# Patient Record
Sex: MALE | Race: WHITE | NOT HISPANIC OR LATINO | Employment: OTHER | URBAN - NONMETROPOLITAN AREA
[De-identification: names, ages, dates, MRNs, and addresses within clinical notes are randomized per-mention and may not be internally consistent; named-entity substitution may affect disease eponyms.]

---

## 2018-02-06 ENCOUNTER — APPOINTMENT (OUTPATIENT)
Dept: CT IMAGING | Facility: HOSPITAL | Age: 68
End: 2018-02-06

## 2018-02-06 ENCOUNTER — HOSPITAL ENCOUNTER (INPATIENT)
Facility: HOSPITAL | Age: 68
LOS: 3 days | Discharge: HOME | End: 2018-02-09
Attending: EMERGENCY MEDICINE | Admitting: FAMILY MEDICINE

## 2018-02-06 ENCOUNTER — APPOINTMENT (OUTPATIENT)
Dept: GENERAL RADIOLOGY | Facility: HOSPITAL | Age: 68
End: 2018-02-06

## 2018-02-06 DIAGNOSIS — Z78.9 IMPAIRED MOBILITY AND ADLS: ICD-10-CM

## 2018-02-06 DIAGNOSIS — Z74.09 IMPAIRED MOBILITY AND ADLS: ICD-10-CM

## 2018-02-06 DIAGNOSIS — Z74.09 IMPAIRED PHYSICAL MOBILITY: ICD-10-CM

## 2018-02-06 DIAGNOSIS — J18.9 PNEUMONIA OF RIGHT LOWER LOBE DUE TO INFECTIOUS ORGANISM: Primary | ICD-10-CM

## 2018-02-06 PROBLEM — J44.1 CHRONIC OBSTRUCTIVE PULMONARY DISEASE WITH ACUTE EXACERBATION: Status: ACTIVE | Noted: 2018-02-06

## 2018-02-06 PROBLEM — J96.21 ACUTE ON CHRONIC RESPIRATORY FAILURE WITH HYPOXIA AND HYPERCAPNIA: Status: ACTIVE | Noted: 2018-02-06

## 2018-02-06 PROBLEM — A41.9 SEPSIS DUE TO PNEUMONIA (HCC): Status: ACTIVE | Noted: 2018-02-06

## 2018-02-06 PROBLEM — J96.22 ACUTE ON CHRONIC RESPIRATORY FAILURE WITH HYPOXIA AND HYPERCAPNIA: Status: ACTIVE | Noted: 2018-02-06

## 2018-02-06 LAB
ALBUMIN SERPL-MCNC: 3.9 G/DL (ref 3.4–4.8)
ALBUMIN/GLOB SERPL: 1 G/DL (ref 1.1–1.8)
ALP SERPL-CCNC: 98 U/L (ref 38–126)
ALT SERPL W P-5'-P-CCNC: 38 U/L (ref 21–72)
ANION GAP SERPL CALCULATED.3IONS-SCNC: 14 MMOL/L (ref 5–15)
ARTERIAL PATENCY WRIST A: ABNORMAL
AST SERPL-CCNC: 27 U/L (ref 17–59)
ATMOSPHERIC PRESS: ABNORMAL MMHG
BASE EXCESS BLDA CALC-SCNC: 6.1 MMOL/L (ref -2.4–2.4)
BASOPHILS # BLD AUTO: 0.03 10*3/MM3 (ref 0–0.2)
BASOPHILS NFR BLD AUTO: 0.2 % (ref 0–2)
BDY SITE: ABNORMAL
BILIRUB SERPL-MCNC: 0.5 MG/DL (ref 0.2–1.3)
BUN BLD-MCNC: 10 MG/DL (ref 7–21)
BUN/CREAT SERPL: 19.2 (ref 7–25)
CA-I BLD-MCNC: 4.6 MG/DL (ref 4.5–4.9)
CALCIUM SPEC-SCNC: 8.7 MG/DL (ref 8.4–10.2)
CHLORIDE SERPL-SCNC: 89 MMOL/L (ref 95–110)
CO2 BLDA-SCNC: 33.4 MMOL/L (ref 23–27)
CO2 SERPL-SCNC: 31 MMOL/L (ref 22–31)
CREAT BLD-MCNC: 0.52 MG/DL (ref 0.7–1.3)
D-DIMER, QUANTITATIVE (MAD,POW, STR): 2509 NG/ML (FEU) (ref 0–470)
D-LACTATE SERPL-SCNC: 0.9 MMOL/L (ref 0.5–2)
DEPRECATED RDW RBC AUTO: 39.9 FL (ref 35.1–43.9)
EOSINOPHIL # BLD AUTO: 0.2 10*3/MM3 (ref 0–0.7)
EOSINOPHIL NFR BLD AUTO: 1.6 % (ref 0–7)
ERYTHROCYTE [DISTWIDTH] IN BLOOD BY AUTOMATED COUNT: 12 % (ref 11.5–14.5)
GFR SERPL CREATININE-BSD FRML MDRD: >150 ML/MIN/1.73 (ref 60–113)
GLOBULIN UR ELPH-MCNC: 3.9 GM/DL (ref 2.3–3.5)
GLUCOSE BLD-MCNC: 101 MG/DL (ref 60–100)
GLUCOSE BLDA-MCNC: 106 MMOL/L
HCO3 BLDA-SCNC: 31.9 MMOL/L (ref 22–26)
HCT VFR BLD AUTO: 41.2 % (ref 39–49)
HCT VFR BLD CALC: 42 % (ref 40–54)
HGB BLD-MCNC: 14 G/DL (ref 13.7–17.3)
HGB BLDA-MCNC: 14.2 G/DL (ref 14–18)
HOLD SPECIMEN: NORMAL
IMM GRANULOCYTES # BLD: 0.09 10*3/MM3 (ref 0–0.02)
IMM GRANULOCYTES NFR BLD: 0.7 % (ref 0–0.5)
LYMPHOCYTES # BLD AUTO: 1.11 10*3/MM3 (ref 0.6–4.2)
LYMPHOCYTES NFR BLD AUTO: 8.8 % (ref 10–50)
MCH RBC QN AUTO: 30.8 PG (ref 26.5–34)
MCHC RBC AUTO-ENTMCNC: 34 G/DL (ref 31.5–36.3)
MCV RBC AUTO: 90.5 FL (ref 80–98)
MODALITY: ABNORMAL
MONOCYTES # BLD AUTO: 2.14 10*3/MM3 (ref 0–0.9)
MONOCYTES NFR BLD AUTO: 17 % (ref 0–12)
NEUTROPHILS # BLD AUTO: 9 10*3/MM3 (ref 2–8.6)
NEUTROPHILS NFR BLD AUTO: 71.7 % (ref 37–80)
NT-PROBNP SERPL-MCNC: 72 PG/ML (ref 0–900)
PCO2 BLDA: 50.3 MM HG (ref 35–45)
PH BLDA: 7.42 PH UNITS (ref 7.35–7.45)
PLATELET # BLD AUTO: 324 10*3/MM3 (ref 150–450)
PMV BLD AUTO: 10.4 FL (ref 8–12)
PO2 BLDA: 108.2 MM HG (ref 80–105)
POTASSIUM BLD-SCNC: 3.8 MMOL/L (ref 3.5–5.1)
POTASSIUM BLDA-SCNC: 3.7 MMOL/L (ref 3.6–4.9)
PROT SERPL-MCNC: 7.8 G/DL (ref 6.3–8.6)
RBC # BLD AUTO: 4.55 10*6/MM3 (ref 4.37–5.74)
SAO2 % BLDCOA: 98.2 %
SODIUM BLD-SCNC: 134 MMOL/L (ref 137–145)
SODIUM BLDA-SCNC: 132 MMOL/L (ref 138–146)
WBC NRBC COR # BLD: 12.57 10*3/MM3 (ref 3.2–9.8)
WHOLE BLOOD HOLD SPECIMEN: NORMAL

## 2018-02-06 PROCEDURE — 93005 ELECTROCARDIOGRAM TRACING: CPT | Performed by: EMERGENCY MEDICINE

## 2018-02-06 PROCEDURE — 93010 ELECTROCARDIOGRAM REPORT: CPT | Performed by: INTERNAL MEDICINE

## 2018-02-06 PROCEDURE — 25010000002 LEVOFLOXACIN PER 250 MG: Performed by: EMERGENCY MEDICINE

## 2018-02-06 PROCEDURE — 94760 N-INVAS EAR/PLS OXIMETRY 1: CPT

## 2018-02-06 PROCEDURE — 71045 X-RAY EXAM CHEST 1 VIEW: CPT

## 2018-02-06 PROCEDURE — 82803 BLOOD GASES ANY COMBINATION: CPT | Performed by: EMERGENCY MEDICINE

## 2018-02-06 PROCEDURE — 99221 1ST HOSP IP/OBS SF/LOW 40: CPT | Performed by: FAMILY MEDICINE

## 2018-02-06 PROCEDURE — 94799 UNLISTED PULMONARY SVC/PX: CPT

## 2018-02-06 PROCEDURE — 83605 ASSAY OF LACTIC ACID: CPT | Performed by: EMERGENCY MEDICINE

## 2018-02-06 PROCEDURE — 99285 EMERGENCY DEPT VISIT HI MDM: CPT

## 2018-02-06 PROCEDURE — 85379 FIBRIN DEGRADATION QUANT: CPT | Performed by: STUDENT IN AN ORGANIZED HEALTH CARE EDUCATION/TRAINING PROGRAM

## 2018-02-06 PROCEDURE — 85025 COMPLETE CBC W/AUTO DIFF WBC: CPT | Performed by: EMERGENCY MEDICINE

## 2018-02-06 PROCEDURE — 87040 BLOOD CULTURE FOR BACTERIA: CPT | Performed by: EMERGENCY MEDICINE

## 2018-02-06 PROCEDURE — 25010000002 METHYLPREDNISOLONE PER 125 MG: Performed by: EMERGENCY MEDICINE

## 2018-02-06 PROCEDURE — 94640 AIRWAY INHALATION TREATMENT: CPT

## 2018-02-06 PROCEDURE — 0 IOPAMIDOL PER 1 ML: Performed by: STUDENT IN AN ORGANIZED HEALTH CARE EDUCATION/TRAINING PROGRAM

## 2018-02-06 PROCEDURE — 83880 ASSAY OF NATRIURETIC PEPTIDE: CPT | Performed by: EMERGENCY MEDICINE

## 2018-02-06 PROCEDURE — 71275 CT ANGIOGRAPHY CHEST: CPT

## 2018-02-06 PROCEDURE — 80053 COMPREHEN METABOLIC PANEL: CPT | Performed by: EMERGENCY MEDICINE

## 2018-02-06 RX ORDER — SODIUM CHLORIDE 0.9 % (FLUSH) 0.9 %
10 SYRINGE (ML) INJECTION AS NEEDED
Status: DISCONTINUED | OUTPATIENT
Start: 2018-02-06 | End: 2018-02-09 | Stop reason: HOSPADM

## 2018-02-06 RX ORDER — ACETAMINOPHEN 325 MG/1
650 TABLET ORAL EVERY 4 HOURS PRN
Status: DISCONTINUED | OUTPATIENT
Start: 2018-02-06 | End: 2018-02-09 | Stop reason: HOSPADM

## 2018-02-06 RX ORDER — ASPIRIN 81 MG/1
81 TABLET, CHEWABLE ORAL DAILY
COMMUNITY
End: 2022-09-14

## 2018-02-06 RX ORDER — IPRATROPIUM BROMIDE AND ALBUTEROL SULFATE 2.5; .5 MG/3ML; MG/3ML
3 SOLUTION RESPIRATORY (INHALATION)
Status: DISCONTINUED | OUTPATIENT
Start: 2018-02-06 | End: 2018-02-06

## 2018-02-06 RX ORDER — BISACODYL 5 MG/1
5 TABLET, DELAYED RELEASE ORAL DAILY PRN
Status: DISCONTINUED | OUTPATIENT
Start: 2018-02-06 | End: 2018-02-09 | Stop reason: HOSPADM

## 2018-02-06 RX ORDER — MONTELUKAST SODIUM 10 MG/1
10 TABLET ORAL NIGHTLY
Status: DISCONTINUED | OUTPATIENT
Start: 2018-02-06 | End: 2018-02-09 | Stop reason: HOSPADM

## 2018-02-06 RX ORDER — LEVOFLOXACIN 5 MG/ML
750 INJECTION, SOLUTION INTRAVENOUS ONCE
Status: COMPLETED | OUTPATIENT
Start: 2018-02-06 | End: 2018-02-06

## 2018-02-06 RX ORDER — PREDNISONE 1 MG/1
5 TABLET ORAL EVERY OTHER DAY
COMMUNITY

## 2018-02-06 RX ORDER — SODIUM CHLORIDE 0.9 % (FLUSH) 0.9 %
1-10 SYRINGE (ML) INJECTION AS NEEDED
Status: DISCONTINUED | OUTPATIENT
Start: 2018-02-06 | End: 2018-02-09 | Stop reason: HOSPADM

## 2018-02-06 RX ORDER — LEVOFLOXACIN 750 MG/1
750 TABLET ORAL EVERY 24 HOURS
Status: DISCONTINUED | OUTPATIENT
Start: 2018-02-07 | End: 2018-02-09 | Stop reason: HOSPADM

## 2018-02-06 RX ORDER — METHYLPREDNISOLONE SODIUM SUCCINATE 125 MG/2ML
125 INJECTION, POWDER, LYOPHILIZED, FOR SOLUTION INTRAMUSCULAR; INTRAVENOUS ONCE
Status: COMPLETED | OUTPATIENT
Start: 2018-02-06 | End: 2018-02-06

## 2018-02-06 RX ORDER — SODIUM CHLORIDE 9 MG/ML
75 INJECTION, SOLUTION INTRAVENOUS CONTINUOUS
Status: DISCONTINUED | OUTPATIENT
Start: 2018-02-06 | End: 2018-02-09

## 2018-02-06 RX ORDER — MONTELUKAST SODIUM 10 MG/1
10 TABLET ORAL NIGHTLY
COMMUNITY
End: 2023-02-10

## 2018-02-06 RX ORDER — LEVOFLOXACIN 5 MG/ML
750 INJECTION, SOLUTION INTRAVENOUS ONCE
Status: DISCONTINUED | OUTPATIENT
Start: 2018-02-07 | End: 2018-02-06

## 2018-02-06 RX ORDER — ASPIRIN 81 MG/1
81 TABLET, CHEWABLE ORAL DAILY
Status: DISCONTINUED | OUTPATIENT
Start: 2018-02-06 | End: 2018-02-09 | Stop reason: HOSPADM

## 2018-02-06 RX ORDER — FAMOTIDINE 40 MG/1
40 TABLET, FILM COATED ORAL DAILY
Status: DISCONTINUED | OUTPATIENT
Start: 2018-02-06 | End: 2018-02-09 | Stop reason: HOSPADM

## 2018-02-06 RX ADMIN — LEVOFLOXACIN 750 MG: 5 INJECTION, SOLUTION INTRAVENOUS at 15:10

## 2018-02-06 RX ADMIN — MONTELUKAST SODIUM 10 MG: 10 TABLET, FILM COATED ORAL at 21:16

## 2018-02-06 RX ADMIN — IOPAMIDOL 57 ML: 755 INJECTION, SOLUTION INTRAVENOUS at 19:30

## 2018-02-06 RX ADMIN — SODIUM CHLORIDE 100 ML/HR: 900 INJECTION, SOLUTION INTRAVENOUS at 13:25

## 2018-02-06 RX ADMIN — SODIUM CHLORIDE 500 ML: 9 INJECTION, SOLUTION INTRAVENOUS at 15:45

## 2018-02-06 RX ADMIN — IPRATROPIUM BROMIDE 0.5 MG: 0.5 SOLUTION RESPIRATORY (INHALATION) at 22:33

## 2018-02-06 RX ADMIN — METHYLPREDNISOLONE SODIUM SUCCINATE 125 MG: 125 INJECTION, POWDER, FOR SOLUTION INTRAMUSCULAR; INTRAVENOUS at 13:25

## 2018-02-06 RX ADMIN — Medication 10 ML: at 13:25

## 2018-02-07 LAB
ALBUMIN SERPL-MCNC: 3.1 G/DL (ref 3.4–4.8)
ALBUMIN/GLOB SERPL: 0.9 G/DL (ref 1.1–1.8)
ALP SERPL-CCNC: 80 U/L (ref 38–126)
ALT SERPL W P-5'-P-CCNC: 40 U/L (ref 21–72)
ANION GAP SERPL CALCULATED.3IONS-SCNC: 5 MMOL/L (ref 5–15)
AST SERPL-CCNC: 23 U/L (ref 17–59)
BASOPHILS # BLD AUTO: 0 10*3/MM3 (ref 0–0.2)
BASOPHILS NFR BLD AUTO: 0 % (ref 0–2)
BILIRUB SERPL-MCNC: <0.1 MG/DL (ref 0.2–1.3)
BUN BLD-MCNC: 11 MG/DL (ref 7–21)
BUN/CREAT SERPL: 21.6 (ref 7–25)
CALCIUM SPEC-SCNC: 8.6 MG/DL (ref 8.4–10.2)
CHLORIDE SERPL-SCNC: 99 MMOL/L (ref 95–110)
CO2 SERPL-SCNC: 32 MMOL/L (ref 22–31)
CREAT BLD-MCNC: 0.51 MG/DL (ref 0.7–1.3)
DEPRECATED RDW RBC AUTO: 39.7 FL (ref 35.1–43.9)
EOSINOPHIL # BLD AUTO: 0 10*3/MM3 (ref 0–0.7)
EOSINOPHIL NFR BLD AUTO: 0 % (ref 0–7)
ERYTHROCYTE [DISTWIDTH] IN BLOOD BY AUTOMATED COUNT: 11.9 % (ref 11.5–14.5)
GFR SERPL CREATININE-BSD FRML MDRD: 162 ML/MIN/1.73 (ref 49–113)
GLOBULIN UR ELPH-MCNC: 3.4 GM/DL (ref 2.3–3.5)
GLUCOSE BLD-MCNC: 139 MG/DL (ref 60–100)
HCT VFR BLD AUTO: 36.4 % (ref 39–49)
HGB BLD-MCNC: 12.4 G/DL (ref 13.7–17.3)
IMM GRANULOCYTES # BLD: 0.05 10*3/MM3 (ref 0–0.02)
IMM GRANULOCYTES NFR BLD: 0.6 % (ref 0–0.5)
LYMPHOCYTES # BLD AUTO: 0.68 10*3/MM3 (ref 0.6–4.2)
LYMPHOCYTES NFR BLD AUTO: 7.9 % (ref 10–50)
MCH RBC QN AUTO: 30.8 PG (ref 26.5–34)
MCHC RBC AUTO-ENTMCNC: 34.1 G/DL (ref 31.5–36.3)
MCV RBC AUTO: 90.5 FL (ref 80–98)
MONOCYTES # BLD AUTO: 0.92 10*3/MM3 (ref 0–0.9)
MONOCYTES NFR BLD AUTO: 10.7 % (ref 0–12)
NEUTROPHILS # BLD AUTO: 6.97 10*3/MM3 (ref 2–8.6)
NEUTROPHILS NFR BLD AUTO: 80.8 % (ref 37–80)
PLATELET # BLD AUTO: 351 10*3/MM3 (ref 150–450)
PMV BLD AUTO: 10.2 FL (ref 8–12)
POTASSIUM BLD-SCNC: 4.3 MMOL/L (ref 3.5–5.1)
PROT SERPL-MCNC: 6.5 G/DL (ref 6.3–8.6)
RBC # BLD AUTO: 4.02 10*6/MM3 (ref 4.37–5.74)
SODIUM BLD-SCNC: 136 MMOL/L (ref 137–145)
WBC NRBC COR # BLD: 8.62 10*3/MM3 (ref 3.2–9.8)

## 2018-02-07 PROCEDURE — 99232 SBSQ HOSP IP/OBS MODERATE 35: CPT | Performed by: STUDENT IN AN ORGANIZED HEALTH CARE EDUCATION/TRAINING PROGRAM

## 2018-02-07 PROCEDURE — 80053 COMPREHEN METABOLIC PANEL: CPT | Performed by: STUDENT IN AN ORGANIZED HEALTH CARE EDUCATION/TRAINING PROGRAM

## 2018-02-07 PROCEDURE — 94760 N-INVAS EAR/PLS OXIMETRY 1: CPT

## 2018-02-07 PROCEDURE — 94799 UNLISTED PULMONARY SVC/PX: CPT

## 2018-02-07 PROCEDURE — 63710000001 PREDNISONE PER 1 MG: Performed by: STUDENT IN AN ORGANIZED HEALTH CARE EDUCATION/TRAINING PROGRAM

## 2018-02-07 PROCEDURE — 85025 COMPLETE CBC W/AUTO DIFF WBC: CPT | Performed by: STUDENT IN AN ORGANIZED HEALTH CARE EDUCATION/TRAINING PROGRAM

## 2018-02-07 RX ORDER — PREDNISONE 20 MG/1
40 TABLET ORAL
Status: DISCONTINUED | OUTPATIENT
Start: 2018-02-07 | End: 2018-02-09 | Stop reason: HOSPADM

## 2018-02-07 RX ORDER — IPRATROPIUM BROMIDE AND ALBUTEROL SULFATE 2.5; .5 MG/3ML; MG/3ML
3 SOLUTION RESPIRATORY (INHALATION)
Status: DISCONTINUED | OUTPATIENT
Start: 2018-02-07 | End: 2018-02-07

## 2018-02-07 RX ADMIN — PREDNISONE 40 MG: 20 TABLET ORAL at 08:31

## 2018-02-07 RX ADMIN — SODIUM CHLORIDE 75 ML/HR: 900 INJECTION, SOLUTION INTRAVENOUS at 15:31

## 2018-02-07 RX ADMIN — SODIUM CHLORIDE 100 ML/HR: 900 INJECTION, SOLUTION INTRAVENOUS at 04:19

## 2018-02-07 RX ADMIN — IPRATROPIUM BROMIDE 0.5 MG: 0.5 SOLUTION RESPIRATORY (INHALATION) at 15:36

## 2018-02-07 RX ADMIN — LEVOFLOXACIN 750 MG: 750 TABLET, FILM COATED ORAL at 15:30

## 2018-02-07 RX ADMIN — IPRATROPIUM BROMIDE 0.5 MG: 0.5 SOLUTION RESPIRATORY (INHALATION) at 18:36

## 2018-02-07 RX ADMIN — ASPIRIN 81 MG 81 MG: 81 TABLET ORAL at 08:31

## 2018-02-07 RX ADMIN — FAMOTIDINE 40 MG: 40 TABLET ORAL at 08:31

## 2018-02-07 RX ADMIN — MONTELUKAST SODIUM 10 MG: 10 TABLET, FILM COATED ORAL at 20:04

## 2018-02-07 RX ADMIN — IPRATROPIUM BROMIDE 0.5 MG: 0.5 SOLUTION RESPIRATORY (INHALATION) at 07:17

## 2018-02-08 ENCOUNTER — APPOINTMENT (OUTPATIENT)
Dept: ULTRASOUND IMAGING | Facility: HOSPITAL | Age: 68
End: 2018-02-08

## 2018-02-08 PROBLEM — E87.6 HYPOKALEMIA: Status: ACTIVE | Noted: 2018-02-08

## 2018-02-08 LAB
ALBUMIN SERPL-MCNC: 2.9 G/DL (ref 3.4–4.8)
ALBUMIN/GLOB SERPL: 0.9 G/DL (ref 1.1–1.8)
ALP SERPL-CCNC: 76 U/L (ref 38–126)
ALT SERPL W P-5'-P-CCNC: 34 U/L (ref 21–72)
ANION GAP SERPL CALCULATED.3IONS-SCNC: 8 MMOL/L (ref 5–15)
AST SERPL-CCNC: 21 U/L (ref 17–59)
BACTERIA SPEC RESP CULT: NORMAL
BASOPHILS # BLD AUTO: 0.01 10*3/MM3 (ref 0–0.2)
BASOPHILS NFR BLD AUTO: 0.1 % (ref 0–2)
BILIRUB SERPL-MCNC: <0.1 MG/DL (ref 0.2–1.3)
BUN BLD-MCNC: 12 MG/DL (ref 7–21)
BUN/CREAT SERPL: 21.1 (ref 7–25)
CALCIUM SPEC-SCNC: 8.6 MG/DL (ref 8.4–10.2)
CHLORIDE SERPL-SCNC: 101 MMOL/L (ref 95–110)
CO2 SERPL-SCNC: 30 MMOL/L (ref 22–31)
CREAT BLD-MCNC: 0.57 MG/DL (ref 0.7–1.3)
DEPRECATED RDW RBC AUTO: 41.1 FL (ref 35.1–43.9)
EOSINOPHIL # BLD AUTO: 0.05 10*3/MM3 (ref 0–0.7)
EOSINOPHIL NFR BLD AUTO: 0.4 % (ref 0–7)
ERYTHROCYTE [DISTWIDTH] IN BLOOD BY AUTOMATED COUNT: 12.2 % (ref 11.5–14.5)
GFR SERPL CREATININE-BSD FRML MDRD: 142 ML/MIN/1.73 (ref 49–113)
GLOBULIN UR ELPH-MCNC: 3.2 GM/DL (ref 2.3–3.5)
GLUCOSE BLD-MCNC: 98 MG/DL (ref 60–100)
GRAM STN SPEC: NORMAL
GRAM STN SPEC: NORMAL
HCT VFR BLD AUTO: 36.1 % (ref 39–49)
HGB BLD-MCNC: 12 G/DL (ref 13.7–17.3)
IMM GRANULOCYTES # BLD: 0.12 10*3/MM3 (ref 0–0.02)
IMM GRANULOCYTES NFR BLD: 1 % (ref 0–0.5)
L PNEUMO1 AG UR QL IA: NEGATIVE
LYMPHOCYTES # BLD AUTO: 1.69 10*3/MM3 (ref 0.6–4.2)
LYMPHOCYTES NFR BLD AUTO: 14.2 % (ref 10–50)
MCH RBC QN AUTO: 30.7 PG (ref 26.5–34)
MCHC RBC AUTO-ENTMCNC: 33.2 G/DL (ref 31.5–36.3)
MCV RBC AUTO: 92.3 FL (ref 80–98)
MONOCYTES # BLD AUTO: 2.01 10*3/MM3 (ref 0–0.9)
MONOCYTES NFR BLD AUTO: 16.9 % (ref 0–12)
NEUTROPHILS # BLD AUTO: 8.03 10*3/MM3 (ref 2–8.6)
NEUTROPHILS NFR BLD AUTO: 67.4 % (ref 37–80)
PLATELET # BLD AUTO: 398 10*3/MM3 (ref 150–450)
PMV BLD AUTO: 10 FL (ref 8–12)
POTASSIUM BLD-SCNC: 3.2 MMOL/L (ref 3.5–5.1)
PROT SERPL-MCNC: 6.1 G/DL (ref 6.3–8.6)
RBC # BLD AUTO: 3.91 10*6/MM3 (ref 4.37–5.74)
S PNEUM AG SPEC QL LA: NEGATIVE
SODIUM BLD-SCNC: 139 MMOL/L (ref 137–145)
WBC NRBC COR # BLD: 11.91 10*3/MM3 (ref 3.2–9.8)

## 2018-02-08 PROCEDURE — 97166 OT EVAL MOD COMPLEX 45 MIN: CPT

## 2018-02-08 PROCEDURE — 94799 UNLISTED PULMONARY SVC/PX: CPT

## 2018-02-08 PROCEDURE — G8978 MOBILITY CURRENT STATUS: HCPCS

## 2018-02-08 PROCEDURE — 97530 THERAPEUTIC ACTIVITIES: CPT

## 2018-02-08 PROCEDURE — 93970 EXTREMITY STUDY: CPT

## 2018-02-08 PROCEDURE — 87581 M.PNEUMON DNA AMP PROBE: CPT | Performed by: STUDENT IN AN ORGANIZED HEALTH CARE EDUCATION/TRAINING PROGRAM

## 2018-02-08 PROCEDURE — 97116 GAIT TRAINING THERAPY: CPT

## 2018-02-08 PROCEDURE — 99232 SBSQ HOSP IP/OBS MODERATE 35: CPT | Performed by: STUDENT IN AN ORGANIZED HEALTH CARE EDUCATION/TRAINING PROGRAM

## 2018-02-08 PROCEDURE — 87899 AGENT NOS ASSAY W/OPTIC: CPT | Performed by: STUDENT IN AN ORGANIZED HEALTH CARE EDUCATION/TRAINING PROGRAM

## 2018-02-08 PROCEDURE — G8979 MOBILITY GOAL STATUS: HCPCS

## 2018-02-08 PROCEDURE — 85025 COMPLETE CBC W/AUTO DIFF WBC: CPT | Performed by: STUDENT IN AN ORGANIZED HEALTH CARE EDUCATION/TRAINING PROGRAM

## 2018-02-08 PROCEDURE — 87205 SMEAR GRAM STAIN: CPT | Performed by: STUDENT IN AN ORGANIZED HEALTH CARE EDUCATION/TRAINING PROGRAM

## 2018-02-08 PROCEDURE — 97162 PT EVAL MOD COMPLEX 30 MIN: CPT

## 2018-02-08 PROCEDURE — 63710000001 PREDNISONE PER 1 MG: Performed by: STUDENT IN AN ORGANIZED HEALTH CARE EDUCATION/TRAINING PROGRAM

## 2018-02-08 PROCEDURE — 80053 COMPREHEN METABOLIC PANEL: CPT | Performed by: STUDENT IN AN ORGANIZED HEALTH CARE EDUCATION/TRAINING PROGRAM

## 2018-02-08 PROCEDURE — G8987 SELF CARE CURRENT STATUS: HCPCS

## 2018-02-08 PROCEDURE — 94760 N-INVAS EAR/PLS OXIMETRY 1: CPT

## 2018-02-08 PROCEDURE — G8988 SELF CARE GOAL STATUS: HCPCS

## 2018-02-08 RX ORDER — POTASSIUM CHLORIDE 750 MG/1
40 CAPSULE, EXTENDED RELEASE ORAL DAILY
Status: DISCONTINUED | OUTPATIENT
Start: 2018-02-08 | End: 2018-02-09

## 2018-02-08 RX ADMIN — FAMOTIDINE 40 MG: 40 TABLET ORAL at 08:17

## 2018-02-08 RX ADMIN — LEVOFLOXACIN 750 MG: 750 TABLET, FILM COATED ORAL at 15:22

## 2018-02-08 RX ADMIN — PREDNISONE 40 MG: 20 TABLET ORAL at 08:16

## 2018-02-08 RX ADMIN — IPRATROPIUM BROMIDE 0.5 MG: 0.5 SOLUTION RESPIRATORY (INHALATION) at 19:37

## 2018-02-08 RX ADMIN — IPRATROPIUM BROMIDE 0.5 MG: 0.5 SOLUTION RESPIRATORY (INHALATION) at 15:25

## 2018-02-08 RX ADMIN — SODIUM CHLORIDE 75 ML/HR: 900 INJECTION, SOLUTION INTRAVENOUS at 18:56

## 2018-02-08 RX ADMIN — SODIUM CHLORIDE 75 ML/HR: 900 INJECTION, SOLUTION INTRAVENOUS at 04:50

## 2018-02-08 RX ADMIN — POTASSIUM CHLORIDE 40 MEQ: 750 CAPSULE, EXTENDED RELEASE ORAL at 10:38

## 2018-02-08 RX ADMIN — IPRATROPIUM BROMIDE 0.5 MG: 0.5 SOLUTION RESPIRATORY (INHALATION) at 10:47

## 2018-02-08 RX ADMIN — ASPIRIN 81 MG 81 MG: 81 TABLET ORAL at 08:16

## 2018-02-08 RX ADMIN — MONTELUKAST SODIUM 10 MG: 10 TABLET, FILM COATED ORAL at 21:24

## 2018-02-08 RX ADMIN — IPRATROPIUM BROMIDE 0.5 MG: 0.5 SOLUTION RESPIRATORY (INHALATION) at 06:31

## 2018-02-09 VITALS
RESPIRATION RATE: 16 BRPM | HEIGHT: 65 IN | HEART RATE: 107 BPM | BODY MASS INDEX: 26.66 KG/M2 | WEIGHT: 160 LBS | OXYGEN SATURATION: 95 % | SYSTOLIC BLOOD PRESSURE: 113 MMHG | DIASTOLIC BLOOD PRESSURE: 61 MMHG | TEMPERATURE: 97.5 F

## 2018-02-09 PROBLEM — R53.81 PHYSICAL DECONDITIONING: Status: ACTIVE | Noted: 2018-02-09

## 2018-02-09 LAB
ALBUMIN SERPL-MCNC: 3 G/DL (ref 3.4–4.8)
ALBUMIN/GLOB SERPL: 0.9 G/DL (ref 1.1–1.8)
ALP SERPL-CCNC: 69 U/L (ref 38–126)
ALT SERPL W P-5'-P-CCNC: 34 U/L (ref 21–72)
ANION GAP SERPL CALCULATED.3IONS-SCNC: 6 MMOL/L (ref 5–15)
AST SERPL-CCNC: 18 U/L (ref 17–59)
BASOPHILS # BLD AUTO: 0.01 10*3/MM3 (ref 0–0.2)
BASOPHILS NFR BLD AUTO: 0.1 % (ref 0–2)
BILIRUB SERPL-MCNC: <0.1 MG/DL (ref 0.2–1.3)
BUN BLD-MCNC: 11 MG/DL (ref 7–21)
BUN/CREAT SERPL: 19.3 (ref 7–25)
CALCIUM SPEC-SCNC: 8.4 MG/DL (ref 8.4–10.2)
CHLORIDE SERPL-SCNC: 100 MMOL/L (ref 95–110)
CO2 SERPL-SCNC: 33 MMOL/L (ref 22–31)
CREAT BLD-MCNC: 0.57 MG/DL (ref 0.7–1.3)
DEPRECATED RDW RBC AUTO: 41 FL (ref 35.1–43.9)
EOSINOPHIL # BLD AUTO: 0.02 10*3/MM3 (ref 0–0.7)
EOSINOPHIL NFR BLD AUTO: 0.2 % (ref 0–7)
ERYTHROCYTE [DISTWIDTH] IN BLOOD BY AUTOMATED COUNT: 11.9 % (ref 11.5–14.5)
GFR SERPL CREATININE-BSD FRML MDRD: 142 ML/MIN/1.73 (ref 60–113)
GLOBULIN UR ELPH-MCNC: 3.2 GM/DL (ref 2.3–3.5)
GLUCOSE BLD-MCNC: 108 MG/DL (ref 60–100)
HCT VFR BLD AUTO: 36.7 % (ref 39–49)
HGB BLD-MCNC: 12.1 G/DL (ref 13.7–17.3)
IMM GRANULOCYTES # BLD: 0.12 10*3/MM3 (ref 0–0.02)
IMM GRANULOCYTES NFR BLD: 1.2 % (ref 0–0.5)
LYMPHOCYTES # BLD AUTO: 1.27 10*3/MM3 (ref 0.6–4.2)
LYMPHOCYTES NFR BLD AUTO: 12.7 % (ref 10–50)
MCH RBC QN AUTO: 30.7 PG (ref 26.5–34)
MCHC RBC AUTO-ENTMCNC: 33 G/DL (ref 31.5–36.3)
MCV RBC AUTO: 93.1 FL (ref 80–98)
MONOCYTES # BLD AUTO: 1.62 10*3/MM3 (ref 0–0.9)
MONOCYTES NFR BLD AUTO: 16.2 % (ref 0–12)
MYCOPLASMAE PNEUMONIAE BY PCR: NORMAL
NEUTROPHILS # BLD AUTO: 6.96 10*3/MM3 (ref 2–8.6)
NEUTROPHILS NFR BLD AUTO: 69.6 % (ref 37–80)
PLATELET # BLD AUTO: 420 10*3/MM3 (ref 150–450)
PMV BLD AUTO: 9.7 FL (ref 8–12)
POTASSIUM BLD-SCNC: 4 MMOL/L (ref 3.5–5.1)
PROT SERPL-MCNC: 6.2 G/DL (ref 6.3–8.6)
RBC # BLD AUTO: 3.94 10*6/MM3 (ref 4.37–5.74)
SODIUM BLD-SCNC: 139 MMOL/L (ref 137–145)
WBC NRBC COR # BLD: 10 10*3/MM3 (ref 3.2–9.8)

## 2018-02-09 PROCEDURE — 97530 THERAPEUTIC ACTIVITIES: CPT

## 2018-02-09 PROCEDURE — 94799 UNLISTED PULMONARY SVC/PX: CPT

## 2018-02-09 PROCEDURE — 80053 COMPREHEN METABOLIC PANEL: CPT | Performed by: STUDENT IN AN ORGANIZED HEALTH CARE EDUCATION/TRAINING PROGRAM

## 2018-02-09 PROCEDURE — 99232 SBSQ HOSP IP/OBS MODERATE 35: CPT | Performed by: STUDENT IN AN ORGANIZED HEALTH CARE EDUCATION/TRAINING PROGRAM

## 2018-02-09 PROCEDURE — 85025 COMPLETE CBC W/AUTO DIFF WBC: CPT | Performed by: STUDENT IN AN ORGANIZED HEALTH CARE EDUCATION/TRAINING PROGRAM

## 2018-02-09 PROCEDURE — 94760 N-INVAS EAR/PLS OXIMETRY 1: CPT

## 2018-02-09 PROCEDURE — 63710000001 PREDNISONE PER 1 MG: Performed by: STUDENT IN AN ORGANIZED HEALTH CARE EDUCATION/TRAINING PROGRAM

## 2018-02-09 PROCEDURE — 97535 SELF CARE MNGMENT TRAINING: CPT

## 2018-02-09 RX ORDER — FLUTICASONE PROPIONATE 220 UG/1
1 AEROSOL, METERED RESPIRATORY (INHALATION)
Qty: 12 G | Refills: 1 | Status: ON HOLD | OUTPATIENT
Start: 2018-02-09 | End: 2021-07-12

## 2018-02-09 RX ORDER — POTASSIUM CHLORIDE 750 MG/1
20 CAPSULE, EXTENDED RELEASE ORAL DAILY
Status: DISCONTINUED | OUTPATIENT
Start: 2018-02-09 | End: 2018-02-09 | Stop reason: HOSPADM

## 2018-02-09 RX ORDER — LEVOFLOXACIN 750 MG/1
750 TABLET ORAL EVERY 24 HOURS
Qty: 2 TABLET | Refills: 0 | Status: SHIPPED | OUTPATIENT
Start: 2018-02-09 | End: 2018-02-11

## 2018-02-09 RX ADMIN — FAMOTIDINE 40 MG: 40 TABLET ORAL at 08:38

## 2018-02-09 RX ADMIN — IPRATROPIUM BROMIDE 0.5 MG: 0.5 SOLUTION RESPIRATORY (INHALATION) at 09:47

## 2018-02-09 RX ADMIN — ASPIRIN 81 MG 81 MG: 81 TABLET ORAL at 08:39

## 2018-02-09 RX ADMIN — POTASSIUM CHLORIDE 20 MEQ: 750 CAPSULE, EXTENDED RELEASE ORAL at 08:38

## 2018-02-09 RX ADMIN — PREDNISONE 40 MG: 20 TABLET ORAL at 08:38

## 2018-02-09 RX ADMIN — IPRATROPIUM BROMIDE 0.5 MG: 0.5 SOLUTION RESPIRATORY (INHALATION) at 06:21

## 2018-02-11 LAB
BACTERIA SPEC AEROBE CULT: NORMAL
BACTERIA SPEC AEROBE CULT: NORMAL

## 2018-11-06 ENCOUNTER — HOSPITAL ENCOUNTER (EMERGENCY)
Facility: HOSPITAL | Age: 68
Discharge: HOME | End: 2018-11-06
Attending: FAMILY MEDICINE | Admitting: FAMILY MEDICINE

## 2018-11-06 ENCOUNTER — APPOINTMENT (OUTPATIENT)
Dept: GENERAL RADIOLOGY | Facility: HOSPITAL | Age: 68
End: 2018-11-06

## 2018-11-06 VITALS
DIASTOLIC BLOOD PRESSURE: 70 MMHG | HEART RATE: 95 BPM | TEMPERATURE: 98 F | HEIGHT: 65 IN | WEIGHT: 161 LBS | RESPIRATION RATE: 20 BRPM | BODY MASS INDEX: 26.82 KG/M2 | SYSTOLIC BLOOD PRESSURE: 152 MMHG | OXYGEN SATURATION: 96 %

## 2018-11-06 DIAGNOSIS — J44.9 CHRONIC OBSTRUCTIVE PULMONARY DISEASE, UNSPECIFIED COPD TYPE (HCC): Primary | ICD-10-CM

## 2018-11-06 LAB
ALBUMIN SERPL-MCNC: 4 G/DL (ref 3.4–4.8)
ALBUMIN/GLOB SERPL: 1.2 G/DL (ref 1.1–1.8)
ALP SERPL-CCNC: 91 U/L (ref 38–126)
ALT SERPL W P-5'-P-CCNC: 31 U/L (ref 21–72)
ANION GAP SERPL CALCULATED.3IONS-SCNC: 9 MMOL/L (ref 5–15)
AST SERPL-CCNC: 26 U/L (ref 17–59)
BASOPHILS # BLD AUTO: 0.02 10*3/MM3 (ref 0–0.2)
BASOPHILS NFR BLD AUTO: 0.1 % (ref 0–2)
BILIRUB SERPL-MCNC: 0.4 MG/DL (ref 0.2–1.3)
BILIRUB UR QL STRIP: NEGATIVE
BUN BLD-MCNC: 19 MG/DL (ref 7–21)
BUN/CREAT SERPL: 24.4 (ref 7–25)
CALCIUM SPEC-SCNC: 8.9 MG/DL (ref 8.4–10.2)
CHLORIDE SERPL-SCNC: 102 MMOL/L (ref 95–110)
CLARITY UR: CLEAR
CO2 SERPL-SCNC: 27 MMOL/L (ref 22–31)
COLOR UR: YELLOW
CREAT BLD-MCNC: 0.78 MG/DL (ref 0.7–1.3)
DEPRECATED RDW RBC AUTO: 41.3 FL (ref 35.1–43.9)
EOSINOPHIL # BLD AUTO: 0.05 10*3/MM3 (ref 0–0.7)
EOSINOPHIL NFR BLD AUTO: 0.3 % (ref 0–7)
ERYTHROCYTE [DISTWIDTH] IN BLOOD BY AUTOMATED COUNT: 12.4 % (ref 11.5–14.5)
FLUAV AG NPH QL: NEGATIVE
FLUBV AG NPH QL IA: NEGATIVE
GFR SERPL CREATININE-BSD FRML MDRD: 99 ML/MIN/1.73 (ref 49–113)
GLOBULIN UR ELPH-MCNC: 3.3 GM/DL (ref 2.3–3.5)
GLUCOSE BLD-MCNC: 105 MG/DL (ref 60–100)
GLUCOSE UR STRIP-MCNC: NEGATIVE MG/DL
HCT VFR BLD AUTO: 42.6 % (ref 39–49)
HGB BLD-MCNC: 14.8 G/DL (ref 13.7–17.3)
HGB UR QL STRIP.AUTO: NEGATIVE
HOLD SPECIMEN: NORMAL
HOLD SPECIMEN: NORMAL
IMM GRANULOCYTES # BLD: 0.06 10*3/MM3 (ref 0–0.02)
IMM GRANULOCYTES NFR BLD: 0.4 % (ref 0–0.5)
INR PPP: 1.02 (ref 0.8–1.2)
KETONES UR QL STRIP: NEGATIVE
LEUKOCYTE ESTERASE UR QL STRIP.AUTO: NEGATIVE
LYMPHOCYTES # BLD AUTO: 0.76 10*3/MM3 (ref 0.6–4.2)
LYMPHOCYTES NFR BLD AUTO: 5.2 % (ref 10–50)
MCH RBC QN AUTO: 31.6 PG (ref 26.5–34)
MCHC RBC AUTO-ENTMCNC: 34.7 G/DL (ref 31.5–36.3)
MCV RBC AUTO: 90.8 FL (ref 80–98)
MONOCYTES # BLD AUTO: 0.8 10*3/MM3 (ref 0–0.9)
MONOCYTES NFR BLD AUTO: 5.5 % (ref 0–12)
NEUTROPHILS # BLD AUTO: 12.88 10*3/MM3 (ref 2–8.6)
NEUTROPHILS NFR BLD AUTO: 88.5 % (ref 37–80)
NITRITE UR QL STRIP: NEGATIVE
NT-PROBNP SERPL-MCNC: 48 PG/ML (ref 0–900)
PH UR STRIP.AUTO: 7 [PH] (ref 5–9)
PLATELET # BLD AUTO: 219 10*3/MM3 (ref 150–450)
PMV BLD AUTO: 11.1 FL (ref 8–12)
POTASSIUM BLD-SCNC: 4.1 MMOL/L (ref 3.5–5.1)
PROT SERPL-MCNC: 7.3 G/DL (ref 6.3–8.6)
PROT UR QL STRIP: NEGATIVE
PROTHROMBIN TIME: 13.2 SECONDS (ref 11.1–15.3)
RBC # BLD AUTO: 4.69 10*6/MM3 (ref 4.37–5.74)
SODIUM BLD-SCNC: 138 MMOL/L (ref 137–145)
SP GR UR STRIP: 1 (ref 1–1.03)
TROPONIN I SERPL-MCNC: <0.012 NG/ML
UROBILINOGEN UR QL STRIP: NORMAL
WBC NRBC COR # BLD: 14.57 10*3/MM3 (ref 3.2–9.8)
WHOLE BLOOD HOLD SPECIMEN: NORMAL
WHOLE BLOOD HOLD SPECIMEN: NORMAL

## 2018-11-06 PROCEDURE — 71046 X-RAY EXAM CHEST 2 VIEWS: CPT

## 2018-11-06 PROCEDURE — 87804 INFLUENZA ASSAY W/OPTIC: CPT | Performed by: PHYSICIAN ASSISTANT

## 2018-11-06 PROCEDURE — 80053 COMPREHEN METABOLIC PANEL: CPT | Performed by: FAMILY MEDICINE

## 2018-11-06 PROCEDURE — 83880 ASSAY OF NATRIURETIC PEPTIDE: CPT | Performed by: PHYSICIAN ASSISTANT

## 2018-11-06 PROCEDURE — 85610 PROTHROMBIN TIME: CPT | Performed by: FAMILY MEDICINE

## 2018-11-06 PROCEDURE — 85025 COMPLETE CBC W/AUTO DIFF WBC: CPT

## 2018-11-06 PROCEDURE — 84484 ASSAY OF TROPONIN QUANT: CPT | Performed by: FAMILY MEDICINE

## 2018-11-06 PROCEDURE — 99284 EMERGENCY DEPT VISIT MOD MDM: CPT

## 2018-11-06 PROCEDURE — 94640 AIRWAY INHALATION TREATMENT: CPT

## 2018-11-06 PROCEDURE — 81003 URINALYSIS AUTO W/O SCOPE: CPT | Performed by: FAMILY MEDICINE

## 2018-11-06 RX ORDER — LEVOFLOXACIN 750 MG/1
750 TABLET ORAL DAILY
Qty: 4 TABLET | Refills: 0 | Status: SHIPPED | OUTPATIENT
Start: 2018-11-06 | End: 2018-11-10

## 2018-11-06 RX ADMIN — LEVOFLOXACIN 750 MG: 500 TABLET, FILM COATED ORAL at 17:21

## 2018-11-06 RX ADMIN — IPRATROPIUM BROMIDE 0.5 MG: 0.5 SOLUTION RESPIRATORY (INHALATION) at 16:37

## 2018-11-06 NOTE — ED PROVIDER NOTES
Subjective   Pt, hx of COPD and pulmonary embolism, reports oxygen drops to the 80s with exertion which started 1 week ago and sore throat and productive cough started yesterday. Called Dr. Starr, PCP and advised pt to come to the ED for further evaluation. In addition, pt has history of flu and pneumonia. Pt takes Ipratropium to manage COPD.        History provided by:  Patient   used: No    Cough   Cough characteristics:  Productive  Sputum characteristics:  Yellow  Severity:  Moderate  Onset quality:  Gradual  Duration:  1 day  Timing:  Constant  Progression:  Unchanged  Context: not animal exposure and not sick contacts    Associated symptoms: shortness of breath        Review of Systems   Constitutional: Negative for fatigue.   HENT: Negative for congestion.    Respiratory: Positive for cough and shortness of breath.    Gastrointestinal: Negative for vomiting.   Endocrine: Negative for polyuria.   Skin: Negative for color change.   Neurological: Negative for syncope.   Psychiatric/Behavioral: Negative for agitation.   All other systems reviewed and are negative.      Past Medical History:   Diagnosis Date   • COPD (chronic obstructive pulmonary disease) (CMS/[facility])        Allergies   Allergen Reactions   • Amoxicillin Anaphylaxis   • Albuterol Irritability     Facial flushing and palpitations.   • Lortab [Hydrocodone-Acetaminophen] Other (See Comments)     Can't remember   • Morphine And Related Hallucinations   • Prilosec [Omeprazole] Other (See Comments)     unknown   • Sulfa Antibiotics Other (See Comments)     Can't remember also more and can't remember   • Symbicort [Budesonide-Formoterol Fumarate] Unknown (See Comments)     Doesn't remember reaction type       Past Surgical History:   Procedure Laterality Date   • HIP ARTHROPLASTY         History reviewed. No pertinent family history.    Social History     Social History   • Marital status:      Social History Main Topics   •  Smoking status: Never Smoker   • Drug use: Unknown     Other Topics Concern   • Not on file           Objective   Physical Exam   Constitutional: He is oriented to person, place, and time. He appears well-developed and well-nourished.   HENT:   Head: Normocephalic.   Right Ear: Hearing normal.   Left Ear: Hearing normal.   Nose: Nose normal.   Eyes: Conjunctivae, EOM and lids are normal.   Neck: Trachea normal and full passive range of motion without pain.   Cardiovascular: Regular rhythm, S1 normal, S2 normal, normal heart sounds and normal pulses.    Pulmonary/Chest: Effort normal. He has wheezes in the right lower field and the left lower field.   Abdominal: Normal appearance and bowel sounds are normal.   Neurological: He is alert and oriented to person, place, and time. He is not disoriented.   Skin: Skin is warm and dry. He is not diaphoretic.   Psychiatric: He has a normal mood and affect. His speech is normal and behavior is normal. Thought content normal.   Nursing note and vitals reviewed.      Procedures         Labs Reviewed   COMPREHENSIVE METABOLIC PANEL - Abnormal; Notable for the following:        Result Value    Glucose 105 (*)     All other components within normal limits   CBC WITH AUTO DIFFERENTIAL - Abnormal; Notable for the following:     WBC 14.57 (*)     Neutrophil % 88.5 (*)     Lymphocyte % 5.2 (*)     Neutrophils, Absolute 12.88 (*)     Immature Grans, Absolute 0.06 (*)     All other components within normal limits   INFLUENZA ANTIGEN, RAPID - Normal   PROTIME-INR - Normal    Narrative:     Therapeutic range for most indications is 2.0-3.0 INR,  or 2.5-3.5 for mechanical heart valves.   TROPONIN (IN-HOUSE) - Normal   URINALYSIS W/ CULTURE IF INDICATED - Normal    Narrative:     Urine microscopic not indicated.   BNP (IN-HOUSE) - Normal   RAINBOW DRAW    Narrative:     The following orders were created for panel order [place] Draw.  Procedure                               Abnormality          Status                     ---------                               -----------         ------                     Light Blue Top[[id number]]                                   Final result               Green Top (Gel)[[id number]]                                  Final result               Lavender Top[[id number]]                                     Final result               Gold Top - SST[[id number]]                                   Final result                 Please view results for these tests on the individual orders.   CBC AND DIFFERENTIAL    Narrative:     The following orders were created for panel order CBC & Differential.  Procedure                               Abnormality         Status                     ---------                               -----------         ------                     CBC Auto Differential[[id number]]        Abnormal            Final result                 Please view results for these tests on the individual orders.   LIGHT BLUE TOP   GREEN TOP   LAVENDER TOP   GOLD TOP - SST       XR Chest PA & Lateral   Final Result   CONCLUSION: Flattening both diaphragms indicating air trapping,   COPD. Otherwise unremarkable chest. No evidence of active   disease.          Electronically signed by:  Shane Wray MD  11/6/2018 3:11 PM CST   Workstation: [id number]              ED Course      4:30P  I informed pt I would like to admit him due to hypoxia with exertion. Pt refuses and expressed his oxygen always decreases with exertion. Pt reports he has taken Levaquin and steroids several times together for several years.          PERC Rule (for pulmonary embolism) reviewed and/or performed as part of the patient evaluation and treatment planning process.  The result associated with this review/performance is: 3       MDM      Final diagnoses:   Chronic obstructive pulmonary disease, unspecified COPD type (CMS/[facility])            Maranda Arevalo PA-C  11/06/18 2013

## 2019-10-01 ENCOUNTER — HOSPITAL ENCOUNTER (OUTPATIENT)
Dept: SLEEP MEDICINE | Facility: HOSPITAL | Age: 69
Discharge: HOME | End: 2019-10-01
Admitting: INTERNAL MEDICINE

## 2019-10-01 ENCOUNTER — TRANSCRIBE ORDERS (OUTPATIENT)
Dept: SLEEP MEDICINE | Facility: HOSPITAL | Age: 69
End: 2019-10-01

## 2019-10-01 DIAGNOSIS — G47.33 OBSTRUCTIVE SLEEP APNEA SYNDROME: Primary | ICD-10-CM

## 2019-10-01 DIAGNOSIS — G47.33 OBSTRUCTIVE SLEEP APNEA SYNDROME: ICD-10-CM

## 2019-10-01 PROCEDURE — 95810 POLYSOM 6/> YRS 4/> PARAM: CPT

## 2019-10-01 PROCEDURE — 95810 POLYSOM 6/> YRS 4/> PARAM: CPT | Performed by: INTERNAL MEDICINE

## 2019-10-06 ENCOUNTER — APPOINTMENT (OUTPATIENT)
Dept: SLEEP MEDICINE | Facility: HOSPITAL | Age: 69
End: 2019-10-06

## 2019-10-24 ENCOUNTER — OFFICE VISIT (OUTPATIENT)
Dept: SLEEP MEDICINE | Facility: HOSPITAL | Age: 69
End: 2019-10-24

## 2019-10-24 VITALS
HEIGHT: 65 IN | SYSTOLIC BLOOD PRESSURE: 131 MMHG | OXYGEN SATURATION: 92 % | WEIGHT: 161.9 LBS | HEART RATE: 118 BPM | DIASTOLIC BLOOD PRESSURE: 80 MMHG | BODY MASS INDEX: 26.98 KG/M2

## 2019-10-24 DIAGNOSIS — R06.83 SNORING: Primary | ICD-10-CM

## 2019-10-24 PROCEDURE — 99203 OFFICE O/P NEW LOW 30 MIN: CPT | Performed by: INTERNAL MEDICINE

## 2019-10-24 NOTE — PROGRESS NOTES
New Patient Sleep Medicine Consultation    Encounter Date: 10/24/2019         Patient's PCP: Landon Starr MD  Referring provider: No ref. provider found  Reason for consultation chief complaint: results of PSG and to establish care    Brian Garcia is a 69 y.o. male whose bedtime is ~ 2230. He  falls asleep after 5-30 minutes, and is up 0 times per night. He wakes up ~ 8361-2288. He endorses 6-8 hours of sleep. He drinks 4 cups of coffee, 0 teas, and 0 sodas per day. He drinks 0 alcoholic beverages per week.    Brian Garcia admits to snoring, High blod pressure and excessive daytime sleepiness. He denies cataplexy, sleep paralysis, or hypnagogic hallucinations. He does not take sedatives or hypnotics. He has no sleepiness with driving. He naps frequently.    He used to smoke, but has not smoked for years. 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  4 brothers and 3 sisters  Other family history + for: 1 brother  with brain CA, 1  with lung cancer, 1 sister  with cancer, FH of emphysema  FH of sleep disorders: none known    [place] -       Review of Systems:  Constitutional: negative  Eyes: negative  Ears, nose, mouth, throat, and face: negative  Respiratory: negative  Cardiovascular: negative  Gastrointestinal: negative  Genitourinary:negative  Integument/breast: negative  Hematologic/lymphatic: negative  Musculoskeletal:negative  Neurological:  "negative  Behavioral/Psych: negative  Endocrine: negative  Allergic/Immunologic: negative Patient advised to discuss any positive ROS with PCP.      Vitals:    10/24/19 1259   BP: 131/80   Pulse: 118   SpO2: 92%           10/24/19  1259   Weight: 73.4 kg (161 lb 14.4 oz)       Body mass index is 26.94 kg/m². Patient's Body mass index is 26.94 kg/m². BMI is within normal parameters. No follow-up required..    Neck circumference: 15\"          General: Alert. Cooperative. Well developed. No acute distress.             Head:  Normocephalic. Symmetrical. Atraumatic.              Eyes: Sclera clear. No icterus. PERRLA. Normal EOM.             Ears: No deformities. Normal hearing.             Nose: No septal deviation. No drainage.          Throat: No oral lesions. No thrush. Moist mucous membranes. Trachea midline    Tongue is enlarged    Dentition is poor       Pharynx: Posterior pharyngeal pillars are narrow    Mallampati score of IV (only hard palate visible)    Pharynx is normal with unrermarkable tonsils   Chest Wall:  Normal shape. Symmetric expansion with respiration. No tenderness.          Lungs:  Clear to auscultation bilaterally. No wheezes. No rhonchi. No rales. Respirations regular, even and unlabored.            Heart:  Regular rhythm and normal rate. Normal S1 and S2. No murmur.     Abdomen:  Soft, non-tender and non-distended. Normal bowel sounds. No masses.  Extremities:  Moves all extremities well. No edema.           Pulses: Pulses palpable and equal bilaterally.               Skin: Dry. Intact. No bleeding. No rash.           Neuro: Moves all 4 extremities and cranial nerves grossly intact.  Psychiatric: Normal mood and affect.      Current Outpatient Medications:   •  aspirin 81 MG chewable tablet, Chew 81 mg Daily., Disp: , Rfl:   •  fluticasone (FLOVENT HFA) 220 MCG/ACT inhaler, Inhale 1 puff 2 (Two) Times a Day., Disp: 12 g, Rfl: 1  •  montelukast (SINGULAIR) 10 MG tablet, Take 10 mg by mouth Every " Night., Disp: , Rfl:   •  predniSONE (DELTASONE) 5 MG tablet, Take 5 mg by mouth Every Other Day., Disp: , Rfl:     Lab Results   Component Value Date    WBC 14.57 (H) 11/06/2018    HGB 14.8 11/06/2018    HCT 42.6 11/06/2018    MCV 90.8 11/06/2018     11/06/2018     Lab Results   Component Value Date    GLUCOSE 105 (H) 11/06/2018    CALCIUM 8.9 11/06/2018     11/06/2018    K 4.1 11/06/2018    CO2 27.0 11/06/2018     11/06/2018    BUN 19 11/06/2018    CREATININE 0.78 11/06/2018    EGFRIFNONA 99 11/06/2018    BCR 24.4 11/06/2018    ANIONGAP 9.0 11/06/2018     Lab Results   Component Value Date    INR 1.02 11/06/2018    INR 1.8 03/18/2016    INR 1.9 03/17/2016    PROTIME 13.2 11/06/2018    PROTIME 21.0 (H) 03/18/2016    PROTIME 22.1 (H) 03/17/2016     Lab Results   Component Value Date    CKTOTAL 33 (L) 03/14/2016    CKMB 0.8 03/14/2016    TROPONINI <0.012 11/06/2018     Assessment and Plan:    1. Chronic respiratory failure - New (to me), no additional work-up planned (3 points of 1)  1. Continue O2  2. Snoring  1. No evidence of LEONARDO    RTC as needed         This document has been electronically signed by Joseph Darling MD on October 24, 2019         CC: Landon Starr MD          No ref. provider found

## 2021-02-23 ENCOUNTER — IMMUNIZATION (OUTPATIENT)
Dept: VACCINE CLINIC | Facility: HOSPITAL | Age: 71
End: 2021-02-23

## 2021-02-23 PROCEDURE — 91300 HC SARSCOV02 VAC 30MCG/0.3ML IM: CPT | Performed by: NURSE PRACTITIONER

## 2021-02-23 PROCEDURE — 0001A: CPT | Performed by: NURSE PRACTITIONER

## 2021-03-16 ENCOUNTER — IMMUNIZATION (OUTPATIENT)
Dept: VACCINE CLINIC | Facility: HOSPITAL | Age: 71
End: 2021-03-16

## 2021-03-16 PROCEDURE — 0002A: CPT | Performed by: THORACIC SURGERY (CARDIOTHORACIC VASCULAR SURGERY)

## 2021-03-16 PROCEDURE — 91300 HC SARSCOV02 VAC 30MCG/0.3ML IM: CPT | Performed by: THORACIC SURGERY (CARDIOTHORACIC VASCULAR SURGERY)

## 2021-07-12 ENCOUNTER — HOSPITAL ENCOUNTER (INPATIENT)
Facility: HOSPITAL | Age: 71
LOS: 7 days | Discharge: HOME | End: 2021-07-19
Attending: EMERGENCY MEDICINE | Admitting: INTERNAL MEDICINE

## 2021-07-12 ENCOUNTER — APPOINTMENT (OUTPATIENT)
Dept: CT IMAGING | Facility: HOSPITAL | Age: 71
End: 2021-07-12

## 2021-07-12 ENCOUNTER — APPOINTMENT (OUTPATIENT)
Dept: GENERAL RADIOLOGY | Facility: HOSPITAL | Age: 71
End: 2021-07-12

## 2021-07-12 DIAGNOSIS — J44.1 ACUTE EXACERBATION OF CHRONIC OBSTRUCTIVE PULMONARY DISEASE (COPD) (HCC): Primary | ICD-10-CM

## 2021-07-12 DIAGNOSIS — J96.21 ACUTE ON CHRONIC RESPIRATORY FAILURE WITH HYPOXIA AND HYPERCAPNIA (HCC): ICD-10-CM

## 2021-07-12 DIAGNOSIS — J96.22 ACUTE ON CHRONIC RESPIRATORY FAILURE WITH HYPOXIA AND HYPERCAPNIA (HCC): ICD-10-CM

## 2021-07-12 DIAGNOSIS — J44.1 CHRONIC OBSTRUCTIVE PULMONARY DISEASE WITH ACUTE EXACERBATION (HCC): ICD-10-CM

## 2021-07-12 DIAGNOSIS — R91.1 PULMONARY NODULE: ICD-10-CM

## 2021-07-12 DIAGNOSIS — Z74.09 IMPAIRED FUNCTIONAL MOBILITY, BALANCE, GAIT, AND ENDURANCE: ICD-10-CM

## 2021-07-12 DIAGNOSIS — J18.9 PNEUMONIA OF RIGHT LUNG DUE TO INFECTIOUS ORGANISM, UNSPECIFIED PART OF LUNG: ICD-10-CM

## 2021-07-12 LAB
ALBUMIN SERPL-MCNC: 4.3 G/DL (ref 3.5–5.2)
ALBUMIN/GLOB SERPL: 1.4 G/DL
ALP SERPL-CCNC: 85 U/L (ref 39–117)
ALT SERPL W P-5'-P-CCNC: 20 U/L (ref 1–41)
ANION GAP SERPL CALCULATED.3IONS-SCNC: 6 MMOL/L (ref 5–15)
APTT PPP: 28 SECONDS (ref 20–40.3)
ARTERIAL PATENCY WRIST A: POSITIVE
AST SERPL-CCNC: 19 U/L (ref 1–40)
ATMOSPHERIC PRESS: 747 MMHG
BASE EXCESS BLDA CALC-SCNC: 7.3 MMOL/L (ref 0–2)
BASOPHILS # BLD AUTO: 0.07 10*3/MM3 (ref 0–0.2)
BASOPHILS NFR BLD AUTO: 0.8 % (ref 0–1.5)
BDY SITE: ABNORMAL
BILIRUB SERPL-MCNC: 0.2 MG/DL (ref 0–1.2)
BUN SERPL-MCNC: 16 MG/DL (ref 8–23)
BUN/CREAT SERPL: 16 (ref 7–25)
CALCIUM SPEC-SCNC: 9.1 MG/DL (ref 8.6–10.5)
CHLORIDE SERPL-SCNC: 96 MMOL/L (ref 98–107)
CO2 SERPL-SCNC: 32 MMOL/L (ref 22–29)
CREAT SERPL-MCNC: 1 MG/DL (ref 0.76–1.27)
D-DIMER, QUANTITATIVE (MAD,POW, STR): 965 NG/ML (FEU) (ref 0–470)
DEPRECATED RDW RBC AUTO: 41.7 FL (ref 37–54)
EOSINOPHIL # BLD AUTO: 0.67 10*3/MM3 (ref 0–0.4)
EOSINOPHIL NFR BLD AUTO: 7.2 % (ref 0.3–6.2)
ERYTHROCYTE [DISTWIDTH] IN BLOOD BY AUTOMATED COUNT: 12.3 % (ref 12.3–15.4)
FLUAV SUBTYP SPEC NAA+PROBE: NOT DETECTED
FLUBV RNA ISLT QL NAA+PROBE: NOT DETECTED
GAS FLOW AIRWAY: 2.5 LPM
GFR SERPL CREATININE-BSD FRML MDRD: 74 ML/MIN/1.73
GLOBULIN UR ELPH-MCNC: 3.1 GM/DL
GLUCOSE SERPL-MCNC: 101 MG/DL (ref 65–99)
HCO3 BLDA-SCNC: 34.2 MMOL/L (ref 20–26)
HCT VFR BLD AUTO: 43.3 % (ref 37.5–51)
HGB BLD-MCNC: 14.3 G/DL (ref 13–17.7)
HOLD SPECIMEN: NORMAL
HOLD SPECIMEN: NORMAL
IMM GRANULOCYTES # BLD AUTO: 0.04 10*3/MM3 (ref 0–0.05)
IMM GRANULOCYTES NFR BLD AUTO: 0.4 % (ref 0–0.5)
INR PPP: 0.93 (ref 0.8–1.2)
LYMPHOCYTES # BLD AUTO: 1.59 10*3/MM3 (ref 0.7–3.1)
LYMPHOCYTES NFR BLD AUTO: 17.2 % (ref 19.6–45.3)
Lab: ABNORMAL
MCH RBC QN AUTO: 30.5 PG (ref 26.6–33)
MCHC RBC AUTO-ENTMCNC: 33 G/DL (ref 31.5–35.7)
MCV RBC AUTO: 92.3 FL (ref 79–97)
MODALITY: ABNORMAL
MONOCYTES # BLD AUTO: 1.08 10*3/MM3 (ref 0.1–0.9)
MONOCYTES NFR BLD AUTO: 11.7 % (ref 5–12)
NEUTROPHILS NFR BLD AUTO: 5.82 10*3/MM3 (ref 1.7–7)
NEUTROPHILS NFR BLD AUTO: 62.7 % (ref 42.7–76)
NRBC BLD AUTO-RTO: 0 /100 WBC (ref 0–0.2)
NT-PROBNP SERPL-MCNC: 30.1 PG/ML (ref 0–900)
PCO2 BLDA: 55.8 MM HG (ref 35–45)
PH BLDA: 7.39 PH UNITS (ref 7.35–7.45)
PLATELET # BLD AUTO: 238 10*3/MM3 (ref 140–450)
PMV BLD AUTO: 10.2 FL (ref 6–12)
PO2 BLDA: 152 MM HG (ref 83–108)
POTASSIUM SERPL-SCNC: 4.2 MMOL/L (ref 3.5–5.2)
PROT SERPL-MCNC: 7.4 G/DL (ref 6–8.5)
PROTHROMBIN TIME: 12.4 SECONDS (ref 11.1–15.3)
RBC # BLD AUTO: 4.69 10*6/MM3 (ref 4.14–5.8)
SAO2 % BLDCOA: 99.4 % (ref 94–99)
SARS-COV-2 RNA PNL SPEC NAA+PROBE: NOT DETECTED
SODIUM SERPL-SCNC: 134 MMOL/L (ref 136–145)
TROPONIN T SERPL-MCNC: <0.01 NG/ML (ref 0–0.03)
TROPONIN T SERPL-MCNC: <0.01 NG/ML (ref 0–0.03)
VENTILATOR MODE: ABNORMAL
WBC # BLD AUTO: 9.27 10*3/MM3 (ref 3.4–10.8)

## 2021-07-12 PROCEDURE — 82803 BLOOD GASES ANY COMBINATION: CPT

## 2021-07-12 PROCEDURE — 85730 THROMBOPLASTIN TIME PARTIAL: CPT | Performed by: EMERGENCY MEDICINE

## 2021-07-12 PROCEDURE — 93005 ELECTROCARDIOGRAM TRACING: CPT | Performed by: EMERGENCY MEDICINE

## 2021-07-12 PROCEDURE — 80053 COMPREHEN METABOLIC PANEL: CPT | Performed by: EMERGENCY MEDICINE

## 2021-07-12 PROCEDURE — 25010000002 ENOXAPARIN PER 10 MG: Performed by: INTERNAL MEDICINE

## 2021-07-12 PROCEDURE — 93005 ELECTROCARDIOGRAM TRACING: CPT

## 2021-07-12 PROCEDURE — 83880 ASSAY OF NATRIURETIC PEPTIDE: CPT | Performed by: EMERGENCY MEDICINE

## 2021-07-12 PROCEDURE — 85025 COMPLETE CBC W/AUTO DIFF WBC: CPT | Performed by: EMERGENCY MEDICINE

## 2021-07-12 PROCEDURE — 36600 WITHDRAWAL OF ARTERIAL BLOOD: CPT

## 2021-07-12 PROCEDURE — 36415 COLL VENOUS BLD VENIPUNCTURE: CPT | Performed by: INTERNAL MEDICINE

## 2021-07-12 PROCEDURE — G0378 HOSPITAL OBSERVATION PER HR: HCPCS

## 2021-07-12 PROCEDURE — 84484 ASSAY OF TROPONIN QUANT: CPT | Performed by: EMERGENCY MEDICINE

## 2021-07-12 PROCEDURE — 85379 FIBRIN DEGRADATION QUANT: CPT | Performed by: EMERGENCY MEDICINE

## 2021-07-12 PROCEDURE — 25010000002 METHYLPREDNISOLONE PER 125 MG: Performed by: EMERGENCY MEDICINE

## 2021-07-12 PROCEDURE — 71275 CT ANGIOGRAPHY CHEST: CPT

## 2021-07-12 PROCEDURE — 99284 EMERGENCY DEPT VISIT MOD MDM: CPT

## 2021-07-12 PROCEDURE — 87636 SARSCOV2 & INF A&B AMP PRB: CPT | Performed by: EMERGENCY MEDICINE

## 2021-07-12 PROCEDURE — 85610 PROTHROMBIN TIME: CPT | Performed by: EMERGENCY MEDICINE

## 2021-07-12 PROCEDURE — 94640 AIRWAY INHALATION TREATMENT: CPT

## 2021-07-12 PROCEDURE — 25010000002 LEVOFLOXACIN PER 250 MG: Performed by: EMERGENCY MEDICINE

## 2021-07-12 PROCEDURE — 0 IOPAMIDOL PER 1 ML: Performed by: EMERGENCY MEDICINE

## 2021-07-12 PROCEDURE — 87040 BLOOD CULTURE FOR BACTERIA: CPT | Performed by: INTERNAL MEDICINE

## 2021-07-12 PROCEDURE — 71045 X-RAY EXAM CHEST 1 VIEW: CPT

## 2021-07-12 PROCEDURE — 93010 ELECTROCARDIOGRAM REPORT: CPT | Performed by: INTERNAL MEDICINE

## 2021-07-12 RX ORDER — PREDNISONE 20 MG/1
40 TABLET ORAL
Status: DISCONTINUED | OUTPATIENT
Start: 2021-07-13 | End: 2021-07-19 | Stop reason: HOSPADM

## 2021-07-12 RX ORDER — SODIUM CHLORIDE 0.9 % (FLUSH) 0.9 %
10 SYRINGE (ML) INJECTION AS NEEDED
Status: DISCONTINUED | OUTPATIENT
Start: 2021-07-12 | End: 2021-07-19 | Stop reason: HOSPADM

## 2021-07-12 RX ORDER — SODIUM CHLORIDE 9 MG/ML
100 INJECTION, SOLUTION INTRAVENOUS CONTINUOUS
Status: DISCONTINUED | OUTPATIENT
Start: 2021-07-12 | End: 2021-07-12

## 2021-07-12 RX ORDER — SODIUM CHLORIDE 0.9 % (FLUSH) 0.9 %
10 SYRINGE (ML) INJECTION EVERY 12 HOURS SCHEDULED
Status: DISCONTINUED | OUTPATIENT
Start: 2021-07-12 | End: 2021-07-19 | Stop reason: HOSPADM

## 2021-07-12 RX ORDER — METHYLPREDNISOLONE SODIUM SUCCINATE 125 MG/2ML
125 INJECTION, POWDER, LYOPHILIZED, FOR SOLUTION INTRAMUSCULAR; INTRAVENOUS ONCE
Status: COMPLETED | OUTPATIENT
Start: 2021-07-12 | End: 2021-07-12

## 2021-07-12 RX ORDER — LEVOFLOXACIN 5 MG/ML
750 INJECTION, SOLUTION INTRAVENOUS ONCE
Status: COMPLETED | OUTPATIENT
Start: 2021-07-12 | End: 2021-07-12

## 2021-07-12 RX ORDER — LORAZEPAM 0.5 MG/1
0.5 TABLET ORAL EVERY 8 HOURS PRN
Status: DISCONTINUED | OUTPATIENT
Start: 2021-07-12 | End: 2021-07-19 | Stop reason: HOSPADM

## 2021-07-12 RX ORDER — ASPIRIN 81 MG/1
81 TABLET, CHEWABLE ORAL DAILY
Status: DISCONTINUED | OUTPATIENT
Start: 2021-07-13 | End: 2021-07-19 | Stop reason: HOSPADM

## 2021-07-12 RX ORDER — ACETAMINOPHEN 325 MG/1
650 TABLET ORAL EVERY 4 HOURS PRN
Status: DISCONTINUED | OUTPATIENT
Start: 2021-07-12 | End: 2021-07-19 | Stop reason: HOSPADM

## 2021-07-12 RX ORDER — SODIUM CHLORIDE 0.9 % (FLUSH) 0.9 %
10 SYRINGE (ML) INJECTION AS NEEDED
Status: DISCONTINUED | OUTPATIENT
Start: 2021-07-12 | End: 2021-07-12

## 2021-07-12 RX ORDER — MONTELUKAST SODIUM 10 MG/1
10 TABLET ORAL NIGHTLY
Status: DISCONTINUED | OUTPATIENT
Start: 2021-07-12 | End: 2021-07-19 | Stop reason: HOSPADM

## 2021-07-12 RX ORDER — ONDANSETRON 4 MG/1
4 TABLET, FILM COATED ORAL EVERY 6 HOURS PRN
Status: DISCONTINUED | OUTPATIENT
Start: 2021-07-12 | End: 2021-07-19 | Stop reason: HOSPADM

## 2021-07-12 RX ORDER — IPRATROPIUM BROMIDE AND ALBUTEROL SULFATE 2.5; .5 MG/3ML; MG/3ML
3 SOLUTION RESPIRATORY (INHALATION)
Status: DISCONTINUED | OUTPATIENT
Start: 2021-07-12 | End: 2021-07-12 | Stop reason: ALTCHOICE

## 2021-07-12 RX ORDER — TIOTROPIUM BROMIDE AND OLODATEROL 3.124; 2.736 UG/1; UG/1
2 SPRAY, METERED RESPIRATORY (INHALATION)
Status: ON HOLD | COMMUNITY
End: 2022-12-30

## 2021-07-12 RX ADMIN — ENOXAPARIN SODIUM 40 MG: 40 INJECTION SUBCUTANEOUS at 23:13

## 2021-07-12 RX ADMIN — MONTELUKAST SODIUM 10 MG: 10 TABLET, COATED ORAL at 23:13

## 2021-07-12 RX ADMIN — IPRATROPIUM BROMIDE 0.5 MG: 0.5 SOLUTION RESPIRATORY (INHALATION) at 20:24

## 2021-07-12 RX ADMIN — SODIUM CHLORIDE, PRESERVATIVE FREE 10 ML: 5 INJECTION INTRAVENOUS at 23:13

## 2021-07-12 RX ADMIN — LEVOFLOXACIN 750 MG: 5 INJECTION, SOLUTION INTRAVENOUS at 18:43

## 2021-07-12 RX ADMIN — IOPAMIDOL 58 ML: 755 INJECTION, SOLUTION INTRAVENOUS at 20:08

## 2021-07-12 RX ADMIN — METHYLPREDNISOLONE SODIUM SUCCINATE 125 MG: 125 INJECTION, POWDER, FOR SOLUTION INTRAMUSCULAR; INTRAVENOUS at 18:43

## 2021-07-12 NOTE — ED PROVIDER NOTES
Subjective   69yo male pmh significant o2 dependent copd/dvt/pe presents ED c/o 1wk hx increased soa/monahan with hypoxia sao2 88% with exertion at home.  Pt reports intermittent chest discomfort at times, however, no chest pain reported today.  ROS neg fever/chills/cough/hemoptysis/abd pain.  ROS (+) pedal edema.      History provided by:  Patient  Shortness of Breath  Severity:  Moderate  Onset quality:  Gradual  Duration:  1 week  Chronicity:  Recurrent  Associated symptoms: chest pain    Associated symptoms: no cough        Review of Systems   Constitutional: Negative.    HENT: Negative.    Eyes: Negative for redness.   Respiratory: Positive for shortness of breath. Negative for cough.    Cardiovascular: Positive for chest pain.   Gastrointestinal: Negative.    Genitourinary: Negative.    Musculoskeletal: Negative.    Allergic/Immunologic: Negative for immunocompromised state.   All other systems reviewed and are negative.      Past Medical History:   Diagnosis Date   • COPD (chronic obstructive pulmonary disease) (CMS/[facility])        Allergies   Allergen Reactions   • Amoxicillin Anaphylaxis   • Albuterol Irritability     Facial flushing and palpitations.   • Lortab [Hydrocodone-Acetaminophen] Other (See Comments)     Can't remember   • Morphine And Related Hallucinations   • Prilosec [Omeprazole] Other (See Comments)     unknown   • Sulfa Antibiotics Other (See Comments)     Can't remember also more and can't remember   • Symbicort [Budesonide-Formoterol Fumarate] Unknown (See Comments)     Doesn't remember reaction type       Past Surgical History:   Procedure Laterality Date   • HIP ARTHROPLASTY         No family history on file.    Social History     Socioeconomic History   • Marital status:      Spouse name: Not on file   • Number of children: Not on file   • Years of education: Not on file   • Highest education level: Not on file   Tobacco Use   • Smoking status: Never Smoker           Objective    Physical Exam  Vitals and nursing note reviewed.   Constitutional:       Appearance: Normal appearance.   HENT:      Head: Normocephalic and atraumatic.      Mouth/Throat:      Mouth: Mucous membranes are moist.   Eyes:      Pupils: Pupils are equal, round, and reactive to light.   Cardiovascular:      Rate and Rhythm: Normal rate and regular rhythm.      Pulses: Normal pulses.      Heart sounds: Normal heart sounds. No murmur heard.   No friction rub. No gallop.    Pulmonary:      Effort: Pulmonary effort is normal. No respiratory distress.      Breath sounds: Normal breath sounds. No wheezing, rhonchi or rales.   Abdominal:      General: Abdomen is flat. Bowel sounds are normal. There is no distension.      Palpations: Abdomen is soft.      Tenderness: There is no abdominal tenderness. There is no guarding or rebound.   Musculoskeletal:      Cervical back: Normal range of motion and neck supple. No rigidity.      Right lower leg: Edema present.      Left lower leg: Edema present.   Lymphadenopathy:      Cervical: No cervical adenopathy.   Skin:     General: Skin is warm and dry.   Neurological:      General: No focal deficit present.      Mental Status: He is alert and oriented to person, place, and time.      GCS: GCS eye subscore is 4. GCS verbal subscore is 5. GCS motor subscore is 6.         ECG 12 Lead      Date/Time: 7/12/2021 6:20 PM  Performed by: Issac Campbell MD  Authorized by: Issac Campbell MD   Interpreted by physician  Rhythm: sinus rhythm  Rate: normal  BPM: 98  QRS axis: normal  Conduction: non-specific intraventricular conduction delay  ST Segments: ST segments normal  T depression: aVL  Other: no other findings  Clinical impression: abnormal ECG                 ED Course  ED Course as of Jul 12 2124   Mon Jul 12, 2021   1836 Checkout Dr. Solis 1900hrs. Pending CTPA/labs/disposition.    [SD]   2120 Patient was checked out to me at shift change by Dr. Campbell.  Patient's pulse oximeter dropped  down to 89% on 3 L nasal cannula with ambulation.  I reviewed the results of the patient's evaluation with him.  I recommended admission for observation.  Case discussed with the hospitalist Dr. Heredia.  He agrees to admit the patient to telemetry.    [DR]      ED Course User Index  [DR] Alex Solis MD  [SD] Issac Campbell MD      Labs Reviewed   COMPREHENSIVE METABOLIC PANEL - Abnormal; Notable for the following components:       Result Value    Glucose 101 (*)     Sodium 134 (*)     Chloride 96 (*)     CO2 32.0 (*)     All other components within normal limits    Narrative:     GFR Normal >60  Chronic Kidney Disease <60  Kidney Failure <15     D-DIMER, QUANTITATIVE - Abnormal; Notable for the following components:    D-Dimer, Quantitative 965 (*)     All other components within normal limits    Narrative:     Dimer values <500 ng/ml FEU are FDA approved as aid in diagnosis of deep venous thrombosis and pulmonary embolism.  This test should not be used in an exclusion strategy with pretest probability alone.    A recent guideline regarding diagnosis for pulmonary thromboembolism recommends an adjusted exclusion criterion of age x 10 ng/ml FEU for patients >50 years of age ([employer] Intern Med 2015; 163: 701-711).     CBC WITH AUTO DIFFERENTIAL - Abnormal; Notable for the following components:    Lymphocyte % 17.2 (*)     Eosinophil % 7.2 (*)     Monocytes, Absolute 1.08 (*)     Eosinophils, Absolute 0.67 (*)     All other components within normal limits   BLOOD GAS, ARTERIAL - Abnormal; Notable for the following components:    pCO2, Arterial 55.8 (*)     pO2, Arterial 152.0 (*)     HCO3, Arterial 34.2 (*)     Base Excess, Arterial 7.3 (*)     O2 Saturation, Arterial 99.4 (*)     All other components within normal limits   COVID-19 AND FLU A/B, NP SWAB IN TRANSPORT MEDIA 8-12 HR TAT - Normal    Narrative:     Fact sheet for providers: https://www.fda.gov/media/251569/download    Fact sheet for patients:  https://www.fda.gov/media/407096/download    Test performed by PCR.   PROTIME-INR - Normal    Narrative:     Therapeutic range for most indications is 2.0-3.0 INR,  or 2.5-3.5 for mechanical heart valves.   APTT - Normal    Narrative:     The recommended Heparin therapeutic range is 68-97 seconds.   TROPONIN (IN-HOUSE) - Normal    Narrative:     Troponin T Reference Range:  <= 0.03 ng/mL-   Negative for AMI  >0.03 ng/mL-     Abnormal for myocardial necrosis.  Clinicians would have to utilize clinical acumen, EKG, Troponin and serial changes to determine if it is an Acute Myocardial Infarction or myocardial injury due to an underlying chronic condition.       Results may be falsely decreased if patient taking Biotin.     TROPONIN (IN-HOUSE) - Normal    Narrative:     Troponin T Reference Range:  <= 0.03 ng/mL-   Negative for AMI  >0.03 ng/mL-     Abnormal for myocardial necrosis.  Clinicians would have to utilize clinical acumen, EKG, Troponin and serial changes to determine if it is an Acute Myocardial Infarction or myocardial injury due to an underlying chronic condition.       Results may be falsely decreased if patient taking Biotin.     BNP (IN-HOUSE) - Normal    Narrative:     Among patients with dyspnea, NT-proBNP is highly sensitive for the detection of acute congestive heart failure. In addition NT-proBNP of <300 pg/ml effectively rules out acute congestive heart failure with 99% negative predictive value.    Results may be falsely decreased if patient taking Biotin.     BLOOD CULTURE   BLOOD CULTURE   BLOOD GAS, ARTERIAL   CBC AND DIFFERENTIAL    Narrative:     The following orders were created for panel order CBC & Differential.  Procedure                               Abnormality         Status                     ---------                               -----------         ------                     CBC Auto Differential[[id number]]        Abnormal            Final result                 Please view  results for these tests on the individual orders.   EXTRA TUBES    Narrative:     The following orders were created for panel order Extra Tubes.  Procedure                               Abnormality         Status                     ---------                               -----------         ------                     Gold Top - SST[[id number]]                                   Final result               Gray Top[[id number]]                                         Final result                 Please view results for these tests on the individual orders.   GOLD TOP - SST   GRAY TOP     XR Chest 1 View    Result Date: 7/12/2021  Narrative: Exam: AP portable chest INDICATION: Shortness of breath COMPARISON: 11/6/2018 FINDINGS: AP portable chest. The bony structures are intact. The cardiomegaly is silhouette is unremarkable. Plaque is present in the aorta. Lungs are hyperinflated with parenchymal destruction right greater than left compatible with emphysema. There is mild linear atelectasis in the right lung. No pneumothorax or pleural effusion.     Impression: 1. Mild linear atelectasis in the right. Recommend follow-up. 2. Pulmonary emphysema Electronically signed by:  Nicolas Saldivar MD  7/12/2021 5:42 PM CDT Workstation: [id number]    CT Angiogram Chest    Result Date: 7/12/2021  Narrative: CT CHEST ANGIOGRAPHY WITH IV CONTRAST INDICATION: 70 years Male; d dimer (+)/soa, J44.1 Chronic obstructive pulmonary disease with (acute) exacerbation TECHNIQUE:  CT angiogram of the chest was performed with IV contrast.  3-D/MIP reconstructions were performed.  This exam was performed according to our departmental dose-optimization program, which includes automated exposure control, adjustment of the mA and/or kV according to patient size and/or use of iterative reconstruction technique. COMPARISON: 2/6/2018. FINDINGS: Thyroid: Unremarkable. Heart/Mediastinum: The heart is normal in size. Scattered coronary artery calcifications.  Vasculature: No evidence of pulmonary emboli. Mild atherosclerosis of the aorta. No aortic aneurysm or dissection. Lungs/Pleura: Severe centrilobular emphysema. Spiculated nodule measuring 1.8 x 1.0 x 0.9 cm in the central posterior aspect of the right middle lobe closely abutting the minor fissure, pulmonary vessels and the lateral subsegmental branch of the right middle lobe bronchus is new from previous study. Reticular interstitial opacity in the remainder of the lateral right middle lobe and also scattered in the superior segment of the right lower lobe could represent postobstructive pneumonia/edema. Stable 3 mm calcified nodule in the left lower lobe is likely benign. No pleural effusion or pneumothorax. Lymph nodes: No pathologically enlarged lymph nodes. Bones: Unremarkable. Soft tissues: Unremarkable. Incidental findings: Small nonenhancing subcentimeter cyst in the right hepatic lobe is probably benign.     Impression: 1.  No evidence of pulmonary embolism. 2.  Spiculated nodule measuring 1.8 cm in the central posterior aspect of the right middle lobe closely abutting the minor fissure, pulmonary vessels and the lateral subsegmental branch of the right middle lobe bronchus is new from previous study. Recommend PET/CT or tissue sampling (bronchoscopy). 3.  Reticular interstitial opacity in the remainder of the lateral right middle lobe and also scattered in the superior segment of the right lower lobe could represent postobstructive pneumonia/edema. Electronically signed by:  Lizz Osman  7/12/2021 8:49 PM CDT Workstation: [id number]                                         [facility]    Final diagnoses:   Acute exacerbation of chronic obstructive pulmonary disease (COPD) (CMS/HCC)   Pneumonia of right lung due to infectious organism, unspecified part of lung   Pulmonary nodule       ED Disposition  ED Disposition     ED Disposition Condition Comment    Decision to Admit  Level of Care: Telemetry [5]   Diagnosis:  Acute exacerbation of chronic obstructive pulmonary disease (COPD) (CMS/[facility]) [[id number]]   Admitting Physician: ROB DEY [[id number]]   Attending Physician: ROB DEY [[id number]]            No follow-up provider specified.       Medication List      No changes were made to your prescriptions during this visit.          Alex Solis MD  07/12/21 8417

## 2021-07-12 NOTE — ED TRIAGE NOTES
PT REPORTS THAT HE HAS EMPHYSEMA AND HIS SPO2 IS DROPPING INTO THE 80S WITH ACTIVITY. HE WEARS 2L HOME O2 AT HOME.     HE IS ALERT AND ORIENTED X4. SPEAKS IN FULL SENTENCES,.

## 2021-07-13 LAB
QT INTERVAL: 340 MS
QTC INTERVAL: 434 MS

## 2021-07-13 PROCEDURE — 94799 UNLISTED PULMONARY SVC/PX: CPT

## 2021-07-13 PROCEDURE — 94760 N-INVAS EAR/PLS OXIMETRY 1: CPT

## 2021-07-13 PROCEDURE — G0378 HOSPITAL OBSERVATION PER HR: HCPCS

## 2021-07-13 PROCEDURE — 25010000002 ENOXAPARIN PER 10 MG: Performed by: INTERNAL MEDICINE

## 2021-07-13 PROCEDURE — 25010000002 CEFTRIAXONE PER 250 MG: Performed by: INTERNAL MEDICINE

## 2021-07-13 PROCEDURE — 63710000001 PREDNISONE PER 1 MG: Performed by: INTERNAL MEDICINE

## 2021-07-13 PROCEDURE — 99204 OFFICE O/P NEW MOD 45 MIN: CPT | Performed by: INTERNAL MEDICINE

## 2021-07-13 PROCEDURE — 25010000002 FUROSEMIDE PER 20 MG: Performed by: INTERNAL MEDICINE

## 2021-07-13 RX ORDER — AZITHROMYCIN 250 MG/1
500 TABLET, FILM COATED ORAL
Status: COMPLETED | OUTPATIENT
Start: 2021-07-13 | End: 2021-07-17

## 2021-07-13 RX ORDER — FUROSEMIDE 10 MG/ML
20 INJECTION INTRAMUSCULAR; INTRAVENOUS EVERY 12 HOURS
Status: COMPLETED | OUTPATIENT
Start: 2021-07-13 | End: 2021-07-15

## 2021-07-13 RX ADMIN — IPRATROPIUM BROMIDE 0.5 MG: 0.5 SOLUTION RESPIRATORY (INHALATION) at 20:15

## 2021-07-13 RX ADMIN — SODIUM CHLORIDE, PRESERVATIVE FREE 10 ML: 5 INJECTION INTRAVENOUS at 08:28

## 2021-07-13 RX ADMIN — ASPIRIN 81 MG 81 MG: 81 TABLET ORAL at 08:28

## 2021-07-13 RX ADMIN — MONTELUKAST SODIUM 10 MG: 10 TABLET, COATED ORAL at 20:38

## 2021-07-13 RX ADMIN — ENOXAPARIN SODIUM 40 MG: 40 INJECTION SUBCUTANEOUS at 20:38

## 2021-07-13 RX ADMIN — AZITHROMYCIN MONOHYDRATE 500 MG: 250 TABLET ORAL at 08:28

## 2021-07-13 RX ADMIN — FUROSEMIDE 20 MG: 10 INJECTION, SOLUTION INTRAMUSCULAR; INTRAVENOUS at 13:29

## 2021-07-13 RX ADMIN — CEFTRIAXONE 1 G: 1 INJECTION, POWDER, FOR SOLUTION INTRAMUSCULAR; INTRAVENOUS at 06:09

## 2021-07-13 RX ADMIN — PREDNISONE 40 MG: 20 TABLET ORAL at 08:28

## 2021-07-13 RX ADMIN — IPRATROPIUM BROMIDE 0.5 MG: 0.5 SOLUTION RESPIRATORY (INHALATION) at 07:46

## 2021-07-13 RX ADMIN — IPRATROPIUM BROMIDE 0.5 MG: 0.5 SOLUTION RESPIRATORY (INHALATION) at 02:38

## 2021-07-13 RX ADMIN — SODIUM CHLORIDE, PRESERVATIVE FREE 10 ML: 5 INJECTION INTRAVENOUS at 20:38

## 2021-07-13 NOTE — PROGRESS NOTES
Item 0 Points 1 Point 2 Points 3 Points 4 Points Subtotal   Mental Status Alert, oriented, cooperative Lethargic, follows commands Confused, not following commands Obtunded or Somnolent Comatose 0     Respiratory Pattern Regular RR 8-16 breaths/minute Increased RR 18-25 breaths/minute Dyspnea on exertion, irregular RR 26-30 breaths/minute Shortness of breath,  RR 31-35 breaths/minute Accessory muscle use, severe SOB  RR > 35 breaths/minute 0     Breath Sounds Clear Decreased unilaterally Decreased bilaterally Basilar crackles Wheezing and/or rhonchi 0     Cough Strong, spontaneous, non-productive Strong productive Weak, non-productive Weak, productive or weak with rhonchi Absent or may require suctioning 1     Pulmonary Status Nonsmoker, no previous history, >1 year quit < 1 PPD  <1 year quit > or = 1 PPD Diagnosed pulmonary disease (severe or chronic) Severe or chronic pulmonary disease with exacerbation 0     Surgical Status None General surgery (non-abdominal or non-thoracic) Lower abdominal Thoracic or upper abdominal Thoracic with pulmonary disease 0     Chest X-ray Clear Chronic changes Infiltrates, atelectasis or pleural effusion Infiltrates in > 1 lobe Diffuse infiltrates and atelectasis and/or effusions 2     Activity   Level Ambulatory Ambulatory with assistance Non-ambulatory Paraplegic Quadriplegic 0          Total Score   3     Score    Drug Therapy Frequency 20 or >    Q4 Duoneb with Q2 Albuterol PRN 15-19    Q6 Duoneb with Q4 Albuterol PRN 10-14    QID Duoneb with Q4 Albuterol PRN 5-9    TID Duoneb with Q6 Albuterol PRN 0-4    Q4 PRN Duoneb                  Lung Expansion Therapy (PEP) Bronchopulmonary Hygiene (CPT)   Q4 & PRN - Severe atelectasis, poor oxygenation Q4 - Copious secretions, dyspnea, unable to sleep   QID - High risk for persistent atelectasis, existence of atelectasis QID & Q4 PRN - Moderate secretion production   TID - At risk for developing atelectasis TID - Small amounts of  secretions with poor cough   BID - Prevention of atelectasis BID - Unable to breathe deeply and cough spontaneously     RT Comments / Recommendations:    RT eval complete changing nebs to prn with Atrovent, due to proventil allergy..  We will reeval in 48 hours.

## 2021-07-13 NOTE — CONSULTS
DATE OF CONSULT: 7/13/2021    REQUESTING SOURCE:  Dr Street      REASON FOR CONSULTATION: Spiculated right lung nodule, history of pulmonary embolism      HISTORY OF PRESENT ILLNESS:   70-year-old male with medical problem consisting of hypertension, history of pulmonary embolism in 2015 for which he took Coumadin for 1 year, chronic obstructive pulmonary disease, history of close to 100-pack-year smoking history that he quit around 2008 was admitted to [facility] on July 12, 2021 with complaint of worsening shortness of breath and swelling of lower extremity.  Patient states prior to hospital admission he was having shortness of breath that was getting worse over the last 2 weeks despite of using home oxygen.  Denied any hemoptysis.  Denies any major weight loss or loss of appetite recently.  Denies any productive cough.  Denies any new headache or dizziness.        PAST MEDICAL HISTORY:    Past Medical History:   Diagnosis Date   • COPD (chronic obstructive pulmonary disease) (CMS/[facility])        PAST SURGICAL HISTORY:  Past Surgical History:   Procedure Laterality Date   • HIP ARTHROPLASTY         ALLERGIES:    Allergies   Allergen Reactions   • Amoxicillin Anaphylaxis   • Albuterol Irritability     Facial flushing and palpitations.   • Lortab [Hydrocodone-Acetaminophen] Other (See Comments)     Can't remember   • Morphine And Related Hallucinations   • Prilosec [Omeprazole] Other (See Comments)     unknown   • Sulfa Antibiotics Other (See Comments)     Can't remember also more and can't remember   • Symbicort [Budesonide-Formoterol Fumarate] Unknown (See Comments)     Doesn't remember reaction type       SOCIAL HISTORY:   Social History     Tobacco Use   • Smoking status: Never Smoker   Substance Use Topics   • Alcohol use: Not on file   • Drug use: Not on file       CURRENT MEDICATIONS:    Current Facility-Administered Medications   Medication Dose Route Frequency Provider Last Rate Last Admin   •  acetaminophen (TYLENOL) tablet 650 mg  650 mg Oral Q4H PRN Alvaro Heredia MD       • aspirin chewable tablet 81 mg  81 mg Oral Daily Alvaro Heredia MD   81 mg at 07/13/21 0828   • azithromycin (ZITHROMAX) tablet 500 mg  500 mg Oral Q24H Alvaro Heredia MD   500 mg at 07/13/21 0828   • cefTRIAXone (ROCEPHIN) 1 g/100 mL 0.9% NS (MBP)  1 g Intravenous Q24H Alvaro Heredia MD   Stopped at 07/13/21 0828   • enoxaparin (LOVENOX) syringe 40 mg  40 mg Subcutaneous Q24H Alvaro Heredia MD   40 mg at 07/12/21 2313   • furosemide (LASIX) injection 20 mg  20 mg Intravenous Q12H Lg Street MD   20 mg at 07/13/21 1329   • ipratropium (ATROVENT) nebulizer solution 0.5 mg  0.5 mg Nebulization Q4H PRN Alvaro Heredia MD       • LORazepam (ATIVAN) tablet 0.5 mg  0.5 mg Oral Q8H PRN Alvaro Heredia MD       • montelukast (SINGULAIR) tablet 10 mg  10 mg Oral Nightly Alvaro Heredia MD   10 mg at 07/12/21 2313   • ondansetron (ZOFRAN) tablet 4 mg  4 mg Oral Q6H PRN Alvaro Heredia MD       • predniSONE (DELTASONE) tablet 40 mg  40 mg Oral Daily With Breakfast Alvaro Heredia MD   40 mg at 07/13/21 0828   • sodium chloride 0.9 % flush 10 mL  10 mL Intravenous Q12H Alvaro Heredia MD   10 mL at 07/13/21 0828   • sodium chloride 0.9 % flush 10 mL  10 mL Intravenous PRN Alvaro Heredia MD            HOME MEDICATIONS:   No current facility-administered medications on file prior to encounter.     Current Outpatient Medications on File Prior to Encounter   Medication Sig Dispense Refill   • ipratropium (ATROVENT HFA) 17 MCG/ACT inhaler Inhale 2 puffs 4 (Four) Times a Day As Needed for Wheezing.     • predniSONE (DELTASONE) 5 MG tablet Take 5 mg by mouth Every Other Day.     • tiotropium bromide-olodaterol (Stiolto Respimat) 2.5-2.5 MCG/ACT aerosol solution inhaler Inhale 2 puffs Daily.     • aspirin 81 MG chewable tablet Chew 81 mg Daily.     • montelukast (SINGULAIR) 10 MG tablet Take 10 mg by  "mouth Every Night.         FAMILY HISTORY:    No family history on file.    REVIEW OF SYSTEMS:        CONSTITUTIONAL:  Complains of fatigue. Denies any fever, chills or weight loss.     EYES: No visual disturbances. No discharge. No new lesions    ENMT:  No epistaxis, mouth sores or difficulty swallowing.    RESPIRATORY: Positive for recent worsening of chronic shortness of breath.  No new cough or hemoptysis.    CARDIOVASCULAR:  No chest pain or palpitations.    GASTROINTESTINAL:  No abdominal pain nausea, vomiting or blood in the stool.    GENITOURINARY: Positive for frequency.  No dysuria or hematuria.    MUSCULOSKELETAL: Positive for chronic back pain.  Positive for swelling of lower extremity.    LYMPHATICS:  Denies any abnormal swollen glands anywhere in the body.    NEUROLOGICAL : Positive for intermittent tingling and numbness affecting bilateral feet. No headache or dizziness. No seizures or balance problems.    SKIN: No new skin lesions.    ENDOCRINE : No new hot or cold intolerance. No new polyuria . No polydipsia.        PHYSICAL EXAMINATION:      VITAL SIGNS:  /68 (BP Location: Left arm, Patient Position: Lying)   Pulse 108   Temp 97.2 °F (36.2 °C) (Temporal)   Resp 16   Ht 165.1 cm (65\")   Wt 75.5 kg (166 lb 6.4 oz)   SpO2 93%   BMI 27.69 kg/m²       07/12/21  1627 07/12/21  2242   Weight: 72.6 kg (160 lb) 75.5 kg (166 lb 6.4 oz)           CONSTITUTIONAL:  Not in any distress.  Using oxygen by nasal cannula    EYES: Mild conjunctival Pallor. No Icterus. No Pterygium. Extraocular Movements intact.No ptosis.    ENMT:  Normocephalic, Atraumatic.No Facial Asymmetry noted.    NECK:  No adenopathy.Trachea midline.     RESPIRATORY: Diminished air entry in bilateral lung field.  Occasional wheezing present.    CARDIOVASCULAR:  S1, S2. Regular rate and rhythm. No murmur or gallop appreciated.    ABDOMEN:  Soft, obese, nontender. Bowel sounds present in all four quadrants.  No Hepatosplenomegaly " appreciated.    MUSCULOSKELETAL: 1+ edema.No Calf Tenderness.decreased range of motion.    NEUROLOGIC:    No  Motor  deficit appreciated. Cranial Nerves 2-12 grossly intact.    SKIN : No new skin lesion identified. Skin is warm and dry to touch.    LYMPHATICS: No new enlarged lymph nodes in neck or supraclavicular area.    PSYCHIATRY: Alert, awake and oriented ×3.            DIAGNOSTIC DATA:    WBC   Date Value Ref Range Status   07/12/2021 9.27 3.40 - 10.80 10*3/mm3 Final     RBC   Date Value Ref Range Status   07/12/2021 4.69 4.14 - 5.80 10*6/mm3 Final     Hemoglobin   Date Value Ref Range Status   07/12/2021 14.3 13.0 - 17.7 g/dL Final     Hematocrit   Date Value Ref Range Status   07/12/2021 43.3 37.5 - 51.0 % Final     MCV   Date Value Ref Range Status   07/12/2021 92.3 79.0 - 97.0 fL Final     MCH   Date Value Ref Range Status   07/12/2021 30.5 26.6 - 33.0 pg Final     MCHC   Date Value Ref Range Status   07/12/2021 33.0 31.5 - 35.7 g/dL Final     RDW   Date Value Ref Range Status   07/12/2021 12.3 12.3 - 15.4 % Final     RDW-SD   Date Value Ref Range Status   07/12/2021 41.7 37.0 - 54.0 fl Final     MPV   Date Value Ref Range Status   07/12/2021 10.2 6.0 - 12.0 fL Final     Platelets   Date Value Ref Range Status   07/12/2021 238 140 - 450 10*3/mm3 Final     Neutrophil %   Date Value Ref Range Status   07/12/2021 62.7 42.7 - 76.0 % Final     Lymphocyte %   Date Value Ref Range Status   07/12/2021 17.2 (L) 19.6 - 45.3 % Final     Monocyte %   Date Value Ref Range Status   07/12/2021 11.7 5.0 - 12.0 % Final     Eosinophil %   Date Value Ref Range Status   07/12/2021 7.2 (H) 0.3 - 6.2 % Final     Basophil %   Date Value Ref Range Status   07/12/2021 0.8 0.0 - 1.5 % Final     Immature Grans %   Date Value Ref Range Status   07/12/2021 0.4 0.0 - 0.5 % Final     Neutrophils, Absolute   Date Value Ref Range Status   07/12/2021 5.82 1.70 - 7.00 10*3/mm3 Final     Lymphocytes, Absolute   Date Value Ref Range Status    07/12/2021 1.59 0.70 - 3.10 10*3/mm3 Final     Monocytes, Absolute   Date Value Ref Range Status   07/12/2021 1.08 (H) 0.10 - 0.90 10*3/mm3 Final     Eosinophils, Absolute   Date Value Ref Range Status   07/12/2021 0.67 (H) 0.00 - 0.40 10*3/mm3 Final     Basophils, Absolute   Date Value Ref Range Status   07/12/2021 0.07 0.00 - 0.20 10*3/mm3 Final     Immature Grans, Absolute   Date Value Ref Range Status   07/12/2021 0.04 0.00 - 0.05 10*3/mm3 Final     nRBC   Date Value Ref Range Status   07/12/2021 0.0 0.0 - 0.2 /100 WBC Final     Glucose   Date Value Ref Range Status   07/12/2021 101 (H) 65 - 99 mg/dL Final     Sodium   Date Value Ref Range Status   07/12/2021 134 (L) 136 - 145 mmol/L Final     Potassium   Date Value Ref Range Status   07/12/2021 4.2 3.5 - 5.2 mmol/L Final     CO2   Date Value Ref Range Status   07/12/2021 32.0 (H) 22.0 - 29.0 mmol/L Final     Chloride   Date Value Ref Range Status   07/12/2021 96 (L) 98 - 107 mmol/L Final     Anion Gap   Date Value Ref Range Status   07/12/2021 6.0 5.0 - 15.0 mmol/L Final     Creatinine   Date Value Ref Range Status   07/12/2021 1.00 0.76 - 1.27 mg/dL Final     BUN   Date Value Ref Range Status   07/12/2021 16 8 - 23 mg/dL Final     BUN/Creatinine Ratio   Date Value Ref Range Status   07/12/2021 16.0 7.0 - 25.0 Final     Calcium   Date Value Ref Range Status   07/12/2021 9.1 8.6 - 10.5 mg/dL Final     eGFR Non  Amer   Date Value Ref Range Status   07/12/2021 74 >60 mL/min/1.73 Final     Alkaline Phosphatase   Date Value Ref Range Status   07/12/2021 85 39 - 117 U/L Final     Total Protein   Date Value Ref Range Status   07/12/2021 7.4 6.0 - 8.5 g/dL Final     ALT (SGPT)   Date Value Ref Range Status   07/12/2021 20 1 - 41 U/L Final     AST (SGOT)   Date Value Ref Range Status   07/12/2021 19 1 - 40 U/L Final     Total Bilirubin   Date Value Ref Range Status   07/12/2021 0.2 0.0 - 1.2 mg/dL Final     Albumin   Date Value Ref Range Status   07/12/2021  4.30 3.50 - 5.20 g/dL Final     Globulin   Date Value Ref Range Status   07/12/2021 3.1 gm/dL Final     No results found for: IRON, TIBC, LABIRON, FERRITIN, [id number], FOLATE  No results found for: , LABCA2, AFPTM, HCGQUANT, , CHROMGRNA, [id number], CEA, REFLABREPO]    Radiology Data :  XR Chest 1 View    Result Date: 7/12/2021  1. Mild linear atelectasis in the right. Recommend follow-up. 2. Pulmonary emphysema Electronically signed by:  Nicolas Saldivar MD  7/12/2021 5:42 PM CDT Workstation: [id number]    CT Angiogram Chest    Result Date: 7/12/2021  1.  No evidence of pulmonary embolism. 2.  Spiculated nodule measuring 1.8 cm in the central posterior aspect of the right middle lobe closely abutting the minor fissure, pulmonary vessels and the lateral subsegmental branch of the right middle lobe bronchus is new from previous study. Recommend PET/CT or tissue sampling (bronchoscopy). 3.  Reticular interstitial opacity in the remainder of the lateral right middle lobe and also scattered in the superior segment of the right lower lobe could represent postobstructive pneumonia/edema. Electronically signed by:  Lizz Osman  7/12/2021 8:49 PM CDT Workstation: [id number]      Pathology :  * Cannot find OR log *    ASSESSMENT AND PLAN:      1.  Spiculated nodule in right middle lobe:  -CT angiogram done on July 12, 2021 was reviewed with radiologist Dr. Worley.  There is 1.8 cm nodule in right middle lobe which is new as compared to CT angiogram done in 2018.  -Due to patient's extensive smoking history of close to 100-pack-year smoking history new onset of spiculated lung nodule is worrisome for developing lung cancer.  -Result of CT angiogram were discussed with patient and his wife.  -Recommend outpatient follow-up with PET CT for further evaluation of lung nodule.  -Recommend waiting 2 to 3 weeks after hospital discharge and completion of antibiotic prior to PET/CT to rule out any infectious etiology.  Sometimes  infection can also be falsely positive on PET scan that is why recommend waiting 2 to 3 weeks at present  -Patient has family is agreeable to plan of PET/CT as outpatient and further work-up depending on result of PET/CT    2.  History of pulmonary embolism  -Patient was diagnosed with pulmonary embolism in 2015  -Had a Coumadin for 1 year.  -CT angiogram done on July 12 2021 is negative for pulmonary embolism    3.  Chronic obstructive pulmonary disease  -Currently remains on steroid and antibiotic for COPD exacerbation            Thank you for this consultation.    Omega Davis MD  7/13/2021  18:25 CDT        Part of this note may be an electronic transcription/translation of spoken language to printed text using the Dragon Dictation System.

## 2021-07-13 NOTE — PROGRESS NOTES
[facility] Medicine Services  INPATIENT PROGRESS NOTE    Length of Stay: 0  Date of Admission: 7/12/2021  Primary Care Physician: Landon Starr MD    Subjective   Chief Complaint: Shortness of air.    HPI: Patient is seen for follow-up.  70-year-old male with history of chronic respiratory failure/O2 dependent COPD, hypertension, prior pulmonary embolism who was admitted for acute on chronic respiratory failure secondary to COPD exacerbation/pneumonia.    Patient is doing better, less short of air and on supplemental oxygen of 2 L nasal prong with saturation in the mid to upper nineties.  At baseline he uses 2 to 3 L of supplemental oxygen.  He voices no new complaints but is hard of hearing.    Review of Systems   Constitutional: Positive for activity change and fatigue. Negative for appetite change, chills, diaphoresis and fever.   HENT: Positive for hearing loss. Negative for trouble swallowing and voice change.    Eyes: Negative for photophobia and visual disturbance.   Respiratory: Positive for shortness of breath. Negative for cough, choking, chest tightness, wheezing and stridor.    Cardiovascular: Negative for chest pain, palpitations and leg swelling.   Gastrointestinal: Negative for abdominal distention, abdominal pain, blood in stool, constipation, diarrhea, nausea and vomiting.   Endocrine: Negative for cold intolerance, heat intolerance, polydipsia, polyphagia and polyuria.   Genitourinary: Negative for decreased urine volume, difficulty urinating, dysuria, enuresis, flank pain, frequency, hematuria and urgency.   Musculoskeletal: Negative for arthralgias, gait problem, myalgias, neck pain and neck stiffness.   Skin: Negative for pallor, rash and wound.   Neurological: Negative for dizziness, tremors, seizures, syncope, facial asymmetry, speech difficulty, weakness, light-headedness, numbness and headaches.   Hematological: Does not bruise/bleed easily.    Psychiatric/Behavioral: Negative for agitation, behavioral problems and confusion.       Objective    Temp:  [96.7 °F (35.9 °C)-98.3 °F (36.8 °C)] 96.7 °F (35.9 °C)  Heart Rate:  [] 108  Resp:  [16-22] 16  BP: (136-198)/(59-96) 136/69    Physical Exam  Vitals and nursing note reviewed.   Constitutional:       General: He is not in acute distress.     Appearance: He is well-developed. He is not diaphoretic.   HENT:      Head: Normocephalic and atraumatic.      Comments: Patient is hard of hearing.  Eyes:      General: No scleral icterus.     Pupils: Pupils are equal, round, and reactive to light.   Neck:      Thyroid: No thyromegaly.      Vascular: No JVD.   Cardiovascular:      Rate and Rhythm: Regular rhythm. Tachycardia present.      Heart sounds: Normal heart sounds. No murmur heard.   No friction rub. No gallop.    Pulmonary:      Effort: Pulmonary effort is normal.      Breath sounds: Decreased breath sounds and rhonchi present. No wheezing or rales.   Chest:      Chest wall: No tenderness.   Abdominal:      General: Bowel sounds are normal. There is no distension.      Palpations: Abdomen is soft. There is no mass.      Tenderness: There is no abdominal tenderness. There is no right CVA tenderness, left CVA tenderness, guarding or rebound.   Musculoskeletal:         General: No tenderness or deformity.      Cervical back: Normal range of motion and neck supple.      Right lower leg: Edema present.      Left lower leg: Edema present.      Comments: He has trace/dependent edema both legs.   Skin:     General: Skin is warm and dry.      Coloration: Skin is not jaundiced or pale.      Findings: No bruising, erythema, lesion or rash.   Neurological:      General: No focal deficit present.      Mental Status: He is alert and oriented to person, place, and time.      Cranial Nerves: No cranial nerve deficit.      Sensory: No sensory deficit.      Motor: No weakness or abnormal muscle tone.      Coordination:  Coordination normal.      Gait: Gait normal.      Deep Tendon Reflexes: Reflexes normal.   Psychiatric:         Behavior: Behavior normal.         Thought Content: Thought content normal.         Judgment: Judgment normal.           Medication Review:    Current Facility-Administered Medications:   •  acetaminophen (TYLENOL) tablet 650 mg, 650 mg, Oral, Q4H PRN, Alvaro Heredia MD  •  aspirin chewable tablet 81 mg, 81 mg, Oral, Daily, Alvaro Heredia MD, 81 mg at 07/13/21 0828  •  azithromycin (ZITHROMAX) tablet 500 mg, 500 mg, Oral, Q24H, Alvaro Heredia MD, 500 mg at 07/13/21 0828  •  cefTRIAXone (ROCEPHIN) 1 g/100 mL 0.9% NS (MBP), 1 g, Intravenous, Q24H, Alvaro Heredia MD, Stopped at 07/13/21 0828  •  enoxaparin (LOVENOX) syringe 40 mg, 40 mg, Subcutaneous, Q24H, Alvaro Heredia MD, 40 mg at 07/12/21 2313  •  ipratropium (ATROVENT) nebulizer solution 0.5 mg, 0.5 mg, Nebulization, Q6H, Alvaro Heredia MD, 0.5 mg at 07/13/21 0746  •  LORazepam (ATIVAN) tablet 0.5 mg, 0.5 mg, Oral, Q8H PRN, Alvaro Heredia MD  •  montelukast (SINGULAIR) tablet 10 mg, 10 mg, Oral, Nightly, Alvaro Heredia MD, 10 mg at 07/12/21 2313  •  ondansetron (ZOFRAN) tablet 4 mg, 4 mg, Oral, Q6H PRN, Alvaro Heredia MD  •  predniSONE (DELTASONE) tablet 40 mg, 40 mg, Oral, Daily With Breakfast, Alvaro Heredia MD, 40 mg at 07/13/21 0828  •  sodium chloride 0.9 % flush 10 mL, 10 mL, Intravenous, Q12H, Alvaro Heredia MD, 10 mL at 07/13/21 0828  •  sodium chloride 0.9 % flush 10 mL, 10 mL, Intravenous, PRN, Alvaro Heredia MD    Results Review:  I have reviewed the labs, radiology results, and diagnostic studies.    Laboratory Data:   Results from last 7 days   Lab Units 07/12/21  1708   SODIUM mmol/L 134*   POTASSIUM mmol/L 4.2   CHLORIDE mmol/L 96*   CO2 mmol/L 32.0*   BUN mg/dL 16   CREATININE mg/dL 1.00   GLUCOSE mg/dL 101*   CALCIUM mg/dL 9.1   BILIRUBIN mg/dL 0.2   ALK PHOS U/L 85   ALT (SGPT) U/L 20    AST (SGOT) U/L 19   ANION GAP mmol/L 6.0     Estimated Creatinine Clearance: 64.3 mL/min (by C-G formula based on SCr of 1 mg/dL).          Results from last 7 days   Lab Units 07/12/21  1709   WBC 10*3/mm3 9.27   HEMOGLOBIN g/dL 14.3   HEMATOCRIT % 43.3   PLATELETS 10*3/mm3 238     Results from last 7 days   Lab Units 07/12/21  1708   INR  0.93       Culture Data:   No results found for: BLOODCX  No results found for: URINECX  No results found for: RESPCX  No results found for: WOUNDCX  No results found for: STOOLCX  No components found for: BODYFLD    Radiology Data:   Imaging Results (Last 24 Hours)     Procedure Component Value Units Date/Time    CT Angiogram Chest [[id number]] Collected: 07/12/21 2004     Updated: 07/12/21 2050    Narrative:      CT CHEST ANGIOGRAPHY WITH IV CONTRAST    INDICATION: 70 years Male; d dimer (+)/soa, J44.1 Chronic  obstructive pulmonary disease with (acute) exacerbation    TECHNIQUE:  CT angiogram of the chest was performed with IV  contrast.  3-D/MIP reconstructions were performed.  This exam was  performed according to our departmental dose-optimization  program, which includes automated exposure control, adjustment of  the mA and/or kV according to patient size and/or use of  iterative reconstruction technique.     COMPARISON: 2/6/2018.    FINDINGS:  Thyroid: Unremarkable.   Heart/Mediastinum: The heart is normal in size. Scattered  coronary artery calcifications.   Vasculature: No evidence of pulmonary emboli. Mild  atherosclerosis of the aorta. No aortic aneurysm or dissection.  Lungs/Pleura: Severe centrilobular emphysema. Spiculated nodule  measuring 1.8 x 1.0 x 0.9 cm in the central posterior aspect of  the right middle lobe closely abutting the minor fissure,  pulmonary vessels and the lateral subsegmental branch of the  right middle lobe bronchus is new from previous study. Reticular  interstitial opacity in the remainder of the lateral right middle  lobe and also  scattered in the superior segment of the right  lower lobe could represent postobstructive pneumonia/edema.  Stable 3 mm calcified nodule in the left lower lobe is likely  benign. No pleural effusion or pneumothorax.   Lymph nodes: No pathologically enlarged lymph nodes.  Bones: Unremarkable.  Soft tissues: Unremarkable.  Incidental findings: Small nonenhancing subcentimeter cyst in the  right hepatic lobe is probably benign.       Impression:      1.  No evidence of pulmonary embolism.  2.  Spiculated nodule measuring 1.8 cm in the central posterior  aspect of the right middle lobe closely abutting the minor  fissure, pulmonary vessels and the lateral subsegmental branch of  the right middle lobe bronchus is new from previous study.  Recommend PET/CT or tissue sampling (bronchoscopy).  3.  Reticular interstitial opacity in the remainder of the  lateral right middle lobe and also scattered in the superior  segment of the right lower lobe could represent postobstructive  pneumonia/edema.    Electronically signed by:  Lizz Osman  7/12/2021 8:49 PM CDT  Workstation: [id number]    XR Chest 1 View [[id number]] Collected: 07/12/21 1712     Updated: 07/12/21 1743    Narrative:      Exam: AP portable chest    INDICATION: Shortness of breath    COMPARISON: 11/6/2018    FINDINGS: AP portable chest. The bony structures are intact. The  cardiomegaly is silhouette is unremarkable. Plaque is present in  the aorta. Lungs are hyperinflated with parenchymal destruction  right greater than left compatible with emphysema. There is mild  linear atelectasis in the right lung. No pneumothorax or pleural  effusion.      Impression:      1. Mild linear atelectasis in the right. Recommend follow-up.  2. Pulmonary emphysema    Electronically signed by:  Nicolas Saldivar MD  7/12/2021 5:42 PM  CDT Workstation: [id number]          I have reviewed the patient's current medications.     Assessment/Plan     Hospital Problem List:  Principal Problem:     Acute exacerbation of chronic obstructive pulmonary disease (COPD) (CMS/[facility])  Active Problems:    Acute on chronic respiratory failure with hypoxia and hypercapnia (CMS/HCC)    Community acquired pneumonia of right middle lobe of lung    Acute on chronic respiratory failure with hypoxia and hypercapnia (secondary to ?pneumonia/COPD exacerbation): Continue noninvasive respiratory therapy, bronchodilators, steroids and antimicrobial therapy.  Blood culture results are pending.  Elevated D-dimer is likely due to acute illness and CT pulmonary angio is negative for pulmonary embolism.  Mild hyponatremia is clinically insignificant.    1.8 cm right middle lobe nodule: Consult oncologist.  Patient may need biopsy/surveillance imaging with PET scan .    Dependent edema both legs: Patient will be treated with low-dose Lasix for few days.    Continue GI and DVT prophylaxis.    CODE STATUS is full code.    Discharge Planning: In progress.    I confirmed that the patient's Advance Care Plan is present, code status is documented, or surrogate decision maker is listed in the patient's medical record.     I have utilized all available immediate resources to obtain, update, or review the patient's current medications      Lg Street MD   07/13/21   11:23 CDT

## 2021-07-13 NOTE — H&P
[facility] Medicine Admission      Date of Admission: 7/12/2021, 05:34 CDT      Primary Care Physician: Landon Starr MD      Chief Complaint: Shortness of breath    HPI: 70-year-old male with past medical history significant for hypertension, prior pulmonary embolism and COPD presents to the emergency department with shortness of breath.  Patient describes worsening shortness of breath for the past few days that is particularly worse with exertion.  He notes oxygen saturations in the mid to high 80s at home.  He endorses generalized chest discomfort without overt chest pain.  No heart palpitations or lightheadedness.  He denies cough, fevers or sick contacts.    Concurrent Medical History:  has a past medical history of COPD (chronic obstructive pulmonary disease) (CMS/[facility]).    Past Surgical History:  has a past surgical history that includes Hip Arthroplasty.    Family History: Noncontributory    Social History:  reports that he has never smoked. He does not have any smokeless tobacco history on file.    Allergies:   Allergies   Allergen Reactions   • Amoxicillin Anaphylaxis   • Albuterol Irritability     Facial flushing and palpitations.   • Lortab [Hydrocodone-Acetaminophen] Other (See Comments)     Can't remember   • Morphine And Related Hallucinations   • Prilosec [Omeprazole] Other (See Comments)     unknown   • Sulfa Antibiotics Other (See Comments)     Can't remember also more and can't remember   • Symbicort [Budesonide-Formoterol Fumarate] Unknown (See Comments)     Doesn't remember reaction type       Medications:   Prior to Admission medications    Medication Sig Start Date End Date Taking? Authorizing Provider   ipratropium (ATROVENT HFA) 17 MCG/ACT inhaler Inhale 2 puffs 4 (Four) Times a Day As Needed for Wheezing.   Yes ProviderQamar MD   predniSONE (DELTASONE) 5 MG tablet Take 5 mg by mouth Every Other Day.   Yes Qamar Javed MD    tiotropium bromide-olodaterol (Stiolto Respimat) 2.5-2.5 MCG/ACT aerosol solution inhaler Inhale 2 puffs Daily.   Yes Provider, MD Qamar   aspirin 81 MG chewable tablet Chew 81 mg Daily.    Provider, Qamar, MD   montelukast (SINGULAIR) 10 MG tablet Take 10 mg by mouth Every Night.    Provider, MD Qamar       Review of Systems:  A complete 10 point review of systems was obtained and was negative unless noted in the HPI.    Physical Exam:   Temp:  [98 °F (36.7 °C)-98.3 °F (36.8 °C)] 98 °F (36.7 °C)  Heart Rate:  [] 96  Resp:  [16-22] 16  BP: (136-198)/(59-96) 136/59  Physical Exam  Constitutional:       General: He is not in acute distress.     Appearance: He is not ill-appearing.   HENT:      Head: Normocephalic.      Nose: Nose normal.      Mouth/Throat:      Mouth: Mucous membranes are moist.      Pharynx: Oropharynx is clear.   Eyes:      Extraocular Movements: Extraocular movements intact.      Conjunctiva/sclera: Conjunctivae normal.   Cardiovascular:      Rate and Rhythm: Normal rate and regular rhythm.      Heart sounds: Normal heart sounds.   Pulmonary:      Effort: Pulmonary effort is normal. No respiratory distress.      Breath sounds: Normal breath sounds.   Abdominal:      General: Bowel sounds are normal.      Palpations: Abdomen is soft.      Tenderness: There is no abdominal tenderness.   Musculoskeletal:         General: Normal range of motion.      Cervical back: Normal range of motion.   Skin:     General: Skin is warm and dry.   Neurological:      General: No focal deficit present.      Mental Status: He is alert. Mental status is at baseline.   Psychiatric:         Mood and Affect: Mood normal.         Behavior: Behavior normal.           Results Reviewed:  I have personally reviewed current lab, radiology, and data and agree with results.  Lab Results (last 24 hours)     Procedure Component Value Units Date/Time    Blood Culture - Blood, Arm, Right [[id number]] Collected:  07/12/21 2301    Specimen: Blood from Arm, Right Updated: 07/12/21 2307    Blood Culture - Blood, Arm, Left [[id number]] Collected: 07/12/21 2125    Specimen: Blood from Arm, Left Updated: 07/12/21 2137    Troponin [[id number]]  (Normal) Collected: 07/12/21 2057    Specimen: Blood Updated: 07/12/21 2122     Troponin T <0.010 ng/mL     Narrative:      Troponin T Reference Range:  <= 0.03 ng/mL-   Negative for AMI  >0.03 ng/mL-     Abnormal for myocardial necrosis.  Clinicians would have to utilize clinical acumen, EKG, Troponin and serial changes to determine if it is an Acute Myocardial Infarction or myocardial injury due to an underlying chronic condition.       Results may be falsely decreased if patient taking Biotin.      Extra Tubes [[id number]] Collected: 07/12/21 1713    Specimen: Blood, Venous Line Updated: 07/12/21 2115    Narrative:      The following orders were created for panel order Extra Tubes.  Procedure                               Abnormality         Status                     ---------                               -----------         ------                     Gold Top - SST[[id number]]                                   Final result               Gray Top[[id number]]                                         Final result                 Please view results for these tests on the individual orders.    Gray Top [[id number]] Collected: 07/12/21 1713    Specimen: Blood Updated: 07/12/21 2115     Extra Tube Hold for add-ons.     Comment: Auto resulted.       Gold Top - SST [[id number]] Collected: 07/12/21 1713    Specimen: Blood Updated: 07/12/21 1815     Extra Tube Hold for add-ons.     Comment: Auto resulted.       Protime-INR [[id number]]  (Normal) Collected: 07/12/21 1708    Specimen: Blood Updated: 07/12/21 1741     Protime 12.4 Seconds      INR 0.93    Narrative:      Therapeutic range for most indications is 2.0-3.0 INR,  or 2.5-3.5 for mechanical heart valves.    aPTT [[id number]]  (Normal) Collected:  07/12/21 1708    Specimen: Blood Updated: 07/12/21 1741     PTT 28.0 seconds     Narrative:      The recommended Heparin therapeutic range is 68-97 seconds.    D-dimer, Quantitative [[id number]]  (Abnormal) Collected: 07/12/21 1708    Specimen: Blood Updated: 07/12/21 1741     D-Dimer, Quantitative 965 ng/mL (FEU)     Narrative:      Dimer values <500 ng/ml FEU are FDA approved as aid in diagnosis of deep venous thrombosis and pulmonary embolism.  This test should not be used in an exclusion strategy with pretest probability alone.    A recent guideline regarding diagnosis for pulmonary thromboembolism recommends an adjusted exclusion criterion of age x 10 ng/ml FEU for patients >50 years of age ([employer]ola Intern Med 2015; 163: 701-711).      COVID-19 and FLU A/B PCR - Swab, Nasopharynx [[id number]]  (Normal) Collected: 07/12/21 1709    Specimen: Swab from Nasopharynx Updated: 07/12/21 1735     COVID19 Not Detected     Influenza A PCR Not Detected     Influenza B PCR Not Detected    Narrative:      Fact sheet for providers: https://www.fda.gov/media/091209/download    Fact sheet for patients: https://www.fda.gov/media/095668/download    Test performed by PCR.    Comprehensive Metabolic Panel [[id number]]  (Abnormal) Collected: 07/12/21 1708    Specimen: Blood Updated: 07/12/21 1734     Glucose 101 mg/dL      BUN 16 mg/dL      Creatinine 1.00 mg/dL      Sodium 134 mmol/L      Potassium 4.2 mmol/L      Chloride 96 mmol/L      CO2 32.0 mmol/L      Calcium 9.1 mg/dL      Total Protein 7.4 g/dL      Albumin 4.30 g/dL      ALT (SGPT) 20 U/L      AST (SGOT) 19 U/L      Alkaline Phosphatase 85 U/L      Total Bilirubin 0.2 mg/dL      eGFR Non African Amer 74 mL/min/1.73      Globulin 3.1 gm/dL      A/G Ratio 1.4 g/dL      BUN/Creatinine Ratio 16.0     Anion Gap 6.0 mmol/L     Narrative:      GFR Normal >60  Chronic Kidney Disease <60  Kidney Failure <15      Troponin [[id number]]  (Normal) Collected: 07/12/21 1708    Specimen: Blood  Updated: 07/12/21 1734     Troponin T <0.010 ng/mL     Narrative:      Troponin T Reference Range:  <= 0.03 ng/mL-   Negative for AMI  >0.03 ng/mL-     Abnormal for myocardial necrosis.  Clinicians would have to utilize clinical acumen, EKG, Troponin and serial changes to determine if it is an Acute Myocardial Infarction or myocardial injury due to an underlying chronic condition.       Results may be falsely decreased if patient taking Biotin.      BNP [[id number]]  (Normal) Collected: 07/12/21 1708    Specimen: Blood Updated: 07/12/21 1732     proBNP 30.1 pg/mL     Narrative:      Among patients with dyspnea, NT-proBNP is highly sensitive for the detection of acute congestive heart failure. In addition NT-proBNP of <300 pg/ml effectively rules out acute congestive heart failure with 99% negative predictive value.    Results may be falsely decreased if patient taking Biotin.      Blood Gas, Arterial - [[id number]]  (Abnormal) Collected: 07/12/21 1722    Specimen: Arterial Blood Updated: 07/12/21 1730     Site Right Radial     Anson's Test Positive     pH, Arterial 7.395 pH units      pCO2, Arterial 55.8 mm Hg      Comment: 83 Value above reference range        pO2, Arterial 152.0 mm Hg      Comment: 83 Value above reference range        HCO3, Arterial 34.2 mmol/L      Comment: 83 Value above reference range        Base Excess, Arterial 7.3 mmol/L      Comment: 83 Value above reference range        O2 Saturation, Arterial 99.4 %      Comment: 83 Value above reference range        Barometric Pressure for Blood Gas 747 mmHg      Modality Nasal Cannula     Flow Rate 2.5 lpm      Ventilator Mode NA     Collected by      Comment: Meter: [id number]     :  [id number]       CBC & Differential [[id number]]  (Abnormal) Collected: 07/12/21 1709    Specimen: Blood Updated: 07/12/21 1716    Narrative:      The following orders were created for panel order CBC & Differential.  Procedure                                Abnormality         Status                     ---------                               -----------         ------                     CBC Auto Differential[[id number]]        Abnormal            Final result                 Please view results for these tests on the individual orders.    CBC Auto Differential [[id number]]  (Abnormal) Collected: 07/12/21 1709    Specimen: Blood Updated: 07/12/21 1716     WBC 9.27 10*3/mm3      RBC 4.69 10*6/mm3      Hemoglobin 14.3 g/dL      Hematocrit 43.3 %      MCV 92.3 fL      MCH 30.5 pg      MCHC 33.0 g/dL      RDW 12.3 %      RDW-SD 41.7 fl      MPV 10.2 fL      Platelets 238 10*3/mm3      Neutrophil % 62.7 %      Lymphocyte % 17.2 %      Monocyte % 11.7 %      Eosinophil % 7.2 %      Basophil % 0.8 %      Immature Grans % 0.4 %      Neutrophils, Absolute 5.82 10*3/mm3      Lymphocytes, Absolute 1.59 10*3/mm3      Monocytes, Absolute 1.08 10*3/mm3      Eosinophils, Absolute 0.67 10*3/mm3      Basophils, Absolute 0.07 10*3/mm3      Immature Grans, Absolute 0.04 10*3/mm3      nRBC 0.0 /100 WBC         Imaging Results (Last 24 Hours)     Procedure Component Value Units Date/Time    CT Angiogram Chest [[id number]] Collected: 07/12/21 2004     Updated: 07/12/21 2050    Narrative:      CT CHEST ANGIOGRAPHY WITH IV CONTRAST    INDICATION: 70 years Male; d dimer (+)/soa, J44.1 Chronic  obstructive pulmonary disease with (acute) exacerbation    TECHNIQUE:  CT angiogram of the chest was performed with IV  contrast.  3-D/MIP reconstructions were performed.  This exam was  performed according to our departmental dose-optimization  program, which includes automated exposure control, adjustment of  the mA and/or kV according to patient size and/or use of  iterative reconstruction technique.     COMPARISON: 2/6/2018.    FINDINGS:  Thyroid: Unremarkable.   Heart/Mediastinum: The heart is normal in size. Scattered  coronary artery calcifications.   Vasculature: No evidence of pulmonary emboli.  Mild  atherosclerosis of the aorta. No aortic aneurysm or dissection.  Lungs/Pleura: Severe centrilobular emphysema. Spiculated nodule  measuring 1.8 x 1.0 x 0.9 cm in the central posterior aspect of  the right middle lobe closely abutting the minor fissure,  pulmonary vessels and the lateral subsegmental branch of the  right middle lobe bronchus is new from previous study. Reticular  interstitial opacity in the remainder of the lateral right middle  lobe and also scattered in the superior segment of the right  lower lobe could represent postobstructive pneumonia/edema.  Stable 3 mm calcified nodule in the left lower lobe is likely  benign. No pleural effusion or pneumothorax.   Lymph nodes: No pathologically enlarged lymph nodes.  Bones: Unremarkable.  Soft tissues: Unremarkable.  Incidental findings: Small nonenhancing subcentimeter cyst in the  right hepatic lobe is probably benign.       Impression:      1.  No evidence of pulmonary embolism.  2.  Spiculated nodule measuring 1.8 cm in the central posterior  aspect of the right middle lobe closely abutting the minor  fissure, pulmonary vessels and the lateral subsegmental branch of  the right middle lobe bronchus is new from previous study.  Recommend PET/CT or tissue sampling (bronchoscopy).  3.  Reticular interstitial opacity in the remainder of the  lateral right middle lobe and also scattered in the superior  segment of the right lower lobe could represent postobstructive  pneumonia/edema.    Electronically signed by:  Lizz Osman  7/12/2021 8:49 PM CDT  Workstation: [id number]    XR Chest 1 View [[id number]] Collected: 07/12/21 1712     Updated: 07/12/21 1743    Narrative:      Exam: AP portable chest    INDICATION: Shortness of breath    COMPARISON: 11/6/2018    FINDINGS: AP portable chest. The bony structures are intact. The  cardiomegaly is silhouette is unremarkable. Plaque is present in  the aorta. Lungs are hyperinflated with parenchymal  destruction  right greater than left compatible with emphysema. There is mild  linear atelectasis in the right lung. No pneumothorax or pleural  effusion.      Impression:      1. Mild linear atelectasis in the right. Recommend follow-up.  2. Pulmonary emphysema    Electronically signed by:  Nicolas Saldivar MD  7/12/2021 5:42 PM  CDT Workstation: [id number]            Assessment:    Active Hospital Problems    Diagnosis    • **Acute exacerbation of chronic obstructive pulmonary disease (COPD) (CMS/HCC)    • Community acquired pneumonia of right middle lobe of lung    • Acute on chronic respiratory failure with hypoxia and hypercapnia (CMS/HCC)      - Treating as described above.               Plan:    #1.  Acute COPD exacerbation with acute on chronic hypoxic respiratory failure.  #2.  Concern for community-acquired pneumonia.  #3.  Right upper lobe pulmonary nodule.    Initiation of systemic steroid therapy and scheduled duo nebs with antibiotic therapy consisting of ceftriaxone azithromycin for possible community-acquired pneumonia and underlying COPD exacerbation.  There is a spiculated nodule measuring 1.8 cm in the central posterior aspect of the right middle lobe concerning for malignancy.  This is a new finding and will need further work-up coordinated in the outpatient setting.  EKG without acute ST segment or T wave changes and troponin enzyme negative.  COVID-19 and influenza negative.  proBNP within normal limits.  D-dimer elevated 165 with CTA chest negative for pulmonary embolism.    CODE STATUS: Full code  DVT prophylaxis: Subcutaneous Lovenox  Diet: Cardiac  Disposition: Observation    Alvaro Heredia MD  Hospitalist Service

## 2021-07-14 LAB
ANION GAP SERPL CALCULATED.3IONS-SCNC: 8 MMOL/L (ref 5–15)
BASOPHILS # BLD AUTO: 0.07 10*3/MM3 (ref 0–0.2)
BASOPHILS NFR BLD AUTO: 0.4 % (ref 0–1.5)
BUN SERPL-MCNC: 16 MG/DL (ref 8–23)
BUN/CREAT SERPL: 20.8 (ref 7–25)
CALCIUM SPEC-SCNC: 9.4 MG/DL (ref 8.6–10.5)
CHLORIDE SERPL-SCNC: 102 MMOL/L (ref 98–107)
CO2 SERPL-SCNC: 32 MMOL/L (ref 22–29)
CREAT SERPL-MCNC: 0.77 MG/DL (ref 0.76–1.27)
DEPRECATED RDW RBC AUTO: 42.6 FL (ref 37–54)
EOSINOPHIL # BLD AUTO: 0.08 10*3/MM3 (ref 0–0.4)
EOSINOPHIL NFR BLD AUTO: 0.5 % (ref 0.3–6.2)
ERYTHROCYTE [DISTWIDTH] IN BLOOD BY AUTOMATED COUNT: 12.6 % (ref 12.3–15.4)
GFR SERPL CREATININE-BSD FRML MDRD: 100 ML/MIN/1.73
GLUCOSE SERPL-MCNC: 102 MG/DL (ref 65–99)
HCT VFR BLD AUTO: 43.2 % (ref 37.5–51)
HGB BLD-MCNC: 14.3 G/DL (ref 13–17.7)
IMM GRANULOCYTES # BLD AUTO: 0.08 10*3/MM3 (ref 0–0.05)
IMM GRANULOCYTES NFR BLD AUTO: 0.5 % (ref 0–0.5)
LYMPHOCYTES # BLD AUTO: 2 10*3/MM3 (ref 0.7–3.1)
LYMPHOCYTES NFR BLD AUTO: 11.5 % (ref 19.6–45.3)
MCH RBC QN AUTO: 30.7 PG (ref 26.6–33)
MCHC RBC AUTO-ENTMCNC: 33.1 G/DL (ref 31.5–35.7)
MCV RBC AUTO: 92.7 FL (ref 79–97)
MONOCYTES # BLD AUTO: 1.8 10*3/MM3 (ref 0.1–0.9)
MONOCYTES NFR BLD AUTO: 10.3 % (ref 5–12)
NEUTROPHILS NFR BLD AUTO: 13.38 10*3/MM3 (ref 1.7–7)
NEUTROPHILS NFR BLD AUTO: 76.8 % (ref 42.7–76)
NRBC BLD AUTO-RTO: 0 /100 WBC (ref 0–0.2)
PLATELET # BLD AUTO: 259 10*3/MM3 (ref 140–450)
PMV BLD AUTO: 10.2 FL (ref 6–12)
POTASSIUM SERPL-SCNC: 3.7 MMOL/L (ref 3.5–5.2)
RBC # BLD AUTO: 4.66 10*6/MM3 (ref 4.14–5.8)
SODIUM SERPL-SCNC: 142 MMOL/L (ref 136–145)
WBC # BLD AUTO: 17.41 10*3/MM3 (ref 3.4–10.8)

## 2021-07-14 PROCEDURE — 25010000002 CEFTRIAXONE: Performed by: INTERNAL MEDICINE

## 2021-07-14 PROCEDURE — 25010000002 ENOXAPARIN PER 10 MG: Performed by: INTERNAL MEDICINE

## 2021-07-14 PROCEDURE — 25010000002 FUROSEMIDE PER 20 MG: Performed by: INTERNAL MEDICINE

## 2021-07-14 PROCEDURE — 94760 N-INVAS EAR/PLS OXIMETRY 1: CPT

## 2021-07-14 PROCEDURE — 97162 PT EVAL MOD COMPLEX 30 MIN: CPT

## 2021-07-14 PROCEDURE — 63710000001 PREDNISONE PER 1 MG: Performed by: INTERNAL MEDICINE

## 2021-07-14 PROCEDURE — G0378 HOSPITAL OBSERVATION PER HR: HCPCS

## 2021-07-14 PROCEDURE — 94799 UNLISTED PULMONARY SVC/PX: CPT

## 2021-07-14 PROCEDURE — 99214 OFFICE O/P EST MOD 30 MIN: CPT | Performed by: INTERNAL MEDICINE

## 2021-07-14 PROCEDURE — 85025 COMPLETE CBC W/AUTO DIFF WBC: CPT | Performed by: INTERNAL MEDICINE

## 2021-07-14 PROCEDURE — 80048 BASIC METABOLIC PNL TOTAL CA: CPT | Performed by: INTERNAL MEDICINE

## 2021-07-14 RX ADMIN — SODIUM CHLORIDE, PRESERVATIVE FREE 10 ML: 5 INJECTION INTRAVENOUS at 08:09

## 2021-07-14 RX ADMIN — ENOXAPARIN SODIUM 40 MG: 40 INJECTION SUBCUTANEOUS at 20:18

## 2021-07-14 RX ADMIN — SODIUM CHLORIDE, PRESERVATIVE FREE 10 ML: 5 INJECTION INTRAVENOUS at 20:18

## 2021-07-14 RX ADMIN — FUROSEMIDE 20 MG: 10 INJECTION, SOLUTION INTRAMUSCULAR; INTRAVENOUS at 05:28

## 2021-07-14 RX ADMIN — FUROSEMIDE 20 MG: 10 INJECTION, SOLUTION INTRAMUSCULAR; INTRAVENOUS at 16:50

## 2021-07-14 RX ADMIN — AZITHROMYCIN MONOHYDRATE 500 MG: 250 TABLET ORAL at 07:26

## 2021-07-14 RX ADMIN — ASPIRIN 81 MG 81 MG: 81 TABLET ORAL at 07:26

## 2021-07-14 RX ADMIN — MONTELUKAST SODIUM 10 MG: 10 TABLET, COATED ORAL at 20:18

## 2021-07-14 RX ADMIN — CEFTRIAXONE 1 G: 1 INJECTION, POWDER, FOR SOLUTION INTRAMUSCULAR; INTRAVENOUS at 05:28

## 2021-07-14 RX ADMIN — PREDNISONE 40 MG: 20 TABLET ORAL at 07:26

## 2021-07-14 NOTE — PROGRESS NOTES
HISTORY OF PRESENT ILLNESS:    Complains of shortness of breath.  Denies any bleeding.  Denies any new lymph node enlargement.  Denies any hemoptysis        PAST MEDICAL HISTORY:    Past Medical History:   Diagnosis Date   • COPD (chronic obstructive pulmonary disease) (CMS/[facility])        SOCIAL HISTORY:    Social History     Tobacco Use   • Smoking status: Never Smoker   Substance Use Topics   • Alcohol use: Not on file   • Drug use: Not on file       Surgical History :  Past Surgical History:   Procedure Laterality Date   • HIP ARTHROPLASTY         ALLERGIES:    Allergies   Allergen Reactions   • Amoxicillin Anaphylaxis   • Albuterol Irritability     Facial flushing and palpitations.   • Lortab [Hydrocodone-Acetaminophen] Other (See Comments)     Can't remember   • Morphine And Related Hallucinations   • Prilosec [Omeprazole] Other (See Comments)     unknown   • Sulfa Antibiotics Other (See Comments)     Can't remember also more and can't remember   • Symbicort [Budesonide-Formoterol Fumarate] Unknown (See Comments)     Doesn't remember reaction type         FAMILY HISTORY:  History reviewed. No pertinent family history.        REVIEW OF SYSTEMS:      CONSTITUTIONAL:  Complains of fatigue. Denies any fever, chills or weight loss.     EYES: No visual disturbances. No discharge. No new lesions    ENMT:  No epistaxis, mouth sores or difficulty swallowing.    RESPIRATORY: Positive for shortness of breath. No new cough or hemoptysis.    CARDIOVASCULAR:  No chest pain or palpitations.    GASTROINTESTINAL:  No abdominal pain nausea, vomiting or blood in the stool.    GENITOURINARY: Positive for frequency.  No Dysuria or Hematuria.    MUSCULOSKELETAL: Positive for chronic back pain.  Positive for swelling of lower extremity.    LYMPHATICS:  Denies any abnormal swollen glands anywhere in the body.    NEUROLOGICAL : Positive for intermittent tingling and numbness affecting bilateral feet. No headache or dizziness. No  "seizures or balance problems.    SKIN: No new skin lesions.    ENDOCRINE : No new hot or cold intolerance. No new polyuria . No polydipsia.        PHYSICAL EXAMINATION:      VITAL SIGNS:  /73 (BP Location: Left arm, Patient Position: Lying)   Pulse 112   Temp 97.1 °F (36.2 °C) (Axillary)   Resp 18   Ht 165.1 cm (65\")   Wt 74.1 kg (163 lb 6.4 oz)   SpO2 94%   BMI 27.19 kg/m²       07/12/21  1627 07/12/21 2242 07/14/21 0527   Weight: 72.6 kg (160 lb) 75.5 kg (166 lb 6.4 oz) 74.1 kg (163 lb 6.4 oz)             CONSTITUTIONAL:  Not in any distress.    EYES: Mild conjunctival Pallor. No Icterus. No Pterygium. Extraocular Movements intact.No ptosis.    ENMT:  Normocephalic, Atraumatic.No Facial Asymmetry noted.    NECK:  No adenopathy.Trachea midline. NO JVD.    RESPIRATORY: Diminished air entry in bilateral lung field.  Occasional wheezing present.    CARDIOVASCULAR:  S1, S2. Regular rate and rhythm. No murmur or gallop appreciated.    ABDOMEN:  Soft, obese, nontender. Bowel sounds present in all four quadrants.  No Hepatosplenomegaly appreciated.    MUSCULOSKELETAL:  1+ edema.No Calf Tenderness.Decreased range of motion.    NEUROLOGIC:    No  Motor  deficit appreciated. Cranial Nerves 2-12 grossly intact.    SKIN : No new skin lesion identified. Skin is warm and moist to touch.    LYMPHATICS: No new enlarged lymph nodes in neck or supraclavicular area.    PSYCHIATRY: Alert, awake and oriented ×3.          DIAGNOSTIC DATA:    Glucose   Date Value Ref Range Status   07/14/2021 102 (H) 65 - 99 mg/dL Final     Sodium   Date Value Ref Range Status   07/14/2021 142 136 - 145 mmol/L Final     Potassium   Date Value Ref Range Status   07/14/2021 3.7 3.5 - 5.2 mmol/L Final     Comment:     Slight hemolysis detected by analyzer. Results may be affected.     CO2   Date Value Ref Range Status   07/14/2021 32.0 (H) 22.0 - 29.0 mmol/L Final     Chloride   Date Value Ref Range Status   07/14/2021 102 98 - 107 mmol/L " Final     Anion Gap   Date Value Ref Range Status   07/14/2021 8.0 5.0 - 15.0 mmol/L Final     Creatinine   Date Value Ref Range Status   07/14/2021 0.77 0.76 - 1.27 mg/dL Final     BUN   Date Value Ref Range Status   07/14/2021 16 8 - 23 mg/dL Final     BUN/Creatinine Ratio   Date Value Ref Range Status   07/14/2021 20.8 7.0 - 25.0 Final     Calcium   Date Value Ref Range Status   07/14/2021 9.4 8.6 - 10.5 mg/dL Final     eGFR Non  Amer   Date Value Ref Range Status   07/14/2021 100 >60 mL/min/1.73 Final     Alkaline Phosphatase   Date Value Ref Range Status   07/12/2021 85 39 - 117 U/L Final     Total Protein   Date Value Ref Range Status   07/12/2021 7.4 6.0 - 8.5 g/dL Final     ALT (SGPT)   Date Value Ref Range Status   07/12/2021 20 1 - 41 U/L Final     AST (SGOT)   Date Value Ref Range Status   07/12/2021 19 1 - 40 U/L Final     Total Bilirubin   Date Value Ref Range Status   07/12/2021 0.2 0.0 - 1.2 mg/dL Final     Albumin   Date Value Ref Range Status   07/12/2021 4.30 3.50 - 5.20 g/dL Final     Globulin   Date Value Ref Range Status   07/12/2021 3.1 gm/dL Final     Lab Results   Component Value Date    WBC 17.41 (H) 07/14/2021    HGB 14.3 07/14/2021    HCT 43.2 07/14/2021    MCV 92.7 07/14/2021     07/14/2021     Lab Results   Component Value Date    NEUTROABS 13.38 (H) 07/14/2021     No results found for: , LABCA2, AFPTM, HCGQUANT, , CHROMGRNA, [id number], CEA, REFLABREPO        PATHOLOGY:  * Cannot find OR log *         RADIOLOGY DATA :  XR Chest 1 View    Result Date: 7/12/2021  1. Mild linear atelectasis in the right. Recommend follow-up. 2. Pulmonary emphysema Electronically signed by:  Nicolas Saldivar MD  7/12/2021 5:42 PM CDT Workstation: [id number]    CT Angiogram Chest    Result Date: 7/12/2021  1.  No evidence of pulmonary embolism. 2.  Spiculated nodule measuring 1.8 cm in the central posterior aspect of the right middle lobe closely abutting the minor fissure, pulmonary  vessels and the lateral subsegmental branch of the right middle lobe bronchus is new from previous study. Recommend PET/CT or tissue sampling (bronchoscopy). 3.  Reticular interstitial opacity in the remainder of the lateral right middle lobe and also scattered in the superior segment of the right lower lobe could represent postobstructive pneumonia/edema. Electronically signed by:  Lizz Osman  7/12/2021 8:49 PM CDT Workstation: [id number]        ASSESSMENT AND PLAN:      1.  Spiculated nodule in right middle lobe:  The CT angiogram done on July 12, 2021 showed 1.8 cm nodule in right middle lobe which is new as compared to CT angiogram done in 2018  -It was discussed with patient and his wife on July 13, 2021 and that he will need further work-up including PET/CT as outpatient to rule out any malignant process  -Upon follow-up visit today patient and his wife does not have any question regarding further work-up regarding spiculated lung nodule.  -My office will arrange for PET/CT in about 3 weeks after hospital discharge that will give enough time for infectious etiology to clear in case patient has any underlying pneumonia.  -Patient will be followed up in cancer center after PET/CT for further recommendation.  Patient was left with contact information for cancer Center today.    2.  History of pulmonary embolism  -Patient was diagnosed with pulmonary embolism in 2015 for which he took Coumadin for 1 year.  -CT angiogram done on July 12, 2021 is negative for pulmonary embolism    3.  Leukocytosis:  -Secondary to steroid.  White blood cell count is 17,000 we'll monitor with CBC    4.  Shortness of breath due to COPD exacerbation  -Remains on steroid and antibiotic as per primary team          Omega Davis MD  7/14/2021  17:41 CDT        Part of this note may be an electronic transcription/translation of spoken language to printed text using the Dragon Dictation System.

## 2021-07-14 NOTE — PLAN OF CARE
Problem: Adult Inpatient Plan of Care  Goal: Plan of Care Review  Recent Flowsheet Documentation  Taken 7/14/2021 1314 by Nydia Muñoz PT  Plan of Care Reviewed With:   patient   spouse  Outcome Summary: PT evaluation completed. Pt sitting in chair visiting with spouse. Pt agreeable to therapy. Pt reports recently finished lunch and this makes his breathing more difficult. Pt transferred sit to stand to sit with supervision and ambulated 20ft with CGA with O2 2lpm. O2 sats initially 95% then dropped to 85% with ambulation and recovered to 94% in 1 minute. Function limited by decreased strength, balance, and tolerance for functional mobility and activities. Pt will benefit from PT to regain lost function. Anticipate home with assistance at discharge. If pt discharged prior to goals being met, Pt may benefit from HH therapy with progression to pulmonary rehab.   Goal Outcome Evaluation:  Plan of Care Reviewed With: patient, spouse           Outcome Summary: PT evaluation completed. Pt sitting in chair visiting with spouse. Pt agreeable to therapy. Pt reports recently finished lunch and this makes his breathing more difficult. Pt transferred sit to stand to sit with supervision and ambulated 20ft with CGA with O2 2lpm. O2 sats initially 95% then dropped to 85% with ambulation and recovered to 94% in 1 minute. Function limited by decreased strength, balance, and tolerance for functional mobility and activities. Pt will benefit from PT to regain lost function. Anticipate home with assistance at discharge. If pt discharged prior to goals being met, Pt may benefit from HH therapy with progression to pulmonary rehab.

## 2021-07-14 NOTE — THERAPY EVALUATION
Patient Name: Brian Garcia  : 1950    MRN: [id number]                              Today's Date: 2021       Admit Date: 2021    Visit Dx:     ICD-10-CM ICD-9-CM   1. Acute exacerbation of chronic obstructive pulmonary disease (COPD) (CMS/[facility])  J44.1 491.21   2. Pneumonia of right lung due to infectious organism, unspecified part of lung  J18.9 483.8   3. Pulmonary nodule  R91.1 793.11   4. Impaired functional mobility, balance, gait, and endurance  Z74.09 V49.89     Patient Active Problem List   Diagnosis   • Pneumonia of both lower lobes due to infectious organism   • Chronic obstructive pulmonary disease with acute exacerbation (CMS/HCC)   • Sepsis due to pneumonia (CMS/HCC)   • Acute on chronic respiratory failure with hypoxia and hypercapnia (CMS/HCC)   • Hypokalemia   • Physical deconditioning   • Acute exacerbation of chronic obstructive pulmonary disease (COPD) (CMS/[facility])   • Community acquired pneumonia of right middle lobe of lung     Past Medical History:   Diagnosis Date   • COPD (chronic obstructive pulmonary disease) (CMS/[facility])      Past Surgical History:   Procedure Laterality Date   • HIP ARTHROPLASTY       General Information     Row Name 21 1314          Physical Therapy Time and Intention    Document Type  evaluation  -     Mode of Treatment  individual therapy;physical therapy  -     Row Name 21 1314          General Information    Patient Profile Reviewed  yes  -EUGENE     Prior Level of Function  independent:;gait;transfer;ADL's;cooking  -     Existing Precautions/Restrictions  oxygen therapy device and L/min  -     Row Name 21 1314          Living Environment    Lives With  spouse  -     Row Name 21 1314          Home Main Entrance    Number of Stairs, Main Entrance  five mostly uses ramp with bilateral rails  -     Stair Railings, Main Entrance  railing on left side (ascending)  -     Row Name 21 1314          Cognition     Orientation Status (Cognition)  oriented x 4  -[facility] Name 07/14/21 1314          Safety Issues, Functional Mobility    Safety Issues Affecting Function (Mobility)  insight into deficits/self-awareness;safety precaution awareness  -     Impairments Affecting Function (Mobility)  endurance/activity tolerance;shortness of breath  -       User Key  (r) = Recorded By, (t) = Taken By, (c) = Cosigned By    Initials Name Provider Type    Nydia Alvarez PT Physical Therapist        Mobility     Row Name 07/14/21 1314          Bed Mobility    Comment (Bed Mobility)  not tested;pt in chair before and after session  -[facility] Name 07/14/21 1314          Bed-Chair Transfer    Bed-Chair Shoshone (Transfers)  supervision chair to chair  -[facility] 07/14/21 1314          Sit-Stand Transfer    Sit-Stand Shoshone (Transfers)  supervision  -EUGENE     Row Name 07/14/21 1314          Gait/Stairs (Locomotion)    Shoshone Level (Gait)  contact guard  -     Assistive Device (Gait)  -- none  -     Distance in Feet (Gait)  20ft  -       User Key  (r) = Recorded By, (t) = Taken By, (c) = Cosigned By    Initials Name Provider Type    EUGENE Nydia Muñoz PT Physical Therapist        Obj/Interventions     [facility] Name 07/14/21 1314          Range of Motion Comprehensive    Comment, General Range of Motion  AROM WFL all extremities  -EUGENE     Row Name 07/14/21 1314          Strength Comprehensive (MMT)    Comment, General Manual Muscle Testing (MMT) Assessment  BUE: grossly 4+/5 BLE: 4/5 ankles/feet, knees, hips  -[facility] Name 07/14/21 1314          Balance    Balance Assessment  sitting static balance;sitting dynamic balance;standing static balance;standing dynamic balance  -     Static Sitting Balance  WNL  -     Dynamic Sitting Balance  WNL  -     Static Standing Balance  WFL  -     Dynamic Standing Balance  mild impairment  -[facility] Name 07/14/21 1314          Sensory Assessment (Somatosensory)     Sensory Assessment (Somatosensory)  sensation intact to light touch  -EUGENE       User Key  (r) = Recorded By, (t) = Taken By, (c) = Cosigned By    Initials Name Provider Type    Nydia Alvarez, PT Physical Therapist        Goals/Plan     Row Name 07/14/21 1314          Bed Mobility Goal 1 (PT)    Activity/Assistive Device (Bed Mobility Goal 1, PT)  sit to supine  -EUGENE     Weber Level/Cues Needed (Bed Mobility Goal 1, PT)  independent  -EUGENE     Time Frame (Bed Mobility Goal 1, PT)  by discharge  -EUGENE     Progress/Outcomes (Bed Mobility Goal 1, PT)  goal not met  -EUGENE     Row Name 07/14/21 1314          Transfer Goal 1 (PT)    Activity/Assistive Device (Transfer Goal 1, PT)  sit-to-stand/stand-to-sit;bed-to-chair/chair-to-bed O2 sats will not drop below 90%  -EUGENE     Weber Level/Cues Needed (Transfer Goal 1, PT)  independent  -EUGENE     Time Frame (Transfer Goal 1, PT)  by discharge  -EUGENE     Progress/Outcome (Transfer Goal 1, PT)  goal not met  -EUGENE     Row Name 07/14/21 1314          Gait Training Goal 1 (PT)    Activity/Assistive Device (Gait Training Goal 1, PT)  gait (walking locomotion);decrease fall risk;increase endurance/gait distance  -EUGENE     Weber Level (Gait Training Goal 1, PT)  independent O2 sats will not drop below 90%  -EUGENE     Distance (Gait Training Goal 1, PT)  30ft or more x3  -EUGENE     Row Name 07/14/21 1314          ROM Goal 1 (PT)    ROM Goal 1 (PT)  Pt will tolerate LE exercises OOB in chair with vss  -EUGENE     Time Frame (ROM Goal 1, PT)  by discharge  -EUGENE     Progress/Outcome (ROM Goal 1, PT)  goal not met  -EUGENE     Row Name 07/14/21 1314          Stairs Goal 1 (PT)    Activity/Assistive Device (Stairs Goal 1, PT)  ascending stairs;descending stairs;using handrail, left  -EUGENE     Weber Level/Cues Needed (Stairs Goal 1, PT)  modified independence  -EUGENE     Number of Stairs (Stairs Goal 1, PT)  5  -EUGENE     Time Frame (Stairs Goal 1, PT)  by discharge  -EUGENE     Progress/Outcome (Stairs  Goal 1, PT)  goal not met  -       User Key  (r) = Recorded By, (t) = Taken By, (c) = Cosigned By    Initials Name Provider Type    Nydia Alvarez, PT Physical Therapist        Clinical Impression     Row Name 07/14/21 1314          Pain    Additional Documentation  Pain Scale: Numbers Pre/Post-Treatment (Group)  -[facility] Name 07/14/21 1314          Pain Scale: Numbers Pre/Post-Treatment    Pretreatment Pain Rating  0/10 - no pain  -EUGENE     Posttreatment Pain Rating  0/10 - no pain  -EUGENE     Row Name 07/14/21 1314          Plan of Care Review    Plan of Care Reviewed With  patient;spouse  -     Outcome Summary  PT evaluation completed. Pt sitting in chair visiting with spouse. Pt agreeable to therapy. Pt reports recently finished lunch and this makes his breathing more difficult. Pt transferred sit to stand to sit with supervision and ambulated 20ft with CGA with O2 2lpm. O2 sats initially 95% then dropped to 85% with ambulation and recovered to 94% in 1 minute. Function limited by decreased strength, balance, and tolerance for functional mobility and activities. Pt will benefit from PT to regain lost function. Anticipate home with assistance at discharge. If pt discharged prior to goals being met, Pt may benefit from HH therapy with progression to pulmonary rehab.  -     Row Name 07/14/21 1314          Therapy Assessment/Plan (PT)    Patient/Family Therapy Goals Statement (PT)  amparo to impove mobility so O2 sats do not drop below 88% on O2  -     Rehab Potential (PT)  good, to achieve stated therapy goals  -     Criteria for Skilled Interventions Met (PT)  yes;meets criteria;skilled treatment is necessary  -     Predicted Duration of Therapy Intervention (PT)  until discharge or goals met  -     Row Name 07/14/21 1314          Vital Signs    Pre Systolic BP Rehab  137  -EUGENE     Pre Treatment Diastolic BP  65  -EUGENE     Post Systolic BP Rehab  142  -EUGENE     Post Treatment Diastolic BP  67  -EUGENE      Pretreatment Heart Rate (beats/min)  110  -EUGENE     Posttreatment Heart Rate (beats/min)  112  -EUGENE     Pre SpO2 (%)  95  -EUGENE     O2 Delivery Pre Treatment  supplemental O2 2lpm  -EUGENE     Intra SpO2 (%)  85  -EUGENE     O2 Delivery Intra Treatment  nasal cannula  -EUGENE     Post SpO2 (%)  94  -EUGENE     O2 Delivery Post Treatment  nasal cannula  -EUGENE     Pre Patient Position  Sitting  -EUGENE     Intra Patient Position  Sitting  -EUGENE     Post Patient Position  Sitting  -EUGENE     Row Name 07/14/21 1314          Positioning and Restraints    Pre-Treatment Position  sitting in chair/recliner  -EUGENE     Post Treatment Position  chair  -EUGENE     In Chair  sitting;call light within reach;encouraged to call for assist;with family/caregiver  -EUGENE       User Key  (r) = Recorded By, (t) = Taken By, (c) = Cosigned By    Initials Name Provider Type    Nydia Alvarez PT Physical Therapist        Outcome Measures     Row Name 07/14/21 1314          How much help from another person do you currently need...    Turning from your back to your side while in flat bed without using bedrails?  4  -EUGENE     Moving from lying on back to sitting on the side of a flat bed without bedrails?  4  -EUGENE     Moving to and from a bed to a chair (including a wheelchair)?  3  -EUGENE     Standing up from a chair using your arms (e.g., wheelchair, bedside chair)?  3  -EUGENE     Climbing 3-5 steps with a railing?  3  -EUGENE     To walk in hospital room?  3  -EUGENE     AM-PAC 6 Clicks Score (PT)  20  -     Row Name 07/14/21 1314          Functional Assessment    Outcome Measure Options  AM-PAC 6 Clicks Basic Mobility (PT)  -       User Key  (r) = Recorded By, (t) = Taken By, (c) = Cosigned By    Initials Name Provider Type    Nydia Alvarez PT Physical Therapist        Physical Therapy Education                 Title: PT OT SLP Therapies (Not Started)     Topic: Physical Therapy (In Progress)     Point: Mobility training (In Progress)     Learning Progress Summary           Patient  Acceptance, E, NR by  at 7/14/2021 1546                   Point: Home exercise program (Not Started)     Learner Progress:  Not documented in this visit.          Point: Body mechanics (In Progress)     Learning Progress Summary           Patient Acceptance, E, NR by  at 7/14/2021 1546                   Point: Precautions (In Progress)     Learning Progress Summary           Patient Acceptance, E, NR by  at 7/14/2021 1546                               User Key     Initials Effective Dates Name Provider Type Discipline     06/16/21 -  Nydia Muñoz, PT Physical Therapist PT              PT Recommendation and Plan  Planned Therapy Interventions (PT): balance training, bed mobility training, gait training, home exercise program, patient/family education, stair training, strengthening, transfer training  Plan of Care Reviewed With: patient, spouse  Outcome Summary: PT evaluation completed. Pt sitting in chair visiting with spouse. Pt agreeable to therapy. Pt reports recently finished lunch and this makes his breathing more difficult. Pt transferred sit to stand to sit with supervision and ambulated 20ft with CGA with O2 2lpm. O2 sats initially 95% then dropped to 85% with ambulation and recovered to 94% in 1 minute. Function limited by decreased strength, balance, and tolerance for functional mobility and activities. Pt will benefit from PT to regain lost function. Anticipate home with assistance at discharge. If pt discharged prior to goals being met, Pt may benefit from HH therapy with progression to pulmonary rehab.     Time Calculation:   PT Charges     Row Name 07/14/21 1547             Time Calculation    Start Time  1314  -      Stop Time  1407  -      Time Calculation (min)  53 min  -      PT Received On  07/14/21  -      PT Goal Re-Cert Due Date  07/27/21  -         Time Calculation- PT    Total Timed Code Minutes- PT  0 minute(s)  -         Untimed Charges    PT Eval/Re-eval Minutes  53   -EUGENE         Total Minutes    Untimed Charges Total Minutes  53  -EUGENE       Total Minutes  53  -EUGENE        User Key  (r) = Recorded By, (t) = Taken By, (c) = Cosigned By    Initials Name Provider Type    Nydia Alvarez, PT Physical Therapist        Therapy Charges for Today     Code Description Service Date Service Provider Modifiers Qty    [id number]  PT EVAL MOD COMPLEXITY 4 7/14/2021 Nydia Muñoz, PT GP 1          PT G-Codes  Outcome Measure Options: AM-PAC 6 Clicks Basic Mobility (PT)  AM-PAC 6 Clicks Score (PT): 20    Nydia Muñoz, TYLER  7/14/2021

## 2021-07-14 NOTE — PROGRESS NOTES
[facility] Medicine Services  INPATIENT PROGRESS NOTE    Length of Stay: 0  Date of Admission: 7/12/2021  Primary Care Physician: Landon Starr MD    Subjective   Chief Complaint: Shortness of air  HPI:    Patient admitted for acute on chronic respiratory failure due to COPD exacerbation and pneumonia.  Doing a little better today currently on 2 L nasal cannula which is baseline for him.  Still having difficulty breathing with minimal exertion.    Review of Systems   Respiratory: Positive for shortness of breath.    Cardiovascular: Negative for chest pain.   Gastrointestinal: Negative for diarrhea.        All pertinent negatives and positives are as above. All other systems have been reviewed and are negative unless otherwise stated.     Objective    Temp:  [97.1 °F (36.2 °C)-97.9 °F (36.6 °C)] 97.6 °F (36.4 °C)  Heart Rate:  [] 99  Resp:  [16-18] 18  BP: (112-164)/(57-74) 131/70  Physical Exam  Vitals and nursing note reviewed.   Constitutional:       General: He is not in acute distress.     Appearance: He is well-developed. He is not diaphoretic.   HENT:      Head: Normocephalic and atraumatic.   Cardiovascular:      Rate and Rhythm: Normal rate.   Pulmonary:      Effort: Pulmonary effort is normal. No respiratory distress.      Breath sounds: No wheezing.   Abdominal:      General: There is no distension.      Palpations: Abdomen is soft.   Musculoskeletal:         General: Normal range of motion.   Skin:     General: Skin is warm and dry.   Neurological:      Mental Status: He is alert.      Cranial Nerves: No cranial nerve deficit.   Psychiatric:         Behavior: Behavior normal.         Thought Content: Thought content normal.         Judgment: Judgment normal.              Results Review:  I have reviewed the labs, radiology results, and diagnostic studies.    Laboratory Data:   Lab Results (last 24 hours)     Procedure Component Value Units Date/Time     Basic Metabolic Panel [[id number]]  (Abnormal) Collected: 07/14/21 0605    Specimen: Blood Updated: 07/14/21 0644     Glucose 102 mg/dL      BUN 16 mg/dL      Creatinine 0.77 mg/dL      Sodium 142 mmol/L      Potassium 3.7 mmol/L      Comment: Slight hemolysis detected by analyzer. Results may be affected.        Chloride 102 mmol/L      CO2 32.0 mmol/L      Calcium 9.4 mg/dL      eGFR Non African Amer 100 mL/min/1.73      BUN/Creatinine Ratio 20.8     Anion Gap 8.0 mmol/L     Narrative:      GFR Normal >60  Chronic Kidney Disease <60  Kidney Failure <15      CBC & Differential [[id number]]  (Abnormal) Collected: 07/14/21 0605    Specimen: Blood Updated: 07/14/21 0625    Narrative:      The following orders were created for panel order CBC & Differential.  Procedure                               Abnormality         Status                     ---------                               -----------         ------                     CBC Auto Differential[[id number]]        Abnormal            Final result                 Please view results for these tests on the individual orders.    CBC Auto Differential [[id number]]  (Abnormal) Collected: 07/14/21 0605    Specimen: Blood Updated: 07/14/21 0625     WBC 17.41 10*3/mm3      RBC 4.66 10*6/mm3      Hemoglobin 14.3 g/dL      Hematocrit 43.2 %      MCV 92.7 fL      MCH 30.7 pg      MCHC 33.1 g/dL      RDW 12.6 %      RDW-SD 42.6 fl      MPV 10.2 fL      Platelets 259 10*3/mm3      Neutrophil % 76.8 %      Lymphocyte % 11.5 %      Monocyte % 10.3 %      Eosinophil % 0.5 %      Basophil % 0.4 %      Immature Grans % 0.5 %      Neutrophils, Absolute 13.38 10*3/mm3      Lymphocytes, Absolute 2.00 10*3/mm3      Monocytes, Absolute 1.80 10*3/mm3      Eosinophils, Absolute 0.08 10*3/mm3      Basophils, Absolute 0.07 10*3/mm3      Immature Grans, Absolute 0.08 10*3/mm3      nRBC 0.0 /100 WBC     Blood Culture - Blood, Arm, Right [[id number]] Collected: 07/12/21 5100    Specimen: Blood  from Arm, Right Updated: 07/13/21 2316     Blood Culture No growth at 24 hours    Blood Culture - Blood, Arm, Left [[id number]] Collected: 07/12/21 2125    Specimen: Blood from Arm, Left Updated: 07/13/21 2145     Blood Culture No growth at 24 hours          Culture Data:   Blood Culture   Date Value Ref Range Status   07/12/2021 No growth at 24 hours  Preliminary   07/12/2021 No growth at 24 hours  Preliminary     No results found for: URINECX  No results found for: RESPCX  No results found for: WOUNDCX  No results found for: STOOLCX  No components found for: BODYFLD    Radiology Data:   Imaging Results (Last 24 Hours)     ** No results found for the last 24 hours. **          I have reviewed the patient's current medications.     Assessment/Plan     Active Hospital Problems    Diagnosis    • **Acute exacerbation of chronic obstructive pulmonary disease (COPD) (CMS/HCC)    • Community acquired pneumonia of right middle lobe of lung    • Acute on chronic respiratory failure with hypoxia and hypercapnia (CMS/HCC)      - Treating as described above.           Acute on chronic respiratory failure with hypoxia and hypercapnia (secondary to pneumonia/COPD exacerbation): Continue noninvasive respiratory therapy, bronchodilators, steroids and antimicrobial therapy.    Culture is negative so far  Elevated D-dimer is likely due to acute illness and CT pulmonary angio is negative for pulmonary embolism.  Continue with IV antibiotics    Mild hyponatremia-is clinically insignificant.     1.8 cm right middle lobe nodule: Consult oncologist.  Patient may need biopsy/surveillance imaging with PET scan .     Dependent edema both legs: Patient will be treated with low-dose Lasix for few days.     Continue GI and DVT prophylaxis.     CODE STATUS is full code.     Discharge Planning: In progress.     I confirmed that the patient's Advance Care Plan is present, code status is documented, or surrogate decision maker is listed in the  patient's medical record.               Herman Smiley MD   07/14/21   12:19 CDT

## 2021-07-15 LAB
ANION GAP SERPL CALCULATED.3IONS-SCNC: 5 MMOL/L (ref 5–15)
BASOPHILS # BLD AUTO: 0.07 10*3/MM3 (ref 0–0.2)
BASOPHILS NFR BLD AUTO: 0.6 % (ref 0–1.5)
BUN SERPL-MCNC: 18 MG/DL (ref 8–23)
BUN/CREAT SERPL: 23.7 (ref 7–25)
CALCIUM SPEC-SCNC: 8.6 MG/DL (ref 8.6–10.5)
CHLORIDE SERPL-SCNC: 102 MMOL/L (ref 98–107)
CO2 SERPL-SCNC: 34 MMOL/L (ref 22–29)
CREAT SERPL-MCNC: 0.76 MG/DL (ref 0.76–1.27)
DEPRECATED RDW RBC AUTO: 42.5 FL (ref 37–54)
EOSINOPHIL # BLD AUTO: 0.29 10*3/MM3 (ref 0–0.4)
EOSINOPHIL NFR BLD AUTO: 2.5 % (ref 0.3–6.2)
ERYTHROCYTE [DISTWIDTH] IN BLOOD BY AUTOMATED COUNT: 12.5 % (ref 12.3–15.4)
GFR SERPL CREATININE-BSD FRML MDRD: 101 ML/MIN/1.73
GLUCOSE SERPL-MCNC: 96 MG/DL (ref 65–99)
HCT VFR BLD AUTO: 38.2 % (ref 37.5–51)
HGB BLD-MCNC: 12.6 G/DL (ref 13–17.7)
IMM GRANULOCYTES # BLD AUTO: 0.05 10*3/MM3 (ref 0–0.05)
IMM GRANULOCYTES NFR BLD AUTO: 0.4 % (ref 0–0.5)
LYMPHOCYTES # BLD AUTO: 2.63 10*3/MM3 (ref 0.7–3.1)
LYMPHOCYTES NFR BLD AUTO: 23.1 % (ref 19.6–45.3)
MCH RBC QN AUTO: 30.6 PG (ref 26.6–33)
MCHC RBC AUTO-ENTMCNC: 33 G/DL (ref 31.5–35.7)
MCV RBC AUTO: 92.7 FL (ref 79–97)
MONOCYTES # BLD AUTO: 1.27 10*3/MM3 (ref 0.1–0.9)
MONOCYTES NFR BLD AUTO: 11.1 % (ref 5–12)
NEUTROPHILS NFR BLD AUTO: 62.3 % (ref 42.7–76)
NEUTROPHILS NFR BLD AUTO: 7.09 10*3/MM3 (ref 1.7–7)
NRBC BLD AUTO-RTO: 0 /100 WBC (ref 0–0.2)
PLATELET # BLD AUTO: 216 10*3/MM3 (ref 140–450)
PMV BLD AUTO: 10.1 FL (ref 6–12)
POTASSIUM SERPL-SCNC: 3.4 MMOL/L (ref 3.5–5.2)
RBC # BLD AUTO: 4.12 10*6/MM3 (ref 4.14–5.8)
SODIUM SERPL-SCNC: 141 MMOL/L (ref 136–145)
WBC # BLD AUTO: 11.4 10*3/MM3 (ref 3.4–10.8)

## 2021-07-15 PROCEDURE — 80048 BASIC METABOLIC PNL TOTAL CA: CPT | Performed by: INTERNAL MEDICINE

## 2021-07-15 PROCEDURE — 94799 UNLISTED PULMONARY SVC/PX: CPT

## 2021-07-15 PROCEDURE — 25010000002 ENOXAPARIN PER 10 MG: Performed by: INTERNAL MEDICINE

## 2021-07-15 PROCEDURE — 25010000002 CEFTRIAXONE PER 250 MG: Performed by: INTERNAL MEDICINE

## 2021-07-15 PROCEDURE — 85025 COMPLETE CBC W/AUTO DIFF WBC: CPT | Performed by: FAMILY MEDICINE

## 2021-07-15 PROCEDURE — 63710000001 PREDNISONE PER 1 MG: Performed by: INTERNAL MEDICINE

## 2021-07-15 PROCEDURE — 25010000002 FUROSEMIDE PER 20 MG: Performed by: INTERNAL MEDICINE

## 2021-07-15 RX ORDER — POTASSIUM CHLORIDE 750 MG/1
40 CAPSULE, EXTENDED RELEASE ORAL AS NEEDED
Status: DISCONTINUED | OUTPATIENT
Start: 2021-07-15 | End: 2021-07-19 | Stop reason: HOSPADM

## 2021-07-15 RX ORDER — POTASSIUM CHLORIDE 7.45 MG/ML
10 INJECTION INTRAVENOUS
Status: DISCONTINUED | OUTPATIENT
Start: 2021-07-15 | End: 2021-07-19 | Stop reason: HOSPADM

## 2021-07-15 RX ORDER — POTASSIUM CHLORIDE 1.5 G/1.77G
40 POWDER, FOR SOLUTION ORAL AS NEEDED
Status: DISCONTINUED | OUTPATIENT
Start: 2021-07-15 | End: 2021-07-19 | Stop reason: HOSPADM

## 2021-07-15 RX ORDER — IPRATROPIUM BROMIDE AND ALBUTEROL SULFATE 2.5; .5 MG/3ML; MG/3ML
3 SOLUTION RESPIRATORY (INHALATION)
Status: DISCONTINUED | OUTPATIENT
Start: 2021-07-15 | End: 2021-07-15

## 2021-07-15 RX ADMIN — SODIUM CHLORIDE, PRESERVATIVE FREE 10 ML: 5 INJECTION INTRAVENOUS at 08:48

## 2021-07-15 RX ADMIN — POTASSIUM CHLORIDE 40 MEQ: 750 CAPSULE, EXTENDED RELEASE ORAL at 16:55

## 2021-07-15 RX ADMIN — SODIUM CHLORIDE, PRESERVATIVE FREE 10 ML: 5 INJECTION INTRAVENOUS at 21:46

## 2021-07-15 RX ADMIN — CEFTRIAXONE 1 G: 1 INJECTION, POWDER, FOR SOLUTION INTRAMUSCULAR; INTRAVENOUS at 06:23

## 2021-07-15 RX ADMIN — PREDNISONE 40 MG: 20 TABLET ORAL at 08:47

## 2021-07-15 RX ADMIN — FUROSEMIDE 20 MG: 10 INJECTION, SOLUTION INTRAMUSCULAR; INTRAVENOUS at 06:23

## 2021-07-15 RX ADMIN — IPRATROPIUM BROMIDE 0.5 MG: 0.5 SOLUTION RESPIRATORY (INHALATION) at 20:05

## 2021-07-15 RX ADMIN — POTASSIUM CHLORIDE 40 MEQ: 750 CAPSULE, EXTENDED RELEASE ORAL at 21:46

## 2021-07-15 RX ADMIN — ENOXAPARIN SODIUM 40 MG: 40 INJECTION SUBCUTANEOUS at 21:46

## 2021-07-15 RX ADMIN — MONTELUKAST SODIUM 10 MG: 10 TABLET, COATED ORAL at 21:46

## 2021-07-15 RX ADMIN — ASPIRIN 81 MG 81 MG: 81 TABLET ORAL at 08:48

## 2021-07-15 RX ADMIN — AZITHROMYCIN MONOHYDRATE 500 MG: 250 TABLET ORAL at 08:47

## 2021-07-15 NOTE — PROGRESS NOTES
[facility] Medicine Services  INPATIENT PROGRESS NOTE    Length of Stay: 0  Date of Admission: 7/12/2021  Primary Care Physician: Landon Starr MD    Subjective   Chief Complaint: Shortness of air  HPI:    Patient admitted for COPD exacerbation.  Patient currently on supplemental oxygen 2 L but he is desaturating to 85% when he is walking around.  He has baseline 2 L at home.    Review of Systems   Respiratory: Positive for cough and shortness of breath.    Cardiovascular: Negative for chest pain.        All pertinent negatives and positives are as above. All other systems have been reviewed and are negative unless otherwise stated.     Objective    Temp:  [97 °F (36.1 °C)-97.6 °F (36.4 °C)] 97.6 °F (36.4 °C)  Heart Rate:  [] 90  Resp:  [18] 18  BP: (123-135)/(62-71) 123/62  Physical Exam  Vitals and nursing note reviewed.   Constitutional:       General: He is not in acute distress.     Appearance: He is well-developed. He is not diaphoretic.   HENT:      Head: Normocephalic and atraumatic.   Cardiovascular:      Rate and Rhythm: Normal rate.   Pulmonary:      Effort: Pulmonary effort is normal. No respiratory distress.      Breath sounds: No wheezing.   Abdominal:      General: There is no distension.      Palpations: Abdomen is soft.   Musculoskeletal:         General: Normal range of motion.   Skin:     General: Skin is warm and dry.   Neurological:      Mental Status: He is alert.      Cranial Nerves: No cranial nerve deficit.   Psychiatric:         Behavior: Behavior normal.         Thought Content: Thought content normal.         Judgment: Judgment normal.             Results Review:  I have reviewed the labs, radiology results, and diagnostic studies.    Laboratory Data:   Lab Results (last 24 hours)     Procedure Component Value Units Date/Time    Basic Metabolic Panel [[id number]]  (Abnormal) Collected: 07/15/21 0610    Specimen: Blood Updated: 07/15/21  0649     Glucose 96 mg/dL      BUN 18 mg/dL      Creatinine 0.76 mg/dL      Sodium 141 mmol/L      Potassium 3.4 mmol/L      Chloride 102 mmol/L      CO2 34.0 mmol/L      Calcium 8.6 mg/dL      eGFR Non African Amer 101 mL/min/1.73      BUN/Creatinine Ratio 23.7     Anion Gap 5.0 mmol/L     Narrative:      GFR Normal >60  Chronic Kidney Disease <60  Kidney Failure <15      CBC & Differential [[id number]]  (Abnormal) Collected: 07/15/21 0610    Specimen: Blood Updated: 07/15/21 0618    Narrative:      The following orders were created for panel order CBC & Differential.  Procedure                               Abnormality         Status                     ---------                               -----------         ------                     CBC Auto Differential[[id number]]        Abnormal            Final result                 Please view results for these tests on the individual orders.    CBC Auto Differential [[id number]]  (Abnormal) Collected: 07/15/21 0610    Specimen: Blood Updated: 07/15/21 0618     WBC 11.40 10*3/mm3      RBC 4.12 10*6/mm3      Hemoglobin 12.6 g/dL      Hematocrit 38.2 %      MCV 92.7 fL      MCH 30.6 pg      MCHC 33.0 g/dL      RDW 12.5 %      RDW-SD 42.5 fl      MPV 10.1 fL      Platelets 216 10*3/mm3      Neutrophil % 62.3 %      Lymphocyte % 23.1 %      Monocyte % 11.1 %      Eosinophil % 2.5 %      Basophil % 0.6 %      Immature Grans % 0.4 %      Neutrophils, Absolute 7.09 10*3/mm3      Lymphocytes, Absolute 2.63 10*3/mm3      Monocytes, Absolute 1.27 10*3/mm3      Eosinophils, Absolute 0.29 10*3/mm3      Basophils, Absolute 0.07 10*3/mm3      Immature Grans, Absolute 0.05 10*3/mm3      nRBC 0.0 /100 WBC     Blood Culture - Blood, Arm, Right [[id number]] Collected: 07/12/21 2301    Specimen: Blood from Arm, Right Updated: 07/14/21 2316     Blood Culture No growth at 2 days    Blood Culture - Blood, Arm, Left [[id number]] Collected: 07/12/21 2125    Specimen: Blood from Arm, Left Updated:  07/14/21 2145     Blood Culture No growth at 2 days          Culture Data:   Blood Culture   Date Value Ref Range Status   07/12/2021 No growth at 2 days  Preliminary   07/12/2021 No growth at 2 days  Preliminary     No results found for: URINECX  No results found for: RESPCX  No results found for: WOUNDCX  No results found for: STOOLCX  No components found for: BODYFLD    Radiology Data:   Imaging Results (Last 24 Hours)     ** No results found for the last 24 hours. **          I have reviewed the patient's current medications.     Assessment/Plan     Active Hospital Problems    Diagnosis    • **Acute exacerbation of chronic obstructive pulmonary disease (COPD) (CMS/HCC)    • Community acquired pneumonia of right middle lobe of lung    • Acute on chronic respiratory failure with hypoxia and hypercapnia (CMS/HCC)      - Treating as described above.         Acute on chronic respiratory failure with hypoxia and hypercapnia (secondary to pneumonia/COPD exacerbation): Continue noninvasive respiratory therapy, bronchodilators, steroids and antimicrobial therapy.    Culture is negative so far  Elevated D-dimer is likely due to acute illness and CT pulmonary angio is negative for pulmonary embolism.  Continue with IV antibiotics  Will start on scheduled nebulizer treatments.     Mild hyponatremia-is clinically insignificant.     1.8 cm right middle lobe nodule: Oncology also managing is planning on PET scan as an outpatient.     Dependent edema both legs: Patient will be treated with low-dose Lasix for few days.     Continue GI and DVT prophylaxis.     CODE STATUS is full code.     Discharge Planning: In progress.     I confirmed that the patient's Advance Care Plan is present, code status is documented, or surrogate decision maker is listed in the patient's medical record.               Herman Smiley MD   07/15/21   16:03 CDT

## 2021-07-15 NOTE — SIGNIFICANT NOTE
07/15/21 1340   OTHER   Discipline physical therapy assistant   Rehab Time/Intention   Session Not Performed patient unavailable for treatment  (Pt. & spouse meeting with case management in room at this time. Will check back tomorrrow for treatment)

## 2021-07-15 NOTE — SIGNIFICANT NOTE
07/15/21 1115   OTHER   Discipline physical therapy assistant   Rehab Time/Intention   Session Not Performed patient/family declined treatment  (Pt.'s spouse assisting pt. with bath this a.m. request PT to check back later for treatment.)

## 2021-07-16 DIAGNOSIS — IMO0001 LUNG NODULE < 6CM ON CT: Primary | ICD-10-CM

## 2021-07-16 LAB
ANION GAP SERPL CALCULATED.3IONS-SCNC: 7 MMOL/L (ref 5–15)
BASOPHILS # BLD AUTO: 0.06 10*3/MM3 (ref 0–0.2)
BASOPHILS NFR BLD AUTO: 0.5 % (ref 0–1.5)
BUN SERPL-MCNC: 16 MG/DL (ref 8–23)
BUN/CREAT SERPL: 26.7 (ref 7–25)
CALCIUM SPEC-SCNC: 8.7 MG/DL (ref 8.6–10.5)
CHLORIDE SERPL-SCNC: 102 MMOL/L (ref 98–107)
CO2 SERPL-SCNC: 31 MMOL/L (ref 22–29)
CREAT SERPL-MCNC: 0.6 MG/DL (ref 0.76–1.27)
DEPRECATED RDW RBC AUTO: 42.6 FL (ref 37–54)
EOSINOPHIL # BLD AUTO: 0.23 10*3/MM3 (ref 0–0.4)
EOSINOPHIL NFR BLD AUTO: 1.9 % (ref 0.3–6.2)
ERYTHROCYTE [DISTWIDTH] IN BLOOD BY AUTOMATED COUNT: 12.3 % (ref 12.3–15.4)
GFR SERPL CREATININE-BSD FRML MDRD: 133 ML/MIN/1.73
GLUCOSE SERPL-MCNC: 115 MG/DL (ref 65–99)
HCT VFR BLD AUTO: 38.7 % (ref 37.5–51)
HGB BLD-MCNC: 12.7 G/DL (ref 13–17.7)
IMM GRANULOCYTES # BLD AUTO: 0.08 10*3/MM3 (ref 0–0.05)
IMM GRANULOCYTES NFR BLD AUTO: 0.7 % (ref 0–0.5)
LYMPHOCYTES # BLD AUTO: 2.25 10*3/MM3 (ref 0.7–3.1)
LYMPHOCYTES NFR BLD AUTO: 18.7 % (ref 19.6–45.3)
MCH RBC QN AUTO: 30.7 PG (ref 26.6–33)
MCHC RBC AUTO-ENTMCNC: 32.8 G/DL (ref 31.5–35.7)
MCV RBC AUTO: 93.5 FL (ref 79–97)
MONOCYTES # BLD AUTO: 1.4 10*3/MM3 (ref 0.1–0.9)
MONOCYTES NFR BLD AUTO: 11.6 % (ref 5–12)
NEUTROPHILS NFR BLD AUTO: 66.6 % (ref 42.7–76)
NEUTROPHILS NFR BLD AUTO: 8 10*3/MM3 (ref 1.7–7)
NRBC BLD AUTO-RTO: 0 /100 WBC (ref 0–0.2)
PLATELET # BLD AUTO: 236 10*3/MM3 (ref 140–450)
PMV BLD AUTO: 10.6 FL (ref 6–12)
POTASSIUM SERPL-SCNC: 3.9 MMOL/L (ref 3.5–5.2)
POTASSIUM SERPL-SCNC: 4.3 MMOL/L (ref 3.5–5.2)
RBC # BLD AUTO: 4.14 10*6/MM3 (ref 4.14–5.8)
SODIUM SERPL-SCNC: 140 MMOL/L (ref 136–145)
WBC # BLD AUTO: 12.02 10*3/MM3 (ref 3.4–10.8)

## 2021-07-16 PROCEDURE — 94799 UNLISTED PULMONARY SVC/PX: CPT

## 2021-07-16 PROCEDURE — 80048 BASIC METABOLIC PNL TOTAL CA: CPT | Performed by: INTERNAL MEDICINE

## 2021-07-16 PROCEDURE — 94760 N-INVAS EAR/PLS OXIMETRY 1: CPT

## 2021-07-16 PROCEDURE — 84132 ASSAY OF SERUM POTASSIUM: CPT | Performed by: INTERNAL MEDICINE

## 2021-07-16 PROCEDURE — 63710000001 PREDNISONE PER 1 MG: Performed by: INTERNAL MEDICINE

## 2021-07-16 PROCEDURE — 97110 THERAPEUTIC EXERCISES: CPT

## 2021-07-16 PROCEDURE — 97530 THERAPEUTIC ACTIVITIES: CPT

## 2021-07-16 PROCEDURE — 25010000002 ENOXAPARIN PER 10 MG: Performed by: INTERNAL MEDICINE

## 2021-07-16 PROCEDURE — 25010000002 CEFTRIAXONE PER 250 MG: Performed by: INTERNAL MEDICINE

## 2021-07-16 PROCEDURE — 85025 COMPLETE CBC W/AUTO DIFF WBC: CPT | Performed by: FAMILY MEDICINE

## 2021-07-16 RX ADMIN — ENOXAPARIN SODIUM 40 MG: 40 INJECTION SUBCUTANEOUS at 20:06

## 2021-07-16 RX ADMIN — PREDNISONE 40 MG: 20 TABLET ORAL at 08:19

## 2021-07-16 RX ADMIN — MONTELUKAST SODIUM 10 MG: 10 TABLET, COATED ORAL at 20:06

## 2021-07-16 RX ADMIN — AZITHROMYCIN MONOHYDRATE 500 MG: 250 TABLET ORAL at 08:19

## 2021-07-16 RX ADMIN — IPRATROPIUM BROMIDE 0.5 MG: 0.5 SOLUTION RESPIRATORY (INHALATION) at 06:47

## 2021-07-16 RX ADMIN — SODIUM CHLORIDE, PRESERVATIVE FREE 10 ML: 5 INJECTION INTRAVENOUS at 08:20

## 2021-07-16 RX ADMIN — ASPIRIN 81 MG 81 MG: 81 TABLET ORAL at 08:19

## 2021-07-16 RX ADMIN — CEFTRIAXONE 1 G: 1 INJECTION, POWDER, FOR SOLUTION INTRAMUSCULAR; INTRAVENOUS at 05:37

## 2021-07-16 NOTE — PLAN OF CARE
Problem: Adult Inpatient Plan of Care  Goal: Plan of Care Review  Outcome: Ongoing, Progressing  Flowsheets (Taken 7/16/2021 1351)  Progress: improving  Plan of Care Reviewed With: patient  Outcome Summary: Patient doing better. Feels like he is hungry all the time from the prednisone.  Goal: Patient-Specific Goal (Individualized)  Outcome: Ongoing, Progressing  Goal: Absence of Hospital-Acquired Illness or Injury  Outcome: Ongoing, Progressing  Intervention: Identify and Manage Fall Risk  Recent Flowsheet Documentation  Taken 7/16/2021 1320 by Leslie Washington RN  Safety Promotion/Fall Prevention: safety round/check completed  Taken 7/16/2021 1100 by Leslie Washington RN  Safety Promotion/Fall Prevention: safety round/check completed  Taken 7/16/2021 0900 by Leslie Washington RN  Safety Promotion/Fall Prevention: safety round/check completed  Taken 7/16/2021 0820 by Leslie Washington RN  Safety Promotion/Fall Prevention: safety round/check completed  Taken 7/16/2021 0715 by Leslie Washington RN  Safety Promotion/Fall Prevention: safety round/check completed  Intervention: Prevent Skin Injury  Recent Flowsheet Documentation  Taken 7/16/2021 1320 by Leslie Washington RN  Body Position: position changed independently  Taken 7/16/2021 1100 by Leslie Washington RN  Body Position: position changed independently  Taken 7/16/2021 0900 by Leslie Washington RN  Body Position: position changed independently  Goal: Optimal Comfort and Wellbeing  Outcome: Ongoing, Progressing  Intervention: Provide Person-Centered Care  Recent Flowsheet Documentation  Taken 7/16/2021 0820 by Leslie Washington RN  Trust Relationship/Rapport: care explained  Goal: Readiness for Transition of Care  Outcome: Ongoing, Progressing   Goal Outcome Evaluation:  Plan of Care Reviewed With: patient        Progress: improving  Outcome Summary: Patient doing better. Feels like he is hungry all the time from  the prednisone.

## 2021-07-16 NOTE — PROGRESS NOTES
Item 0 Points 1 Point 2 Points 3 Points 4 Points Subtotal   Mental Status Alert, oriented, cooperative Lethargic, follows commands Confused, not following commands Obtunded or Somnolent Comatose 0   Respiratory Pattern Regular RR 8-16 breaths/minute Increased RR 18-25 breaths/minute Dyspnea on exertion, irregular RR 26-30 breaths/minute Shortness of breath,  RR 31-35 breaths/minute Accessory muscle use, severe SOB  RR > 35 breaths/minute 1   Breath Sounds Clear Decreased unilaterally Decreased bilaterally Basilar crackles Wheezing and/or rhonchi 0   Cough Strong, spontaneous, non-productive Strong productive Weak, non-productive Weak, productive or weak with rhonchi Absent or may require suctioning 0   Pulmonary Status Nonsmoker, no previous history, >1 year quit < 1 PPD  <1 year quit > or = 1 PPD Diagnosed pulmonary disease (severe or chronic) Severe or chronic pulmonary disease with exacerbation 0   Surgical Status None General surgery (non-abdominal or non-thoracic) Lower abdominal Thoracic or upper abdominal Thoracic with pulmonary disease 0   Chest X-ray Clear Chronic changes Infiltrates, atelectasis or pleural effusion Infiltrates in > 1 lobe Diffuse infiltrates and atelectasis and/or effusions 1   Activity   Level Ambulatory Ambulatory with assistance Non-ambulatory Paraplegic Quadriplegic 2        Total Score   4   Score    Drug Therapy Frequency 20 or >    Q4 Duoneb with Q2 Albuterol PRN 15-19    Q6 Duoneb with Q4 Albuterol PRN 10-14    QID Duoneb with Q4 Albuterol PRN 5-9    TID Duoneb with Q6 Albuterol PRN 0-4    Q4 PRN Duoneb                  Lung Expansion Therapy (PEP) Bronchopulmonary Hygiene (CPT)   Q4 & PRN - Severe atelectasis, poor oxygenation Q4 - Copious secretions, dyspnea, unable to sleep   QID - High risk for persistent atelectasis, existence of atelectasis QID & Q4 PRN - Moderate secretion production   TID - At risk for developing atelectasis TID - Small amounts of secretions with poor cough    BID - Prevention of atelectasis BID - Unable to breathe deeply and cough spontaneously     RT Comments / Recommendations:  Patient states he does not take nebulizer treatments at home but does wear nasal canula @ 2 lpm. Will change nebulizer treatments to Q4PRN per protocol per total score.

## 2021-07-16 NOTE — PLAN OF CARE
Goal Outcome Evaluation:  Plan of Care Reviewed With: patient        Progress: improving  Outcome Summary: Pt. alert & agreeable to treatment with spouse present. Pt. transferred sup-sit independent, sit-stand-sit independent, toilet transfer independent, amb. 15'x2 + 120' w/o AD SBA, pt. performed x20 reps seate therex EOB. Cont. to mobilize

## 2021-07-16 NOTE — PROGRESS NOTES
[facility] Medicine Services  INPATIENT PROGRESS NOTE    Length of Stay: 1  Date of Admission: 7/12/2021  Primary Care Physician: Landon Starr MD    Subjective   Chief Complaint: Shortness of air  HPI:    Patient admitted for COPD exacerbation.  Currently on supplemental oxygen 2 L which is baseline for him but he is desaturating whenever he is getting up.    Review of Systems   Respiratory: Positive for cough and shortness of breath.         All pertinent negatives and positives are as above. All other systems have been reviewed and are negative unless otherwise stated.     Objective    Temp:  [96.7 °F (35.9 °C)-97.6 °F (36.4 °C)] 97.4 °F (36.3 °C)  Heart Rate:  [70-96] 92  Resp:  [18-20] 18  BP: (123-144)/(60-71) 130/60  Physical Exam  Vitals and nursing note reviewed.   Constitutional:       General: He is not in acute distress.     Appearance: He is well-developed. He is not diaphoretic.   HENT:      Head: Normocephalic and atraumatic.   Cardiovascular:      Rate and Rhythm: Normal rate.   Pulmonary:      Effort: Pulmonary effort is normal. No respiratory distress.      Breath sounds: No wheezing.   Abdominal:      General: There is no distension.      Palpations: Abdomen is soft.   Musculoskeletal:         General: Normal range of motion.   Skin:     General: Skin is warm and dry.   Neurological:      Mental Status: He is alert.      Cranial Nerves: No cranial nerve deficit.   Psychiatric:         Behavior: Behavior normal.         Thought Content: Thought content normal.         Judgment: Judgment normal.             Results Review:  I have reviewed the labs, radiology results, and diagnostic studies.    Laboratory Data:   Lab Results (last 24 hours)     Procedure Component Value Units Date/Time    Basic Metabolic Panel [[id number]]  (Abnormal) Collected: 07/16/21 0522    Specimen: Blood Updated: 07/16/21 0628     Glucose 115 mg/dL      BUN 16 mg/dL      Creatinine  0.60 mg/dL      Sodium 140 mmol/L      Potassium 3.9 mmol/L      Comment: Slight hemolysis detected by analyzer. Results may be affected.        Chloride 102 mmol/L      CO2 31.0 mmol/L      Calcium 8.7 mg/dL      eGFR Non African Amer 133 mL/min/1.73      BUN/Creatinine Ratio 26.7     Anion Gap 7.0 mmol/L     Narrative:      GFR Normal >60  Chronic Kidney Disease <60  Kidney Failure <15      CBC & Differential [[id number]]  (Abnormal) Collected: 07/16/21 0522    Specimen: Blood Updated: 07/16/21 0607    Narrative:      The following orders were created for panel order CBC & Differential.  Procedure                               Abnormality         Status                     ---------                               -----------         ------                     CBC Auto Differential[[id number]]        Abnormal            Final result                 Please view results for these tests on the individual orders.    CBC Auto Differential [[id number]]  (Abnormal) Collected: 07/16/21 0522    Specimen: Blood Updated: 07/16/21 0607     WBC 12.02 10*3/mm3      RBC 4.14 10*6/mm3      Hemoglobin 12.7 g/dL      Hematocrit 38.7 %      MCV 93.5 fL      MCH 30.7 pg      MCHC 32.8 g/dL      RDW 12.3 %      RDW-SD 42.6 fl      MPV 10.6 fL      Platelets 236 10*3/mm3      Neutrophil % 66.6 %      Lymphocyte % 18.7 %      Monocyte % 11.6 %      Eosinophil % 1.9 %      Basophil % 0.5 %      Immature Grans % 0.7 %      Neutrophils, Absolute 8.00 10*3/mm3      Lymphocytes, Absolute 2.25 10*3/mm3      Monocytes, Absolute 1.40 10*3/mm3      Eosinophils, Absolute 0.23 10*3/mm3      Basophils, Absolute 0.06 10*3/mm3      Immature Grans, Absolute 0.08 10*3/mm3      nRBC 0.0 /100 WBC     Potassium [[id number]]  (Normal) Collected: 07/16/21 0135    Specimen: Blood Updated: 07/16/21 0201     Potassium 4.3 mmol/L     Blood Culture - Blood, Arm, Right [[id number]] Collected: 07/12/21 2301    Specimen: Blood from Arm, Right Updated: 07/15/21 8641      Blood Culture No growth at 3 days    Blood Culture - Blood, Arm, Left [[id number]] Collected: 07/12/21 2125    Specimen: Blood from Arm, Left Updated: 07/15/21 2145     Blood Culture No growth at 3 days          Culture Data:   No results found for: BLOODCX  No results found for: URINECX  No results found for: RESPCX  No results found for: WOUNDCX  No results found for: STOOLCX  No components found for: BODYFLD    Radiology Data:   Imaging Results (Last 24 Hours)     ** No results found for the last 24 hours. **          I have reviewed the patient's current medications.     Assessment/Plan     Active Hospital Problems    Diagnosis    • **Acute exacerbation of chronic obstructive pulmonary disease (COPD) (CMS/HCC)    • Community acquired pneumonia of right middle lobe of lung    • Acute on chronic respiratory failure with hypoxia and hypercapnia (CMS/HCC)      - Treating as described above.         Acute on chronic respiratory failure with hypoxia and hypercapnia (secondary to pneumonia/COPD exacerbation): Continue noninvasive respiratory therapy, bronchodilators, steroids and antimicrobial therapy.    Culture is negative so far  Elevated D-dimer is likely due to acute illness and CT pulmonary angio is negative for pulmonary embolism.  Continue with IV antibiotics  Continue with nebulizer treatments as scheduled.     Mild hyponatremia-is clinically insignificant.     1.8 cm right middle lobe nodule: Oncology also managing is planning on PET scan as an outpatient.     Dependent edema both legs: Patient will be treated with low-dose Lasix for few days.     Continue GI and DVT prophylaxis.     CODE STATUS is full code.     Discharge Planning: In progress.     I confirmed that the patient's Advance Care Plan is present, code status is documented, or surrogate decision maker is listed in the patient's medical record.             Herman Smiley MD   07/16/21   13:14 CDT

## 2021-07-16 NOTE — THERAPY TREATMENT NOTE
Acute Care - Physical Therapy Treatment Note  [facility]     Patient Name: Brian Garcia  : 1950  MRN: [id number]  Today's Date: 2021           PT Assessment (last 12 hours)      PT Evaluation and Treatment     Row Name 21 0945          Physical Therapy Time and Intention    Subjective Information  no complaints  -JA     Document Type  therapy note (daily note)  -CHICO     Mode of Treatment  individual therapy;physical therapy  -     Patient Effort  good  -     Row Name 21 0945          General Information    Patient Profile Reviewed  yes  -CHICO     Existing Precautions/Restrictions  oxygen therapy device and L/min  -     Row Name 2145          Cognition    Orientation Status (Cognition)  oriented x 4  -     Row Name 2145          Pain Scale: Numbers Pre/Post-Treatment    Pretreatment Pain Rating  0/10 - no pain  -CHICO     Posttreatment Pain Rating  0/10 - no pain  -     Row Name 2145          Transfers    Transfers  sit-stand transfer;stand-sit transfer;toilet transfer  -CHICO     Bed-Chair [place] (Transfers)  --  -JA     Sit-Stand [place] (Transfers)  independent  -CHICO     Stand-Sit [place] (Transfers)  independent  -     [place] Level (Toilet Transfer)  independent  -     Assistive Device (Toilet Transfer)  other (see comments) no AD  -JA     Row Name 21          Sit-Stand Transfer    Assistive Device (Sit-Stand Transfers)  other (see comments) no AD  -JA     Row Name 2145          Stand-Sit Transfer    Assistive Device (Stand-Sit Transfers)  other (see comments) no AD   -JA     Row Name 2145          Toilet Transfer    Type (Toilet Transfer)  sit-stand;stand-sit  -[facility] Name 21 0945          Gait/Stairs (Locomotion)    [place] Level (Gait)  standby assist  -     Assistive Device (Gait)  -- none  -JA     Distance in Feet (Gait)  15'x2, 120'  -JA     Pattern (Gait)   step-through  -     Deviations/Abnormal Patterns (Gait)  stride length decreased  -JA     Row Name 07/16/21 0945          Safety Issues, Functional Mobility    Impairments Affecting Function (Mobility)  endurance/activity tolerance;shortness of breath  -JA     Row Name 07/16/21 0945          Motor Skills    Therapeutic Exercise  hip;knee  -JA     Row Name 07/16/21 0945          Hip (Therapeutic Exercise)    Hip (Therapeutic Exercise)  AROM (active range of motion)  -     Hip AROM (Therapeutic Exercise)  bilateral;flexion  -JA     Row Name 07/16/21 0945          Knee (Therapeutic Exercise)    Knee (Therapeutic Exercise)  AROM (active range of motion)  -     Knee AROM (Therapeutic Exercise)  bilateral;LAQ (long arc quad)  -JA     Row Name 07/16/21 0945          Vital Signs    Pre Systolic BP Rehab  154  -     Pre Treatment Diastolic BP  75  -     Pretreatment Heart Rate (beats/min)  76  -JA     Intratreatment Heart Rate (beats/min)  93  -JA     Posttreatment Heart Rate (beats/min)  92  -     Pre SpO2 (%)  98  -     O2 Delivery Pre Treatment  supplemental O2  -     Intra SpO2 (%)  98  -     O2 Delivery Intra Treatment  supplemental O2  -     Post SpO2 (%)  94  -JA     O2 Delivery Post Treatment  supplemental O2  -     Pre Patient Position  Supine  -     Intra Patient Position  Standing  -     Post Patient Position  Sitting  -JA     Row Name 07/16/21 0945          Bed Mobility Goal 1 (PT)    Activity/Assistive Device (Bed Mobility Goal 1, PT)  sit to supine  -     [place] Level/Cues Needed (Bed Mobility Goal 1, PT)  independent  -     Time Frame (Bed Mobility Goal 1, PT)  by discharge  -     Progress/Outcomes (Bed Mobility Goal 1, PT)  goal not met  -JA     Row Name 07/16/21 0945          Transfer Goal 1 (PT)    Activity/Assistive Device (Transfer Goal 1, PT)  sit-to-stand/stand-to-sit;bed-to-chair/chair-to-bed O2 sats will not drop below 90%  [facility]     [place] Level/Cues Needed  (Transfer Goal 1, PT)  independent  -JA     Time Frame (Transfer Goal 1, PT)  by discharge  -JA     Progress/Outcome (Transfer Goal 1, PT)  goal partially met  -     Row Name 07/16/21 0945          Gait Training Goal 1 (PT)    Activity/Assistive Device (Gait Training Goal 1, PT)  gait (walking locomotion);decrease fall risk;increase endurance/gait distance  -     [place] Level (Gait Training Goal 1, PT)  independent O2 sats will not drop below 90%  -JA     Distance (Gait Training Goal 1, PT)  30ft or more x3  -JA     Progress/Outcome (Gait Training Goal 1, PT)  (S) goal met  -     Row Name 07/16/21 0945          ROM Goal 1 (PT)    ROM Goal 1 (PT)  Pt will tolerate LE exercises OOB in chair with vss  -JA     Time Frame (ROM Goal 1, PT)  by discharge  -JA     Progress/Outcome (ROM Goal 1, PT)  goal not met  -[facility] Name 07/16/21 0945          Stairs Goal 1 (PT)    Activity/Assistive Device (Stairs Goal 1, PT)  ascending stairs;descending stairs;using handrail, left  -JA     [place] Level/Cues Needed (Stairs Goal 1, PT)  modified independence  -     Number of Stairs (Stairs Goal 1, PT)  5  -JA     Time Frame (Stairs Goal 1, PT)  by discharge  -     Progress/Outcome (Stairs Goal 1, PT)  goal not met  -JA     Row Name 07/16/21 0945          Positioning and Restraints    Pre-Treatment Position  in bed  -     Post Treatment Position  bed  -JA     In Bed  sitting EOB;call light within reach;encouraged to call for assist;with family/caregiver  -JA     Row Name 07/16/21 0945          Therapy Assessment/Plan (PT)    Rehab Potential (PT)  good, to achieve stated therapy goals  -     Criteria for Skilled Interventions Met (PT)  yes;meets criteria;skilled treatment is necessary  -[facility] Name 07/16/21 0945          Progress Summary (PT)    Progress Toward Functional Goals (PT)  progress toward functional goals as expected  -       User Key  (r) = Recorded By, (t) = Taken By, (c) = Cosigned By     Initials Name Provider Type    Papi Smith PTA Physical Therapy Assistant        Physical Therapy Education                 Title: PT OT SLP Therapies (Not Started)     Topic: Physical Therapy (In Progress)     Point: Mobility training (In Progress)     Learning Progress Summary           Patient Acceptance, E, NR by  at 7/14/2021 1546                   Point: Home exercise program (Not Started)     Learner Progress:  Not documented in this visit.          Point: Body mechanics (In Progress)     Learning Progress Summary           Patient Acceptance, E, NR by  at 7/14/2021 1546                   Point: Precautions (In Progress)     Learning Progress Summary           Patient Acceptance, E, NR by  at 7/14/2021 1546                               User Key     Initials Effective Dates Name Provider Type Discipline     06/16/21 -  Nydia Muñoz PT Physical Therapist PT              PT Recommendation and Plan  Anticipated Discharge Disposition (PT): home with assist  Therapy Frequency (PT): other (see comments) (5-7 days/wk)  Progress Summary (PT)  Progress Toward Functional Goals (PT): progress toward functional goals as expected  Plan of Care Reviewed With: patient  Progress: improving  Outcome Summary: Pt. alert & agreeable to treatment with spouse present. Pt. transferred sup-sit independent, sit-stand-sit independent, toilet transfer independent, amb. 15'x2 + 120' w/o AD SBA, pt. performed x20 reps seate therex EOB. Cont. to mobilize       Time Calculation:   PT Charges     Row Name 07/16/21 1257             Time Calculation    Start Time  0945  -JA      Stop Time  1030  -JA      Time Calculation (min)  45 min  -JA         Time Calculation- PT    Total Timed Code Minutes- PT  45 minute(s)  -JA         Timed Charges    80504 - PT Therapeutic Exercise Minutes  10  -JA      09972 - PT Therapeutic Activity Minutes  35  -JA         Total Minutes    Timed Charges Total Minutes  45  -JA       Total  Minutes  45  -JA        User Key  (r) = Recorded By, (t) = Taken By, (c) = Cosigned By    Initials Name Provider Type    Papi Smith PTA Physical Therapy Assistant        Therapy Charges for Today     Code Description Service Date Service Provider Modifiers Qty    17102328902  PT THER PROC EA 15 MIN 7/16/2021 Papi Hutchins, REGINE GP 1    65851774199 HC PT THERAPEUTIC ACT EA 15 MIN 7/16/2021 Papi Hutchins PTA GP 2          PT G-Codes  Outcome Measure Options: AM-PAC 6 Clicks Basic Mobility (PT)  AM-PAC 6 Clicks Score (PT): 20    Papi Hutchins PTA  7/16/2021

## 2021-07-16 NOTE — PLAN OF CARE
Goal Outcome Evaluation:           Progress: no change  Outcome Summary: Patient currently resting with no complaints. Replaced potassium per protocol. VSS.

## 2021-07-17 LAB
ALBUMIN SERPL-MCNC: 3.7 G/DL (ref 3.5–5.2)
ALBUMIN/GLOB SERPL: 1.5 G/DL
ALP SERPL-CCNC: 65 U/L (ref 39–117)
ALT SERPL W P-5'-P-CCNC: 40 U/L (ref 1–41)
ANION GAP SERPL CALCULATED.3IONS-SCNC: 8 MMOL/L (ref 5–15)
AST SERPL-CCNC: 30 U/L (ref 1–40)
BACTERIA SPEC AEROBE CULT: NORMAL
BACTERIA SPEC AEROBE CULT: NORMAL
BASOPHILS # BLD AUTO: 0.06 10*3/MM3 (ref 0–0.2)
BASOPHILS NFR BLD AUTO: 0.5 % (ref 0–1.5)
BILIRUB SERPL-MCNC: <0.2 MG/DL (ref 0–1.2)
BUN SERPL-MCNC: 15 MG/DL (ref 8–23)
BUN/CREAT SERPL: 21.4 (ref 7–25)
CALCIUM SPEC-SCNC: 8.6 MG/DL (ref 8.6–10.5)
CHLORIDE SERPL-SCNC: 102 MMOL/L (ref 98–107)
CO2 SERPL-SCNC: 30 MMOL/L (ref 22–29)
CREAT SERPL-MCNC: 0.7 MG/DL (ref 0.76–1.27)
DEPRECATED RDW RBC AUTO: 40.6 FL (ref 37–54)
EOSINOPHIL # BLD AUTO: 0.18 10*3/MM3 (ref 0–0.4)
EOSINOPHIL NFR BLD AUTO: 1.5 % (ref 0.3–6.2)
ERYTHROCYTE [DISTWIDTH] IN BLOOD BY AUTOMATED COUNT: 12.2 % (ref 12.3–15.4)
GFR SERPL CREATININE-BSD FRML MDRD: 111 ML/MIN/1.73
GLOBULIN UR ELPH-MCNC: 2.5 GM/DL
GLUCOSE SERPL-MCNC: 95 MG/DL (ref 65–99)
HCT VFR BLD AUTO: 38.5 % (ref 37.5–51)
HGB BLD-MCNC: 13.1 G/DL (ref 13–17.7)
IMM GRANULOCYTES # BLD AUTO: 0.11 10*3/MM3 (ref 0–0.05)
IMM GRANULOCYTES NFR BLD AUTO: 0.9 % (ref 0–0.5)
LYMPHOCYTES # BLD AUTO: 2.26 10*3/MM3 (ref 0.7–3.1)
LYMPHOCYTES NFR BLD AUTO: 18.5 % (ref 19.6–45.3)
MCH RBC QN AUTO: 31 PG (ref 26.6–33)
MCHC RBC AUTO-ENTMCNC: 34 G/DL (ref 31.5–35.7)
MCV RBC AUTO: 91 FL (ref 79–97)
MONOCYTES # BLD AUTO: 1.2 10*3/MM3 (ref 0.1–0.9)
MONOCYTES NFR BLD AUTO: 9.8 % (ref 5–12)
NEUTROPHILS NFR BLD AUTO: 68.8 % (ref 42.7–76)
NEUTROPHILS NFR BLD AUTO: 8.41 10*3/MM3 (ref 1.7–7)
NRBC BLD AUTO-RTO: 0 /100 WBC (ref 0–0.2)
PLATELET # BLD AUTO: 223 10*3/MM3 (ref 140–450)
PMV BLD AUTO: 10.3 FL (ref 6–12)
POTASSIUM SERPL-SCNC: 4 MMOL/L (ref 3.5–5.2)
PROT SERPL-MCNC: 6.2 G/DL (ref 6–8.5)
RBC # BLD AUTO: 4.23 10*6/MM3 (ref 4.14–5.8)
SODIUM SERPL-SCNC: 140 MMOL/L (ref 136–145)
WBC # BLD AUTO: 12.22 10*3/MM3 (ref 3.4–10.8)

## 2021-07-17 PROCEDURE — 94799 UNLISTED PULMONARY SVC/PX: CPT

## 2021-07-17 PROCEDURE — 94760 N-INVAS EAR/PLS OXIMETRY 1: CPT

## 2021-07-17 PROCEDURE — 25010000002 ENOXAPARIN PER 10 MG: Performed by: INTERNAL MEDICINE

## 2021-07-17 PROCEDURE — 25010000002 CEFTRIAXONE PER 250 MG: Performed by: INTERNAL MEDICINE

## 2021-07-17 PROCEDURE — 80053 COMPREHEN METABOLIC PANEL: CPT | Performed by: FAMILY MEDICINE

## 2021-07-17 PROCEDURE — 63710000001 PREDNISONE PER 1 MG: Performed by: INTERNAL MEDICINE

## 2021-07-17 PROCEDURE — 85025 COMPLETE CBC W/AUTO DIFF WBC: CPT | Performed by: FAMILY MEDICINE

## 2021-07-17 RX ADMIN — MONTELUKAST SODIUM 10 MG: 10 TABLET, COATED ORAL at 20:20

## 2021-07-17 RX ADMIN — ENOXAPARIN SODIUM 40 MG: 40 INJECTION SUBCUTANEOUS at 20:20

## 2021-07-17 RX ADMIN — CEFTRIAXONE 1 G: 1 INJECTION, POWDER, FOR SOLUTION INTRAMUSCULAR; INTRAVENOUS at 06:52

## 2021-07-17 RX ADMIN — PREDNISONE 40 MG: 20 TABLET ORAL at 07:24

## 2021-07-17 RX ADMIN — ASPIRIN 81 MG 81 MG: 81 TABLET ORAL at 07:24

## 2021-07-17 RX ADMIN — SODIUM CHLORIDE, PRESERVATIVE FREE 10 ML: 5 INJECTION INTRAVENOUS at 07:26

## 2021-07-17 RX ADMIN — AZITHROMYCIN MONOHYDRATE 500 MG: 250 TABLET ORAL at 07:25

## 2021-07-17 RX ADMIN — IPRATROPIUM BROMIDE 0.5 MG: 0.5 SOLUTION RESPIRATORY (INHALATION) at 14:44

## 2021-07-17 NOTE — PLAN OF CARE
Problem: Adult Inpatient Plan of Care  Goal: Plan of Care Review  Outcome: Ongoing, Progressing  Flowsheets  Taken 7/17/2021 0332 by Lucia Sanches RN  Progress: improving  Taken 7/16/2021 1351 by Leslie Washintgon RN  Plan of Care Reviewed With: patient  Goal: Patient-Specific Goal (Individualized)  Outcome: Ongoing, Progressing  Goal: Absence of Hospital-Acquired Illness or Injury  Outcome: Ongoing, Progressing  Intervention: Identify and Manage Fall Risk  Recent Flowsheet Documentation  Taken 7/17/2021 0200 by Lucia Sanches RN  Safety Promotion/Fall Prevention: safety round/check completed  Taken 7/17/2021 0000 by Lucia Sanches RN  Safety Promotion/Fall Prevention: safety round/check completed  Taken 7/16/2021 2235 by Lucia Sanches RN  Safety Promotion/Fall Prevention: safety round/check completed  Taken 7/16/2021 2150 by Lucia Sanches RN  Safety Promotion/Fall Prevention: safety round/check completed  Taken 7/16/2021 2050 by Lucia Sanches RN  Safety Promotion/Fall Prevention: safety round/check completed  Taken 7/16/2021 1950 by Lucia Sanches RN  Safety Promotion/Fall Prevention: safety round/check completed  Intervention: Prevent Skin Injury  Recent Flowsheet Documentation  Taken 7/17/2021 0200 by Lucia Sanches RN  Body Position: position changed independently  Taken 7/17/2021 0000 by Lucia Sanches RN  Body Position: position changed independently  Taken 7/16/2021 2235 by Lucia Sanches RN  Body Position: position changed independently  Taken 7/16/2021 2150 by Lucia Sanches RN  Body Position: position changed independently  Taken 7/16/2021 2050 by Lucia Sanches RN  Body Position: position changed independently  Taken 7/16/2021 1950 by Lucia Sanches RN  Body Position: position changed independently  Intervention: Prevent and Manage VTE (venous thromboembolism) Risk  Recent Flowsheet Documentation  Taken 7/16/2021 1950 by Lucia Sanches  RN  VTE Prevention/Management: (lovenox) other (see comments)  Goal: Optimal Comfort and Wellbeing  Outcome: Ongoing, Progressing  Intervention: Provide Person-Centered Care  Recent Flowsheet Documentation  Taken 7/16/2021 1950 by Lucia Sanches RN  Trust Relationship/Rapport: care explained  Goal: Readiness for Transition of Care  Outcome: Ongoing, Progressing   Goal Outcome Evaluation:           Progress: improving

## 2021-07-17 NOTE — PLAN OF CARE
Problem: Adult Inpatient Plan of Care  Goal: Plan of Care Review  Outcome: Ongoing, Progressing  Flowsheets (Taken 7/17/2021 1413)  Progress: no change  Plan of Care Reviewed With: patient  Outcome Summary: patient concerned about not receiving his breathing treatments.  Goal: Patient-Specific Goal (Individualized)  Outcome: Ongoing, Progressing  Goal: Absence of Hospital-Acquired Illness or Injury  Outcome: Ongoing, Progressing  Intervention: Identify and Manage Fall Risk  Recent Flowsheet Documentation  Taken 7/17/2021 1315 by Leslie Washington RN  Safety Promotion/Fall Prevention: safety round/check completed  Taken 7/17/2021 1105 by Leslie Washington RN  Safety Promotion/Fall Prevention: safety round/check completed  Taken 7/17/2021 0930 by Leslie Washington RN  Safety Promotion/Fall Prevention: safety round/check completed  Taken 7/17/2021 0725 by Leslie Washington RN  Safety Promotion/Fall Prevention: safety round/check completed  Taken 7/17/2021 0710 by Leslie Washington RN  Safety Promotion/Fall Prevention: safety round/check completed  Intervention: Prevent Skin Injury  Recent Flowsheet Documentation  Taken 7/17/2021 1315 by Leslie Washington RN  Body Position: position changed independently  Taken 7/17/2021 1105 by Leslie Washington RN  Body Position: position changed independently  Taken 7/17/2021 0930 by Leslie Washington RN  Body Position: position changed independently  Taken 7/17/2021 0725 by Leslie Washington RN  Body Position: position changed independently  Taken 7/17/2021 0710 by Leslie Washington RN  Body Position: position changed independently  Goal: Optimal Comfort and Wellbeing  Outcome: Ongoing, Progressing  Intervention: Provide Person-Centered Care  Recent Flowsheet Documentation  Taken 7/17/2021 0725 by Leslie Washington RN  Trust Relationship/Rapport: care explained  Goal: Readiness for Transition of Care  Outcome: Ongoing,  Progressing   Goal Outcome Evaluation:  Plan of Care Reviewed With: patient        Progress: no change  Outcome Summary: patient concerned about not receiving his breathing treatments.

## 2021-07-17 NOTE — PROGRESS NOTES
[facility] Medicine Services  INPATIENT PROGRESS NOTE    Length of Stay: 2  Date of Admission: 7/12/2021  Primary Care Physician: Landon Starr MD    Subjective   Chief Complaint: Shortness of air  HPI:    Admitted for COPD exacerbation.  Currently on supplemental oxygen 2 L.  Still getting hypoxic with minimal exertion.    Review of Systems   Respiratory: Positive for shortness of breath.    Cardiovascular: Negative for chest pain.   Gastrointestinal: Negative for diarrhea.        All pertinent negatives and positives are as above. All other systems have been reviewed and are negative unless otherwise stated.     Objective    Temp:  [96.6 °F (35.9 °C)-99 °F (37.2 °C)] 99 °F (37.2 °C)  Heart Rate:  [] 106  Resp:  [16-18] 16  BP: (129-150)/(65-78) 142/65  Physical Exam  Vitals and nursing note reviewed.   Constitutional:       General: He is not in acute distress.     Appearance: He is well-developed. He is not diaphoretic.   HENT:      Head: Normocephalic and atraumatic.   Cardiovascular:      Rate and Rhythm: Normal rate.   Pulmonary:      Effort: Pulmonary effort is normal. No respiratory distress.      Breath sounds: No wheezing.   Abdominal:      General: There is no distension.      Palpations: Abdomen is soft.   Musculoskeletal:         General: Normal range of motion.   Skin:     General: Skin is warm and dry.   Neurological:      Mental Status: He is alert.      Cranial Nerves: No cranial nerve deficit.   Psychiatric:         Behavior: Behavior normal.         Thought Content: Thought content normal.         Judgment: Judgment normal.             Results Review:  I have reviewed the labs, radiology results, and diagnostic studies.    Laboratory Data:   Lab Results (last 24 hours)     Procedure Component Value Units Date/Time    Comprehensive Metabolic Panel [[id number]]  (Abnormal) Collected: 07/17/21 0527    Specimen: Blood Updated: 07/17/21 0604     Glucose  95 mg/dL      BUN 15 mg/dL      Creatinine 0.70 mg/dL      Sodium 140 mmol/L      Potassium 4.0 mmol/L      Comment: Slight hemolysis detected by analyzer. Results may be affected.        Chloride 102 mmol/L      CO2 30.0 mmol/L      Calcium 8.6 mg/dL      Total Protein 6.2 g/dL      Albumin 3.70 g/dL      ALT (SGPT) 40 U/L      AST (SGOT) 30 U/L      Comment: Slight hemolysis detected by analyzer. Results may be affected.        Alkaline Phosphatase 65 U/L      Total Bilirubin <0.2 mg/dL      eGFR Non African Amer 111 mL/min/1.73      Globulin 2.5 gm/dL      A/G Ratio 1.5 g/dL      BUN/Creatinine Ratio 21.4     Anion Gap 8.0 mmol/L     Narrative:      GFR Normal >60  Chronic Kidney Disease <60  Kidney Failure <15      CBC & Differential [[id number]]  (Abnormal) Collected: 07/17/21 0527    Specimen: Blood Updated: 07/17/21 0538    Narrative:      The following orders were created for panel order CBC & Differential.  Procedure                               Abnormality         Status                     ---------                               -----------         ------                     CBC Auto Differential[[id number]]        Abnormal            Final result                 Please view results for these tests on the individual orders.    CBC Auto Differential [[id number]]  (Abnormal) Collected: 07/17/21 0527    Specimen: Blood Updated: 07/17/21 0538     WBC 12.22 10*3/mm3      RBC 4.23 10*6/mm3      Hemoglobin 13.1 g/dL      Hematocrit 38.5 %      MCV 91.0 fL      MCH 31.0 pg      MCHC 34.0 g/dL      RDW 12.2 %      RDW-SD 40.6 fl      MPV 10.3 fL      Platelets 223 10*3/mm3      Neutrophil % 68.8 %      Lymphocyte % 18.5 %      Monocyte % 9.8 %      Eosinophil % 1.5 %      Basophil % 0.5 %      Immature Grans % 0.9 %      Neutrophils, Absolute 8.41 10*3/mm3      Lymphocytes, Absolute 2.26 10*3/mm3      Monocytes, Absolute 1.20 10*3/mm3      Eosinophils, Absolute 0.18 10*3/mm3      Basophils, Absolute 0.06 10*3/mm3       Immature Grans, Absolute 0.11 10*3/mm3      nRBC 0.0 /100 WBC     Blood Culture - Blood, Arm, Right [[id number]] Collected: 07/12/21 2301    Specimen: Blood from Arm, Right Updated: 07/16/21 2315     Blood Culture No growth at 4 days    Blood Culture - Blood, Arm, Left [[id number]] Collected: 07/12/21 2125    Specimen: Blood from Arm, Left Updated: 07/16/21 2145     Blood Culture No growth at 4 days          Culture Data:   No results found for: BLOODCX  No results found for: URINECX  No results found for: RESPCX  No results found for: WOUNDCX  No results found for: STOOLCX  No components found for: BODYFLD    Radiology Data:   Imaging Results (Last 24 Hours)     ** No results found for the last 24 hours. **          I have reviewed the patient's current medications.     Assessment/Plan     Active Hospital Problems    Diagnosis    • **Acute exacerbation of chronic obstructive pulmonary disease (COPD) (CMS/HCC)    • Community acquired pneumonia of right middle lobe of lung    • Acute on chronic respiratory failure with hypoxia and hypercapnia (CMS/HCC)      - Treating as described above.       Acute on chronic respiratory failure with hypoxia and hypercapnia (secondary to pneumonia/COPD exacerbation): Continue noninvasive respiratory therapy, bronchodilators, steroids and antimicrobial therapy.    Culture is negative so far  Elevated D-dimer is likely due to acute illness and CT pulmonary angio is negative for pulmonary embolism.  Continue with IV antibiotics  Continue with nebulizer treatments as scheduled.     Mild hyponatremia-is clinically insignificant.     1.8 cm right middle lobe nodule: Oncology also managing is planning on PET scan as an outpatient.     Dependent edema both legs: Lasix has been held, compression stockings will be given    Continue GI and DVT prophylaxis.     CODE STATUS is full code.     Discharge Planning: In progress.     I confirmed that the patient's Advance Care Plan is present, code  status is documented, or surrogate decision maker is listed in the patient's medical record.       Herman Smiley MD   07/17/21   14:50 CDT

## 2021-07-18 LAB
ALBUMIN SERPL-MCNC: 4.1 G/DL (ref 3.5–5.2)
ALBUMIN/GLOB SERPL: 1.5 G/DL
ALP SERPL-CCNC: 72 U/L (ref 39–117)
ALT SERPL W P-5'-P-CCNC: 85 U/L (ref 1–41)
ANION GAP SERPL CALCULATED.3IONS-SCNC: 8 MMOL/L (ref 5–15)
AST SERPL-CCNC: 58 U/L (ref 1–40)
BASOPHILS # BLD AUTO: 0.08 10*3/MM3 (ref 0–0.2)
BASOPHILS NFR BLD AUTO: 0.6 % (ref 0–1.5)
BILIRUB SERPL-MCNC: 0.3 MG/DL (ref 0–1.2)
BUN SERPL-MCNC: 18 MG/DL (ref 8–23)
BUN/CREAT SERPL: 24.3 (ref 7–25)
CALCIUM SPEC-SCNC: 8.9 MG/DL (ref 8.6–10.5)
CHLORIDE SERPL-SCNC: 100 MMOL/L (ref 98–107)
CO2 SERPL-SCNC: 32 MMOL/L (ref 22–29)
CREAT SERPL-MCNC: 0.74 MG/DL (ref 0.76–1.27)
DEPRECATED RDW RBC AUTO: 41.3 FL (ref 37–54)
EOSINOPHIL # BLD AUTO: 0.35 10*3/MM3 (ref 0–0.4)
EOSINOPHIL NFR BLD AUTO: 2.5 % (ref 0.3–6.2)
ERYTHROCYTE [DISTWIDTH] IN BLOOD BY AUTOMATED COUNT: 12.4 % (ref 12.3–15.4)
GFR SERPL CREATININE-BSD FRML MDRD: 104 ML/MIN/1.73
GLOBULIN UR ELPH-MCNC: 2.8 GM/DL
GLUCOSE SERPL-MCNC: 84 MG/DL (ref 65–99)
HCT VFR BLD AUTO: 44 % (ref 37.5–51)
HGB BLD-MCNC: 14.8 G/DL (ref 13–17.7)
IMM GRANULOCYTES # BLD AUTO: 0.16 10*3/MM3 (ref 0–0.05)
IMM GRANULOCYTES NFR BLD AUTO: 1.2 % (ref 0–0.5)
LYMPHOCYTES # BLD AUTO: 2.98 10*3/MM3 (ref 0.7–3.1)
LYMPHOCYTES NFR BLD AUTO: 21.5 % (ref 19.6–45.3)
MCH RBC QN AUTO: 30.7 PG (ref 26.6–33)
MCHC RBC AUTO-ENTMCNC: 33.6 G/DL (ref 31.5–35.7)
MCV RBC AUTO: 91.3 FL (ref 79–97)
MONOCYTES # BLD AUTO: 1.62 10*3/MM3 (ref 0.1–0.9)
MONOCYTES NFR BLD AUTO: 11.7 % (ref 5–12)
NEUTROPHILS NFR BLD AUTO: 62.5 % (ref 42.7–76)
NEUTROPHILS NFR BLD AUTO: 8.66 10*3/MM3 (ref 1.7–7)
NRBC BLD AUTO-RTO: 0 /100 WBC (ref 0–0.2)
PLATELET # BLD AUTO: 277 10*3/MM3 (ref 140–450)
PMV BLD AUTO: 10.3 FL (ref 6–12)
POTASSIUM SERPL-SCNC: 3.7 MMOL/L (ref 3.5–5.2)
PROT SERPL-MCNC: 6.9 G/DL (ref 6–8.5)
RBC # BLD AUTO: 4.82 10*6/MM3 (ref 4.14–5.8)
SODIUM SERPL-SCNC: 140 MMOL/L (ref 136–145)
WBC # BLD AUTO: 13.85 10*3/MM3 (ref 3.4–10.8)

## 2021-07-18 PROCEDURE — 94799 UNLISTED PULMONARY SVC/PX: CPT

## 2021-07-18 PROCEDURE — 97535 SELF CARE MNGMENT TRAINING: CPT

## 2021-07-18 PROCEDURE — 25010000002 ENOXAPARIN PER 10 MG: Performed by: INTERNAL MEDICINE

## 2021-07-18 PROCEDURE — 97116 GAIT TRAINING THERAPY: CPT

## 2021-07-18 PROCEDURE — 85025 COMPLETE CBC W/AUTO DIFF WBC: CPT | Performed by: FAMILY MEDICINE

## 2021-07-18 PROCEDURE — 63710000001 PREDNISONE PER 1 MG: Performed by: INTERNAL MEDICINE

## 2021-07-18 PROCEDURE — 94760 N-INVAS EAR/PLS OXIMETRY 1: CPT

## 2021-07-18 PROCEDURE — 80053 COMPREHEN METABOLIC PANEL: CPT | Performed by: FAMILY MEDICINE

## 2021-07-18 PROCEDURE — 97530 THERAPEUTIC ACTIVITIES: CPT

## 2021-07-18 RX ADMIN — PREDNISONE 40 MG: 20 TABLET ORAL at 07:22

## 2021-07-18 RX ADMIN — IPRATROPIUM BROMIDE 0.5 MG: 0.5 SOLUTION RESPIRATORY (INHALATION) at 20:00

## 2021-07-18 RX ADMIN — ASPIRIN 81 MG 81 MG: 81 TABLET ORAL at 07:23

## 2021-07-18 RX ADMIN — IPRATROPIUM BROMIDE 0.5 MG: 0.5 SOLUTION RESPIRATORY (INHALATION) at 11:45

## 2021-07-18 RX ADMIN — ENOXAPARIN SODIUM 40 MG: 40 INJECTION SUBCUTANEOUS at 20:13

## 2021-07-18 RX ADMIN — SODIUM CHLORIDE, PRESERVATIVE FREE 10 ML: 5 INJECTION INTRAVENOUS at 07:23

## 2021-07-18 RX ADMIN — MONTELUKAST SODIUM 10 MG: 10 TABLET, COATED ORAL at 20:13

## 2021-07-18 RX ADMIN — IPRATROPIUM BROMIDE 0.5 MG: 0.5 SOLUTION RESPIRATORY (INHALATION) at 06:07

## 2021-07-18 NOTE — PLAN OF CARE
Problem: Adult Inpatient Plan of Care  Goal: Plan of Care Review  Outcome: Ongoing, Progressing  Flowsheets  Taken 7/18/2021 0127 by Lucia Sanches RN  Progress: no change  Taken 7/17/2021 1413 by Leslie Washington RN  Plan of Care Reviewed With: patient  Goal: Patient-Specific Goal (Individualized)  Outcome: Ongoing, Progressing  Goal: Absence of Hospital-Acquired Illness or Injury  Outcome: Ongoing, Progressing  Intervention: Identify and Manage Fall Risk  Recent Flowsheet Documentation  Taken 7/18/2021 0000 by Lucia Sanches RN  Safety Promotion/Fall Prevention: safety round/check completed  Taken 7/17/2021 2220 by Lucia Sanches RN  Safety Promotion/Fall Prevention: safety round/check completed  Taken 7/17/2021 2120 by Lucia Sanches RN  Safety Promotion/Fall Prevention: safety round/check completed  Taken 7/17/2021 2020 by Lucia Sanches RN  Safety Promotion/Fall Prevention: safety round/check completed  Taken 7/17/2021 1920 by Lucia Sanches RN  Safety Promotion/Fall Prevention: safety round/check completed  Intervention: Prevent Skin Injury  Recent Flowsheet Documentation  Taken 7/18/2021 0000 by Lucia Sanches RN  Body Position: position changed independently  Taken 7/17/2021 2220 by Lucia Sanches RN  Body Position: position changed independently  Taken 7/17/2021 2120 by Lucia Sanches RN  Body Position: position changed independently  Taken 7/17/2021 2020 by Lucia Sanches RN  Body Position: position changed independently  Taken 7/17/2021 1920 by Lucia Sanches RN  Body Position: position changed independently  Intervention: Prevent and Manage VTE (venous thromboembolism) Risk  Recent Flowsheet Documentation  Taken 7/17/2021 1920 by Lucia Sanches RN  VTE Prevention/Management: (lovenox) other (see comments)  Goal: Optimal Comfort and Wellbeing  Outcome: Ongoing, Progressing  Intervention: Provide Person-Centered Care  Recent Flowsheet  Documentation  Taken 7/17/2021 1920 by Lucia Sanches, RN  Trust Relationship/Rapport: care explained  Goal: Readiness for Transition of Care  Outcome: Ongoing, Progressing   Goal Outcome Evaluation:           Progress: no change

## 2021-07-18 NOTE — PROGRESS NOTES
[facility] Medicine Services  INPATIENT PROGRESS NOTE    Length of Stay: 3  Date of Admission: 7/12/2021  Primary Care Physician: Landon Starr MD    Subjective   Chief Complaint: Shortness of air  HPI:    Patient admitted for COPD exacerbation.  Currently on 2 L.  Reports he is making progress and breathing is improving.    Review of Systems   Respiratory: Positive for shortness of breath.    Cardiovascular: Negative for chest pain.        All pertinent negatives and positives are as above. All other systems have been reviewed and are negative unless otherwise stated.     Objective    Temp:  [96.9 °F (36.1 °C)-97.9 °F (36.6 °C)] 96.9 °F (36.1 °C)  Heart Rate:  [] 80  Resp:  [16-20] 18  BP: (127-151)/(65-76) 138/65  Physical Exam  Vitals and nursing note reviewed.   Constitutional:       General: He is not in acute distress.     Appearance: He is well-developed. He is not diaphoretic.   HENT:      Head: Normocephalic and atraumatic.   Cardiovascular:      Rate and Rhythm: Normal rate.   Pulmonary:      Effort: Pulmonary effort is normal. No respiratory distress.      Breath sounds: Wheezing present.   Abdominal:      General: There is no distension.      Palpations: Abdomen is soft.   Musculoskeletal:         General: Normal range of motion.   Skin:     General: Skin is warm and dry.   Neurological:      Mental Status: He is alert.      Cranial Nerves: No cranial nerve deficit.   Psychiatric:         Behavior: Behavior normal.         Thought Content: Thought content normal.         Judgment: Judgment normal.             Results Review:  I have reviewed the labs, radiology results, and diagnostic studies.    Laboratory Data:   Lab Results (last 24 hours)     Procedure Component Value Units Date/Time    Comprehensive Metabolic Panel [[id number]]  (Abnormal) Collected: 07/18/21 0514    Specimen: Blood Updated: 07/18/21 0652     Glucose 84 mg/dL      BUN 18 mg/dL       Creatinine 0.74 mg/dL      Sodium 140 mmol/L      Potassium 3.7 mmol/L      Comment: Slight hemolysis detected by analyzer. Results may be affected.        Chloride 100 mmol/L      CO2 32.0 mmol/L      Calcium 8.9 mg/dL      Total Protein 6.9 g/dL      Albumin 4.10 g/dL      ALT (SGPT) 85 U/L      AST (SGOT) 58 U/L      Alkaline Phosphatase 72 U/L      Total Bilirubin 0.3 mg/dL      eGFR Non African Amer 104 mL/min/1.73      Globulin 2.8 gm/dL      A/G Ratio 1.5 g/dL      BUN/Creatinine Ratio 24.3     Anion Gap 8.0 mmol/L     Narrative:      GFR Normal >60  Chronic Kidney Disease <60  Kidney Failure <15      CBC & Differential [[id number]]  (Abnormal) Collected: 07/18/21 0514    Specimen: Blood Updated: 07/18/21 0644    Narrative:      The following orders were created for panel order CBC & Differential.  Procedure                               Abnormality         Status                     ---------                               -----------         ------                     CBC Auto Differential[[id number]]        Abnormal            Final result                 Please view results for these tests on the individual orders.    CBC Auto Differential [[id number]]  (Abnormal) Collected: 07/18/21 0514    Specimen: Blood Updated: 07/18/21 0644     WBC 13.85 10*3/mm3      RBC 4.82 10*6/mm3      Hemoglobin 14.8 g/dL      Hematocrit 44.0 %      MCV 91.3 fL      MCH 30.7 pg      MCHC 33.6 g/dL      RDW 12.4 %      RDW-SD 41.3 fl      MPV 10.3 fL      Platelets 277 10*3/mm3      Neutrophil % 62.5 %      Lymphocyte % 21.5 %      Monocyte % 11.7 %      Eosinophil % 2.5 %      Basophil % 0.6 %      Immature Grans % 1.2 %      Neutrophils, Absolute 8.66 10*3/mm3      Lymphocytes, Absolute 2.98 10*3/mm3      Monocytes, Absolute 1.62 10*3/mm3      Eosinophils, Absolute 0.35 10*3/mm3      Basophils, Absolute 0.08 10*3/mm3      Immature Grans, Absolute 0.16 10*3/mm3      nRBC 0.0 /100 WBC     Blood Culture - Blood, Arm, Right  [[id number]] Collected: 07/12/21 2301    Specimen: Blood from Arm, Right Updated: 07/17/21 2315     Blood Culture No growth at 5 days    Blood Culture - Blood, Arm, Left [[id number]] Collected: 07/12/21 2125    Specimen: Blood from Arm, Left Updated: 07/17/21 2145     Blood Culture No growth at 5 days          Culture Data:   No results found for: BLOODCX  No results found for: URINECX  No results found for: RESPCX  No results found for: WOUNDCX  No results found for: STOOLCX  No components found for: BODYFLD    Radiology Data:   Imaging Results (Last 24 Hours)     ** No results found for the last 24 hours. **          I have reviewed the patient's current medications.     Assessment/Plan     Active Hospital Problems    Diagnosis    • **Acute exacerbation of chronic obstructive pulmonary disease (COPD) (CMS/HCC)    • Community acquired pneumonia of right middle lobe of lung    • Acute on chronic respiratory failure with hypoxia and hypercapnia (CMS/HCC)      - Treating as described above.       Acute on chronic respiratory failure with hypoxia and hypercapnia (secondary to pneumonia/COPD exacerbation): Continue noninvasive respiratory therapy, bronchodilators, steroids and antimicrobial therapy.    Culture is negative so far  Elevated D-dimer is likely due to acute illness and CT pulmonary angio is negative for pulmonary embolism.  Has finished course of antibiotics  Continue with nebulizer treatments as scheduled.     Mild hyponatremia-is clinically insignificant.     1.8 cm right middle lobe nodule: Oncology also managing is planning on PET scan as an outpatient.     Dependent edema both legs: Lasix has been held, compression stockings will be given     Continue GI and DVT prophylaxis.     CODE STATUS is full code.     Discharge Planning: Likely can discharge home in 1 or 2 days     I confirmed that the patient's Advance Care Plan is present, code status is documented, or surrogate decision maker is listed in the  patient's medical record              Herman Smiley MD   07/18/21   11:30 CDT

## 2021-07-18 NOTE — PLAN OF CARE
Problem: Adult Inpatient Plan of Care  Goal: Plan of Care Review  Outcome: Ongoing, Progressing  Flowsheets  Taken 7/18/2021 1204 by Leslie Washington RN  Plan of Care Reviewed With: patient  Outcome Summary: Patient ambulating in room independently with oxygen on.  Taken 7/18/2021 0127 by Lucia Sanches RN  Progress: no change  Goal: Patient-Specific Goal (Individualized)  Outcome: Ongoing, Progressing  Goal: Absence of Hospital-Acquired Illness or Injury  Outcome: Ongoing, Progressing  Intervention: Identify and Manage Fall Risk  Recent Flowsheet Documentation  Taken 7/18/2021 0935 by Leslie Washington RN  Safety Promotion/Fall Prevention: safety round/check completed  Taken 7/18/2021 0725 by Leslie Washington RN  Safety Promotion/Fall Prevention: safety round/check completed  Taken 7/18/2021 0723 by Leslie Washington RN  Safety Promotion/Fall Prevention: safety round/check completed  Intervention: Prevent Skin Injury  Recent Flowsheet Documentation  Taken 7/18/2021 0723 by Leslie Washington RN  Body Position: position changed independently  Goal: Optimal Comfort and Wellbeing  Outcome: Ongoing, Progressing  Intervention: Provide Person-Centered Care  Recent Flowsheet Documentation  Taken 7/18/2021 0725 by Leslie Washington RN  Trust Relationship/Rapport: care explained  Goal: Readiness for Transition of Care  Outcome: Ongoing, Progressing   Goal Outcome Evaluation:  Plan of Care Reviewed With: patient        Progress: no change  Outcome Summary: Patient ambulating in room independently with oxygen on.

## 2021-07-18 NOTE — SIGNIFICANT NOTE
Item 0 Points 1 Point 2 Points 3 Points 4 Points Subtotal   Mental Status Alert, oriented, cooperative Lethargic, follows commands Confused, not following commands Obtunded or Somnolent Comatose 0   Respiratory Pattern Regular RR 8-16 breaths/minute Increased RR 18-25 breaths/minute Dyspnea on exertion, irregular RR 26-30 breaths/minute Shortness of breath,  RR 31-35 breaths/minute Accessory muscle use, severe SOB  RR > 35 breaths/minute 0   Breath Sounds Clear Decreased unilaterally Decreased bilaterally Basilar crackles Wheezing and/or rhonchi 2   Cough Strong, spontaneous, non-productive Strong productive Weak, non-productive Weak, productive or weak with rhonchi Absent or may require suctioning 0   Pulmonary Status Nonsmoker, no previous history, >1 year quit < 1 PPD  <1 year quit > or = 1 PPD Diagnosed pulmonary disease (severe or chronic) Severe or chronic pulmonary disease with exacerbation 3   Surgical Status None General surgery (non-abdominal or non-thoracic) Lower abdominal Thoracic or upper abdominal Thoracic with pulmonary disease 0   Chest X-ray Clear Chronic changes Infiltrates, atelectasis or pleural effusion Infiltrates in > 1 lobe Diffuse infiltrates and atelectasis and/or effusions 2   Activity   Level Ambulatory Ambulatory with assistance Non-ambulatory Paraplegic Quadriplegic 0        Total Score   7   Score    Drug Therapy Frequency 20 or >    Q4 Duoneb with Q2 Albuterol PRN 15-19    Q6 Duoneb with Q4 Albuterol PRN 10-14    QID Duoneb with Q4 Albuterol PRN 5-9    TID Duoneb with Q6 Albuterol PRN 0-4    Q4 PRN Duoneb                  Lung Expansion Therapy (PEP) Bronchopulmonary Hygiene (CPT)   Q4 & PRN - Severe atelectasis, poor oxygenation Q4 - Copious secretions, dyspnea, unable to sleep   QID - High risk for persistent atelectasis, existence of atelectasis QID & Q4 PRN - Moderate secretion production   TID - At risk for developing atelectasis TID - Small amounts of secretions with poor cough    BID - Prevention of atelectasis BID - Unable to breathe deeply and cough spontaneously     RT Comments / Recommendations:  TID Atrovent (pt allergic to duo neb)

## 2021-07-18 NOTE — PLAN OF CARE
Goal Outcome Evaluation:  Plan of Care Reviewed With: patient           Outcome Summary: sit-stand-sit ind,amb 82',44',94' w/o ad sba/ind-slow pace but able to turn,step back w/o lob-sat 88-91% with gt and pt recovered to>93% with sitting rest break,hr 105-112

## 2021-07-18 NOTE — THERAPY TREATMENT NOTE
Acute Care - Physical Therapy Treatment Note  [facility]     Patient Name: Brian Garcia  : 1950  MRN: [id number]  Today's Date: 2021           PT Assessment (last 12 hours)      PT Evaluation and Treatment     Row Name 21 1042          Physical Therapy Time and Intention    Subjective Information  complains of;pain  -LN     Document Type  therapy note (daily note)  -LN     Mode of Treatment  individual therapy;physical therapy  -LN     Patient Effort  good  -LN     Row Name 21 1042          General Information    Patient Profile Reviewed  yes  -LN     Existing Precautions/Restrictions  oxygen therapy device and L/min  -LN     Row Name 21 1042          Cognition    Orientation Status (Cognition)  oriented x 4  -LN     Row Name 21 1042          Pain Scale: Numbers Pre/Post-Treatment    Pretreatment Pain Rating  2/10  -LN     Posttreatment Pain Rating  2/10  -LN     Pain Location - Orientation  lower  -LN     Pain Location  back  -LN     Pre/Posttreatment Pain Comment  declines pain meds  -LN     Row Name 21 1042          Transfers    Transfers  sit-stand transfer;stand-sit transfer;toilet transfer  -LN     Sit-Stand [place] (Transfers)  independent  -LN     Stand-Sit [place] (Transfers)  independent  -LN     [place] Level (Toilet Transfer)  independent  -LN     Assistive Device (Toilet Transfer)  other (see comments) no AD  -LN     Row Name 21 1042          Sit-Stand Transfer    Assistive Device (Sit-Stand Transfers)  other (see comments) no AD  -LN     Row Name 21 1042          Stand-Sit Transfer    Assistive Device (Stand-Sit Transfers)  other (see comments) no AD   -LN     Row Name 21 1042          Toilet Transfer    Type (Toilet Transfer)  sit-stand;stand-sit  -LN     Row Name 21 1042          Gait/Stairs (Locomotion)    [place] Level (Gait)  standby assist;independent  -LN     Assistive Device (Gait)  -- none   -LN     Distance in Feet (Gait)  82',44',94'  -LN     Pattern (Gait)  step-through  -LN     Deviations/Abnormal Patterns (Gait)  stride length decreased  -LN     [place] Level (Stairs)  independent  -LN     Handrail Location (Stairs)  none  -LN     Number of Steps (Stairs)  5x2  -LN     Ascending Technique (Stairs)  step-to-step  -LN     Descending Technique (Stairs)  step-to-step  -LN     Row Name 07/18/21 1042          Safety Issues, Functional Mobility    Impairments Affecting Function (Mobility)  endurance/activity tolerance;shortness of breath  -LN     Row Name 07/18/21 1042          Plan of Care Review    Plan of Care Reviewed With  patient  -LN     Outcome Summary  sit-stand-sit ind,amb 82',44',94' w/o ad sba/ind-slow pace but able to turn,step back w/o lob-sat 88-91% with gt and pt recovered to>93% with sitting rest break,hr 105-112  -LN     [facility] Name 07/18/21 1042          Vital Signs    Pre Systolic BP Rehab  135  -LN     Pre Treatment Diastolic BP  65  -LN     Post Systolic BP Rehab  133  -LN     Post Treatment Diastolic BP  64  -LN     Pretreatment Heart Rate (beats/min)  95  -LN     Intratreatment Heart Rate (beats/min)  115  -LN     Posttreatment Heart Rate (beats/min)  106  -LN     Pre SpO2 (%)  95  -LN     O2 Delivery Pre Treatment  supplemental O2  -LN     Intra SpO2 (%)  88  -LN     Post SpO2 (%)  96  -LN     Pre Patient Position  Sitting  -LN     Intra Patient Position  Standing  -LN     Post Patient Position  Sitting  -LN     Row Name 07/18/21 1042          Bed Mobility Goal 1 (PT)    Activity/Assistive Device (Bed Mobility Goal 1, PT)  sit to supine  -LN     [place] Level/Cues Needed (Bed Mobility Goal 1, PT)  independent  -LN     Time Frame (Bed Mobility Goal 1, PT)  by discharge  -LN     Progress/Outcomes (Bed Mobility Goal 1, PT)  goal not met  -LN     Row Name 07/18/21 1042          Transfer Goal 1 (PT)    Activity/Assistive Device (Transfer Goal 1, PT)   sit-to-stand/stand-to-sit;bed-to-chair/chair-to-bed O2 sats will not drop below 90%  -LN     [place] Level/Cues Needed (Transfer Goal 1, PT)  independent  -LN     Time Frame (Transfer Goal 1, PT)  by discharge  -LN     Progress/Outcome (Transfer Goal 1, PT)  goal partially met  -LN     Row Name 07/18/21 1042          Gait Training Goal 1 (PT)    Activity/Assistive Device (Gait Training Goal 1, PT)  gait (walking locomotion);decrease fall risk;increase endurance/gait distance  -LN     [place] Level (Gait Training Goal 1, PT)  independent O2 sats will not drop below 90%  -LN     Distance (Gait Training Goal 1, PT)  30ft or more x3  -LN     Progress/Outcome (Gait Training Goal 1, PT)  goal met  -LN     Row Name 07/18/21 1042          ROM Goal 1 (PT)    ROM Goal 1 (PT)  Pt will tolerate LE exercises OOB in chair with vss  -LN     Time Frame (ROM Goal 1, PT)  by discharge  -LN     Progress/Outcome (ROM Goal 1, PT)  goal not met  -LN     Row Name 07/18/21 1042          Stairs Goal 1 (PT)    Activity/Assistive Device (Stairs Goal 1, PT)  ascending stairs;descending stairs;using handrail, left  -LN     [place] Level/Cues Needed (Stairs Goal 1, PT)  modified independence  -LN     Number of Stairs (Stairs Goal 1, PT)  5  -LN     Time Frame (Stairs Goal 1, PT)  by discharge  -LN     Progress/Outcome (Stairs Goal 1, PT)  goal met  -LN     Row Name 07/18/21 1042          Positioning and Restraints    In Chair  sitting;call light within reach;with family/caregiver  -LN     Row Name 07/18/21 1042          Therapy Assessment/Plan (PT)    Rehab Potential (PT)  good, to achieve stated therapy goals  -LN     Criteria for Skilled Interventions Met (PT)  yes;meets criteria;skilled treatment is necessary  -LN       User Key  (r) = Recorded By, (t) = Taken By, (c) = Cosigned By    Initials Name Provider Type    LN Sangita Thorne PTA Physical Therapy Assistant        Physical Therapy Education                 Title: PT OT  SLP Therapies (Not Started)     Topic: Physical Therapy (In Progress)     Point: Mobility training (In Progress)     Learning Progress Summary           Patient Acceptance, E, NR by EUGENE at 7/14/2021 1546                   Point: Home exercise program (Not Started)     Learner Progress:  Not documented in this visit.          Point: Body mechanics (In Progress)     Learning Progress Summary           Patient Acceptance, E, NR by EUGENE at 7/14/2021 1546                   Point: Precautions (Done)     Learning Progress Summary           Patient Acceptance, E, VU,NR by LN at 7/18/2021 1152    Comment: energy conservation    Acceptance, E, NR by EUGENE at 7/14/2021 1546                               User Key     Initials Effective Dates Name Provider Type Discipline     06/16/21 -  Nydia Muñoz PT Physical Therapist PT    KATERINE 06/16/21 -  Sangita Thorne PTA Physical Therapy Assistant PT              PT Recommendation and Plan  Anticipated Discharge Disposition (PT): home with assist  Therapy Frequency (PT): other (see comments) (5-7 days/wk)  Plan of Care Reviewed With: patient  Outcome Summary: sit-stand-sit ind,amb 82',44',94' w/o ad sba/ind-slow pace but able to turn,step back w/o lob-sat 88-91% with gt and pt recovered to>93% with sitting rest break,hr 105-112       Time Calculation:   PT Charges     Row Name 07/18/21 1152             Time Calculation    Start Time  1042  -LN      Stop Time  1140  -LN      Time Calculation (min)  58 min  -      PT Received On  07/18/21  -LN         Time Calculation- PT    Total Timed Code Minutes- PT  58 minute(s)  -        User Key  (r) = Recorded By, (t) = Taken By, (c) = Cosigned By    Initials Name Provider Type     Sangita Thorne PTA Physical Therapy Assistant        Therapy Charges for Today     Code Description Service Date Service Provider Modifiers Qty    82628042642 HC PT THERAPEUTIC ACT EA 15 MIN 7/18/2021 Sangita Thorne PTA GP 2    96327833886 HC GAIT TRAINING EA 15 MIN  7/18/2021 Sangita Thorne, PTA GP 1    66882169975 HC PT SELF CARE/MGMT/TRAIN EA 15 MIN 7/18/2021 Sangita Thorne, REGINE GP 1          PT G-Codes  Outcome Measure Options: AM-PAC 6 Clicks Basic Mobility (PT)  AM-PAC 6 Clicks Score (PT): 20    Sangita Thorne PTA  7/18/2021

## 2021-07-19 VITALS
HEIGHT: 65 IN | HEART RATE: 111 BPM | WEIGHT: 164.4 LBS | BODY MASS INDEX: 27.39 KG/M2 | TEMPERATURE: 97.7 F | SYSTOLIC BLOOD PRESSURE: 173 MMHG | OXYGEN SATURATION: 98 % | RESPIRATION RATE: 16 BRPM | DIASTOLIC BLOOD PRESSURE: 74 MMHG

## 2021-07-19 LAB
ALBUMIN SERPL-MCNC: 3.9 G/DL (ref 3.5–5.2)
ALBUMIN/GLOB SERPL: 1.4 G/DL
ALP SERPL-CCNC: 83 U/L (ref 39–117)
ALT SERPL W P-5'-P-CCNC: 106 U/L (ref 1–41)
ANION GAP SERPL CALCULATED.3IONS-SCNC: 10 MMOL/L (ref 5–15)
AST SERPL-CCNC: 54 U/L (ref 1–40)
BASOPHILS # BLD AUTO: 0.09 10*3/MM3 (ref 0–0.2)
BASOPHILS NFR BLD AUTO: 0.7 % (ref 0–1.5)
BILIRUB SERPL-MCNC: <0.2 MG/DL (ref 0–1.2)
BUN SERPL-MCNC: 19 MG/DL (ref 8–23)
BUN/CREAT SERPL: 25.7 (ref 7–25)
CALCIUM SPEC-SCNC: 8.9 MG/DL (ref 8.6–10.5)
CHLORIDE SERPL-SCNC: 93 MMOL/L (ref 98–107)
CO2 SERPL-SCNC: 28 MMOL/L (ref 22–29)
CREAT SERPL-MCNC: 0.74 MG/DL (ref 0.76–1.27)
DEPRECATED RDW RBC AUTO: 42.9 FL (ref 37–54)
EOSINOPHIL # BLD AUTO: 0.44 10*3/MM3 (ref 0–0.4)
EOSINOPHIL NFR BLD AUTO: 3.3 % (ref 0.3–6.2)
ERYTHROCYTE [DISTWIDTH] IN BLOOD BY AUTOMATED COUNT: 12.5 % (ref 12.3–15.4)
GFR SERPL CREATININE-BSD FRML MDRD: 104 ML/MIN/1.73
GLOBULIN UR ELPH-MCNC: 2.8 GM/DL
GLUCOSE SERPL-MCNC: 106 MG/DL (ref 65–99)
HCT VFR BLD AUTO: 44.2 % (ref 37.5–51)
HGB BLD-MCNC: 14.6 G/DL (ref 13–17.7)
IMM GRANULOCYTES # BLD AUTO: 0.13 10*3/MM3 (ref 0–0.05)
IMM GRANULOCYTES NFR BLD AUTO: 1 % (ref 0–0.5)
LYMPHOCYTES # BLD AUTO: 2.89 10*3/MM3 (ref 0.7–3.1)
LYMPHOCYTES NFR BLD AUTO: 21.7 % (ref 19.6–45.3)
MCH RBC QN AUTO: 30.7 PG (ref 26.6–33)
MCHC RBC AUTO-ENTMCNC: 33 G/DL (ref 31.5–35.7)
MCV RBC AUTO: 93.1 FL (ref 79–97)
MONOCYTES # BLD AUTO: 1.9 10*3/MM3 (ref 0.1–0.9)
MONOCYTES NFR BLD AUTO: 14.2 % (ref 5–12)
NEUTROPHILS NFR BLD AUTO: 59.1 % (ref 42.7–76)
NEUTROPHILS NFR BLD AUTO: 7.89 10*3/MM3 (ref 1.7–7)
NRBC BLD AUTO-RTO: 0 /100 WBC (ref 0–0.2)
PLATELET # BLD AUTO: 241 10*3/MM3 (ref 140–450)
PMV BLD AUTO: 10.1 FL (ref 6–12)
POTASSIUM SERPL-SCNC: 4 MMOL/L (ref 3.5–5.2)
PROT SERPL-MCNC: 6.7 G/DL (ref 6–8.5)
RBC # BLD AUTO: 4.75 10*6/MM3 (ref 4.14–5.8)
SODIUM SERPL-SCNC: 131 MMOL/L (ref 136–145)
WBC # BLD AUTO: 13.34 10*3/MM3 (ref 3.4–10.8)

## 2021-07-19 PROCEDURE — 85025 COMPLETE CBC W/AUTO DIFF WBC: CPT | Performed by: FAMILY MEDICINE

## 2021-07-19 PROCEDURE — 63710000001 PREDNISONE PER 1 MG: Performed by: INTERNAL MEDICINE

## 2021-07-19 PROCEDURE — 80053 COMPREHEN METABOLIC PANEL: CPT | Performed by: FAMILY MEDICINE

## 2021-07-19 PROCEDURE — 94799 UNLISTED PULMONARY SVC/PX: CPT

## 2021-07-19 RX ADMIN — SODIUM CHLORIDE, PRESERVATIVE FREE 10 ML: 5 INJECTION INTRAVENOUS at 09:24

## 2021-07-19 RX ADMIN — PREDNISONE 40 MG: 20 TABLET ORAL at 09:19

## 2021-07-19 RX ADMIN — IPRATROPIUM BROMIDE 0.5 MG: 0.5 SOLUTION RESPIRATORY (INHALATION) at 11:42

## 2021-07-19 RX ADMIN — ASPIRIN 81 MG 81 MG: 81 TABLET ORAL at 09:19

## 2021-07-19 NOTE — DISCHARGE PLACEMENT REQUEST
"Debbie Velasco (70 y.o. Male)     Date of Birth Social Security Number Address Home Phone MRN    1950  [address] [facility] [address] [facility] [address] [phone] [id number]    [employer] Marital Status          Presybeterian        Admission Date Admission Type Admitting Provider Attending Provider Department, Room/Bed    7/12/21 Emergency Alvaro Heredia MD Kirchner, Quinn J, MD [address], 323/1    Discharge Date Discharge Disposition Discharge Destination         Home or Self Care              Attending Provider: Alvaro Heredia MD    Allergies: Amoxicillin, Albuterol, Lortab [Hydrocodone-acetaminophen], Morphine And Related, Prilosec [Omeprazole], Sulfa Antibiotics, Symbicort [Budesonide-formoterol Fumarate]    Isolation: None   Infection: None   Code Status: CPR    Ht: 165.1 cm (65\")   Wt: 74.6 kg (164 lb 6.4 oz)    Admission Cmt: None   Principal Problem: Acute exacerbation of chronic obstructive pulmonary disease (COPD) (CMS/[facility]) [J44.1]                 Active Insurance as of 7/12/2021     Primary Coverage     Payor Plan Insurance Group Employer/Plan Group    [facility] [place] [id number]     Payor Plan Address Payor Plan Phone Number Payor Plan Fax Number Effective Dates    [facility] OFFICE OF COMMUNITY CARE [phone]  2/24/2000 - None Entered    [address] [address]       [facility] [address]       Subscriber Name Subscriber Birth Date Member ID       DEBBIE VELASCO 1950 [id number]           Secondary Coverage     Payor Plan Insurance Group Employer/Plan Group    HUMANA MEDICARE REPLACEMENT HU[employer]NA MEDICARE REPLACEMENT [id number]     Payor Plan Address Payor Plan Phone Number Payor Plan Fax Number Effective Dates    [address] [address] [phone]  10/1/2019 - None Entered    [facility] [address]       Subscriber Name Subscriber Birth Date Member ID       DBEBIE VELASCO 1950 [id number]                 Emergency Contacts      " "(Rel.) Home Phone Work Phone Mobile Phone    Annette Harvey (Significant Other) [phone] -- [phone]            Insurance Information                [employer] 111 Phone: [phone]    Subscriber: Brian Garcia Subscriber#: [id number]    Group#: [id number] Precert#:         [employer] MEDICARE REPLACEMENT/[employer] MEDICARE REPLACEMENT Phone: [phone]    Subscriber: Brian Garcia Subscriber#: [id number]    Group#: [id number] Precert#:         [address] [address]  Dept. Phone:  [phone]  Dept. Fax:  [phone] Date Ordered: 2021         Patient:  Brian Garcia MRN:  [id number]   [address] [facility] [address] :  1950  SSN:    Phone: [phone] Sex:  M     Weight: 74.6 kg (164 lb 6.4 oz)         Ht Readings from Last 1 Encounters:   21 165.1 cm (65\")         Home Nebulizer          (Order ID: [id number])    Diagnosis:  Acute exacerbation of chronic obstructive pulmonary disease (COPD) (CMS/HCC) (J44.1 [ICD-10-CM] 491.21 [ICD-9-CM])  Impaired functional mobility, balance, gait, and endurance (Z74.09 [ICD-10-CM] V49.89 [ICD-9-CM])  Chronic obstructive pulmonary disease with acute exacerbation (CMS/HCC) (J44.1 [ICD-10-CM] 491.21 [ICD-9-CM])  Acute on chronic respiratory failure with hypoxia and hypercapnia (CMS/HCC) (J96.21,J96.22 [ICD-10-CM] 518.84,786.09,799.02 [ICD-9-CM])   Quantity:  1     Nebulizer Equipment:  Nebulizer w/ Compressor  Nebulizer Accessories:  Nebulizer Kit - Administration Set, Non-Disposable  Length of Need (99 Months = Lifetime): 99 Months = Lifetime        Authorizing Provider's Phone: [phone]   Authorizing Provider:Anita Toney MD  Authorizing Provider's NPI: [id number]  Order Entered By: Anita Toney MD 2021 10:51 AM     Electronically signed by: Anita Toney MD 2021 10:51 AM          "

## 2021-07-19 NOTE — PAYOR COMM NOTE
"Paola Tsai  Case Management Extender  [facility]  [phone] phone  [phone] fax    Auth# [id number]    Debbie Velasco (70 y.o. Male)     Date of Birth Social Security Number Address Home Phone MRN    1950  [address] [facility] [address] [phone] [id number]    Jain Marital Status          Judaism        Admission Date Admission Type Admitting Provider Attending Provider Department, Room/Bed    7/12/21 Emergency Alvaro Heredia MD Kirchner, Quinn J, MD [facility] [address] [place], 323/1    Discharge Date Discharge Disposition Discharge Destination         Home or Self Care              Attending Provider: Alvaro Heredia MD    Allergies: Amoxicillin, Albuterol, Lortab [Hydrocodone-acetaminophen], Morphine And Related, Prilosec [Omeprazole], Sulfa Antibiotics, Symbicort [Budesonide-formoterol Fumarate]    Isolation: None   Infection: None   Code Status: CPR    Ht: 165.1 cm (65\")   Wt: 74.6 kg (164 lb 6.4 oz)    Admission Cmt: None   Principal Problem: Acute exacerbation of chronic obstructive pulmonary disease (COPD) (CMS/[facility]) [J44.1]                 Active Insurance as of 7/12/2021     Primary Coverage     Payor Plan Insurance Group Employer/Plan Group    [facility] DEPT [id number]     Payor Plan Address Payor Plan Phone Number Payor Plan Fax Number Effective Dates    [facility] OFFICE OF COMMUNITY CARE [phone]  2/24/2000 - None Entered    [address] [address]       [facility] [address]       Subscriber Name Subscriber Birth Date Member ID       DEBBIE VELASCO 1950 [id number]           Secondary Coverage     Payor Plan Insurance Group Employer/Plan Group    HUMANA MEDICARE REPLACEMENT HUMANA MEDICARE REPLACEMENT [id number]     Payor Plan Address Payor Plan Phone Number Payor Plan Fax Number Effective Dates    [address] [address] [phone]  10/1/2019 - None Entered    [facility] [address]       Subscriber Name " Subscriber Birth Date Member ID       DEBBIE VELASCO 1950 [id number]                 Emergency Contacts      (Rel.) Home Phone Work Phone Mobile Phone    Annette Harvey (Significant Other) [phone] -- [phone]               History & Physical      Alvaro Heredia MD at 07/13/21 0534                [facility] Medicine Admission      Date of Admission: 7/12/2021, 05:34 CDT      Primary Care Physician: Landon Starr MD      Chief Complaint: Shortness of breath    HPI: 70-year-old male with past medical history significant for hypertension, prior pulmonary embolism and COPD presents to the emergency department with shortness of breath.  Patient describes worsening shortness of breath for the past few days that is particularly worse with exertion.  He notes oxygen saturations in the mid to high 80s at home.  He endorses generalized chest discomfort without overt chest pain.  No heart palpitations or lightheadedness.  He denies cough, fevers or sick contacts.    Concurrent Medical History:  has a past medical history of COPD (chronic obstructive pulmonary disease) (CMS/[facility]).    Past Surgical History:  has a past surgical history that includes Hip Arthroplasty.    Family History: Noncontributory    Social History:  reports that he has never smoked. He does not have any smokeless tobacco history on file.    Allergies:   Allergies   Allergen Reactions   • Amoxicillin Anaphylaxis   • Albuterol Irritability     Facial flushing and palpitations.   • Lortab [Hydrocodone-Acetaminophen] Other (See Comments)     Can't remember   • Morphine And Related Hallucinations   • Prilosec [Omeprazole] Other (See Comments)     unknown   • Sulfa Antibiotics Other (See Comments)     Can't remember also more and can't remember   • Symbicort [Budesonide-Formoterol Fumarate] Unknown (See Comments)     Doesn't remember reaction type       Medications:   Prior to Admission  medications    Medication Sig Start Date End Date Taking? Authorizing Provider   ipratropium (ATROVENT HFA) 17 MCG/ACT inhaler Inhale 2 puffs 4 (Four) Times a Day As Needed for Wheezing.   Yes Qamar Javed MD   predniSONE (DELTASONE) 5 MG tablet Take 5 mg by mouth Every Other Day.   Yes Qamar Javed MD   tiotropium bromide-olodaterol (Stiolto Respimat) 2.5-2.5 MCG/ACT aerosol solution inhaler Inhale 2 puffs Daily.   Yes Qamar Javed MD   aspirin 81 MG chewable tablet Chew 81 mg Daily.    Qamar Javed MD   montelukast (SINGULAIR) 10 MG tablet Take 10 mg by mouth Every Night.    Qamar Javed MD       Review of Systems:  A complete 10 point review of systems was obtained and was negative unless noted in the HPI.    Physical Exam:   Temp:  [98 °F (36.7 °C)-98.3 °F (36.8 °C)] 98 °F (36.7 °C)  Heart Rate:  [] 96  Resp:  [16-22] 16  BP: (136-198)/(59-96) 136/59  Physical Exam  Constitutional:       General: He is not in acute distress.     Appearance: He is not ill-appearing.   HENT:      Head: Normocephalic.      Nose: Nose normal.      Mouth/Throat:      Mouth: Mucous membranes are moist.      Pharynx: Oropharynx is clear.   Eyes:      Extraocular Movements: Extraocular movements intact.      Conjunctiva/sclera: Conjunctivae normal.   Cardiovascular:      Rate and Rhythm: Normal rate and regular rhythm.      Heart sounds: Normal heart sounds.   Pulmonary:      Effort: Pulmonary effort is normal. No respiratory distress.      Breath sounds: Normal breath sounds.   Abdominal:      General: Bowel sounds are normal.      Palpations: Abdomen is soft.      Tenderness: There is no abdominal tenderness.   Musculoskeletal:         General: Normal range of motion.      Cervical back: Normal range of motion.   Skin:     General: Skin is warm and dry.   Neurological:      General: No focal deficit present.      Mental Status: He is alert. Mental status is at baseline.   Psychiatric:          Mood and Affect: Mood normal.         Behavior: Behavior normal.           Results Reviewed:  I have personally reviewed current lab, radiology, and data and agree with results.  Lab Results (last 24 hours)     Procedure Component Value Units Date/Time    Blood Culture - Blood, Arm, Right [[id number]] Collected: 07/12/21 2301    Specimen: Blood from Arm, Right Updated: 07/12/21 2307    Blood Culture - Blood, Arm, Left [[id number]] Collected: 07/12/21 2125    Specimen: Blood from Arm, Left Updated: 07/12/21 2137    Troponin [[id number]]  (Normal) Collected: 07/12/21 2057    Specimen: Blood Updated: 07/12/21 2122     Troponin T <0.010 ng/mL     Narrative:      Troponin T Reference Range:  <= 0.03 ng/mL-   Negative for AMI  >0.03 ng/mL-     Abnormal for myocardial necrosis.  Clinicians would have to utilize clinical acumen, EKG, Troponin and serial changes to determine if it is an Acute Myocardial Infarction or myocardial injury due to an underlying chronic condition.       Results may be falsely decreased if patient taking Biotin.      Extra Tubes [[id number]] Collected: 07/12/21 1713    Specimen: Blood, Venous Line Updated: 07/12/21 2115    Narrative:      The following orders were created for panel order Extra Tubes.  Procedure                               Abnormality         Status                     ---------                               -----------         ------                     Gold Top - SST[[id number]]                                   Final result               Gray Top[[id number]]                                         Final result                 Please view results for these tests on the individual orders.    Gray Top [[id number]] Collected: 07/12/21 1713    Specimen: Blood Updated: 07/12/21 2115     Extra Tube Hold for add-ons.     Comment: Auto resulted.       Gold Top - SST [[id number]] Collected: 07/12/21 1713    Specimen: Blood Updated: 07/12/21 1815     Extra Tube Hold for add-ons.      Comment: Auto resulted.       Protime-INR [[id number]]  (Normal) Collected: 07/12/21 1708    Specimen: Blood Updated: 07/12/21 1741     Protime 12.4 Seconds      INR 0.93    Narrative:      Therapeutic range for most indications is 2.0-3.0 INR,  or 2.5-3.5 for mechanical heart valves.    aPTT [[id number]]  (Normal) Collected: 07/12/21 1708    Specimen: Blood Updated: 07/12/21 1741     PTT 28.0 seconds     Narrative:      The recommended Heparin therapeutic range is 68-97 seconds.    D-dimer, Quantitative [[id number]]  (Abnormal) Collected: 07/12/21 1708    Specimen: Blood Updated: 07/12/21 1741     D-Dimer, Quantitative 965 ng/mL (FEU)     Narrative:      Dimer values <500 ng/ml FEU are FDA approved as aid in diagnosis of deep venous thrombosis and pulmonary embolism.  This test should not be used in an exclusion strategy with pretest probability alone.    A recent guideline regarding diagnosis for pulmonary thromboembolism recommends an adjusted exclusion criterion of age x 10 ng/ml FEU for patients >50 years of age (Lola Intern Med 2015; 163: 701-711).      COVID-19 and FLU A/B PCR - Swab, Nasopharynx [[id number]]  (Normal) Collected: 07/12/21 1709    Specimen: Swab from Nasopharynx Updated: 07/12/21 1735     COVID19 Not Detected     Influenza A PCR Not Detected     Influenza B PCR Not Detected    Narrative:      Fact sheet for providers: https://www.fda.gov/media/875184/download    Fact sheet for patients: https://www.fda.gov/media/037964/download    Test performed by PCR.    Comprehensive Metabolic Panel [[id number]]  (Abnormal) Collected: 07/12/21 1708    Specimen: Blood Updated: 07/12/21 1734     Glucose 101 mg/dL      BUN 16 mg/dL      Creatinine 1.00 mg/dL      Sodium 134 mmol/L      Potassium 4.2 mmol/L      Chloride 96 mmol/L      CO2 32.0 mmol/L      Calcium 9.1 mg/dL      Total Protein 7.4 g/dL      Albumin 4.30 g/dL      ALT (SGPT) 20 U/L      AST (SGOT) 19 U/L      Alkaline Phosphatase 85 U/L      Total  Bilirubin 0.2 mg/dL      eGFR Non African Amer 74 mL/min/1.73      Globulin 3.1 gm/dL      A/G Ratio 1.4 g/dL      BUN/Creatinine Ratio 16.0     Anion Gap 6.0 mmol/L     Narrative:      GFR Normal >60  Chronic Kidney Disease <60  Kidney Failure <15      Troponin [[id number]]  (Normal) Collected: 07/12/21 1708    Specimen: Blood Updated: 07/12/21 1734     Troponin T <0.010 ng/mL     Narrative:      Troponin T Reference Range:  <= 0.03 ng/mL-   Negative for AMI  >0.03 ng/mL-     Abnormal for myocardial necrosis.  Clinicians would have to utilize clinical acumen, EKG, Troponin and serial changes to determine if it is an Acute Myocardial Infarction or myocardial injury due to an underlying chronic condition.       Results may be falsely decreased if patient taking Biotin.      BNP [[id number]]  (Normal) Collected: 07/12/21 1708    Specimen: Blood Updated: 07/12/21 1732     proBNP 30.1 pg/mL     Narrative:      Among patients with dyspnea, NT-proBNP is highly sensitive for the detection of acute congestive heart failure. In addition NT-proBNP of <300 pg/ml effectively rules out acute congestive heart failure with 99% negative predictive value.    Results may be falsely decreased if patient taking Biotin.      Blood Gas, Arterial - [[id number]]  (Abnormal) Collected: 07/12/21 1722    Specimen: Arterial Blood Updated: 07/12/21 1730     Site Right Radial     Anson's Test Positive     pH, Arterial 7.395 pH units      pCO2, Arterial 55.8 mm Hg      Comment: 83 Value above reference range        pO2, Arterial 152.0 mm Hg      Comment: 83 Value above reference range        HCO3, Arterial 34.2 mmol/L      Comment: 83 Value above reference range        Base Excess, Arterial 7.3 mmol/L      Comment: 83 Value above reference range        O2 Saturation, Arterial 99.4 %      Comment: 83 Value above reference range        Barometric Pressure for Blood Gas 747 mmHg      Modality Nasal Cannula     Flow Rate 2.5 lpm      Ventilator Mode NA      Collected by DARYA     Comment: Meter: [id number]     :  [id number]       CBC & Differential [[id number]]  (Abnormal) Collected: 07/12/21 1709    Specimen: Blood Updated: 07/12/21 1716    Narrative:      The following orders were created for panel order CBC & Differential.  Procedure                               Abnormality         Status                     ---------                               -----------         ------                     CBC Auto Differential[[id number]]        Abnormal            Final result                 Please view results for these tests on the individual orders.    CBC Auto Differential [[id number]]  (Abnormal) Collected: 07/12/21 1709    Specimen: Blood Updated: 07/12/21 1716     WBC 9.27 10*3/mm3      RBC 4.69 10*6/mm3      Hemoglobin 14.3 g/dL      Hematocrit 43.3 %      MCV 92.3 fL      MCH 30.5 pg      MCHC 33.0 g/dL      RDW 12.3 %      RDW-SD 41.7 fl      MPV 10.2 fL      Platelets 238 10*3/mm3      Neutrophil % 62.7 %      Lymphocyte % 17.2 %      Monocyte % 11.7 %      Eosinophil % 7.2 %      Basophil % 0.8 %      Immature Grans % 0.4 %      Neutrophils, Absolute 5.82 10*3/mm3      Lymphocytes, Absolute 1.59 10*3/mm3      Monocytes, Absolute 1.08 10*3/mm3      Eosinophils, Absolute 0.67 10*3/mm3      Basophils, Absolute 0.07 10*3/mm3      Immature Grans, Absolute 0.04 10*3/mm3      nRBC 0.0 /100 WBC         Imaging Results (Last 24 Hours)     Procedure Component Value Units Date/Time    CT Angiogram Chest [[id number]] Collected: 07/12/21 2004     Updated: 07/12/21 2050    Narrative:      CT CHEST ANGIOGRAPHY WITH IV CONTRAST    INDICATION: 70 years Male; d dimer (+)/soa, J44.1 Chronic  obstructive pulmonary disease with (acute) exacerbation    TECHNIQUE:  CT angiogram of the chest was performed with IV  contrast.  3-D/MIP reconstructions were performed.  This exam was  performed according to our departmental dose-optimization  program, which includes automated  exposure control, adjustment of  the mA and/or kV according to patient size and/or use of  iterative reconstruction technique.     COMPARISON: 2/6/2018.    FINDINGS:  Thyroid: Unremarkable.   Heart/Mediastinum: The heart is normal in size. Scattered  coronary artery calcifications.   Vasculature: No evidence of pulmonary emboli. Mild  atherosclerosis of the aorta. No aortic aneurysm or dissection.  Lungs/Pleura: Severe centrilobular emphysema. Spiculated nodule  measuring 1.8 x 1.0 x 0.9 cm in the central posterior aspect of  the right middle lobe closely abutting the minor fissure,  pulmonary vessels and the lateral subsegmental branch of the  right middle lobe bronchus is new from previous study. Reticular  interstitial opacity in the remainder of the lateral right middle  lobe and also scattered in the superior segment of the right  lower lobe could represent postobstructive pneumonia/edema.  Stable 3 mm calcified nodule in the left lower lobe is likely  benign. No pleural effusion or pneumothorax.   Lymph nodes: No pathologically enlarged lymph nodes.  Bones: Unremarkable.  Soft tissues: Unremarkable.  Incidental findings: Small nonenhancing subcentimeter cyst in the  right hepatic lobe is probably benign.       Impression:      1.  No evidence of pulmonary embolism.  2.  Spiculated nodule measuring 1.8 cm in the central posterior  aspect of the right middle lobe closely abutting the minor  fissure, pulmonary vessels and the lateral subsegmental branch of  the right middle lobe bronchus is new from previous study.  Recommend PET/CT or tissue sampling (bronchoscopy).  3.  Reticular interstitial opacity in the remainder of the  lateral right middle lobe and also scattered in the superior  segment of the right lower lobe could represent postobstructive  pneumonia/edema.    Electronically signed by:  Lizz Osman  7/12/2021 8:49 PM CDT  Workstation: [id number]    XR Chest 1 View [[id number]] Collected: 07/12/21 7739      Updated: 07/12/21 1743    Narrative:      Exam: AP portable chest    INDICATION: Shortness of breath    COMPARISON: 11/6/2018    FINDINGS: AP portable chest. The bony structures are intact. The  cardiomegaly is silhouette is unremarkable. Plaque is present in  the aorta. Lungs are hyperinflated with parenchymal destruction  right greater than left compatible with emphysema. There is mild  linear atelectasis in the right lung. No pneumothorax or pleural  effusion.      Impression:      1. Mild linear atelectasis in the right. Recommend follow-up.  2. Pulmonary emphysema    Electronically signed by:  Nicolas Saldivar MD  7/12/2021 5:42 PM  CDT Workstation: [id number]            Assessment:    Active Hospital Problems    Diagnosis    • **Acute exacerbation of chronic obstructive pulmonary disease (COPD) (CMS/HCC)    • Community acquired pneumonia of right middle lobe of lung    • Acute on chronic respiratory failure with hypoxia and hypercapnia (CMS/HCC)      - Treating as described above.               Plan:    #1.  Acute COPD exacerbation with acute on chronic hypoxic respiratory failure.  #2.  Concern for community-acquired pneumonia.  #3.  Right upper lobe pulmonary nodule.    Initiation of systemic steroid therapy and scheduled duo nebs with antibiotic therapy consisting of ceftriaxone azithromycin for possible community-acquired pneumonia and underlying COPD exacerbation.  There is a spiculated nodule measuring 1.8 cm in the central posterior aspect of the right middle lobe concerning for malignancy.  This is a new finding and will need further work-up coordinated in the outpatient setting.  EKG without acute ST segment or T wave changes and troponin enzyme negative.  COVID-19 and influenza negative.  proBNP within normal limits.  D-dimer elevated 165 with CTA chest negative for pulmonary embolism.    CODE STATUS: Full code  DVT prophylaxis: Subcutaneous Lovenox  Diet: Cardiac  Disposition: Observation    Alvaor NOVAK  MD Giovanna  Hospitalist Service                  Electronically signed by Alvaro Heredia MD at 07/13/21 0569          Emergency Department Notes      Destiney Gonzales, RN at 07/12/21 1700        PT REPORTS THAT HE HAS EMPHYSEMA AND HIS SPO2 IS DROPPING INTO THE 80S WITH ACTIVITY. HE WEARS 2L HOME O2 AT HOME.     HE IS ALERT AND ORIENTED X4. SPEAKS IN FULL SENTENCES,.    Electronically signed by Destiney Gonzales RN at 07/12/21 1701     Alex Solis MD at 07/12/21 1817      Procedure Orders    1. ECG 12 Lead [[id number]] ordered by Issac Campbell MD               Subjective   69yo male pmh significant o2 dependent copd/dvt/pe presents ED c/o 1wk hx increased soa/monahan with hypoxia sao2 88% with exertion at home.  Pt reports intermittent chest discomfort at times, however, no chest pain reported today.  ROS neg fever/chills/cough/hemoptysis/abd pain.  ROS (+) pedal edema.      History provided by:  Patient  Shortness of Breath  Severity:  Moderate  Onset quality:  Gradual  Duration:  1 week  Chronicity:  Recurrent  Associated symptoms: chest pain    Associated symptoms: no cough        Review of Systems   Constitutional: Negative.    HENT: Negative.    Eyes: Negative for redness.   Respiratory: Positive for shortness of breath. Negative for cough.    Cardiovascular: Positive for chest pain.   Gastrointestinal: Negative.    Genitourinary: Negative.    Musculoskeletal: Negative.    Allergic/Immunologic: Negative for immunocompromised state.   All other systems reviewed and are negative.      Past Medical History:   Diagnosis Date   • COPD (chronic obstructive pulmonary disease) (CMS/[facility])        Allergies   Allergen Reactions   • Amoxicillin Anaphylaxis   • Albuterol Irritability     Facial flushing and palpitations.   • Lortab [Hydrocodone-Acetaminophen] Other (See Comments)     Can't remember   • Morphine And Related Hallucinations   • Prilosec [Omeprazole] Other (See Comments)     unknown   • Sulfa Antibiotics Other  (See Comments)     Can't remember also more and can't remember   • Symbicort [Budesonide-Formoterol Fumarate] Unknown (See Comments)     Doesn't remember reaction type       Past Surgical History:   Procedure Laterality Date   • HIP ARTHROPLASTY         No family history on file.    Social History     Socioeconomic History   • Marital status:      Spouse name: Not on file   • Number of children: Not on file   • Years of education: Not on file   • Highest education level: Not on file   Tobacco Use   • Smoking status: Never Smoker           Objective   Physical Exam  Vitals and nursing note reviewed.   Constitutional:       Appearance: Normal appearance.   HENT:      Head: Normocephalic and atraumatic.      Mouth/Throat:      Mouth: Mucous membranes are moist.   Eyes:      Pupils: Pupils are equal, round, and reactive to light.   Cardiovascular:      Rate and Rhythm: Normal rate and regular rhythm.      Pulses: Normal pulses.      Heart sounds: Normal heart sounds. No murmur heard.   No friction rub. No gallop.    Pulmonary:      Effort: Pulmonary effort is normal. No respiratory distress.      Breath sounds: Normal breath sounds. No wheezing, rhonchi or rales.   Abdominal:      General: Abdomen is flat. Bowel sounds are normal. There is no distension.      Palpations: Abdomen is soft.      Tenderness: There is no abdominal tenderness. There is no guarding or rebound.   Musculoskeletal:      Cervical back: Normal range of motion and neck supple. No rigidity.      Right lower leg: Edema present.      Left lower leg: Edema present.   Lymphadenopathy:      Cervical: No cervical adenopathy.   Skin:     General: Skin is warm and dry.   Neurological:      General: No focal deficit present.      Mental Status: He is alert and oriented to person, place, and time.      GCS: GCS eye subscore is 4. GCS verbal subscore is 5. GCS motor subscore is 6.         ECG 12 Lead      Date/Time: 7/12/2021 6:20 PM  Performed by:  Issac Campbell MD  Authorized by: Issac Campbell MD   Interpreted by physician  Rhythm: sinus rhythm  Rate: normal  BPM: 98  QRS axis: normal  Conduction: non-specific intraventricular conduction delay  ST Segments: ST segments normal  T depression: aVL  Other: no other findings  Clinical impression: abnormal ECG                ED Course  ED Course as of Jul 12 2124   Mon Jul 12, 2021   1836 Checkout Dr. Solis 1900hrs. Pending CTPA/labs/disposition.    [SD]   2120 Patient was checked out to me at shift change by Dr. Campbell.  Patient's pulse oximeter dropped down to 89% on 3 L nasal cannula with ambulation.  I reviewed the results of the patient's evaluation with him.  I recommended admission for observation.  Case discussed with the hospitalist Dr. Heredia.  He agrees to admit the patient to telemetry.    [DR]      ED Course User Index  [DR] Alex Solis MD  [SD] Issac Campbell MD      Labs Reviewed   COMPREHENSIVE METABOLIC PANEL - Abnormal; Notable for the following components:       Result Value    Glucose 101 (*)     Sodium 134 (*)     Chloride 96 (*)     CO2 32.0 (*)     All other components within normal limits    Narrative:     GFR Normal >60  Chronic Kidney Disease <60  Kidney Failure <15     D-DIMER, QUANTITATIVE - Abnormal; Notable for the following components:    D-Dimer, Quantitative 965 (*)     All other components within normal limits    Narrative:     Dimer values <500 ng/ml FEU are FDA approved as aid in diagnosis of deep venous thrombosis and pulmonary embolism.  This test should not be used in an exclusion strategy with pretest probability alone.    A recent guideline regarding diagnosis for pulmonary thromboembolism recommends an adjusted exclusion criterion of age x 10 ng/ml FEU for patients >50 years of age (Lola Intern Med 2015; 163: 701-711).     CBC WITH AUTO DIFFERENTIAL - Abnormal; Notable for the following components:    Lymphocyte % 17.2 (*)     Eosinophil % 7.2 (*)     Monocytes,  Absolute 1.08 (*)     Eosinophils, Absolute 0.67 (*)     All other components within normal limits   BLOOD GAS, ARTERIAL - Abnormal; Notable for the following components:    pCO2, Arterial 55.8 (*)     pO2, Arterial 152.0 (*)     HCO3, Arterial 34.2 (*)     Base Excess, Arterial 7.3 (*)     O2 Saturation, Arterial 99.4 (*)     All other components within normal limits   COVID-19 AND FLU A/B, NP SWAB IN TRANSPORT MEDIA 8-12 HR TAT - Normal    Narrative:     Fact sheet for providers: https://www.fda.gov/media/384345/download    Fact sheet for patients: https://www.fda.gov/media/395664/download    Test performed by PCR.   PROTIME-INR - Normal    Narrative:     Therapeutic range for most indications is 2.0-3.0 INR,  or 2.5-3.5 for mechanical heart valves.   APTT - Normal    Narrative:     The recommended Heparin therapeutic range is 68-97 seconds.   TROPONIN (IN-HOUSE) - Normal    Narrative:     Troponin T Reference Range:  <= 0.03 ng/mL-   Negative for AMI  >0.03 ng/mL-     Abnormal for myocardial necrosis.  Clinicians would have to utilize clinical acumen, EKG, Troponin and serial changes to determine if it is an Acute Myocardial Infarction or myocardial injury due to an underlying chronic condition.       Results may be falsely decreased if patient taking Biotin.     TROPONIN (IN-HOUSE) - Normal    Narrative:     Troponin T Reference Range:  <= 0.03 ng/mL-   Negative for AMI  >0.03 ng/mL-     Abnormal for myocardial necrosis.  Clinicians would have to utilize clinical acumen, EKG, Troponin and serial changes to determine if it is an Acute Myocardial Infarction or myocardial injury due to an underlying chronic condition.       Results may be falsely decreased if patient taking Biotin.     BNP (IN-HOUSE) - Normal    Narrative:     Among patients with dyspnea, NT-proBNP is highly sensitive for the detection of acute congestive heart failure. In addition NT-proBNP of <300 pg/ml effectively rules out acute congestive  heart failure with 99% negative predictive value.    Results may be falsely decreased if patient taking Biotin.     BLOOD CULTURE   BLOOD CULTURE   BLOOD GAS, ARTERIAL   CBC AND DIFFERENTIAL    Narrative:     The following orders were created for panel order CBC & Differential.  Procedure                               Abnormality         Status                     ---------                               -----------         ------                     CBC Auto Differential[[id number]]        Abnormal            Final result                 Please view results for these tests on the individual orders.   EXTRA TUBES    Narrative:     The following orders were created for panel order Extra Tubes.  Procedure                               Abnormality         Status                     ---------                               -----------         ------                     Gold Top - SST[[id number]]                                   Final result               Gray Top[[id number]]                                         Final result                 Please view results for these tests on the individual orders.   GOLD TOP - SST   GRAY TOP     XR Chest 1 View    Result Date: 7/12/2021  Narrative: Exam: AP portable chest INDICATION: Shortness of breath COMPARISON: 11/6/2018 FINDINGS: AP portable chest. The bony structures are intact. The cardiomegaly is silhouette is unremarkable. Plaque is present in the aorta. Lungs are hyperinflated with parenchymal destruction right greater than left compatible with emphysema. There is mild linear atelectasis in the right lung. No pneumothorax or pleural effusion.     Impression: 1. Mild linear atelectasis in the right. Recommend follow-up. 2. Pulmonary emphysema Electronically signed by:  Nicolas Saldivar MD  7/12/2021 5:42 PM CDT Workstation: [id number]    CT Angiogram Chest    Result Date: 7/12/2021  Narrative: CT CHEST ANGIOGRAPHY WITH IV CONTRAST INDICATION: 70 years Male; d dimer (+)/soa, J44.1  Chronic obstructive pulmonary disease with (acute) exacerbation TECHNIQUE:  CT angiogram of the chest was performed with IV contrast.  3-D/MIP reconstructions were performed.  This exam was performed according to our departmental dose-optimization program, which includes automated exposure control, adjustment of the mA and/or kV according to patient size and/or use of iterative reconstruction technique. COMPARISON: 2/6/2018. FINDINGS: Thyroid: Unremarkable. Heart/Mediastinum: The heart is normal in size. Scattered coronary artery calcifications. Vasculature: No evidence of pulmonary emboli. Mild atherosclerosis of the aorta. No aortic aneurysm or dissection. Lungs/Pleura: Severe centrilobular emphysema. Spiculated nodule measuring 1.8 x 1.0 x 0.9 cm in the central posterior aspect of the right middle lobe closely abutting the minor fissure, pulmonary vessels and the lateral subsegmental branch of the right middle lobe bronchus is new from previous study. Reticular interstitial opacity in the remainder of the lateral right middle lobe and also scattered in the superior segment of the right lower lobe could represent postobstructive pneumonia/edema. Stable 3 mm calcified nodule in the left lower lobe is likely benign. No pleural effusion or pneumothorax. Lymph nodes: No pathologically enlarged lymph nodes. Bones: Unremarkable. Soft tissues: Unremarkable. Incidental findings: Small nonenhancing subcentimeter cyst in the right hepatic lobe is probably benign.     Impression: 1.  No evidence of pulmonary embolism. 2.  Spiculated nodule measuring 1.8 cm in the central posterior aspect of the right middle lobe closely abutting the minor fissure, pulmonary vessels and the lateral subsegmental branch of the right middle lobe bronchus is new from previous study. Recommend PET/CT or tissue sampling (bronchoscopy). 3.  Reticular interstitial opacity in the remainder of the lateral right middle lobe and also scattered in the  superior segment of the right lower lobe could represent postobstructive pneumonia/edema. Electronically signed by:  Lizz Osman  7/12/2021 8:49 PM CDT Workstation: [id number]                                         [facility]    Final diagnoses:   Acute exacerbation of chronic obstructive pulmonary disease (COPD) (CMS/[facility])   Pneumonia of right lung due to infectious organism, unspecified part of lung   Pulmonary nodule       ED Disposition  ED Disposition     ED Disposition Condition Comment    Decision to Admit  Level of Care: Telemetry [5]   Diagnosis: Acute exacerbation of chronic obstructive pulmonary disease (COPD) (CMS/[facility]) [275940]   Admitting Physician: ROB DEY [[id number]]   Attending Physician: ROB DEY [[id number]]            No follow-up provider specified.       Medication List      No changes were made to your prescriptions during this visit.          Alex Solis MD  07/12/21 2124      Electronically signed by Alex Solis MD at 07/12/21 2124     Destiney Gonzales RN at 07/12/21 2108        Pt ambulated. SpO2 dropped to 89%     Destiney Gonzales RN  07/12/21 2109      Electronically signed by Destiney Gonzales RN at 07/12/21 2109     Destiney Gonzales RN at 07/12/21 2155        Report given to Trinity Destiney Whitten RN  07/12/21 2155      Electronically signed by Destiney Gonzales RN at 07/12/21 2155     Nydia Salmon, LAUREN at 07/12/21 2233        Tele monitor on and working      Nydia Salmon RN  07/12/21 2233      Electronically signed by Nydia Salmon RN at 07/12/21 2233          Physician Progress Notes (last 72 hours) (Notes from 07/16/21 1125 through 07/19/21 1125)      Herman Smiley MD at 07/18/21 1130              [facility] Medicine Services  INPATIENT PROGRESS NOTE    Length of Stay: 3  Date of Admission: 7/12/2021  Primary Care Physician: Landon Starr MD    Subjective   Chief Complaint: Shortness of air  HPI:    Patient admitted for COPD  exacerbation.  Currently on 2 L.  Reports he is making progress and breathing is improving.    Review of Systems   Respiratory: Positive for shortness of breath.    Cardiovascular: Negative for chest pain.        All pertinent negatives and positives are as above. All other systems have been reviewed and are negative unless otherwise stated.     Objective    Temp:  [96.9 °F (36.1 °C)-97.9 °F (36.6 °C)] 96.9 °F (36.1 °C)  Heart Rate:  [] 80  Resp:  [16-20] 18  BP: (127-151)/(65-76) 138/65  Physical Exam  Vitals and nursing note reviewed.   Constitutional:       General: He is not in acute distress.     Appearance: He is well-developed. He is not diaphoretic.   HENT:      Head: Normocephalic and atraumatic.   Cardiovascular:      Rate and Rhythm: Normal rate.   Pulmonary:      Effort: Pulmonary effort is normal. No respiratory distress.      Breath sounds: Wheezing present.   Abdominal:      General: There is no distension.      Palpations: Abdomen is soft.   Musculoskeletal:         General: Normal range of motion.   Skin:     General: Skin is warm and dry.   Neurological:      Mental Status: He is alert.      Cranial Nerves: No cranial nerve deficit.   Psychiatric:         Behavior: Behavior normal.         Thought Content: Thought content normal.         Judgment: Judgment normal.             Results Review:  I have reviewed the labs, radiology results, and diagnostic studies.    Laboratory Data:   Lab Results (last 24 hours)     Procedure Component Value Units Date/Time    Comprehensive Metabolic Panel [[id number]]  (Abnormal) Collected: 07/18/21 0514    Specimen: Blood Updated: 07/18/21 0652     Glucose 84 mg/dL      BUN 18 mg/dL      Creatinine 0.74 mg/dL      Sodium 140 mmol/L      Potassium 3.7 mmol/L      Comment: Slight hemolysis detected by analyzer. Results may be affected.        Chloride 100 mmol/L      CO2 32.0 mmol/L      Calcium 8.9 mg/dL      Total Protein 6.9 g/dL      Albumin 4.10 g/dL       ALT (SGPT) 85 U/L      AST (SGOT) 58 U/L      Alkaline Phosphatase 72 U/L      Total Bilirubin 0.3 mg/dL      eGFR Non African Amer 104 mL/min/1.73      Globulin 2.8 gm/dL      A/G Ratio 1.5 g/dL      BUN/Creatinine Ratio 24.3     Anion Gap 8.0 mmol/L     Narrative:      GFR Normal >60  Chronic Kidney Disease <60  Kidney Failure <15      CBC & Differential [[id number]]  (Abnormal) Collected: 07/18/21 0514    Specimen: Blood Updated: 07/18/21 0644    Narrative:      The following orders were created for panel order CBC & Differential.  Procedure                               Abnormality         Status                     ---------                               -----------         ------                     CBC Auto Differential[[id number]]        Abnormal            Final result                 Please view results for these tests on the individual orders.    CBC Auto Differential [[id number]]  (Abnormal) Collected: 07/18/21 0514    Specimen: Blood Updated: 07/18/21 0644     WBC 13.85 10*3/mm3      RBC 4.82 10*6/mm3      Hemoglobin 14.8 g/dL      Hematocrit 44.0 %      MCV 91.3 fL      MCH 30.7 pg      MCHC 33.6 g/dL      RDW 12.4 %      RDW-SD 41.3 fl      MPV 10.3 fL      Platelets 277 10*3/mm3      Neutrophil % 62.5 %      Lymphocyte % 21.5 %      Monocyte % 11.7 %      Eosinophil % 2.5 %      Basophil % 0.6 %      Immature Grans % 1.2 %      Neutrophils, Absolute 8.66 10*3/mm3      Lymphocytes, Absolute 2.98 10*3/mm3      Monocytes, Absolute 1.62 10*3/mm3      Eosinophils, Absolute 0.35 10*3/mm3      Basophils, Absolute 0.08 10*3/mm3      Immature Grans, Absolute 0.16 10*3/mm3      nRBC 0.0 /100 WBC     Blood Culture - Blood, Arm, Right [[id number]] Collected: 07/12/21 2301    Specimen: Blood from Arm, Right Updated: 07/17/21 2315     Blood Culture No growth at 5 days    Blood Culture - Blood, Arm, Left [[id number]] Collected: 07/12/21 2125    Specimen: Blood from Arm, Left Updated: 07/17/21 2145     Blood Culture No  growth at 5 days          Culture Data:   No results found for: BLOODCX  No results found for: URINECX  No results found for: RESPCX  No results found for: WOUNDCX  No results found for: STOOLCX  No components found for: BODYFLD    Radiology Data:   Imaging Results (Last 24 Hours)     ** No results found for the last 24 hours. **          I have reviewed the patient's current medications.     Assessment/Plan     Active Hospital Problems    Diagnosis    • **Acute exacerbation of chronic obstructive pulmonary disease (COPD) (CMS/HCC)    • Community acquired pneumonia of right middle lobe of lung    • Acute on chronic respiratory failure with hypoxia and hypercapnia (CMS/HCC)      - Treating as described above.       Acute on chronic respiratory failure with hypoxia and hypercapnia (secondary to pneumonia/COPD exacerbation): Continue noninvasive respiratory therapy, bronchodilators, steroids and antimicrobial therapy.    Culture is negative so far  Elevated D-dimer is likely due to acute illness and CT pulmonary angio is negative for pulmonary embolism.  Has finished course of antibiotics  Continue with nebulizer treatments as scheduled.     Mild hyponatremia-is clinically insignificant.     1.8 cm right middle lobe nodule: Oncology also managing is planning on PET scan as an outpatient.     Dependent edema both legs: Lasix has been held, compression stockings will be given     Continue GI and DVT prophylaxis.     CODE STATUS is full code.     Discharge Planning: Likely can discharge home in 1 or 2 days     I confirmed that the patient's Advance Care Plan is present, code status is documented, or surrogate decision maker is listed in the patient's medical record              Herman Smiley MD   07/18/21   11:30 CDT      Electronically signed by Herman Smiley MD at 07/18/21 3111     Herman Smiley MD at 07/17/21 5195              [facility] Medicine Services  INPATIENT  PROGRESS NOTE    Length of Stay: 2  Date of Admission: 7/12/2021  Primary Care Physician: Landon Starr MD    Subjective   Chief Complaint: Shortness of air  HPI:    Admitted for COPD exacerbation.  Currently on supplemental oxygen 2 L.  Still getting hypoxic with minimal exertion.    Review of Systems   Respiratory: Positive for shortness of breath.    Cardiovascular: Negative for chest pain.   Gastrointestinal: Negative for diarrhea.        All pertinent negatives and positives are as above. All other systems have been reviewed and are negative unless otherwise stated.     Objective    Temp:  [96.6 °F (35.9 °C)-99 °F (37.2 °C)] 99 °F (37.2 °C)  Heart Rate:  [] 106  Resp:  [16-18] 16  BP: (129-150)/(65-78) 142/65  Physical Exam  Vitals and nursing note reviewed.   Constitutional:       General: He is not in acute distress.     Appearance: He is well-developed. He is not diaphoretic.   HENT:      Head: Normocephalic and atraumatic.   Cardiovascular:      Rate and Rhythm: Normal rate.   Pulmonary:      Effort: Pulmonary effort is normal. No respiratory distress.      Breath sounds: No wheezing.   Abdominal:      General: There is no distension.      Palpations: Abdomen is soft.   Musculoskeletal:         General: Normal range of motion.   Skin:     General: Skin is warm and dry.   Neurological:      Mental Status: He is alert.      Cranial Nerves: No cranial nerve deficit.   Psychiatric:         Behavior: Behavior normal.         Thought Content: Thought content normal.         Judgment: Judgment normal.             Results Review:  I have reviewed the labs, radiology results, and diagnostic studies.    Laboratory Data:   Lab Results (last 24 hours)     Procedure Component Value Units Date/Time    Comprehensive Metabolic Panel [[id number]]  (Abnormal) Collected: 07/17/21 0527    Specimen: Blood Updated: 07/17/21 0604     Glucose 95 mg/dL      BUN 15 mg/dL      Creatinine 0.70 mg/dL      Sodium 140 mmol/L       Potassium 4.0 mmol/L      Comment: Slight hemolysis detected by analyzer. Results may be affected.        Chloride 102 mmol/L      CO2 30.0 mmol/L      Calcium 8.6 mg/dL      Total Protein 6.2 g/dL      Albumin 3.70 g/dL      ALT (SGPT) 40 U/L      AST (SGOT) 30 U/L      Comment: Slight hemolysis detected by analyzer. Results may be affected.        Alkaline Phosphatase 65 U/L      Total Bilirubin <0.2 mg/dL      eGFR Non African Amer 111 mL/min/1.73      Globulin 2.5 gm/dL      A/G Ratio 1.5 g/dL      BUN/Creatinine Ratio 21.4     Anion Gap 8.0 mmol/L     Narrative:      GFR Normal >60  Chronic Kidney Disease <60  Kidney Failure <15      CBC & Differential [[id number]]  (Abnormal) Collected: 07/17/21 0527    Specimen: Blood Updated: 07/17/21 0538    Narrative:      The following orders were created for panel order CBC & Differential.  Procedure                               Abnormality         Status                     ---------                               -----------         ------                     CBC Auto Differential[[id number]]        Abnormal            Final result                 Please view results for these tests on the individual orders.    CBC Auto Differential [[id number]]  (Abnormal) Collected: 07/17/21 0527    Specimen: Blood Updated: 07/17/21 0538     WBC 12.22 10*3/mm3      RBC 4.23 10*6/mm3      Hemoglobin 13.1 g/dL      Hematocrit 38.5 %      MCV 91.0 fL      MCH 31.0 pg      MCHC 34.0 g/dL      RDW 12.2 %      RDW-SD 40.6 fl      MPV 10.3 fL      Platelets 223 10*3/mm3      Neutrophil % 68.8 %      Lymphocyte % 18.5 %      Monocyte % 9.8 %      Eosinophil % 1.5 %      Basophil % 0.5 %      Immature Grans % 0.9 %      Neutrophils, Absolute 8.41 10*3/mm3      Lymphocytes, Absolute 2.26 10*3/mm3      Monocytes, Absolute 1.20 10*3/mm3      Eosinophils, Absolute 0.18 10*3/mm3      Basophils, Absolute 0.06 10*3/mm3      Immature Grans, Absolute 0.11 10*3/mm3      nRBC 0.0 /100 WBC     Blood Culture  - Blood, Arm, Right [[id number]] Collected: 07/12/21 2301    Specimen: Blood from Arm, Right Updated: 07/16/21 2315     Blood Culture No growth at 4 days    Blood Culture - Blood, Arm, Left [[id number]] Collected: 07/12/21 2125    Specimen: Blood from Arm, Left Updated: 07/16/21 2145     Blood Culture No growth at 4 days          Culture Data:   No results found for: BLOODCX  No results found for: URINECX  No results found for: RESPCX  No results found for: WOUNDCX  No results found for: STOOLCX  No components found for: BODYFLD    Radiology Data:   Imaging Results (Last 24 Hours)     ** No results found for the last 24 hours. **          I have reviewed the patient's current medications.     Assessment/Plan     Active Hospital Problems    Diagnosis    • **Acute exacerbation of chronic obstructive pulmonary disease (COPD) (CMS/HCC)    • Community acquired pneumonia of right middle lobe of lung    • Acute on chronic respiratory failure with hypoxia and hypercapnia (CMS/HCC)      - Treating as described above.       Acute on chronic respiratory failure with hypoxia and hypercapnia (secondary to pneumonia/COPD exacerbation): Continue noninvasive respiratory therapy, bronchodilators, steroids and antimicrobial therapy.    Culture is negative so far  Elevated D-dimer is likely due to acute illness and CT pulmonary angio is negative for pulmonary embolism.  Continue with IV antibiotics  Continue with nebulizer treatments as scheduled.     Mild hyponatremia-is clinically insignificant.     1.8 cm right middle lobe nodule: Oncology also managing is planning on PET scan as an outpatient.     Dependent edema both legs: Lasix has been held, compression stockings will be given    Continue GI and DVT prophylaxis.     CODE STATUS is full code.     Discharge Planning: In progress.     I confirmed that the patient's Advance Care Plan is present, code status is documented, or surrogate decision maker is listed in the patient's medical  record.       Herman Smiley MD   07/17/21   14:50 CDT      Electronically signed by Herman Smiley MD at 07/17/21 1452     Herman Smiley MD at 07/16/21 1314              [facility] Medicine Services  INPATIENT PROGRESS NOTE    Length of Stay: 1  Date of Admission: 7/12/2021  Primary Care Physician: Landon Starr MD    Subjective   Chief Complaint: Shortness of air  HPI:    Patient admitted for COPD exacerbation.  Currently on supplemental oxygen 2 L which is baseline for him but he is desaturating whenever he is getting up.    Review of Systems   Respiratory: Positive for cough and shortness of breath.         All pertinent negatives and positives are as above. All other systems have been reviewed and are negative unless otherwise stated.     Objective    Temp:  [96.7 °F (35.9 °C)-97.6 °F (36.4 °C)] 97.4 °F (36.3 °C)  Heart Rate:  [70-96] 92  Resp:  [18-20] 18  BP: (123-144)/(60-71) 130/60  Physical Exam  Vitals and nursing note reviewed.   Constitutional:       General: He is not in acute distress.     Appearance: He is well-developed. He is not diaphoretic.   HENT:      Head: Normocephalic and atraumatic.   Cardiovascular:      Rate and Rhythm: Normal rate.   Pulmonary:      Effort: Pulmonary effort is normal. No respiratory distress.      Breath sounds: No wheezing.   Abdominal:      General: There is no distension.      Palpations: Abdomen is soft.   Musculoskeletal:         General: Normal range of motion.   Skin:     General: Skin is warm and dry.   Neurological:      Mental Status: He is alert.      Cranial Nerves: No cranial nerve deficit.   Psychiatric:         Behavior: Behavior normal.         Thought Content: Thought content normal.         Judgment: Judgment normal.             Results Review:  I have reviewed the labs, radiology results, and diagnostic studies.    Laboratory Data:   Lab Results (last 24 hours)     Procedure Component Value Units  Date/Time    Basic Metabolic Panel [[id number]]  (Abnormal) Collected: 07/16/21 0522    Specimen: Blood Updated: 07/16/21 0628     Glucose 115 mg/dL      BUN 16 mg/dL      Creatinine 0.60 mg/dL      Sodium 140 mmol/L      Potassium 3.9 mmol/L      Comment: Slight hemolysis detected by analyzer. Results may be affected.        Chloride 102 mmol/L      CO2 31.0 mmol/L      Calcium 8.7 mg/dL      eGFR Non African Amer 133 mL/min/1.73      BUN/Creatinine Ratio 26.7     Anion Gap 7.0 mmol/L     Narrative:      GFR Normal >60  Chronic Kidney Disease <60  Kidney Failure <15      CBC & Differential [[id number]]  (Abnormal) Collected: 07/16/21 0522    Specimen: Blood Updated: 07/16/21 0607    Narrative:      The following orders were created for panel order CBC & Differential.  Procedure                               Abnormality         Status                     ---------                               -----------         ------                     CBC Auto Differential[[id number]]        Abnormal            Final result                 Please view results for these tests on the individual orders.    CBC Auto Differential [[id number]]  (Abnormal) Collected: 07/16/21 0522    Specimen: Blood Updated: 07/16/21 0607     WBC 12.02 10*3/mm3      RBC 4.14 10*6/mm3      Hemoglobin 12.7 g/dL      Hematocrit 38.7 %      MCV 93.5 fL      MCH 30.7 pg      MCHC 32.8 g/dL      RDW 12.3 %      RDW-SD 42.6 fl      MPV 10.6 fL      Platelets 236 10*3/mm3      Neutrophil % 66.6 %      Lymphocyte % 18.7 %      Monocyte % 11.6 %      Eosinophil % 1.9 %      Basophil % 0.5 %      Immature Grans % 0.7 %      Neutrophils, Absolute 8.00 10*3/mm3      Lymphocytes, Absolute 2.25 10*3/mm3      Monocytes, Absolute 1.40 10*3/mm3      Eosinophils, Absolute 0.23 10*3/mm3      Basophils, Absolute 0.06 10*3/mm3      Immature Grans, Absolute 0.08 10*3/mm3      nRBC 0.0 /100 WBC     Potassium [[id number]]  (Normal) Collected: 07/16/21 0135    Specimen: Blood  Updated: 07/16/21 0201     Potassium 4.3 mmol/L     Blood Culture - Blood, Arm, Right [[id number]] Collected: 07/12/21 2301    Specimen: Blood from Arm, Right Updated: 07/15/21 2315     Blood Culture No growth at 3 days    Blood Culture - Blood, Arm, Left [[id number]] Collected: 07/12/21 2125    Specimen: Blood from Arm, Left Updated: 07/15/21 2145     Blood Culture No growth at 3 days          Culture Data:   No results found for: BLOODCX  No results found for: URINECX  No results found for: RESPCX  No results found for: WOUNDCX  No results found for: STOOLCX  No components found for: BODYFLD    Radiology Data:   Imaging Results (Last 24 Hours)     ** No results found for the last 24 hours. **          I have reviewed the patient's current medications.     Assessment/Plan     Active Hospital Problems    Diagnosis    • **Acute exacerbation of chronic obstructive pulmonary disease (COPD) (CMS/HCC)    • Community acquired pneumonia of right middle lobe of lung    • Acute on chronic respiratory failure with hypoxia and hypercapnia (CMS/HCC)      - Treating as described above.         Acute on chronic respiratory failure with hypoxia and hypercapnia (secondary to pneumonia/COPD exacerbation): Continue noninvasive respiratory therapy, bronchodilators, steroids and antimicrobial therapy.    Culture is negative so far  Elevated D-dimer is likely due to acute illness and CT pulmonary angio is negative for pulmonary embolism.  Continue with IV antibiotics  Continue with nebulizer treatments as scheduled.     Mild hyponatremia-is clinically insignificant.     1.8 cm right middle lobe nodule: Oncology also managing is planning on PET scan as an outpatient.     Dependent edema both legs: Patient will be treated with low-dose Lasix for few days.     Continue GI and DVT prophylaxis.     CODE STATUS is full code.     Discharge Planning: In progress.     I confirmed that the patient's Advance Care Plan is present, code status is  documented, or surrogate decision maker is listed in the patient's medical record.             Herman Smiley MD   07/16/21   13:14 CDT      Electronically signed by Herman Smiley MD at 07/16/21 1315       Consult Notes (last 72 hours) (Notes from 07/16/21 1125 through 07/19/21 1125)    No notes of this type exist for this encounter.

## 2021-07-19 NOTE — DISCHARGE SUMMARY
[facility] Medicine Services  DISCHARGE SUMMARY       Date of Admission: 7/12/2021  Date of Discharge:  7/19/2021  Primary Care Physician: Landon Starr MD    Presenting Problem/History of Present Illness:  Acute exacerbation of chronic obstructive pulmonary disease (COPD) (CMS/HCC) [J44.1]  Pulmonary nodule [R91.1]  Pneumonia of right lung due to infectious organism, unspecified part of lung [J18.9]       Final Discharge Diagnoses:  Active Hospital Problems    Diagnosis    • **Acute exacerbation of chronic obstructive pulmonary disease (COPD) (CMS/HCC)    • Community acquired pneumonia of right middle lobe of lung    • Acute on chronic respiratory failure with hypoxia and hypercapnia (CMS/[facility])      - Treating as described above.         Consults:   Consults     Date and Time Order Name Status Description    7/13/2021 11:34 AM Hematology & Oncology Inpatient Consult Completed           Procedures Performed:                 Pertinent Test Results:   Imaging Results (All)     Procedure Component Value Units Date/Time    CT Angiogram Chest [[id number]] Collected: 07/12/21 2004     Updated: 07/12/21 2050    Narrative:      CT CHEST ANGIOGRAPHY WITH IV CONTRAST    INDICATION: 70 years Male; d dimer (+)/soa, J44.1 Chronic  obstructive pulmonary disease with (acute) exacerbation    TECHNIQUE:  CT angiogram of the chest was performed with IV  contrast.  3-D/MIP reconstructions were performed.  This exam was  performed according to our departmental dose-optimization  program, which includes automated exposure control, adjustment of  the mA and/or kV according to patient size and/or use of  iterative reconstruction technique.     COMPARISON: 2/6/2018.    FINDINGS:  Thyroid: Unremarkable.   Heart/Mediastinum: The heart is normal in size. Scattered  coronary artery calcifications.   Vasculature: No evidence of pulmonary emboli. Mild  atherosclerosis of the aorta. No aortic aneurysm or  dissection.  Lungs/Pleura: Severe centrilobular emphysema. Spiculated nodule  measuring 1.8 x 1.0 x 0.9 cm in the central posterior aspect of  the right middle lobe closely abutting the minor fissure,  pulmonary vessels and the lateral subsegmental branch of the  right middle lobe bronchus is new from previous study. Reticular  interstitial opacity in the remainder of the lateral right middle  lobe and also scattered in the superior segment of the right  lower lobe could represent postobstructive pneumonia/edema.  Stable 3 mm calcified nodule in the left lower lobe is likely  benign. No pleural effusion or pneumothorax.   Lymph nodes: No pathologically enlarged lymph nodes.  Bones: Unremarkable.  Soft tissues: Unremarkable.  Incidental findings: Small nonenhancing subcentimeter cyst in the  right hepatic lobe is probably benign.       Impression:      1.  No evidence of pulmonary embolism.  2.  Spiculated nodule measuring 1.8 cm in the central posterior  aspect of the right middle lobe closely abutting the minor  fissure, pulmonary vessels and the lateral subsegmental branch of  the right middle lobe bronchus is new from previous study.  Recommend PET/CT or tissue sampling (bronchoscopy).  3.  Reticular interstitial opacity in the remainder of the  lateral right middle lobe and also scattered in the superior  segment of the right lower lobe could represent postobstructive  pneumonia/edema.    Electronically signed by:  Lizz Osman  7/12/2021 8:49 PM CDT  Workstation: [id number]    XR Chest 1 View [[id number]] Collected: 07/12/21 1712     Updated: 07/12/21 1743    Narrative:      Exam: AP portable chest    INDICATION: Shortness of breath    COMPARISON: 11/6/2018    FINDINGS: AP portable chest. The bony structures are intact. The  cardiomegaly is silhouette is unremarkable. Plaque is present in  the aorta. Lungs are hyperinflated with parenchymal destruction  right greater than left compatible with emphysema. There is  mild  linear atelectasis in the right lung. No pneumothorax or pleural  effusion.      Impression:      1. Mild linear atelectasis in the right. Recommend follow-up.  2. Pulmonary emphysema    Electronically signed by:  Nicolas Saldivar MD  7/12/2021 5:42 PM  CDT Workstation: [id number]         Lab Results (last 48 hours)     Procedure Component Value Units Date/Time    Comprehensive Metabolic Panel [[id number]]  (Abnormal) Collected: 07/19/21 0654    Specimen: Blood Updated: 07/19/21 0745     Glucose 106 mg/dL      BUN 19 mg/dL      Creatinine 0.74 mg/dL      Sodium 131 mmol/L      Potassium 4.0 mmol/L      Comment: Slight hemolysis detected by analyzer. Results may be affected.        Chloride 93 mmol/L      CO2 28.0 mmol/L      Calcium 8.9 mg/dL      Total Protein 6.7 g/dL      Albumin 3.90 g/dL      ALT (SGPT) 106 U/L      AST (SGOT) 54 U/L      Comment: Slight hemolysis detected by analyzer. Results may be affected.        Alkaline Phosphatase 83 U/L      Total Bilirubin <0.2 mg/dL      eGFR Non African Amer 104 mL/min/1.73      Globulin 2.8 gm/dL      A/G Ratio 1.4 g/dL      BUN/Creatinine Ratio 25.7     Anion Gap 10.0 mmol/L     Narrative:      GFR Normal >60  Chronic Kidney Disease <60  Kidney Failure <15      CBC & Differential [[id number]]  (Abnormal) Collected: 07/19/21 0654    Specimen: Blood Updated: 07/19/21 0721    Narrative:      The following orders were created for panel order CBC & Differential.  Procedure                               Abnormality         Status                     ---------                               -----------         ------                     CBC Auto Differential[[id number]]        Abnormal            Final result                 Please view results for these tests on the individual orders.    CBC Auto Differential [[id number]]  (Abnormal) Collected: 07/19/21 0654    Specimen: Blood Updated: 07/19/21 0721     WBC 13.34 10*3/mm3      RBC 4.75 10*6/mm3      Hemoglobin 14.6 g/dL       Hematocrit 44.2 %      MCV 93.1 fL      MCH 30.7 pg      MCHC 33.0 g/dL      RDW 12.5 %      RDW-SD 42.9 fl      MPV 10.1 fL      Platelets 241 10*3/mm3      Neutrophil % 59.1 %      Lymphocyte % 21.7 %      Monocyte % 14.2 %      Eosinophil % 3.3 %      Basophil % 0.7 %      Immature Grans % 1.0 %      Neutrophils, Absolute 7.89 10*3/mm3      Lymphocytes, Absolute 2.89 10*3/mm3      Monocytes, Absolute 1.90 10*3/mm3      Eosinophils, Absolute 0.44 10*3/mm3      Basophils, Absolute 0.09 10*3/mm3      Immature Grans, Absolute 0.13 10*3/mm3      nRBC 0.0 /100 WBC     Comprehensive Metabolic Panel [[id number]]  (Abnormal) Collected: 07/18/21 0514    Specimen: Blood Updated: 07/18/21 0652     Glucose 84 mg/dL      BUN 18 mg/dL      Creatinine 0.74 mg/dL      Sodium 140 mmol/L      Potassium 3.7 mmol/L      Comment: Slight hemolysis detected by analyzer. Results may be affected.        Chloride 100 mmol/L      CO2 32.0 mmol/L      Calcium 8.9 mg/dL      Total Protein 6.9 g/dL      Albumin 4.10 g/dL      ALT (SGPT) 85 U/L      AST (SGOT) 58 U/L      Alkaline Phosphatase 72 U/L      Total Bilirubin 0.3 mg/dL      eGFR Non African Amer 104 mL/min/1.73      Globulin 2.8 gm/dL      A/G Ratio 1.5 g/dL      BUN/Creatinine Ratio 24.3     Anion Gap 8.0 mmol/L     Narrative:      GFR Normal >60  Chronic Kidney Disease <60  Kidney Failure <15      CBC & Differential [[id number]]  (Abnormal) Collected: 07/18/21 0514    Specimen: Blood Updated: 07/18/21 0644    Narrative:      The following orders were created for panel order CBC & Differential.  Procedure                               Abnormality         Status                     ---------                               -----------         ------                     CBC Auto Differential[[id number]]        Abnormal            Final result                 Please view results for these tests on the individual orders.    CBC Auto Differential [[id number]]  (Abnormal) Collected: 07/18/21  "0514    Specimen: Blood Updated: 07/18/21 0644     WBC 13.85 10*3/mm3      RBC 4.82 10*6/mm3      Hemoglobin 14.8 g/dL      Hematocrit 44.0 %      MCV 91.3 fL      MCH 30.7 pg      MCHC 33.6 g/dL      RDW 12.4 %      RDW-SD 41.3 fl      MPV 10.3 fL      Platelets 277 10*3/mm3      Neutrophil % 62.5 %      Lymphocyte % 21.5 %      Monocyte % 11.7 %      Eosinophil % 2.5 %      Basophil % 0.6 %      Immature Grans % 1.2 %      Neutrophils, Absolute 8.66 10*3/mm3      Lymphocytes, Absolute 2.98 10*3/mm3      Monocytes, Absolute 1.62 10*3/mm3      Eosinophils, Absolute 0.35 10*3/mm3      Basophils, Absolute 0.08 10*3/mm3      Immature Grans, Absolute 0.16 10*3/mm3      nRBC 0.0 /100 WBC     Blood Culture - Blood, Arm, Right [[id number]] Collected: 07/12/21 2301    Specimen: Blood from Arm, Right Updated: 07/17/21 2315     Blood Culture No growth at 5 days    Blood Culture - Blood, Arm, Left [[id number]] Collected: 07/12/21 2125    Specimen: Blood from Arm, Left Updated: 07/17/21 2145     Blood Culture No growth at 5 days            Chief Complaint on Day of Discharge: shortness of breath     Hospital Course:  70-year-old male with past medical history significant for hypertension, prior pulmonary embolism and COPD presents to the emergency department with shortness of breath. He completed a course of antibiotics and steroids. He takes 5mg of prednisone every other day at home. He will be discharged home on nebulizer machine as those treatments help him a lot. He is allergic to albuterol. He has a nodule in the right middle lobe that is concerning for malignancy. He has an appointment for PET and follow up with hem/onc.     Condition on Discharge:  Stable     Physical Exam on Discharge:  /76 (BP Location: Left arm, Patient Position: Lying)   Pulse 96   Temp 97.8 °F (36.6 °C) (Temporal)   Resp 18   Ht 165.1 cm (65\")   Wt 74.6 kg (164 lb 6.4 oz)   SpO2 94%   BMI 27.36 kg/m²   Physical Exam  Vitals reviewed. "   Constitutional:       General: He is not in acute distress.     Appearance: He is well-developed.   HENT:      Head: Normocephalic and atraumatic.      Nose: Nose normal.   Eyes:      Conjunctiva/sclera: Conjunctivae normal.   Cardiovascular:      Rate and Rhythm: Normal rate and regular rhythm.   Pulmonary:      Effort: Pulmonary effort is normal. No respiratory distress.      Breath sounds: Normal breath sounds. No wheezing or rales.   Musculoskeletal:         General: Normal range of motion.      Cervical back: Normal range of motion and neck supple.   Skin:     General: Skin is warm and dry.   Neurological:      Mental Status: He is alert and oriented to person, place, and time.   Psychiatric:         Behavior: Behavior normal.           Discharge Disposition:  Home or Self Care    Discharge Medications:     Discharge Medications      New Medications      Instructions Start Date   ipratropium 0.02 % nebulizer solution  Commonly known as: ATROVENT   0.5 mg, Nebulization, 3 Times Daily - RT, Sub amount for how much insurance allows         Continue These Medications      Instructions Start Date   aspirin 81 MG chewable tablet   81 mg, Oral, Daily      ipratropium 17 MCG/ACT inhaler  Commonly known as: ATROVENT HFA   2 puffs, Inhalation, 4 Times Daily PRN      montelukast 10 MG tablet  Commonly known as: SINGULAIR   10 mg, Oral, Nightly      predniSONE 5 MG tablet  Commonly known as: DELTASONE   5 mg, Oral, Every Other Day      Stiolto Respimat 2.5-2.5 MCG/ACT aerosol solution inhaler  Generic drug: tiotropium bromide-olodaterol   2 puffs, Inhalation, Daily - RT             Discharge Diet:   Diet Instructions     Advance Diet As Tolerated            Activity at Discharge:   Activity Instructions     Activity as Tolerated            Follow-up Appointments:   Future Appointments   Date Time Provider Department Center   7/30/2021  9:30 AM Jaycee Rose MD [facility] CD POW None   7/30/2021 10:30 AM [facility] PET 1 [facility]  PET [facility]   8/2/2021  1:00 PM Omega Davis MD [facility] ONC [facility]AD [facility]AD   8/2/2021  1:45 PM NURSE DIRK CHENEY OPI MAD       Test Results Pending at Discharge:     Time: >30 minutes        This document has been electronically signed by Anita Toney MD on July 19, 2021 10:57 CDT

## 2021-07-19 NOTE — SIGNIFICANT NOTE
"   07/19/21 1230   OTHER   Discipline physical therapy assistant   Rehab Time/Intention   Session Not Performed patient/family declined treatment  (refused rx all day \"I'm not doing it today\")     "

## 2021-07-19 NOTE — PAYOR COMM NOTE
"Paola Tsai  Case Management Extender  [facility]  [phone] phone  [phone] fax    Auth# [id number]    Debbie Velasco (70 y.o. Male)     Date of Birth Social Security Number Address Home Phone MRN    1950  [id number] [facility] [address] [phone] [id number]    Jew Marital Status          Confucianist        Admission Date Admission Type Admitting Provider Attending Provider Department, Room/Bed    7/12/21 Emergency Alvaro Heredia MD Kirchner, Quinn J, MD [facility] [address] [place], 323/1    Discharge Date Discharge Disposition Discharge Destination         Home or Self Care              Attending Provider: Alvaro Heredia MD    Allergies: Amoxicillin, Albuterol, Lortab [Hydrocodone-acetaminophen], Morphine And Related, Prilosec [Omeprazole], Sulfa Antibiotics, Symbicort [Budesonide-formoterol Fumarate]    Isolation: None   Infection: None   Code Status: CPR    Ht: 165.1 cm (65\")   Wt: 74.6 kg (164 lb 6.4 oz)    Admission Cmt: None   Principal Problem: Acute exacerbation of chronic obstructive pulmonary disease (COPD) (CMS/[facility]) [J44.1]                 Active Insurance as of 7/12/2021     Primary Coverage     Payor Plan Insurance Group Employer/Plan Group    [facility] DEPT [id number]     Payor Plan Address Payor Plan Phone Number Payor Plan Fax Number Effective Dates    [facility] OFFICE OF COMMUNITY CARE [phone]  2/24/2000 - None Entered    [address] [address]       [facility] [address]       Subscriber Name Subscriber Birth Date Member ID       DEBBIE VELASCO 1950 [id number]           Secondary Coverage     Payor Plan Insurance Group Employer/Plan Group    HUMANA MEDICARE REPLACEMENT HUMANA MEDICARE REPLACEMENT [id number]     Payor Plan Address Payor Plan Phone Number Payor Plan Fax Number Effective Dates    [address] [address] [phone]  10/1/2019 - None Entered    [facility] [id number]       Subscriber Name " Subscriber Birth Date Member ID       DEBBIE VELASCO 1950 [id number]                 Emergency Contacts      (Rel.) Home Phone Work Phone Mobile Phone    Annette Harvey (Significant Other) [phone] -- [phone]               Discharge Summary      Anita Toney MD at 07/19/21 1057              [facility] Medicine Services  DISCHARGE SUMMARY       Date of Admission: 7/12/2021  Date of Discharge:  7/19/2021  Primary Care Physician: Landon Starr MD    Presenting Problem/History of Present Illness:  Acute exacerbation of chronic obstructive pulmonary disease (COPD) (CMS/HCC) [J44.1]  Pulmonary nodule [R91.1]  Pneumonia of right lung due to infectious organism, unspecified part of lung [J18.9]       Final Discharge Diagnoses:  Active Hospital Problems    Diagnosis    • **Acute exacerbation of chronic obstructive pulmonary disease (COPD) (CMS/HCC)    • Community acquired pneumonia of right middle lobe of lung    • Acute on chronic respiratory failure with hypoxia and hypercapnia (CMS/[facility])      - Treating as described above.         Consults:   Consults     Date and Time Order Name Status Description    7/13/2021 11:34 AM Hematology & Oncology Inpatient Consult Completed           Procedures Performed:                 Pertinent Test Results:   Imaging Results (All)     Procedure Component Value Units Date/Time    CT Angiogram Chest [[id number]] Collected: 07/12/21 2004     Updated: 07/12/21 2050    Narrative:      CT CHEST ANGIOGRAPHY WITH IV CONTRAST    INDICATION: 70 years Male; d dimer (+)/soa, J44.1 Chronic  obstructive pulmonary disease with (acute) exacerbation    TECHNIQUE:  CT angiogram of the chest was performed with IV  contrast.  3-D/MIP reconstructions were performed.  This exam was  performed according to our departmental dose-optimization  program, which includes automated exposure control, adjustment of  the mA and/or kV  according to patient size and/or use of  iterative reconstruction technique.     COMPARISON: 2/6/2018.    FINDINGS:  Thyroid: Unremarkable.   Heart/Mediastinum: The heart is normal in size. Scattered  coronary artery calcifications.   Vasculature: No evidence of pulmonary emboli. Mild  atherosclerosis of the aorta. No aortic aneurysm or dissection.  Lungs/Pleura: Severe centrilobular emphysema. Spiculated nodule  measuring 1.8 x 1.0 x 0.9 cm in the central posterior aspect of  the right middle lobe closely abutting the minor fissure,  pulmonary vessels and the lateral subsegmental branch of the  right middle lobe bronchus is new from previous study. Reticular  interstitial opacity in the remainder of the lateral right middle  lobe and also scattered in the superior segment of the right  lower lobe could represent postobstructive pneumonia/edema.  Stable 3 mm calcified nodule in the left lower lobe is likely  benign. No pleural effusion or pneumothorax.   Lymph nodes: No pathologically enlarged lymph nodes.  Bones: Unremarkable.  Soft tissues: Unremarkable.  Incidental findings: Small nonenhancing subcentimeter cyst in the  right hepatic lobe is probably benign.       Impression:      1.  No evidence of pulmonary embolism.  2.  Spiculated nodule measuring 1.8 cm in the central posterior  aspect of the right middle lobe closely abutting the minor  fissure, pulmonary vessels and the lateral subsegmental branch of  the right middle lobe bronchus is new from previous study.  Recommend PET/CT or tissue sampling (bronchoscopy).  3.  Reticular interstitial opacity in the remainder of the  lateral right middle lobe and also scattered in the superior  segment of the right lower lobe could represent postobstructive  pneumonia/edema.    Electronically signed by:  Lizz Osman  7/12/2021 8:49 PM CDT  Workstation: [id number]    XR Chest 1 View [[id number]] Collected: 07/12/21 1712     Updated: 07/12/21 9788    Narrative:      Exam:  AP portable chest    INDICATION: Shortness of breath    COMPARISON: 11/6/2018    FINDINGS: AP portable chest. The bony structures are intact. The  cardiomegaly is silhouette is unremarkable. Plaque is present in  the aorta. Lungs are hyperinflated with parenchymal destruction  right greater than left compatible with emphysema. There is mild  linear atelectasis in the right lung. No pneumothorax or pleural  effusion.      Impression:      1. Mild linear atelectasis in the right. Recommend follow-up.  2. Pulmonary emphysema    Electronically signed by:  Nicolas Saldivar MD  7/12/2021 5:42 PM  CDT Workstation: [id number]         Lab Results (last 48 hours)     Procedure Component Value Units Date/Time    Comprehensive Metabolic Panel [[id number]]  (Abnormal) Collected: 07/19/21 0654    Specimen: Blood Updated: 07/19/21 0745     Glucose 106 mg/dL      BUN 19 mg/dL      Creatinine 0.74 mg/dL      Sodium 131 mmol/L      Potassium 4.0 mmol/L      Comment: Slight hemolysis detected by analyzer. Results may be affected.        Chloride 93 mmol/L      CO2 28.0 mmol/L      Calcium 8.9 mg/dL      Total Protein 6.7 g/dL      Albumin 3.90 g/dL      ALT (SGPT) 106 U/L      AST (SGOT) 54 U/L      Comment: Slight hemolysis detected by analyzer. Results may be affected.        Alkaline Phosphatase 83 U/L      Total Bilirubin <0.2 mg/dL      eGFR Non African Amer 104 mL/min/1.73      Globulin 2.8 gm/dL      A/G Ratio 1.4 g/dL      BUN/Creatinine Ratio 25.7     Anion Gap 10.0 mmol/L     Narrative:      GFR Normal >60  Chronic Kidney Disease <60  Kidney Failure <15      CBC & Differential [[id number]]  (Abnormal) Collected: 07/19/21 0654    Specimen: Blood Updated: 07/19/21 0721    Narrative:      The following orders were created for panel order CBC & Differential.  Procedure                               Abnormality         Status                     ---------                               -----------         ------                     CBC  Auto Differential[[id number]]        Abnormal            Final result                 Please view results for these tests on the individual orders.    CBC Auto Differential [[id number]]  (Abnormal) Collected: 07/19/21 0654    Specimen: Blood Updated: 07/19/21 0721     WBC 13.34 10*3/mm3      RBC 4.75 10*6/mm3      Hemoglobin 14.6 g/dL      Hematocrit 44.2 %      MCV 93.1 fL      MCH 30.7 pg      MCHC 33.0 g/dL      RDW 12.5 %      RDW-SD 42.9 fl      MPV 10.1 fL      Platelets 241 10*3/mm3      Neutrophil % 59.1 %      Lymphocyte % 21.7 %      Monocyte % 14.2 %      Eosinophil % 3.3 %      Basophil % 0.7 %      Immature Grans % 1.0 %      Neutrophils, Absolute 7.89 10*3/mm3      Lymphocytes, Absolute 2.89 10*3/mm3      Monocytes, Absolute 1.90 10*3/mm3      Eosinophils, Absolute 0.44 10*3/mm3      Basophils, Absolute 0.09 10*3/mm3      Immature Grans, Absolute 0.13 10*3/mm3      nRBC 0.0 /100 WBC     Comprehensive Metabolic Panel [[id number]]  (Abnormal) Collected: 07/18/21 0514    Specimen: Blood Updated: 07/18/21 0652     Glucose 84 mg/dL      BUN 18 mg/dL      Creatinine 0.74 mg/dL      Sodium 140 mmol/L      Potassium 3.7 mmol/L      Comment: Slight hemolysis detected by analyzer. Results may be affected.        Chloride 100 mmol/L      CO2 32.0 mmol/L      Calcium 8.9 mg/dL      Total Protein 6.9 g/dL      Albumin 4.10 g/dL      ALT (SGPT) 85 U/L      AST (SGOT) 58 U/L      Alkaline Phosphatase 72 U/L      Total Bilirubin 0.3 mg/dL      eGFR Non African Amer 104 mL/min/1.73      Globulin 2.8 gm/dL      A/G Ratio 1.5 g/dL      BUN/Creatinine Ratio 24.3     Anion Gap 8.0 mmol/L     Narrative:      GFR Normal >60  Chronic Kidney Disease <60  Kidney Failure <15      CBC & Differential [[id number]]  (Abnormal) Collected: 07/18/21 0514    Specimen: Blood Updated: 07/18/21 0644    Narrative:      The following orders were created for panel order CBC & Differential.  Procedure                               Abnormality          Status                     ---------                               -----------         ------                     CBC Auto Differential[[id number]]        Abnormal            Final result                 Please view results for these tests on the individual orders.    CBC Auto Differential [[id number]]  (Abnormal) Collected: 07/18/21 0514    Specimen: Blood Updated: 07/18/21 0644     WBC 13.85 10*3/mm3      RBC 4.82 10*6/mm3      Hemoglobin 14.8 g/dL      Hematocrit 44.0 %      MCV 91.3 fL      MCH 30.7 pg      MCHC 33.6 g/dL      RDW 12.4 %      RDW-SD 41.3 fl      MPV 10.3 fL      Platelets 277 10*3/mm3      Neutrophil % 62.5 %      Lymphocyte % 21.5 %      Monocyte % 11.7 %      Eosinophil % 2.5 %      Basophil % 0.6 %      Immature Grans % 1.2 %      Neutrophils, Absolute 8.66 10*3/mm3      Lymphocytes, Absolute 2.98 10*3/mm3      Monocytes, Absolute 1.62 10*3/mm3      Eosinophils, Absolute 0.35 10*3/mm3      Basophils, Absolute 0.08 10*3/mm3      Immature Grans, Absolute 0.16 10*3/mm3      nRBC 0.0 /100 WBC     Blood Culture - Blood, Arm, Right [[id number]] Collected: 07/12/21 2301    Specimen: Blood from Arm, Right Updated: 07/17/21 2315     Blood Culture No growth at 5 days    Blood Culture - Blood, Arm, Left [[id number]] Collected: 07/12/21 2125    Specimen: Blood from Arm, Left Updated: 07/17/21 2145     Blood Culture No growth at 5 days            Chief Complaint on Day of Discharge: shortness of breath     Hospital Course:  70-year-old male with past medical history significant for hypertension, prior pulmonary embolism and COPD presents to the emergency department with shortness of breath. He completed a course of antibiotics and steroids. He takes 5mg of prednisone every other day at home. He will be discharged home on nebulizer machine as those treatments help him a lot. He is allergic to albuterol. He has a nodule in the right middle lobe that is concerning for malignancy. He has an appointment for PET  "and follow up with hem/onc.     Condition on Discharge:  Stable     Physical Exam on Discharge:  /76 (BP Location: Left arm, Patient Position: Lying)   Pulse 96   Temp 97.8 °F (36.6 °C) (Temporal)   Resp 18   Ht 165.1 cm (65\")   Wt 74.6 kg (164 lb 6.4 oz)   SpO2 94%   BMI 27.36 kg/m²   Physical Exam  Vitals reviewed.   Constitutional:       General: He is not in acute distress.     Appearance: He is well-developed.   HENT:      Head: Normocephalic and atraumatic.      Nose: Nose normal.   Eyes:      Conjunctiva/sclera: Conjunctivae normal.   Cardiovascular:      Rate and Rhythm: Normal rate and regular rhythm.   Pulmonary:      Effort: Pulmonary effort is normal. No respiratory distress.      Breath sounds: Normal breath sounds. No wheezing or rales.   Musculoskeletal:         General: Normal range of motion.      Cervical back: Normal range of motion and neck supple.   Skin:     General: Skin is warm and dry.   Neurological:      Mental Status: He is alert and oriented to person, place, and time.   Psychiatric:         Behavior: Behavior normal.           Discharge Disposition:  Home or Self Care    Discharge Medications:     Discharge Medications      New Medications      Instructions Start Date   ipratropium 0.02 % nebulizer solution  Commonly known as: ATROVENT   0.5 mg, Nebulization, 3 Times Daily - RT, Sub amount for how much insurance allows         Continue These Medications      Instructions Start Date   aspirin 81 MG chewable tablet   81 mg, Oral, Daily      ipratropium 17 MCG/ACT inhaler  Commonly known as: ATROVENT HFA   2 puffs, Inhalation, 4 Times Daily PRN      montelukast 10 MG tablet  Commonly known as: SINGULAIR   10 mg, Oral, Nightly      predniSONE 5 MG tablet  Commonly known as: DELTASONE   5 mg, Oral, Every Other Day      Stiolto Respimat 2.5-2.5 MCG/ACT aerosol solution inhaler  Generic drug: tiotropium bromide-olodaterol   2 puffs, Inhalation, Daily - RT             Discharge " Diet:   Diet Instructions     Advance Diet As Tolerated            Activity at Discharge:   Activity Instructions     Activity as Tolerated            Follow-up Appointments:   Future Appointments   Date Time Provider Department Center   7/30/2021  9:30 AM Jaycee Rose MD [facility] CD POW None   7/30/2021 10:30 AM MAD PET 1 [facility] PET [facility]   8/2/2021  1:00 PM Omega Davis MD [facility] ONC [facility]   8/2/2021  1:45 PM NURSE [facility] OPI MAD       Test Results Pending at Discharge:     Time: >30 minutes        This document has been electronically signed by Anita Toney MD on July 19, 2021 10:57 CDT                    Electronically signed by Anita Toney MD at 07/19/21 1103

## 2021-07-20 ENCOUNTER — READMISSION MANAGEMENT (OUTPATIENT)
Dept: CALL CENTER | Facility: HOSPITAL | Age: 71
End: 2021-07-20

## 2021-07-20 NOTE — OUTREACH NOTE
Prep Survey      Responses   [facility] facility patient discharged from?  [facility]   Is LACE score < 7 ?  No   Emergency Room discharge w/ pulse ox?  No   Eligibility  Readm Mgmt   Discharge diagnosis  Acute exacerbation of chronic obstructive pulmonary disease   Does the patient have one of the following disease processes/diagnoses(primary or secondary)?  COPD/Pneumonia   Does the patient have Home health ordered?  No   Is there a DME ordered?  Yes   What DME was ordered[facility]   Prep survey completed?  Yes          Sima Mahoney RN

## 2021-07-23 ENCOUNTER — READMISSION MANAGEMENT (OUTPATIENT)
Dept: CALL CENTER | Facility: HOSPITAL | Age: 71
End: 2021-07-23

## 2021-07-23 NOTE — OUTREACH NOTE
COPD/PN Week 1 Survey      Responses   [facility] patient discharged from?  [facility]   Does the patient have one of the following disease processes/diagnoses(primary or secondary)?  COPD/Pneumonia   Was the primary reason for admission:  COPD exacerbation   Week 1 attempt successful?  No   Unsuccessful attempts  Attempt 1          Halina Caldwell RN

## 2021-07-28 ENCOUNTER — TELEPHONE (OUTPATIENT)
Dept: ONCOLOGY | Facility: CLINIC | Age: 71
End: 2021-07-28

## 2021-07-28 ENCOUNTER — READMISSION MANAGEMENT (OUTPATIENT)
Dept: CALL CENTER | Facility: HOSPITAL | Age: 71
End: 2021-07-28

## 2021-07-28 NOTE — OUTREACH NOTE
COPD/PN Week 1 Survey      Responses   [facility] patient discharged from?  [place]   Does the patient have one of the following disease processes/diagnoses(primary or secondary)?  COPD/Pneumonia   Was the primary reason for admission:  COPD exacerbation   Week 1 attempt successful?  No   Unsuccessful attempts  Attempt 2          Enrrique Lombardo RN

## 2021-07-30 ENCOUNTER — HOSPITAL ENCOUNTER (OUTPATIENT)
Dept: PET IMAGING | Facility: HOSPITAL | Age: 71
Discharge: HOME | End: 2021-07-30
Admitting: INTERNAL MEDICINE

## 2021-07-30 DIAGNOSIS — IMO0001 LUNG NODULE < 6CM ON CT: ICD-10-CM

## 2021-07-30 PROCEDURE — A9552 F18 FDG: HCPCS | Performed by: INTERNAL MEDICINE

## 2021-07-30 PROCEDURE — 78815 PET IMAGE W/CT SKULL-THIGH: CPT

## 2021-07-30 PROCEDURE — 0 FLUDEOXYGLUCOSE F18 SOLUTION: Performed by: INTERNAL MEDICINE

## 2021-07-30 RX ADMIN — FLUDEOXYGLUCOSE F18 1 DOSE: 300 INJECTION INTRAVENOUS at 10:42

## 2021-08-02 ENCOUNTER — APPOINTMENT (OUTPATIENT)
Dept: ONCOLOGY | Facility: HOSPITAL | Age: 71
End: 2021-08-02

## 2021-08-02 ENCOUNTER — OFFICE VISIT (OUTPATIENT)
Dept: ONCOLOGY | Facility: CLINIC | Age: 71
End: 2021-08-02

## 2021-08-02 VITALS
TEMPERATURE: 98.3 F | WEIGHT: 164 LBS | BODY MASS INDEX: 27.32 KG/M2 | DIASTOLIC BLOOD PRESSURE: 83 MMHG | HEIGHT: 65 IN | RESPIRATION RATE: 18 BRPM | HEART RATE: 113 BPM | SYSTOLIC BLOOD PRESSURE: 168 MMHG

## 2021-08-02 DIAGNOSIS — R91.8 PULMONARY NODULES: Primary | ICD-10-CM

## 2021-08-02 DIAGNOSIS — Z86.711 HISTORY OF PULMONARY EMBOLISM: ICD-10-CM

## 2021-08-02 DIAGNOSIS — D72.828 OTHER ELEVATED WHITE BLOOD CELL (WBC) COUNT: ICD-10-CM

## 2021-08-02 PROBLEM — D72.829 LEUKOCYTOSIS: Status: ACTIVE | Noted: 2021-08-02

## 2021-08-02 PROCEDURE — G0463 HOSPITAL OUTPT CLINIC VISIT: HCPCS | Performed by: INTERNAL MEDICINE

## 2021-08-02 PROCEDURE — 99214 OFFICE O/P EST MOD 30 MIN: CPT | Performed by: INTERNAL MEDICINE

## 2021-08-02 PROCEDURE — 1126F AMNT PAIN NOTED NONE PRSNT: CPT | Performed by: INTERNAL MEDICINE

## 2021-08-02 PROCEDURE — 1123F ACP DISCUSS/DSCN MKR DOCD: CPT | Performed by: INTERNAL MEDICINE

## 2021-08-02 PROCEDURE — G9903 PT SCRN TBCO ID AS NON USER: HCPCS | Performed by: INTERNAL MEDICINE

## 2021-08-02 NOTE — PROGRESS NOTES
DATE OF VISIT: 8/2/2021      REASON FOR VISIT: Lung nodules, history of pulmonary embolism, leukocytosis      HISTORY OF PRESENT ILLNESS:   70-year-old male with medical problem consisting of hypertension, history of pulmonary embolism in 2015 for which patient took Coumadin for 1 year, chronic obstructive pulmonary disease, gross 200 pack smoking history that he quit in 2008 was initially seen in consultation on July 13, 2021 when patient was admitted to [facility] for shortness of breath and swelling of lower extremity.  Patient was found to have spiculated nodule in right lung on CT angiogram of the chest.  Patient is here for follow-up appointment today to discuss recently done PET/CT on July 30, 2021 for evaluation of lung nodule.  Denies any hemoptysis.  Denies any major weight loss or any new lymph node enlargement.            Past Medical History, Past Surgical History, Social History, Family History have been reviewed and are without significant changes except as mentioned.    Review of Systems   Constitutional: Positive for fatigue.   Respiratory: Positive for shortness of breath.    Musculoskeletal: Positive for arthralgias and back pain.   Neurological: Positive for numbness (Positive for intermittent tingling and numbness affecting bilateral feet).   Hematological: Negative for adenopathy.      A comprehensive 14 point review of systems was performed and was negative except as mentioned.    Medications:  The current medication list was reviewed in the EMR    ALLERGIES:    Allergies   Allergen Reactions   • Amoxicillin Anaphylaxis   • Albuterol Irritability     Facial flushing and palpitations.   • Lortab [Hydrocodone-Acetaminophen] Other (See Comments)     Can't remember   • Morphine And Related Hallucinations   • Prilosec [Omeprazole] Other (See Comments)     unknown   • Sulfa Antibiotics Other (See Comments)     Can't remember also more and can't remember   • Symbicort  "[Budesonide-Formoterol Fumarate] Unknown (See Comments)     Doesn't remember reaction type       Objective      Vitals:    08/02/21 1255   BP: 168/83   Pulse: 113   Resp: 18   Temp: 98.3 °F (36.8 °C)   Weight: 74.4 kg (164 lb)   Height: 165.1 cm (65\")   PainSc: 0-No pain     Current Status 8/2/2021   ECOG score 0       Physical Exam  Cardiovascular:      Rate and Rhythm: Normal rate and regular rhythm.   Pulmonary:      Breath sounds: Normal breath sounds.   Neurological:      Mental Status: He is alert and oriented to person, place, and time.           RECENT LABS:  Glucose   Date Value Ref Range Status   07/19/2021 106 (H) 65 - 99 mg/dL Final     Glucose, Arterial   Date Value Ref Range Status   02/06/2018 106 mmol/L Final     Sodium   Date Value Ref Range Status   07/19/2021 131 (L) 136 - 145 mmol/L Final     Potassium   Date Value Ref Range Status   07/19/2021 4.0 3.5 - 5.2 mmol/L Final     Comment:     Slight hemolysis detected by analyzer. Results may be affected.     CO2   Date Value Ref Range Status   07/19/2021 28.0 22.0 - 29.0 mmol/L Final     Chloride   Date Value Ref Range Status   07/19/2021 93 (L) 98 - 107 mmol/L Final     Anion Gap   Date Value Ref Range Status   07/19/2021 10.0 5.0 - 15.0 mmol/L Final     Creatinine   Date Value Ref Range Status   07/19/2021 0.74 (L) 0.76 - 1.27 mg/dL Final     BUN   Date Value Ref Range Status   07/19/2021 19 8 - 23 mg/dL Final     BUN/Creatinine Ratio   Date Value Ref Range Status   07/19/2021 25.7 (H) 7.0 - 25.0 Final     Calcium   Date Value Ref Range Status   07/19/2021 8.9 8.6 - 10.5 mg/dL Final     eGFR Non  Amer   Date Value Ref Range Status   07/19/2021 104 >60 mL/min/1.73 Final     Alkaline Phosphatase   Date Value Ref Range Status   07/19/2021 83 39 - 117 U/L Final     Total Protein   Date Value Ref Range Status   07/19/2021 6.7 6.0 - 8.5 g/dL Final     ALT (SGPT)   Date Value Ref Range Status   07/19/2021 106 (H) 1 - 41 U/L Final     AST (SGOT) "   Date Value Ref Range Status   07/19/2021 54 (H) 1 - 40 U/L Final     Comment:     Slight hemolysis detected by analyzer. Results may be affected.     Total Bilirubin   Date Value Ref Range Status   07/19/2021 <0.2 0.0 - 1.2 mg/dL Final     Albumin   Date Value Ref Range Status   07/19/2021 3.90 3.50 - 5.20 g/dL Final     Globulin   Date Value Ref Range Status   07/19/2021 2.8 gm/dL Final     Lab Results   Component Value Date    WBC 13.34 (H) 07/19/2021    HGB 14.6 07/19/2021    HCT 44.2 07/19/2021    MCV 93.1 07/19/2021     07/19/2021     Lab Results   Component Value Date    NEUTROABS 7.89 (H) 07/19/2021     No results found for: , LABCA2, AFPTM, HCGQUANT, , CHROMGRNA, [id number], CEA, REFLABREPO      PATHOLOGY:  * Cannot find OR log *         RADIOLOGY DATA :  NM Pet Skull Base To Mid Thigh    Result Date: 8/2/2021  CONCLUSION: The previously noted spiculated nodule in the posterior mid right middle lobe is not well visualized on this exam. No hypermetabolic activity is seen in the noted on the previous CT. SUV max is 1.5 in this area which is below threshold. Severe chronic obstructive pulmonary disease. Electronically signed by:  Tristan Worley MD  8/2/2021 10:35 AM CDT Workstation: [id number]          Assessment/Plan     1.  Lung nodules:  -CT angiogram done on July 12, 2021 showed 1.8 cm spiculated nodule in right middle lobe and 6 mm left upper lung nodule.  -Patient had a PET/CT done on July 30, 2021 which was reviewed with radiologist.  There is no abnormal uptake in the area of spiculated lung nodule on CT angiogram.  There is stable 6 mm nodule in left upper lung which needs follow-up.  -Result of PET/CT not showing any kind of abnormal uptake that can be seen in malignancy was discussed with patient and his family.  -Patient states he is scheduled to get another CT scan done at [facility] in October 2021.  We will get CT scan on a disc from [facility] prior to next  clinic visit in 3 months and have it compared with the last PET/CT to document stability of lung nodules.  -We will repeat CBC and CMP upon next clinic visit in 3 months.  -Recommendation were discussed with patient and his family    2.  History of pulmonary embolism  -Patient was diagnosed with pulmonary embolism in 2015.  -Coumadin for 1 year.  -CT angiogram done on July 12, 2021 is negative for pulmonary embolism    3.  Leukocytosis:  -Secondary to steroids.  Will monitor with CBC for now    4.  Health maintenance: Patient does not smoke currently    5. Advance Care Planning: For now patient remains full code and is able to make decisions.  Patient has health care surrogate mentioned on chart.                 PHQ-9 Total Score: 0   -Patient is not homicidal or suicidal.  No acute intervention required.    Brian Garcia reports a pain score of 0.  Given his pain assessment as noted, treatment options were discussed and the following options were decided upon as a follow-up plan to address the patient's pain: continuation of current treatment plan for pain.         Omega Davis MD  8/2/2021  13:21 CDT        Part of this note may be an electronic transcription/translation of spoken language to printed text using the Dragon Dictation System.          CC:

## 2021-08-03 ENCOUNTER — READMISSION MANAGEMENT (OUTPATIENT)
Dept: CALL CENTER | Facility: HOSPITAL | Age: 71
End: 2021-08-03

## 2021-08-03 NOTE — OUTREACH NOTE
COPD/PN Week 2 Survey      Responses   [facility] patient discharged from?  [place]   Does the patient have one of the following disease processes/diagnoses(primary or secondary)?  COPD/Pneumonia   Was the primary reason for admission:  COPD exacerbation   Week 2 attempt successful?  Yes   Call start time  1406   Call end time  1412   Discharge diagnosis  Acute exacerbation of chronic obstructive pulmonary disease   Is patient permission given to speak with other caregiver?  Yes   Meds reviewed with patient/caregiver?  Yes   Is the patient having any side effects they believe may be caused by any medication additions or changes?  No   Does the patient have all medications ordered at discharge?  Yes   Is the patient taking all medications as directed (includes completed medication regime)?  Yes   Medication comments  States he has not been using his nebs, but will.   Does the patient have a primary care provider?   Yes   Does the patient have an appointment with their PCP or specialist within 7 days of discharge?  Yes   Has the patient kept scheduled appointments due by today?  Yes   Has home health visited the patient within 72 hours of discharge?  N/A   Has all DME been delivered?  No   DME comments  [facility]   Pulse Ox monitoring  None   Psychosocial issues?  No   Did the patient receive a copy of their discharge instructions?  Yes   Nursing interventions  Reviewed instructions with patient   What is the patient's perception of their health status since discharge?  Improving   Nursing Interventions  Nurse provided patient education   Are the patient's immunizations up to date?   Yes   Nursing interventions  Educated on importance of maintaining up to date immunizations as advised by provider   If the patient is a current smoker, are they able to teach back resources for cessation?  Not a smoker   Is the patient/caregiver able to teach back the hierarchy of who to call/visit for  symptoms/problems? PCP, Specialist, Home health nurse, Urgent Care, ED, 911  Yes   Additional teach back comments  Apptetite is great, but his sleep is always poor.   Is the patient/caregiver able to teach back signs and symptoms of worsening condition:  Fever/chills, Shortness of breath, Chest pain   Is the patient/caregiver able to teach back importance of completing antibiotic course of treatment?  Yes   Week 2 call completed?  Yes   Wrap up additional comments  SOa w/ copd, he is improving, sees Cards on the 17th.          Shayla Jarrett RN

## 2021-08-12 ENCOUNTER — READMISSION MANAGEMENT (OUTPATIENT)
Dept: CALL CENTER | Facility: HOSPITAL | Age: 71
End: 2021-08-12

## 2021-08-12 NOTE — OUTREACH NOTE
COPD/PN Week 3 Survey      Responses   [facility] patient discharged from?  [place]   Does the patient have one of the following disease processes/diagnoses(primary or secondary)?  COPD/Pneumonia   Was the primary reason for admission:  COPD exacerbation   Week 3 attempt successful?  No   Unsuccessful attempts  Attempt 1          Emily Galeana RN

## 2021-08-17 ENCOUNTER — OFFICE VISIT (OUTPATIENT)
Dept: CARDIOLOGY | Facility: CLINIC | Age: 71
End: 2021-08-17

## 2021-08-17 VITALS
TEMPERATURE: 96.9 F | SYSTOLIC BLOOD PRESSURE: 142 MMHG | DIASTOLIC BLOOD PRESSURE: 80 MMHG | BODY MASS INDEX: 27.32 KG/M2 | HEIGHT: 65 IN | WEIGHT: 164 LBS | OXYGEN SATURATION: 98 % | HEART RATE: 95 BPM

## 2021-08-17 DIAGNOSIS — R00.2 PALPITATIONS: Primary | ICD-10-CM

## 2021-08-17 PROCEDURE — 99203 OFFICE O/P NEW LOW 30 MIN: CPT | Performed by: INTERNAL MEDICINE

## 2021-08-17 NOTE — PROGRESS NOTES
[facility] Cardiology  OFFICE CONSULT NOTE    Patient Name: Brian Garcia  Age/Sex: 70 y.o. male  : 1950  MRN: [id number]      Referring Provider: Landon Starr MD  [address] [place] [address]        Chief Complaint: Palpitations  History of Present Illness:  Brian Garcia is a 70 y.o. male who in January started having his heart racing.  He said he had his heart rate go up to 150 or so beats per minute.  This was checked by oximetry.  It was usually in the morning when he would be doing something in his home and or like when he was unloading groceries.  He did not have a syncopal episode.  He did not have any chest pain with this.  Later on this year he was found to have a pneumonia and since that has been treated he said is actually somewhat better.  He does have she will DO occasional fast heartbeat but is not as much as before.  He is on home oxygen.    Last Echo: None    Last Cath:      Past Medical History:  Past Medical History:   Diagnosis Date   • COPD (chronic obstructive pulmonary disease) (CMS/[facility])        PAST SURGERY   Past Surgical History:   Procedure Laterality Date   • HIP ARTHROPLASTY         Home Medications:      Current Outpatient Medications:   •  aspirin 81 MG chewable tablet, Chew 81 mg Daily., Disp: , Rfl:   •  ipratropium (ATROVENT HFA) 17 MCG/ACT inhaler, Inhale 2 puffs 4 (Four) Times a Day As Needed for Wheezing., Disp: , Rfl:   •  ipratropium (ATROVENT) 0.02 % nebulizer solution, Take 2.5 mL by nebulization 3 (Three) Times a Day. Sub amount for how much insurance allows, Disp: 670 mL, Rfl: 0  •  montelukast (SINGULAIR) 10 MG tablet, Take 10 mg by mouth Every Night., Disp: , Rfl:   •  predniSONE (DELTASONE) 5 MG tablet, Take 5 mg by mouth Every Other Day., Disp: , Rfl:   •  tiotropium bromide-olodaterol (Stiolto Respimat) 2.5-2.5 MCG/ACT aerosol solution inhaler, Inhale 2 puffs Daily., Disp: , Rfl:      Allergies:  Allergies   Allergen Reactions   • Amoxicillin Anaphylaxis   • Albuterol Irritability     Facial flushing and palpitations.   • Lortab [Hydrocodone-Acetaminophen] Other (See Comments)     Can't remember   • Morphine And Related Hallucinations   • Prilosec [Omeprazole] Other (See Comments)     unknown   • Sulfa Antibiotics Other (See Comments)     Can't remember also more and can't remember   • Symbicort [Budesonide-Formoterol Fumarate] Unknown (See Comments)     Doesn't remember reaction type        Social History:  Social History     Socioeconomic History   • Marital status:      Spouse name: Not on file   • Number of children: Not on file   • Years of education: Not on file   • Highest education level: Not on file   Tobacco Use   • Smoking status: Never Smoker   Vaping Use   • Vaping Use: Never used   Substance and Sexual Activity   • Alcohol use: Not Currently   • Drug use: Never   • Sexual activity: Defer        Family History:  History reviewed. No pertinent family history.      Review of Systems:   Constitution: No chills, no rigors, no unexplained weight loss or weight gain    Eyes:  No diplopia, no blurred vision, no loss of vision, conjunctiva is pink and sclera is anicteric    ENT:  No tinnitus, no otorrhea, no epistaxis, no sore throat   Respiratory: No cough, no hemoptysis    Cardiovascular:  As mentioned in HPI    Gastrointestinal: No nausea, no vomiting, no hematemesis, no diarrhea or constipation, no melena    Genitourinary: No frequency of dysuria no hematuria    Integument: positive for  No pruritis and  no skin rash    Hematologic / Lymphatic: No excessive bleeding, easy bruising, fatigue, lymphadenopathy and petechiae    Musculoskeletal: No joint pain, joint stiffness, joint swelling, muscle pain, muscle weakness and neck pain    Neurological: No dizziness, headaches, light headedness, seizures and vertigo or stroke    Behavioral/Psych: No depression, or bipolar  disorder    Endocrine: no h/o diabetes, hyperlipidemia or thyroid problems        Vitals:    08/17/21 1010   BP: 142/80   Pulse: 95   Temp: 96.9 °F (36.1 °C)   SpO2: 98%       Body mass index is 27.29 kg/m².          Physical Exam:   General Appearance:    Alert, oriented, cooperative, in no acute distress     Head:    Normocephalic, atraumatic, without obvious abnormality     Eyes:            Lids and lashes normal, conjunctivae and sclerae normal, no   icterus, no pallor     Ears:    Ears appear intact with no abnormalities noted     Throat:   Mucous membranes pink and moist     Neck:   Supple, trachea midline, no carotid bruit, no JVD     Lungs:     Air enty equal,  Clear to auscultation, respirations regular, Unlabored. No wheezes, rales, rhonchi    Heart:    Regular rhythm and normal rate, normal S1 and S2, no            murmur, no gallop,      Abdomen:     Normal bowel sounds, no masses, liver and spleen nonpalpable, soft, non-tender, non-distended, no guarding, no rebound tenderness       Extremities:   Moves all extremities well, no edema, no cyanosis, no              Redness, no clubbing     Pulses:   Pulses palpable and equal bilaterally       Neurologic:   Alert and oriented to person, place, and time. No focal neurological deficits       Lab Review:     No results displayed because visit has over 200 results.      ]    Assessment/Plan     1. Palpitations    - Mobile Cardiac Outpatient Telemetry; Future  - Adult Transthoracic Echo Complete W/ Cont if Necessary Per Protocol  The patient's transfer atrial fibrillation versus sinus tachycardia.  He has multiple risk especially in view of his COPD.  Our plan is to do a mobile outpatient telemetry followed by a 2D echo.    Patient's Body mass index is 27.29 kg/m². indicating that he is overweight (BMI 25-29.9). Obesity-related health conditions include the following: copd. Obesity is unchanged. BMI is is above average; no BMI management plan is appropriate. We  discussed portion control and increasing exercise..      Brian Pace Radha  reports that he [id number] packed years. He does not have any smokeless tobacco history on file.           Return in about 6 months (around 2/17/2022).

## 2021-08-23 ENCOUNTER — TELEPHONE (OUTPATIENT)
Dept: CARDIOLOGY | Facility: CLINIC | Age: 71
End: 2021-08-23

## 2021-08-24 ENCOUNTER — TELEPHONE (OUTPATIENT)
Dept: CARDIOLOGY | Facility: CLINIC | Age: 71
End: 2021-08-24

## 2021-08-25 ENCOUNTER — DOCUMENTATION (OUTPATIENT)
Dept: CARDIOLOGY | Facility: CLINIC | Age: 71
End: 2021-08-25

## 2021-08-25 DIAGNOSIS — I48.0 PAROXYSMAL ATRIAL FIBRILLATION (HCC): Primary | ICD-10-CM

## 2021-08-26 ENCOUNTER — DOCUMENTATION (OUTPATIENT)
Dept: CARDIAC SURGERY | Facility: CLINIC | Age: 71
End: 2021-08-26

## 2021-08-26 ENCOUNTER — TELEPHONE (OUTPATIENT)
Dept: CARDIOLOGY | Facility: CLINIC | Age: 71
End: 2021-08-26

## 2021-08-26 ENCOUNTER — ANTICOAGULATION VISIT (OUTPATIENT)
Dept: CARDIAC SURGERY | Facility: CLINIC | Age: 71
End: 2021-08-26

## 2021-08-26 DIAGNOSIS — I48.0 PAROXYSMAL ATRIAL FIBRILLATION (HCC): Primary | ICD-10-CM

## 2021-08-26 NOTE — PROGRESS NOTES
I called pt to schedule appt for [facility]madin Clinic after receiving referral from Dr Rose. Pt states he would rather take Eliquis or Xarelto if possible. Message sent to Dr Rose's MA and she spoke to Dr Rose and sent in Eliquis. I instructed pt to call [facility] if Eliquis was too expensive to call us and we will set him up for an appt and send in Coumadin; pt verbalized an understanding. Findings reported by Treva Miranda RN.

## 2021-08-31 ENCOUNTER — DOCUMENTATION (OUTPATIENT)
Dept: CARDIOLOGY | Facility: CLINIC | Age: 71
End: 2021-08-31

## 2021-08-31 NOTE — PROGRESS NOTES
Paperwork filled out for Mr. Garcia by Dr. Rose to change his Apixaban to Dabigatran 150 mg Q 12 hours.  This was faxed to [facility] ([facility]) [address] [place] [address], [place] [address]   Fax # [phone]

## 2021-09-01 ENCOUNTER — DOCUMENTATION (OUTPATIENT)
Dept: CARDIOLOGY | Facility: CLINIC | Age: 71
End: 2021-09-01

## 2021-09-01 NOTE — PROGRESS NOTES
[facility] [place] [facility] and stated [facility] to change Mr. Garcia's Apixaban to Dabigatran.   Pharmacist called stating that they [facility] got [facility] Apixaban prior authorized and they had already mailed it to the patient. He wanted to know if it was ok to continue this medicine instead of changing it.  I told him it was OK.

## 2021-09-02 ENCOUNTER — TELEPHONE (OUTPATIENT)
Dept: CARDIOLOGY | Facility: CLINIC | Age: 71
End: 2021-09-02

## 2021-09-02 RX ORDER — DILTIAZEM HYDROCHLORIDE 180 MG/1
180 CAPSULE, EXTENDED RELEASE ORAL DAILY
Qty: 30 CAPSULE | Refills: 11 | Status: SHIPPED | OUTPATIENT
Start: 2021-09-02 | End: 2022-04-18 | Stop reason: SDUPTHER

## 2021-09-02 NOTE — TELEPHONE ENCOUNTER
Per Dr. Rose, I notified Mr. Garcia that a rx for diltiazem 180 mg [facility] to [facility].  This is for heart rate control.  Asked him to take one pill daily.

## 2021-09-03 ENCOUNTER — TELEPHONE (OUTPATIENT)
Dept: CARDIOLOGY | Facility: CLINIC | Age: 71
End: 2021-09-03

## 2021-09-03 NOTE — TELEPHONE ENCOUNTER
Called pt to tell him Dr. Rose said yes he does need to continue his baby Asprin and Eliquis every 12 hours. Understood.  Message from Jaycee Rose MD sent at 9/3/2021  3:04 PM CDT -----  yes  ----- Message -----  From: Marlen Bernal MA  Sent: 9/3/2021   2:38 PM CDT  To: Jaycee Rose MD      ----- Message -----  From: Olivia Mueller RegSched Rep  Sent: 9/3/2021   2:35 PM CDT  To: [facility] Cardiology [facility] Clinical Pool  Subject: karlie pt                                         Pt wanted to know if he still needs to continue his baby aspirin as well as taking the Eliquis every 12 hours      Can reach pt at [phone]

## 2021-09-13 ENCOUNTER — TELEPHONE (OUTPATIENT)
Dept: CARDIOLOGY | Facility: CLINIC | Age: 71
End: 2021-09-13

## 2021-09-13 NOTE — TELEPHONE ENCOUNTER
Spoke to Mr. Garcia and told him that [facility].  He stated he was sorry to bother us that he received his medicine in the mail today 9/13/2021.          ----- Message from Jaycee Rose MD sent at 9/13/2021 11:54 AM CDT -----  Regarding: RE: karlie pt  The transmission of the Eliquis as it was received at the pharmacy at [facility] on August 26  ----- Message -----  From: Kerri Terry CSA  Sent: 9/13/2021   8:27 AM CDT  To: Jaycee Rose MD  Subject: FW: karlie pt                                     Please advise    ----- Message -----  From: Lexie Montalvo MA  Sent: 9/13/2021   8:21 AM CDT  To: Kerri Terry CSA  Subject: FW: karlie pt                                       ----- Message -----  From: Olivia Mueller RegSched Rep  Sent: 9/13/2021   8:12 AM CDT  To: [facility] Cardiology [facility] Clinical [place]  Subject: karlie pt                                         Pt said he was told to call this week with his last few days of BP/HR readings.   Pt said he has not received medication for his heart as of yesterday.     8th: 113/54 HR 60, 113/64 HR 80; 101/55 HR 84; 95/53 HR 83; 121/62 HR 78    9th: 117/60 75;     94/57  HR 88;    110/58  HR 76    10th: 102/72   HR 72;      102/53  HR 87;      105/56   HR 92        Can reach pt at [phone]

## 2021-09-20 ENCOUNTER — TELEPHONE (OUTPATIENT)
Dept: CARDIOLOGY | Facility: CLINIC | Age: 71
End: 2021-09-20

## 2021-09-20 LAB
BH CV ECHO MEAS - ACS: 2 CM
BH CV ECHO MEAS - AO MAX PG (FULL): 0.86 MMHG
BH CV ECHO MEAS - AO MAX PG: 5 MMHG
BH CV ECHO MEAS - AO MEAN PG (FULL): 0 MMHG
BH CV ECHO MEAS - AO MEAN PG: 2 MMHG
BH CV ECHO MEAS - AO ROOT AREA (BSA CORRECTED): 1.5
BH CV ECHO MEAS - AO ROOT AREA: 6.2 CM^2
BH CV ECHO MEAS - AO ROOT DIAM: 2.8 CM
BH CV ECHO MEAS - AO V2 MAX: 112 CM/SEC
BH CV ECHO MEAS - AO V2 MEAN: 70 CM/SEC
BH CV ECHO MEAS - AO V2 VTI: 18.9 CM
BH CV ECHO MEAS - ASC AORTA: 3.5 CM
BH CV ECHO MEAS - AVA(I,A): 2.4 CM^2
BH CV ECHO MEAS - AVA(I,D): 2.4 CM^2
BH CV ECHO MEAS - AVA(V,A): 2.3 CM^2
BH CV ECHO MEAS - AVA(V,D): 2.3 CM^2
BH CV ECHO MEAS - BSA(HAYCOCK): 1.9 M^2
BH CV ECHO MEAS - BSA: 1.8 M^2
BH CV ECHO MEAS - BZI_BMI: 27.3 KILOGRAMS/M^2
BH CV ECHO MEAS - BZI_METRIC_HEIGHT: 165.1 CM
BH CV ECHO MEAS - BZI_METRIC_WEIGHT: 74.4 KG
BH CV ECHO MEAS - EDV(CUBED): 54.9 ML
BH CV ECHO MEAS - EDV(MOD-SP2): 78 ML
BH CV ECHO MEAS - EDV(MOD-SP4): 125 ML
BH CV ECHO MEAS - EDV(TEICH): 62 ML
BH CV ECHO MEAS - EF(CUBED): 68 %
BH CV ECHO MEAS - EF(MOD-SP2): 69.2 %
BH CV ECHO MEAS - EF(MOD-SP4): 68.8 %
BH CV ECHO MEAS - EF(TEICH): 60.3 %
BH CV ECHO MEAS - EPSS: 0.7 CM
BH CV ECHO MEAS - ESV(CUBED): 17.6 ML
BH CV ECHO MEAS - ESV(MOD-SP2): 24 ML
BH CV ECHO MEAS - ESV(MOD-SP4): 39 ML
BH CV ECHO MEAS - ESV(TEICH): 24.6 ML
BH CV ECHO MEAS - FS: 31.6 %
BH CV ECHO MEAS - IVS/LVPW: 1.1
BH CV ECHO MEAS - IVSD: 1 CM
BH CV ECHO MEAS - LA DIMENSION: 2.9 CM
BH CV ECHO MEAS - LA/AO: 1
BH CV ECHO MEAS - LV DIASTOLIC VOL/BSA (35-75): 68.8 ML/M^2
BH CV ECHO MEAS - LV MASS(C)D: 109 GRAMS
BH CV ECHO MEAS - LV MASS(C)DI: 60 GRAMS/M^2
BH CV ECHO MEAS - LV MAX PG: 4.2 MMHG
BH CV ECHO MEAS - LV MEAN PG: 2 MMHG
BH CV ECHO MEAS - LV SYSTOLIC VOL/BSA (12-30): 21.5 ML/M^2
BH CV ECHO MEAS - LV V1 MAX: 102 CM/SEC
BH CV ECHO MEAS - LV V1 MEAN: 56.7 CM/SEC
BH CV ECHO MEAS - LV V1 VTI: 17.9 CM
BH CV ECHO MEAS - LVIDD: 3.8 CM
BH CV ECHO MEAS - LVIDS: 2.6 CM
BH CV ECHO MEAS - LVLD AP2: 7.3 CM
BH CV ECHO MEAS - LVLD AP4: 7.8 CM
BH CV ECHO MEAS - LVLS AP2: 6 CM
BH CV ECHO MEAS - LVLS AP4: 6.4 CM
BH CV ECHO MEAS - LVOT AREA (M): 2.5 CM^2
BH CV ECHO MEAS - LVOT AREA: 2.5 CM^2
BH CV ECHO MEAS - LVOT DIAM: 1.8 CM
BH CV ECHO MEAS - LVPWD: 0.9 CM
BH CV ECHO MEAS - MR MAX PG: 21.3 MMHG
BH CV ECHO MEAS - MR MAX VEL: 231 CM/SEC
BH CV ECHO MEAS - MV A MAX VEL: 111 CM/SEC
BH CV ECHO MEAS - MV DEC SLOPE: 162 CM/SEC^2
BH CV ECHO MEAS - MV E MAX VEL: 66.6 CM/SEC
BH CV ECHO MEAS - MV E/A: 0.6
BH CV ECHO MEAS - MV MAX PG: 2.4 MMHG
BH CV ECHO MEAS - MV MEAN PG: 1 MMHG
BH CV ECHO MEAS - MV P1/2T MAX VEL: 67.8 CM/SEC
BH CV ECHO MEAS - MV P1/2T: 122.6 MSEC
BH CV ECHO MEAS - MV V2 MAX: 76.8 CM/SEC
BH CV ECHO MEAS - MV V2 MEAN: 47.4 CM/SEC
BH CV ECHO MEAS - MV V2 VTI: 22.3 CM
BH CV ECHO MEAS - MVA P1/2T LCG: 3.2 CM^2
BH CV ECHO MEAS - MVA(P1/2T): 1.8 CM^2
BH CV ECHO MEAS - MVA(VTI): 2 CM^2
BH CV ECHO MEAS - PA MAX PG (FULL): 1 MMHG
BH CV ECHO MEAS - PA MAX PG: 3.8 MMHG
BH CV ECHO MEAS - PA MEAN PG (FULL): 1 MMHG
BH CV ECHO MEAS - PA MEAN PG: 2 MMHG
BH CV ECHO MEAS - PA V2 MAX: 97.9 CM/SEC
BH CV ECHO MEAS - PA V2 MEAN: 62.9 CM/SEC
BH CV ECHO MEAS - PA V2 VTI: 13.6 CM
BH CV ECHO MEAS - RAP SYSTOLE: 5 MMHG
BH CV ECHO MEAS - RV MAX PG: 2.8 MMHG
BH CV ECHO MEAS - RV MEAN PG: 1 MMHG
BH CV ECHO MEAS - RV V1 MAX: 83.5 CM/SEC
BH CV ECHO MEAS - RV V1 MEAN: 51.2 CM/SEC
BH CV ECHO MEAS - RV V1 VTI: 15.3 CM
BH CV ECHO MEAS - RVDD: 2.7 CM
BH CV ECHO MEAS - RVSP: 34.8 MMHG
BH CV ECHO MEAS - SI(AO): 64 ML/M^2
BH CV ECHO MEAS - SI(CUBED): 20.5 ML/M^2
BH CV ECHO MEAS - SI(LVOT): 25.1 ML/M^2
BH CV ECHO MEAS - SI(MOD-SP2): 29.7 ML/M^2
BH CV ECHO MEAS - SI(MOD-SP4): 47.3 ML/M^2
BH CV ECHO MEAS - SI(TEICH): 20.5 ML/M^2
BH CV ECHO MEAS - SV(AO): 116.4 ML
BH CV ECHO MEAS - SV(CUBED): 37.3 ML
BH CV ECHO MEAS - SV(LVOT): 45.5 ML
BH CV ECHO MEAS - SV(MOD-SP2): 54 ML
BH CV ECHO MEAS - SV(MOD-SP4): 86 ML
BH CV ECHO MEAS - SV(TEICH): 37.3 ML
BH CV ECHO MEAS - TR MAX VEL: 273 CM/SEC
BH CV VAS BP RIGHT ARM: NORMAL MMHG

## 2021-09-20 NOTE — TELEPHONE ENCOUNTER
Contacted patient to let him know per Dr. Rose that his echo looks good. He voiced his understanding.     Jaycee Rose MD Densmore, Ashley, MA  Cc: Epley, Kendall, MA  Overall everything looks good.           Previous Messages       ----- Message -----   From: Jaycee Rose MD   Sent: 9/20/2021   1:44 PM CDT   To: Jaycee Rose MD

## 2021-09-22 ENCOUNTER — TELEPHONE (OUTPATIENT)
Dept: CARDIOLOGY | Facility: CLINIC | Age: 71
End: 2021-09-22

## 2021-09-22 NOTE — TELEPHONE ENCOUNTER
Returned patient call to inform him per Dr. Rose that he is to do what he feels capable of. He voiced his understanding.

## 2021-09-22 NOTE — TELEPHONE ENCOUNTER
----- Message from Jaycee Rose MD sent at 9/21/2021  9:37 AM CDT -----  Regarding: RE: Echo  Do what he feels capable of doing  ----- Message -----  From: Epley, Kendall, MA  Sent: 9/20/2021   4:43 PM CDT  To: Jaycee Rose MD  Subject: FW: Echo                                         Hey Dr. Rose patient wants to know how much he needs to push himself so he don't fall out in the yard.    ----- Message -----  From: Jaycee Rose MD  Sent: 9/20/2021   3:03 PM CDT  To: Kendall Epley, MA, Gilma Jacobson MA  Subject: Echo                                             Overall everything looks good.  ----- Message -----  From: Jaycee Rose MD  Sent: 9/20/2021   1:44 PM CDT  To: Jaycee Rose MD

## 2021-11-08 ENCOUNTER — OFFICE VISIT (OUTPATIENT)
Dept: ONCOLOGY | Facility: CLINIC | Age: 71
End: 2021-11-08

## 2021-11-08 ENCOUNTER — LAB (OUTPATIENT)
Dept: ONCOLOGY | Facility: HOSPITAL | Age: 71
End: 2021-11-08

## 2021-11-08 VITALS
SYSTOLIC BLOOD PRESSURE: 145 MMHG | TEMPERATURE: 97.8 F | BODY MASS INDEX: 27.19 KG/M2 | OXYGEN SATURATION: 94 % | HEART RATE: 82 BPM | WEIGHT: 163.2 LBS | DIASTOLIC BLOOD PRESSURE: 65 MMHG

## 2021-11-08 DIAGNOSIS — Z86.711 HISTORY OF PULMONARY EMBOLISM: ICD-10-CM

## 2021-11-08 DIAGNOSIS — R91.8 PULMONARY NODULES: Chronic | ICD-10-CM

## 2021-11-08 DIAGNOSIS — R91.8 PULMONARY NODULES: ICD-10-CM

## 2021-11-08 DIAGNOSIS — D72.828 OTHER ELEVATED WHITE BLOOD CELL (WBC) COUNT: ICD-10-CM

## 2021-11-08 DIAGNOSIS — D72.828 OTHER ELEVATED WHITE BLOOD CELL (WBC) COUNT: Primary | Chronic | ICD-10-CM

## 2021-11-08 DIAGNOSIS — Z86.711 HISTORY OF PULMONARY EMBOLISM: Chronic | ICD-10-CM

## 2021-11-08 PROBLEM — D72.829 LEUKOCYTOSIS: Chronic | Status: ACTIVE | Noted: 2021-08-02

## 2021-11-08 LAB
ALBUMIN SERPL-MCNC: 4.5 G/DL (ref 3.5–5.2)
ALBUMIN/GLOB SERPL: 1.4 G/DL
ALP SERPL-CCNC: 100 U/L (ref 39–117)
ALT SERPL W P-5'-P-CCNC: 21 U/L (ref 1–41)
ANION GAP SERPL CALCULATED.3IONS-SCNC: 8 MMOL/L (ref 5–15)
AST SERPL-CCNC: 17 U/L (ref 1–40)
BASOPHILS # BLD AUTO: 0.09 10*3/MM3 (ref 0–0.2)
BASOPHILS NFR BLD AUTO: 1.1 % (ref 0–1.5)
BILIRUB SERPL-MCNC: 0.2 MG/DL (ref 0–1.2)
BUN SERPL-MCNC: 16 MG/DL (ref 8–23)
BUN/CREAT SERPL: 17.4 (ref 7–25)
CALCIUM SPEC-SCNC: 9.7 MG/DL (ref 8.6–10.5)
CHLORIDE SERPL-SCNC: 100 MMOL/L (ref 98–107)
CO2 SERPL-SCNC: 31 MMOL/L (ref 22–29)
CREAT SERPL-MCNC: 0.92 MG/DL (ref 0.76–1.27)
DEPRECATED RDW RBC AUTO: 39.3 FL (ref 37–54)
EOSINOPHIL # BLD AUTO: 0.21 10*3/MM3 (ref 0–0.4)
EOSINOPHIL NFR BLD AUTO: 2.6 % (ref 0.3–6.2)
ERYTHROCYTE [DISTWIDTH] IN BLOOD BY AUTOMATED COUNT: 11.9 % (ref 12.3–15.4)
GFR SERPL CREATININE-BSD FRML MDRD: 81 ML/MIN/1.73
GLOBULIN UR ELPH-MCNC: 3.2 GM/DL
GLUCOSE SERPL-MCNC: 115 MG/DL (ref 65–99)
HCT VFR BLD AUTO: 44.4 % (ref 37.5–51)
HGB BLD-MCNC: 15.2 G/DL (ref 13–17.7)
HOLD SPECIMEN: NORMAL
IMM GRANULOCYTES # BLD AUTO: 0.02 10*3/MM3 (ref 0–0.05)
IMM GRANULOCYTES NFR BLD AUTO: 0.2 % (ref 0–0.5)
LYMPHOCYTES # BLD AUTO: 1.29 10*3/MM3 (ref 0.7–3.1)
LYMPHOCYTES NFR BLD AUTO: 15.7 % (ref 19.6–45.3)
MCH RBC QN AUTO: 31 PG (ref 26.6–33)
MCHC RBC AUTO-ENTMCNC: 34.2 G/DL (ref 31.5–35.7)
MCV RBC AUTO: 90.4 FL (ref 79–97)
MONOCYTES # BLD AUTO: 0.65 10*3/MM3 (ref 0.1–0.9)
MONOCYTES NFR BLD AUTO: 7.9 % (ref 5–12)
NEUTROPHILS NFR BLD AUTO: 5.97 10*3/MM3 (ref 1.7–7)
NEUTROPHILS NFR BLD AUTO: 72.5 % (ref 42.7–76)
NRBC BLD AUTO-RTO: 0 /100 WBC (ref 0–0.2)
PLATELET # BLD AUTO: 224 10*3/MM3 (ref 140–450)
PMV BLD AUTO: 10.1 FL (ref 6–12)
POTASSIUM SERPL-SCNC: 4.4 MMOL/L (ref 3.5–5.2)
PROT SERPL-MCNC: 7.7 G/DL (ref 6–8.5)
RBC # BLD AUTO: 4.91 10*6/MM3 (ref 4.14–5.8)
SODIUM SERPL-SCNC: 139 MMOL/L (ref 136–145)
WBC # BLD AUTO: 8.23 10*3/MM3 (ref 3.4–10.8)

## 2021-11-08 PROCEDURE — 80053 COMPREHEN METABOLIC PANEL: CPT

## 2021-11-08 PROCEDURE — 1123F ACP DISCUSS/DSCN MKR DOCD: CPT | Performed by: INTERNAL MEDICINE

## 2021-11-08 PROCEDURE — 85025 COMPLETE CBC W/AUTO DIFF WBC: CPT

## 2021-11-08 PROCEDURE — 99214 OFFICE O/P EST MOD 30 MIN: CPT | Performed by: INTERNAL MEDICINE

## 2021-11-08 PROCEDURE — 1126F AMNT PAIN NOTED NONE PRSNT: CPT | Performed by: INTERNAL MEDICINE

## 2021-11-08 PROCEDURE — G0463 HOSPITAL OUTPT CLINIC VISIT: HCPCS | Performed by: INTERNAL MEDICINE

## 2021-11-08 NOTE — PROGRESS NOTES
DATE OF VISIT: 11/8/2021      REASON FOR VISIT: Lung nodules, history of pulmonary embolism, leukocytosis      HISTORY OF PRESENT ILLNESS:   71-year-old male with medical problem consisting of hypertension, history of pulmonary embolism in 2015 for which patient took Coumadin for 1 year, chronic obstructive pulmonary disease, heavy nicotine addiction that he quit in 2008 was initially seen in consultation on July 13, 2020 when he was admitted to [facility] and was found to have spiculated lung lesion on right lung.  Subsequent PET/CT did not show any uptake on the nodule.  Patient is here for 3-month follow-up appointment today.  Had a repeat CT of chest done at [facility] on October 12, 2021.  Denies any hemoptysis.  Denies any new lymph node enlargement.  Complains of chronic arthralgia and back pain.  Complains of chronic shortness of breath.  Complains of tingling and numbness affecting bilateral lower extremity.      Past Medical History, Past Surgical History, Social History, Family History have been reviewed and are without significant changes except as mentioned.    Review of Systems   A comprehensive 14 point review of systems was performed and was negative except as mentioned in HPI.    Medications:  The current medication list was reviewed in the EMR    ALLERGIES:    Allergies   Allergen Reactions   • Amoxicillin Anaphylaxis   • Albuterol Irritability     Facial flushing and palpitations.   • Lortab [Hydrocodone-Acetaminophen] Other (See Comments)     Can't remember   • Morphine And Related Hallucinations   • Prilosec [Omeprazole] Other (See Comments)     unknown   • Sulfa Antibiotics Other (See Comments)     Can't remember also more and can't remember   • Symbicort [Budesonide-Formoterol Fumarate] Unknown (See Comments)     Doesn't remember reaction type       Objective      Vitals:    11/08/21 1304   BP: 145/65   Pulse: 82   Temp: 97.8 °F (36.6 °C)   SpO2: 94%   Weight: 74 kg (163 lb  3.2 oz)   PainSc: 0-No pain     Current Status 8/2/2021   ECOG score 0       Physical Exam  Pulmonary:      Breath sounds: Normal breath sounds.   Neurological:      Mental Status: He is alert and oriented to person, place, and time.           RECENT LABS:  Glucose   Date Value Ref Range Status   11/08/2021 115 (H) 65 - 99 mg/dL Final     Glucose, Arterial   Date Value Ref Range Status   02/06/2018 106 mmol/L Final     Sodium   Date Value Ref Range Status   11/08/2021 139 136 - 145 mmol/L Final     Potassium   Date Value Ref Range Status   11/08/2021 4.4 3.5 - 5.2 mmol/L Final     CO2   Date Value Ref Range Status   11/08/2021 31.0 (H) 22.0 - 29.0 mmol/L Final     Chloride   Date Value Ref Range Status   11/08/2021 100 98 - 107 mmol/L Final     Anion Gap   Date Value Ref Range Status   11/08/2021 8.0 5.0 - 15.0 mmol/L Final     Creatinine   Date Value Ref Range Status   11/08/2021 0.92 0.76 - 1.27 mg/dL Final     BUN   Date Value Ref Range Status   11/08/2021 16 8 - 23 mg/dL Final     BUN/Creatinine Ratio   Date Value Ref Range Status   11/08/2021 17.4 7.0 - 25.0 Final     Calcium   Date Value Ref Range Status   11/08/2021 9.7 8.6 - 10.5 mg/dL Final     eGFR Non  Amer   Date Value Ref Range Status   11/08/2021 81 >60 mL/min/1.73 Final     Alkaline Phosphatase   Date Value Ref Range Status   11/08/2021 100 39 - 117 U/L Final     Total Protein   Date Value Ref Range Status   11/08/2021 7.7 6.0 - 8.5 g/dL Final     ALT (SGPT)   Date Value Ref Range Status   11/08/2021 21 1 - 41 U/L Final     AST (SGOT)   Date Value Ref Range Status   11/08/2021 17 1 - 40 U/L Final     Total Bilirubin   Date Value Ref Range Status   11/08/2021 0.2 0.0 - 1.2 mg/dL Final     Albumin   Date Value Ref Range Status   11/08/2021 4.50 3.50 - 5.20 g/dL Final     Globulin   Date Value Ref Range Status   11/08/2021 3.2 gm/dL Final     Lab Results   Component Value Date    WBC 8.23 11/08/2021    HGB 15.2 11/08/2021    HCT 44.4 11/08/2021     MCV 90.4 11/08/2021     11/08/2021     Lab Results   Component Value Date    NEUTROABS 5.97 11/08/2021     No results found for: , LABCA2, AFPTM, HCGQUANT, , CHROMGRNA, 5YDQK12SBR, CEA, REFLABREPO      PATHOLOGY:  * Cannot find OR log *         RADIOLOGY DATA :  No radiology results for the last 7 days        Assessment/Plan     1.  Lung nodule:  -PET/CT done on July 30, 2021 did not show any evidence of uptake on right lung nodule.  -Recent CT of chest without contrast done at  on October 12, 2021 showed stable left lung nodules without any significant change since November [facility].  Patient does have infiltrative changes in right lung.  -Recommend continuing surveillance CT scan.  Patient [facility]  Is scheduled to get another CT scan done in month of April 2022.  -We will ask patient to return to clinic in May 2022.  We will try to obtain neck CT scan on a disc as well for review.  -If the lung nodule remains stable upon next clinic visit in 6-month, patient can be discharged from the clinic.      2.  History of pulmonary embolism  -Patient was diagnosed with pulmonary embolism in 2015.  S/p Coumadin for 1 year.  -Recent CT angiogram done on July 20, 2021 was negative for pulmonary embolism    3.  Leukocytosis:  -Reactive due to steroids  -White blood cell count is normal today at 8.23.    4.  Health maintenance: Patient does not smoke.    5. Advance Care Planning: For now patient remains full code and is able to make decisions.  Patient has health care surrogate mentioned on chart.                 PHQ-9 Total Score: 1   -Patient is not homicidal or suicidal.  No acute intervention required.    Brian Garcia reports a pain score of 0.  Given his pain assessment as noted, treatment options were discussed and the following options were decided upon as a follow-up plan to address the patient's pain: continuation  of current treatment plan for pain.         Omega Davis MD  11/8/2021  13:14 CST        Part of this note may be an electronic transcription/translation of spoken language to printed text using the Dragon Dictation System.          CC:

## 2022-02-10 ENCOUNTER — OFFICE VISIT (OUTPATIENT)
Dept: CARDIOLOGY | Facility: CLINIC | Age: 72
End: 2022-02-10

## 2022-02-10 VITALS
TEMPERATURE: 97.1 F | WEIGHT: 170.2 LBS | HEIGHT: 65 IN | HEART RATE: 84 BPM | OXYGEN SATURATION: 98 % | DIASTOLIC BLOOD PRESSURE: 80 MMHG | SYSTOLIC BLOOD PRESSURE: 126 MMHG | BODY MASS INDEX: 28.36 KG/M2

## 2022-02-10 DIAGNOSIS — J44.1 CHRONIC OBSTRUCTIVE PULMONARY DISEASE WITH ACUTE EXACERBATION: ICD-10-CM

## 2022-02-10 DIAGNOSIS — K59.00 CONSTIPATION, UNSPECIFIED CONSTIPATION TYPE: ICD-10-CM

## 2022-02-10 DIAGNOSIS — Z86.711 HISTORY OF PULMONARY EMBOLISM: ICD-10-CM

## 2022-02-10 DIAGNOSIS — I48.0 PAROXYSMAL ATRIAL FIBRILLATION: Primary | ICD-10-CM

## 2022-02-10 PROCEDURE — 99213 OFFICE O/P EST LOW 20 MIN: CPT | Performed by: INTERNAL MEDICINE

## 2022-02-10 NOTE — PROGRESS NOTES
Brian Garcia  71 y.o. male    02/10/2022  Chief Complaint   Patient presents with   • Palpitations     Chief Complaint       History of Present Illness  Patient with a history of atrial fibrillation    -        SUBJECTIVE  Patient had palpitations.  He was found to have atrial fibrillation on MCT.  He is on anticoagulation he is not having issues with this.  He is having no chest pain or any change in his cardiac shortness of breath.  He is having no edema.  His primary issue today is that he has been constipated.  He said he is diet is changed a little bit with less vegetables and this is causing him some issue.  Allergies   Allergen Reactions   • Amoxicillin Anaphylaxis   • Albuterol Irritability     Facial flushing and palpitations.   • Asmanex (120 Metered Doses) [Mometasone Furoate] Unknown - Low Severity   • Lortab [Hydrocodone-Acetaminophen] Other (See Comments)     Can't remember   • Morphine And Related Hallucinations   • Prilosec [Omeprazole] Other (See Comments)     unknown   • Sodium Clavulanate Unknown - High Severity   • Statins GI Intolerance   • Sulfa Antibiotics Other (See Comments)     Can't remember also more and can't remember   • Symbicort [Budesonide-Formoterol Fumarate] Unknown (See Comments)     Doesn't remember reaction type         Past Medical History:   Diagnosis Date   • COPD (chronic obstructive pulmonary disease) (HCC)          Past Surgical History:   Procedure Laterality Date   • HIP ARTHROPLASTY           History reviewed. No pertinent family history.      Social History     Socioeconomic History   • Marital status:    Tobacco Use   • Smoking status: Never Smoker   Vaping Use   • Vaping Use: Never used   Substance and Sexual Activity   • Alcohol use: Not Currently   • Drug use: Never   • Sexual activity: Defer         Prior to Admission medications    Medication Sig Start Date End Date Taking? Authorizing Provider   apixaban (ELIQUIS) 5 MG tablet tablet Take 1  "tablet by mouth Every 12 (Twelve) Hours. 8/26/21  Yes Jaycee Rose MD   aspirin 81 MG chewable tablet Chew 81 mg Daily.   Yes Qamar Javed MD   dilTIAZem XR (DILACOR XR) 180 MG 24 hr capsule Take 1 capsule by mouth Daily. 9/2/21  Yes Jaycee Rose MD   ipratropium (ATROVENT HFA) 17 MCG/ACT inhaler Inhale 2 puffs 4 (Four) Times a Day As Needed for Wheezing.   Yes Qamar Javed MD   ipratropium (ATROVENT) 0.02 % nebulizer solution Take 2.5 mL by nebulization 3 (Three) Times a Day. Sub amount for how much insurance allows 7/19/21  Yes Anita Toney MD   montelukast (SINGULAIR) 10 MG tablet Take 10 mg by mouth Every Night.   Yes Qamar Javed MD   predniSONE (DELTASONE) 5 MG tablet Take 5 mg by mouth Every Other Day.   Yes Qamar Javed MD   tiotropium bromide-olodaterol (Stiolto Respimat) 2.5-2.5 MCG/ACT aerosol solution inhaler Inhale 2 puffs Daily.   Yes Qamar Javed MD         OBJECTIVE    /80 (BP Location: Left arm, Patient Position: Sitting, Cuff Size: Adult)   Pulse 84   Temp 97.1 °F (36.2 °C)   Ht 165 cm (64.96\")   Wt 77.2 kg (170 lb 3.2 oz)   SpO2 98%   BMI 28.36 kg/m²         Review of Systems     Constitutional:  Denies recent weight loss, weight gain, fever or chills, no change in exercise tolerance     HENT:  Denies any hearing loss, epistaxis, hoarseness, or difficulty speaking.     Eyes: Wears eyeglasses or contact lenses     Respiratory:  Denies dyspnea with exertion,no cough, wheezing, or hemoptysis.     Cardiovascular: Negative for palpations, chest pain, orthopnea, PND, peripheral edema, syncope, or claudication.     Gastrointestinal:  Denies change in bowel habits, dyspepsia, ulcer disease, hematochezia, or melena.     Endocrine: Negative for cold intolerance, heat intolerance, polydipsia, polyphagia and polyuria. Denies any history of weight change, heat/cold intolerance, polydipsia, polyuria     Genitourinary: Negative.    "   Musculoskeletal: Denies any history of arthritic symptoms or back problems     Skin:  Denies any change in hair or nails, rashes, or skin lesions.     Allergic/Immunologic: Negative.  Negative for environmental allergies, food allergies and immunocompromised state.     Neurological:  Denies any history of recurrent headaches, strokes, TIA, or seizure disorder.     Hematological: Denies any food allergies, seasonal allergies, bleeding disorders, or lymphadenopathy.     Psychiatric/Behavioral: Denies any history of depression, substance abuse, or change in cognitive function.         Physical Exam     Constitutional: Cooperative, alert and oriented, well-developed, well-nourished, in no acute distress.     HENT:   Head: Normocephalic, normal hair patterns, no masses or tenderness.  Ears, Nose, and Throat: No gross abnormalities. No pallor or cyanosis. Dentition good.   Eyes: EOMS intact, PERRL, conjunctivae and lids unremarkable. Fundoscopic exam and visual fields not performed.   Neck: No palpable masses or adenopathy, no thyromegaly, no JVD, carotid pulses are full and equal bilaterally and without  Bruits.     Cardiovascular: Regular rhythm, S1 and S2 normal, no S3 or S4. Apical impulse not displaced. No murmurs, gallops, or rubs detected.     Pulmonary/Chest: Chest: normal symmetry, no tenderness to palpation, normal respiratory excursion, no intercostal retraction, no use of accessory muscles.            Pulmonary: Normal breath sounds. No rales or ronchi.    Abdominal: Abdomen soft, bowel sounds normoactive, no masses, no hepatosplenomegaly, non-tender, no bruits.     Musculoskeletal: No deformities, clubbing, cyanosis, erythema, or edema observed. There are no spinal abnormalities noted. Normal muscle strength and tone. Pulses full and equal in all extremities, no bruits auscultated.     Neurological: No gross motor or sensory deficits noted, affect appropriate, oriented to time, person, place.     Skin: Warm  and dry to the touch, no apparent skin lesions or masses noted.     Psychiatric: He has a normal mood and affect. His behavior is normal. Judgment and thought content normal.         Procedures      Lab Results   Component Value Date    WBC 8.23 11/08/2021    HGB 15.2 11/08/2021    HCT 44.4 11/08/2021    MCV 90.4 11/08/2021     11/08/2021     Lab Results   Component Value Date    GLUCOSE 115 (H) 11/08/2021    BUN 16 11/08/2021    CREATININE 0.92 11/08/2021    EGFRIFNONA 81 11/08/2021    BCR 17.4 11/08/2021    CO2 31.0 (H) 11/08/2021    CALCIUM 9.7 11/08/2021    ALBUMIN 4.50 11/08/2021    AST 17 11/08/2021    ALT 21 11/08/2021     No results found for: CHOL  No results found for: TRIG  No results found for: HDL  No components found for: LDLCALC  No results found for: LDL  No results found for: HDLLDLRATIO  No components found for: CHOLHDL  No results found for: HGBA1C  No results found for: TSH, P0UKJAG, Z8IBHOY, THYROIDAB        ASSESSMENT AND PLAN      Diagnoses and all orders for this visit:    1. Paroxysmal atrial fibrillation (HCC) (Primary)    2. Chronic obstructive pulmonary disease with acute exacerbation (HCC)    3. History of pulmonary embolism    4. Constipation, unspecified constipation type    Patient has history of atrial fibrillation per his monitor.  He is on anticoagulation for history of a pulmonary embolus and now for his atrial fibrillation.  He is really having no cardiac issues to speak of.  He is having some obstipation and irregular bowel movements.  He attributes that to a slight change of diet with less vegetables.  I have told him to retry his diet but he could also use some Metamucil.  I tell him that should help the constipation and will also help cholesterol.  He will speak to Dr. Starr also if the problem persist .  Patient's Body mass index is 28.36 kg/m². indicating that he is overweight (BMI 25-29.9). Patient's (Body mass index is 28.36 kg/m².) indicates that they are  overweight with health conditions that include none . Weight is unchanged. BMI is is above average; BMI management plan is completed. We discussed portion control. .                Jaycee Rose MD  2/10/2022  08:29 CST

## 2022-04-18 DIAGNOSIS — I48.0 PAROXYSMAL ATRIAL FIBRILLATION: ICD-10-CM

## 2022-04-18 RX ORDER — DILTIAZEM HYDROCHLORIDE 180 MG/1
180 CAPSULE, EXTENDED RELEASE ORAL DAILY
Qty: 90 CAPSULE | Refills: 3 | Status: SHIPPED | OUTPATIENT
Start: 2022-04-18 | End: 2022-06-30 | Stop reason: SDUPTHER

## 2022-04-25 ENCOUNTER — PROCEDURE VISIT (OUTPATIENT)
Dept: PULMONOLOGY | Facility: CLINIC | Age: 72
End: 2022-04-25

## 2022-04-25 ENCOUNTER — OFFICE VISIT (OUTPATIENT)
Dept: PULMONOLOGY | Facility: CLINIC | Age: 72
End: 2022-04-25

## 2022-04-25 VITALS
HEIGHT: 65 IN | WEIGHT: 163 LBS | SYSTOLIC BLOOD PRESSURE: 138 MMHG | DIASTOLIC BLOOD PRESSURE: 82 MMHG | OXYGEN SATURATION: 97 % | HEART RATE: 71 BPM | BODY MASS INDEX: 27.16 KG/M2

## 2022-04-25 DIAGNOSIS — J44.9 CHRONIC OBSTRUCTIVE PULMONARY DISEASE, UNSPECIFIED COPD TYPE: Primary | ICD-10-CM

## 2022-04-25 DIAGNOSIS — J44.9 CHRONIC OBSTRUCTIVE PULMONARY DISEASE, UNSPECIFIED COPD TYPE: ICD-10-CM

## 2022-04-25 PROBLEM — J96.22 ACUTE ON CHRONIC RESPIRATORY FAILURE WITH HYPOXIA AND HYPERCAPNIA: Status: RESOLVED | Noted: 2018-02-06 | Resolved: 2022-04-25

## 2022-04-25 PROBLEM — J18.9 COMMUNITY ACQUIRED PNEUMONIA OF RIGHT MIDDLE LOBE OF LUNG: Status: RESOLVED | Noted: 2021-07-12 | Resolved: 2022-04-25

## 2022-04-25 PROBLEM — J44.1 ACUTE EXACERBATION OF CHRONIC OBSTRUCTIVE PULMONARY DISEASE (COPD): Status: RESOLVED | Noted: 2021-07-12 | Resolved: 2022-04-25

## 2022-04-25 PROBLEM — A41.9 SEPSIS DUE TO PNEUMONIA: Status: RESOLVED | Noted: 2018-02-06 | Resolved: 2022-04-25

## 2022-04-25 PROBLEM — J18.9 PNEUMONIA OF BOTH LOWER LOBES DUE TO INFECTIOUS ORGANISM: Status: RESOLVED | Noted: 2018-02-06 | Resolved: 2022-04-25

## 2022-04-25 PROBLEM — J44.1 CHRONIC OBSTRUCTIVE PULMONARY DISEASE WITH ACUTE EXACERBATION: Status: RESOLVED | Noted: 2018-02-06 | Resolved: 2022-04-25

## 2022-04-25 PROBLEM — J96.21 ACUTE ON CHRONIC RESPIRATORY FAILURE WITH HYPOXIA AND HYPERCAPNIA: Status: RESOLVED | Noted: 2018-02-06 | Resolved: 2022-04-25

## 2022-04-25 PROBLEM — J18.9 SEPSIS DUE TO PNEUMONIA: Status: RESOLVED | Noted: 2018-02-06 | Resolved: 2022-04-25

## 2022-04-25 PROCEDURE — 94729 DIFFUSING CAPACITY: CPT | Performed by: INTERNAL MEDICINE

## 2022-04-25 PROCEDURE — 94727 GAS DIL/WSHOT DETER LNG VOL: CPT | Performed by: INTERNAL MEDICINE

## 2022-04-25 PROCEDURE — 94010 BREATHING CAPACITY TEST: CPT | Performed by: INTERNAL MEDICINE

## 2022-04-25 PROCEDURE — 99204 OFFICE O/P NEW MOD 45 MIN: CPT | Performed by: INTERNAL MEDICINE

## 2022-04-25 NOTE — PROGRESS NOTES
FULL PFT WITHOUT BRONCHODILATOR PERFORMED.     GOOD PATIENT EFFORT AND COOPERATION.     7+ SECOND EXHALATION ON SPIROMETRY, NO PLATEAU ACHIEVED, END OF TEST CRITERIA NOT MET.     ORDERED BY DR. POND, READ BY DR. POND

## 2022-04-25 NOTE — PROGRESS NOTES
Pulmonary Consultation    Landon Starr MD,    Thank you for asking me to see Brian Garcia for   Chief Complaint   Patient presents with   • COPD     Chief Complaint    .      History of Present Illness  Brian Garcia is a 71 y.o. male     4/25/22  [facility]. He tells me his pulmonologist retired and he needs a new one. Was going every 6 months    Patient has Stiolto  He has atrovent HFA and atrovent nebs. He report on albuterol he gets racing heart and flushing.  He is taking Prednisone 5mg every other day for several years  He has supplemental O2 for a few years, DME [facility] of [place], [place]. 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  Review of Systems  As described in the HPI. Otherwise, remainder of ROS (14 systems) were negative.    Patient Active Problem List   Diagnosis   • Hypokalemia   • Physical deconditioning   • Pulmonary nodules   • Leukocytosis   • History of pulmonary embolism   • Chronic obstructive pulmonary disease (HCC)         Current Outpatient Medications:   •  apixaban (ELIQUIS) 5 MG tablet tablet, Take 1 tablet by mouth Every 12 (Twelve) Hours., Disp: 90 tablet, Rfl: 7  •  aspirin 81 MG chewable tablet, Chew 81 mg Daily., Disp: , Rfl:   •  Cholecalciferol (VITAMIN D3 PO), Take  by mouth., Disp: , Rfl:   •  dilTIAZem XR (DILACOR XR) 180 MG 24 hr capsule, Take 1 capsule by mouth Daily., Disp: 90 capsule, Rfl: 3  •  ipratropium (ATROVENT HFA) 17 MCG/ACT inhaler, Inhale 2 puffs 4 (Four) Times a Day As Needed for Wheezing., Disp: , Rfl:   •  ipratropium (ATROVENT) 0.02 % nebulizer solution, Take 2.5 mL by nebulization 3 (Three) Times a Day. Sub amount for how much insurance allows, Disp: 670 mL, Rfl:  "0  •  montelukast (SINGULAIR) 10 MG tablet, Take 10 mg by mouth Every Night., Disp: , Rfl:   •  predniSONE (DELTASONE) 5 MG tablet, Take 5 mg by mouth Every Other Day., Disp: , Rfl:   •  tiotropium bromide-olodaterol (Stiolto Respimat) 2.5-2.5 MCG/ACT aerosol solution inhaler, Inhale 2 puffs Daily., Disp: , Rfl:     Allergies   Allergen Reactions   • Amoxicillin Anaphylaxis   • Albuterol Irritability     Facial flushing and palpitations.   • Asmanex (120 Metered Doses) [Mometasone Furoate] Unknown - Low Severity   • Lortab [Hydrocodone-Acetaminophen] Other (See Comments)     Can't remember   • Morphine And Related Hallucinations   • Prilosec [Omeprazole] Other (See Comments)     unknown   • Sodium Clavulanate Unknown - High Severity   • Statins GI Intolerance   • Sulfa Antibiotics Other (See Comments)     Can't remember also more and can't remember   • Symbicort [Budesonide-Formoterol Fumarate] Unknown (See Comments)     Doesn't remember reaction type       Past Medical History:   Diagnosis Date   • COPD (chronic obstructive pulmonary disease) (HCC)      Past Surgical History:   Procedure Laterality Date   • HIP ARTHROPLASTY       Social History     Socioeconomic History   • Marital status:    Tobacco Use   • Smoking status: Never Smoker   Vaping Use   • Vaping Use: Never used   Substance and Sexual Activity   • Alcohol use: Not Currently   • Drug use: Never   • Sexual activity: Defer     History reviewed. No pertinent family history.       Objective     Blood pressure 138/82, pulse 71, height 165.1 cm (65\"), weight 73.9 kg (163 lb), SpO2 97 %.  Physical Exam    PFTs: (independently reviewed and interpreted by me)  4/25/22  FVC 2.04L, 58%  FEV1 0.80L, 29%  Ratio 39%  TLC 6.92L, 114%  %  DLCO 36%  Very severe obstructive pattern, increased lung volumes suggestive of hyperinflation and air trapping, and severe diffusion impairment      Radiology (independently reviewed and interpreted by me):  CTA chest " 7/12/21- severe emphysema, RML nodule  PET 7/30/21- nodule not appreciated, no aviditiy in that area       Assessment/Plan     Diagnoses and all orders for this visit:    1. Chronic obstructive pulmonary disease, unspecified COPD type (HCC) (Primary)         Discussion/ Recommendations:   Saqib with very severe COPD, however doesn't sound like he's had any recent exacerbations and is actually doing fairly well. Can continue on Stiolto for now. Doesn't actually have an allergy to albuterol but it does sound like he's very sensitive to it. I'm not really sure 5mg of prednisone every other day is doing much for him, not sure why he's not on an ICS. Can consider changing this at follow up    Regarding the nodule on his CT from last year, he is apparently following with Dr Davis for this, and tells me that he has given a disc with subsequent scans to him. I don't have any follow up images after this one. It is reassuring that the PET was negative.    -Continue Stiolto daily  -Continue prn atrovent  -Cont Prednisone 5 mg every other day  -Cont supplemental O2    Follow up in 6 months or sooner if needed             No follow-ups on file.      Thank you for allowing me to participate in the care of Brian Garcia. Please do not hesitate to contact me with any questions.         This document has been electronically signed by Clotilde Washington DO on April 25, 2022 15:12 CDT

## 2022-05-06 ENCOUNTER — TELEPHONE (OUTPATIENT)
Dept: ONCOLOGY | Facility: HOSPITAL | Age: 72
End: 2022-05-06

## 2022-05-06 NOTE — TELEPHONE ENCOUNTER
----- Message from Omega Davis MD sent at 5/6/2022  1:51 PM CDT -----  Regarding: RE: ct report  If he is scheduled to get CT scan done in the near future we can postpone his appointment to CT scan.  If not keep his appointment and we will order a neck CT scan for surveillance purposes after discussion with patient.  Thank you  ----- Message -----  From: Melissa Causey RN  Sent: 5/6/2022   1:19 PM CDT  To: Omega Davis MD  Subject: FW: ct report                                    [facility] Per pt, he has not had CT and the last one he had was in November when he saw us. Do we still need to see him?  Thanks    ----- Message -----  From: Ping Holbrook  Sent: 5/5/2022   8:35 AM CDT  To: Melissa Mcknight RN, #  Subject: RE: ct report                                    Pt called this morning and stated that the last CT he had was the one in October and we have that report scanned in   ----- Message -----  From: Paola Strauss  Sent: 5/4/2022   4:09 PM CDT  To: Melissa Causey RN, #  Subject: RE: ct report                                    Previous reports don't have phone number attached.  Called patient and had to leave voicemail for him to call us back to let us know if he even had a CT recently.   I'm not here 05-05-22, so I asked that he let whoever answers know.    ----- Message -----  From: Melissa Causey RN  Sent: 5/4/2022   3:12 PM CDT  To: [facility]  Subject: ct report                                        Pt coming on 5/9, we are needing a ct report. [facility] Can you all call for the report? There is an old report in media so the numbers should be on there. Thanks

## 2022-05-06 NOTE — TELEPHONE ENCOUNTER
Called and spoke with pt, pt has not had ct scan due to pulmonologist retiring, pt will keep his appt on 5/9. V/u obtained.

## 2022-05-09 ENCOUNTER — OFFICE VISIT (OUTPATIENT)
Dept: ONCOLOGY | Facility: CLINIC | Age: 72
End: 2022-05-09

## 2022-05-09 ENCOUNTER — LAB (OUTPATIENT)
Dept: ONCOLOGY | Facility: HOSPITAL | Age: 72
End: 2022-05-09

## 2022-05-09 VITALS
TEMPERATURE: 97.9 F | BODY MASS INDEX: 26.74 KG/M2 | WEIGHT: 160.7 LBS | DIASTOLIC BLOOD PRESSURE: 67 MMHG | OXYGEN SATURATION: 96 % | HEART RATE: 93 BPM | SYSTOLIC BLOOD PRESSURE: 153 MMHG

## 2022-05-09 DIAGNOSIS — Z86.711 HISTORY OF PULMONARY EMBOLISM: ICD-10-CM

## 2022-05-09 DIAGNOSIS — D72.828 OTHER ELEVATED WHITE BLOOD CELL (WBC) COUNT: Chronic | ICD-10-CM

## 2022-05-09 DIAGNOSIS — R91.8 PULMONARY NODULES: ICD-10-CM

## 2022-05-09 DIAGNOSIS — D72.828 OTHER ELEVATED WHITE BLOOD CELL (WBC) COUNT: ICD-10-CM

## 2022-05-09 DIAGNOSIS — R91.8 PULMONARY NODULES: Primary | Chronic | ICD-10-CM

## 2022-05-09 DIAGNOSIS — Z86.711 HISTORY OF PULMONARY EMBOLISM: Chronic | ICD-10-CM

## 2022-05-09 LAB
ALBUMIN SERPL-MCNC: 4.2 G/DL (ref 3.5–5.2)
ALBUMIN/GLOB SERPL: 1.4 G/DL
ALP SERPL-CCNC: 87 U/L (ref 39–117)
ALT SERPL W P-5'-P-CCNC: 23 U/L (ref 1–41)
ANION GAP SERPL CALCULATED.3IONS-SCNC: 9 MMOL/L (ref 5–15)
AST SERPL-CCNC: 21 U/L (ref 1–40)
BASOPHILS # BLD AUTO: 0.08 10*3/MM3 (ref 0–0.2)
BASOPHILS NFR BLD AUTO: 0.8 % (ref 0–1.5)
BILIRUB SERPL-MCNC: 0.3 MG/DL (ref 0–1.2)
BUN SERPL-MCNC: 17 MG/DL (ref 8–23)
BUN/CREAT SERPL: 22.1 (ref 7–25)
CALCIUM SPEC-SCNC: 9.2 MG/DL (ref 8.6–10.5)
CHLORIDE SERPL-SCNC: 100 MMOL/L (ref 98–107)
CO2 SERPL-SCNC: 29 MMOL/L (ref 22–29)
CREAT SERPL-MCNC: 0.77 MG/DL (ref 0.76–1.27)
DEPRECATED RDW RBC AUTO: 39.9 FL (ref 37–54)
EGFRCR SERPLBLD CKD-EPI 2021: 95.1 ML/MIN/1.73
EOSINOPHIL # BLD AUTO: 0.45 10*3/MM3 (ref 0–0.4)
EOSINOPHIL NFR BLD AUTO: 4.6 % (ref 0.3–6.2)
ERYTHROCYTE [DISTWIDTH] IN BLOOD BY AUTOMATED COUNT: 12.1 % (ref 12.3–15.4)
GLOBULIN UR ELPH-MCNC: 2.9 GM/DL
GLUCOSE SERPL-MCNC: 139 MG/DL (ref 65–99)
HCT VFR BLD AUTO: 42.4 % (ref 37.5–51)
HGB BLD-MCNC: 14.6 G/DL (ref 13–17.7)
HOLD SPECIMEN: NORMAL
IMM GRANULOCYTES # BLD AUTO: 0.04 10*3/MM3 (ref 0–0.05)
IMM GRANULOCYTES NFR BLD AUTO: 0.4 % (ref 0–0.5)
LYMPHOCYTES # BLD AUTO: 1.92 10*3/MM3 (ref 0.7–3.1)
LYMPHOCYTES NFR BLD AUTO: 19.8 % (ref 19.6–45.3)
MCH RBC QN AUTO: 31.3 PG (ref 26.6–33)
MCHC RBC AUTO-ENTMCNC: 34.4 G/DL (ref 31.5–35.7)
MCV RBC AUTO: 91 FL (ref 79–97)
MONOCYTES # BLD AUTO: 1.06 10*3/MM3 (ref 0.1–0.9)
MONOCYTES NFR BLD AUTO: 10.9 % (ref 5–12)
NEUTROPHILS NFR BLD AUTO: 6.14 10*3/MM3 (ref 1.7–7)
NEUTROPHILS NFR BLD AUTO: 63.5 % (ref 42.7–76)
NRBC BLD AUTO-RTO: 0 /100 WBC (ref 0–0.2)
PLATELET # BLD AUTO: 244 10*3/MM3 (ref 140–450)
PMV BLD AUTO: 10 FL (ref 6–12)
POTASSIUM SERPL-SCNC: 4.3 MMOL/L (ref 3.5–5.2)
PROT SERPL-MCNC: 7.1 G/DL (ref 6–8.5)
RBC # BLD AUTO: 4.66 10*6/MM3 (ref 4.14–5.8)
SODIUM SERPL-SCNC: 138 MMOL/L (ref 136–145)
WBC NRBC COR # BLD: 9.69 10*3/MM3 (ref 3.4–10.8)

## 2022-05-09 PROCEDURE — 85025 COMPLETE CBC W/AUTO DIFF WBC: CPT

## 2022-05-09 PROCEDURE — 80053 COMPREHEN METABOLIC PANEL: CPT

## 2022-05-09 PROCEDURE — G0463 HOSPITAL OUTPT CLINIC VISIT: HCPCS | Performed by: INTERNAL MEDICINE

## 2022-05-09 PROCEDURE — 99214 OFFICE O/P EST MOD 30 MIN: CPT | Performed by: INTERNAL MEDICINE

## 2022-05-09 PROCEDURE — 1123F ACP DISCUSS/DSCN MKR DOCD: CPT | Performed by: INTERNAL MEDICINE

## 2022-05-09 PROCEDURE — 1126F AMNT PAIN NOTED NONE PRSNT: CPT | Performed by: INTERNAL MEDICINE

## 2022-05-09 NOTE — PROGRESS NOTES
DATE OF VISIT: 5/9/2022      REASON FOR VISIT: Lung nodule, history of pulmonary embolism, leukocytosis      HISTORY OF PRESENT ILLNESS:   71-year-old male with medical problem consisting of hypertension, history of pulmonary embolism in 2015 for which patient took Coumadin for 1 year, chronic obstructive pulmonary disease, heavy nicotine addiction that he quit in 2008 was initially seen in consultation on July 13, 2020 for spiculated right lung lesion when he was admitted to hospital.  Patient is here for follow-up appointment today.  He was scheduled to get CT scan done on lung at [facility] but states he is pulmonologist has retired and no one has ordered CT scan that he gets every 6 months for surveillance purposes for lung nodule.  Complains of chronic shortness of breath without any recent worsening.  Denies any new cough or hemoptysis.  Complains of tingling and numbness affecting lower extremity.  Complains of chronic hearing loss.      Past Medical History, Past Surgical History, Social History, Family History have been reviewed and are without significant changes except as mentioned.    Review of Systems   A comprehensive 14 point review of systems was performed and was negative except as mentioned in HPI.    Medications:  The current medication list was reviewed in the EMR    ALLERGIES:    Allergies   Allergen Reactions   • Amoxicillin Anaphylaxis   • Albuterol Irritability     Facial flushing and palpitations.   • Asmanex (120 Metered Doses) [Mometasone Furoate] Unknown - Low Severity   • Lortab [Hydrocodone-Acetaminophen] Other (See Comments)     Can't remember   • Morphine And Related Hallucinations   • Prilosec [Omeprazole] Other (See Comments)     unknown   • Sodium Clavulanate Unknown - High Severity   • Statins GI Intolerance   • Sulfa Antibiotics Other (See Comments)     Can't remember also more and can't remember   • Symbicort [Budesonide-Formoterol Fumarate] Unknown (See Comments)     Doesn't  remember reaction type       Objective      Vitals:    05/09/22 1406   BP: 153/67   Pulse: 93   Temp: 97.9 °F (36.6 °C)   TempSrc: Temporal   SpO2: 96%  Comment: O2 TANK IS SET ON 4   Weight: 72.9 kg (160 lb 11.2 oz)   PainSc: 0-No pain     Current Status 8/2/2021   ECOG score 0       Physical Exam  Pulmonary:      Breath sounds: Normal breath sounds.   Neurological:      Mental Status: He is alert and oriented to person, place, and time.           RECENT LABS:  Glucose   Date Value Ref Range Status   05/09/2022 139 (H) 65 - 99 mg/dL Final     Glucose, Arterial   Date Value Ref Range Status   02/06/2018 106 mmol/L Final     Sodium   Date Value Ref Range Status   05/09/2022 138 136 - 145 mmol/L Final     Potassium   Date Value Ref Range Status   05/09/2022 4.3 3.5 - 5.2 mmol/L Final     CO2   Date Value Ref Range Status   05/09/2022 29.0 22.0 - 29.0 mmol/L Final     Chloride   Date Value Ref Range Status   05/09/2022 100 98 - 107 mmol/L Final     Anion Gap   Date Value Ref Range Status   05/09/2022 9.0 5.0 - 15.0 mmol/L Final     Creatinine   Date Value Ref Range Status   05/09/2022 0.77 0.76 - 1.27 mg/dL Final     BUN   Date Value Ref Range Status   05/09/2022 17 8 - 23 mg/dL Final     BUN/Creatinine Ratio   Date Value Ref Range Status   05/09/2022 22.1 7.0 - 25.0 Final     Calcium   Date Value Ref Range Status   05/09/2022 9.2 8.6 - 10.5 mg/dL Final     eGFR Non  Amer   Date Value Ref Range Status   11/08/2021 81 >60 mL/min/1.73 Final     Alkaline Phosphatase   Date Value Ref Range Status   05/09/2022 87 39 - 117 U/L Final     Total Protein   Date Value Ref Range Status   05/09/2022 7.1 6.0 - 8.5 g/dL Final     ALT (SGPT)   Date Value Ref Range Status   05/09/2022 23 1 - 41 U/L Final     AST (SGOT)   Date Value Ref Range Status   05/09/2022 21 1 - 40 U/L Final     Total Bilirubin   Date Value Ref Range Status   05/09/2022 0.3 0.0 - 1.2 mg/dL Final     Albumin   Date Value Ref Range Status   05/09/2022 4.20  3.50 - 5.20 g/dL Final     Globulin   Date Value Ref Range Status   05/09/2022 2.9 gm/dL Final     Lab Results   Component Value Date    WBC 9.69 05/09/2022    HGB 14.6 05/09/2022    HCT 42.4 05/09/2022    MCV 91.0 05/09/2022     05/09/2022     Lab Results   Component Value Date    NEUTROABS 6.14 05/09/2022     No results found for: , LABCA2, AFPTM, HCGQUANT, , CHROMGRNA, [id number], CEA, REFLABREPO      PATHOLOGY:  * Cannot find OR log *         RADIOLOGY DATA :  No radiology results for the last 7 days        Assessment/Plan     1.  Lung nodule:  - PET/CT done on July 30, 2021 did not show any evidence of uptake lung nodule  - CT of chest without contrast done on October 20, 2021 at [facility] showed stable lung nodule.  - We will get CT of chest without contrast along with CBC and CMP prior to next clinic visit in 6 months.  If neck CT scan does not show any significant change in left lung nodule.  No further routine CT scan will be required which was discussed with patient.    2.  History of pulmonary embolism  - Diagnosed in pulmonary embolism in 2015.  S/p Coumadin for 1 year  - CT angiogram in July 2021 was negative for pulmonary embolism.    3.  Leukocytosis  - White blood cell count is normal at 9000.  Will monitor CBC    4.  Health maintenance: Patient does not smoke    5. Advance Care Planning: For now patient remains full code and is able to make decisions.  Patient has health care surrogate mentioned on chart.                 PHQ-9 Total Score: 1   -Patient is not homicidal or suicidal.  No acute intervention required.    Brian Garcia reports a pain score of 0.  Given his pain assessment as noted, treatment options were discussed and the following options were decided upon as a follow-up plan to address the patient's pain: continuation of current treatment plan for pain.         Omega Davis MD  5/9/2022  14:29 CDT        Part of this note may be an electronic  transcription/translation of spoken language to printed text using the Dragon Dictation System.          CC:

## 2022-06-13 ENCOUNTER — OFFICE VISIT (OUTPATIENT)
Dept: PULMONOLOGY | Facility: CLINIC | Age: 72
End: 2022-06-13

## 2022-06-13 VITALS
HEART RATE: 90 BPM | OXYGEN SATURATION: 99 % | BODY MASS INDEX: 26.66 KG/M2 | DIASTOLIC BLOOD PRESSURE: 62 MMHG | SYSTOLIC BLOOD PRESSURE: 126 MMHG | HEIGHT: 65 IN | WEIGHT: 160 LBS

## 2022-06-13 DIAGNOSIS — R04.2 HEMOPTYSIS: Primary | ICD-10-CM

## 2022-06-13 DIAGNOSIS — J44.1 COPD WITH ACUTE EXACERBATION: ICD-10-CM

## 2022-06-13 PROCEDURE — 99214 OFFICE O/P EST MOD 30 MIN: CPT | Performed by: INTERNAL MEDICINE

## 2022-06-13 RX ORDER — DOXYCYCLINE HYCLATE 100 MG
100 TABLET ORAL 2 TIMES DAILY
Qty: 14 TABLET | Refills: 0 | Status: SHIPPED | OUTPATIENT
Start: 2022-06-13 | End: 2022-06-20

## 2022-06-13 RX ORDER — PREDNISONE 10 MG/1
10 TABLET ORAL DAILY
Qty: 5 TABLET | Refills: 0 | OUTPATIENT
Start: 2022-06-13 | End: 2022-07-09

## 2022-06-13 NOTE — PROGRESS NOTES
Pulmonary Consultation    No ref. provider found,    Thank you for asking me to see Brian Garcia for   Chief Complaint   Patient presents with   • Coughing Up Blood   .      History of Present Illness  Brian Garcia is a 71 y.o. male     6/13/22  Patient here for acute visit. He tells me he's coughed up some brihgt red blood last week, more short of breath and lower O2 sats (but not low enough to require increased supplemental o2). Denies fever, fatigue, muscle aches, runny nose or GI symptoms      4/25/22  [facility] He tells me his pulmonologist retired and he needs a new one. Was going every 6 months  Patient has Stiolto  He has atrovent HFA and atrovent nebs. He report on albuterol he gets racing heart and flushing.  He is taking Prednisone 5mg every other day for several years  He has supplemental O2 for a few years, D[facility] [place], [place]. 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  Review of Systems  As described in the HPI. Otherwise, remainder of ROS (14 systems) were negative.    Patient Active Problem List   Diagnosis   • Hypokalemia   • Physical deconditioning   • Pulmonary nodules   • Leukocytosis   • History of pulmonary embolism   • Chronic obstructive pulmonary disease (HCC)         Current Outpatient Medications:   •  apixaban (ELIQUIS) 5 MG tablet tablet, Take 1 tablet by mouth Every 12 (Twelve) Hours., Disp: 90 tablet, Rfl: 7  •  Cholecalciferol (VITAMIN D3 PO), Take  by mouth., Disp: , Rfl:   •  dilTIAZem XR (DILACOR XR) 180 MG 24 hr capsule, Take 1 capsule by mouth Daily., Disp: 90 capsule, Rfl: 3  •  ipratropium (ATROVENT HFA) 17 MCG/ACT inhaler, Inhale 2 puffs 4 (Four) Times a Day As Needed for  Wheezing., Disp: , Rfl:   •  ipratropium (ATROVENT) 0.02 % nebulizer solution, Take 2.5 mL by nebulization 3 (Three) Times a Day. Sub amount for how much insurance allows, Disp: 670 mL, Rfl: 0  •  montelukast (SINGULAIR) 10 MG tablet, Take 10 mg by mouth Every Night., Disp: , Rfl:   •  predniSONE (DELTASONE) 5 MG tablet, Take 5 mg by mouth Every Other Day., Disp: , Rfl:   •  tiotropium bromide-olodaterol (Stiolto Respimat) 2.5-2.5 MCG/ACT aerosol solution inhaler, Inhale 2 puffs Daily., Disp: , Rfl:   •  aspirin 81 MG chewable tablet, Chew 81 mg Daily., Disp: , Rfl:   •  doxycycline (VIBRAMYICN) 100 MG tablet, Take 1 tablet by mouth 2 (Two) Times a Day for 7 days., Disp: 14 tablet, Rfl: 0  •  predniSONE (DELTASONE) 10 MG tablet, Take 1 tablet by mouth Daily., Disp: 5 tablet, Rfl: 0    Allergies   Allergen Reactions   • Amoxicillin Anaphylaxis   • Albuterol Irritability     Facial flushing and palpitations.   • Asmanex (120 Metered Doses) [Mometasone Furoate] Unknown - Low Severity   • Lortab [Hydrocodone-Acetaminophen] Other (See Comments)     Can't remember   • Morphine And Related Hallucinations   • Prilosec [Omeprazole] Other (See Comments)     unknown   • Sodium Clavulanate Unknown - High Severity   • Statins GI Intolerance   • Sulfa Antibiotics Other (See Comments)     Can't remember also more and can't remember   • Symbicort [Budesonide-Formoterol Fumarate] Unknown (See Comments)     Doesn't remember reaction type       Past Medical History:   Diagnosis Date   • COPD (chronic obstructive pulmonary disease) (HCC)      Past Surgical History:   Procedure Laterality Date   • HIP ARTHROPLASTY       Social History     Socioeconomic History   • Marital status:    Tobacco Use   • Smoking status: Never Smoker   Vaping Use   • Vaping Use: Never used   Substance and Sexual Activity   • Alcohol use: Not Currently   • Drug use: Never   • Sexual activity: Defer     History reviewed. No pertinent family history.      "  Objective     Blood pressure 126/62, pulse 90, height 165.1 cm (65\"), weight 72.6 kg (160 lb), SpO2 99 %.  Physical Exam  Vitals reviewed.   Constitutional:       Appearance: Normal appearance.   HENT:      Head: Normocephalic and atraumatic.      Right Ear: Tympanic membrane normal.      Left Ear: Tympanic membrane normal.      Nose: Nose normal.      Mouth/Throat:      Mouth: Mucous membranes are moist.      Pharynx: Oropharynx is clear.   Eyes:      Conjunctiva/sclera: Conjunctivae normal.      Pupils: Pupils are equal, round, and reactive to light.   Cardiovascular:      Rate and Rhythm: Normal rate and regular rhythm.      Pulses: Normal pulses.      Heart sounds: Normal heart sounds.   Pulmonary:      Effort: Pulmonary effort is normal.      Breath sounds: Normal breath sounds.   Abdominal:      General: Abdomen is flat. Bowel sounds are normal.      Palpations: Abdomen is soft.   Musculoskeletal:         General: Normal range of motion.      Cervical back: Normal range of motion and neck supple.   Skin:     General: Skin is warm and dry.   Neurological:      General: No focal deficit present.      Mental Status: He is alert and oriented to person, place, and time.   Psychiatric:         Mood and Affect: Mood normal.         Behavior: Behavior normal.         PFTs: (independently reviewed and interpreted by me)  4/25/22  FVC 2.04L, 58%  FEV1 0.80L, 29%  Ratio 39%  TLC 6.92L, 114%  %  DLCO 36%  Very severe obstructive pattern, increased lung volumes suggestive of hyperinflation and air trapping, and severe diffusion impairment      Radiology (independently reviewed and interpreted by me):  CTA chest 7/12/21- severe emphysema, RML nodule  PET 7/30/21- nodule not appreciated, no aviditiy in that area       Assessment & Plan     Diagnoses and all orders for this visit:    1. Hemoptysis (Primary)    2. COPD with acute exacerbation (HCC)    Other orders  -     doxycycline (VIBRAMYICN) 100 MG tablet; Take 1 " tablet by mouth 2 (Two) Times a Day for 7 days.  Dispense: 14 tablet; Refill: 0  -     predniSONE (DELTASONE) 10 MG tablet; Take 1 tablet by mouth Daily.  Dispense: 5 tablet; Refill: 0         Discussion/ Recommendations:   Patient sounds like he is having a mild exacerbation of his COPD. He's extremely sensitive to steroids, will do a very mild increase to his standard dose as well as some antibiotics. He's on Eliquis for afib which likely contributed to the hemoptysis, which sounded mild. If it continues after treatment, will need to get a CT chest    Follow up in a week       No follow-ups on file.      Thank you for allowing me to participate in the care of Brian Garcia. Please do not hesitate to contact me with any questions.         This document has been electronically signed by Clotilde Washington DO on June 13, 2022 14:26 CDT

## 2022-06-30 RX ORDER — DILTIAZEM HYDROCHLORIDE 180 MG/1
180 CAPSULE, EXTENDED RELEASE ORAL DAILY
Qty: 60 CAPSULE | Refills: 0 | OUTPATIENT
Start: 2022-06-30 | End: 2022-10-07

## 2022-07-11 ENCOUNTER — TELEPHONE (OUTPATIENT)
Dept: PULMONOLOGY | Facility: CLINIC | Age: 72
End: 2022-07-11

## 2022-07-11 ENCOUNTER — OFFICE VISIT (OUTPATIENT)
Dept: PULMONOLOGY | Facility: CLINIC | Age: 72
End: 2022-07-11

## 2022-07-11 VITALS
BODY MASS INDEX: 25.99 KG/M2 | HEIGHT: 65 IN | OXYGEN SATURATION: 97 % | WEIGHT: 156 LBS | DIASTOLIC BLOOD PRESSURE: 80 MMHG | SYSTOLIC BLOOD PRESSURE: 148 MMHG | HEART RATE: 113 BPM

## 2022-07-11 DIAGNOSIS — J96.11 CHRONIC RESPIRATORY FAILURE WITH HYPOXIA, ON HOME O2 THERAPY: ICD-10-CM

## 2022-07-11 DIAGNOSIS — J43.2 CENTRILOBULAR EMPHYSEMA: Primary | ICD-10-CM

## 2022-07-11 DIAGNOSIS — Z99.81 CHRONIC RESPIRATORY FAILURE WITH HYPOXIA, ON HOME O2 THERAPY: ICD-10-CM

## 2022-07-11 PROBLEM — J44.9 CHRONIC OBSTRUCTIVE PULMONARY DISEASE: Status: RESOLVED | Noted: 2022-04-25 | Resolved: 2022-07-11

## 2022-07-11 PROCEDURE — 99215 OFFICE O/P EST HI 40 MIN: CPT | Performed by: NURSE PRACTITIONER

## 2022-07-11 NOTE — TELEPHONE ENCOUNTER
Returned call to patient per Dr. Washington advised him that Coco could see him today at 3 pm for the o2 levels dropping. He voiced his understanding and said that he was no longer coughing up blood.     Returned patient call and patient was not available so left generic VM for return call.     ----- Message from Joanne Gerard sent at 7/11/2022 11:46 AM CDT -----  Contact: [phone]  Appt on 7/15 and he is worse. Been to Urgent care and his O2 drops in the 80's with very little exertion. Wants to be seen ASAP

## 2022-07-11 NOTE — PROGRESS NOTES
"  Pulmonary office visit    No ref. provider found,    Thank you for asking me to see Brian Zarate for   Chief Complaint   Patient presents with   • Shortness of Breath     Chief Complaint    .      History of Present Illness  Brian Zarate is a 71 y.o. male     7/11/22: Mr. Zarate is a 71 year patient who presents today as an acute visit for low oxygen. He was educated on the use of his oxygen and not using too much. He does not like his oxygen to be less than 90. I told him that normal is 92%. And it is ok for him to drop into high 80s with exertion. He has been on 4L NC continuous for awhile and just recently had to increase it to 5L with exertion about a month ago when he had some bloody sputum.   He called his PCP who said his o2 should not be below 89%. This is not feasible for MR. Zarate and he will be running in the high 80s with exertion and should be 92-95% at the most at rest. He is over using his o2 and his wife confirms it. Mr. Zarate will change his liters on his portable conserver which he uses all the time because his home o2 large conserver \"puts out too much air and hurts his nose\".   His hemoptysis resolved. He will return for his Friday appt if he still needs it. I asked him to seek out a dentist immediately for his dental abscess on the right.    He has an abscessed tooth right now on the right that has been on Clindamycin for since Saturday. He has been watching his o2 closely and HR on pulse ox. His HR has been elevated. He is regular rhythm today.  He is tachycardic and has had low grade temps controlled with Tylenol.       6/13/22  Patient here for acute visit. He tells me he's coughed up some bright red blood last week, more short of breath and lower O2 sats (but not low enough to require increased supplemental o2). Denies fever, fatigue, muscle aches, runny nose or GI symptoms      4/25/22  [facility] He tells me his pulmonologist " retired and he needs a new one. Was going every 6 months  Patient has Stiolto  He has atrovent HFA and atrovent nebs. He report on albuterol he gets racing heart and flushing.  He is taking Prednisone 5mg every other day for several years  He has supplemental O2 for a few years, D[facility] of [place], [place]. 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  Review of Systems   Constitutional: Negative for diaphoresis, fatigue, fever and unexpected weight change.   Respiratory: Negative for cough, chest tightness, shortness of breath and wheezing.    Cardiovascular: Negative for chest pain, palpitations and leg swelling.   Gastrointestinal: Negative for abdominal distention and blood in stool.   Genitourinary: Negative for hematuria.   Musculoskeletal: Negative for arthralgias and myalgias.   Skin: Negative for pallor and rash.   Neurological: Negative for dizziness, syncope and weakness.   Hematological: Does not bruise/bleed easily.   Psychiatric/Behavioral: Negative for confusion. The patient is not nervous/anxious.      As described in the HPI. Otherwise, remainder of ROS (14 systems) were negative.    Patient Active Problem List   Diagnosis   • Hypokalemia   • Physical deconditioning   • Pulmonary nodules   • Leukocytosis   • History of pulmonary embolism   • Stage 4 very severe COPD by GOLD classification ([facility])   • Chronic respiratory failure with hypoxia, on home O2 therapy ([facility])         Current Outpatient Medications:   •  apixaban (ELIQUIS) 5 MG tablet tablet, Take 1 tablet by mouth Every 12 (Twelve) Hours., Disp: 90 tablet, Rfl: 7  •  aspirin 81 MG chewable tablet, Chew 81 mg Daily., Disp: , Rfl:   •  chlorhexidine (Peridex) 0.12 % solution, Apply 15 mL to the mouth or  throat 2 (Two) Times a Day for 7 days., Disp: 210 mL, Rfl: 0  •  Cholecalciferol (VITAMIN D3 PO), Take  by mouth., Disp: , Rfl:   •  clindamycin (CLEOCIN) 300 MG capsule, Take 1 capsule by mouth 3 (Three) Times a Day for 7 days., Disp: 21 capsule, Rfl: 0  •  dilTIAZem XR (DILACOR XR) 180 MG 24 hr capsule, Take 1 capsule by mouth Daily., Disp: 60 capsule, Rfl: 0  •  ipratropium (ATROVENT HFA) 17 MCG/ACT inhaler, Inhale 2 puffs 4 (Four) Times a Day As Needed for Wheezing., Disp: , Rfl:   •  ipratropium (ATROVENT) 0.02 % nebulizer solution, Take 2.5 mL by nebulization 3 (Three) Times a Day. Sub amount for how much insurance allows, Disp: 670 mL, Rfl: 0  •  montelukast (SINGULAIR) 10 MG tablet, Take 10 mg by mouth Every Night., Disp: , Rfl:   •  predniSONE (DELTASONE) 5 MG tablet, Take 5 mg by mouth Every Other Day., Disp: , Rfl:   •  tiotropium bromide-olodaterol (Stiolto Respimat) 2.5-2.5 MCG/ACT aerosol solution inhaler, Inhale 2 puffs Daily., Disp: , Rfl:     Allergies   Allergen Reactions   • Amoxicillin Anaphylaxis   • Albuterol Irritability     Facial flushing and palpitations.   • Asmanex (120 Metered Doses) [Mometasone Furoate] Unknown - Low Severity   • Lortab [Hydrocodone-Acetaminophen] Other (See Comments)     Can't remember   • Morphine And Related Hallucinations   • Prilosec [Omeprazole] Other (See Comments)     unknown   • Sodium Clavulanate Unknown - High Severity   • Statins GI Intolerance   • Sulfa Antibiotics Other (See Comments)     Can't remember also more and can't remember   • Symbicort [Budesonide-Formoterol Fumarate] Unknown (See Comments)     Doesn't remember reaction type       Past Medical History:   Diagnosis Date   • COPD (chronic obstructive pulmonary disease) (HCC)      Past Surgical History:   Procedure Laterality Date   • HIP ARTHROPLASTY       Social History     Socioeconomic History   • Marital status:    Tobacco Use   • Smoking status: Former Smoker     Packs/day: 3.00      "Types: Cigarettes     Start date:      Quit date: 2008     Years since quittin.2   • Smokeless tobacco: Never Used   Vaping Use   • Vaping Use: Never used   Substance and Sexual Activity   • Alcohol use: Not Currently   • Drug use: Never   • Sexual activity: Defer     History reviewed. No pertinent family history.       Objective     Blood pressure 148/80, pulse 113, height 165.1 cm (65\"), weight 70.8 kg (156 lb), SpO2 97 %.  Physical Exam  Vitals reviewed.   Constitutional:       Appearance: Normal appearance.   HENT:      Head: Normocephalic and atraumatic.      Right Ear: Tympanic membrane normal.      Left Ear: Tympanic membrane normal.      Nose: Nose normal.      Mouth/Throat:      Mouth: Mucous membranes are moist.      Pharynx: Oropharynx is clear.   Eyes:      Conjunctiva/sclera: Conjunctivae normal.      Pupils: Pupils are equal, round, and reactive to light.   Cardiovascular:      Rate and Rhythm: Regular rhythm. Tachycardia present.      Pulses: Normal pulses.      Heart sounds: Normal heart sounds.   Pulmonary:      Effort: Pulmonary effort is normal.      Breath sounds: Normal breath sounds.   Abdominal:      General: Abdomen is flat. Bowel sounds are normal.      Palpations: Abdomen is soft.   Musculoskeletal:         General: Normal range of motion.      Cervical back: Normal range of motion and neck supple.   Skin:     General: Skin is warm and dry.   Neurological:      General: No focal deficit present.      Mental Status: He is alert and oriented to person, place, and time.   Psychiatric:         Mood and Affect: Mood normal.         Behavior: Behavior normal.         PFTs: (independently reviewed and interpreted by me)  22  FVC 2.04L, 58%  FEV1 0.80L, 29%  Ratio 39%  TLC 6.92L, 114%  %  DLCO 36%  Very severe obstructive pattern, increased lung volumes suggestive of hyperinflation and air trapping, and severe diffusion impairment      Radiology (independently reviewed and " interpreted by me):  CTA chest 7/12/21- severe emphysema, RML nodule  PET 7/30/21- nodule not appreciated, no aviditiy in that area       Assessment & Plan     Continue home o2 at 4L NC. OK for o2 to fall in the 80s with exertion as long as it comes back up. Hemoptysis resolved itself.    I asked him to seek out a dentist immediately for his tooth abscess. He is on Day 3 of abx.     Diagnosis Plan   1. Centrilobular emphysema (HCC)  - continue current treatment.     2. Chronic respiratory failure with hypoxia, on home O2 therapy (HCC)  - continue o2 at 4L. He said 3L is not enough air for him.   I tried to educate him fully on the dangers of using too much oxygen, he listened to me.   He was 99% on 5L of o2 in the room. I walked him - it did not fall. I asked him to not over use his home o2 for shortness of breath.     It sounds like he keeps a pulse ox on his finger almost all the time.             I spent 40 minutes caring for Brian on this date of service. This time includes time spent by me in the following activities: preparing for the visit, reviewing tests, obtaining and/or reviewing a separately obtained history, performing a medically appropriate examination and/or evaluation, counseling and educating the patient/family/caregiver, referring and communicating with other health care professionals and documenting information in the medical record     We had a very long visit.    Follow up Friday if not better. If better, can cancel and come back in 1-2 months.           This document has been electronically signed by GUIDO Rojas on July 11, 2022 17:32 CDT

## 2022-07-15 ENCOUNTER — OFFICE VISIT (OUTPATIENT)
Dept: PULMONOLOGY | Facility: CLINIC | Age: 72
End: 2022-07-15

## 2022-07-15 VITALS
HEIGHT: 65 IN | HEART RATE: 94 BPM | SYSTOLIC BLOOD PRESSURE: 124 MMHG | WEIGHT: 157 LBS | OXYGEN SATURATION: 98 % | DIASTOLIC BLOOD PRESSURE: 78 MMHG | BODY MASS INDEX: 26.16 KG/M2

## 2022-07-15 DIAGNOSIS — Z99.81 CHRONIC RESPIRATORY FAILURE WITH HYPOXIA, ON HOME O2 THERAPY: ICD-10-CM

## 2022-07-15 DIAGNOSIS — J96.11 CHRONIC RESPIRATORY FAILURE WITH HYPOXIA, ON HOME O2 THERAPY: ICD-10-CM

## 2022-07-15 DIAGNOSIS — J44.9 STAGE 4 VERY SEVERE COPD BY GOLD CLASSIFICATION: ICD-10-CM

## 2022-07-15 DIAGNOSIS — R04.2 HEMOPTYSIS: Primary | ICD-10-CM

## 2022-07-15 PROCEDURE — 99214 OFFICE O/P EST MOD 30 MIN: CPT | Performed by: INTERNAL MEDICINE

## 2022-07-15 RX ORDER — ACETAMINOPHEN 325 MG/1
650 TABLET ORAL EVERY 6 HOURS PRN
COMMUNITY
End: 2023-02-14

## 2022-07-15 RX ORDER — DILTIAZEM HYDROCHLORIDE 180 MG/1
CAPSULE, EXTENDED RELEASE ORAL
Qty: 30 CAPSULE | Refills: 1 | Status: SHIPPED | OUTPATIENT
Start: 2022-07-15 | End: 2022-07-15 | Stop reason: SDUPTHER

## 2022-07-15 NOTE — PROGRESS NOTES
Pulmonary Consultation    No ref. provider found,    Thank you for asking me to see Brian Garcia for   Chief Complaint   Patient presents with   • COPD   • Coughing Up Blood   .      History of Present Illness  Brian Garcia is a 71 y.o. male     7/15/22  Patient here for follow up. Saw Coco Mares earlier in the week for increased O2 requirement. This has been stable since then. He has an abscessed tooth and has seen the dentist again and getting another round of antibiotics before it can be extracted. He tells me today that he's had another episode of coughing up blood, not a large amount      6/13/22  Patient here for acute visit. He tells me he's coughed up some brihgt red blood last week, more short of breath and lower O2 sats (but not low enough to require increased supplemental o2). Denies fever, fatigue, muscle aches, runny nose or GI symptoms    4/25/22  [facility]. He tells me his pulmonologist retired and he needs a new one. Was going every 6 months  Patient has Stiolto  He has atrovent HFA and atrovent nebs. He report on albuterol he gets racing heart and flushing.  He is taking Prednisone 5mg every other day for several years  He has supplemental O2 for a few years, D[facility] [place], [place]. 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  Review of Systems  As described in the HPI. Otherwise, remainder of ROS (14 systems) were negative.    Patient Active Problem List   Diagnosis   • Hypokalemia   • Physical deconditioning   • Pulmonary nodules   • Leukocytosis   • History of pulmonary embolism   • Stage 4 very severe COPD by GOLD classification (HCC)   • Chronic respiratory failure with hypoxia, on home O2  therapy (HCC)         Current Outpatient Medications:   •  acetaminophen (TYLENOL) 325 MG tablet, Take 650 mg by mouth Every 6 (Six) Hours As Needed for Mild Pain ., Disp: , Rfl:   •  apixaban (ELIQUIS) 5 MG tablet tablet, Take 1 tablet by mouth Every 12 (Twelve) Hours., Disp: 90 tablet, Rfl: 7  •  aspirin 81 MG chewable tablet, Chew 81 mg Daily., Disp: , Rfl:   •  chlorhexidine (Peridex) 0.12 % solution, Apply 15 mL to the mouth or throat 2 (Two) Times a Day for 7 days., Disp: 210 mL, Rfl: 0  •  Cholecalciferol (VITAMIN D3 PO), Take  by mouth., Disp: , Rfl:   •  clindamycin (CLEOCIN) 300 MG capsule, Take 1 capsule by mouth 3 (Three) Times a Day for 7 days., Disp: 21 capsule, Rfl: 0  •  dilTIAZem XR (DILACOR XR) 180 MG 24 hr capsule, Take 1 capsule by mouth Daily., Disp: 60 capsule, Rfl: 0  •  ipratropium (ATROVENT HFA) 17 MCG/ACT inhaler, Inhale 2 puffs 4 (Four) Times a Day As Needed for Wheezing., Disp: , Rfl:   •  ipratropium (ATROVENT) 0.02 % nebulizer solution, Take 2.5 mL by nebulization 3 (Three) Times a Day. Sub amount for how much insurance allows, Disp: 670 mL, Rfl: 0  •  montelukast (SINGULAIR) 10 MG tablet, Take 10 mg by mouth Every Night., Disp: , Rfl:   •  predniSONE (DELTASONE) 5 MG tablet, Take 5 mg by mouth Every Other Day., Disp: , Rfl:   •  tiotropium bromide-olodaterol (Stiolto Respimat) 2.5-2.5 MCG/ACT aerosol solution inhaler, Inhale 2 puffs Daily., Disp: , Rfl:     Allergies   Allergen Reactions   • Amoxicillin Anaphylaxis   • Albuterol Irritability     Facial flushing and palpitations.   • Asmanex (120 Metered Doses) [Mometasone Furoate] Unknown - Low Severity   • Lortab [Hydrocodone-Acetaminophen] Other (See Comments)     Can't remember   • Morphine And Related Hallucinations   • Prilosec [Omeprazole] Other (See Comments)     unknown   • Sodium Clavulanate Unknown - High Severity   • Statins GI Intolerance   • Sulfa Antibiotics Other (See Comments)     Can't remember also more and can't  "remember   • Symbicort [Budesonide-Formoterol Fumarate] Unknown (See Comments)     Doesn't remember reaction type       Past Medical History:   Diagnosis Date   • COPD (chronic obstructive pulmonary disease) (HCC)    • Hypertension      Past Surgical History:   Procedure Laterality Date   • HIP ARTHROPLASTY       Social History     Socioeconomic History   • Marital status:    Tobacco Use   • Smoking status: Former Smoker     Packs/day: 3.00     Types: Cigarettes     Start date:      Quit date: 2008     Years since quittin.2   • Smokeless tobacco: Never Used   Vaping Use   • Vaping Use: Never used   Substance and Sexual Activity   • Alcohol use: Not Currently   • Drug use: Never   • Sexual activity: Defer     History reviewed. No pertinent family history.       Objective     Blood pressure 124/78, pulse 94, height 165.1 cm (65\"), weight 71.2 kg (157 lb), SpO2 98 %.  Physical Exam  Vitals reviewed.   Constitutional:       Appearance: Normal appearance.   HENT:      Head: Normocephalic and atraumatic.      Right Ear: Tympanic membrane normal.      Left Ear: Tympanic membrane normal.      Nose: Nose normal.      Mouth/Throat:      Mouth: Mucous membranes are moist.      Pharynx: Oropharynx is clear.   Eyes:      Conjunctiva/sclera: Conjunctivae normal.      Pupils: Pupils are equal, round, and reactive to light.   Cardiovascular:      Rate and Rhythm: Normal rate and regular rhythm.      Pulses: Normal pulses.      Heart sounds: Normal heart sounds.   Pulmonary:      Effort: Pulmonary effort is normal.      Breath sounds: Normal breath sounds.   Abdominal:      General: Abdomen is flat. Bowel sounds are normal.      Palpations: Abdomen is soft.   Musculoskeletal:         General: Normal range of motion.      Cervical back: Normal range of motion and neck supple.   Skin:     General: Skin is warm and dry.   Neurological:      General: No focal deficit present.      Mental Status: He is alert and " oriented to person, place, and time.   Psychiatric:         Mood and Affect: Mood normal.         Behavior: Behavior normal.         PFTs: (independently reviewed and interpreted by me)  4/25/22  FVC 2.04L, 58%  FEV1 0.80L, 29%  Ratio 39%  TLC 6.92L, 114%  %  DLCO 36%  Very severe obstructive pattern, increased lung volumes suggestive of hyperinflation and air trapping, and severe diffusion impairment      Radiology (independently reviewed and interpreted by me):  CTA chest 7/12/21- severe emphysema, RML nodule  PET 7/30/21- nodule not appreciated, no aviditiy in that area       Assessment & Plan     Diagnoses and all orders for this visit:    1. Hemoptysis (Primary)  -     CT Chest Without Contrast; Future    2. Chronic respiratory failure with hypoxia, on home O2 therapy ([facility])    3. Stage 4 very severe COPD by GOLD classification ([facility])         Discussion/ Recommendations:   Patient with severe COPD. Retiterated that I think his increased subjective dyspnea right now is because it appears he has an incompletely treated infection. He's getting more antibiotics but if it's actually an abscess (which I can't tell), then it needs drainage  Not sure if he's actually coughing up blood from the lung or if it's from his damaged tooth.  Will just repeat a chest CT now and see if any changes are apparent    CT scan next week and follow up with me the week after          No follow-ups on file.      Thank you for allowing me to participate in the care of Brian Garcia. Please do not hesitate to contact me with any questions.         This document has been electronically signed by Clotilde Washington DO on July 15, 2022 11:02 CDT

## 2022-07-21 ENCOUNTER — HOSPITAL ENCOUNTER (OUTPATIENT)
Dept: CT IMAGING | Facility: HOSPITAL | Age: 72
Discharge: HOME | End: 2022-07-21
Admitting: INTERNAL MEDICINE

## 2022-07-21 DIAGNOSIS — R04.2 HEMOPTYSIS: ICD-10-CM

## 2022-07-21 PROCEDURE — 71250 CT THORAX DX C-: CPT

## 2022-07-26 ENCOUNTER — OFFICE VISIT (OUTPATIENT)
Dept: PULMONOLOGY | Facility: CLINIC | Age: 72
End: 2022-07-26

## 2022-07-26 VITALS
HEIGHT: 65 IN | WEIGHT: 157 LBS | SYSTOLIC BLOOD PRESSURE: 118 MMHG | OXYGEN SATURATION: 97 % | DIASTOLIC BLOOD PRESSURE: 54 MMHG | HEART RATE: 90 BPM | BODY MASS INDEX: 26.16 KG/M2

## 2022-07-26 DIAGNOSIS — Z99.81 CHRONIC RESPIRATORY FAILURE WITH HYPOXIA, ON HOME O2 THERAPY: ICD-10-CM

## 2022-07-26 DIAGNOSIS — J96.11 CHRONIC RESPIRATORY FAILURE WITH HYPOXIA, ON HOME O2 THERAPY: ICD-10-CM

## 2022-07-26 DIAGNOSIS — R91.8 ABNORMAL CT SCAN OF LUNG: Primary | ICD-10-CM

## 2022-07-26 DIAGNOSIS — J44.9 STAGE 4 VERY SEVERE COPD BY GOLD CLASSIFICATION: ICD-10-CM

## 2022-07-26 PROCEDURE — 99214 OFFICE O/P EST MOD 30 MIN: CPT | Performed by: INTERNAL MEDICINE

## 2022-07-26 NOTE — PROGRESS NOTES
Pulmonary Consultation    No ref. provider found,    Thank you for asking me to see Brian Garcia for   Chief Complaint   Patient presents with   • CT results   .      History of Present Illness  Brian Garcia is a 71 y.o. male     7/26/22  Patient here to follow up on CT scan. There was a new RUL infiltrate  He finished his antibiotics this morning for his dental infection. No further hemoptysis. Breathing has been ok.      7/15/22  Patient here for follow up. Saw Coco Mares earlier in the week for increased O2 requirement. This has been stable since then. He has an abscessed tooth and has seen the dentist again and getting another round of antibiotics before it can be extracted. He tells me today that he's had another episode of coughing up blood, not a large amount      6/13/22  Patient here for acute visit. He tells me he's coughed up some brihgt red blood last week, more short of breath and lower O2 sats (but not low enough to require increased supplemental o2). Denies fever, fatigue, muscle aches, runny nose or GI symptoms    4/25/22  [facility] He tells me his pulmonologist retired and he needs a new one. Was going every 6 months  Patient has Stiolto  He has atrovent HFA and atrovent nebs. He report on albuterol he gets racing heart and flushing.  He is taking Prednisone 5mg every other day for several years  He has supplemental O2 for a few years, D[facility] [place], [place]. 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  Review of Systems  As described in the HPI. Otherwise, remainder of ROS (14 systems) were negative.    Patient Active Problem List   Diagnosis   • Hypokalemia   • Physical deconditioning   •  Pulmonary nodules   • Leukocytosis   • History of pulmonary embolism   • Stage 4 very severe COPD by GOLD classification ([facility])   • Chronic respiratory failure with hypoxia, on home O2 therapy ([facility])         Current Outpatient Medications:   •  acetaminophen (TYLENOL) 325 MG tablet, Take 650 mg by mouth Every 6 (Six) Hours As Needed for Mild Pain ., Disp: , Rfl:   •  apixaban (ELIQUIS) 5 MG tablet tablet, Take 1 tablet by mouth Every 12 (Twelve) Hours., Disp: 90 tablet, Rfl: 7  •  aspirin 81 MG chewable tablet, Chew 81 mg Daily., Disp: , Rfl:   •  Cholecalciferol (VITAMIN D3 PO), Take  by mouth., Disp: , Rfl:   •  dilTIAZem XR (DILACOR XR) 180 MG 24 hr capsule, Take 1 capsule by mouth Daily., Disp: 60 capsule, Rfl: 0  •  ipratropium (ATROVENT HFA) 17 MCG/ACT inhaler, Inhale 2 puffs 4 (Four) Times a Day As Needed for Wheezing., Disp: , Rfl:   •  ipratropium (ATROVENT) 0.02 % nebulizer solution, Take 2.5 mL by nebulization 3 (Three) Times a Day. Sub amount for how much insurance allows, Disp: 670 mL, Rfl: 0  •  montelukast (SINGULAIR) 10 MG tablet, Take 10 mg by mouth Every Night., Disp: , Rfl:   •  predniSONE (DELTASONE) 5 MG tablet, Take 5 mg by mouth Every Other Day., Disp: , Rfl:   •  tiotropium bromide-olodaterol (Stiolto Respimat) 2.5-2.5 MCG/ACT aerosol solution inhaler, Inhale 2 puffs Daily., Disp: , Rfl:     Allergies   Allergen Reactions   • Amoxicillin Anaphylaxis   • Albuterol Irritability     Facial flushing and palpitations.   • Asmanex (120 Metered Doses) [Mometasone Furoate] Unknown - Low Severity   • Lortab [Hydrocodone-Acetaminophen] Other (See Comments)     Can't remember   • Morphine And Related Hallucinations   • Prilosec [Omeprazole] Other (See Comments)     unknown   • Sodium Clavulanate Unknown - High Severity   • Statins GI Intolerance   • Sulfa Antibiotics Other (See Comments)     Can't remember also more and can't remember   • Symbicort [Budesonide-Formoterol Fumarate] Unknown (See Comments)  "    Doesn't remember reaction type       Past Medical History:   Diagnosis Date   • COPD (chronic obstructive pulmonary disease) (HCC)    • Hypertension      Past Surgical History:   Procedure Laterality Date   • HIP ARTHROPLASTY       Social History     Socioeconomic History   • Marital status:    Tobacco Use   • Smoking status: Former Smoker     Packs/day: 3.00     Types: Cigarettes     Start date:      Quit date: 2008     Years since quittin.2   • Smokeless tobacco: Never Used   Vaping Use   • Vaping Use: Never used   Substance and Sexual Activity   • Alcohol use: Not Currently   • Drug use: Never   • Sexual activity: Defer     History reviewed. No pertinent family history.       Objective     Blood pressure 118/54, pulse 90, height 165.1 cm (65\"), weight 71.2 kg (157 lb), SpO2 97 %.  Physical Exam  Vitals reviewed.   Constitutional:       Appearance: Normal appearance.   HENT:      Head: Normocephalic and atraumatic.      Right Ear: Tympanic membrane normal.      Left Ear: Tympanic membrane normal.      Nose: Nose normal.      Mouth/Throat:      Mouth: Mucous membranes are moist.      Pharynx: Oropharynx is clear.   Eyes:      Conjunctiva/sclera: Conjunctivae normal.      Pupils: Pupils are equal, round, and reactive to light.   Cardiovascular:      Rate and Rhythm: Normal rate and regular rhythm.      Pulses: Normal pulses.      Heart sounds: Normal heart sounds.   Pulmonary:      Effort: Pulmonary effort is normal.      Breath sounds: Normal breath sounds.   Abdominal:      General: Abdomen is flat. Bowel sounds are normal.      Palpations: Abdomen is soft.   Musculoskeletal:         General: Normal range of motion.      Cervical back: Normal range of motion and neck supple.   Skin:     General: Skin is warm and dry.   Neurological:      General: No focal deficit present.      Mental Status: He is alert and oriented to person, place, and time.   Psychiatric:         Mood and Affect: Mood " normal.         Behavior: Behavior normal.         PFTs: (independently reviewed and interpreted by me)  4/25/22  FVC 2.04L, 58%  FEV1 0.80L, 29%  Ratio 39%  TLC 6.92L, 114%  %  DLCO 36%  Very severe obstructive pattern, increased lung volumes suggestive of hyperinflation and air trapping, and severe diffusion impairment      Radiology (independently reviewed and interpreted by me):  CTA chest 7/12/21- severe emphysema, RML nodule  PET 7/30/21- nodule not appreciated, no aviditiy in that area  CT chest 7/21/22- ne wRUL infiltrate, severe emphysema unchanged       Assessment & Plan     Diagnoses and all orders for this visit:    1. Abnormal CT scan of lung (Primary)  -     CT Chest Without Contrast; Future    2. Stage 4 very severe COPD by GOLD classification ([facility])    3. Chronic respiratory failure with hypoxia, on home O2 therapy ([facility])         Discussion/ Recommendations:   Patient with new small infiltrate in the RUL. It's hard to say what this clearly is, as his background emphysema is so severe. Could certainly be infectious, which would explain the mild hemoptysis. He's just completed an extended course of antibioitcs and he's feeling better so for now, will repeat a CT in 4-6 weeks and look for interval improvement. If not, have to entertain other possibilities such as malignancy    -CT chest in 4-6 weeks and follow up after          Return in about 4 weeks (around 8/23/2022).      Thank you for allowing me to participate in the care of Brian Garcia. Please do not hesitate to contact me with any questions.         This document has been electronically signed by Clotilde Washington DO on July 26, 2022 11:54 CDT

## 2022-08-23 ENCOUNTER — OFFICE VISIT (OUTPATIENT)
Dept: PULMONOLOGY | Facility: CLINIC | Age: 72
End: 2022-08-23

## 2022-08-23 ENCOUNTER — HOSPITAL ENCOUNTER (OUTPATIENT)
Dept: CT IMAGING | Facility: HOSPITAL | Age: 72
Discharge: HOME | End: 2022-08-23
Admitting: INTERNAL MEDICINE

## 2022-08-23 VITALS
HEART RATE: 103 BPM | BODY MASS INDEX: 26.16 KG/M2 | OXYGEN SATURATION: 95 % | SYSTOLIC BLOOD PRESSURE: 114 MMHG | DIASTOLIC BLOOD PRESSURE: 48 MMHG | HEIGHT: 65 IN | WEIGHT: 157 LBS

## 2022-08-23 DIAGNOSIS — J44.9 STAGE 4 VERY SEVERE COPD BY GOLD CLASSIFICATION: Primary | ICD-10-CM

## 2022-08-23 DIAGNOSIS — Z99.81 CHRONIC RESPIRATORY FAILURE WITH HYPOXIA, ON HOME O2 THERAPY: ICD-10-CM

## 2022-08-23 DIAGNOSIS — J96.11 CHRONIC RESPIRATORY FAILURE WITH HYPOXIA, ON HOME O2 THERAPY: ICD-10-CM

## 2022-08-23 DIAGNOSIS — R91.8 PULMONARY NODULES: Chronic | ICD-10-CM

## 2022-08-23 DIAGNOSIS — R91.8 ABNORMAL CT SCAN OF LUNG: ICD-10-CM

## 2022-08-23 PROCEDURE — 99214 OFFICE O/P EST MOD 30 MIN: CPT | Performed by: INTERNAL MEDICINE

## 2022-08-23 PROCEDURE — 71250 CT THORAX DX C-: CPT

## 2022-08-23 NOTE — PROGRESS NOTES
Pulmonary Consultation    No ref. provider found,    Thank you for asking me to see Brian Garcia for   Chief Complaint   Patient presents with   • CT results   .      History of Present Illness  Brian Garcia is a 71 y.o. male     8/23/22  Patient here for follow up CT. Formal read pending but on my review, RUL infiltrate is still present and unchanged. Patient still having intermittent mild hemoptysis and very fatigued, taking several naps a day      7/26/22  Patient here to follow up on CT scan. There was a new RUL infiltrate  He finished his antibiotics this morning for his dental infection. No further hemoptysis. Breathing has been ok.      7/15/22  Patient here for follow up. Saw Coco Mares earlier in the week for increased O2 requirement. This has been stable since then. He has an abscessed tooth and has seen the dentist again and getting another round of antibiotics before it can be extracted. He tells me today that he's had another episode of coughing up blood, not a large amount      6/13/22  Patient here for acute visit. He tells me he's coughed up some brihgt red blood last week, more short of breath and lower O2 sats (but not low enough to require increased supplemental o2). Denies fever, fatigue, muscle aches, runny nose or GI symptoms    4/25/22  [facility] He tells me his pulmonologist retired and he needs a new one. Was going every 6 months  Patient has Stiolto  He has atrovent HFA and atrovent nebs. He report on albuterol he gets racing heart and flushing.  He is taking Prednisone 5mg every other day for several years  He has supplemental O2 for a few years, D[facility] of [place], [place]. 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  Review of Systems  As described in the HPI. Otherwise, remainder of ROS (14 systems) were negative.    Patient Active Problem List   Diagnosis   • Hypokalemia   • Physical deconditioning   • Pulmonary nodules   • Leukocytosis   • History of pulmonary embolism   • Stage 4 very severe COPD by GOLD classification ([facility])   • Chronic respiratory failure with hypoxia, on home O2 therapy ([facility])         Current Outpatient Medications:   •  acetaminophen (TYLENOL) 325 MG tablet, Take 650 mg by mouth Every 6 (Six) Hours As Needed for Mild Pain ., Disp: , Rfl:   •  apixaban (ELIQUIS) 5 MG tablet tablet, Take 1 tablet by mouth Every 12 (Twelve) Hours., Disp: 90 tablet, Rfl: 7  •  aspirin 81 MG chewable tablet, Chew 81 mg Daily., Disp: , Rfl:   •  Cholecalciferol (VITAMIN D3 PO), Take  by mouth., Disp: , Rfl:   •  dilTIAZem XR (DILACOR XR) 180 MG 24 hr capsule, Take 1 capsule by mouth Daily., Disp: 60 capsule, Rfl: 0  •  ipratropium (ATROVENT HFA) 17 MCG/ACT inhaler, Inhale 2 puffs 4 (Four) Times a Day As Needed for Wheezing., Disp: , Rfl:   •  ipratropium (ATROVENT) 0.02 % nebulizer solution, Take 2.5 mL by nebulization 3 (Three) Times a Day. Sub amount for how much insurance allows, Disp: 670 mL, Rfl: 0  •  montelukast (SINGULAIR) 10 MG tablet, Take 10 mg by mouth Every Night., Disp: , Rfl:   •  predniSONE (DELTASONE) 5 MG tablet, Take 5 mg by mouth Every Other Day., Disp: , Rfl:   •  tiotropium bromide-olodaterol (Stiolto Respimat) 2.5-2.5 MCG/ACT aerosol solution inhaler, Inhale 2 puffs Daily., Disp: , Rfl:     Allergies   Allergen Reactions   • Amoxicillin Anaphylaxis   • Albuterol Irritability     Facial flushing and palpitations.   • Asmanex (120 Metered Doses) [Mometasone Furoate] Unknown - Low Severity   • Lortab [Hydrocodone-Acetaminophen] Other (See Comments)     Can't remember   • Morphine And Related Hallucinations   • Prilosec [Omeprazole] Other (See Comments)     unknown   • Sodium  "Clavulanate Unknown - High Severity   • Statins GI Intolerance   • Sulfa Antibiotics Other (See Comments)     Can't remember also more and can't remember   • Symbicort [Budesonide-Formoterol Fumarate] Unknown (See Comments)     Doesn't remember reaction type       Past Medical History:   Diagnosis Date   • COPD (chronic obstructive pulmonary disease) (HCC)    • Hypertension      Past Surgical History:   Procedure Laterality Date   • HIP ARTHROPLASTY       Social History     Socioeconomic History   • Marital status:    Tobacco Use   • Smoking status: Former Smoker     Packs/day: 3.00     Types: Cigarettes     Start date:      Quit date: 2008     Years since quittin.3   • Smokeless tobacco: Never Used   Vaping Use   • Vaping Use: Never used   Substance and Sexual Activity   • Alcohol use: Not Currently   • Drug use: Never   • Sexual activity: Defer     History reviewed. No pertinent family history.       Objective     Blood pressure 114/48, pulse 103, height 165.1 cm (65\"), weight 71.2 kg (157 lb), SpO2 95 %.  Physical Exam  Vitals reviewed.   Constitutional:       Appearance: Normal appearance.   HENT:      Head: Normocephalic and atraumatic.      Right Ear: Tympanic membrane normal.      Left Ear: Tympanic membrane normal.      Nose: Nose normal.      Mouth/Throat:      Mouth: Mucous membranes are moist.      Pharynx: Oropharynx is clear.   Eyes:      Conjunctiva/sclera: Conjunctivae normal.      Pupils: Pupils are equal, round, and reactive to light.   Cardiovascular:      Rate and Rhythm: Normal rate and regular rhythm.      Pulses: Normal pulses.      Heart sounds: Normal heart sounds.   Pulmonary:      Effort: Pulmonary effort is normal.      Breath sounds: Normal breath sounds.   Abdominal:      General: Abdomen is flat. Bowel sounds are normal.      Palpations: Abdomen is soft.   Musculoskeletal:         General: Normal range of motion.      Cervical back: Normal range of motion and neck " supple.   Skin:     General: Skin is warm and dry.   Neurological:      General: No focal deficit present.      Mental Status: He is alert and oriented to person, place, and time.   Psychiatric:         Mood and Affect: Mood normal.         Behavior: Behavior normal.         PFTs: (independently reviewed and interpreted by me)  4/25/22  FVC 2.04L, 58%  FEV1 0.80L, 29%  Ratio 39%  TLC 6.92L, 114%  %  DLCO 36%  Very severe obstructive pattern, increased lung volumes suggestive of hyperinflation and air trapping, and severe diffusion impairment      Radiology (independently reviewed and interpreted by me):  CTA chest 7/12/21- severe emphysema, RML nodule  PET 7/30/21- nodule not appreciated, no aviditiy in that area  CT chest 7/21/22- ne wRUL infiltrate, severe emphysema unchanged       Assessment & Plan     Diagnoses and all orders for this visit:    1. Stage 4 very severe COPD by GOLD classification ([facility]) (Primary)    2. Chronic respiratory failure with hypoxia, on home O2 therapy ([facility])    3. Pulmonary nodules  -     NM PET/CT Skull Base to Mid Thigh; Future         Discussion/ Recommendations:   No change in infiltrate in RUL. This does raise my concern for malignancy somewhat. Will order a PET scan and see what that shows. Given the severity of his emphysema, I would be very hesitant to do much more than a BAL. Will await PET results and then call patient and discuss next steps          No follow-ups on file.      Thank you for allowing me to participate in the care of Brian Garcia. Please do not hesitate to contact me with any questions.         This document has been electronically signed by Clotilde Washington DO on August 23, 2022 13:41 CDT

## 2022-08-24 ENCOUNTER — NURSE NAVIGATOR (OUTPATIENT)
Dept: ONCOLOGY | Facility: CLINIC | Age: 72
End: 2022-08-24

## 2022-08-24 NOTE — PROGRESS NOTES
LUNG PATIENT NAVIGATION     DIAGNOSIS: Lung Nodule  ENCOUNTER TYPE: phone    Received call from Dr Washington yesterday regarding cancelled Bronchoscopy d/t pt’s positive UDS. She is willing to reschedule procedure for 9/13/22 it pt agrees to remain drug-free in interim period prior to that time. Also spoke with Dr. Davis and Dr. Downs who are hopeful that pt will remain compliant so they can proceed with treatment if indicated by biopsy results. Multiple attempts to contact pt yesterday were unanswered.     Called pt this am and had forthright conversation regarding Dr. Washington’ willingness to attempt Bronchoscopy again if pt meets requirement to be drug-free in interim period prior to procedure. She states agreement with requirement. Offered pt encouragement that she has an opportunity to achieve early diagnosis and treatment for best outcomes if she will cooperate with recommendations and that further delays due to her lack of compliance will compromise that opportunity. Offered pt rehab and drug counseling resources which she declined stating her intention is to remove herself from people and situations that involve drug use.     Reviewed new plan of care and appts to include Bronchoscopy with Dr Washington on 9/13/22 and follow-up with Dr. Davis on 9/20/22. Instructed pt to start making transportation arrangements for those appts to avoid further delays. Reinforced goals and encouraged her to call me anytime for support, encouragement and resources to help meet those goals as needed. Pt voiced understanding and agreement to all discussed.     Spoke with Dr. Washington and Dr. Davis to convey above information.

## 2022-08-24 NOTE — ADDENDUM NOTE
Addended by: TERESA DELVALLE on: 8/24/2022 12:35 PM     Modules accepted: Level of Service, SmartSet

## 2022-08-26 ENCOUNTER — NURSE NAVIGATOR (OUTPATIENT)
Dept: ONCOLOGY | Facility: CLINIC | Age: 72
End: 2022-08-26

## 2022-08-26 NOTE — PROGRESS NOTES
LUNG PATIENT NAVIGATION    DIAGNOSIS: Lung Nodule  ENCOUNTER TYPE: Phone    Scheduled PET scan as ordered by Dr. Washington. VA authorization is pending; will follow and reschedule or intervene as needed.    Contacted pt by phone and introduced myself as the Lung Navigator at [facility]. Informed pt of PET scan scheduled for 9/12/22 and that Radiology staff would contact him a few days before with pre-procedure instructions. Made him aware that VA authorization was still pending which may require rescheduling of appt if delayed. Reminded pt that Dr. Washington would call him regarding results or schedule him for follow-up appt.   Provided pt with my contact information and encouraged him to call with any questions or concerns. Pt voiced understanding

## 2022-09-12 ENCOUNTER — HOSPITAL ENCOUNTER (OUTPATIENT)
Dept: PET IMAGING | Facility: HOSPITAL | Age: 72
Discharge: HOME | End: 2022-09-12
Admitting: INTERNAL MEDICINE

## 2022-09-12 DIAGNOSIS — R91.8 PULMONARY NODULES: Chronic | ICD-10-CM

## 2022-09-12 PROCEDURE — 78815 PET IMAGE W/CT SKULL-THIGH: CPT

## 2022-09-12 PROCEDURE — 0 FLUDEOXYGLUCOSE F18 SOLUTION: Performed by: INTERNAL MEDICINE

## 2022-09-12 PROCEDURE — A9552 F18 FDG: HCPCS | Performed by: INTERNAL MEDICINE

## 2022-09-12 RX ADMIN — SODIUM FLUORIDE F 18 1 DOSE: 200 INJECTION, SOLUTION INTRAVENOUS at 09:05

## 2022-09-14 ENCOUNTER — OFFICE VISIT (OUTPATIENT)
Dept: CARDIOLOGY | Facility: CLINIC | Age: 72
End: 2022-09-14

## 2022-09-14 ENCOUNTER — LAB (OUTPATIENT)
Dept: LAB | Facility: HOSPITAL | Age: 72
End: 2022-09-14

## 2022-09-14 VITALS
OXYGEN SATURATION: 96 % | HEART RATE: 80 BPM | BODY MASS INDEX: 26.16 KG/M2 | SYSTOLIC BLOOD PRESSURE: 116 MMHG | DIASTOLIC BLOOD PRESSURE: 62 MMHG | HEIGHT: 65 IN | WEIGHT: 157 LBS

## 2022-09-14 DIAGNOSIS — R00.2 PALPITATIONS: ICD-10-CM

## 2022-09-14 DIAGNOSIS — I48.0 PAROXYSMAL ATRIAL FIBRILLATION: Primary | ICD-10-CM

## 2022-09-14 PROCEDURE — 36415 COLL VENOUS BLD VENIPUNCTURE: CPT | Performed by: INTERNAL MEDICINE

## 2022-09-14 PROCEDURE — 99214 OFFICE O/P EST MOD 30 MIN: CPT | Performed by: INTERNAL MEDICINE

## 2022-09-14 PROCEDURE — 93000 ELECTROCARDIOGRAM COMPLETE: CPT | Performed by: INTERNAL MEDICINE

## 2022-09-14 PROCEDURE — 84443 ASSAY THYROID STIM HORMONE: CPT | Performed by: INTERNAL MEDICINE

## 2022-09-14 NOTE — PROGRESS NOTES
[facility]  OFFICE NOTE    Cardiovascular Medicine  Neville Meeks M.D., [facility]         No referring provider defined for this encounter.    History of Present Illness    Brian Garcia is a 72 y.o. male who presents for a follow up visit today.  He is transitioning care from Dr. Rose to me.    According to Dr. Rose's prior notes, he has a known history of paroxysmal atrial fibrillation.  He is on a regimen of Eliquis 5 mg oral twice daily and diltiazem 180 mg orally daily.  He was diagnosed with A. fib on MCT study in August 2021.   He is also taking baby aspirin.  He denies having any prior history of heart attacks, stroke or peripheral vascular disease.  He has advanced COPD and is dependent on 4 L/min nasal cannula oxygen at all times.  He denies having any chest discomfort, palpitations, lightheadedness, dizziness, presyncope or syncope.  Has not any PND, orthopnea or leg swelling.    Review of Systems - ROS  Constitution: Negative for  weight gain and weight loss.   HENT: Negative for congestion.    Eyes: Negative for blurred vision.   Cardiovascular: As mentioned above  Respiratory: Negative for cough and hemoptysis.    Endocrine: Negative for polydipsia and polyuria.   Hematologic/Lymphatic: Negative for bleeding problem. Does not bruise/bleed easily.   Skin: Negative for flushing.   Musculoskeletal: Negative for neck pain and stiffness.   Gastrointestinal: Negative for abdominal pain, nausea and vomiting.   Genitourinary: Negative for dysuria and hematuria.   Neurological: Negative for dizziness, focal weakness and numbness.   Psychiatric/Behavioral: Negative for altered mental status and depression.      All other systems were reviewed and were negative.    Past Medical History:   Diagnosis Date   • COPD (chronic obstructive pulmonary disease) (HCC)    • Hypertension        Family History:  family history is not on file.    Social History: He quit smoking in  2008.  Denies current alcohol or illicit drug abuse.   reports that he quit smoking about 14 years ago. His smoking use included cigarettes. He started smoking about 61 years ago. He smoked 3.00 packs per day. He has never used smokeless tobacco. He reports previous alcohol use. He reports that he does not use drugs.    Allergies:  Allergies   Allergen Reactions   • Amoxicillin Anaphylaxis   • Albuterol Irritability     Facial flushing and palpitations.   • Asmanex (120 Metered Doses) [Mometasone Furoate] Unknown - Low Severity   • Lortab [Hydrocodone-Acetaminophen] Other (See Comments)     Can't remember   • Morphine And Related Hallucinations   • Prilosec [Omeprazole] Other (See Comments)     unknown   • Sodium Clavulanate Unknown - High Severity   • Statins GI Intolerance   • Sulfa Antibiotics Other (See Comments)     Can't remember also more and can't remember   • Symbicort [Budesonide-Formoterol Fumarate] Unknown (See Comments)     Doesn't remember reaction type         Current Outpatient Medications:   •  acetaminophen (TYLENOL) 325 MG tablet, Take 650 mg by mouth Every 6 (Six) Hours As Needed for Mild Pain ., Disp: , Rfl:   •  apixaban (ELIQUIS) 5 MG tablet tablet, Take 1 tablet by mouth Every 12 (Twelve) Hours., Disp: 90 tablet, Rfl: 7  •  Cholecalciferol (VITAMIN D3 PO), Take  by mouth., Disp: , Rfl:   •  dilTIAZem XR (DILACOR XR) 180 MG 24 hr capsule, Take 1 capsule by mouth Daily., Disp: 60 capsule, Rfl: 0  •  ipratropium (ATROVENT HFA) 17 MCG/ACT inhaler, Inhale 2 puffs 4 (Four) Times a Day As Needed for Wheezing., Disp: , Rfl:   •  ipratropium (ATROVENT) 0.02 % nebulizer solution, Take 2.5 mL by nebulization 3 (Three) Times a Day. Sub amount for how much insurance allows, Disp: 670 mL, Rfl: 0  •  montelukast (SINGULAIR) 10 MG tablet, Take 10 mg by mouth Every Night., Disp: , Rfl:   •  predniSONE (DELTASONE) 5 MG tablet, Take 5 mg by mouth Every Other Day., Disp: , Rfl:   •  tiotropium bromide-olodaterol  "(Stiolto Respimat) 2.5-2.5 MCG/ACT aerosol solution inhaler, Inhale 2 puffs Daily., Disp: , Rfl:     Physical Exam:  Vitals:    09/14/22 1336   BP: 116/62   BP Location: Left arm   Patient Position: Sitting   Cuff Size: Adult   Pulse: 80   SpO2: 96%   Weight: 71.2 kg (157 lb)   Height: 165.1 cm (65\")   PainSc: 0-No pain     Current Pain Level: none  Pulse Ox: Normal  on room air  General: alert, appears stated age and cooperative     Body Habitus: Borderline overweight  HEENT: Head: Normocephalic, no lesions, without obvious abnormality.     Neuro: alert, oriented x3  JVP: Volume/Pulsation: Normal.  Normal waveforms.   Appropriate inspiratory decrease.    Carotid Exam: no bruit normal pulsation bilaterally   Carotid Volume: normal.     Respirations: no increased work of breathing   Chest:  Normal    Pulmonary:Normal   Heart rate: normal    Heart Rhythm: regular     Heart Sounds: S1: normal  S2: normal  S3: absent   S4: absent    Abdomen:   Appearance: normal .  Palpation: Soft, non-tender to palpation, bowel sounds positive in all four quadrants; no guarding or rebound tenderness  Extremity: no significant pitting edema.     DATA REVIEWED:     EKG. I personally reviewed and interpreted the EKG.  normal sinus rhythm, incomplete RBBB, nonspecific T wave abnormality on 9/14/2022    ECG/EMG Results (all)     Procedure Component Value Units Date/Time    ECG 12 Lead [[id number]] Collected: 09/14/22 1328     Updated: 09/14/22 1335     QT Interval 364 ms      QTC Interval 419 ms     Narrative:      Test Reason : a fib  Blood Pressure :   */*   mmHG  Vent. Rate :  80 BPM     Atrial Rate :  80 BPM     P-R Int : 138 ms          QRS Dur : 102 ms      QT Int : 364 ms       P-R-T Axes :  92  81  81 degrees     QTc Int : 419 ms    Normal sinus rhythm  Incomplete right bundle branch block  Borderline ECG  When compared with ECG of 12-JUL-2021 16:32,  No significant change was found    Referred By: WYATT           Confirmed By:     "     ---------------------------------------------------  TTE/LUCILLE:  Results for orders placed in visit on 08/17/21    Adult Transthoracic Echo Complete W/ Cont if Necessary Per Protocol    Interpretation Summary  · Left ventricular ejection fraction appears to be 66 - 70%.  · Left ventricular diastolic function is consistent with (grade I) impaired relaxation.  · Estimated right ventricular systolic pressure from tricuspid regurgitation is normal (<35 mmHg).      -----------------------------------------------------  CXR/Imaging:   Imaging Results (Most Recent)     None          -----------------------------------------------------  CT:   CT Chest Without Contrast Diagnostic    Result Date: 8/24/2022  Overall stable appearance compared with July 21. Persistent central volume loss/consolidation with more peripheral patchy airspace disease right middle and lower lobes. A definite central obstructing lesion is not appreciated. Follow-up after more prolonged appropriate antibiotic therapy might be considered. Alternatively consider bronchoscopy. Electronically signed by:  Star Campuzano MD  8/24/2022 12:51 PM CDT Workstation: [id number]      ----------------------------------------------------      --------------------------------------------------------------------------------------------------  LABS:     The CVD Risk score (Perez et al., 2008) failed to calculate for the following reasons:    Cannot find a previous HDL lab    Cannot find a previous total cholesterol lab         Lab Results   Component Value Date    GLUCOSE 139 (H) 05/09/2022    BUN 17 05/09/2022    CREATININE 0.77 05/09/2022    EGFRIFNONA 81 11/08/2021    BCR 22.1 05/09/2022    K 4.3 05/09/2022    CO2 29.0 05/09/2022    CALCIUM 9.2 05/09/2022    ALBUMIN 4.20 05/09/2022    AST 21 05/09/2022    ALT 23 05/09/2022     Lab Results   Component Value Date    WBC 9.69 05/09/2022    HGB 14.6 05/09/2022    HCT 42.4 05/09/2022    MCV 91.0 05/09/2022      05/09/2022     No results found for: CHOL, CHLPL, TRIG, HDL, LDL, LDLDIRECT  No results found for: TSH, B3VQJVW, R9IKDCO, THYROIDAB  Lab Results   Component Value Date    CKTOTAL 33 (L) 03/14/2016    CKMB 0.8 03/14/2016    TROPONINI <0.012 11/06/2018    TROPONINT <0.010 07/12/2021     No results found for: HGBA1C  Lab Results   Component Value Date    DDIMER 2,063 (H) 11/29/2015     Lab Results   Component Value Date    ALT 23 05/09/2022     No results found for: HGBA1C  Lab Results   Component Value Date    CREATININE 0.77 05/09/2022     No results found for: IRON, TIBC, FERRITIN  Lab Results   Component Value Date    INR 0.93 07/12/2021    INR 1.02 11/06/2018    INR 1.8 03/18/2016    PROTIME 12.4 07/12/2021    PROTIME 13.2 11/06/2018    PROTIME 21.0 (H) 03/18/2016       [unfilled]    1. Paroxysmal atrial fibrillation (HCC)  2. Palpitations  - ECG 12 Lead  - TSH    The patient is a 72-year-old male who is transitioning care from Dr. Rose to me.  He has a known history of paroxysmal atrial fibrillation which was diagnosed by Dr. Rose on MCT study in August 2021.  He also has a history of PE several years ago.  He denies any symptoms that could be attributable to atrial fibrillation at this point.  His [id number] score appears to be 3 based on currently available data.  Will continue Eliquis and diltiazem therapy.  Discontinue baby aspirin.  He denies having any prior history of stroke, heart attacks or peripheral vascular disease.  Obtain serum TSH level today.    Prevention:  BMI is >= 25 and <30. (Overweight) The following options were offered after discussion;: referral to primary care      Brian Garcia  reports that he quit smoking about 14 years ago. His smoking use included cigarettes. He started smoking about 61 years ago. He smoked 3.00 packs per day. He has never used smokeless tobacco.. I have educated him on continued tobacco cessation.      Return in about 6 months (around  3/14/2023).            Electronically signed by Neville Meeks MD on 09/14/22 at 13:50 CDT

## 2022-09-15 ENCOUNTER — TELEPHONE (OUTPATIENT)
Dept: PULMONOLOGY | Facility: CLINIC | Age: 72
End: 2022-09-15

## 2022-09-15 DIAGNOSIS — R91.8 PULMONARY NODULES: Primary | ICD-10-CM

## 2022-09-15 LAB — TSH SERPL DL<=0.05 MIU/L-ACNC: 1.12 UIU/ML (ref 0.27–4.2)

## 2022-09-15 RX ORDER — DILTIAZEM HYDROCHLORIDE 180 MG/1
CAPSULE, EXTENDED RELEASE ORAL
Qty: 30 CAPSULE | Refills: 1 | Status: SHIPPED | OUTPATIENT
Start: 2022-09-15 | End: 2022-12-08 | Stop reason: SDUPTHER

## 2022-09-15 NOTE — TELEPHONE ENCOUNTER
Called patient to discuss results of his Pet scan,  I reviewed his Pet with Dr Davis and Dr Beck in Oncology. We reviewed all his recents scans. The concensus was to repeat a PET in 6 weeks and eval for any changes. If there is a more focal area that is amenable to bronch at that time, will do it. If not, will recommend empiric radiation  Patient agreeable to plan

## 2022-09-16 ENCOUNTER — NURSE NAVIGATOR (OUTPATIENT)
Dept: ONCOLOGY | Facility: CLINIC | Age: 72
End: 2022-09-16

## 2022-09-16 ENCOUNTER — TELEPHONE (OUTPATIENT)
Dept: CARDIOLOGY | Facility: CLINIC | Age: 72
End: 2022-09-16

## 2022-09-16 NOTE — TELEPHONE ENCOUNTER
Called patient. Informed patient of test results. Patient voiced their understandings.     ----- Message from Neville Meeks MD sent at 9/15/2022 12:19 PM CDT -----  Serum TSH was normal.

## 2022-09-16 NOTE — PROGRESS NOTES
LUNG PATIENT NAVIGATION     DIAGNOSIS: Lung Nodule  ENCOUNTER TYPE: Phone     Scheduled PET scan as ordered by Dr. Washington and follow-up appt to review results. Contacted Pulmonology referral coordinator who is submitting updated forms to extend VA authorization.     Called pt to inform of PET scan scheduled for 10/24/22 and Dr. Washington follow-up to review results on 10/26/22. Reminded him that Radiology staff would contact him a few days before with pre-procedure instructions as they did on recent PET scan. Made him aware that VA authorization has been submitted.   Reinforced plan of care as discussed with him by Dr. Washington on phone call this morning.     Confirmed that pt still has my contact information and encouraged him to call with any questions or concerns. Pt voiced understanding of all discussed.

## 2022-09-17 LAB
QT INTERVAL: 364 MS
QTC INTERVAL: 419 MS

## 2022-09-26 ENCOUNTER — TELEPHONE (OUTPATIENT)
Dept: ADMINISTRATIVE | Facility: HOSPITAL | Age: 72
End: 2022-09-26

## 2022-09-26 NOTE — TELEPHONE ENCOUNTER
Patient had left a message asking me to return his call about his VA auth for Dr Washington.  I let him know that it is  and he will need to call them to get that updated- he also asked about Dr Davis.  I told him we had on him but the patient  had not received his letter yet. He said he will contact the VA after every appointment to keep things from getting delayed

## 2022-10-24 ENCOUNTER — HOSPITAL ENCOUNTER (OUTPATIENT)
Dept: PET IMAGING | Facility: HOSPITAL | Age: 72
Discharge: HOME | End: 2022-10-24

## 2022-10-24 DIAGNOSIS — R91.8 PULMONARY NODULES: ICD-10-CM

## 2022-10-31 ENCOUNTER — HOSPITAL ENCOUNTER (OUTPATIENT)
Dept: PET IMAGING | Facility: HOSPITAL | Age: 72
Discharge: HOME | End: 2022-10-31
Admitting: INTERNAL MEDICINE

## 2022-10-31 PROCEDURE — 78815 PET IMAGE W/CT SKULL-THIGH: CPT

## 2022-10-31 PROCEDURE — A9552 F18 FDG: HCPCS | Performed by: INTERNAL MEDICINE

## 2022-10-31 PROCEDURE — 0 FLUDEOXYGLUCOSE F18 SOLUTION: Performed by: INTERNAL MEDICINE

## 2022-10-31 RX ADMIN — SODIUM FLUORIDE F 18 1 DOSE: 200 INJECTION, SOLUTION INTRAVENOUS at 09:54

## 2022-11-07 ENCOUNTER — LAB (OUTPATIENT)
Dept: ONCOLOGY | Facility: HOSPITAL | Age: 72
End: 2022-11-07

## 2022-11-07 DIAGNOSIS — R91.8 PULMONARY NODULES: ICD-10-CM

## 2022-11-07 DIAGNOSIS — D72.828 OTHER ELEVATED WHITE BLOOD CELL (WBC) COUNT: ICD-10-CM

## 2022-11-07 DIAGNOSIS — Z86.711 HISTORY OF PULMONARY EMBOLISM: ICD-10-CM

## 2022-11-07 LAB
ALBUMIN SERPL-MCNC: 4 G/DL (ref 3.5–5.2)
ALBUMIN/GLOB SERPL: 1.3 G/DL
ALP SERPL-CCNC: 95 U/L (ref 39–117)
ALT SERPL W P-5'-P-CCNC: 18 U/L (ref 1–41)
ANION GAP SERPL CALCULATED.3IONS-SCNC: 8 MMOL/L (ref 5–15)
AST SERPL-CCNC: 18 U/L (ref 1–40)
BASOPHILS # BLD AUTO: 0.08 10*3/MM3 (ref 0–0.2)
BASOPHILS NFR BLD AUTO: 0.8 % (ref 0–1.5)
BILIRUB SERPL-MCNC: <0.2 MG/DL (ref 0–1.2)
BUN SERPL-MCNC: 11 MG/DL (ref 8–23)
BUN/CREAT SERPL: 14.9 (ref 7–25)
CALCIUM SPEC-SCNC: 9.2 MG/DL (ref 8.6–10.5)
CHLORIDE SERPL-SCNC: 101 MMOL/L (ref 98–107)
CO2 SERPL-SCNC: 32 MMOL/L (ref 22–29)
CREAT SERPL-MCNC: 0.74 MG/DL (ref 0.76–1.27)
DEPRECATED RDW RBC AUTO: 41.5 FL (ref 37–54)
EGFRCR SERPLBLD CKD-EPI 2021: 95.7 ML/MIN/1.73
EOSINOPHIL # BLD AUTO: 0.5 10*3/MM3 (ref 0–0.4)
EOSINOPHIL NFR BLD AUTO: 5 % (ref 0.3–6.2)
ERYTHROCYTE [DISTWIDTH] IN BLOOD BY AUTOMATED COUNT: 12.3 % (ref 12.3–15.4)
GLOBULIN UR ELPH-MCNC: 3.2 GM/DL
GLUCOSE SERPL-MCNC: 105 MG/DL (ref 65–99)
HCT VFR BLD AUTO: 43.7 % (ref 37.5–51)
HGB BLD-MCNC: 14.3 G/DL (ref 13–17.7)
HOLD SPECIMEN: NORMAL
IMM GRANULOCYTES # BLD AUTO: 0.03 10*3/MM3 (ref 0–0.05)
IMM GRANULOCYTES NFR BLD AUTO: 0.3 % (ref 0–0.5)
LYMPHOCYTES # BLD AUTO: 1.93 10*3/MM3 (ref 0.7–3.1)
LYMPHOCYTES NFR BLD AUTO: 19.4 % (ref 19.6–45.3)
MCH RBC QN AUTO: 30 PG (ref 26.6–33)
MCHC RBC AUTO-ENTMCNC: 32.7 G/DL (ref 31.5–35.7)
MCV RBC AUTO: 91.8 FL (ref 79–97)
MONOCYTES # BLD AUTO: 1.1 10*3/MM3 (ref 0.1–0.9)
MONOCYTES NFR BLD AUTO: 11 % (ref 5–12)
NEUTROPHILS NFR BLD AUTO: 6.33 10*3/MM3 (ref 1.7–7)
NEUTROPHILS NFR BLD AUTO: 63.5 % (ref 42.7–76)
NRBC BLD AUTO-RTO: 0 /100 WBC (ref 0–0.2)
PLATELET # BLD AUTO: 249 10*3/MM3 (ref 140–450)
PMV BLD AUTO: 9.8 FL (ref 6–12)
POTASSIUM SERPL-SCNC: 3.9 MMOL/L (ref 3.5–5.2)
PROT SERPL-MCNC: 7.2 G/DL (ref 6–8.5)
RBC # BLD AUTO: 4.76 10*6/MM3 (ref 4.14–5.8)
SODIUM SERPL-SCNC: 141 MMOL/L (ref 136–145)
WBC NRBC COR # BLD: 9.97 10*3/MM3 (ref 3.4–10.8)

## 2022-11-07 PROCEDURE — 80053 COMPREHEN METABOLIC PANEL: CPT

## 2022-11-07 PROCEDURE — 36415 COLL VENOUS BLD VENIPUNCTURE: CPT | Performed by: INTERNAL MEDICINE

## 2022-11-07 PROCEDURE — 85025 COMPLETE CBC W/AUTO DIFF WBC: CPT

## 2022-11-09 ENCOUNTER — OFFICE VISIT (OUTPATIENT)
Dept: ONCOLOGY | Facility: CLINIC | Age: 72
End: 2022-11-09

## 2022-11-09 VITALS
DIASTOLIC BLOOD PRESSURE: 74 MMHG | TEMPERATURE: 97.5 F | WEIGHT: 146.5 LBS | HEART RATE: 106 BPM | SYSTOLIC BLOOD PRESSURE: 149 MMHG | RESPIRATION RATE: 18 BRPM | OXYGEN SATURATION: 94 % | BODY MASS INDEX: 22.28 KG/M2

## 2022-11-09 DIAGNOSIS — Z86.711 HISTORY OF PULMONARY EMBOLISM: Chronic | ICD-10-CM

## 2022-11-09 DIAGNOSIS — D72.828 OTHER ELEVATED WHITE BLOOD CELL (WBC) COUNT: Chronic | ICD-10-CM

## 2022-11-09 DIAGNOSIS — R91.8 PULMONARY NODULES: Primary | Chronic | ICD-10-CM

## 2022-11-09 PROCEDURE — G0463 HOSPITAL OUTPT CLINIC VISIT: HCPCS | Performed by: INTERNAL MEDICINE

## 2022-11-09 PROCEDURE — 99214 OFFICE O/P EST MOD 30 MIN: CPT | Performed by: INTERNAL MEDICINE

## 2022-11-09 PROCEDURE — 1123F ACP DISCUSS/DSCN MKR DOCD: CPT | Performed by: INTERNAL MEDICINE

## 2022-11-09 PROCEDURE — 1126F AMNT PAIN NOTED NONE PRSNT: CPT | Performed by: INTERNAL MEDICINE

## 2022-11-09 NOTE — PROGRESS NOTES
DATE OF VISIT: 11/9/2022      REASON FOR VISIT: Lung nodule, history of pulmonary embolism, leukocytosis      HISTORY OF PRESENT ILLNESS:   72-year-old male with medical problems significant for hypertension, history of pulmonary embolism in 2015 for which patient took Coumadin for 1 year, chronic obstructive pulmonary disease, heavy nicotine addiction that he quit in 2008 has been [facility] for spiculated right lung lesion leukocytosis and history of pulmonary embolism.  Patient had a PET/CT done on September 12, 2022 that showed minimal uptake in right lung nodule along with uptake in left rib.  Patient had a follow-up 6-week PET/CT done on October 31, 2022.  Patient is here for to discuss results and further recommendation.  Continues to have chronic tingling and numbness affecting lower extremity.  Continues to have chronic hearing loss.  Complains of chronic shortness of breath.  No hemoptysis.          Past Medical History, Past Surgical History, Social History, Family History have been reviewed and are without significant changes except as mentioned.    Review of Systems   Constitutional: Positive for unexpected weight change (Has lost 14 pounds in last 6 months).      A comprehensive 14 point review of systems was performed and was negative except as mentioned in HPI.    Medications:  The current medication list was reviewed in the EMR    ALLERGIES:    Allergies   Allergen Reactions   • Amoxicillin Anaphylaxis   • Albuterol Irritability     Facial flushing and palpitations.   • Asmanex (120 Metered Doses) [Mometasone Furoate] Unknown - Low Severity   • Lortab [Hydrocodone-Acetaminophen] Other (See Comments)     Can't remember   • Morphine And Related Hallucinations   • Prilosec [Omeprazole] Other (See Comments)     unknown   • Sodium Clavulanate Unknown - High Severity   • Statins GI Intolerance   • Sulfa Antibiotics Other (See Comments)     Can't remember also more and can't  remember   • Symbicort [Budesonide-Formoterol Fumarate] Unknown (See Comments)     Doesn't remember reaction type       Objective      Vitals:    11/09/22 1414   BP: 149/74   Pulse: 106   Resp: 18   Temp: 97.5 °F (36.4 °C)   SpO2: 94%   Weight: 66.5 kg (146 lb 8 oz)   PainSc: 0-No pain     Current Status 8/2/2021   ECOG score 0       Physical Exam  Pulmonary:      Breath sounds: Wheezing present. No rhonchi.   Neurological:      Mental Status: He is alert and oriented to person, place, and time.           RECENT LABS:  Glucose   Date Value Ref Range Status   11/07/2022 105 (H) 65 - 99 mg/dL Final     Glucose, Arterial   Date Value Ref Range Status   02/06/2018 106 mmol/L Final     Sodium   Date Value Ref Range Status   11/07/2022 141 136 - 145 mmol/L Final     Potassium   Date Value Ref Range Status   11/07/2022 3.9 3.5 - 5.2 mmol/L Final     CO2   Date Value Ref Range Status   11/07/2022 32.0 (H) 22.0 - 29.0 mmol/L Final     Chloride   Date Value Ref Range Status   11/07/2022 101 98 - 107 mmol/L Final     Anion Gap   Date Value Ref Range Status   11/07/2022 8.0 5.0 - 15.0 mmol/L Final     Creatinine   Date Value Ref Range Status   11/07/2022 0.74 (L) 0.76 - 1.27 mg/dL Final     BUN   Date Value Ref Range Status   11/07/2022 11 8 - 23 mg/dL Final     BUN/Creatinine Ratio   Date Value Ref Range Status   11/07/2022 14.9 7.0 - 25.0 Final     Calcium   Date Value Ref Range Status   11/07/2022 9.2 8.6 - 10.5 mg/dL Final     eGFR Non  Amer   Date Value Ref Range Status   11/08/2021 81 >60 mL/min/1.73 Final     Alkaline Phosphatase   Date Value Ref Range Status   11/07/2022 95 39 - 117 U/L Final     Total Protein   Date Value Ref Range Status   11/07/2022 7.2 6.0 - 8.5 g/dL Final     ALT (SGPT)   Date Value Ref Range Status   11/07/2022 18 1 - 41 U/L Final     AST (SGOT)   Date Value Ref Range Status   11/07/2022 18 1 - 40 U/L Final     Total Bilirubin   Date Value Ref Range Status   11/07/2022 <0.2 0.0 - 1.2 mg/dL  Final     Albumin   Date Value Ref Range Status   11/07/2022 4.00 3.50 - 5.20 g/dL Final     Globulin   Date Value Ref Range Status   11/07/2022 3.2 gm/dL Final     Lab Results   Component Value Date    WBC 9.97 11/07/2022    HGB 14.3 11/07/2022    HCT 43.7 11/07/2022    MCV 91.8 11/07/2022     11/07/2022     Lab Results   Component Value Date    NEUTROABS 6.33 11/07/2022     No results found for: , LABCA2, AFPTM, HCGQUANT, , CHROMGRNA, [id number], CEA, REFLABREPO      PATHOLOGY:  * Cannot find OR log *         RADIOLOGY DATA :  No radiology results for the last 7 days        Assessment & Plan     1.  Lung nodule:  - PET/CT done on September 12, 2022 showed lung nodule with SUV around 2.7 involving right middle lobe and some uptake in left rib cage region which was nonspecific at that point.  - Follow-up PET/CT done on October 31, 2022 was reviewed with radiation oncologist Dr. Downs and discussed with patient.  PET/CT does show increased uptake in right middle lobe area of consolidation with SUV around 5.  Left rib region that had minimal uptake on last PET/CT is consistent with healing fracture.  - Result of PET/CT were discussed with patient.  Uptake in lung is not consistent with bronchogenic carcinoma due to multiple area having faint uptake next to each other.  Ideally patient will benefit from a tissue diagnosis prior to any further treatment.  - Option of empiric radiation treatment if biopsy is not possible was discussed with patient as well.  -Patient has upcoming appointment with Dr. Washington on November 15, 2022.  He wants to discuss with Dr. Washington before making any decision.  - Patient was encouraged to call our office if he wants to have consultation with radiation oncologist regarding the situation.  - For now we will provide him a 3-month appointment with repeat CBC and CMP on that day.    2.  History of pulmonary embolism  - Was diagnosed in 2015.  S/p Coumadin for 1 year  - CT  angiogram in July 2021 was negative for pulmonary embolism    3.  Leukocytosis  - Most recent white blood cell count is 9000.  Will monitor with CBC    4.  Health maintenance: Patient does not smoke    5. Advance Care Planning: For now patient remains full code and is able to make decisions.  Patient has health care surrogate mentioned on chart.                 PHQ-9 Total Score: 6   -Patient is not homicidal or suicidal.  No acute intervention required.    Brian Garcia reports a pain score of 0.  Given his pain assessment as noted, treatment options were discussed and the following options were decided upon as a follow-up plan to address the patient's pain: continuation of current treatment plan for pain.         Omega Davis MD  11/9/2022  14:30 CST        Part of this note may be an electronic transcription/translation of spoken language to printed text using the Dragon Dictation System.          CC:

## 2022-11-15 ENCOUNTER — OFFICE VISIT (OUTPATIENT)
Dept: PULMONOLOGY | Facility: CLINIC | Age: 72
End: 2022-11-15

## 2022-11-15 VITALS
HEART RATE: 95 BPM | DIASTOLIC BLOOD PRESSURE: 70 MMHG | WEIGHT: 146 LBS | HEIGHT: 68 IN | SYSTOLIC BLOOD PRESSURE: 120 MMHG | OXYGEN SATURATION: 95 % | BODY MASS INDEX: 22.13 KG/M2

## 2022-11-15 DIAGNOSIS — J44.9 STAGE 4 VERY SEVERE COPD BY GOLD CLASSIFICATION: Primary | ICD-10-CM

## 2022-11-15 DIAGNOSIS — R91.8 PULMONARY NODULES: Chronic | ICD-10-CM

## 2022-11-15 PROCEDURE — 99214 OFFICE O/P EST MOD 30 MIN: CPT | Performed by: INTERNAL MEDICINE

## 2022-11-15 NOTE — PROGRESS NOTES
Pulmonary Consultation    No ref. provider found,    Thank you for asking me to see Brian Garcia for   Chief Complaint   Patient presents with   • PET scan results   .      History of Present Illness  Brian Garcia is a 72 y.o. male     11/15/22  Patient here to follow up on Pet scan. He's already seen Dr Davis about this, but tells me he didn't really understand what he was saying so not sure about what's going on  He stopped his Stiolto a few weeks ago. He's been using his Atrovent inhaler, rarely using his nebulizer    8/23/22  Patient here for follow up CT. Formal read pending but on my review, RUL infiltrate is still present and unchanged. Patient still having intermittent mild hemoptysis and very fatigued, taking several naps a day      7/26/22  Patient here to follow up on CT scan. There was a new RUL infiltrate  He finished his antibiotics this morning for his dental infection. No further hemoptysis. Breathing has been ok.      7/15/22  Patient here for follow up. Saw Coco Mares earlier in the week for increased O2 requirement. This has been stable since then. He has an abscessed tooth and has seen the dentist again and getting another round of antibiotics before it can be extracted. He tells me today that he's had another episode of coughing up blood, not a large amount      6/13/22  Patient here for acute visit. He tells me he's coughed up some brihgt red blood last week, more short of breath and lower O2 sats (but not low enough to require increased supplemental o2). Denies fever, fatigue, muscle aches, runny nose or GI symptoms    4/25/22  [facility]. He tells me his pulmonologist retired and he needs a new one. Was going every 6 months  Patient has Stiolto  He has atrovent HFA and atrovent nebs. He report on albuterol he gets racing heart and flushing.  He is taking Prednisone 5mg every other day for several years  He has supplemental O2 for a few  years, DME [facility] [place], [place]. 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  Review of Systems  As described in the HPI. Otherwise, remainder of ROS (14 systems) were negative.    Patient Active Problem List   Diagnosis   • Hypokalemia   • Physical deconditioning   • Pulmonary nodules   • Leukocytosis   • History of pulmonary embolism   • Stage 4 very severe COPD by GOLD classification (formerly [facility])   • Chronic respiratory failure with hypoxia, on home O2 therapy (formerly [facility])         Current Outpatient Medications:   •  acetaminophen (TYLENOL) 325 MG tablet, Take 650 mg by mouth Every 6 (Six) Hours As Needed for Mild Pain ., Disp: , Rfl:   •  apixaban (ELIQUIS) 5 MG tablet tablet, Take 1 tablet by mouth Every 12 (Twelve) Hours., Disp: 90 tablet, Rfl: 7  •  Cholecalciferol (VITAMIN D3 PO), Take  by mouth., Disp: , Rfl:   •  dilTIAZem (TIAZAC) 180 MG 24 hr capsule, TAKE ONE CAPSULE BY MOUTH ONCE A DAY FOR HEART OR TO LOWER BLOOD PRESSURE, Disp: 30 capsule, Rfl: 1  •  ipratropium (ATROVENT HFA) 17 MCG/ACT inhaler, Inhale 2 puffs 4 (Four) Times a Day As Needed for Wheezing., Disp: , Rfl:   •  ipratropium (ATROVENT) 0.02 % nebulizer solution, Take 2.5 mL by nebulization 3 (Three) Times a Day. Sub amount for how much insurance allows, Disp: 670 mL, Rfl: 0  •  montelukast (SINGULAIR) 10 MG tablet, Take 10 mg by mouth Every Night., Disp: , Rfl:   •  predniSONE (DELTASONE) 5 MG tablet, Take 5 mg by mouth Every Other Day., Disp: , Rfl:   •  tiotropium bromide-olodaterol (Stiolto Respimat) 2.5-2.5 MCG/ACT aerosol solution inhaler, Inhale 2 puffs Daily., Disp: , Rfl:     Allergies   Allergen Reactions   • Amoxicillin Anaphylaxis   • Albuterol Irritability     Facial flushing and palpitations.  "  • Asmanex (120 Metered Doses) [Mometasone Furoate] Unknown - Low Severity   • Lortab [Hydrocodone-Acetaminophen] Other (See Comments)     Can't remember   • Morphine And Related Hallucinations   • Prilosec [Omeprazole] Other (See Comments)     unknown   • Sodium Clavulanate Unknown - High Severity   • Statins GI Intolerance   • Sulfa Antibiotics Other (See Comments)     Can't remember also more and can't remember   • Symbicort [Budesonide-Formoterol Fumarate] Unknown (See Comments)     Doesn't remember reaction type       Past Medical History:   Diagnosis Date   • COPD (chronic obstructive pulmonary disease) (HCC)    • Hypertension      Past Surgical History:   Procedure Laterality Date   • HIP ARTHROPLASTY       Social History     Socioeconomic History   • Marital status:    Tobacco Use   • Smoking status: Former     Packs/day: 3.00     Types: Cigarettes     Start date:      Quit date: 2008     Years since quittin.5   • Smokeless tobacco: Never   Vaping Use   • Vaping Use: Never used   Substance and Sexual Activity   • Alcohol use: Not Currently   • Drug use: Never   • Sexual activity: Defer     No family history on file.       Objective     Blood pressure 120/70, pulse 95, height 172.7 cm (68\"), weight 66.2 kg (146 lb), SpO2 95 %.  Physical Exam  Vitals reviewed.   Constitutional:       Appearance: Normal appearance.   HENT:      Head: Normocephalic and atraumatic.      Right Ear: Tympanic membrane normal.      Left Ear: Tympanic membrane normal.      Nose: Nose normal.      Mouth/Throat:      Mouth: Mucous membranes are moist.      Pharynx: Oropharynx is clear.   Eyes:      Conjunctiva/sclera: Conjunctivae normal.      Pupils: Pupils are equal, round, and reactive to light.   Cardiovascular:      Rate and Rhythm: Normal rate and regular rhythm.      Pulses: Normal pulses.      Heart sounds: Normal heart sounds.   Pulmonary:      Effort: Pulmonary effort is normal.      Breath sounds: Normal " breath sounds.   Abdominal:      General: Abdomen is flat. Bowel sounds are normal.      Palpations: Abdomen is soft.   Musculoskeletal:         General: Normal range of motion.      Cervical back: Normal range of motion and neck supple.   Skin:     General: Skin is warm and dry.   Neurological:      General: No focal deficit present.      Mental Status: He is alert and oriented to person, place, and time.   Psychiatric:         Mood and Affect: Mood normal.         Behavior: Behavior normal.         PFTs: (independently reviewed and interpreted by me)  4/25/22  FVC 2.04L, 58%  FEV1 0.80L, 29%  Ratio 39%  TLC 6.92L, 114%  %  DLCO 36%  Very severe obstructive pattern, increased lung volumes suggestive of hyperinflation and air trapping, and severe diffusion impairment      Radiology (independently reviewed and interpreted by me):  CTA chest 7/12/21- severe emphysema, RML nodule  PET 7/30/21- nodule not appreciated, no aviditiy in that area  CT chest 7/21/22- ne wRUL infiltrate, severe emphysema unchanged  CT chest 8/23/22- persistent infiltrate  PET 9/12/22- increased avidity in the RML infiltrate  PET 10/31/22- increased uptake in the same area, no other areas of concern     Assessment & Plan     Diagnoses and all orders for this visit:    1. Stage 4 very severe COPD by GOLD classification (HCC) (Primary)    2. Pulmonary nodules  -     CT Chest Without Contrast; Future         Discussion/ Recommendations:   Spent 30 mins discussing the PET results and possible implications of this with patient and his wife. Agree that a getting a tissue diagnosis would be ideal. However patient has extremely severe emphysema and requires 4-5L of supplemental O2 at baseline. He is chronically dyspneic and I think performing any lung procedure that requires anesthesia is risky. Additionally, the location of the lesion is a bit tricky and I think that the certainty of getting an accurate, diagnostic sample is not very high.  We  discussed empiric radiation, which patient is potentially open to but only if necessary. For now, he would prefer to watch it a little longer which is not unreasonable. Will plan on a CT chest in 3 months and if there is evidence of actual growth in size of the lesion, will refer to Rad Onc          No follow-ups on file.      Thank you for allowing me to participate in the care of Brian Garcia. Please do not hesitate to contact me with any questions.         This document has been electronically signed by Clotilde Washington DO on November 15, 2022 12:41 CST

## 2022-12-08 ENCOUNTER — TELEPHONE (OUTPATIENT)
Dept: CARDIOLOGY | Facility: CLINIC | Age: 72
End: 2022-12-08

## 2022-12-08 RX ORDER — DILTIAZEM HYDROCHLORIDE 180 MG/1
180 CAPSULE, EXTENDED RELEASE ORAL DAILY
Qty: 90 CAPSULE | Refills: 1 | Status: SHIPPED | OUTPATIENT
Start: 2022-12-08 | End: 2023-02-03 | Stop reason: SDUPTHER

## 2022-12-08 NOTE — TELEPHONE ENCOUNTER
----- Message from Joanne Gerard sent at 12/6/2022  1:40 PM CST -----  Contact: [phone]  Needs a rx of dilTIAZem (TIAZAC) 180 MG 24 hr capsule [facility]

## 2022-12-15 ENCOUNTER — TRANSCRIBE ORDERS (OUTPATIENT)
Dept: ORTHOPEDIC SURGERY | Facility: CLINIC | Age: 72
End: 2022-12-15

## 2022-12-15 DIAGNOSIS — M25.511 RIGHT SHOULDER PAIN, UNSPECIFIED CHRONICITY: Primary | ICD-10-CM

## 2022-12-24 ENCOUNTER — APPOINTMENT (OUTPATIENT)
Dept: CT IMAGING | Facility: HOSPITAL | Age: 72
End: 2022-12-24

## 2022-12-24 ENCOUNTER — APPOINTMENT (OUTPATIENT)
Dept: GENERAL RADIOLOGY | Facility: HOSPITAL | Age: 72
End: 2022-12-24

## 2022-12-24 ENCOUNTER — HOSPITAL ENCOUNTER (EMERGENCY)
Facility: HOSPITAL | Age: 72
Discharge: ANOTHER HOSPITAL | End: 2022-12-24
Attending: FAMILY MEDICINE | Admitting: FAMILY MEDICINE

## 2022-12-24 VITALS
RESPIRATION RATE: 20 BRPM | OXYGEN SATURATION: 100 % | HEART RATE: 91 BPM | DIASTOLIC BLOOD PRESSURE: 70 MMHG | SYSTOLIC BLOOD PRESSURE: 149 MMHG | WEIGHT: 157.8 LBS | TEMPERATURE: 98.5 F | BODY MASS INDEX: 26.29 KG/M2 | HEIGHT: 65 IN

## 2022-12-24 DIAGNOSIS — I61.5 INTRAVENTRICULAR HEMORRHAGE: Primary | ICD-10-CM

## 2022-12-24 LAB
ALBUMIN SERPL-MCNC: 4.3 G/DL (ref 3.5–5.2)
ALBUMIN/GLOB SERPL: 1.5 G/DL
ALP SERPL-CCNC: 89 U/L (ref 39–117)
ALT SERPL W P-5'-P-CCNC: 30 U/L (ref 1–41)
ANION GAP SERPL CALCULATED.3IONS-SCNC: 11 MMOL/L (ref 5–15)
AST SERPL-CCNC: 20 U/L (ref 1–40)
BASOPHILS # BLD AUTO: 0.05 10*3/MM3 (ref 0–0.2)
BASOPHILS NFR BLD AUTO: 0.5 % (ref 0–1.5)
BILIRUB SERPL-MCNC: 0.2 MG/DL (ref 0–1.2)
BUN SERPL-MCNC: 18 MG/DL (ref 8–23)
BUN/CREAT SERPL: 25 (ref 7–25)
CALCIUM SPEC-SCNC: 9.5 MG/DL (ref 8.6–10.5)
CHLORIDE SERPL-SCNC: 98 MMOL/L (ref 98–107)
CK SERPL-CCNC: 34 U/L (ref 20–200)
CO2 SERPL-SCNC: 30 MMOL/L (ref 22–29)
CREAT SERPL-MCNC: 0.72 MG/DL (ref 0.76–1.27)
DEPRECATED RDW RBC AUTO: 45.6 FL (ref 37–54)
EGFRCR SERPLBLD CKD-EPI 2021: 96.5 ML/MIN/1.73
EOSINOPHIL # BLD AUTO: 0.12 10*3/MM3 (ref 0–0.4)
EOSINOPHIL NFR BLD AUTO: 1.2 % (ref 0.3–6.2)
ERYTHROCYTE [DISTWIDTH] IN BLOOD BY AUTOMATED COUNT: 13.4 % (ref 12.3–15.4)
FLUAV RNA RESP QL NAA+PROBE: NOT DETECTED
FLUBV RNA RESP QL NAA+PROBE: NOT DETECTED
GLOBULIN UR ELPH-MCNC: 2.9 GM/DL
GLUCOSE BLDC GLUCOMTR-MCNC: 120 MG/DL (ref 70–130)
GLUCOSE SERPL-MCNC: 121 MG/DL (ref 65–99)
HCT VFR BLD AUTO: 39.6 % (ref 37.5–51)
HGB BLD-MCNC: 13 G/DL (ref 13–17.7)
HOLD SPECIMEN: NORMAL
IMM GRANULOCYTES # BLD AUTO: 0.07 10*3/MM3 (ref 0–0.05)
IMM GRANULOCYTES NFR BLD AUTO: 0.7 % (ref 0–0.5)
INR PPP: 1.07 (ref 0.8–1.2)
LIPASE SERPL-CCNC: 31 U/L (ref 13–60)
LYMPHOCYTES # BLD AUTO: 0.76 10*3/MM3 (ref 0.7–3.1)
LYMPHOCYTES NFR BLD AUTO: 7.3 % (ref 19.6–45.3)
MAGNESIUM SERPL-MCNC: 1.9 MG/DL (ref 1.6–2.4)
MCH RBC QN AUTO: 30.6 PG (ref 26.6–33)
MCHC RBC AUTO-ENTMCNC: 32.8 G/DL (ref 31.5–35.7)
MCV RBC AUTO: 93.2 FL (ref 79–97)
MONOCYTES # BLD AUTO: 0.62 10*3/MM3 (ref 0.1–0.9)
MONOCYTES NFR BLD AUTO: 6 % (ref 5–12)
NEUTROPHILS NFR BLD AUTO: 8.76 10*3/MM3 (ref 1.7–7)
NEUTROPHILS NFR BLD AUTO: 84.3 % (ref 42.7–76)
NRBC BLD AUTO-RTO: 0 /100 WBC (ref 0–0.2)
NT-PROBNP SERPL-MCNC: 21.7 PG/ML (ref 0–900)
PLATELET # BLD AUTO: 252 10*3/MM3 (ref 140–450)
PMV BLD AUTO: 9.9 FL (ref 6–12)
POTASSIUM SERPL-SCNC: 4.5 MMOL/L (ref 3.5–5.2)
PROT SERPL-MCNC: 7.2 G/DL (ref 6–8.5)
PROTHROMBIN TIME: 13.8 SECONDS (ref 11.1–15.3)
RBC # BLD AUTO: 4.25 10*6/MM3 (ref 4.14–5.8)
SARS-COV-2 RNA RESP QL NAA+PROBE: NOT DETECTED
SODIUM SERPL-SCNC: 139 MMOL/L (ref 136–145)
TROPONIN T SERPL-MCNC: <0.01 NG/ML (ref 0–0.03)
WBC NRBC COR # BLD: 10.38 10*3/MM3 (ref 3.4–10.8)
WHOLE BLOOD HOLD SPECIMEN: NORMAL

## 2022-12-24 PROCEDURE — 85610 PROTHROMBIN TIME: CPT | Performed by: FAMILY MEDICINE

## 2022-12-24 PROCEDURE — 83880 ASSAY OF NATRIURETIC PEPTIDE: CPT | Performed by: FAMILY MEDICINE

## 2022-12-24 PROCEDURE — 25010000002 LEVOFLOXACIN PER 250 MG: Performed by: FAMILY MEDICINE

## 2022-12-24 PROCEDURE — 83690 ASSAY OF LIPASE: CPT | Performed by: FAMILY MEDICINE

## 2022-12-24 PROCEDURE — 71045 X-RAY EXAM CHEST 1 VIEW: CPT

## 2022-12-24 PROCEDURE — 25010000002 PROTHROMBIN COMPLEX CONC HUMAN 1000 UNITS KIT: Performed by: FAMILY MEDICINE

## 2022-12-24 PROCEDURE — 83735 ASSAY OF MAGNESIUM: CPT | Performed by: FAMILY MEDICINE

## 2022-12-24 PROCEDURE — 82550 ASSAY OF CK (CPK): CPT | Performed by: FAMILY MEDICINE

## 2022-12-24 PROCEDURE — 99284 EMERGENCY DEPT VISIT MOD MDM: CPT

## 2022-12-24 PROCEDURE — 82962 GLUCOSE BLOOD TEST: CPT

## 2022-12-24 PROCEDURE — 36415 COLL VENOUS BLD VENIPUNCTURE: CPT

## 2022-12-24 PROCEDURE — 96365 THER/PROPH/DIAG IV INF INIT: CPT

## 2022-12-24 PROCEDURE — 70450 CT HEAD/BRAIN W/O DYE: CPT

## 2022-12-24 PROCEDURE — 84484 ASSAY OF TROPONIN QUANT: CPT | Performed by: FAMILY MEDICINE

## 2022-12-24 PROCEDURE — 85025 COMPLETE CBC W/AUTO DIFF WBC: CPT | Performed by: FAMILY MEDICINE

## 2022-12-24 PROCEDURE — 87040 BLOOD CULTURE FOR BACTERIA: CPT | Performed by: FAMILY MEDICINE

## 2022-12-24 PROCEDURE — 96375 TX/PRO/DX INJ NEW DRUG ADDON: CPT

## 2022-12-24 PROCEDURE — 25010000002 PROTHROMBIN COMPLEX CONC HUMAN 500 UNITS KIT: Performed by: FAMILY MEDICINE

## 2022-12-24 PROCEDURE — 87636 SARSCOV2 & INF A&B AMP PRB: CPT | Performed by: FAMILY MEDICINE

## 2022-12-24 PROCEDURE — 80053 COMPREHEN METABOLIC PANEL: CPT | Performed by: FAMILY MEDICINE

## 2022-12-24 RX ORDER — LEVOFLOXACIN 5 MG/ML
750 INJECTION, SOLUTION INTRAVENOUS ONCE
Status: COMPLETED | OUTPATIENT
Start: 2022-12-24 | End: 2022-12-24

## 2022-12-24 RX ADMIN — PROTHROMBIN, COAGULATION FACTOR VII HUMAN, COAGULATION FACTOR IX HUMAN, COAGULATION FACTOR X HUMAN, PROTEIN C, PROTEIN S HUMAN, AND WATER 2174 UNITS: KIT at 13:39

## 2022-12-24 RX ADMIN — LEVOFLOXACIN 750 MG: 5 INJECTION, SOLUTION INTRAVENOUS at 13:39

## 2022-12-24 RX ADMIN — PROTHROMBIN, COAGULATION FACTOR VII HUMAN, COAGULATION FACTOR IX HUMAN, COAGULATION FACTOR X HUMAN, PROTEIN C, PROTEIN S HUMAN, AND WATER 1150 UNITS: KIT at 13:39

## 2022-12-24 NOTE — ED PROVIDER NOTES
Subjective   History of Present Illness  Patient presents to the emergency department confused after having a ground-level fall.  Patient complains of leg weakness and myalgias.  Wife states that the patient developed these symptoms prior to falling.  She states that he fell between 10 and 10:30 AM this morning.  Patient is on Eliquis.  Patient does not have any signs of trauma on physical exam.    Fall  Mechanism of injury: fall    Incident location:  Home  Time since incident:  1 hour  Arrived directly from scene: yes    Fall:     Fall occurred:  Standing    Height of fall:  GLF    Impact surface:  Hard floor    Entrapped after fall: no    Protective equipment: none    Suspicion of alcohol use: no    Suspicion of drug use: no    Prior to arrival data:     Bystander interventions:  None    Blood loss:  None    Responsiveness at scene:  Alert    Orientation at scene:  Person    Amnesic to event: no      Airway interventions:  None    Breathing interventions:  None  Associated symptoms: no abdominal pain, no chest pain, no headaches, no nausea, no neck pain, no seizures and no vomiting    Eye Problem  Associated symptoms: no discharge, no headaches, no nausea, no redness, no vomiting and no weakness    Gait Problem  Associated symptoms: fatigue and myalgias    Associated symptoms: no abdominal pain, no chest pain, no congestion, no cough, no diarrhea, no ear pain, no fever, no headaches, no nausea, no rash, no rhinorrhea, no shortness of breath, no sore throat, no vomiting and no wheezing        Review of Systems   Constitutional: Positive for activity change and fatigue. Negative for appetite change, chills, diaphoresis and fever.   HENT: Negative for congestion, ear discharge, ear pain, nosebleeds, rhinorrhea, sinus pressure, sore throat and trouble swallowing.    Eyes: Negative for discharge and redness.   Respiratory: Negative for apnea, cough, chest tightness, shortness of breath and wheezing.    Cardiovascular:  Negative for chest pain.   Gastrointestinal: Negative for abdominal pain, diarrhea, nausea and vomiting.   Endocrine: Negative for polyuria.   Genitourinary: Negative for dysuria, frequency and urgency.   Musculoskeletal: Positive for gait problem and myalgias. Negative for neck pain.   Skin: Negative for color change and rash.   Allergic/Immunologic: Negative for immunocompromised state.   Neurological: Negative for dizziness, seizures, syncope, weakness, light-headedness and headaches.   Hematological: Negative for adenopathy. Does not bruise/bleed easily.   Psychiatric/Behavioral: Positive for confusion. Negative for behavioral problems.   All other systems reviewed and are negative.      Past Medical History:   Diagnosis Date   • COPD (chronic obstructive pulmonary disease) ([facility])    • Hypertension        Allergies   Allergen Reactions   • Amoxicillin Anaphylaxis   • Albuterol Irritability     Facial flushing and palpitations.   • Asmanex (120 Metered Doses) [Mometasone Furoate] Unknown - Low Severity   • Lortab [Hydrocodone-Acetaminophen] Other (See Comments)     Can't remember   • Morphine And Related Hallucinations   • Prilosec [Omeprazole] Other (See Comments)     unknown   • Sodium Clavulanate Unknown - High Severity   • Statins GI Intolerance   • Sulfa Antibiotics Other (See Comments)     Can't remember also more and can't remember   • Symbicort [Budesonide-Formoterol Fumarate] Unknown (See Comments)     Doesn't remember reaction type       Past Surgical History:   Procedure Laterality Date   • HIP ARTHROPLASTY         History reviewed. No pertinent family history.    Social History     Socioeconomic History   • Marital status:    Tobacco Use   • Smoking status: Former     Packs/day: 3.00     Types: Cigarettes     Start date:      Quit date: 2008     Years since quittin.6   • Smokeless tobacco: Never   Vaping Use   • Vaping Use: Never used   Substance and Sexual Activity   • Alcohol  use: Not Currently   • Drug use: Never   • Sexual activity: Defer           Objective   Physical Exam  Vitals and nursing note reviewed.   Constitutional:       Appearance: He is well-developed.   HENT:      Head: Normocephalic and atraumatic.      Nose: Nose normal.   Eyes:      General: No scleral icterus.        Right eye: No discharge.         Left eye: No discharge.      Conjunctiva/sclera: Conjunctivae normal.      Pupils: Pupils are equal, round, and reactive to light.   Neck:      Trachea: No tracheal deviation.   Cardiovascular:      Rate and Rhythm: Normal rate and regular rhythm.      Heart sounds: Normal heart sounds. No murmur heard.  Pulmonary:      Effort: Pulmonary effort is normal. No respiratory distress.      Breath sounds: No stridor. Decreased breath sounds, wheezing and rhonchi present. No rales.   Abdominal:      General: Bowel sounds are normal. There is no distension.      Palpations: Abdomen is soft. There is no mass.      Tenderness: There is no abdominal tenderness. There is no guarding or rebound.   Musculoskeletal:      Cervical back: Normal range of motion and neck supple.   Skin:     General: Skin is warm and dry.      Findings: No erythema or rash.   Neurological:      Mental Status: He is alert. He is confused.      GCS: GCS eye subscore is 4. GCS verbal subscore is 5. GCS motor subscore is 6.      Cranial Nerves: Cranial nerves 2-12 are intact.      Sensory: Sensation is intact.      Motor: Motor function is intact.      Coordination: Coordination is intact. Coordination normal.   Psychiatric:         Behavior: Behavior normal.         Thought Content: Thought content normal.         Procedures           ED Course  ED Course as of 12/24/22 1439   Sat Dec 24, 2022   1325 Findings were discussed with Dr. Aguero at [facility].  He has agreed to accept patient for further care.  Weather does not permit transfer via helicopter, patient will have to go by ground.  Kcentra was  started in the emergency department as patient is on Eliquis.  His wife states that he did not take his dose of Eliquis today, but had been taking it up until yesterday. [CB]      ED Course User Index  [CB] Jemal Matamoros MD              Labs Reviewed   COMPREHENSIVE METABOLIC PANEL - Abnormal; Notable for the following components:       Result Value    Glucose 121 (*)     Creatinine 0.72 (*)     CO2 30.0 (*)     All other components within normal limits    Narrative:     GFR Normal >60  Chronic Kidney Disease <60  Kidney Failure <15    The GFR formula is only valid for adults with stable renal function between ages 18 and 70.   CBC WITH AUTO DIFFERENTIAL - Abnormal; Notable for the following components:    Neutrophil % 84.3 (*)     Lymphocyte % 7.3 (*)     Immature Grans % 0.7 (*)     Neutrophils, Absolute 8.76 (*)     Immature Grans, Absolute 0.07 (*)     All other components within normal limits   COVID-19 AND FLU A/B, NP SWAB IN TRANSPORT MEDIA 8-12 HR TAT - Normal    Narrative:     Fact sheet for providers: https://www.fda.gov/media/374944/download    Fact sheet for patients: https://www.fda.gov/media/774004/download    Test performed by PCR.   LIPASE - Normal   PROTIME-INR - Normal    Narrative:     Therapeutic range for most indications is 2.0-3.0 INR,  or 2.5-3.5 for mechanical heart valves.   BNP (IN-HOUSE) - Normal    Narrative:     Among patients with dyspnea, NT-proBNP is highly sensitive for the detection of acute congestive heart failure. In addition NT-proBNP of <300 pg/ml effectively rules out acute congestive heart failure with 99% negative predictive value.    Results may be falsely decreased if patient taking Biotin.     TROPONIN (IN-HOUSE) - Normal    Narrative:     Troponin T Reference Range:  <= 0.03 ng/mL-   Negative for AMI  >0.03 ng/mL-     Abnormal for myocardial necrosis.  Clinicians would have to utilize clinical acumen, EKG, Troponin and serial changes to determine if it is an  Acute Myocardial Infarction or myocardial injury due to an underlying chronic condition.       Results may be falsely decreased if patient taking Biotin.     CK - Normal   MAGNESIUM - Normal   POCT GLUCOSE FINGERSTICK - Normal   BLOOD CULTURE   BLOOD CULTURE   LACTIC ACID, PLASMA   CBC AND DIFFERENTIAL    Narrative:     The following orders were created for panel order CBC & Differential.  Procedure                               Abnormality         Status                     ---------                               -----------         ------                     CBC Auto Differential[[id number]]        Abnormal            Final result                 Please view results for these tests on the individual orders.   EXTRA TUBES    Narrative:     The following orders were created for panel order Extra Tubes.  Procedure                               Abnormality         Status                     ---------                               -----------         ------                     Lavender Top[[id number]]                                     Final result               Gold Top - SST[[id number]]                                   Final result                 Please view results for these tests on the individual orders.   LAVENDER TOP   GOLD TOP - SST       CT Head Without Contrast Stroke Protocol   Final Result   CONCLUSION:   Acute hemorrhage distending the right lateral ventricle, most   pronounced in the temporal horn.   Hemorrhage in the third ventricle as well.   Etiology most likely traumatic in nature.   Cerebral and cerebellar atrophy.   Findings discussed with ER physician at the time of dictation.      [id number]      Electronically signed by:  Vishnu Staton MD  12/24/2022 1:02 PM   [facility] Workstation: [id number]      XR Chest 1 View   Final Result   CONCLUSION:   Chronic obstructive pulmonary disease.   Progressive infiltrates right middle and lower lobes from CT   August 23, 2022.   Possible 2.5 cm nodule right midlung adjacent  to the minor   fissure.      [id number]      Electronically signed by:  Vishnu Staton MD  12/24/2022 12:39 PM   [facility] Workstation: [id number]                                            [facility]    Final diagnoses:   Intraventricular hemorrhage (HCC)       ED Disposition  ED Disposition     ED Disposition   Transfer to Another Facility     Condition   --    Comment   --             No follow-up provider specified.       Medication List      No changes were made to your prescriptions during this visit.         Jemal Matamoros MD  12/24/22 [id number]

## 2022-12-29 LAB
BACTERIA SPEC AEROBE CULT: NORMAL
BACTERIA SPEC AEROBE CULT: NORMAL

## 2022-12-30 ENCOUNTER — HOSPITAL ENCOUNTER (INPATIENT)
Facility: HOSPITAL | Age: 72
LOS: 14 days | Discharge: HOME | DRG: 949 | End: 2023-01-13
Attending: ORTHOPAEDIC SURGERY | Admitting: ORTHOPAEDIC SURGERY
Payer: OTHER GOVERNMENT

## 2022-12-30 DIAGNOSIS — Z78.9 IMPAIRED MOBILITY AND ADLS: ICD-10-CM

## 2022-12-30 DIAGNOSIS — I63.9 CEREBROVASCULAR ACCIDENT (CVA), UNSPECIFIED MECHANISM: ICD-10-CM

## 2022-12-30 DIAGNOSIS — R13.10 DYSPHAGIA, UNSPECIFIED TYPE: ICD-10-CM

## 2022-12-30 DIAGNOSIS — Z74.09 IMPAIRED FUNCTIONAL MOBILITY, BALANCE, GAIT, AND ENDURANCE: ICD-10-CM

## 2022-12-30 DIAGNOSIS — Z74.09 IMPAIRED MOBILITY AND ADLS: ICD-10-CM

## 2022-12-30 DIAGNOSIS — R48.9 SYMBOLIC DYSFUNCTION: ICD-10-CM

## 2022-12-30 DIAGNOSIS — I63.89 CEREBROVASCULAR ACCIDENT (CVA) DUE TO OTHER MECHANISM: Primary | ICD-10-CM

## 2022-12-30 PROBLEM — E87.6 HYPOKALEMIA: Chronic | Status: ACTIVE | Noted: 2018-02-08

## 2022-12-30 PROBLEM — J44.9 STAGE 4 VERY SEVERE COPD BY GOLD CLASSIFICATION: Chronic | Status: ACTIVE | Noted: 2022-04-25

## 2022-12-30 PROBLEM — I61.6 NONTRAUMATIC MULTIPLE LOCALIZED INTRACEREBRAL HEMORRHAGES (HCC): Status: ACTIVE | Noted: 2022-12-30

## 2022-12-30 PROCEDURE — 99222 1ST HOSP IP/OBS MODERATE 55: CPT | Performed by: ORTHOPAEDIC SURGERY

## 2022-12-30 PROCEDURE — 94640 AIRWAY INHALATION TREATMENT: CPT

## 2022-12-30 RX ORDER — CEFUROXIME AXETIL 500 MG/1
500 TABLET ORAL EVERY 12 HOURS SCHEDULED
Status: COMPLETED | OUTPATIENT
Start: 2022-12-30 | End: 2023-01-04

## 2022-12-30 RX ORDER — CEFUROXIME AXETIL 500 MG/1
500 TABLET ORAL 2 TIMES DAILY
COMMUNITY
Start: 2022-12-29 | End: 2023-01-13 | Stop reason: HOSPADM

## 2022-12-30 RX ORDER — EZETIMIBE 10 MG/1
10 TABLET ORAL DAILY
COMMUNITY
End: 2023-02-14

## 2022-12-30 RX ORDER — MELATONIN
1000 DAILY
Status: DISCONTINUED | OUTPATIENT
Start: 2022-12-31 | End: 2023-01-13 | Stop reason: HOSPADM

## 2022-12-30 RX ORDER — DILTIAZEM HYDROCHLORIDE 180 MG/1
180 CAPSULE, COATED, EXTENDED RELEASE ORAL
Status: DISCONTINUED | OUTPATIENT
Start: 2022-12-31 | End: 2023-01-13 | Stop reason: HOSPADM

## 2022-12-30 RX ORDER — PREDNISONE 1 MG/1
5 TABLET ORAL EVERY OTHER DAY
Status: DISCONTINUED | OUTPATIENT
Start: 2023-01-01 | End: 2023-01-13 | Stop reason: HOSPADM

## 2022-12-30 RX ORDER — TIZANIDINE 2 MG/1
2 TABLET ORAL NIGHTLY PRN
Status: DISCONTINUED | OUTPATIENT
Start: 2022-12-30 | End: 2023-01-13 | Stop reason: HOSPADM

## 2022-12-30 RX ORDER — ACETAMINOPHEN 325 MG/1
650 TABLET ORAL EVERY 6 HOURS PRN
Status: DISCONTINUED | OUTPATIENT
Start: 2022-12-30 | End: 2023-01-01

## 2022-12-30 RX ORDER — MONTELUKAST SODIUM 10 MG/1
10 TABLET ORAL NIGHTLY
Status: DISCONTINUED | OUTPATIENT
Start: 2022-12-30 | End: 2023-01-13 | Stop reason: HOSPADM

## 2022-12-30 RX ADMIN — TIZANIDINE 2 MG: 2 TABLET ORAL at 21:51

## 2022-12-30 RX ADMIN — CEFUROXIME AXETIL 500 MG: 500 TABLET ORAL at 21:09

## 2022-12-30 RX ADMIN — IPRATROPIUM BROMIDE 0.5 MG: 0.5 SOLUTION RESPIRATORY (INHALATION) at 20:33

## 2022-12-30 RX ADMIN — MONTELUKAST SODIUM 10 MG: 10 TABLET, COATED ORAL at 21:09

## 2022-12-30 NOTE — H&P
HISTORY AND PHYSICAL    Admit date: 22     Chief complaint: Confusion and balance    History of present illness (4):  Patient is a 72 y.o. male presents with CVA history initially seen in [place] transferred over the weekend to [place] studies performed shows CVA intracranial hemorrhage with  midline shift no focal significant weakness just balance issues and confusion issues some type of expressive aphasia cognition type aphasia so he knows what he is doing    Personal/Family/Social History: Patient lives alone or with his girlfriend significant other seem to be very compatible  Past Medical History:   Diagnosis Date   • COPD (chronic obstructive pulmonary disease) (HCC)    • Hypertension      Past Surgical History:   Procedure Laterality Date   • HIP ARTHROPLASTY       No family history on file.  Social History     Tobacco Use   • Smoking status: Former     Packs/day: 3.00     Types: Cigarettes     Start date:      Quit date: 2008     Years since quittin.6   • Smokeless tobacco: Never   Vaping Use   • Vaping Use: Never used   Substance Use Topics   • Alcohol use: Not Currently   • Drug use: Never       Review of Systems:(detailed)    Constitutional: Prior to the stroke extremely independent almost to the point of being obstinate     HENT: No significant abnormalities within his respiratory system other chronic obstructive lung disease    Eyes: No complaints visual difficulties    Respiratory: History of severe chronic obstructive pulm disease multiple medications have multiple episodes of pneumonia and admission to the hospital and respiratory failure    Cardiovascular: Significant cardiac abnormality chest pain (R cardiac failure    Gastrointestinal: Negative    Endocrine: Negative patient states she is nondiabetic    Genitourinary: Negative      Musculoskeletal: Generalized weakness and poor coordination since the stroke    Skin: Negative    Allergic/Immunologic:    Allergies    Allergen Reactions   • Amoxicillin Anaphylaxis   • Albuterol Irritability     Facial flushing and palpitations.   • Asmanex (120 Metered Doses) [Mometasone Furoate] Unknown - Low Severity   • Lortab [Hydrocodone-Acetaminophen] Other (See Comments)     Can't remember   • Morphine And Related Hallucinations   • Prilosec [Omeprazole] Other (See Comments)     unknown   • Sodium Clavulanate Unknown - High Severity   • Statins GI Intolerance   • Sulfa Antibiotics Other (See Comments)     Can't remember also more and can't remember   • Symbicort [Budesonide-Formoterol Fumarate] Unknown (See Comments)     Doesn't remember reaction type       Neurological: Nothing prior to the stroke    Hematological: Negative    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 Authorizing Provider   Cholecalciferol (VITAMIN D3 PO) Take 2,000 Int'l Units/day by mouth Daily.   Yes Qamar Javed MD   dilTIAZem (TIAZAC) 180 MG 24 hr capsule Take 1 capsule by mouth Daily. 12/8/22  Yes Neville Meeks MD   ezetimibe (ZETIA) 10 MG tablet Take 10 mg by mouth Daily.   Yes Qamar Javed MD   montelukast (SINGULAIR) 10 MG tablet Take 10 mg by mouth Every Night.   Yes Qamar Javed MD   predniSONE (DELTASONE) 5 MG tablet Take 5 mg by mouth Every Other Day.   Yes Qamar Javed MD   acetaminophen (TYLENOL) 325 MG tablet Take 650 mg by mouth Every 6 (Six) Hours As Needed for Mild Pain .    Qamar Javed MD   apixaban (ELIQUIS) 5 MG tablet tablet Take 1 tablet by mouth Every 12 (Twelve) Hours. 4/18/22   Jaycee Rose MD   ipratropium (ATROVENT HFA) 17 MCG/ACT inhaler Inhale 2 puffs 4 (Four) Times a Day As Needed for Wheezing.    Qamar Javed MD   ipratropium (ATROVENT) 0.02 % nebulizer solution Take 2.5 mL by nebulization 3 (Three)  Times a Day. Sub amount for how much insurance allows 7/19/21   Anita Toney MD   tiZANidine (ZANAFLEX) 2 MG tablet Take 1 tablet by mouth At Night As Needed for Muscle Spasms. 12/12/22   Christin Sanderson APRN   dilTIAZem XR (DILACOR XR) 180 MG 24 hr capsule Take 1 capsule by mouth Daily. 6/30/22 10/7/22  Neville Meeks MD   tiotropium bromide-olodaterol (Stiolto Respimat) 2.5-2.5 MCG/ACT aerosol solution inhaler Inhale 2 puffs Daily.  12/30/22  Qamar Javed MD   tiZANidine (ZANAFLEX) 2 MG tablet Take 1 tablet by mouth At Night As Needed for Muscle Spasms. 12/12/22 12/30/22  Christin Sanderson APRN     Allergies   Allergen Reactions   • Amoxicillin Anaphylaxis   • Albuterol Irritability     Facial flushing and palpitations.   • Asmanex (120 Metered Doses) [Mometasone Furoate] Unknown - Low Severity   • Lortab [Hydrocodone-Acetaminophen] Other (See Comments)     Can't remember   • Morphine And Related Hallucinations   • Prilosec [Omeprazole] Other (See Comments)     unknown   • Sodium Clavulanate Unknown - High Severity   • Statins GI Intolerance   • Sulfa Antibiotics Other (See Comments)     Can't remember also more and can't remember   • Symbicort [Budesonide-Formoterol Fumarate] Unknown (See Comments)     Doesn't remember reaction type         Vital Signs  Temp:  [98.7 °F (37.1 °C)] 98.7 °F (37.1 °C)  Heart Rate:  [72] 72  Resp:  [18] 18  BP: (148)/(71) 148/71    Physical Exam:    Constitutional: [place] [facility] he is in a wheelchair   He is on O2 has not had a eat I do not know if his medicines have been given to him this morning we will have to check on that I discussed with him that we need to cooperate together the goal is to get him better independent and safe so he can go home and released to go home with his significant other and be safe he said he would try  HENT: Within normal limits for age    Eyes: EOM intact no visual field  difficulties    Neck: Range of motion no carotid bruits    Cardiovascular: Regular sinus rhythm without murmur    Pulmonary: Distant breath sounds wheezes present throughout no cough or sputum production    Abdominal: Negative masses    Genitourinary/Anorectal: Furred at this time      Musculoskeletal: Normal ROM as functional poor coordination  Skin:     Neurological: Please see therapies assessment    Psychiatric/Behavioral: Hopefully he will be cooperative and he was told he required a lot of cueing where he was before he was more obstinate with the therapist wanting to do it the way he wanted to do it rather than allowing us to guide him and help him    Results Review:   Lab Results (last 24 hours)     ** No results found for the last 24 hours. **        Imaging Results (Last 24 Hours)     ** No results found for the last 24 hours. 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    Hypertension stable           Fan Rob MD  12/30/22  13:02 CST            This document has been electronically signed by Fan Rob MD on December 30, 2022 13:02 CST      Part of this note may be an electronic transcription/translation of spoken language to printed text using the Dragon Dictation System.

## 2022-12-31 PROBLEM — R53.81 PHYSICAL DECONDITIONING: Chronic | Status: ACTIVE | Noted: 2022-12-31

## 2022-12-31 PROCEDURE — 99231 SBSQ HOSP IP/OBS SF/LOW 25: CPT | Performed by: ORTHOPAEDIC SURGERY

## 2022-12-31 PROCEDURE — 94664 DEMO&/EVAL PT USE INHALER: CPT

## 2022-12-31 PROCEDURE — 94799 UNLISTED PULMONARY SVC/PX: CPT

## 2022-12-31 PROCEDURE — 92523 SPEECH SOUND LANG COMPREHEN: CPT | Performed by: SPEECH-LANGUAGE PATHOLOGIST

## 2022-12-31 PROCEDURE — 94760 N-INVAS EAR/PLS OXIMETRY 1: CPT

## 2022-12-31 PROCEDURE — 97162 PT EVAL MOD COMPLEX 30 MIN: CPT

## 2022-12-31 PROCEDURE — 92610 EVALUATE SWALLOWING FUNCTION: CPT | Performed by: SPEECH-LANGUAGE PATHOLOGIST

## 2022-12-31 PROCEDURE — 97110 THERAPEUTIC EXERCISES: CPT

## 2022-12-31 PROCEDURE — 97535 SELF CARE MNGMENT TRAINING: CPT

## 2022-12-31 PROCEDURE — 97166 OT EVAL MOD COMPLEX 45 MIN: CPT

## 2022-12-31 RX ADMIN — CEFUROXIME AXETIL 500 MG: 500 TABLET ORAL at 08:11

## 2022-12-31 RX ADMIN — IPRATROPIUM BROMIDE 0.5 MG: 0.5 SOLUTION RESPIRATORY (INHALATION) at 14:26

## 2022-12-31 RX ADMIN — DILTIAZEM HYDROCHLORIDE 180 MG: 180 CAPSULE, COATED, EXTENDED RELEASE ORAL at 08:11

## 2022-12-31 RX ADMIN — Medication 1000 UNITS: at 08:11

## 2022-12-31 RX ADMIN — MONTELUKAST SODIUM 10 MG: 10 TABLET, COATED ORAL at 20:01

## 2022-12-31 RX ADMIN — IPRATROPIUM BROMIDE 0.5 MG: 0.5 SOLUTION RESPIRATORY (INHALATION) at 08:18

## 2022-12-31 RX ADMIN — CEFUROXIME AXETIL 500 MG: 500 TABLET ORAL at 20:01

## 2022-12-31 RX ADMIN — TIZANIDINE 2 MG: 2 TABLET ORAL at 20:01

## 2022-12-31 NOTE — THERAPY TREATMENT NOTE
Inpatient Rehabilitation - Occupational Therapy Treatment Note    [facility]     Patient Name: Brian Garcia  : 1950  MRN: [id number]    Today's Date: 2022                 Admit Date: 2022         ICD-10-CM ICD-9-CM   1. Dysphagia, unspecified type  R13.10 787.20   2. Symbolic dysfunction  R48.9 784.60   3. Impaired mobility and ADLs  Z74.09 V49.89    Z78.9    4. Impaired functional mobility, balance, gait, and endurance  Z74.09 V49.89       Patient Active Problem List   Diagnosis   • Pneumonia of both lower lobes due to infectious organism   • Hypokalemia   • Physical deconditioning   • Pulmonary nodules   • Leukocytosis   • History of pulmonary embolism   • Stage 4 very severe COPD by GOLD classification ([facility])   • Chronic respiratory failure with hypoxia, on home O2 therapy ([facility])   • Nontraumatic multiple localized intracerebral hemorrhages ([facility])   • Hypertension       Past Medical History:   Diagnosis Date   • COPD (chronic obstructive pulmonary disease) ([facility])    • Hypertension        Past Surgical History:   Procedure Laterality Date   • HIP ARTHROPLASTY               IRF OT ASSESSMENT FLOWSHEET (last 12 hours)     IRF OT Evaluation and Treatment     Row Name 22 1230 22 1033       OT Time and Intention    Document Type daily treatment  -BB initial evaluation  -CM    Mode of Treatment individual therapy;occupational therapy  -BB occupational therapy;individual therapy  -CM    Total Minutes, Occupational Therapy 30  -BB 60  -CM    Patient Effort good  -BB good  -CM    Row Name 22 1230 22 1033       General Information    Patient Profile Reviewed -- yes  -CM    General Observations of Patient supine in bed  -BB up in w/c upon arrival, supine in bed at end of session  -CM    Existing Precautions/Restrictions fall  -BB fall  -CM    Limitations/Impairments hearing  -BB hearing  -CM    Row Name 22 1033          Previous Level of Function/Home Environm     Bathing/Grooming, Premorbid Functional Level independent  -CM     Dressing, Premorbid Functional Level independent  -CM     Eating/Feeding, Premorbid Functional Level independent  -CM     Toileting, Premorbid Functional Level independent  -CM     BADLs, Premorbid Functional Level independent  -CM     IADLs, Premorbid Functional Level independent  -CM     Bed Mobility, Premorbid Functional Level independent  -CM     Transfers, Premorbid Functional Level independent  -CM     Row Name 12/31/22 1033          Living Environment    Current Living Arrangements home  -CM     Home Accessibility wheelchair accessible  -CM     People in Home significant other  -CM     Primary Care Provided by self  -CM     Row Name 12/31/22 1033          Home Use of Assistive/Adaptive Equipment    Equipment Currently Used at Home oxygen  -CM     Row Name 12/31/22 1230 12/31/22 1033       Pain Assessment    Pretreatment Pain Rating 0/10 - no pain  -BB 4/10  -CM    Posttreatment Pain Rating 0/10 - no pain  -BB 2/10  -CM    Pain Location -- lower  -CM    Pain Location - -- back  -CM    Row Name 12/31/22 1230 12/31/22 1033       Cognition/Psychosocial    Orientation Status (Cognition) oriented x 4  -BB oriented x 4  -CM    Follows Commands (Cognition) WFL  -BB WFL  -CM    Personal Safety Interventions gait belt;fall prevention program maintained;nonskid shoes/slippers when out of bed;supervised activity  -BB gait belt;fall prevention program maintained;nonskid shoes/slippers when out of bed;supervised activity  -CM    Row Name 12/31/22 1230 12/31/22 1033       Range of Motion (ROM)    Range of Motion bilateral upper extremities  pt performed B UE exercises with shoulder,elbow, wrist flexion/extension 1x10  -BB ROM is WFL  -CM    Row Name 12/31/22 1033          Range of Motion Comprehensive    General Range of Motion bilateral upper extremity ROM WFL  -CM     Row Name 12/31/22 1033          Strength (Manual Muscle Testing)    Strength (Manual  Muscle Testing) bilateral upper extremities  -CM     Row Name 12/31/22 1033          Strength Comprehensive (MMT)    Comment, General Manual Muscle Testing (MMT) Assessment MMT: B elbows and  4/5. B shoulders 3+/5.  -CM     Row Name 12/31/22 1230 12/31/22 1033       Basic Activities of Daily Living (BADLs)    Basic Activities of Daily Living grooming  -BB lower body dressing  -CM    Row Name 12/31/22 1033          Lower Body Dressing    [place] Level (Lower Body Dressing) moderate assist (50% patient effort)  -CM     Assistive Device Use (Lower Body Dressing) other (see comments)  step stool  -CM     Position (Lower Body Dressing) edge of bed sitting  -CM     Row Name 12/31/22 1230          Grooming    [place] Level (Grooming) oral care regimen;wash face, hands;hair care, combing/brushing;standby assist  SBA  -BB     Position (Grooming) sitting up in bed  -BB     Row Name 12/31/22 1033          Bed Mobility    Bed Mobility scooting/bridging;sit-supine  -CM     Scooting/Bridging [place] (Bed Mobility) standby assist;verbal cues  -CM     Sit-Supine [place] (Bed Mobility) verbal cues;standby assist  -CM     Bed Mobility, Safety Issues decreased use of legs for bridging/pushing  -CM     Assistive Device (Bed Mobility) bed rails;head of bed elevated  -CM     Row Name 12/31/22 1033          Functional Mobility    Functional Mobility- Ind. Level contact guard assist  -CM     Functional Mobility- Device walker, front-wheeled  -CM     Functional Mobility- Safety Issues supplemental O2  -CM     Row Name 12/31/22 1033          Transfer Assessment/Treatment    Transfers toilet transfer;sit-stand transfer;stand-sit transfer  -CM     Row Name 12/31/22 1033          Sit-Stand Transfer    Sit-Stand [place] (Transfers) contact guard  -CM     Assistive Device (Sit-Stand Transfers) walker, front-wheeled  -CM     Row Name 12/31/22 1033          Stand-Sit Transfer    Stand-Sit [place] (Transfers)  contact guard  -CM     Assistive Device (Stand-Sit Transfers) walker, front-wheeled  -CM     Row Name 12/31/22 1033          Toilet Transfer    Type (Toilet Transfer) stand-sit;sit-stand  -CM     [place] Level (Toilet Transfer) contact guard  -CM     Assistive Device (Toilet Transfer) commode chair;walker, front-wheeled  -CM     Comment, (Toilet Transfer) Pt requested to sit on commode chair sitting beside toilet.  -CM     Row Name 12/31/22 1033          Safety Issues, Functional Mobility    Safety Issues Affecting Function (Mobility) awareness of need for assistance;insight into deficits/self-awareness;positioning of assistive device;safety precaution awareness;safety precautions follow-through/compliance  -CM     Impairments Affecting Function (Mobility) balance;endurance/activity tolerance  -CM     Row Name 12/31/22 1230          Positioning and Restraints    Pre-Treatment Position in bed  -BB     Post Treatment Position bed  -BB     Row Name 12/31/22 1033          Vital Signs    Pre Systolic BP Rehab 139  -CM     Pre Treatment Diastolic BP 71  -CM     Post Systolic BP Rehab 140  -CM     Post Treatment Diastolic BP 74  -CM     Pretreatment Heart Rate (beats/min) 97  -CM     Posttreatment Heart Rate (beats/min) 86  -CM     Pre SpO2 (%) 99  -CM     O2 Delivery Pre Treatment nasal cannula  4LPM  -CM     Post SpO2 (%) 100  -CM     O2 Delivery Post Treatment nasal cannula  -CM     Intra Patient Position Sitting  -CM     Post Patient Position Supine  -CM     Row Name 12/31/22 1033          Therapy Assessment/Plan (OT)    Patient's Goals For Discharge return home  -     Row Name 12/31/22 1033          Therapy Assessment/Plan (OT)    OT Diagnosis Impaired mobility and ADLs  -CM     Rehab Potential/Prognosis (OT) good, to achieve stated therapy goals  -CM     Frequency of Treatment (OT) other (see comments)  5-6 days/wk  -CM     Estimated Duration of Therapy (OT) until facility discharge  -CM     Problem List (OT)  problems related to;balance;mobility;strength;postural control  -CM     Activity Limitations Related to Problem List (OT) unable to ambulate safely;BADLs not performed adequately or safely;IADLs not performed adequately or safely;community activities not performed adequately or safely;home management activity not performed adequately or safely  -CM     Planned Therapy Interventions (OT) activity tolerance training;adaptive equipment training;BADL retraining;cognitive/visual perception retraining;functional balance retraining;IADL retraining;occupation/activity based interventions;patient/caregiver education/training;ROM/therapeutic exercise;strengthening exercise;transfer/mobility retraining;wheelchair assessment/training  -CM     Row Name 12/31/22 1033          Evaluation Complexity (OT)    Review Occupational Profile/Medical/Therapy History Complexity expanded/moderate complexity  -CM     Assessment, Occupational Performance/Identification of Deficit Complexity 3-5 performance deficits  -CM     Clinical Decision Making Complexity (OT) detailed assessment/moderate complexity  -CM     Overall Complexity of Evaluation (OT) moderate complexity  -CM     Row Name 12/31/22 1033          Therapy Plan Review/Discharge Plan (OT)    Therapy Plan Review (OT) evaluation/treatment results reviewed;care plan/treatment goals reviewed;risks/benefits reviewed;current/potential barriers reviewed;participants voiced agreement with care plan;participants included;patient  -CM     Anticipated Discharge Disposition (OT) home with assist  -CM     Row Name 12/31/22 1033          Bathing Goal 1 (OT-IRF)    Activity/Device (Bathing Goal 1, OT-IRF) lower body bathing  -CM     [place] Level (Bathing Goal 1, OT-IRF) modified independence  -CM     Time Frame (Bathing Goal 1, OT-IRF) by discharge;long-term goal (LTG)  -CM     Progress/Outcomes (Bathing Goal 1, OT-IRF) goal not met  -CM     Row Name 12/31/22 1033          LB Dressing Goal 1  (OT-IRF)    Activity/Device (LB Dressing Goal 1, OT-IRF) lower body dressing  -CM     [place] (LB Dressing Goal 1, OT-IRF) modified independence  -CM     Time Frame (LB Dressing Goal 1, OT-IRF) long-term goal (LTG);by discharge  -CM     Progress/Outcomes (LB Dressing Goal 1, OT-IRF) goal not met  -CM     Row Name 12/31/22 1033          Strength Goal 1 (OT-IRF)    Strength Goal 1 (OT-IRF) Pt will increase BUE strength in all planes by one level for increased strength and endurance required for ADL routine.  -CM     Time Frame (Strength Goal 1, OT-IRF) long-term goal (LTG);by discharge  -CM     Progress/Outcomes (Strength Goal 1, OT-IRF) goal not met  -CM     Row Name 12/31/22 1033           Endurance Goal 1 (OT)    Endurance Goal 1 (OT) Pt will complete at least 20 minutes of standing ADLs with Mod (I) or less w/ min fatigue or less for increased endurance, safety, and (I) with ADLs and mobility.  -CM     Activity Level (Endurance Goal 1, OT) endurance 2 good  -CM     Time Frame (Endurance Goal 1, OT) long term goal (LTG);by discharge  -CM     Progress/Outcome (Endurance Goal 1, OT) goal not met  -CM           User Key  (r) = Recorded By, (t) = Taken By, (c) = Cosigned By    Initials Name Effective Dates    Lisa Ricardo COTA 06/16/21 -     CM Ghazala Alfonso OT 11/18/22 -                  Occupational Therapy Education     Title: PT OT SLP Therapies (In Progress)     Topic: Occupational Therapy (In Progress)     Point: ADL training (Done)     Description:   Instruct learner(s) on proper safety adaptation and remediation techniques during self care or transfers.   Instruct in proper use of assistive devices.              Learning Progress Summary           Patient Acceptance, E,TB, VU by BB at 12/31/2022 1522    Acceptance, E,TB, VU by EDSON at 12/31/2022 1211    Comment: OT POC, Role of OT, safe ADL mobility and t/f                   Point: Home exercise program (Not Started)     Description:   Instruct  learner(s) on appropriate technique for monitoring, assisting and/or progressing therapeutic exercises/activities.              Learner Progress:  Not documented in this visit.          Point: Precautions (Not Started)     Description:   Instruct learner(s) on prescribed precautions during self-care and functional transfers.              Learner Progress:  Not documented in this visit.          Point: Body mechanics (Not Started)     Description:   Instruct learner(s) on proper positioning and spine alignment during self-care, functional mobility activities and/or exercises.              Learner Progress:  Not documented in this visit.                      User Key     Initials Effective Dates Name Provider Type Discipline    BB 06/16/21 -  Lisa José COTA Occupational Therapist Assistant OT    CM 11/18/22 -  Ghazala Alfonso OT Occupational Therapist OT                    OT Recommendation and Plan                    Outcome Measures     Row Name 12/31/22 1100 12/31/22 1033 12/31/22 0930       9 Hole Peg    9-Hole Peg Left -- 1.02.91  -CM --    9-Hole Peg Right -- 38.61  -CM --       How much help from another person do you currently need...    Turning from your back to your side while in flat bed without using bedrails? -- -- 3  -EUGENE    Moving from lying on back to sitting on the side of a flat bed without bedrails? -- -- 3  -EUGENE    Moving to and from a bed to a chair (including a wheelchair)? -- -- 3  -EUGENE    Standing up from a chair using your arms (e.g., wheelchair, bedside chair)? -- -- 3  -EUGENE    Climbing 3-5 steps with a railing? -- -- 2  -EUGENE    To walk in hospital room? -- -- 3  -EUGENE    AM-PAC 6 Clicks Score (PT) -- -- 17  -EUGENE       How much help from another is currently needed...    Putting on and taking off regular lower body clothing? -- 2  -CM --    Bathing (including washing, rinsing, and drying) -- 2  -CM --    Toileting (which includes using toilet bed pan or urinal) -- 3  -CM --    Putting on  and taking off regular upper body clothing -- 3  -CM --    Taking care of personal grooming (such as brushing teeth) -- 4  -CM --    Eating meals -- 4  -CM --    AM-PAC 6 Clicks Score (OT) -- 18  -CM --       Modified [place] Scale    Pre-Stroke Modified [place] Scale -- 0 - No Symptoms at all.  -CM --    Modified [place] Scale -- 0 - No Symptoms at all.  -CM --       Functional Assessment    Outcome Measure Options AM-PAC 6 Clicks Daily Activity (OT);Modified [place];9 Hole Peg  -CM -- AM-PAC 6 Clicks Basic Mobility (PT)  -EUGENE          User Key  (r) = Recorded By, (t) = Taken By, (c) = Cosigned By    Initials Name Provider Type    Nydia Alvarez, PT Physical Therapist    Ghazala Lemus, OT Occupational Therapist                  Time Calculation:      Time Calculation- OT     Row Name 12/31/22 1522 12/31/22 1212          Time Calculation- OT    OT Start Time 1230  -BB 1033  -CM     OT Stop Time 1300  -BB 1133  -CM     OT Time Calculation (min) 30 min  -BB 60 min  -CM     Total Timed Code Minutes- OT 30 minute(s)  -BB --     OT Received On 12/31/22  -BB 12/31/22  -CM     OT Goal Re-Cert Due Date -- 01/13/23  -CM        Timed Charges    48325 - OT Therapeutic Exercise Minutes 15  -BB --     62500 - OT Self Care/Mgmt Minutes 15  -BB --        Untimed Charges    OT Eval/Re-eval Minutes -- 60  -CM        Total Minutes    Timed Charges Total Minutes 30  -BB --     Untimed Charges Total Minutes -- 60  -CM      Total Minutes 30  -BB 60  -CM           User Key  (r) = Recorded By, (t) = Taken By, (c) = Cosigned By    Initials Name Provider Type    Lisa Ricardo COTA Occupational Therapist Assistant    Ghazala Lemus OT Occupational Therapist              Therapy Charges for Today     Code Description Service Date Service Provider Modifiers Qty    96167397132 HC OT THER PROC EA 15 MIN 12/31/2022 Lisa José COTA GO 1    [id number] HC OT SELF CARE/MGMT/TRAIN EA 15 MIN 12/31/2022 Lisa José COTA  GO 1                   Lisa José, MUNOZ  12/31/2022

## 2022-12-31 NOTE — PLAN OF CARE
Problem: Rehabilitation ([facility]) Plan of Care  Goal: Home and Community Transition Plan Established  Intervention: Support Transition to Safe and Accessible Home Environment  Recent Flowsheet Documentation  Taken 12/31/2022 7427 by Nydia Muñzo PT  Equipment Currently Used at Home: oxygen     Problem: Rehabilitation ([facility]) Plan of Care  Goal: Plan of Care Review  Flowsheets (Taken 12/31/2022 1351)  Plan of Care Reviewed With: patient  Outcome Evaluation: PT evaluation completed. Pt sleepy but easily roused and agreeable to therapy. Pt transferred supine to sit with CGA and sit to stand to sit with CGA with FWW and verbal cues for safe use of FWW. Pt transferred bed to w/c with FWW and CGA. Pt propelled w/c 100ft with SBA. Pt ambulated with FWW 50ft with CGA. Pt required frequent rest breaks to catch his breath. O2 sats remained in 90's throughout on 4lpm. Function limited by decreased strength, balance, and tolerance for functional mobility and activities. Pt will benefit from PT to improve strength, balance, and tolerance to improve transfer and gait ability and to decrease fall risk. Anticipate home with assist at discharge with HHPT.   Goal Outcome Evaluation:

## 2022-12-31 NOTE — PLAN OF CARE
Goal Outcome Evaluation:           Progress: improving  Outcome Evaluation: Swallow evaluation and speech evaluation completed this date. Pt scored 14/15 on BIMS and 30/30 on SLUMS however pt  has been confused at times and RN reports some confusion. SLP will initiate plan of care for cognitive linguistic disorders to determine pt's safety and return to baseline. No s/s of aspiration noted with swallow evaluation. No tx for swallowing needed.

## 2022-12-31 NOTE — PROGRESS NOTES
Progress Note and 24 hour plan of care       Admit date: 12/30/2022 12:28 PM       Treatment Team     Provider Relationship Specialty Contact    Fan Rob MD Attending Rehabilitation   [phone]      Nydia Muñoz, PT Physical Therapist Physical Therapy   [phone]      Dale Starr, RN Registered Nurse --   [phone]      Anita Ram, CCC-SLP Speech Language Pathologist Speech Pathology   [phone]      Ghazala Alfonso OT Occupational Therapist Occupational Therapy --    Judi Pierre, CRT Respiratory Therapist Respiratory Therapy   [phone]               Chief Complaint:  Nontraumatic multiple localized intracerebral hemorrhages (HCC)    HPI: No changes since admission    Vital Signs  Temp:  [96 °F (35.6 °C)-98.7 °F (37.1 °C)] 96 °F (35.6 °C)  Heart Rate:  [] 100  Resp:  [16-18] 16  BP: (148-181)/(71-78) 155/71    Physical Exam 1:    Constitutional: Patient is alert oriented person undergoing therapy with OT this morning and eating breakfast does not like the food is too greasy to collect to caloric     HENT:      Eyes:     Neck:      Cardiovascular: No chest pain no evidence of heart failure    Pulmonary: No coughing or sputum production lungs clear on nasal O2    Abdominal:      Genitourinary/Anorectal:       Musculoskeletal: Generalized weakness    Skin:     Neurological: Progressive change    Psychiatric/Behavioral: She is to be cooperative with therapy       Results Review:    I reviewed the patient's new clinical results.    Nursing notes and therapy notes have been reviewed.    I have reviewed medications.    Assessment and Plan  Patient to benefit from skilled PT to address exercise endurance, safety in self-care, and functional mobility.     Patient to benefit from skilled Occupational Therapy to address activities of daily living, functional mobility, fine motor, gross motor, endurance, safety in self-care, and activity tolerance.     Patient to benefit  from skilled Speech Therapy to address dysphagia, communication deficits and cognitive-linguistic communication disorder.     Pt should be able to participate up to 3 hours per day and make measurable improvements and be able to return home post discharge.    There has been no change in the last 24 hours.    Assessment/Plan  Principal Problem:    Nontraumatic multiple localized intracerebral hemorrhages (HCC) we do not have records from [facility] we do not know the recommendation of when the patient know how long he should stay un anticoagulated.  Usual recommendation [facility] acute bleed would be 2 weeks then restart him on his anticoagulation which I will have to find what that was is not reported is using Plavix aspirin and Eliquis for his A. fib  Active Problems:    Pneumonia of both lower lobes due to infectious organism patient is on antibiotics we will continue to the fourth but he is having no problems with the evidence of pneumonia at this time so the treatment protocol is successful      Overview: - Discussed with Radiology (Dr. Davey), who reports that the patient has a       superior segment small patchy opacity in the left lung base likely       pneumonia, and a small patchy opacity in the right lung base, likely       pneumonia. He recommended continuing with antibiotics and then doing       follow up imaging in 3 months.      - Blood cultures no growth to date.      - Levaquin day 4. Transitioned to PO today.      - Legionella and strep pneumo neg. Mycoplasma pending.    Physical deconditioning plan is exercise and endurance training as well as mental activity to clear his confusion hopefully that will improve with time      Overview: - PT/OT Consulted. Recommendations: home with home health.      - Patient may benefit from an assisted living.    Stage 4 very severe COPD by GOLD classification (HCC) on oxygen we will continue    Hypokalemia stable      Overview: - Supplement daily.    Hypertension  stable         Fan Rob MD  12/31/22  09:42 CST          This document has been electronically signed by Fan Rob MD on December 31, 2022 09:42 CST       This document has been electronically signed by Fan Rob MD on December 31, 2022 09:42 CST      Part of this note may be an electronic transcription/translation of spoken language to printed text using the Dragon Dictation System.

## 2022-12-31 NOTE — THERAPY EVALUATION
Inpatient Rehabilitation - Speech Language Pathology Initial Evaluation  [facility]     Patient Name: Brian Garcia  : 1950  MRN: [id number]  Today's Date: 2022               Admit Date: 2022     Visit Dx:    ICD-10-CM ICD-9-CM   1. Dysphagia, unspecified type  R13.10 787.20   2. Symbolic dysfunction  R48.9 784.60   3. Impaired mobility and ADLs  Z74.09 V49.89    Z78.9    4. Impaired functional mobility, balance, gait, and endurance  Z74.09 V49.89     Patient Active Problem List   Diagnosis   • Pneumonia of both lower lobes due to infectious organism   • Hypokalemia   • Physical deconditioning   • Pulmonary nodules   • Leukocytosis   • History of pulmonary embolism   • Stage 4 very severe COPD by GOLD classification ([facility])   • Chronic respiratory failure with hypoxia, on home O2 therapy ([facility])   • Nontraumatic multiple localized intracerebral hemorrhages ([facility])   • Hypertension     Past Medical History:   Diagnosis Date   • COPD (chronic obstructive pulmonary disease) ([facility])    • Hypertension      Past Surgical History:   Procedure Laterality Date   • HIP ARTHROPLASTY         SLP Recommendation and Plan  SLP Diagnosis: cognitive-linguistic disorder (Noted confusion however scored 15/15 on BIMs and 30/30 SLUMS) (22)           SLC Criteria for Skilled Therapy Interventions Met: yes (22)        Therapy Frequency (Swallow): evaluation only (22)        Daily Summary of Progress (SLP): progress toward functional goals is good (22)           Treatment Assessment (SLP): cognitive-linguistic disorder (22)  Treatment Assessment Comments (SLP): Swallow evaluation and speech evaluation completed this date. Pt scored 14/15 on BIMS and 30/30 on SLUMS however pt  has been confused at times and RN reports some confusion. SLP will initiate plan of care for cognitive linguistic disorders to determine pt's safety and return to baseline. No s/s of  aspiration noted with swallow evaluation. No tx for swallowing needed. (12/31/22 0831)     Plan of Care Reviewed With: patient (12/31/22 1409)  Progress: improving (12/31/22 1409)  Outcome Evaluation: Swallow evaluation and speech evaluation completed this date. Pt scored 14/15 on BIMS and 30/30 on SLUMS however pt  has been confused at times and RN reports some confusion. SLP will initiate plan of care for cognitive linguistic disorders to determine pt's safety and return to baseline. No s/s of aspiration noted with swallow evaluation. No tx for swallowing needed. (12/31/22 1409)      SLP EVALUATION (last 72 hours)     SLP SLC Evaluation     Row Name 12/31/22 0831                   Communication Assessment/Intervention    Document Type evaluation  -EK        Subjective Information no complaints  -EK        Patient Observations alert;cooperative;agree to therapy  -EK        Patient Effort good  -EK           General Information    Patient Profile Reviewed yes  -EK        Precautions/Limitations, Vision corrective lenses needed for reading  -EK        Precautions/Limitations, Hearing hearing impairment, bilaterally  -EK        Plans/Goals Discussed with patient  -EK           Pain    Additional Documentation Pain Scale: Numbers Pre/Post-Treatment (Group)  -EK           Pain Scale: Numbers Pre/Post-Treatment    Pretreatment Pain Rating 4/10  -EK        Posttreatment Pain Rating 4/10  -EK        Pain Location - back  -EK           Comprehension Assessment/Intervention    Comprehension Assessment/Intervention Auditory Comprehension  -EK           Auditory Comprehension Assessment/Intervention    Auditory Comprehension (Communication) WFL  -EK        Able to Identify Objects/Pictures (Communication) WFL  -EK        Answers Questions (Communication) WFL  -EK        Able to Follow Commands (Communication) WFL  -EK           Expression Assessment/Intervention    Expression Assessment/Intervention verbal expression  -EK            Oral Motor Structure and Function    Oral Motor Structure and Function WFL  -EK           Oral Musculature and Cranial Nerve Assessment    Oral Motor General Assessment WFL  -EK           Cognitive Assessment Intervention- SLP    Orientation Status (Cognition) mild impairment  -EK        Memory (Cognitive) mild impairment  -EK        Thought Organization (Cognitive) WFL  -EK        Reasoning (Cognitive) WFL  -EK        Problem Solving (Cognitive) WFL  -EK        Functional Math (Cognitive) WFL  -EK        Executive Function (Cognition) WFL  -EK        Pragmatics (Communication) WFL  -EK           SLUMS: [facility] Mental Status Examination    SLUMS Score 30  -EK        SLUMS Range 27-30: Normal (High school education or higher)  -EK           SLP Evaluation Clinical Impressions    SLP Diagnosis cognitive-linguistic disorder  Noted confusion however scored 15/15 on BIMs and 30/30 SLUMS  -EK        Rehab Potential/Prognosis good  -EK        SLC Criteria for Skilled Therapy Interventions Met yes  -EK        Functional Impact Poor Judgement  -EK           SLP Treatment Clinical Impressions    Treatment Assessment (SLP) cognitive-linguistic disorder  -EK        Treatment Assessment Comments (SLP) Swallow evaluation and speech evaluation completed this date. Pt scored 14/15 on BIMS and 30/30 on SLUMS however pt  has been confused at times and RN reports some confusion. SLP will initiate plan of care for cognitive linguistic disorders to determine pt's safety and return to baseline. No s/s of aspiration noted with swallow evaluation. No tx for swallowing needed.  -EK        Daily Summary of Progress (SLP) progress toward functional goals is good  -EK        Care Plan Review evaluation/treatment results reviewed  -EK           Recommendations    Therapy Frequency (SLP SLC) 5 days per week  5-6 week  -EK           Orientation Goal 1 (SLP)    Improve Orientation Through Goal 1 (SLP) demonstrating orientation  to day;demonstrating orientation to month;demonstrating orientation to year;demonstrating orientation to place;90%;independently (over 90% accuracy)  -EK        Time Frame (Orientation Goal 1, SLP) by discharge  -EK        Progress/Outcomes (Orientation Goal 1, SLP) new goal  -EK           Reasoning Goal 1 (SLP)    Improve Reasoning Through Goal 1 (SLP) complete deductive reasoning task;90%;independently (over 90% accuracy)  -EK        Time Frame (Reasoning Goal 1, SLP) by discharge  -EK        Progress/Outcomes (Reasoning Goal 1, SLP) new goal  -EK           Functional Problem Solving Skills Goal 1 (SLP)    Improve Problem Solving Through Goal 1 (SLP) answer questions about ADL problems;90%;independently (over 90% accuracy)  -EK        Time Frame (Problem Solving Goal 1, SLP) by discharge  -EK        Progress/Outcomes (Problem Solving Goal 1, SLP) new goal  -EK              User Key  (r) = Recorded By, (t) = Taken By, (c) = Cosigned By    Initials Name Effective Dates    Anita Maya, CCC-SLP 06/16/21 -                    EDUCATION  The patient has been educated in the following areas:     Cognitive Impairment Communication Impairment Dysphagia (Swallowing Impairment).           SLP GOALS     Row Name 12/31/22 0831             Orientation Goal 1 (SLP)    Improve Orientation Through Goal 1 (SLP) demonstrating orientation to day;demonstrating orientation to month;demonstrating orientation to year;demonstrating orientation to place;90%;independently (over 90% accuracy)  -EK      Time Frame (Orientation Goal 1, SLP) by discharge  -EK      Progress/Outcomes (Orientation Goal 1, SLP) new goal  -EK         Reasoning Goal 1 (SLP)    Improve Reasoning Through Goal 1 (SLP) complete deductive reasoning task;90%;independently (over 90% accuracy)  -EK      Time Frame (Reasoning Goal 1, SLP) by discharge  -EK      Progress/Outcomes (Reasoning Goal 1, SLP) new goal  -EK         Functional Problem Solving Skills  "Goal 1 (SLP)    Improve Problem Solving Through Goal 1 (SLP) answer questions about ADL problems;90%;independently (over 90% accuracy)  -EK      Time Frame (Problem Solving Goal 1, SLP) by discharge  -EK      Progress/Outcomes (Problem Solving Goal 1, SLP) new goal  -EK            User Key  (r) = Recorded By, (t) = Taken By, (c) = Cosigned By    Initials Name Provider Type    Anita Maya CCC-SLP Speech and Language Pathologist                Cognitive [facility] Admission   Should Brief Interview for Mental Status (-) be Conducted?    0.No (patient is rarely/never understood) Skip to , Memory/Recall Ability     1.Yes Continue to , Repetition of Three Words 1   BRIEF INTERVIEW FOR MENTAL STATUS    Repetition of Three Words    Ask patient: “I am going to say three words for you to remember. Please repeat the words after I have said all three. The words are: sock, blue and bed. Now tell me the three words.”    Number of words repeated after first attempt     3.Three 2. Two  1.One  0.None 3   After the patient's first attempt, repeat the words using cues (\"sock, something to wear; blue, a color; bed, a piece of furniture\"). You may repeat the words up to two more times.    Temporal Orientation (orientation to year, month, and day)    Ask patient: “Please tell me what year it is right now.”    Able to report correct year  3. Correct    2.Missed by 1 year    1.Missed by 2 - 5 years 0.Missed by > 5 years or no answer 3   Ask patient: “What month are we in right now?”   Able to report correct month 2. Accurate within 5 days 1. Missed by 6 days to 1 month 0.Missed by > 1 month or no answer 2   Ask patient: “What day of the week is today?”    Able to report correct day of the week 1. Correct     0. Incorrect or no answer 0   Recall     Ask patient: “Let's go back to an earlier question. What were those three words that I asked you to repeat?” If unable to remember a word, give cue " "(something to wear; a color; a piece of furniture) for that word. Able to recall “sock” 2.Yes, no cue required  1.Yes, after cueing (\"something to wear\")  0.No - could not recall 2   Able to recall “blue” 2. Yes, no cue required 1.Yes, after cueing (\"a color\") 0.No - could not recall 2   Able to recall “bed” 2. Yes, no cue required1.Yes, after cueing (\"a piece of furniture\") 0.No - could not recall 2   BIMS TOTAL SCORE 0-15  14   Enter 99 IF Patient unable to complete the interview    Should the Staff Assessment for Mental Status () be Conducted? (Yes or No)    Staff Assessment for Mental Status    (Do not conduct if Brief Inerview for Mental Status was completed)    Memory Recall/Ability Check all that Apply:    A.Current season.    B.Location of own room.    C.Staff names and faces.    E.That he or she is in a hospital/hospital unit.    Z.None of the above were recalled.            Hearing, Speech and Vision Admission Goal  Date Date Date Date Date Discharge   Expression of Ideas and Wants (consider both verbal and non-verbal expression and excluding language barriers) 4 4         4.Expresses complex messages without difficulty and with speech that is clear and easy to understand.           3.Exhibits some difficulty with expressing needs and ideas (e.g., some words or finishing thoughts) or speech is not clear.           2.Frequently exhibits difficulty with expressing needs and ideas.           1.Rarely/Never expresses self or speech is very difficult to understand.           Understanding Verbal and Non-Verbal Content (with hearing aid or device, if used, and excluding language barriers) 4 4         4.Understands: Clear comprehension without cues or repetitions.           3.Usually Understands: Understands most conversations, but misses some part/intent of message. Requires cues at times to understand           2.Sometimes Understands: Understands only basic conversations or simple, direct phrases. " Frequently requires cues to understand           1.Rarely/Never Understands                     Time Calculation:      Time Calculation- SLP     Row Name 22 1001             Time Calculation- SLP    SLP Start Time 08  -EK      SLP Stop Time 915  -EK      SLP Time Calculation (min) 44 min  -EK      Total Timed Code Minutes- SLP 44 minute(s)  -EK      SLP Received On 22  -EK      SLP Goal Re-Cert Due Date 23  -EK            User Key  (r) = Recorded By, (t) = Taken By, (c) = Cosigned By    Initials Name Provider Type    Anita Maya CCC-SLP Speech and Language Pathologist                Therapy Charges for Today     Code Description Service Date Service Provider Modifiers Qty    70590531697 HC ST EVAL ORAL PHARYNG SWALLOW 1 2022 Anita Ram CCC-SLP GN 1    15821265734 HC ST EVAL SPEECH AND PROD W LANG  2 2022 Anita Ram CCC-SLP GN 1                     LOC Barrera  2022 and Inpatient Rehabilitation - Speech Language Pathology   Swallow Initial Evaluation [facility]     Patient Name: Brian Garcia  : 1950  MRN: [id number]  Today's Date: 2022               Admit Date: 2022    Visit Dx:     ICD-10-CM ICD-9-CM   1. Dysphagia, unspecified type  R13.10 787.20   2. Symbolic dysfunction  R48.9 784.60   3. Impaired mobility and ADLs  Z74.09 V49.89    Z78.9    4. Impaired functional mobility, balance, gait, and endurance  Z74.09 V49.89     Patient Active Problem List   Diagnosis   • Pneumonia of both lower lobes due to infectious organism   • Hypokalemia   • Physical deconditioning   • Pulmonary nodules   • Leukocytosis   • History of pulmonary embolism   • Stage 4 very severe COPD by GOLD classification ([facility])   • Chronic respiratory failure with hypoxia, on home O2 therapy ([facility])   • Nontraumatic multiple localized intracerebral hemorrhages ([facility])   • Hypertension     Past  Medical History:   Diagnosis Date   • COPD (chronic obstructive pulmonary disease) ([facility])    • Hypertension      Past Surgical History:   Procedure Laterality Date   • HIP ARTHROPLASTY         SLP Recommendation and Plan                                Therapy Frequency (Swallow): evaluation only (12/31/22 0831)           Daily Summary of Progress (SLP): progress toward functional goals is good (12/31/22 0831)               Treatment Assessment (SLP): cognitive-linguistic disorder (12/31/22 0831)  Treatment Assessment Comments (SLP): Swallow evaluation and speech evaluation completed this date. Pt scored 14/15 on BIMS and 30/30 on SLUMS however pt  has been confused at times and RN reports some confusion. SLP will initiate plan of care for cognitive linguistic disorders to determine pt's safety and return to baseline. No s/s of aspiration noted with swallow evaluation. No tx for swallowing needed. (12/31/22 0831)            Plan of Care Reviewed With: patient  Progress: improving  Outcome Evaluation: Swallow evaluation and speech evaluation completed this date. Pt scored 14/15 on BIMS and 30/30 on SLUMS however pt  has been confused at times and RN reports some confusion. SLP will initiate plan of care for cognitive linguistic disorders to determine pt's safety and return to baseline. No s/s of aspiration noted with swallow evaluation. No tx for swallowing needed.      SWALLOW EVALUATION (last 72 hours)     SLP Adult Swallow Evaluation     Row Name 12/31/22 0831                   Rehab Evaluation    Patient/Family/Caregiver Comments/Observations none  -EK           General Information    Pertinent History Of Current Problem CVA with hemorrhage  -EK        Current Method of Nutrition regular textures;thin liquids  -EK           Oral Motor Structure and Function    Dentition Assessment natural, present and adequate  -EK        Secretion Management WNL/WFL  -EK        Volitional Swallow WFL  -EK        Volitional Cough  WFL  -EK           General Eating/Swallowing Observations    Respiratory Support Currently in Use nasal cannula  -EK        Eating/Swallowing Skills self-fed  -EK        Positioning During Eating upright 90 degree;upright in bed  -EK        Utensils Used cup  Pt does not like to use straws  -EK        Consistencies Trialed regular textures;thin liquids;soft to chew textures  -EK           Clinical Swallow Eval    Oral Prep Phase WFL  -EK        Oral Transit WFL  -EK        Oral Residue WFL  -EK        Pharyngeal Phase WFL  -EK           SLP Evaluation Clinical Impression    Functional Impact no impact on function  -EK           Recommendations    Therapy Frequency (Swallow) evaluation only  -EK              User Key  (r) = Recorded By, (t) = Taken By, (c) = Cosigned By    Initials Name Effective Dates    EK Anita Ram CCC-SLP 06/16/21 -                 EDUCATION  The patient has been educated in the following areas:   Cognitive Impairment Communication Impairment Dysphagia (Swallowing Impairment).        SLP GOALS     Row Name 12/31/22 0831             Orientation Goal 1 (SLP)    Improve Orientation Through Goal 1 (SLP) demonstrating orientation to day;demonstrating orientation to month;demonstrating orientation to year;demonstrating orientation to place;90%;independently (over 90% accuracy)  -EK      Time Frame (Orientation Goal 1, SLP) by discharge  -EK      Progress/Outcomes (Orientation Goal 1, SLP) new goal  -EK         Reasoning Goal 1 (SLP)    Improve Reasoning Through Goal 1 (SLP) complete deductive reasoning task;90%;independently (over 90% accuracy)  -EK      Time Frame (Reasoning Goal 1, SLP) by discharge  -EK      Progress/Outcomes (Reasoning Goal 1, SLP) new goal  -EK         Functional Problem Solving Skills Goal 1 (SLP)    Improve Problem Solving Through Goal 1 (SLP) answer questions about ADL problems;90%;independently (over 90% accuracy)  -EK      Time Frame (Problem Solving Goal  1, SLP) by discharge  -EK      Progress/Outcomes (Problem Solving Goal 1, SLP) new goal  -EK            User Key  (r) = Recorded By, (t) = Taken By, (c) = Cosigned By    Initials Name Provider Type    Anita Maya CCC-SLP Speech and Language Pathologist                   Time Calculation:    Time Calculation- SLP     Row Name 12/31/22 1001             Time Calculation- SLP    SLP Start Time 0831  -EK      SLP Stop Time 0915  -EK      SLP Time Calculation (min) 44 min  -EK      Total Timed Code Minutes- SLP 44 minute(s)  -EK      SLP Received On 12/31/22  -EK      SLP Goal Re-Cert Due Date 01/13/23  -EK            User Key  (r) = Recorded By, (t) = Taken By, (c) = Cosigned By    Initials Name Provider Type    Anita Maya CCC-SLP Speech and Language Pathologist                Therapy Charges for Today     Code Description Service Date Service Provider Modifiers Qty    40316533162 HC ST EVAL ORAL PHARYNG SWALLOW 1 12/31/2022 Anita Ram CCC-SLP GN 1    [id number] HC ST EVAL SPEECH AND PROD W LANG  2 12/31/2022 Anita Ram CCC-SLP GN 1           29 minutes speech/15 minutes swallow    LOC Barrera  12/31/2022

## 2022-12-31 NOTE — PLAN OF CARE
Goal Outcome Evaluation:  Plan of Care Reviewed With: patient           Outcome Evaluation: Initial OT evaluation completed on ARU. Pt sitting in w/c with PT upon arrival. Pt reported he was (I) with ADLs prior to admission. Pt t/f from w/c to EOB with CGA and FWW. Pt completed 9-hole peg test and  strength via dynamometer.  strength results are as follows: L 28, 25, 20 and R 28, 28, 25. Pt required Min A for doffing socks sitting EOB and Mod A for donning socks sitting EOB, as well as a small stool to raise his foot off the floor. Pt completed sit <> stand with CGA and FWW. Completed mobility to and from the bathroom with CGA and FWW. Pt completed toilet t/f with CGA and FWW. Pt was SBA for sit to sup. Pt left supine in bed with all needs in reach and exit alarm on. I/P OT will focus on endurance, safety, strength, and (I) with ADLs and mobility. Goals established.

## 2022-12-31 NOTE — PLAN OF CARE
Problem: Rehabilitation ([facility]) Plan of Care  Goal: Plan of Care Review  Outcome: Ongoing, Progressing  Flowsheets (Taken 12/31/2022 1100)  Plan of Care Reviewed With: patient   Goal Outcome Evaluation:  Plan of Care Reviewed With: patient            Decreased appetite and intake.  Wt varies.  Nausea occasionally.  Agreed to ice cream BID and PB BID to supplement intake and optimize nutrition at this time.  Will add cardiac restrictions to diet order to align with hx.   Goal for pt to eat % of meals

## 2022-12-31 NOTE — PLAN OF CARE
Goal Outcome Evaluation:   PT evaluation completed. Pt sleepy but easily roused and agreeable to therapy. Pt transferred supine to sit with CGA and sit to stand to sit with CGA. Pt transferred bed to w/c with FWW with CGA with verbal and tactile cues for safe use of FWW. Pt propelled w/c 100ft with 2 turns with SBA. Pt ambulated with FWW 50ft with CGA. Pt required frequent rest breaks to catch his breath. O2 sats remained in 90's throughout on 4lpm. Function limited by decreased strength, balance and tolerance for functional mobility and activities. Pt will benefit from PT to improve strength, balance, and tolerance to improve transfer and gait ability and to decrease fall risk. Anticipate home with assistance at discharge with HHPT.

## 2022-12-31 NOTE — PLAN OF CARE
Goal Outcome Evaluation:  Plan of Care Reviewed With: patient        Progress: no change  Outcome Evaluation: VSS, pt very argumentative and uncooperative. Pt confused at night and will not follow cues. Pt continues to repeat questions and phrases and refuses to do simple tasks asked of him. Unsteady gait and when he stands at the side of the bed. Bed alarm on at all times, pt reoriented to place and time and situation several times throughout shift. Will continue to monitor.

## 2022-12-31 NOTE — PLAN OF CARE
Goal Outcome Evaluation:  Plan of Care Reviewed With: patient        Progress: improving  Outcome Evaluation: Swallow evaluation and speech evaluation completed this date. Pt scored 14/15 on BIMS and 30/30 on SLUMS however pt  has been confused at times and RN reports some confusion. SLP will initiate plan of care for cognitive linguistic disorders to determine pt's safety and return to baseline. No s/s of aspiration noted with swallow evaluation. No tx for swallowing needed.

## 2022-12-31 NOTE — THERAPY EVALUATION
Inpatient Rehabilitation - Physical Therapy Initial Evaluation       [facility]     Patient Name: Brian Garcia  : 1950  MRN: [id number]    Today's Date: 2022                    Admit Date: 2022      Visit Dx:     ICD-10-CM ICD-9-CM   1. Dysphagia, unspecified type  R13.10 787.20   2. Symbolic dysfunction  R48.9 784.60   3. Impaired mobility and ADLs  Z74.09 V49.89    Z78.9    4. Impaired functional mobility, balance, gait, and endurance  Z74.09 V49.89       Patient Active Problem List   Diagnosis   • Pneumonia of both lower lobes due to infectious organism   • Hypokalemia   • Physical deconditioning   • Pulmonary nodules   • Leukocytosis   • History of pulmonary embolism   • Stage 4 very severe COPD by GOLD classification ([facility])   • Chronic respiratory failure with hypoxia, on home O2 therapy ([facility])   • Nontraumatic multiple localized intracerebral hemorrhages ([facility])   • Hypertension       Past Medical History:   Diagnosis Date   • COPD (chronic obstructive pulmonary disease) ([facility])    • Hypertension        Past Surgical History:   Procedure Laterality Date   • HIP ARTHROPLASTY         PT ASSESSMENT (last 12 hours)     IRF PT Evaluation and Treatment     Row Name 22          PT Time and Intention    Document Type initial evaluation  -EUGENE     Mode of Treatment physical therapy;individual therapy  -EUGENE     Row Name 22          General Information    Patient Profile Reviewed yes  -EUGENE     General Observations of Patient supine in bed  -EUGENE     Existing Precautions/Restrictions fall  -EUGENE     Limitations/Impairments hearing  -EUGENE     Row Name 22          Living Environment    Current Living Arrangements home  double wide  -EUGENE     Home Accessibility wheelchair accessible  -EUGENE     People in Home significant other  -EUGENE     Primary Care Provided by self  -EUGENE     Row Name 22          Home Use of Assistive/Adaptive Equipment    Equipment Currently Used at  Home oxygen  -[facility] Name 12/31/22 0930          Pain Assessment    Pretreatment Pain Rating 5/10  -     Posttreatment Pain Rating 4/10  -     Pain Location lower  -     Pain Location - back  -[facility] Name 12/31/22 0930          Cognition/Psychosocial    Orientation Status (Cognition) oriented x 4  -     Follows Commands (Cognition) [facility]  -     Personal Safety Interventions gait belt;fall prevention program maintained;supervised activity;nonskid shoes/slippers when out of bed  -[facility] Name 12/31/22 0930          Range of Motion (ROM)    Range of Motion ROM is WFL  -[facility] Name 12/31/22 0930          Range of Motion Comprehensive    General Range of Motion bilateral lower extremity ROM [facility] Name 12/31/22 0930          Strength (Manual Muscle Testing)    Strength (Manual Muscle Testing) bilateral lower extremities  -[facility] Name 12/31/22 0930          Strength Comprehensive (MMT)    Comment, General Manual Muscle Testing (MMT) Assessment BLE: grossly 4-/5  -[facility] Name 12/31/22 0930          Bed Mobility    Bed Mobility rolling left;rolling right;scooting/bridging;supine-sit;sidelying-sit  -     Rolling Left [place] (Bed Mobility) verbal cues;contact guard;nonverbal cues (demo/gesture)  -     Rolling Right [place] (Bed Mobility) verbal cues;nonverbal cues (demo/gesture);contact guard  -     Scooting/Bridging [place] (Bed Mobility) standby assist;verbal cues  -     Supine-Sit [place] (Bed Mobility) minimum assist (75% patient effort);verbal cues;nonverbal cues (demo/gesture)  -     Assistive Device (Bed Mobility) bed rails;head of bed elevated  -EUGENE     Row Name 12/31/22 0930          Sit-Stand Transfer    Sit-Stand [place] (Transfers) contact guard;verbal cues;nonverbal cues (demo/gesture)  -EUGENE     Row Name 12/31/22 0930          Stand-Sit Transfer    Stand-Sit [place] (Transfers) contact guard;verbal cues;nonverbal cues (demo/gesture)   -[facility] Name 12/31/22 0930          Gait/Stairs (Locomotion)    Gait/Stairs Locomotion gait/ambulation assistive device;distance ambulated  -     [place] Level (Gait) verbal cues;nonverbal cues (demo/gesture);contact guard  -     Distance in Feet (Gait) 50ft  -[facility] Name 12/31/22 0930          Wheelchair Mobility/Management    Comment, Wheelchair Mobility Pt propelled w/c bilateral pedal and manual 100ft with SBA; pt demonstrated understanding of use of brakes  -[facility] Name 12/31/22 0930          Safety Issues, Functional Mobility    Safety Issues Affecting Function (Mobility) insight into deficits/self-awareness;safety precaution awareness  -     Impairments Affecting Function (Mobility) balance;endurance/activity tolerance;shortness of breath;strength  -[facility] Name 12/31/22 0930          Balance    Balance Assessment sitting static balance;sitting dynamic balance;sit to stand dynamic balance;standing static balance;standing dynamic balance  -     Static Sitting Balance standby assist  -     Dynamic Sitting Balance standby assist  -     Position, Sitting Balance sitting edge of bed  -     Sit to Stand Dynamic Balance contact guard  -EUGENE     Static Standing Balance contact guard  -     Dynamic Standing Balance contact guard  -EUGENE     [facility] Name 12/31/22 0930          Vital Signs    Pre Systolic BP Rehab 155  -EUGENE     Pre Treatment Diastolic BP 71  -EUGENE     Post Systolic BP Rehab 139  -EUGENE     Post Treatment Diastolic BP 74  -EUGENE     Pretreatment Heart Rate (beats/min) 90  -EUGENE     Posttreatment Heart Rate (beats/min) 98  -EUGENE     Pre SpO2 (%) 98  -EUGENE     O2 Delivery Pre Treatment nasal cannula  4lpm  -EUGENE     Post SpO2 (%) 98  -EUGENE     O2 Delivery Post Treatment nasal cannula  -EUGENE     Pre Patient Position Supine  -EUGENE     Post Patient Position Sitting  -[facility] Name 12/31/22 0930          Therapy Assessment/Plan (PT)    Patient's Goals For Discharge return home  -[facility] Name 12/31/22  0930          Therapy Assessment/Plan (PT)    PT Diagnosis (PT) impaired gait and mobility  -     Rehab Potential/Prognosis (PT) good, to achieve stated therapy goals  -     Frequency of Treatment (PT) --  5-6 days/wk  -     Estimated Duration of Therapy (PT) 2 weeks;4 weeks  -     Problem List (PT) balance;mobility;strength;pain;hearing  -     Row Name 12/31/22 0930          Therapy Plan Review/Discharge Plan (PT)    Therapy Plan Review (PT) evaluation/treatment results reviewed;care plan/treatment goals reviewed;risks/benefits reviewed;participants voiced agreement with care plan;patient  -     Anticipated Discharge Disposition (PT) home with assist;home with home health  -     Row Name 12/31/22 0930          IRF PT Goals    Bed Mobility Goal Selection (PT-IRF) bed mobility, PT goal 1  -     Transfer Goal Selection (PT-IRF) transfers, PT goal 1;transfers, PT goal 2  -     Gait (Walking Locomotion) Goal Selection (PT-IRF) gait, PT goal 1;gait, PT goal 2  -     Wheelchair Locomotion Goal Selection (PT-IRF) wheelchair locomotion, PT goal (free text)  -     Stairs Goal Selection (PT-IRF) stairs, PT goal 1  -     Strength Goal Selection (PT-IRF) strength, PT goal 3 (free text)  -     Balance Goal Selection (PT) balance, PT goal 1  -     Caregiver Training Goal Selection (PT-IRF) caregiver training, PT goal 1  -     Row Name 12/31/22 0930          Bed Mobility Goal 1 (PT-IRF)    Activity/Assistive Device (Bed Mobility Goal 1, PT-IRF) bed mobility activities, all  -     [place] Level (Bed Mobility Goal 1, PT-IRF) independent  -     Time Frame (Bed Mobility Goal 1, PT-IRF) by discharge  -     Progress/Outcomes (Bed Mobility Goal 1, PT-IRF) goal not met  -     Row Name 12/31/22 0930          Transfer Goal 1 (PT-IRF)    Activity/Assistive Device (Transfer Goal 1, PT-IRF) sit-to-stand/stand-to-sit;bed-to-chair/chair-to-bed;wheelchair transfer  -     [place] Level (Transfer  Goal 1, PT-IRF) standby assist  -EUGENE     Time Frame (Transfer Goal 1, PT-IRF) 1 week  -EUGENE     Progress/Outcomes (Transfer Goal 1, PT-IRF) goal not met  -EUGENE     Row Name 12/31/22 0930          Transfer Goal 2 (PT-IRF)    Activity/Assistive Device (Transfer Goal 2, PT-IRF) sit-to-stand/stand-to-sit;bed-to-chair/chair-to-bed;wheelchair transfer  -EUGENE     [place] Level (Transfer Goal 2, PT-IRF) modified independence  -EUGENE     Time Frame (Transfer Goal 2, PT-IRF) 2 weeks  -EUGENE     Progress/Outcomes (Transfer Goal 2, PT-IRF) goal not met  -EUGENE     Row Name 12/31/22 0930          Gait/Walking Locomotion Goal 1 (PT-IRF)    Activity/Assistive Device (Gait/Walking Locomotion Goal 1, PT-IRF) gait (walking locomotion);assistive device use;increase endurance/gait distance;decrease fall risk  -     Gait/Walking Locomotion Distance Goal 1 (PT-IRF) 50ft or more x2  -EUGENE     [place] Level (Gait/Walking Locomotion Goal 1, PT-IRF) standby assist  -EUGENE     Time Frame (Gait/Walking Locomotion Goal 1, PT-IRF) 1 week  -EUGENE     Progress/Outcomes (Gait/Walking Locomotion Goal 1, PT-IRF) goal not met  -     Row Name 12/31/22 0930          Gait/Walking Locomotion Goal 2 (PT-IRF)    Activity/Assistive Device (Gait/Walking Locomotion Goal 2, PT-IRF) gait (walking locomotion);assistive device use;decrease fall risk;increase endurance/gait distance  -EUGENE     Gait/Walking Locomotion Distance Goal 2 (PT-IRF) 150ft or more  -EUGENE     [place] Level (Gait/Walking Locomotion Goal 2, PT-IRF) modified independence  -EUGENE     Time Frame (Gait/Walking Locomotion Goal 2, PT-IRF) 2 weeks  -EUGENE     Progress/Outcomes (Gait/Walking Locomotion Goal 2, PT-IRF) goal not met  -     Row Name 12/31/22 0930          Wheelchair Locomotion Goal (PT-IRF)    Wheelchair Locomotion Goal (PT-IRF) Pt will propel w/c 150ft with 2 turns with supervision  -EUGENE     Time Frame (Wheelchair Locomotion Goal, PT-IRF) by discharge  -EUGENE     Progress/Outcomes (Wheelchair Locomotion  Goal, PT-IRF) goal not met  -EUGENE     Row Name 12/31/22 0930          Stairs Goal 1 (PT-IRF)    Activity/Assistive Device (Stairs Goal 1, PT-IRF) ascending stairs;descending stairs;using handrail, left;using handrail, right;decrease fall risk  -EUGENE     Number of Stairs (Stairs Goal 1, PT-IRF) 3  -EUGENE     [place] Level (Stairs Goal 1, PT-IRF) standby assist;modified independence  -EUGENE     Time Frame (Stairs Goal 1, PT-IRF) 2 weeks  -EUGENE     Progress/Outcomes (Stairs Goal 1, PT-IRF) goal not met  -EUGENE     Row Name 12/31/22 0930          Strength Goal (PT-IRF)    Strength Goal 3 (PT-IRF) Pt will tolerate LE exercises OOB in chair and progress to standing exercises with VSS  -EUGENE     Time Frame (Strength Goal 3, PT-IRF) by discharge  -EUGENE     Progress/Outcomes (Strength Goal 3, PT-IRF) goal not met  -EUGENE     Row Name 12/31/22 0930          Balance Goal 1 (PT)    Activity/Assistive Device (Balance Goal 1, PT) assistive device use;sitting, static;sitting, dynamic;standing, static;standing, dynamic  -EUGENE     [place] Level/Cues Needed (Balance Goal 1, PT) conditional independence  -EUGENE     Time Frame (Balance Goal 1, PT) by discharge  -EUGENE     Progress/Outcomes (Balance Goal 1, PT) goal not met  -EUGENE     Row Name 12/31/22 0930          Caregiver Training Goal 1 (PT-IRF)    Caregiver Training Goal 1 (PT-IRF) Caregiver with acknowledge/demonstrate understanding of home care instructions  -EUGENE     Time Frame (Caregiver Training Goal 1, PT-IRF) by discharge  -EUGENE     Progress/Outcomes (Caregiver Training Goal 1, PT-IRF) goal not met  -EUGENE           User Key  (r) = Recorded By, (t) = Taken By, (c) = Cosigned By    Initials Name Provider Type    Nydia Alvarez PT Physical Therapist                 Physical Therapy Education     Title: PT OT SLP Therapies (In Progress)     Topic: Physical Therapy (In Progress)     Point: Mobility training (In Progress)     Learning Progress Summary           Patient Acceptance, E, NR by EUGENE at  12/31/2022 1345                   Point: Home exercise program (Not Started)     Learner Progress:  Not documented in this visit.          Point: Body mechanics (In Progress)     Learning Progress Summary           Patient Acceptance, E, NR by EUGENE at 12/31/2022 1345                   Point: Precautions (In Progress)     Learning Progress Summary           Patient Acceptance, E, NR by EUGENE at 12/31/2022 1345                               User Key     Initials Effective Dates Name Provider Type Discipline    EUGENE 06/16/21 -  Nydia Muñoz, PT Physical Therapist PT                PT Recommendation and Plan    Planned Therapy Interventions (PT): balance training, bed mobility training, gait training, home exercise program, patient/family education, stair training, strengthening, transfer training, wheelchair management/propulsion training  Frequency of Treatment (PT):  (5-6 days/wk)  Plan of Care Reviewed With: patient  Outcome Evaluation: PT evaluation completed. Pt sleepy but easily roused and agreeable to therapy. Pt transferred supine to sit with CGA and sit to stand to sit with CGA with FWW and verbal cues for safe use of FWW. Pt transferred bed to w/c with FWW and CGA. Pt propelled w/c 100ft with SBA. Pt ambulated with FWW 50ft with CGA. Pt required frequent rest breaks to catch his breath. O2 sats remained in 90's throughout on 4lpm. Function limited by decreased strength, balance, and tolerance for functional mobility and activities. Pt will benefit from PT to improve strength, balance, and tolerance to improve transfer and gait ability and to decrease fall risk. Anticipate home with assist at discharge with HHPT.    Mobility  Admission Goal Date Discharge   A.Roll left and right 4 6     B.Sit to lying 4 6     C. Lying to sitting on side of bed 4 6     D.Sit to stand 4 6     E. Chair/bed-to-chair transfer   4 6     F.Toilet transfer    88 6     G. Car Transfer 88 6     I.Walk 10 feet 4 6     J.Walk 50 feet with two  turns. 4 6     K.Walk 150 feet:  88 6     L.Walking 10 feet on uneven surfaces  88 6     M.1 step (curb) 88 6     N.4 steps 88 6     O.12 steps 09 09     P.Picking up object 88 6     R. Wheel 50 feet with two turns 4 6     S.Wheel 150 feet 88 6              Time Calculation:      PT Charges     Row Name 12/31/22 1358             Time Calculation    Start Time 0930  -      Stop Time 1032  -      Time Calculation (min) 62 min  -EUGENE      PT Received On 12/31/22  -      PT Goal Re-Cert Due Date 01/13/23  -         Time Calculation- PT    Total Timed Code Minutes- PT 0 minute(s)  -EUGENE         Untimed Charges    PT Eval/Re-eval Minutes 62  -EUGENE         Total Minutes    Untimed Charges Total Minutes 62  -EUGENE       Total Minutes 62  -EUGENE            User Key  (r) = Recorded By, (t) = Taken By, (c) = Cosigned By    Initials Name Provider Type    EUGENE Nydia Muñoz, PT Physical Therapist                Therapy Charges for Today     Code Description Service Date Service Provider Modifiers Qty    [id number]  PT EVAL MOD COMPLEXITY 4 12/31/2022 Nydia Muñoz, PT GP 1            PT G-Codes  Outcome Measure Options: AM-PAC 6 Clicks Daily Activity (OT), Modified [place], 9 [place]  AM-PAC 6 Clicks Score (PT): 17  AM-PAC 6 Clicks Score (OT): 18  Modified Dk Scale: 0 - No Symptoms at all.      Nydia Muñoz, PT  12/31/2022

## 2022-12-31 NOTE — CONSULTS
Adult Nutrition  Assessment/PES    Patient Name:  Brian Garcia  YOB: 1950  MRN: [id number]  Admit Date:  12/30/2022    Assessment Date:  12/31/2022    Comments:  Pt admitted to ARU for therapy after CVA with intracranial hemorrhage.  Hx of COPD, HTN, and blood clots in lungs.  RDN staff seeing for new admit.  Pt reports decreased intake r/t food being salty and decreased appetite.  Will order pt a cardiac diet r/t hx.  Denies food allergies.  Reports nausea occasionally but nothing regular.  Reports wt is up and down and not sure of UBW.  Reports he cannot drink milk d/t Bursitis, but did agreed to ice cream BID and PB BID.  RDN staff will follow ARU course.      Reason for Assessment     Row Name 12/31/22 1047          Reason for Assessment    Reason For Assessment other (see comments)     Identified At Risk by Screening Criteria --  initial assessment                Nutrition/Diet History     Row Name 12/31/22 1048          Nutrition/Diet History    Typical Intake (Food/Fluid/EN/PN) reports not eating the best d/t food being too salty and decreased appetite     Factors Affecting Nutritional Intake appetite                Labs/Tests/Procedures/Meds     Row Name 12/31/22 1048          Labs/Procedures/Meds    Lab Results Reviewed reviewed     Lab Results Comments Cr 0.72, TRIG 167        Diagnostic Tests/Procedures    Diagnostic Test/Procedure Reviewed reviewed, pertinent        Medications    Pertinent Medications Reviewed reviewed, pertinent     Pertinent Medications Comments D3, prednisone                  Estimated/Assessed Needs - Anthropometrics     Row Name 12/31/22 1055          Anthropometrics    Weight for Calculation 64 kg (141 lb 1.5 oz)        Estimated/Assessed Needs    Additional Documentation KCAL/KG (Group);Protein Requirements (Group);Fluid Requirements (Group)        KCAL/KG    KCAL/KG 25 Kcal/Kg (kcal);30 Kcal/Kg (kcal)     25 Kcal/Kg (kcal) 1600     30 Kcal/Kg (kcal)  1920        Protein Requirements    Weight Used For Protein Calculations 64 kg (141 lb 1.5 oz)     Est Protein Requirement Amount (gms/kg) 1.2 gm protein     Estimated Protein Requirements (gms/day) 76.8        Fluid Requirements    Fluid Requirements (mL/day) 1900     RDA Method (mL) 1900                Nutrition Prescription Ordered     Row Name 12/31/22 1055          Nutrition Prescription PO    Current PO Diet Regular                     Problem/Interventions:   Problem 1     Row Name 12/31/22 1055          Nutrition Diagnoses Problem 1    Problem 1 Predicted Suboptimal Intake     Etiology (related to) Factors Affecting Nutrition     Appetite Fair;Poor     Signs/Symptoms (evidenced by) Report of Mnimal PO Intake                      Intervention Goal     Row Name 12/31/22 1056          Intervention Goal    General Maintain nutrition;Meet nutritional needs for age/condition     PO Increase intake;Meet estimated needs     Weight No significant weight loss                Nutrition Intervention     Row Name 12/31/22 1056          Nutrition Intervention    RD/Tech Action Follow Tx progress;Care plan reviewd;Encourage intake;Recommend/ordered     Recommended/Ordered Supplement                Nutrition Prescription     Row Name 12/31/22 1056          Nutrition Prescription PO    PO Prescription Begin/change supplement     Supplement Ice Cream;Other (comment)  PB     Supplement Frequency 2 times a day                Education/Evaluation     Row Name 12/31/22 1056          Education    Education Education topics;Advised regarding habits/behavior     Education Topics Basic nutrition     Advised Regarding Habits/Behavior Use supplement        Monitor/Evaluation    Monitor Per protocol;PO intake;Supplement intake;Pertinent labs;Weight;Skin status;Symptoms     Education Follow-up Reinforce PRN                 Electronically signed by:  Cami Callejas  12/31/22 10:57 CST

## 2022-12-31 NOTE — THERAPY EVALUATION
Inpatient Rehabilitation - Occupational Therapy Initial Evaluation    [facility]     Patient Name: Brian Garcia  : 1950  MRN: [id number]    Today's Date: 2022                 Admit Date: 2022         ICD-10-CM ICD-9-CM   1. Dysphagia, unspecified type  R13.10 787.20   2. Symbolic dysfunction  R48.9 784.60   3. Impaired mobility and ADLs  Z74.09 V49.89    Z78.9        Patient Active Problem List   Diagnosis   • Pneumonia of both lower lobes due to infectious organism   • Hypokalemia   • Physical deconditioning   • Pulmonary nodules   • Leukocytosis   • History of pulmonary embolism   • Stage 4 very severe COPD by GOLD classification ([facility])   • Chronic respiratory failure with hypoxia, on home O2 therapy ([facility])   • Nontraumatic multiple localized intracerebral hemorrhages ([facility])   • Hypertension       Past Medical History:   Diagnosis Date   • COPD (chronic obstructive pulmonary disease) ([facility])    • Hypertension        Past Surgical History:   Procedure Laterality Date   • HIP ARTHROPLASTY               IRF OT ASSESSMENT FLOWSHEET (last 12 hours)     IRF OT Evaluation and Treatment     Row Name 22 1033          OT Time and Intention    Document Type initial evaluation  -CM     Mode of Treatment occupational therapy;individual therapy  -CM     Total Minutes, Occupational Therapy 60  -CM     Patient Effort good  -CM     Row Name 22 1033          General Information    Patient Profile Reviewed yes  -CM     General Observations of Patient up in w/c upon arrival, supine in bed at end of session  -CM     Existing Precautions/Restrictions fall  -CM     Limitations/Impairments hearing  -CM     Row Name 22 1033          Previous Level of Function/Home Environm    Bathing/Grooming, Premorbid Functional Level independent  -CM     Dressing, Premorbid Functional Level independent  -CM     Eating/Feeding, Premorbid Functional Level independent  -CM     Toileting, Premorbid  Functional Level independent  -CM     BADLs, Premorbid Functional Level independent  -CM     IADLs, Premorbid Functional Level independent  -CM     Bed Mobility, Premorbid Functional Level independent  -CM     Transfers, Premorbid Functional Level independent  -CM     Row Name 12/31/22 1033          Living Environment    Current Living Arrangements home  -CM     Home Accessibility wheelchair accessible  -CM     People in Home significant other  -CM     Primary Care Provided by self  -CM     Row Name 12/31/22 1033          Home Use of Assistive/Adaptive Equipment    Equipment Currently Used at Home oxygen  -     Row Name 12/31/22 1033          Pain Assessment    Pretreatment Pain Rating 4/10  -CM     Posttreatment Pain Rating 2/10  -CM     Pain Location lower  -CM     Pain Location - back  -     Row Name 12/31/22 1033          Cognition/Psychosocial    Orientation Status (Cognition) oriented x 4  -CM     Follows Commands (Cognition) WFL  -     Personal Safety Interventions gait belt;fall prevention program maintained;nonskid shoes/slippers when out of bed;supervised activity  -     Row Name 12/31/22 1033          Range of Motion (ROM)    Range of Motion ROM is WFL  -     Row Name 12/31/22 1033          Range of Motion Comprehensive    General Range of Motion bilateral upper extremity ROM WFL  -     Row Name 12/31/22 1033          Strength (Manual Muscle Testing)    Strength (Manual Muscle Testing) bilateral upper extremities  -     Row Name 12/31/22 1033          Strength Comprehensive (MMT)    Comment, General Manual Muscle Testing (MMT) Assessment MMT: B elbows and  4/5. B shoulders 3+/5.  -CM     Row Name 12/31/22 1033          Basic Activities of Daily Living (BADLs)    Basic Activities of Daily Living lower body dressing  -     Row Name 12/31/22 1033          Lower Body Dressing    [place] Level (Lower Body Dressing) moderate assist (50% patient effort)  -CM     Assistive Device Use  (Lower Body Dressing) other (see comments)  step stool  -CM     Position (Lower Body Dressing) edge of bed sitting  -CM     Row Name 12/31/22 1033          Bed Mobility    Bed Mobility scooting/bridging;sit-supine  -CM     Scooting/Bridging [place] (Bed Mobility) standby assist;verbal cues  -CM     Sit-Supine [place] (Bed Mobility) verbal cues;standby assist  -CM     Bed Mobility, Safety Issues decreased use of legs for bridging/pushing  -CM     Assistive Device (Bed Mobility) bed rails;head of bed elevated  -CM     Row Name 12/31/22 1033          Functional Mobility    Functional Mobility- Ind. Level contact guard assist  -CM     Functional Mobility- Device walker, front-wheeled  -CM     Functional Mobility- Safety Issues supplemental O2  -CM     Row Name 12/31/22 1033          Transfer Assessment/Treatment    Transfers toilet transfer;sit-stand transfer;stand-sit transfer  -CM     Row Name 12/31/22 1033          Sit-Stand Transfer    Sit-Stand [place] (Transfers) contact guard  -CM     Assistive Device (Sit-Stand Transfers) walker, front-wheeled  -CM     Row Name 12/31/22 1033          Stand-Sit Transfer    Stand-Sit [place] (Transfers) contact guard  -CM     Assistive Device (Stand-Sit Transfers) walker, front-wheeled  -CM     Row Name 12/31/22 1033          Toilet Transfer    Type (Toilet Transfer) stand-sit;sit-stand  -CM     [place] Level (Toilet Transfer) contact guard  -CM     Assistive Device (Toilet Transfer) commode chair;walker, front-wheeled  -CM     Comment, (Toilet Transfer) Pt requested to sit on commode chair sitting beside toilet.  -CM     Row Name 12/31/22 1033          Safety Issues, Functional Mobility    Safety Issues Affecting Function (Mobility) awareness of need for assistance;insight into deficits/self-awareness;positioning of assistive device;safety precaution awareness;safety precautions follow-through/compliance  -CM     Impairments Affecting Function (Mobility)  balance;endurance/activity tolerance  -     Row Name 12/31/22 1033          Vital Signs    Pre Systolic BP Rehab 139  -CM     Pre Treatment Diastolic BP 71  -CM     Post Systolic BP Rehab 140  -CM     Post Treatment Diastolic BP 74  -CM     Pretreatment Heart Rate (beats/min) 97  -CM     Posttreatment Heart Rate (beats/min) 86  -CM     Pre SpO2 (%) 99  -CM     O2 Delivery Pre Treatment nasal cannula  4LPM  -CM     Post SpO2 (%) 100  -CM     O2 Delivery Post Treatment nasal cannula  -CM     Intra Patient Position Sitting  -CM     Post Patient Position Supine  -CM     Row Name 12/31/22 1033          Therapy Assessment/Plan (OT)    Patient's Goals For Discharge return home  -     Row Name 12/31/22 1033          Therapy Assessment/Plan (OT)    OT Diagnosis Impaired mobility and ADLs  -CM     Rehab Potential/Prognosis (OT) good, to achieve stated therapy goals  -CM     Frequency of Treatment (OT) other (see comments)  5-6 days/wk  -CM     Estimated Duration of Therapy (OT) until facility discharge  -CM     Problem List (OT) problems related to;balance;mobility;strength;postural control  -CM     Activity Limitations Related to Problem List (OT) unable to ambulate safely;BADLs not performed adequately or safely;IADLs not performed adequately or safely;community activities not performed adequately or safely;home management activity not performed adequately or safely  -CM     Planned Therapy Interventions (OT) activity tolerance training;adaptive equipment training;BADL retraining;cognitive/visual perception retraining;functional balance retraining;IADL retraining;occupation/activity based interventions;patient/caregiver education/training;ROM/therapeutic exercise;strengthening exercise;transfer/mobility retraining;wheelchair assessment/training  -     Row Name 12/31/22 1033          Evaluation Complexity (OT)    Review Occupational Profile/Medical/Therapy History Complexity expanded/moderate complexity  -CM      Assessment, Occupational Performance/Identification of Deficit Complexity 3-5 performance deficits  -CM     Clinical Decision Making Complexity (OT) detailed assessment/moderate complexity  -CM     Overall Complexity of Evaluation (OT) moderate complexity  -CM     Row Name 12/31/22 1033          Therapy Plan Review/Discharge Plan (OT)    Therapy Plan Review (OT) evaluation/treatment results reviewed;care plan/treatment goals reviewed;risks/benefits reviewed;current/potential barriers reviewed;participants voiced agreement with care plan;participants included;patient  -CM     Anticipated Discharge Disposition (OT) home with assist  -CM     Row Name 12/31/22 1033          Bathing Goal 1 (OT-IRF)    Activity/Device (Bathing Goal 1, OT-IRF) lower body bathing  -CM     [place] Level (Bathing Goal 1, OT-IRF) modified independence  -CM     Time Frame (Bathing Goal 1, OT-IRF) by discharge;long-term goal (LTG)  -CM     Progress/Outcomes (Bathing Goal 1, OT-IRF) goal not met  -CM     Row Name 12/31/22 1033          LB Dressing Goal 1 (OT-IRF)    Activity/Device (LB Dressing Goal 1, OT-IRF) lower body dressing  -CM     [place] (LB Dressing Goal 1, OT-IRF) modified independence  -CM     Time Frame (LB Dressing Goal 1, OT-IRF) long-term goal (LTG);by discharge  -CM     Progress/Outcomes (LB Dressing Goal 1, OT-IRF) goal not met  -CM     Row Name 12/31/22 1033          Strength Goal 1 (OT-IRF)    Strength Goal 1 (OT-IRF) Pt will increase BUE strength in all planes by one level for increased strength and endurance required for ADL routine.  -CM     Time Frame (Strength Goal 1, OT-IRF) long-term goal (LTG);by discharge  -CM     Progress/Outcomes (Strength Goal 1, OT-IRF) goal not met  -CM     Row Name 12/31/22 1033           Endurance Goal 1 (OT)    Endurance Goal 1 (OT) Pt will complete at least 20 minutes of standing ADLs with Mod (I) or less w/ min fatigue or less for increased endurance, safety, and (I) with ADLs and  mobility.  -CM     Activity Level (Endurance Goal 1, OT) endurance 2 good  -CM     Time Frame (Endurance Goal 1, OT) long term goal (LTG);by discharge  -CM     Progress/Outcome (Endurance Goal 1, OT) goal not met  -CM           User Key  (r) = Recorded By, (t) = Taken By, (c) = Cosigned By    Initials Name Effective Dates    Ghazala Lemus OT 11/18/22 -                  Occupational Therapy Education     Title: PT OT SLP Therapies (In Progress)     Topic: Occupational Therapy (In Progress)     Point: ADL training (Done)     Description:   Instruct learner(s) on proper safety adaptation and remediation techniques during self care or transfers.   Instruct in proper use of assistive devices.              Learning Progress Summary           Patient Acceptance, E,TB, VU by EDSON at 12/31/2022 1211    Comment: OT POC, Role of OT, safe ADL mobility and t/f                   Point: Home exercise program (Not Started)     Description:   Instruct learner(s) on appropriate technique for monitoring, assisting and/or progressing therapeutic exercises/activities.              Learner Progress:  Not documented in this visit.          Point: Precautions (Not Started)     Description:   Instruct learner(s) on prescribed precautions during self-care and functional transfers.              Learner Progress:  Not documented in this visit.          Point: Body mechanics (Not Started)     Description:   Instruct learner(s) on proper positioning and spine alignment during self-care, functional mobility activities and/or exercises.              Learner Progress:  Not documented in this visit.                      User Key     Initials Effective Dates Name Provider Type Discipline     11/18/22 -  Ghazlaa Alfonso OT Occupational Therapist OT                       Self-Care Admission Goal Date Discharge   Eating 06 06 12/31/22    Oral hygiene 06 06 12/31/22    Toileting hygiene 05 06 12/31/22    Shower/bathe self 03 06 12/31/22    Upper body  dressing 05 06 12/31/22    Lower body dressing 03 06 12/31/22    Putting on/taking off footwear 03 06 12/31/22        OT Recommendation and Plan    Planned Therapy Interventions (OT): activity tolerance training, adaptive equipment training, BADL retraining, cognitive/visual perception retraining, functional balance retraining, IADL retraining, occupation/activity based interventions, patient/caregiver education/training, ROM/therapeutic exercise, strengthening exercise, transfer/mobility retraining, wheelchair assessment/training    Plan of Care Review  Plan of Care Reviewed With: patient  Outcome Evaluation: Initial OT evaluation completed on ARU. Pt sitting in w/c with PT upon arrival. Pt reported he was (I) with ADLs prior to admission. Pt t/f from w/c to EOB with CGA and FWW. Pt completed 9-hole peg test and  strength via dynamometer.  strength results are as follows: L 28, 25, 20 and R 28, 28, 25. Pt required Min A for doffing socks sitting EOB and Mod A for donning socks sitting EOB, as well as a small stool to raise his foot off the floor. Pt completed sit <> stand with CGA and FWW. Completed mobility to and from the bathroom with CGA and FWW. Pt completed toilet t/f with CGA and FWW. Pt was SBA for sit to sup. Pt left supine in bed with all needs in reach and exit alarm on. I/P OT will focus on endurance, safety, strength, and (I) with ADLs and mobility. Goals established.  Plan of Care Reviewed With: patient       Outcome Measures     Row Name 12/31/22 1100 12/31/22 1033          9 Hole Peg    9-Hole Peg Left -- 1.02.91  -CM     9-Hole Peg Right -- 38.61  -CM        How much help from another is currently needed...    Putting on and taking off regular lower body clothing? -- 2  -CM     Bathing (including washing, rinsing, and drying) -- 2  -CM     Toileting (which includes using toilet bed pan or urinal) -- 3  -CM     Putting on and taking off regular upper body clothing -- 3  -CM     Taking care  of personal grooming (such as brushing teeth) -- 4  -CM     Eating meals -- 4  -CM     AM-PAC 6 Clicks Score (OT) -- 18  -CM        Modified Beverly Scale    Pre-Stroke Modified Beverly Scale -- 0 - No Symptoms at all.  -CM     Modified Beverly Scale -- 0 - No Symptoms at all.  -CM        Functional Assessment    Outcome Measure Options AM-PAC 6 Clicks Daily Activity (OT);Modified Dk;9 Hole Peg  -CM --           User Key  (r) = Recorded By, (t) = Taken By, (c) = Cosigned By    Initials Name Provider Type    Ghazala Lemus OT Occupational Therapist                  Time Calculation:      Time Calculation- OT     Row Name 12/31/22 1212             Time Calculation- OT    OT Start Time 1033  -CM      OT Stop Time 1133  -CM      OT Time Calculation (min) 60 min  -CM      OT Received On 12/31/22  -CM      OT Goal Re-Cert Due Date 01/13/23  -CM         Untimed Charges    OT Eval/Re-eval Minutes 60  -CM         Total Minutes    Untimed Charges Total Minutes 60  -CM       Total Minutes 60  -CM            User Key  (r) = Recorded By, (t) = Taken By, (c) = Cosigned By    Initials Name Provider Type    Ghazala Lemus OT Occupational Therapist              Therapy Charges for Today     Code Description Service Date Service Provider Modifiers Qty    [id number] HC OT EVAL MOD COMPLEXITY 4 12/31/2022 Ghazala Alfonso OT GO 1                   Ghazala Alfonso OT  12/31/2022

## 2023-01-01 ENCOUNTER — APPOINTMENT (OUTPATIENT)
Dept: GENERAL RADIOLOGY | Facility: HOSPITAL | Age: 73
DRG: 949 | End: 2023-01-01
Payer: OTHER GOVERNMENT

## 2023-01-01 PROBLEM — I63.9 CVA (CEREBRAL VASCULAR ACCIDENT): Status: ACTIVE | Noted: 2023-01-01

## 2023-01-01 LAB — GLUCOSE BLDC GLUCOMTR-MCNC: 163 MG/DL (ref 70–130)

## 2023-01-01 PROCEDURE — 82962 GLUCOSE BLOOD TEST: CPT

## 2023-01-01 PROCEDURE — 94799 UNLISTED PULMONARY SVC/PX: CPT

## 2023-01-01 PROCEDURE — 63710000001 PREDNISONE PER 5 MG: Performed by: ORTHOPAEDIC SURGERY

## 2023-01-01 PROCEDURE — 99231 SBSQ HOSP IP/OBS SF/LOW 25: CPT | Performed by: ORTHOPAEDIC SURGERY

## 2023-01-01 PROCEDURE — 71046 X-RAY EXAM CHEST 2 VIEWS: CPT

## 2023-01-01 PROCEDURE — 94760 N-INVAS EAR/PLS OXIMETRY 1: CPT

## 2023-01-01 RX ORDER — LANOLIN ALCOHOL/MO/W.PET/CERES
6 CREAM (GRAM) TOPICAL NIGHTLY PRN
Status: DISCONTINUED | OUTPATIENT
Start: 2023-01-01 | End: 2023-01-13 | Stop reason: HOSPADM

## 2023-01-01 RX ORDER — ONDANSETRON 4 MG/1
4 TABLET, FILM COATED ORAL EVERY 6 HOURS PRN
Status: DISCONTINUED | OUTPATIENT
Start: 2023-01-01 | End: 2023-01-13 | Stop reason: HOSPADM

## 2023-01-01 RX ORDER — DOCUSATE SODIUM 100 MG/1
100 CAPSULE, LIQUID FILLED ORAL 2 TIMES DAILY
Status: DISCONTINUED | OUTPATIENT
Start: 2023-01-01 | End: 2023-01-13 | Stop reason: HOSPADM

## 2023-01-01 RX ORDER — CALCIUM CARBONATE 200(500)MG
2 TABLET,CHEWABLE ORAL 2 TIMES DAILY PRN
Status: DISCONTINUED | OUTPATIENT
Start: 2023-01-01 | End: 2023-01-13 | Stop reason: HOSPADM

## 2023-01-01 RX ORDER — BISACODYL 10 MG
10 SUPPOSITORY, RECTAL RECTAL DAILY PRN
Status: DISCONTINUED | OUTPATIENT
Start: 2023-01-01 | End: 2023-01-13 | Stop reason: HOSPADM

## 2023-01-01 RX ORDER — ACETAMINOPHEN 325 MG/1
650 TABLET ORAL EVERY 6 HOURS PRN
Status: DISCONTINUED | OUTPATIENT
Start: 2023-01-01 | End: 2023-01-13 | Stop reason: HOSPADM

## 2023-01-01 RX ADMIN — TIZANIDINE 2 MG: 2 TABLET ORAL at 20:38

## 2023-01-01 RX ADMIN — CEFUROXIME AXETIL 500 MG: 500 TABLET ORAL at 20:35

## 2023-01-01 RX ADMIN — IPRATROPIUM BROMIDE 0.5 MG: 0.5 SOLUTION RESPIRATORY (INHALATION) at 20:29

## 2023-01-01 RX ADMIN — DOCUSATE SODIUM 100 MG: 100 CAPSULE, LIQUID FILLED ORAL at 20:35

## 2023-01-01 RX ADMIN — CEFUROXIME AXETIL 500 MG: 500 TABLET ORAL at 08:20

## 2023-01-01 RX ADMIN — PREDNISONE 5 MG: 5 TABLET ORAL at 08:21

## 2023-01-01 RX ADMIN — DILTIAZEM HYDROCHLORIDE 180 MG: 180 CAPSULE, COATED, EXTENDED RELEASE ORAL at 08:21

## 2023-01-01 RX ADMIN — DOCUSATE SODIUM 100 MG: 100 CAPSULE, LIQUID FILLED ORAL at 09:19

## 2023-01-01 RX ADMIN — MONTELUKAST SODIUM 10 MG: 10 TABLET, COATED ORAL at 20:35

## 2023-01-01 RX ADMIN — ACETAMINOPHEN 650 MG: 325 TABLET, FILM COATED ORAL at 06:09

## 2023-01-01 RX ADMIN — Medication 1000 UNITS: at 08:21

## 2023-01-01 RX ADMIN — ACETAMINOPHEN 650 MG: 325 TABLET, FILM COATED ORAL at 13:02

## 2023-01-01 RX ADMIN — IPRATROPIUM BROMIDE 0.5 MG: 0.5 SOLUTION RESPIRATORY (INHALATION) at 15:12

## 2023-01-01 NOTE — SIGNIFICANT NOTE
RT ARRIVED TO ROOM / PT ASLEEPWOKE PATIENT UP AND PT STATED HE DID NOT WANT TREATMENT AT THIS TIME

## 2023-01-01 NOTE — PLAN OF CARE
"Goal Outcome Evaluation:  Plan of Care Reviewed With: patient, significant other        Progress: no change  Outcome Evaluation: Pt continues to recover from intracerebral hemorrhages. Pt is alert and oriented to person, place and time as the day progressed. Pt is slow to follow commands at times, often appearing to \"consider\" the request and whether he will complete the task or not. Pt's significant other at bedside during much of the day and stated that this was not her partner's baseline. SO endorsed that pt is \"stubborn at times.\" Pt was mostly compliant with assessment process but requires time to process what is being asked for him to do or whether he will do it. Pt was medication compliant taking meds as ordered. Pt did require one PRN for pain due to a headache originating from the right side of his head. Effective per patient. Skin is warm, dry and intact, no areas of redness observed. Pt reported fatigue which we discussed as part of the issue of recovering from pneumonia. Chest xray completed today ( no changes from previous study, still indicating pneumonia rt sided groundglass appearance.), Incentive Spiromenter started today with patient. Pt stated \" I know what to do with this.\" Pt demonstrated good technique and understanding and effort. Pt remains on oxygen at 4 LPM.     Pt 's SO, Annette Harvey, asked what she viewed as changes to him since these bleeds occurred. Annette stated that \"he seems to take longer to tell you what's on his mind, but he has always been the kind of person who says 'I will do what I want when I want to do it.' It takes him longer to tell you what he is thinking.\"  "

## 2023-01-01 NOTE — PROGRESS NOTES
Progress Note      Admit date: 12/30/2022 12:28 PM       Treatment Team     Provider Relationship Specialty Contact    Fan Rob MD Attending Rehabilitation   [phone]      Skylar Sanches, RRT Respiratory Therapist Respiratory Therapy   [phone]      Palma Alonso, RN Registered Nurse -- --    Anna Quinones RN Registered Nurse -- --             Chief Complaint:  Nontraumatic multiple localized intracerebral hemorrhages (HCC)    HPI: No changes since admission    Vital Signs  Temp:  [99.8 °F (37.7 °C)-100 °F (37.8 °C)] 100 °F (37.8 °C)  Heart Rate:  [] 109  Resp:  [16-20] 20  BP: (137-184)/(72) 184/72    Physical Exam 1:    Constitutional: Patient is alert voluntarily and independently got up and sat on the side of the bed and took his medications as asked and offered     HENT:      Eyes:     Neck:      Cardiovascular: No evidence of failure    Pulmonary: Lungs prolonged expiratory wheezing difficult to hear no rales or rhonchi    Abdominal:      Genitourinary/Anorectal:       Musculoskeletal: Dressing addressed with physical therapy    Skin:     Neurological: No focal or progressive neurological changes    Psychiatric/Behavioral: Cooperative with therapy       Results Review:    I reviewed the patient's new clinical results.  Note nurse reports temperature 100 no evidence of any fever chills clinically the patient does not have any complaints referable to his lungs with his chronic lung disease.  He was treated for pneumonia up in [place] will continue to that treatment for Nai and plan is a chest x-ray to see if he is have progressive clearing of his infiltrate    Nursing notes and therapy notes have been reviewed.    I have reviewed medications.    Assessment/Plan  Principal Problem:    Nontraumatic multiple localized intracerebral hemorrhages (HCC) stable no focal neurological signs  Active Problems:    Pneumonia of both lower lobes due to infectious organism      Overview: -  Discussed with Radiology (Dr. Davey), who reports that the patient has a       superior segment small patchy opacity in the left lung base likely       pneumonia, and a small patchy opacity in the right lung base, likely       pneumonia. He recommended continuing with antibiotics and then doing       follow up imaging in 3 months.  We will do an x-ray here to have a baseline and see if there is continued improvement      - Blood cultures no growth to date.      - Levaquin day 4. Transitioned to PO today.      - Legionella and strep pneumo neg. Mycoplasma pending.  SHAHRIAR Holley is antibiotics    Physical deconditioning      Overview: - PT/OT Consulted. Recommendations: home with home health.      - Patient may benefit from an assisted living.  Continue therapy    Stage 4 very severe COPD by GOLD classification (HCC) follow    Hypokalemia resolved      Overview: - Supplement daily.    Hypertension stable           Fan Rob MD  01/01/23  08:45 CST          This document has been electronically signed by Fan Rob MD on January 1, 2023 08:45 CST      Part of this note may be an electronic transcription/translation of spoken language to printed text using the Dragon Dictation System.

## 2023-01-02 PROBLEM — Z99.81 CHRONIC RESPIRATORY FAILURE WITH HYPOXIA, ON HOME O2 THERAPY (HCC): Chronic | Status: ACTIVE | Noted: 2022-07-11

## 2023-01-02 PROBLEM — J96.11 CHRONIC RESPIRATORY FAILURE WITH HYPOXIA, ON HOME O2 THERAPY: Chronic | Status: ACTIVE | Noted: 2022-07-11

## 2023-01-02 PROBLEM — E87.6 HYPOKALEMIA: Chronic | Status: RESOLVED | Noted: 2018-02-08 | Resolved: 2023-01-02

## 2023-01-02 LAB
ALBUMIN SERPL-MCNC: 3.7 G/DL (ref 3.5–5.2)
ALBUMIN/GLOB SERPL: 1.1 G/DL
ALP SERPL-CCNC: 97 U/L (ref 39–117)
ALT SERPL W P-5'-P-CCNC: 13 U/L (ref 1–41)
ANION GAP SERPL CALCULATED.3IONS-SCNC: 9 MMOL/L (ref 5–15)
AST SERPL-CCNC: 13 U/L (ref 1–40)
BASOPHILS # BLD AUTO: 0.11 10*3/MM3 (ref 0–0.2)
BASOPHILS NFR BLD AUTO: 0.8 % (ref 0–1.5)
BILIRUB SERPL-MCNC: 0.4 MG/DL (ref 0–1.2)
BUN SERPL-MCNC: 15 MG/DL (ref 8–23)
BUN/CREAT SERPL: 26.3 (ref 7–25)
CALCIUM SPEC-SCNC: 9.4 MG/DL (ref 8.6–10.5)
CHLORIDE SERPL-SCNC: 91 MMOL/L (ref 98–107)
CO2 SERPL-SCNC: 31 MMOL/L (ref 22–29)
CREAT SERPL-MCNC: 0.57 MG/DL (ref 0.76–1.27)
DEPRECATED RDW RBC AUTO: 42.4 FL (ref 37–54)
EGFRCR SERPLBLD CKD-EPI 2021: 103.5 ML/MIN/1.73
EOSINOPHIL # BLD AUTO: 0.71 10*3/MM3 (ref 0–0.4)
EOSINOPHIL NFR BLD AUTO: 4.9 % (ref 0.3–6.2)
ERYTHROCYTE [DISTWIDTH] IN BLOOD BY AUTOMATED COUNT: 12.7 % (ref 12.3–15.4)
GLOBULIN UR ELPH-MCNC: 3.4 GM/DL
GLUCOSE SERPL-MCNC: 109 MG/DL (ref 65–99)
HBA1C MFR BLD: 5.5 % (ref 4.8–5.6)
HCT VFR BLD AUTO: 39.9 % (ref 37.5–51)
HGB BLD-MCNC: 13.6 G/DL (ref 13–17.7)
IMM GRANULOCYTES # BLD AUTO: 0.08 10*3/MM3 (ref 0–0.05)
IMM GRANULOCYTES NFR BLD AUTO: 0.6 % (ref 0–0.5)
IRON 24H UR-MRATE: 37 MCG/DL (ref 59–158)
IRON SATN MFR SERPL: 13 % (ref 20–50)
LYMPHOCYTES # BLD AUTO: 2.14 10*3/MM3 (ref 0.7–3.1)
LYMPHOCYTES NFR BLD AUTO: 14.7 % (ref 19.6–45.3)
MAGNESIUM SERPL-MCNC: 2 MG/DL (ref 1.6–2.4)
MCH RBC QN AUTO: 30.8 PG (ref 26.6–33)
MCHC RBC AUTO-ENTMCNC: 34.1 G/DL (ref 31.5–35.7)
MCV RBC AUTO: 90.5 FL (ref 79–97)
MONOCYTES # BLD AUTO: 2.02 10*3/MM3 (ref 0.1–0.9)
MONOCYTES NFR BLD AUTO: 13.9 % (ref 5–12)
NEUTROPHILS NFR BLD AUTO: 65.1 % (ref 42.7–76)
NEUTROPHILS NFR BLD AUTO: 9.45 10*3/MM3 (ref 1.7–7)
NRBC BLD AUTO-RTO: 0 /100 WBC (ref 0–0.2)
PLATELET # BLD AUTO: 357 10*3/MM3 (ref 140–450)
PMV BLD AUTO: 9.7 FL (ref 6–12)
POTASSIUM SERPL-SCNC: 3.8 MMOL/L (ref 3.5–5.2)
PROT SERPL-MCNC: 7.1 G/DL (ref 6–8.5)
RBC # BLD AUTO: 4.41 10*6/MM3 (ref 4.14–5.8)
SODIUM SERPL-SCNC: 131 MMOL/L (ref 136–145)
TIBC SERPL-MCNC: 286 MCG/DL (ref 298–536)
TRANSFERRIN SERPL-MCNC: 192 MG/DL (ref 200–360)
TSH SERPL DL<=0.05 MIU/L-ACNC: 2.07 UIU/ML (ref 0.27–4.2)
WBC NRBC COR # BLD: 14.51 10*3/MM3 (ref 3.4–10.8)

## 2023-01-02 PROCEDURE — 97116 GAIT TRAINING THERAPY: CPT

## 2023-01-02 PROCEDURE — 80053 COMPREHEN METABOLIC PANEL: CPT | Performed by: ORTHOPAEDIC SURGERY

## 2023-01-02 PROCEDURE — 94799 UNLISTED PULMONARY SVC/PX: CPT

## 2023-01-02 PROCEDURE — 94760 N-INVAS EAR/PLS OXIMETRY 1: CPT

## 2023-01-02 PROCEDURE — 97110 THERAPEUTIC EXERCISES: CPT

## 2023-01-02 PROCEDURE — 99231 SBSQ HOSP IP/OBS SF/LOW 25: CPT | Performed by: ORTHOPAEDIC SURGERY

## 2023-01-02 PROCEDURE — 92507 TX SP LANG VOICE COMM INDIV: CPT | Performed by: SPEECH-LANGUAGE PATHOLOGIST

## 2023-01-02 PROCEDURE — 85025 COMPLETE CBC W/AUTO DIFF WBC: CPT | Performed by: ORTHOPAEDIC SURGERY

## 2023-01-02 PROCEDURE — 97542 WHEELCHAIR MNGMENT TRAINING: CPT

## 2023-01-02 PROCEDURE — 97530 THERAPEUTIC ACTIVITIES: CPT

## 2023-01-02 PROCEDURE — 83036 HEMOGLOBIN GLYCOSYLATED A1C: CPT | Performed by: ORTHOPAEDIC SURGERY

## 2023-01-02 PROCEDURE — 83735 ASSAY OF MAGNESIUM: CPT | Performed by: ORTHOPAEDIC SURGERY

## 2023-01-02 PROCEDURE — 84466 ASSAY OF TRANSFERRIN: CPT | Performed by: ORTHOPAEDIC SURGERY

## 2023-01-02 PROCEDURE — 83540 ASSAY OF IRON: CPT | Performed by: ORTHOPAEDIC SURGERY

## 2023-01-02 PROCEDURE — 84443 ASSAY THYROID STIM HORMONE: CPT | Performed by: ORTHOPAEDIC SURGERY

## 2023-01-02 RX ADMIN — MONTELUKAST SODIUM 10 MG: 10 TABLET, COATED ORAL at 20:15

## 2023-01-02 RX ADMIN — CEFUROXIME AXETIL 500 MG: 500 TABLET ORAL at 08:13

## 2023-01-02 RX ADMIN — DOCUSATE SODIUM 100 MG: 100 CAPSULE, LIQUID FILLED ORAL at 08:14

## 2023-01-02 RX ADMIN — Medication 1000 UNITS: at 08:14

## 2023-01-02 RX ADMIN — DILTIAZEM HYDROCHLORIDE 180 MG: 180 CAPSULE, COATED, EXTENDED RELEASE ORAL at 08:13

## 2023-01-02 RX ADMIN — DOCUSATE SODIUM 100 MG: 100 CAPSULE, LIQUID FILLED ORAL at 20:15

## 2023-01-02 RX ADMIN — ACETAMINOPHEN 650 MG: 325 TABLET, FILM COATED ORAL at 11:16

## 2023-01-02 RX ADMIN — TIZANIDINE 2 MG: 2 TABLET ORAL at 20:15

## 2023-01-02 RX ADMIN — CEFUROXIME AXETIL 500 MG: 500 TABLET ORAL at 20:15

## 2023-01-02 RX ADMIN — IPRATROPIUM BROMIDE 0.5 MG: 0.5 SOLUTION RESPIRATORY (INHALATION) at 06:41

## 2023-01-02 NOTE — PLAN OF CARE
Goal Outcome Evaluation:  Plan of Care Reviewed With: patient        Progress: improving  Outcome Evaluation: Pt seen for skilled ST services this date to address cognitive-lingusitic deficits.  Upon entry, pt was attempting to fill out his menu for the next three meals and was having notable dificulty despites attempts from dietary staff to assist.  Pt able to acknowledge his need for extended time, but demonstrated poor inhibition when help was offered and when he needed to ask for help.  Pt did participate well in all therapy tasks and met targets for 2/3 goals when given extended time and repetition of information/questions.  Continue POC.

## 2023-01-02 NOTE — PLAN OF CARE
Goal Outcome Evaluation:  Plan of Care Reviewed With: patient     Problem: Rehabilitation (IRF) Plan of Care  Goal: Plan of Care Review  Outcome: Ongoing, Progressing  Flowsheets (Taken 1/2/2023 1430)  Progress: improving  Plan of Care Reviewed With: patient  Outcome Evaluation: pt is drowsy and requires much encouragement, extened time, and constant cueing for participation. pt requires CGA for sit-stand-sit and t/fs. pt participated in gait training, 50 ft both AM and PM. pt with FF posture and cueing for walker proximity. pt has festinating gait with albin flexed knees/hips that worsen with fatigue. pt participated in LE ther ex with much encouragment and cueing. no new goals met at this time. pt would continue to benefit from PT services.

## 2023-01-02 NOTE — THERAPY TREATMENT NOTE
Inpatient Rehabilitation - Physical Therapy Treatment Note       [facility]     Patient Name: Brian Garcia  : 1950  MRN: [id number]    Today's Date: 2023                    Admit Date: 2022      Visit Dx:     ICD-10-CM ICD-9-CM   1. Dysphagia, unspecified type  R13.10 787.20   2. Symbolic dysfunction  R48.9 784.60   3. Impaired mobility and ADLs  Z74.09 V49.89    Z78.9    4. Impaired functional mobility, balance, gait, and endurance  Z74.09 V49.89       Patient Active Problem List   Diagnosis   • Pneumonia of both lower lobes due to infectious organism   • Physical deconditioning   • Pulmonary nodules   • Leukocytosis   • History of pulmonary embolism   • Stage 4 very severe COPD by GOLD classification ([facility])   • Chronic respiratory failure with hypoxia, on home O2 therapy (HCC)   • Nontraumatic multiple localized intracerebral hemorrhages (HCC)   • Hypertension   • CVA (cerebral vascular accident) ([facility])       Past Medical History:   Diagnosis Date   • COPD (chronic obstructive pulmonary disease) (HCC)    • Hypertension        Past Surgical History:   Procedure Laterality Date   • HIP ARTHROPLASTY         PT ASSESSMENT (last 12 hours)     IRF PT Evaluation and Treatment     Row Name 23 1353 23 1011       PT Time and Intention    Document Type daily treatment  -EUGENE daily treatment  -EUGENE    Mode of Treatment physical therapy;individual therapy  -EUGENE physical therapy;individual therapy  -EUGENE    Row Name 23 1353 23 1011       General Information    Patient Profile Reviewed yes  -EUGENE yes  -EUGENE    Existing Precautions/Restrictions fall  -EUGENE fall  -EUGENE    Limitations/Impairments hearing  -EUGENE hearing  -EUGENE    Row Name 23 1353 23 1011       Living Environment    Primary Care Provided by self  -EUGENE self  -EUGENE    Row Name 23 1353 23 1011       Home Use of Assistive/Adaptive Equipment    Equipment Currently Used at Home oxygen  -EUGENE oxygen  -EUGENE    Row Name  01/02/23 1353 01/02/23 1011       Pain Assessment    Pretreatment Pain Rating 3/10  -EUGENE 5/10  -EUGENE    Posttreatment Pain Rating -- 4/10  -EUGENE    Pain Location - Side/Orientation Right  - --    Pain Location - shoulder  - --    Row Name 01/02/23 1353 01/02/23 1011       Cognition/Psychosocial    Affect/Mental Status (Cognition) other (see comments)  drowsy  -EUGENE --    Orientation Status (Cognition) oriented x 4  -EUGENE --  -EUGENE    Follows Commands (Cognition) -- --  -EUGENE    Comment, Cognition -- pt does not follow commands well. pt is drowsy. requires extended time and constant cueing  -    Row Name 01/02/23 1353 01/02/23 1011       Range of Motion Comprehensive    General Range of Motion bilateral lower extremity ROM WFL  - bilateral lower extremity ROM WFL  -[facility] Name 01/02/23 1011          Bed Mobility    Bed Mobility --  -EUGENE     Rolling Left [place] (Bed Mobility) --  -EUGENE     Rolling Right [place] (Bed Mobility) --  -EUGENE     Scooting/Bridging [place] (Bed Mobility) --  -EUGENE     Supine-Sit [place] (Bed Mobility) --  -EUGENE     Assistive Device (Bed Mobility) --  -[facility] Name 01/02/23 1353 01/02/23 1011       Transfer Assessment/Treatment    Transfers sit-stand transfer;stand-sit transfer;chair-bed transfer  - sit-stand transfer;stand-sit transfer  -[facility] Name 01/02/23 1353 01/02/23 1011       Sit-Stand Transfer    Sit-Stand [place] (Transfers) contact guard;verbal cues;nonverbal cues (demo/gesture)  - contact guard;verbal cues;nonverbal cues (demo/gesture)  -[facility] Name 01/02/23 1353 01/02/23 1011       Stand-Sit Transfer    Stand-Sit [place] (Transfers) contact guard;verbal cues;nonverbal cues (demo/gesture)  - contact guard;verbal cues;nonverbal cues (demo/gesture)  -[facility] Name 01/02/23 1353          Toilet Transfer    Type (Toilet Transfer) sit-stand;stand-sit  -     [place] Level (Toilet Transfer) contact guard  -[facility] Name 01/02/23 1353 01/02/23  1011       Gait/Stairs (Locomotion)    Gait/Stairs Locomotion gait/ambulation assistive device;distance ambulated  -EUGENE gait/ambulation assistive device;distance ambulated  -EUGENE    [place] Level (Gait) verbal cues;nonverbal cues (demo/gesture);contact guard  -EUGENE verbal cues;nonverbal cues (demo/gesture);contact guard  -EUGENE    Assistive Device (Gait) walker, front-wheeled  -EUGENE walker, front-wheeled  -    Distance in Feet (Gait) 50  -EUGENE 50  -EUGENE    Deviations/Abnormal Patterns (Gait) festinating/shuffling;gait speed decreased;stride length decreased  albin flexed knee/hips that worsened with fatigue.  -EUGENE festinating/shuffling;gait speed decreased;stride length decreased  -EUGENE    Bilateral Gait Deviations forward flexed posture  cueing for walker proximity  - forward flexed posture  cueing for walker proximity  -    Comment, (Gait/Stairs) -- much cueing and encouragement required to get pt to stand up and participate in gait.  -    Row Name 01/02/23 1011          Wheelchair Mobility/Management    Comment, Wheelchair Mobility with extended time and constant cueing, pt propelled WC 10-15 ft SBA  -     Row Name 01/02/23 1353 01/02/23 1011       Safety Issues, Functional Mobility    Impairments Affecting Function (Mobility) balance;endurance/activity tolerance;shortness of breath;strength  - balance;endurance/activity tolerance;shortness of breath;strength  -    Row Name 01/02/23 1011          Motor Skills    Therapeutic Exercise --  attempted HS curls with theraband. pt completed only 6 reps with extended time and max encouragement. attempted AAROM in sitting and pt would not participate. began to stretch pts HS and became agitated and demanded PT stop.  -     Additional Documentation --  pt educated if he is going to remain on this floor then he will have to work with PT. pt began to participate in seated LE ther ex. ankle DF/PF, LAQ, hip flexion, hip Ab/Ad- 10x1 AROM-AAROM.  -     Row Name 01/02/23 0671  01/02/23 1011       Vital Signs    Pre Systolic BP Rehab 129  -EUGENE 131  -EUGENE    Pre Treatment Diastolic BP 71  -EUGENE 62  -EUGENE    Pretreatment Heart Rate (beats/min) 96  -EUGENE 107  -EUGENE    Intratreatment Heart Rate (beats/min) -- 99  -EUGENE    Pre SpO2 (%) 93  -EUGENE 99  -EUGENE    O2 Delivery Pre Treatment nasal cannula  -EUGENE nasal cannula  -EUGENE    Intra SpO2 (%) -- 88  -EUGENE    O2 Delivery Intra Treatment -- nasal cannula  -EUGENE    Pre Patient Position Sitting  -EUGENE Sitting  -EUGENE    Post Patient Position Sitting  EOB with nsg.  - Sitting  -EUGENE    Row Name 01/02/23 1353 01/02/23 1011       Therapy Assessment/Plan (PT)    Patient's Goals For Discharge return home  - return home  -    Row Name 01/02/23 1353 01/02/23 1011       Therapy Assessment/Plan (PT)    Rehab Potential/Prognosis (PT) good, to achieve stated therapy goals  - good, to achieve stated therapy goals  -    Frequency of Treatment (PT) --  5-6 days/wk  -EUGENE --  5-6 days/wk  -EUGENE    Estimated Duration of Therapy (PT) 2 weeks;4 weeks  -EUGENE 2 weeks;4 weeks  -    Problem List (PT) balance;mobility;strength;pain;hearing  - balance;mobility;strength;pain;hearing  -    Row Name 01/02/23 1353 01/02/23 1011       Therapy Plan Review/Discharge Plan (PT)    Anticipated Discharge Disposition (PT) home with assist;home with home health  - home with assist;home with home health  -    Row Name 01/02/23 1353 01/02/23 1011       IRF PT Goals    Bed Mobility Goal Selection (PT-IRF) bed mobility, PT goal 1  - bed mobility, PT goal 1  -    Transfer Goal Selection (PT-IRF) transfers, PT goal 1;transfers, PT goal 2  - transfers, PT goal 1;transfers, PT goal 2  -    Gait (Walking Locomotion) Goal Selection (PT-IRF) gait, PT goal 1;gait, PT goal 2  -EUGENE gait, PT goal 1;gait, PT goal 2  -    Wheelchair Locomotion Goal Selection (PT-IRF) wheelchair locomotion, PT goal (free text)  - wheelchair locomotion, PT goal (free text)  -    Stairs Goal Selection (PT-IRF) stairs, PT goal  1  -EUGENE stairs, PT goal 1  -EUGENE    Strength Goal Selection (PT-IRF) strength, PT goal 3 (free text)  -EUGENE strength, PT goal 3 (free text)  -EUGENE    Balance Goal Selection (PT) balance, PT goal 1  -EUGENE balance, PT goal 1  -EUGENE    Caregiver Training Goal Selection (PT-IRF) caregiver training, PT goal 1  -EUGENE caregiver training, PT goal 1  -EUGENE    Row Name 01/02/23 1353 01/02/23 1011       Bed Mobility Goal 1 (PT-IRF)    Activity/Assistive Device (Bed Mobility Goal 1, PT-IRF) bed mobility activities, all  -EUGENE bed mobility activities, all  -EUGENE    [place] Level (Bed Mobility Goal 1, PT-IRF) independent  -EUGENE independent  -EUGENE    Time Frame (Bed Mobility Goal 1, PT-IRF) by discharge  -EUGENE by discharge  -EUGENE    Progress/Outcomes (Bed Mobility Goal 1, PT-IRF) goal not met  -EUGENE goal not met  -EUGENE    Row Name 01/02/23 1353 01/02/23 1011       Transfer Goal 1 (PT-IRF)    Activity/Assistive Device (Transfer Goal 1, PT-IRF) sit-to-stand/stand-to-sit;bed-to-chair/chair-to-bed;wheelchair transfer  -EUGENE sit-to-stand/stand-to-sit;bed-to-chair/chair-to-bed;wheelchair transfer  -EUGENE    [place] Level (Transfer Goal 1, PT-IRF) standby assist  -EUGENE standby assist  -EUGENE    Time Frame (Transfer Goal 1, PT-IRF) 1 week  -EUGENE 1 week  -EUGENE    Progress/Outcomes (Transfer Goal 1, PT-IRF) goal not met  -EUGENE goal not met  -EUGENE    Row Name 01/02/23 1353 01/02/23 1011       Transfer Goal 2 (PT-IRF)    Activity/Assistive Device (Transfer Goal 2, PT-IRF) sit-to-stand/stand-to-sit;bed-to-chair/chair-to-bed;wheelchair transfer  -EUGENE sit-to-stand/stand-to-sit;bed-to-chair/chair-to-bed;wheelchair transfer  -EUGENE    [place] Level (Transfer Goal 2, PT-IRF) modified independence  -EUGENE modified independence  -EUGENE    Time Frame (Transfer Goal 2, PT-IRF) 2 weeks  -EUGENE 2 weeks  -EUGENE    Progress/Outcomes (Transfer Goal 2, PT-IRF) goal not met  -EUGENE goal not met  -EUGENE    Row Name 01/02/23 1353 01/02/23 1011       Gait/Walking Locomotion Goal 1 (PT-IRF)    Activity/Assistive  Device (Gait/Walking Locomotion Goal 1, PT-IRF) gait (walking locomotion);assistive device use;increase endurance/gait distance;decrease fall risk  -EUGENE gait (walking locomotion);assistive device use;increase endurance/gait distance;decrease fall risk  -EUGENE    Gait/Walking Locomotion Distance Goal 1 (PT-IRF) 50ft or more x2  -EUGENE 50ft or more x2  -EUGENE    [place] Level (Gait/Walking Locomotion Goal 1, PT-IRF) standby assist  -EUGENE standby assist  -EUGENE    Time Frame (Gait/Walking Locomotion Goal 1, PT-IRF) 1 week  -EUGENE 1 week  -EUGENE    Progress/Outcomes (Gait/Walking Locomotion Goal 1, PT-IRF) goal not met  -EUGENE goal not met  -EUGENE    Row Name 01/02/23 1353 01/02/23 1011       Gait/Walking Locomotion Goal 2 (PT-IRF)    Activity/Assistive Device (Gait/Walking Locomotion Goal 2, PT-IRF) gait (walking locomotion);assistive device use;decrease fall risk;increase endurance/gait distance  -EUGENE gait (walking locomotion);assistive device use;decrease fall risk;increase endurance/gait distance  -EUGENE    Gait/Walking Locomotion Distance Goal 2 (PT-IRF) 150ft or more  -EUGENE 150ft or more  -EUGENE    [place] Level (Gait/Walking Locomotion Goal 2, PT-IRF) modified independence  -EUGENE modified independence  -EUGENE    Time Frame (Gait/Walking Locomotion Goal 2, PT-IRF) 2 weeks  -EUGENE 2 weeks  -EUGENE    Progress/Outcomes (Gait/Walking Locomotion Goal 2, PT-IRF) goal not met  -EUGENE goal not met  -EUGENE    Row Name 01/02/23 1353 01/02/23 1011       Wheelchair Locomotion Goal (PT-IRF)    Wheelchair Locomotion Goal (PT-IRF) Pt will propel w/c 150ft with 2 turns with supervision  -EUGENE Pt will propel w/c 150ft with 2 turns with supervision  -EUGENE    Time Frame (Wheelchair Locomotion Goal, PT-IRF) by discharge  -EUGENE by discharge  -EUGENE    Progress/Outcomes (Wheelchair Locomotion Goal, PT-IRF) goal not met  -EUGENE goal not met  -EUGENE    Row Name 01/02/23 1353 01/02/23 1011       Stairs Goal 1 (PT-IRF)    Activity/Assistive Device (Stairs Goal 1, PT-IRF) ascending  stairs;descending stairs;using handrail, left;using handrail, right;decrease fall risk  -EUGENE ascending stairs;descending stairs;using handrail, left;using handrail, right;decrease fall risk  -EUGENE    Number of Stairs (Stairs Goal 1, PT-IRF) 3  -EUGENE 3  -EUGENE    [place] Level (Stairs Goal 1, PT-IRF) standby assist;modified independence  -EUGENE standby assist;modified independence  -EUGENE    Time Frame (Stairs Goal 1, PT-IRF) 2 weeks  -EUGENE 2 weeks  -EUGENE    Progress/Outcomes (Stairs Goal 1, PT-IRF) goal not met  -EUGENE goal not met  -EUGENE    Row Name 01/02/23 1353 01/02/23 1011       Strength Goal (PT-IRF)    Strength Goal 3 (PT-IRF) Pt will tolerate LE exercises OOB in chair and progress to standing exercises with VSS  -EUGENE Pt will tolerate LE exercises OOB in chair and progress to standing exercises with VSS  -EUGENE    Time Frame (Strength Goal 3, PT-IRF) by discharge  -EUGENE by discharge  -EUGENE    Progress/Outcomes (Strength Goal 3, PT-IRF) goal not met  -EUGENE goal not met  -EUGENE    Row Name 01/02/23 1353 01/02/23 1011       Balance Goal 1 (PT)    Activity/Assistive Device (Balance Goal 1, PT) assistive device use;sitting, static;sitting, dynamic;standing, static;standing, dynamic  -EUGENE assistive device use;sitting, static;sitting, dynamic;standing, static;standing, dynamic  -EUGENE    [place] Level/Cues Needed (Balance Goal 1, PT) conditional independence  -EUGENE conditional independence  -EUGENE    Time Frame (Balance Goal 1, PT) by discharge  -EUGENE by discharge  -EUGENE    Progress/Outcomes (Balance Goal 1, PT) goal not met  -EUGENE goal not met  -EUGENE    Row Name 01/02/23 1353 01/02/23 1011       Caregiver Training Goal 1 (PT-IRF)    Caregiver Training Goal 1 (PT-IRF) Caregiver with acknowledge/demonstrate understanding of home care instructions  -EUGENE Caregiver with acknowledge/demonstrate understanding of home care instructions  -EUGENE    Time Frame (Caregiver Training Goal 1, PT-IRF) by discharge  -EUGENE by discharge  -EUGENE    Progress/Outcomes (Caregiver Training  Goal 1, PT-IRF) goal not met  - goal not met  -          User Key  (r) = Recorded By, (t) = Taken By, (c) = Cosigned By    Initials Name Provider Type     Yossi Haynes PTA Physical Therapist Assistant                 Physical Therapy Education     Title: PT OT SLP Therapies (In Progress)     Topic: Physical Therapy (In Progress)     Point: Mobility training (In Progress)     Learning Progress Summary           Patient Acceptance, E, NR by  at 1/2/2023 1219    Acceptance, E, NR by [facility] at 12/31/2022 1345                   Point: Home exercise program (Not Started)     Learner Progress:  Not documented in this visit.          Point: Body mechanics (In Progress)     Learning Progress Summary           Patient Acceptance, E, NR by [facility] at 12/31/2022 1345                   Point: Precautions (In Progress)     Learning Progress Summary           Patient Acceptance, E, NR by [facility] at 12/31/2022 1345                               User Key     Initials Effective Dates Name Provider Type Discipline    [facility] 06/16/21 -  Nydia Muñoz, PT Physical Therapist PT     06/16/21 -  Yossi Haynes PTA Physical Therapist Assistant PT                PT Recommendation and Plan    Frequency of Treatment (PT):  (5-6 days/wk)  Plan of Care Reviewed With: patient  Progress: improving  Outcome Evaluation: pt is drowsy and requires much encouragement, extened time, and constant cueing for participation. pt requires CGA for sit-stand-sit and t/fs. pt participated in gait training, 50 ft both AM and PM. pt with FF posture and cueing for walker proximity. pt has festinating gait with albin flexed knees/hips that worsen with fatigue. pt participated in LE ther ex with much encouragment and cueing. no new goals met at this time. pt would continue to benefit from PT services.       Outcome Measures     Row Name 01/02/23 1011 12/31/22 1100 12/31/22 1033       9 Hole Peg    9-Hole Peg Left -- -- 1.02.91  -CM    9-Hole Peg Right -- -- 38.61   -CM       How much help from another person do you currently need...    Turning from your back to your side while in flat bed without using bedrails? 3  -EUGENE -- --    Moving from lying on back to sitting on the side of a flat bed without bedrails? 3  -EUGENE -- --    Moving to and from a bed to a chair (including a wheelchair)? 3  -EUGENE -- --    Standing up from a chair using your arms (e.g., wheelchair, bedside chair)? 3  -EUGENE -- --    Climbing 3-5 steps with a railing? 2  -EUGENE -- --    To walk in hospital room? 3  -EUGENE -- --    AM-PAC 6 Clicks Score (PT) 17  -EUGENE -- --       How much help from another is currently needed...    Putting on and taking off regular lower body clothing? -- -- 2  -CM    Bathing (including washing, rinsing, and drying) -- -- 2  -CM    Toileting (which includes using toilet bed pan or urinal) -- -- 3  -CM    Putting on and taking off regular upper body clothing -- -- 3  -CM    Taking care of personal grooming (such as brushing teeth) -- -- 4  -CM    Eating meals -- -- 4  -CM    AM-PAC 6 Clicks Score (OT) -- -- 18  -CM       Modified Dk Scale    Pre-Stroke Modified Dk Scale -- -- 0 - No Symptoms at all.  -CM    Modified [place] Scale -- -- 0 - No Symptoms at all.  -CM       Functional Assessment    Outcome Measure Options AM-PAC 6 Clicks Basic Mobility (PT)  -EUGENE AM-PAC 6 Clicks Daily Activity (OT);Modified [place];9 Hole Peg  - --    Row Name 12/31/22 5112             How much help from another person do you currently need...    Turning from your back to your side while in flat bed without using bedrails? 3  -JCA      Moving from lying on back to sitting on the side of a flat bed without bedrails? 3  -JCA      Moving to and from a bed to a chair (including a wheelchair)? 3  -JCA      Standing up from a chair using your arms (e.g., wheelchair, bedside chair)? 3  -JCA      Climbing 3-5 steps with a railing? 2  -JCA      To walk in hospital room? 3  -JCA      AM-PAC 6 Clicks Score (PT) 17  -JCA          Functional Assessment    Outcome Measure Options AM-PAC 6 Clicks Basic Mobility (PT)  -JCA            User Key  (r) = Recorded By, (t) = Taken By, (c) = Cosigned By    Initials Name Provider Type    Nydia Mcgrath, PT Physical Therapist    Yossi Ragland PTA Physical Therapist Assistant    Ghazala Lemus, OT Occupational Therapist                     Time Calculation:      PT Charges     Row Name 01/02/23 1434 01/02/23 1220          Time Calculation    Start Time 1353  -EUGENE 1011  -EUGENE     Stop Time 1425  -EUGENE 1110  -EUGENE     Time Calculation (min) 32 min  -EUGENE 59 min  -EUGENE        Time Calculation- PT    Total Timed Code Minutes- PT 32 minute(s)  -EUGENE 59 minute(s)  -EUGENE        Timed Charges    50436 - PT Therapeutic Exercise Minutes -- 30  -EUGENE     89066 - Gait Training Minutes  15  -EUGENE 15  -EUGENE     18240 - PT Therapeutic Activity Minutes 17  -EUGENE --     51536 - PT Self Care/Mgmt Minutes -- 4  -EUGENE     35484 - Wheelchair Management Training -- 10  -EUGENE        Total Minutes    Timed Charges Total Minutes 32  -EUGENE 59  -EUGENE      Total Minutes 32  -EUGENE 59  -EUGENE           User Key  (r) = Recorded By, (t) = Taken By, (c) = Cosigned By    Initials Name Provider Type    Yossi Ragland PTA Physical Therapist Assistant                Therapy Charges for Today     Code Description Service Date Service Provider Modifiers Qty    89556932964 HC PT THER PROC EA 15 MIN 1/2/2023 Yossi Haynes PTA GP 2    16927269101 HC GAIT TRAINING EA 15 MIN 1/2/2023 Yossi Haynes, PTA GP 1    61894334053 HC PT WHEELCHAIR MGMT EA 15 MIN 1/2/2023 Yossi Haynes PTA GP 1    40547388130 HC GAIT TRAINING EA 15 MIN 1/2/2023 Yossi Haynes, PTA GP 1    00205417591 HC PT THERAPEUTIC ACT EA 15 MIN 1/2/2023 Yossi Haynes, PTA GP 1            PT G-Codes  Outcome Measure Options: AM-PAC 6 Clicks Basic Mobility (PT)  AM-PAC 6 Clicks Score (PT): 17  AM-PAC 6 Clicks Score (OT): 18  Modified [place] Scale: 0 - No Symptoms at all.      Yossi PORTILLO  [place], PTA  1/2/2023

## 2023-01-02 NOTE — THERAPY TREATMENT NOTE
Inpatient Rehabilitation - Occupational Therapy Treatment Note    [facility]     Patient Name: Brian Garcia  : 1950  MRN: [id number]    Today's Date: 2023                 Admit Date: 2022         ICD-10-CM ICD-9-CM   1. Dysphagia, unspecified type  R13.10 787.20   2. Symbolic dysfunction  R48.9 784.60   3. Impaired mobility and ADLs  Z74.09 V49.89    Z78.9    4. Impaired functional mobility, balance, gait, and endurance  Z74.09 V49.89       Patient Active Problem List   Diagnosis   • Pneumonia of both lower lobes due to infectious organism   • Physical deconditioning   • Pulmonary nodules   • Leukocytosis   • History of pulmonary embolism   • Stage 4 very severe COPD by GOLD classification ([facility])   • Chronic respiratory failure with hypoxia, on home O2 therapy ([facility])   • Nontraumatic multiple localized intracerebral hemorrhages ([facility])   • Hypertension   • CVA (cerebral vascular accident) ([facility])       Past Medical History:   Diagnosis Date   • COPD (chronic obstructive pulmonary disease) ([facility])    • Hypertension        Past Surgical History:   Procedure Laterality Date   • HIP ARTHROPLASTY               IRF OT ASSESSMENT FLOWSHEET (last 12 hours)     IRF OT Evaluation and Treatment     Row Name 23 1257 23 0925       OT Time and Intention    Document Type daily treatment  -LM daily treatment  -LM    Mode of Treatment individual therapy;occupational therapy  -LM individual therapy;occupational therapy  -LM    Patient Effort good  -LM good  -LM    Row Name 23 1257 23 0925       General Information    Existing Precautions/Restrictions fall  -LM fall  -LM    Limitations/Impairments hearing  -LM hearing  -LM    Row Name 23 1257 23 0925       Pain Assessment    Pretreatment Pain Rating 0/10 - no pain  -LM 0/10 - no pain  -LM    Posttreatment Pain Rating 0/10 - no pain  -LM 0/10 - no pain  -LM    Row Name 23 1257 23 0925        Cognition/Psychosocial    Orientation Status (Cognition) oriented x 4  -LM oriented x 4  -LM    Follows Commands (Cognition) WFL  -LM WFL  -LM    Row Name 01/02/23 0925          Basic Activities of Daily Living (BADLs)    Basic Activities of Daily Living grooming  -LM     Row Name 01/02/23 1257 01/02/23 0925       Grooming    Brush Level (Grooming) --  SBA  -LM oral care regimen;wash face, hands;hair care, combing/brushing;standby assist  SBA  -LM    Position (Grooming) -- sitting up in bed  -LM    Row Name 01/02/23 0925          Bed Mobility    Bed Mobility bed mobility (all) activities;supine-sit  -LM     All Activities, Brush (Bed Mobility) minimum assist (75% patient effort);moderate assist (50% patient effort)  -LM     Row Name 01/02/23 1257 01/02/23 0925       Transfer Assessment/Treatment    Transfers bed-chair transfer;sit-stand transfer;stand-sit transfer  -LM bed-chair transfer;sit-stand transfer;stand-sit transfer  -LM    Row Name 01/02/23 1257 01/02/23 0925       Bed-Chair Transfer    Bed-Chair Brush (Transfers) minimum assist (75% patient effort)  -LM minimum assist (75% patient effort)  -LM    Row Name 01/02/23 0925          Sit-Stand Transfer    Sit-Stand Brush (Transfers) contact guard  -LM     Row Name 01/02/23 0925          Stand-Sit Transfer    Stand-Sit Brush (Transfers) contact guard  -LM     Row Name 01/02/23 1257 01/02/23 0925       Wheelchair Mobility/Management    Comment, Wheelchair Mobility mod a  pt perf with some effort to push wheelchair to gym  -LM max a with many vcs and encouragement to self propel  -LM    Row Name 01/02/23 1257 01/02/23 0925       Motor Skills    Motor Skills coordination  large ball bounce act  pt demo good coordination  perf for coordination and endurance attention act  -LM coordination  act at table top with problem solving task completed with good perf and no vcs or modifications  -LM    Coordination WFL;WNL  -LM --     Therapeutic Exercise -- aerobic  -LM    Row Name 01/02/23 1257 01/02/23 0925       Aerobic Exercise    Type (Aerobic Exercise) arm bike  -LM arm bike  -LM    Time Performed (Aerobic Exercise) attempted 10 min again on bike although pt perf 2 min at time and stopping act and beginning again total of 15 min with breaks  -LM 10  encouragment to cont task and complete  pt stopping task and fall asleep periodically requiring vcs  -LM    Row Name 01/02/23 1257          Balance    Balance Assessment sitting static balance;sitting dynamic balance;standing static balance;sit to stand dynamic balance;standing dynamic balance  -LM     Row Name 01/02/23 1257 01/02/23 0925       Positioning and Restraints    Pre-Treatment Position in bed  -LM in bed  -LM    Post Treatment Position wheelchair  -LM wheelchair  -LM    Row Name 01/02/23 1257 01/02/23 0925       Bathing Goal 1 (OT-IRF)    Activity/Device (Bathing Goal 1, OT-IRF) lower body bathing  -LM lower body bathing  -LM    Webb Level (Bathing Goal 1, OT-IRF) modified independence  -LM modified independence  -LM    Time Frame (Bathing Goal 1, OT-IRF) by discharge;long-term goal (LTG)  -LM by discharge;long-term goal (LTG)  -LM    Progress/Outcomes (Bathing Goal 1, OT-IRF) goal not met  -LM goal not met  -LM    Row Name 01/02/23 1257 01/02/23 0925       LB Dressing Goal 1 (OT-IRF)    Activity/Device (LB Dressing Goal 1, OT-IRF) lower body dressing  -LM lower body dressing  -LM    Webb (LB Dressing Goal 1, OT-IRF) modified independence  -LM modified independence  -LM    Time Frame (LB Dressing Goal 1, OT-IRF) long-term goal (LTG);by discharge  -LM long-term goal (LTG);by discharge  -LM    Progress/Outcomes (LB Dressing Goal 1, OT-IRF) goal not met  -LM goal not met  -LM    Row Name 01/02/23 1257 01/02/23 0925       Strength Goal 1 (OT-IRF)    Strength Goal 1 (OT-IRF) Pt will increase BUE strength in all planes by one level for increased strength and endurance  required for ADL routine.  -LM Pt will increase BUE strength in all planes by one level for increased strength and endurance required for ADL routine.  -LM    Time Frame (Strength Goal 1, OT-IRF) long-term goal (LTG);by discharge  -LM long-term goal (LTG);by discharge  -LM    Progress/Outcomes (Strength Goal 1, OT-IRF) goal not met  -LM goal not met  -LM    Row Name 01/02/23 1257 01/02/23 0925        Endurance Goal 1 (OT)    Activity Level (Endurance Goal 1, OT) endurance 2 good  -LM endurance 2 good  -LM    Progress/Outcome (Endurance Goal 1, OT) goal not met  -LM goal not met  -LM          User Key  (r) = Recorded By, (t) = Taken By, (c) = Cosigned By    Initials Name Effective Dates    LM Holly Sewell, ALEXANDER 06/16/21 -                  Occupational Therapy Education     Title: PT OT SLP Therapies (In Progress)     Topic: Occupational Therapy (In Progress)     Point: ADL training (Done)     Description:   Instruct learner(s) on proper safety adaptation and remediation techniques during self care or transfers.   Instruct in proper use of assistive devices.              Learning Progress Summary           Patient Acceptance, E,TB, VU by BB at 12/31/2022 1522    Acceptance, E,TB, VU by CM at 12/31/2022 1211    Comment: OT POC, Role of OT, safe ADL mobility and t/f                   Point: Home exercise program (Not Started)     Description:   Instruct learner(s) on appropriate technique for monitoring, assisting and/or progressing therapeutic exercises/activities.              Learner Progress:  Not documented in this visit.          Point: Precautions (Not Started)     Description:   Instruct learner(s) on prescribed precautions during self-care and functional transfers.              Learner Progress:  Not documented in this visit.          Point: Body mechanics (Not Started)     Description:   Instruct learner(s) on proper positioning and spine alignment during self-care, functional mobility activities and/or  exercises.              Learner Progress:  Not documented in this visit.                      User Key     Initials Effective Dates Name Provider Type Discipline    BB 06/16/21 -  Lisa José COTA Occupational Therapist Assistant OT    CM 11/18/22 -  Ghazala Alfonso OT Occupational Therapist OT                    OT Recommendation and Plan         Plan of Care Review  Plan of Care Reviewed With: patient  Progress: improving  Outcome Evaluation: pt perf fair with OT  pt very drowsy behavior falling asleep periodically during act.pt very difficult to get to remain on task   pt required min a for transfers although dificulty staying on task or comprehending task of wheelchair mobility.  Pt perf ther ex b ue bike with multi vcs problem solving act at table good perf and large ball bounce task with good perf  pt 02 thsi date 92-99  %  Plan of Care Reviewed With: patient  Progress: improving       Outcome Measures     Row Name 01/02/23 1011 12/31/22 1100 12/31/22 1033       9 Hole Peg    9-Hole Peg Left -- -- 1.02.91  -CM    9-Hole Peg Right -- -- 38.61  -CM       How much help from another person do you currently need...    Turning from your back to your side while in flat bed without using bedrails? 3  -EUGENE -- --    Moving from lying on back to sitting on the side of a flat bed without bedrails? 3  -EUGENE -- --    Moving to and from a bed to a chair (including a wheelchair)? 3  -EUGENE -- --    Standing up from a chair using your arms (e.g., wheelchair, bedside chair)? 3  -EUGENE -- --    Climbing 3-5 steps with a railing? 2  -EUGENE -- --    To walk in hospital room? 3  -EUGENE -- --    AM-PAC 6 Clicks Score (PT) 17  -EUGENE -- --       How much help from another is currently needed...    Putting on and taking off regular lower body clothing? -- -- 2  -CM    Bathing (including washing, rinsing, and drying) -- -- 2  -CM    Toileting (which includes using toilet bed pan or urinal) -- -- 3  -CM    Putting on and taking off regular upper  body clothing -- -- 3  -CM    Taking care of personal grooming (such as brushing teeth) -- -- 4  -CM    Eating meals -- -- 4  -CM    AM-PAC 6 Clicks Score (OT) -- -- 18  -CM       Modified Dk Scale    Pre-Stroke Modified Rotan Scale -- -- 0 - No Symptoms at all.  -CM    Modified Rotan Scale -- -- 0 - No Symptoms at all.  -CM       Functional Assessment    Outcome Measure Options AM-PAC 6 Clicks Basic Mobility (PT)  - AM-PAC 6 Clicks Daily Activity (OT);Modified Rotan;9 Hole Peg  -CM --    Row Name 12/31/22 0930             How much help from another person do you currently need...    Turning from your back to your side while in flat bed without using bedrails? 3  -JCA      Moving from lying on back to sitting on the side of a flat bed without bedrails? 3  -JCA      Moving to and from a bed to a chair (including a wheelchair)? 3  -JCA      Standing up from a chair using your arms (e.g., wheelchair, bedside chair)? 3  -JCA      Climbing 3-5 steps with a railing? 2  -JCA      To walk in hospital room? 3  -JCA      AM-PAC 6 Clicks Score (PT) 17  -JCA         Functional Assessment    Outcome Measure Options AM-PAC 6 Clicks Basic Mobility (PT)  -[facility]            User Key  (r) = Recorded By, (t) = Taken By, (c) = Cosigned By    Initials Name Provider Type    JCA Nydia Muñoz, PT Physical Therapist    EUGENE Yossi Haynes, PTA Physical Therapist Assistant    Ghazala Lemus, OT Occupational Therapist                  Time Calculation:      Time Calculation- OT     Row Name 01/02/23 1634 01/02/23 1626 01/02/23 1434       Time Calculation- OT    OT Start Time 1258  -LM 0925  -LM --    OT Stop Time 1345  -LM 1010  -LM --    OT Time Calculation (min) 47 min  -LM 45 min  -LM --    Total Timed Code Minutes- OT 47 minute(s)  -LM 45 minute(s)  -LM --    OT Received On 01/02/23  -LM 01/02/23  -LM --       Timed Charges    77887 - OT Therapeutic Exercise Minutes 15  -LM 10  -LM --    20000 - Gait Training Minutes  -- -- 15   -    46228 - OT Therapeutic Activity Minutes 32  -LM 35  -LM --       Total Minutes    Timed Charges Total Minutes 47  -LM 45  -LM 15  -EUGENE     Total Minutes 47  -LM 45  -LM 15  -EUGENE    Row Name 01/02/23 1220             Timed Charges    42889 - Gait Training Minutes  15  -EUGENE         Total Minutes    Timed Charges Total Minutes 15  -EUGENE       Total Minutes 15  -EUGENE            User Key  (r) = Recorded By, (t) = Taken By, (c) = Cosigned By    Initials Name Provider Type    Yossi Ragland, REGINE Physical Therapist Assistant    Holly Broussard COTA Occupational Therapist Assistant              Therapy Charges for Today     Code Description Service Date Service Provider Modifiers Qty    02976463426 HC OT THERAPEUTIC ACT EA 15 MIN 1/2/2023 Holly Sewell, ALEXANDER GO 2    37835835865 HC OT THER PROC EA 15 MIN 1/2/2023 Holly Sewell, ALEXANDER GO 1    29132086582 HC OT THERAPEUTIC ACT EA 15 MIN 1/2/2023 Holly Sewell COTA GO 2    09384866112 HC OT THER PROC EA 15 MIN 1/2/2023 Holly Sewell, MUNOZ GO 1                   ALEXANDER Fragoso  1/2/2023

## 2023-01-02 NOTE — PLAN OF CARE
Goal Outcome Evaluation:  Plan of Care Reviewed With: patient        Progress: improving  Outcome Evaluation: pt perf fair with OT  pt very drowsy behavior falling asleep periodically during act.pt very difficult to get to remain on task   pt required min a for transfers although dificulty staying on task or comprehending task of wheelchair mobility.  Pt perf ther ex b ue bike with multi vcs problem solving act at table good perf and large ball bounce task with good perf  pt 02 thsi date 92-99  %

## 2023-01-02 NOTE — PROGRESS NOTES
Progress Note      Admit date: 12/30/2022 12:28 PM       Treatment Team     Provider Relationship Specialty Contact    Fan Rob MD Attending Rehabilitation   [phone]      Yossi Haynes, REGINE Physical Therapy Assistant Physical Therapy --    Latesha Ram MS CCC-SLP Speech Language Pathologist Speech Pathology   [phone]      Holly Sewell COTA Occupational Therapy Assistant Occupational Therapy --    Mami Jerry LPN Licensed Practical Nurse --   [phone]      Lizzie Izaguirre, RT Student Respiratory Therapy Student Respiratory Therapy   [phone]      Tarsha Hernandez RN Registered Nurse Behavioral Health --             Chief Complaint:  Nontraumatic multiple localized intracerebral hemorrhages (HCC)    HPI: No changes since admission    Vital Signs  Temp:  [98.2 °F (36.8 °C)-99.3 °F (37.4 °C)] 98.2 °F (36.8 °C)  Heart Rate:  [] 111  Resp:  [18-20] 18  BP: (139-143)/(69-76) 143/69    Physical Exam 1:    Constitutional: Patient is alert stable oriented to time place and person     HENT:      Eyes:     Neck:      Cardiovascular: No chest pain    Pulmonary: If occultly breathing still on O2    Abdominal:      Genitourinary/Anorectal:       Musculoskeletal: No focal motor weakness    Skin:     Neurological: Complains of a slight little headache on the right side right behind the right eye area just like he says little shocks no other significant problem.  Been cooperative slow to respond but responds appropriately    Psychiatric/Behavioral: Cooperative physical therapy       Results Review:    I reviewed the patient's new clinical results.    Nursing notes and therapy notes have been reviewed.    I have reviewed medications.    Assessment/Plan  Principal Problem:    Nontraumatic multiple localized intracerebral hemorrhages (HCC) apparently this is middle cerebral artery area stable active Problems:    Pneumonia of both lower lobes due to infectious organism new chest  x-ray basically demonstrates opacity I will see any dramatic change from previous x-rays I think I will try to see that of Dr. Sparks and see him tomorrow some comment of the possibility of cancer what this recommends what he needs to have done for evaluation      Overview: - Discussed with Radiology (Dr. Davey), who reports that the patient has a       superior segment small patchy opacity in the left lung base likely       pneumonia, and a small patchy opacity in the right lung base, likely       pneumonia. He recommended continuing with antibiotics and then doing       follow up imaging in 3 months.      - Blood cultures no growth to date.      - Levaquin day 4. Transitioned to PO today.      - Legionella and strep pneumo neg. Mycoplasma pending.    Physical deconditioning      Overview: - PT/OT Consulted. Recommendations: home with home health.  Stable improving with physical therapy      - Patient may benefit from an assisted living.  To be evaluated    Stage 4 very severe COPD by GOLD classification ([facility]) no change    Hypokalemia resolved      Overview: - Supplement daily.    Hypertension    CVA (cerebral vascular accident) ([facility])           Fan Rob MD  01/02/23  09:06 CST          This document has been electronically signed by Fan Rob MD on January 2, 2023 09:06 CST      Part of this note may be an electronic transcription/translation of spoken language to printed text using the Dragon Dictation System.

## 2023-01-02 NOTE — PLAN OF CARE
Goal Outcome Evaluation:  Plan of Care Reviewed With: patient        Progress: no change  Outcome Evaluation: Pt alert and oriented to person place and situation.  Pt does have response lag and each time spoken to requests for signee to repeat.  Pt was cooperative and did assist with transfer from chair to bed with gait belt and assist of one.  Pt. did repeatedly ask where his oxygen concentrator was during night for approximately 20 minutes but did go back to sleep when explained oxygen in room.  Pt continues on 4L/min oxygen per nasal canula.  Pt. was compliant with medications and did sleep approximately 7 hours this shift.  Encouraged patient to used incentive spirometer.  Pt. has been continent throughout night.

## 2023-01-02 NOTE — THERAPY TREATMENT NOTE
"Inpatient Rehabilitation - Speech Language Pathology Treatment Note  [facility]     Patient Name: Brian Garcia  : 1950  MRN: [id number]  Today's Date: 2023               Admit Date: 2022     Pt seen for skilled ST services this date to address cognitive-lingusitic deficits.  Upon entry, pt was attempting to fill out his menu for the next three meals and was having notable dificulty despites attempts from dietary staff to assist.  Pt able to acknowledge his need for extended time, but demonstrated poor inhibition when help was offered and when he needed to ask for help.  Pt did participate well in all therapy tasks and met targets for 2/3 goals when given extended time and repetition of information/questions.  Continue POC.    Goals:  1.  Pt will answer orientation questions w/90% acc:  Pt was 90% acc w/orientation questions, only missing the year in which he stated it was , then he said, \"no, it's .\"    2.  Pt will complete deductive reasoning tasks w/90% acc:  Pt was 100% acc w/simple deductive reasoning questions involving math; however, had difficulty deducing and required step-by-step from SLP in order to state where his children lived.  Pt did require additional time for each question.    3.  Pt will answer questions about ADL problems w/90% acc:  Pt was 60% acc w/simple problem solving scenarios and required increased time for responses, as well as, repetition of questions and information.       Visit Dx:    ICD-10-CM ICD-9-CM   1. Dysphagia, unspecified type  R13.10 787.20   2. Symbolic dysfunction  R48.9 784.60   3. Impaired mobility and ADLs  Z74.09 V49.89    Z78.9    4. Impaired functional mobility, balance, gait, and endurance  Z74.09 V49.89     Patient Active Problem List   Diagnosis   • Pneumonia of both lower lobes due to infectious organism   • Physical deconditioning   • Pulmonary nodules   • Leukocytosis   • History of pulmonary embolism   • Stage 4 very " severe COPD by GOLD classification ([facility])   • Chronic respiratory failure with hypoxia, on home O2 therapy ([facility])   • Nontraumatic multiple localized intracerebral hemorrhages ([facility])   • Hypertension   • CVA (cerebral vascular accident) ([facility])     Past Medical History:   Diagnosis Date   • COPD (chronic obstructive pulmonary disease) (HCC)    • Hypertension      Past Surgical History:   Procedure Laterality Date   • HIP ARTHROPLASTY         SLP Recommendation and Plan  SLP Diagnosis: cognitive-linguistic disorder (Noted confusion however scored 15/15 on BIMs and 30/30 SLUMS) (01/02/23 0830)           SLC Criteria for Skilled Therapy Interventions Met: yes (01/02/23 0830)                 Daily Summary of Progress (SLP): progress toward functional goals is good (01/02/23 0830)           Treatment Assessment (SLP): continued, cognitive-linguistic disorder (01/02/23 0830)  Treatment Assessment Comments (SLP): Pt seen for skilled ST services this date to address cognitive-lingusitic deficits.  Upon entry, pt was attempting to fill out his menu for the next three meals and was having notable dificulty despites attempts from dietary staff to assist.  Pt able to acknowledge his need for extended time, but demonstrated poor inhibition when help was offered and when he needed to ask for help.  Pt did participate well in all therapy tasks and met targets for 2/3 goals when given extended time and repetition of information/questions.  Continue POC. (01/02/23 0830)  Plan for Continued Treatment (SLP): continue treatment per plan of care (01/02/23 0830)  Plan of Care Reviewed With: patient (01/02/23 1026)  Progress: improving (01/02/23 1026)  Outcome Evaluation: Pt seen for skilled ST services this date to address cognitive-lingusitic deficits.  Upon entry, pt was attempting to fill out his menu for the next three meals and was having notable dificulty despites attempts from dietary staff to assist.  Pt able to acknowledge his need  for extended time, but demonstrated poor inhibition when help was offered and when he needed to ask for help.  Pt did participate well in all therapy tasks and met targets for 2/3 goals when given extended time and repetition of information/questions.  Continue POC. (01/02/23 1026)      SLP EVALUATION (last 72 hours)     SLP SLC Evaluation     Row Name 01/02/23 0830 12/31/22 0831                Communication Assessment/Intervention    Document Type therapy note (daily note)  -CK evaluation  -EK       Subjective Information no complaints  -CK no complaints  -EK       Patient Observations alert;cooperative;agree to therapy  -CK alert;cooperative;agree to therapy  -EK       Patient/Family/Caregiver Comments/Observations No family present at bedside  -CK --       Patient Effort good  -CK good  -EK          General Information    Patient Profile Reviewed yes  -CK yes  -EK       Precautions/Limitations, Vision corrective lenses needed for reading  -CK corrective lenses needed for reading  -EK       Precautions/Limitations, Hearing hearing impairment, bilaterally  -CK hearing impairment, bilaterally  -EK       Plans/Goals Discussed with patient  -CK patient  -EK          Pain    Additional Documentation -- Pain Scale: Numbers Pre/Post-Treatment (Group)  -EK          Pain Scale: Numbers Pre/Post-Treatment    Pretreatment Pain Rating 0/10 - no pain  -CK 4/10  -EK       Posttreatment Pain Rating 0/10 - no pain  -CK 4/10  -EK       Pain Location - -- back  -EK          Comprehension Assessment/Intervention    Comprehension Assessment/Intervention -- Auditory Comprehension  -EK          Auditory Comprehension Assessment/Intervention    Auditory Comprehension (Communication) WFL  -CK WFL  -EK       Able to Identify Objects/Pictures (Communication) WFL  -CK WFL  -EK       Answers Questions (Communication) WFL  -CK WFL  -EK       Able to Follow Commands (Communication) WFL  -CK WFL  -EK          Expression Assessment/Intervention     Expression Assessment/Intervention -- verbal expression  -EK          Oral Motor Structure and Function    Oral Motor Structure and Function WFL  -CK WFL  -EK       Dentition Assessment natural, present and adequate  -CK --          Oral Musculature and Cranial Nerve Assessment    Oral Motor General Assessment WFL  -CK WFL  -EK          Cognitive Assessment Intervention- SLP    Orientation Status (Cognition) mild impairment  -CK mild impairment  -EK       Memory (Cognitive) mild impairment  -CK mild impairment  -EK       Thought Organization (Cognitive) WFL  -CK WFL  -EK       Reasoning (Cognitive) WFL  -CK WFL  -EK       Problem Solving (Cognitive) WFL  -CK WFL  -EK       Functional Math (Cognitive) WFL  -CK WFL  -EK       Executive Function (Cognition) WFL  -CK WFL  -EK       Pragmatics (Communication) WFL  -CK WFL  -EK          SLUMS: [facility] Mental Status Examination    SLUMS Score -- 30  -EK       SLUMS Range -- 27-30: Normal (High school education or higher)  -EK          SLP Evaluation Clinical Impressions    SLP Diagnosis cognitive-linguistic disorder  Noted confusion however scored 15/15 on BIMs and 30/30 SLUMS  -CK cognitive-linguistic disorder  Noted confusion however scored 15/15 on BIMs and 30/30 SLUMS  -EK       Rehab Potential/Prognosis good  -CK good  -EK       SLC Criteria for Skilled Therapy Interventions Met yes  -CK yes  -EK       Functional Impact Poor Judgement  -CK Poor Judgement  -EK          SLP Treatment Clinical Impressions    Treatment Assessment (SLP) continued;cognitive-linguistic disorder  -CK cognitive-linguistic disorder  -EK       Treatment Assessment Comments (SLP) Pt seen for skilled ST services this date to address cognitive-lingusitic deficits.  Upon entry, pt was attempting to fill out his menu for the next three meals and was having notable dificulty despites attempts from dietary staff to assist.  Pt able to acknowledge his need for extended time, but  demonstrated poor inhibition when help was offered and when he needed to ask for help.  Pt did participate well in all therapy tasks and met targets for 2/3 goals when given extended time and repetition of information/questions.  Continue POC.  -CK Swallow evaluation and speech evaluation completed this date. Pt scored 14/15 on BIMS and 30/30 on SLUMS however pt  has been confused at times and RN reports some confusion. SLP will initiate plan of care for cognitive linguistic disorders to determine pt's safety and return to baseline. No s/s of aspiration noted with swallow evaluation. No tx for swallowing needed.  -EK       Daily Summary of Progress (SLP) progress toward functional goals is good  -CK progress toward functional goals is good  -EK       Plan for Continued Treatment (SLP) continue treatment per plan of care  -CK --       Care Plan Review evaluation/treatment results reviewed;care plan/treatment goals reviewed;risks/benefits reviewed;current/potential barriers reviewed;patient/other agree to care plan  -CK evaluation/treatment results reviewed  -EK          Recommendations    Therapy Frequency (SLP SLC) 5 days per week  5-6 week  -CK 5 days per week  5-6 week  -EK          Orientation Goal 1 (SLP)    Improve Orientation Through Goal 1 (SLP) demonstrating orientation to day;demonstrating orientation to month;demonstrating orientation to year;demonstrating orientation to place;90%;independently (over 90% accuracy)  -CK demonstrating orientation to day;demonstrating orientation to month;demonstrating orientation to year;demonstrating orientation to place;90%;independently (over 90% accuracy)  -EK       Time Frame (Orientation Goal 1, SLP) by discharge  -CK by discharge  -EK       Progress (Orientation Goal 1, SLP) 90%  -CK --       Progress/Outcomes (Orientation Goal 1, SLP) goal ongoing  -CK new goal  -EK          Reasoning Goal 1 (SLP)    Improve Reasoning Through Goal 1 (SLP) complete deductive reasoning  task;90%;independently (over 90% accuracy)  -CK complete deductive reasoning task;90%;independently (over 90% accuracy)  -EK       Time Frame (Reasoning Goal 1, SLP) by discharge  -CK by discharge  -EK       Progress (Reasoning Goal 1, SLP) 100%;with moderate cues (50-74%)  -CK --       Progress/Outcomes (Reasoning Goal 1, SLP) goal ongoing  -CK new goal  -EK          Functional Problem Solving Skills Goal 1 (SLP)    Improve Problem Solving Through Goal 1 (SLP) answer questions about ADL problems;90%;independently (over 90% accuracy)  -CK answer questions about ADL problems;90%;independently (over 90% accuracy)  -EK       Time Frame (Problem Solving Goal 1, SLP) by discharge  -CK by discharge  -EK       Progress (Problem Solving Goal 1, SLP) 60%  -CK --       Progress/Outcomes (Problem Solving Goal 1, SLP) goal ongoing  -CK new goal  -EK             User Key  (r) = Recorded By, (t) = Taken By, (c) = Cosigned By    Initials Name Effective Dates    EK Anita Ram, CCC-SLP 06/16/21 -     Latesha Andrews MS CCC-SLP 06/16/21 -                    EDUCATION  The patient has been educated in the following areas:     Cognitive Impairment Communication Impairment.           SLP GOALS     Row Name 01/02/23 0830 12/31/22 0831          Orientation Goal 1 (SLP)    Improve Orientation Through Goal 1 (SLP) demonstrating orientation to day;demonstrating orientation to month;demonstrating orientation to year;demonstrating orientation to place;90%;independently (over 90% accuracy)  -CK demonstrating orientation to day;demonstrating orientation to month;demonstrating orientation to year;demonstrating orientation to place;90%;independently (over 90% accuracy)  -EK     Time Frame (Orientation Goal 1, SLP) by discharge  -CK by discharge  -EK     Progress (Orientation Goal 1, SLP) 90%  -CK --     Progress/Outcomes (Orientation Goal 1, SLP) goal ongoing  -CK new goal  -EK        Reasoning Goal 1 (SLP)    Improve  Reasoning Through Goal 1 (SLP) complete deductive reasoning task;90%;independently (over 90% accuracy)  -CK complete deductive reasoning task;90%;independently (over 90% accuracy)  -EK     Time Frame (Reasoning Goal 1, SLP) by discharge  -CK by discharge  -EK     Progress (Reasoning Goal 1, SLP) 100%;with moderate cues (50-74%)  -CK --     Progress/Outcomes (Reasoning Goal 1, SLP) goal ongoing  -CK new goal  -EK        Functional Problem Solving Skills Goal 1 (SLP)    Improve Problem Solving Through Goal 1 (SLP) answer questions about ADL problems;90%;independently (over 90% accuracy)  -CK answer questions about ADL problems;90%;independently (over 90% accuracy)  -EK     Time Frame (Problem Solving Goal 1, SLP) by discharge  -CK by discharge  -EK     Progress (Problem Solving Goal 1, SLP) 60%  -CK --     Progress/Outcomes (Problem Solving Goal 1, SLP) goal ongoing  -CK new goal  -EK           User Key  (r) = Recorded By, (t) = Taken By, (c) = Cosigned By    Initials Name Provider Type    Anita Maya, CCC-SLP Speech and Language Pathologist    Latesha Andrews MS CCC-SLP Speech and Language Pathologist                        Time Calculation:      Time Calculation- SLP     Row Name 01/02/23 1021             Time Calculation- SLP    SLP Start Time 0830  -CK      SLP Stop Time 0923  -CK      SLP Time Calculation (min) 53 min  -CK      Total Timed Code Minutes- SLP 53 minute(s)  -CK      SLP Received On 01/02/23  -CK      SLP Goal Re-Cert Due Date 01/13/23  -CK         Untimed Charges    52044-UT Treatment/ST Modification Prosth Aug Alter  53  -CK         Total Minutes    Untimed Charges Total Minutes 53  -CK       Total Minutes 53  -CK            User Key  (r) = Recorded By, (t) = Taken By, (c) = Cosigned By    Initials Name Provider Type    Latesha Andrews MS CCC-SLP Speech and Language Pathologist                Therapy Charges for Today     Code Description Service Date Service  Provider Modifiers Qty    25517676919  ST TREATMENT SPEECH 4 1/2/2023 Latesha Ram, MS CCC-SLP GN 1                     Latesha Ram, MS IVEY-SLP  1/2/2023

## 2023-01-03 PROCEDURE — 97116 GAIT TRAINING THERAPY: CPT

## 2023-01-03 PROCEDURE — 94799 UNLISTED PULMONARY SVC/PX: CPT

## 2023-01-03 PROCEDURE — 97530 THERAPEUTIC ACTIVITIES: CPT

## 2023-01-03 PROCEDURE — 63710000001 PREDNISONE PER 5 MG: Performed by: ORTHOPAEDIC SURGERY

## 2023-01-03 PROCEDURE — 97110 THERAPEUTIC EXERCISES: CPT

## 2023-01-03 PROCEDURE — 99231 SBSQ HOSP IP/OBS SF/LOW 25: CPT | Performed by: ORTHOPAEDIC SURGERY

## 2023-01-03 PROCEDURE — 92507 TX SP LANG VOICE COMM INDIV: CPT | Performed by: SPEECH-LANGUAGE PATHOLOGIST

## 2023-01-03 PROCEDURE — 94664 DEMO&/EVAL PT USE INHALER: CPT

## 2023-01-03 PROCEDURE — 97542 WHEELCHAIR MNGMENT TRAINING: CPT

## 2023-01-03 PROCEDURE — 97535 SELF CARE MNGMENT TRAINING: CPT

## 2023-01-03 PROCEDURE — 94760 N-INVAS EAR/PLS OXIMETRY 1: CPT

## 2023-01-03 RX ADMIN — IPRATROPIUM BROMIDE 0.5 MG: 0.5 SOLUTION RESPIRATORY (INHALATION) at 19:59

## 2023-01-03 RX ADMIN — Medication 1000 UNITS: at 08:09

## 2023-01-03 RX ADMIN — IPRATROPIUM BROMIDE 0.5 MG: 0.5 SOLUTION RESPIRATORY (INHALATION) at 08:47

## 2023-01-03 RX ADMIN — DILTIAZEM HYDROCHLORIDE 180 MG: 180 CAPSULE, COATED, EXTENDED RELEASE ORAL at 08:09

## 2023-01-03 RX ADMIN — CEFUROXIME AXETIL 500 MG: 500 TABLET ORAL at 20:10

## 2023-01-03 RX ADMIN — MONTELUKAST SODIUM 10 MG: 10 TABLET, COATED ORAL at 20:10

## 2023-01-03 RX ADMIN — DOCUSATE SODIUM 100 MG: 100 CAPSULE, LIQUID FILLED ORAL at 20:10

## 2023-01-03 RX ADMIN — DOCUSATE SODIUM 100 MG: 100 CAPSULE, LIQUID FILLED ORAL at 08:09

## 2023-01-03 RX ADMIN — PREDNISONE 5 MG: 5 TABLET ORAL at 08:09

## 2023-01-03 RX ADMIN — CEFUROXIME AXETIL 500 MG: 500 TABLET ORAL at 08:09

## 2023-01-03 NOTE — PLAN OF CARE
Goal Outcome Evaluation:  Plan of Care Reviewed With: patient        Progress: no change  Outcome Evaluation: ST: pt with poor participation in tasks.  frequently needing repeititon likely d/t [place], but delayed processing cannot be ruled out. inconsistant responses noted during therapy tasks with egocentric questions

## 2023-01-03 NOTE — NURSING NOTE
PHQ 2-9 completed. Patient does not report feelings of social isolation.  [facility] Brochure and Data Collection Summary for Patients in Inpatient Rehab Facilities given to patient.

## 2023-01-03 NOTE — PLAN OF CARE
Goal Outcome Evaluation:  Plan of Care Reviewed With: patient        Progress: improving  Outcome Evaluation: pt perf bed mobiliity with mod a due to lack of cooperation and participation  pt requireds mod a for wheelchair mob due to lack of participation and coopertion,  pt perf tf eob to wheelchair min a  perf ue ex b ue 10 with many rb on ue bike and ther ex b ue with 2 lb wt and level 1 tband sitting inchair  pt frequently falling asleep or abdruptly stopping act with many cues to continue./  pt will need 24\/7 sup assist at dc with HH OT at this time

## 2023-01-03 NOTE — NURSING NOTE
Spoke with pt's SO regarding what pt likes to eat. She states he likes Biscuits and gravy, rowe, roast beef, mashed potatoes, fresh steamed vegetables, hamburgers,  Baked chicken & fish & tuna salad sandwiches.

## 2023-01-03 NOTE — THERAPY TREATMENT NOTE
Inpatient Rehabilitation - Physical Therapy Treatment Note  [facility]     Patient Name: Brian Garcia  : 1950  MRN: [id number]  Today's Date: 1/3/2023      Visit Dx:     ICD-10-CM ICD-9-CM   1. Dysphagia, unspecified type  R13.10 787.20   2. Symbolic dysfunction  R48.9 784.60   3. Impaired mobility and ADLs  Z74.09 V49.89    Z78.9    4. Impaired functional mobility, balance, gait, and endurance  Z74.09 V49.89     Patient Active Problem List   Diagnosis   • Pneumonia of both lower lobes due to infectious organism   • Physical deconditioning   • Pulmonary nodules   • Leukocytosis   • History of pulmonary embolism   • Stage 4 very severe COPD by GOLD classification ([facility])   • Chronic respiratory failure with hypoxia, on home O2 therapy ([facility])   • Nontraumatic multiple localized intracerebral hemorrhages ([facility])   • Hypertension   • CVA (cerebral vascular accident) ([facility])     Past Medical History:   Diagnosis Date   • COPD (chronic obstructive pulmonary disease) ([facility])    • Hypertension      Past Surgical History:   Procedure Laterality Date   • HIP ARTHROPLASTY       PT Assessment (last 12 hours)     PT Evaluation and Treatment    No documentation.                 Physical Therapy Education     Title: PT OT SLP Therapies (In Progress)     Topic: Physical Therapy (In Progress)     Point: Mobility training (In Progress)     Learning Progress Summary           Patient Acceptance, E, NR by EUGENE at 1/3/2023 1206    Acceptance, E, NR by EUGENE at 2023 1219    Acceptance, E, NR by JC1 at 2022 1345                   Point: Home exercise program (Not Started)     Learner Progress:  Not documented in this visit.          Point: Body mechanics (In Progress)     Learning Progress Summary           Patient Acceptance, E, NR by JC1 at 2022 1345                   Point: Precautions (In Progress)     Learning Progress Summary           Patient Acceptance, E, NR by JC1 at 2022 1345                                User Key     Initials Effective Dates Name Provider Type Discipline    JC 06/16/21 -  Nydia Muñoz, PT Physical Therapist PT     06/16/21 -  Yossi Haynes, REGINE Physical Therapist Assistant PT              PT Recommendation and Plan     Plan of Care Reviewed With: patient  Progress: improving  Outcome Evaluation: PT tx limited by pt participation. pt requires constant cueing and encouragement for participation. pt requires CGA for sit-stand when willing. increased gait to 52 ft min A w/ RW. pt participated in LE ther ex and sit-stands. WC mobility- 20, 10, 10 ft min A. education and encouragement given this PM but pt only participated minimally. no new goals met at this time. pt would continue to benefit from PT services.   Outcome Measures     Row Name 01/03/23 1404 01/03/23 1044 01/02/23 1011       How much help from another person do you currently need...    Turning from your back to your side while in flat bed without using bedrails? -- 3  -EUGENE 3  -EUGENE    Moving from lying on back to sitting on the side of a flat bed without bedrails? -- 3  -EUGENE 3  -EUGENE    Moving to and from a bed to a chair (including a wheelchair)? -- 3  -EUGENE 3  -EUGENE    Standing up from a chair using your arms (e.g., wheelchair, bedside chair)? -- 3  -EUGENE 3  -EUGENE    Climbing 3-5 steps with a railing? -- 2  -EUGENE 2  -EUGENE    To walk in hospital room? -- 3  -EUGENE 3  -EUGENE    AM-PAC 6 Clicks Score (PT) -- 17  -EUGENE 17  -EUGENE       Functional Assessment    Outcome Measure Options AM-PAC 6 Clicks Basic Mobility (PT)  -EUGENE AM-PAC 6 Clicks Basic Mobility (PT)  -EUGENE AM-PAC 6 Clicks Basic Mobility (PT)  -EGUENE          User Key  (r) = Recorded By, (t) = Taken By, (c) = Cosigned By    Initials Name Provider Type    EUGENE Yossi Haynes PTA Physical Therapist Assistant                 Time Calculation:    PT Charges     Row Name 01/03/23 1501 01/03/23 1206          Time Calculation    Start Time 1404  -EUGENE 1044  -EUGENE     Stop Time 1427  -EUGENE 1153  -EUGENE     Time  Calculation (min) 23 min  -EUGENE 69 min  -        Time Calculation- PT    Total Timed Code Minutes- PT 23 minute(s)  -EUGENE 69 minute(s)  -EUGENE        Timed Charges    61650 - PT Therapeutic Exercise Minutes -- 15  -EUGENE     09895 - Gait Training Minutes  -- 15  -EUGENE     05798 - PT Therapeutic Activity Minutes 15  -EUGENE 15  -     70077 - PT Self Care/Mgmt Minutes 8  -EUGENE 9  -     34777 - Wheelchair Management Training -- 15  -EUGENE        Total Minutes    Timed Charges Total Minutes 23  -EUGENE 69  -EUGENE      Total Minutes 23  -EUGENE 69  -EUGENE           User Key  (r) = Recorded By, (t) = Taken By, (c) = Cosigned By    Initials Name Provider Type     Yossi Haynes PTA Physical Therapist Assistant              Therapy Charges for Today     Code Description Service Date Service Provider Modifiers Qty    81881199362 HC PT THER PROC EA 15 MIN 1/2/2023 Yossi Haynes, PTA GP 2    28756830424 HC GAIT TRAINING EA 15 MIN 1/2/2023 Yossi Haynes, PTA GP 1    46018698549 HC PT WHEELCHAIR MGMT EA 15 MIN 1/2/2023 Yossi Haynes, PTA GP 1    16094451710 HC GAIT TRAINING EA 15 MIN 1/2/2023 Yossi Haynes, PTA GP 1    89866387052 HC PT THERAPEUTIC ACT EA 15 MIN 1/2/2023 Yossi Haynes, PTA GP 1    97005121466 HC PT THER PROC EA 15 MIN 1/3/2023 Yossi Haynes, PTA GP 1    19741916244 HC GAIT TRAINING EA 15 MIN 1/3/2023 Yossi Haynes, PTA GP 1    81118157739 HC PT THERAPEUTIC ACT EA 15 MIN 1/3/2023 Yossi Haynes, PTA GP 1    05057748528 HC PT SELF CARE/MGMT/TRAIN EA 15 MIN 1/3/2023 Yossi Haynes, PTA GP 1    36128741193 HC PT WHEELCHAIR MGMT EA 15 MIN 1/3/2023 Yossi Haynes, PTA GP 1    12223702129 HC PT SELF CARE/MGMT/TRAIN EA 15 MIN 1/3/2023 Yossi Haynes, PTA GP 1    95604117127 HC PT THERAPEUTIC ACT EA 15 MIN 1/3/2023 Yossi Haynes, PTA GP 1          PT G-Codes  Outcome Measure Options: AM-PAC 6 Clicks Basic Mobility (PT)  AM-PAC 6 Clicks Score (PT): 17  AM-PAC 6 Clicks Score (OT): 18  Modified Tutor Key Scale: 0 - No  Symptoms at all.    Yossi Haynes, PTA  1/3/2023

## 2023-01-03 NOTE — PATIENT CARE CONFERENCE
Attendance of weekly team conference:   Dr. Fan Rob, Holly Sewell, ALEXANDER/JARAD, Lori Siu, SLP, Hyacinth Duran, GWEN Coordinator and Aleja Tripp, MSW, Cami Callejas, Dietetic Specialist    Dr. Rob initiated and led today's multidisciplinary team meeting. Patient's progress reviewed, GG codes reviewed, discharge date discussed and equipment needs addressed.     Rehab goals  Goal is independent ambulation with and/or without assistive aids.  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 and cooperation hopefully we will get overcome this whether this is due to his CVA or part of his normal personality is difficult to tell at this time  Discharge Disposition: To be determined      Expected Discharge Date: Monday 1/9/23    Anticipated discharge orders/equipment: Wheelchair, shower chair, walker, bedside commode, ramp           Self-Care Admission Goal Date 1/3/23 Discharge   Eating 6 6 3    Oral hygiene 6 6 3    Toileting hygiene 5 6 2    Shower/bathe self 3 6 2    Upper body dressing 5 6 3    Lower body dressing 3 6 2    Putting on/taking off footwear 3 6 2        Mobility  Admission Goal Date  1/3/23 Discharge   A.Roll left and right 4 6 4    B.Sit to lying 4 6 4    C. Lying to sitting on side of bed 4 6 3    D.Sit to stand 4 6 4    E. Chair/bed-to-chair transfer   4 6 4    F.Toilet transfer    88 6 4    G. Car Transfer 88 6 88    I.Walk 10 feet 4 6 4    J.Walk 50 feet with two turns. 4 6 3    K.Walk 150 feet:  88 6 88    L.Walking 10 feet on uneven surfaces  88 6 88    M.1 step (curb) 88 6 88    N.4 steps 88 6 88    O.12 steps 9 9 88    P.Picking up object 88 6 88    R. Wheel 50 feet with two turns 4 6 7    S.Wheel 150 feet 88 6 7      Hearing, Speech and Vision Admission Goal  Date  1/3/23 Discharge   Expression of Ideas and Wants (consider both verbal and non-verbal expression and excluding language barriers)  4- Without Difficulty  3- Some difficulty  2- Frequently exhibits  difficulty  1- Rarely/never exhibits clear speech  4 4 3    Understanding Verbal and Non-Verbal Content (with hearing aid or device, if used, and excluding language barriers)  4- Comprehends without cues  3- Usually understands  2- Some Understands  1- Rarely/Never Understands 4 4 3

## 2023-01-03 NOTE — PLAN OF CARE
Goal Outcome Evaluation:  Plan of Care Reviewed With: patient        Progress: improving  Outcome Evaluation: Pt has been up multiple times throughout night.  He was able to stand at bedside and use urinal with minimal assist of one.  Pt. able to lay back down in bed and put feet in bed but does move very slowly.  He is [place] and requires multiple ques to understand directions.  He was compliant with meds. VSS.

## 2023-01-03 NOTE — PLAN OF CARE
Goal Outcome Evaluation:  Plan of Care Reviewed With: patient     Problem: Rehabilitation (IRF) Plan of Care  Goal: Plan of Care Review  Outcome: Ongoing, Progressing  Flowsheets (Taken 1/3/2023 7636)  Progress: improving  Plan of Care Reviewed With: patient  Outcome Evaluation: PT tx limited by pt participation. pt requires constant cueing and encouragement for participation. pt requires CGA for sit-stand when willing. increased gait to 52 ft min A w/ RW. pt participated in LE ther ex and sit-stands. WC mobility- 20, 10, 10 ft min A. education and encouragement given this PM but pt only participated minimally. no new goals met at this time. pt would continue to benefit from PT services.

## 2023-01-03 NOTE — THERAPY TREATMENT NOTE
Inpatient Rehabilitation - Speech Language Pathology Treatment Note    [facility]     Patient Name: Brian Garcia  : 1950  MRN: [id number]    Today's Date: 1/3/2023                   Admit Date: 2022     Pt seen for skilled ST services this date to address cognitive-lingusitic deficits. Pt responses were inconsistent and no goals were met.  [place] was a barrier as well as poor judgement and problem solving.    Continue POC.     Goals:  1.  Pt will answer orientation questions w/90% acc:  Pt was 90% acc w/orientation questions and mod cues.   Ultimately, he had correct responses, but first he listed off totally wrong answers to almost every inquiry.       2.  Pt will complete deductive reasoning tasks w/90% acc:  Pt was 80% acc w/simple deductive reasoning questions and required mod cues.  He demonstrated difficulty recalling egocentric information and was hesitant to share responses to personal information.       3.  Pt will answer questions about ADL problems w/90% acc:  Pt was 60% acc w/simple problem solving scenarios and required increased time for responses, as well as, repetition of questions and information.     Hearing, Speech and Vision Admission Goal  Date  1/3/23 Date Date Date Date Discharge   Expression of Ideas and Wants (consider both verbal and non-verbal expression and excluding language barriers) 4 4  3             Understanding Verbal and Non-Verbal Content (with hearing aid or device, if used, and excluding language barriers) 4 4 3            Visit Dx:      ICD-10-CM ICD-9-CM   1. Dysphagia, unspecified type  R13.10 787.20   2. Symbolic dysfunction  R48.9 784.60   3. Impaired mobility and ADLs  Z74.09 V49.89    Z78.9    4. Impaired functional mobility, balance, gait, and endurance  Z74.09 V49.89       Patient Active Problem List   Diagnosis   • Pneumonia of both lower lobes due to infectious organism   • Physical deconditioning   • Pulmonary nodules   • Leukocytosis   •  History of pulmonary embolism   • Stage 4 very severe COPD by GOLD classification ([facility])   • Chronic respiratory failure with hypoxia, on home O2 therapy ([facility])   • Nontraumatic multiple localized intracerebral hemorrhages ([facility])   • Hypertension   • CVA (cerebral vascular accident) ([facility])       Past Medical History:   Diagnosis Date   • COPD (chronic obstructive pulmonary disease) ([facility])    • Hypertension        Past Surgical History:   Procedure Laterality Date   • HIP ARTHROPLASTY         SLP Recommendation and Plan                   Anticipated Discharge Disposition (SLP): home with assist (01/03/23 1218)        Predicted Duration Therapy Intervention (Days): until discharge (01/03/23 1218)     Daily Summary of Progress (SLP): progress toward functional goals as expected (01/03/23 1218)        Plan of Care Reviewed With: patient (01/03/23 1617)  Progress: no change (01/03/23 1617)  Outcome Evaluation: ST: pt with poor participation in tasks.  frequently needing repeititon likely d/t [place], but delayed processing cannot be ruled out. inconsistant responses noted during therapy tasks with egocentric questions (01/03/23 1617)     Treatment Assessment (SLP): continued, cognitive-linguistic disorder (01/03/23 1218)  Treatment Assessment Comments (SLP): ST: pt with poor participation in tasks.  frequently needing repeititon likely d/t [place], but delayed processing cannot be ruled out. inconsistant responses noted during therapy tasks with egocentric questiions (01/03/23 1218)  Plan for Continued Treatment (SLP): continue treatment per plan of care (01/03/23 1218)    SLP EVALUATION (last 72 hours)     SLP SLC Evaluation     Row Name 01/03/23 1218 01/02/23 0889                Communication Assessment/Intervention    Document Type therapy note (daily note)  -EC therapy note (daily note)  -CK       Subjective Information no complaints  -EC no complaints  -CK       Patient Observations alert;poorly cooperative;agree to therapy  -EC  alert;cooperative;agree to therapy  -CK       Patient/Family/Caregiver Comments/Observations none  -EC No family present at bedside  -CK       Patient Effort good  -EC good  -CK          General Information    Patient Profile Reviewed yes  -EC yes  -CK       Precautions/Limitations, Vision corrective lenses needed for reading  -EC corrective lenses needed for reading  -CK       Precautions/Limitations, Hearing hearing impairment, bilaterally  -EC hearing impairment, bilaterally  -CK       Plans/Goals Discussed with patient  -EC patient  -CK          Pain Scale: Numbers Pre/Post-Treatment    Pretreatment Pain Rating 0/10 - no pain  -EC 0/10 - no pain  -CK       Posttreatment Pain Rating 0/10 - no pain  -EC 0/10 - no pain  -CK          Auditory Comprehension Assessment/Intervention    Auditory Comprehension (Communication) -- WFL  -CK       Able to Identify Objects/Pictures (Communication) -- L  -CK       Answers Questions (Communication) -- WFL  -CK       Able to Follow Commands (Communication) -- WFL  -CK          Oral Motor Structure and Function    Oral Motor Structure and Function -- [facility]  -CK       Dentition Assessment -- natural, present and adequate  -CK          Oral Musculature and Cranial Nerve Assessment    Oral Motor General Assessment -- WFL  -CK          Cognitive Assessment Intervention- SLP    Orientation Status (Cognition) -- mild impairment  -CK       Memory (Cognitive) -- mild impairment  -CK       Thought Organization (Cognitive) -- WFL  -CK       Reasoning (Cognitive) -- L  -CK       Problem Solving (Cognitive) -- [facility]  -CK       Functional Math (Cognitive) -- L  -CK       Executive Function (Cognition) -- WFL  -CK       Pragmatics (Communication) -- [facility]  -CK          SLP Evaluation Clinical Impressions    SLP Diagnosis -- cognitive-linguistic disorder  Noted confusion however scored 15/15 on BIMs and 30/30 SLUMS  -CK       Rehab Potential/Prognosis -- good  -CK       SLC Criteria for Skilled  Therapy Interventions Met -- yes  -CK       Functional Impact -- Poor Judgement  -CK          SLP Treatment Clinical Impressions    Treatment Assessment (SLP) continued;cognitive-linguistic disorder  -EC continued;cognitive-linguistic disorder  -CK       Treatment Assessment Comments (SLP) ST: pt with poor participation in tasks.  frequently needing repeititon likely d/t [place], but delayed processing cannot be ruled out. inconsistant responses noted during therapy tasks with egocentric questiions  -EC Pt seen for skilled ST services this date to address cognitive-lingusitic deficits.  Upon entry, pt was attempting to fill out his menu for the next three meals and was having notable dificulty despites attempts from dietary staff to assist.  Pt able to acknowledge his need for extended time, but demonstrated poor inhibition when help was offered and when he needed to ask for help.  Pt did participate well in all therapy tasks and met targets for 2/3 goals when given extended time and repetition of information/questions.  Continue POC.  -CK       Daily Summary of Progress (SLP) progress toward functional goals as expected  -EC progress toward functional goals is good  -CK       Plan for Continued Treatment (SLP) continue treatment per plan of care  -EC continue treatment per plan of care  -CK       Care Plan Review evaluation/treatment results reviewed;care plan/treatment goals reviewed;risks/benefits reviewed;current/potential barriers reviewed;patient/other agree to care plan  -EC evaluation/treatment results reviewed;care plan/treatment goals reviewed;risks/benefits reviewed;current/potential barriers reviewed;patient/other agree to care plan  -CK          Recommendations    Therapy Frequency (SLP SLC) 5 days per week  -EC 5 days per week  5-6 week  -CK       Predicted Duration Therapy Intervention (Days) until discharge  -EC --       Anticipated Discharge Disposition (SLP) home with assist  -EC --           Orientation Goal 1 (SLP)    Improve Orientation Through Goal 1 (SLP) demonstrating orientation to day;demonstrating orientation to month;demonstrating orientation to year;demonstrating orientation to place;90%;independently (over 90% accuracy)  -EC demonstrating orientation to day;demonstrating orientation to month;demonstrating orientation to year;demonstrating orientation to place;90%;independently (over 90% accuracy)  -CK       Time Frame (Orientation Goal 1, SLP) by discharge  -EC by discharge  -CK       Progress (Orientation Goal 1, SLP) 90%;other (comment)  repetitions and inconsistant responses  -EC 90%  -CK       Progress/Outcomes (Orientation Goal 1, SLP) goal ongoing  -EC goal ongoing  -CK          Reasoning Goal 1 (SLP)    Improve Reasoning Through Goal 1 (SLP) complete deductive reasoning task;90%;independently (over 90% accuracy)  -EC complete deductive reasoning task;90%;independently (over 90% accuracy)  -CK       Time Frame (Reasoning Goal 1, SLP) by discharge  -EC by discharge  -CK       Progress (Reasoning Goal 1, SLP) with moderate cues (50-74%);80%  -%;with moderate cues (50-74%)  -CK       Progress/Outcomes (Reasoning Goal 1, SLP) goal ongoing  -EC goal ongoing  -CK          Functional Problem Solving Skills Goal 1 (SLP)    Improve Problem Solving Through Goal 1 (SLP) answer questions about ADL problems;90%;independently (over 90% accuracy)  -EC answer questions about ADL problems;90%;independently (over 90% accuracy)  -CK       Time Frame (Problem Solving Goal 1, SLP) by discharge  -EC by discharge  -CK       Progress (Problem Solving Goal 1, SLP) 60%  -EC 60%  -CK       Progress/Outcomes (Problem Solving Goal 1, SLP) goal ongoing  -EC goal ongoing  -CK             User Key  (r) = Recorded By, (t) = Taken By, (c) = Cosigned By    Initials Name Effective Dates    EC Anita Siu CCC-SLP 06/16/21 -     CK Latesha Ram MS CCC-SLP 06/16/21 -                    EDUCATION    The  patient has been educated in the following areas:       Cognitive Impairment.             SLP GOALS     Row Name 01/03/23 1218 01/02/23 0859          Orientation Goal 1 (SLP)    Improve Orientation Through Goal 1 (SLP) demonstrating orientation to day;demonstrating orientation to month;demonstrating orientation to year;demonstrating orientation to place;90%;independently (over 90% accuracy)  -EC demonstrating orientation to day;demonstrating orientation to month;demonstrating orientation to year;demonstrating orientation to place;90%;independently (over 90% accuracy)  -CK     Time Frame (Orientation Goal 1, SLP) by discharge  -EC by discharge  -CK     Progress (Orientation Goal 1, SLP) 90%;other (comment)  repetitions and inconsistant responses  -EC 90%  -CK     Progress/Outcomes (Orientation Goal 1, SLP) goal ongoing  -EC goal ongoing  -CK        Reasoning Goal 1 (SLP)    Improve Reasoning Through Goal 1 (SLP) complete deductive reasoning task;90%;independently (over 90% accuracy)  -EC complete deductive reasoning task;90%;independently (over 90% accuracy)  -CK     Time Frame (Reasoning Goal 1, SLP) by discharge  -EC by discharge  -CK     Progress (Reasoning Goal 1, SLP) with moderate cues (50-74%);80%  -%;with moderate cues (50-74%)  -CK     Progress/Outcomes (Reasoning Goal 1, SLP) goal ongoing  -EC goal ongoing  -CK        Functional Problem Solving Skills Goal 1 (SLP)    Improve Problem Solving Through Goal 1 (SLP) answer questions about ADL problems;90%;independently (over 90% accuracy)  -EC answer questions about ADL problems;90%;independently (over 90% accuracy)  -CK     Time Frame (Problem Solving Goal 1, SLP) by discharge  -EC by discharge  -CK     Progress (Problem Solving Goal 1, SLP) 60%  -EC 60%  -CK     Progress/Outcomes (Problem Solving Goal 1, SLP) goal ongoing  -EC goal ongoing  -CK           User Key  (r) = Recorded By, (t) = Taken By, (c) = Cosigned By    Initials Name Provider Type    EC  Anita Siu CCC-SLP Speech and Language Pathologist    CK Latesha Ram, MS CCC-SLP Speech and Language Pathologist                            Time Calculation:        Time Calculation- SLP     Row Name 01/03/23 1617             Time Calculation- SLP    SLP Start Time 1218  -EC      SLP Stop Time 1256  -EC      SLP Time Calculation (min) 38 min  -EC      Total Timed Code Minutes- SLP 38 minute(s)  -EC      SLP Received On 01/03/23  -EC      SLP Goal Re-Cert Due Date 01/13/23  -EC         Untimed Charges    29475-BM Treatment/ST Modification Prosth Aug Alter  38  -EC         Total Minutes    Untimed Charges Total Minutes 38  -EC       Total Minutes 38  -EC            User Key  (r) = Recorded By, (t) = Taken By, (c) = Cosigned By    Initials Name Provider Type    EC Anita Siu CCC-SLP Speech and Language Pathologist                  Therapy Charges for Today     Code Description Service Date Service Provider Modifiers Qty    [id number]  ST TREATMENT SPEECH 3 1/3/2023 nAita Siu CCC-SLP GN 1                           LOC Bernardo  1/3/2023

## 2023-01-03 NOTE — PROGRESS NOTES
Nutrition Services    Patient Name:  Brian Garcia  YOB: 1950  MRN: [id number]  Admit Date:  12/30/2022    Reason for assessment:  Follow up    Nutrition Diet/History:    -Typical Intake:  Appetite depressed and inadequate intake.  -Factors Affecting Nutritional Intake:  Appetite    Weight history:  CBW:  141#  UBW:  Unsure at this time    Labs:  Na 131, Cl 91, Glu 109, Cr 0.57    Meds:  D3, colace, prednisone    Nutrition Prescription Order:  Heart healthy; ice cream BID, PB crackers or 1/2 PB sandwich BID    Evaluation of Nutrition Intake:  47% average for 8 meals    Pt states he is doing well with his meals.  Reports appetite has not improved.  Getting foods he likes, but RN staff have stated he is still stating the food is too salty.  ?altered taste.  Wears O2 at home.  Will continue to send supplements as ordered to encourage intake.  Pt is very [place] and every question needed to be repeated at least once.  RN staff stated they would work with pt to get his preferences for meals.  MD requested cranberry juice be ordered with all meals - will order.  RDN staff will follow ARU course.     Electronically signed by:  Cami Callejas  01/03/23 13:40 CST

## 2023-01-03 NOTE — PROGRESS NOTES
Progress Note      Admit date: 12/30/2022 12:28 PM       Treatment Team     Provider Relationship Specialty Contact    Fan Rob MD Attending Rehabilitation   [phone]      Yossi Haynes, PTA Physical Therapy Assistant Physical Therapy --    Anita Siu CCC-SLP Speech Language Pathologist Speech Pathology   [phone]      Aleja Tripp  -- --    Aldair Miller MD Consulting Physician Pulmonary Disease   [phone]      Susan Olguin, RRT Respiratory Therapist Respiratory Therapy   [phone]      Elayne Cohn, LPN Licensed Practical Nurse -- --    Holly Sewell COTA Occupational Therapy Assistant Occupational Therapy --    Tarsha Hernandez RN Registered Nurse Behavioral Health --             Chief Complaint:  Nontraumatic multiple localized intracerebral hemorrhages (HCC)    HPI: No changes since admission    Vital Signs  Temp:  [97 °F (36.1 °C)-97.9 °F (36.6 °C)] 97 °F (36.1 °C)  Heart Rate:  [] 98  Resp:  [18-20] 18  BP: (140-158)/(69-84) 140/69    Physical Exam 1:    Constitutional: Patient is alert oriented up in chair in therapy with occupational therapy this morning     HENT:      Eyes:     Neck:      Cardiovascular: No chest pain    Pulmonary: No cough productive sputum temperature    Abdominal:      Genitourinary/Anorectal: Voiding without difficulty      Musculoskeletal:     Skin:     Neurological: Progressive changes in neurological status    Psychiatric/Behavioral: Cooperative with physical therapy       Results Review:    I reviewed the patient's new clinical results.    Nursing notes and therapy notes have been reviewed.    I have reviewed medications.    Assessment/Plan  Principal Problem:    Nontraumatic multiple localized intracerebral hemorrhages (HCC) stable active Problems:    Pneumonia of both lower lobes due to infectious organism      Overview: - Discussed with Radiology (Dr. Davey), who reports that the patient has a       superior  segment small patchy opacity in the left lung base likely       pneumonia, and a small patchy opacity in the right lung base, likely       pneumonia. He recommended continuing with antibiotics and then doing       follow up imaging in 3 months.      - Blood cultures no growth to date.      - Levaquin day 4. Transitioned to PO today.      - Legionella and strep pneumo neg. Mycoplasma pending.  Under treatment and evaluation    Physical deconditioning      Overview: - PT/OT Consulted. Recommendations: home with home health.      - Patient may benefit from an assisted living.  Continue therapy    Stage 4 very severe COPD by GOLD classification ([facility]) evaluation I have consulted Dr. Miller to review his films and give me recommendations    Hypertension stable    Chronic respiratory failure with hypoxia, on home O2 therapy ([facility]) stable    CVA (cerebral vascular accident) ([facility]) able           Fan Rob MD  01/03/23  10:34 CST          This document has been electronically signed by Fan Rob MD on January 3, 2023 10:34 CST      Part of this note may be an electronic transcription/translation of spoken language to printed text using the Dragon Dictation System.

## 2023-01-03 NOTE — THERAPY TREATMENT NOTE
Inpatient Rehabilitation - Occupational Therapy Treatment Note    [facility]     Patient Name: Brian Garcia  : 1950  MRN: [id number]    Today's Date: 1/3/2023                     Self-Care Admission Goal Date Discharge   Eating   3    Oral hygiene   3    Toileting hygiene   2    Shower/bathe self   2    Upper body dressing   3    Lower body dressing   2    Putting on/taking off footwear   2           Admit Date: 2022         ICD-10-CM ICD-9-CM   1. Dysphagia, unspecified type  R13.10 787.20   2. Symbolic dysfunction  R48.9 784.60   3. Impaired mobility and ADLs  Z74.09 V49.89    Z78.9    4. Impaired functional mobility, balance, gait, and endurance  Z74.09 V49.89       Patient Active Problem List   Diagnosis   • Pneumonia of both lower lobes due to infectious organism   • Physical deconditioning   • Pulmonary nodules   • Leukocytosis   • History of pulmonary embolism   • Stage 4 very severe COPD by GOLD classification ([facility])   • Chronic respiratory failure with hypoxia, on home O2 therapy ([facility])   • Nontraumatic multiple localized intracerebral hemorrhages ([facility])   • Hypertension   • CVA (cerebral vascular accident) ([facility])       Past Medical History:   Diagnosis Date   • COPD (chronic obstructive pulmonary disease) ([facility])    • Hypertension        Past Surgical History:   Procedure Laterality Date   • HIP ARTHROPLASTY               IRF OT ASSESSMENT FLOWSHEET (last 12 hours)     IRF OT Evaluation and Treatment     Row Name 23 1044 23 0855       OT Time and Intention    Document Type daily treatment  -EUGENE daily treatment  -LM    Mode of Treatment physical therapy;individual therapy  -EUGENE individual therapy;occupational therapy  -LM    Patient Effort -- good  -LM    Row Name 23 1044 23 0855       General Information    Patient Profile Reviewed yes  -EUGENE --    Existing Precautions/Restrictions fall  -EUGENE fall  -LM    Limitations/Impairments hearing  -EUGENE hearing  -LM    Row  Name 01/03/23 1044          Living Environment    Primary Care Provided by self  -     Row Name 01/03/23 1044          Home Use of Assistive/Adaptive Equipment    Equipment Currently Used at Home oxygen  -     Row Name 01/03/23 1044 01/03/23 0855       Pain Assessment    Pretreatment Pain Rating 0/10 - no pain  -EUGENE 0/10 - no pain  -LM    Posttreatment Pain Rating -- 0/10 - no pain  -LM    Row Name 01/03/23 1044 01/03/23 0855       Cognition/Psychosocial    Affect/Mental Status (Cognition) other (see comments)  drowsy  -EUGENE --    Orientation Status (Cognition) oriented x 4  -EUGENE oriented x 4  -    Follows Commands (Cognition) -- WFL  -    Row Name 01/03/23 1044          Range of Motion Comprehensive    General Range of Motion bilateral lower extremity ROM WFL  -     Row Name 01/03/23 0855          Lower Body Dressing    [place] Level (Lower Body Dressing) doff;don;shoes/slippers;moderate assist (50% patient effort)  -     Row Name 01/03/23 0855          Grooming    [place] Level (Grooming) --  SBA  -     Row Name 01/03/23 0855          Bed Mobility    Bed Mobility bed mobility (all) activities;supine-sit  -     All Activities, [place] (Bed Mobility) moderate assist (50% patient effort)  due to pt lack of participation and effort  -     Row Name 01/03/23 0855          Transfer Assessment/Treatment    Transfers bed-chair transfer;sit-stand transfer;stand-sit transfer  -     Row Name 01/03/23 0855          Bed-Chair Transfer    Bed-Chair [place] (Transfers) minimum assist (75% patient effort)  -     Row Name 01/03/23 1044 01/03/23 0855       Sit-Stand Transfer    Sit-Stand [place] (Transfers) contact guard;verbal cues;nonverbal cues (demo/gesture)  - minimum assist (75% patient effort)  -    Row Name 01/03/23 1044          Stand-Sit Transfer    Stand-Sit [place] (Transfers) contact guard;verbal cues;nonverbal cues (demo/gesture)  -     Row Name 01/03/23 0855           Wheelchair Mobility/Management    Comment, Wheelchair Mobility to gym 100 ft with mod a multi vcs  -     Row Name 01/03/23 1044          Safety Issues, Functional Mobility    Impairments Affecting Function (Mobility) balance;endurance/activity tolerance;shortness of breath;strength  -     Row Name 01/03/23 0855          Motor Skills    Motor Skills --  ther ex with level 1 tband all p;lanes sitting in chair  -     Therapeutic Exercise aerobic  -     Row Name 01/03/23 0855          Aerobic Exercise    Type (Aerobic Exercise) arm bike  -     Time Performed (Aerobic Exercise) 5  -LM     Row Name 01/03/23 0855          Positioning and Restraints    Pre-Treatment Position in bed  -     Post Treatment Position wheelchair  -     Row Name 01/03/23 1044 01/03/23 0855       Vital Signs    Pre Systolic BP Rehab 134  -EUGENE --    Pre Treatment Diastolic BP 66  -EUGENE --    Pretreatment Heart Rate (beats/min) 105  -EUGENE --    Intratreatment Heart Rate (beats/min) -- 110  -    Pre SpO2 (%) 94  -EUGENE --    O2 Delivery Pre Treatment nasal cannula  - --    Intra SpO2 (%) -- 99  -LM    Row Name 01/03/23 1044          Therapy Assessment/Plan (OT)    Patient's Goals For Discharge return home  -     Row Name 01/03/23 0855          Bathing Goal 1 (OT-IRF)    Activity/Device (Bathing Goal 1, OT-IRF) lower body bathing  -LM     [place] Level (Bathing Goal 1, OT-IRF) modified independence  -LM     Time Frame (Bathing Goal 1, OT-IRF) by discharge;long-term goal (LTG)  -LM     Progress/Outcomes (Bathing Goal 1, OT-IRF) goal not met  -     Row Name 01/03/23 0855          LB Dressing Goal 1 (OT-IRF)    Activity/Device (LB Dressing Goal 1, OT-IRF) lower body dressing  -LM     [place] (LB Dressing Goal 1, OT-IRF) modified independence  -LM     Time Frame (LB Dressing Goal 1, OT-IRF) long-term goal (LTG);by discharge  -LM     Progress/Outcomes (LB Dressing Goal 1, OT-IRF) goal not met  -     Row Name 01/03/23 0855           Strength Goal 1 (OT-IRF)    Strength Goal 1 (OT-IRF) Pt will increase BUE strength in all planes by one level for increased strength and endurance required for ADL routine.  -LM     Time Frame (Strength Goal 1, OT-IRF) long-term goal (LTG);by discharge  -LM     Progress/Outcomes (Strength Goal 1, OT-IRF) goal not met  -LM     Row Name 01/03/23 0855           Endurance Goal 1 (OT)    Activity Level (Endurance Goal 1, OT) endurance 2 good  -LM     Progress/Outcome (Endurance Goal 1, OT) goal not met  -LM           User Key  (r) = Recorded By, (t) = Taken By, (c) = Cosigned By    Initials Name Effective Dates    EUGENE Yossi Haynes, PTA 06/16/21 -     LM Holly Sewell, MUNOZ 06/16/21 -                  Occupational Therapy Education     Title: PT OT SLP Therapies (In Progress)     Topic: Occupational Therapy (In Progress)     Point: ADL training (Done)     Description:   Instruct learner(s) on proper safety adaptation and remediation techniques during self care or transfers.   Instruct in proper use of assistive devices.              Learning Progress Summary           Patient Acceptance, E,TB, VU by BB at 12/31/2022 1522    Acceptance, E,TB, VU by CM at 12/31/2022 1211    Comment: OT POC, Role of OT, safe ADL mobility and t/f                   Point: Home exercise program (Not Started)     Description:   Instruct learner(s) on appropriate technique for monitoring, assisting and/or progressing therapeutic exercises/activities.              Learner Progress:  Not documented in this visit.          Point: Precautions (Not Started)     Description:   Instruct learner(s) on prescribed precautions during self-care and functional transfers.              Learner Progress:  Not documented in this visit.          Point: Body mechanics (Not Started)     Description:   Instruct learner(s) on proper positioning and spine alignment during self-care, functional mobility activities and/or exercises.              Learner  Progress:  Not documented in this visit.                      User Key     Initials Effective Dates Name Provider Type Discipline    BB 06/16/21 -  Lisa José COTA Occupational Therapist Assistant OT    CM 11/18/22 -  Ghazala Alfonso OT Occupational Therapist OT                    OT Recommendation and Plan         Plan of Care Review  Plan of Care Reviewed With: patient  Progress: improving  Outcome Evaluation: pt perf bed mobiliity with mod a due to lack of cooperation and participation  pt requireds mod a for wheelchair mob due to lack of participation and coopertion,  pt perf tf eob to wheelchair min a  perf ue ex b ue 10 with many rb on ue bike and ther ex b ue with 2 lb wt and level 1 tband sitting inchair  pt frequently falling asleep or abdruptly stopping act with many cues to continue./  pt will need 24\/7 sup assist at dc with HH OT at this time  Plan of Care Reviewed With: patient  Progress: improving       Outcome Measures     Row Name 01/03/23 1404 01/03/23 1044 01/02/23 1011       How much help from another person do you currently need...    Turning from your back to your side while in flat bed without using bedrails? -- 3  -EUGENE 3  -EUGENE    Moving from lying on back to sitting on the side of a flat bed without bedrails? -- 3  -EUGENE 3  -EUGENE    Moving to and from a bed to a chair (including a wheelchair)? -- 3  -EUGENE 3  -EUGENE    Standing up from a chair using your arms (e.g., wheelchair, bedside chair)? -- 3  -EUGENE 3  -EUGENE    Climbing 3-5 steps with a railing? -- 2  -EUGENE 2  -EUGENE    To walk in hospital room? -- 3  -EUGENE 3  -EUGENE    AM-PAC 6 Clicks Score (PT) -- 17  -EUGENE 17  -EUGENE       Functional Assessment    Outcome Measure Options AM-PAC 6 Clicks Basic Mobility (PT)  -EUGENE AM-PAC 6 Clicks Basic Mobility (PT)  -EUGENE AM-PAC 6 Clicks Basic Mobility (PT)  -EUGENE          User Key  (r) = Recorded By, (t) = Taken By, (c) = Cosigned By    Initials Name Provider Type    EUGENE Yossi Haynes, PTA Physical Therapist Assistant                   Time Calculation:      Time Calculation- OT     Row Name 01/03/23 1206 01/03/23 0912          Time Calculation- OT    OT Start Time -- 0855  -LM     OT Stop Time -- 1025  -LM     OT Time Calculation (min) -- 90 min  -LM     Total Timed Code Minutes- OT -- 90 minute(s)  -LM     OT Received On -- 01/03/23  -LM        Timed Charges    78364 - OT Therapeutic Exercise Minutes -- 40  -LM     52143 - Gait Training Minutes  15  -EUGENE --     13912 - OT Therapeutic Activity Minutes -- 30  -LM     39779 - OT Self Care/Mgmt Minutes -- 20  -LM        Total Minutes    Timed Charges Total Minutes 15  -EUGENE 90  -LM      Total Minutes 15  -EUGENE 90  -LM           User Key  (r) = Recorded By, (t) = Taken By, (c) = Cosigned By    Initials Name Provider Type    Yossi Ragland, REGINE Physical Therapist Assistant     Holly Sewell COTA Occupational Therapist Assistant              Therapy Charges for Today     Code Description Service Date Service Provider Modifiers Qty    24442101089 HC OT THERAPEUTIC ACT EA 15 MIN 1/2/2023 Holly Sewell, MUNOZ GO 2    05110431203 HC OT THER PROC EA 15 MIN 1/2/2023 Holly Sewell, MUNOZ GO 1    95224251758 HC OT THERAPEUTIC ACT EA 15 MIN 1/2/2023 Holly Sewell, MUNOZ GO 2    87966131319 HC OT THER PROC EA 15 MIN 1/2/2023 Holly Sewell, MUNOZ GO 1    15592914570 HC OT SELF CARE/MGMT/TRAIN EA 15 MIN 1/3/2023 Holly Sewell, MUNOZ GO 1    59587542219 HC OT THERAPEUTIC ACT EA 15 MIN 1/3/2023 Holly Sewell, MUNOZ GO 2    56939438167 HC OT THER PROC EA 15 MIN 1/3/2023 Holly Sewell, MUNOZ GO 3                   ALEXANDER Fragoso  1/3/2023

## 2023-01-04 PROCEDURE — 99231 SBSQ HOSP IP/OBS SF/LOW 25: CPT | Performed by: ORTHOPAEDIC SURGERY

## 2023-01-04 PROCEDURE — 97530 THERAPEUTIC ACTIVITIES: CPT

## 2023-01-04 PROCEDURE — 94664 DEMO&/EVAL PT USE INHALER: CPT

## 2023-01-04 PROCEDURE — 92507 TX SP LANG VOICE COMM INDIV: CPT | Performed by: SPEECH-LANGUAGE PATHOLOGIST

## 2023-01-04 PROCEDURE — 94799 UNLISTED PULMONARY SVC/PX: CPT

## 2023-01-04 PROCEDURE — 94760 N-INVAS EAR/PLS OXIMETRY 1: CPT

## 2023-01-04 PROCEDURE — 97110 THERAPEUTIC EXERCISES: CPT

## 2023-01-04 PROCEDURE — 97535 SELF CARE MNGMENT TRAINING: CPT

## 2023-01-04 RX ADMIN — IPRATROPIUM BROMIDE 0.5 MG: 0.5 SOLUTION RESPIRATORY (INHALATION) at 15:13

## 2023-01-04 RX ADMIN — DOCUSATE SODIUM 100 MG: 100 CAPSULE, LIQUID FILLED ORAL at 08:00

## 2023-01-04 RX ADMIN — IPRATROPIUM BROMIDE 0.5 MG: 0.5 SOLUTION RESPIRATORY (INHALATION) at 21:21

## 2023-01-04 RX ADMIN — DOCUSATE SODIUM 100 MG: 100 CAPSULE, LIQUID FILLED ORAL at 20:44

## 2023-01-04 RX ADMIN — MONTELUKAST SODIUM 10 MG: 10 TABLET, COATED ORAL at 20:44

## 2023-01-04 RX ADMIN — IPRATROPIUM BROMIDE 0.5 MG: 0.5 SOLUTION RESPIRATORY (INHALATION) at 07:44

## 2023-01-04 RX ADMIN — Medication 1000 UNITS: at 08:00

## 2023-01-04 RX ADMIN — DILTIAZEM HYDROCHLORIDE 180 MG: 180 CAPSULE, COATED, EXTENDED RELEASE ORAL at 08:00

## 2023-01-04 RX ADMIN — CEFUROXIME AXETIL 500 MG: 500 TABLET ORAL at 08:00

## 2023-01-04 RX ADMIN — TIZANIDINE 2 MG: 2 TABLET ORAL at 20:43

## 2023-01-04 NOTE — THERAPY TREATMENT NOTE
"Inpatient Rehabilitation - Speech Language Pathology Treatment Note  [facility]     Patient Name: Brian Garcia  : 1950  MRN: [id number]  Today's Date: 2023               Admit Date: 2022     Pt seen for skilled ST services this date. Initially pt was very lethargic and falling asleep; however, pt aroused after tactile stimuli from SLP.  Pt continues to require mutliple repetitions of information and increased time for response formulation.  Pt also gave nonsensical answers for deductive reasoning questions.  Pt would smile and laugh after giving nonsensical responses.  Pt did meet orientation goal this date w/o use of visual aides.  Pt's lack of participation and willingness to provide legitimate answers is a barrier to progress.  Goals:  1.  Pt will answer orientation questions w/90% acc:  Pt was 90% acc w/orientation questions w/o assistance  2.  Pt will complete deductive reasoning tasks w/90% acc:  Pt would not provide legitimate answers to questions.  Pt would respond w/nonsensical answers followed by a smile and a laugh.  When SLP would ugre pt to provide a legitimate answer, pt would respond w/another nonsensical response and again smile and laugh.  For example, the pt was presented the following infrencing question: Children playing on a neighbor's back porch who was patiently waiting for her tomatoes to rippen.  The tomato plant sat on the back porch where the children liked to play.  The next day the tomatoes were missing.  When asked if he could infer what happened to the tomatoes, he responded w/\"bull frogs at them.\"  When asked to give a realistic response, he then stated, \"woodpeckers\" ate them.     3.  Pt will answer questions about ADL problems w/90% acc:  Pt was 60% acc w/simple problem solving scenarios and required increased time for responses, as well as, repetition of questions and information.     Hearing, Speech and Vision Admission Goal  Date  1/3/23 Date " 01/04/23\ Date Date Date Discharge   Expression of Ideas and Wants (consider both verbal and non-verbal expression and excluding language barriers) 4 4  3  3           Understanding Verbal and Non-Verbal Content (with hearing aid or device, if used, and excluding language barriers) 4 4 3  3                      Visit Dx:    ICD-10-CM ICD-9-CM   1. Dysphagia, unspecified type  R13.10 787.20   2. Symbolic dysfunction  R48.9 784.60   3. Impaired mobility and ADLs  Z74.09 V49.89    Z78.9    4. Impaired functional mobility, balance, gait, and endurance  Z74.09 V49.89     Patient Active Problem List   Diagnosis   • Pneumonia of both lower lobes due to infectious organism   • Physical deconditioning   • Pulmonary nodules   • Leukocytosis   • History of pulmonary embolism   • Stage 4 very severe COPD by GOLD classification ([facility])   • Chronic respiratory failure with hypoxia, on home O2 therapy ([facility])   • Nontraumatic multiple localized intracerebral hemorrhages ([facility])   • Hypertension   • CVA (cerebral vascular accident) ([facility])     Past Medical History:   Diagnosis Date   • COPD (chronic obstructive pulmonary disease) ([facility])    • Hypertension      Past Surgical History:   Procedure Laterality Date   • HIP ARTHROPLASTY         SLP Recommendation and Plan                 Anticipated Discharge Disposition (SLP): home with assist (01/04/23 1305)        Predicted Duration Therapy Intervention (Days): until discharge (01/04/23 1305)     Daily Summary of Progress (SLP): progress toward functional goals is gradual (01/04/23 1305)           Treatment Assessment (SLP): continued, cognitive-linguistic disorder (01/04/23 1305)  Treatment Assessment Comments (SLP): Pt seen for skilled ST services this date. Initially pt was very lethargic and falling asleep; however, pt aroused after tactile stimuli from SLP.  Pt continues to require mutliple repetitions of information and increased time for response formulation.  Pt also gave nonsensical  answers for deductive reasoning questions.  Pt would smile and laugh after giving nonsensical responses.  Pt did meet orientation goal this date w/o use of visual aides.  Pt's lack of participation and willingness to provide legitimate answers is a barrier to progress. (01/04/23 1305)  Plan for Continued Treatment (SLP): goals adjusted to reflect functional improvements demonstrated (01/04/23 1305)  Plan of Care Reviewed With: patient (01/04/23 1358)  Progress: improving (01/04/23 1358)  Outcome Evaluation: Pt seen for skilled ST services this date. Initially pt was very lethargic and falling asleep; however, pt aroused after tactile stimuli from SLP.  Pt continues to require mutliple repetitions of information and increased time for response formulation.  Pt also gave nonsensical answers for deductive reasoning questions.  Pt would smile and laugh after giving nonsensical responses.  Pt did meet orientation goal this date w/o use of visual aides.  Pt's lack of participation and willingness to provide legitimate answers is a barrier to progress. (01/04/23 1358)      SLP EVALUATION (last 72 hours)     SLP SLC Evaluation     Row Name 01/04/23 1305 01/03/23 1218 01/02/23 0830             Communication Assessment/Intervention    Document Type therapy note (daily note)  -CK therapy note (daily note)  -EC therapy note (daily note)  -CK      Subjective Information no complaints  -CK no complaints  -EC no complaints  -CK      Patient Observations alert;poorly cooperative;agree to therapy  -CK alert;poorly cooperative;agree to therapy  -EC alert;cooperative;agree to therapy  -CK      Patient/Family/Caregiver Comments/Observations No family present at bedside  -CK none  -EC No family present at bedside  -CK      Patient Effort fair  -CK good  -EC good  -CK         General Information    Patient Profile Reviewed yes  -CK yes  -EC yes  -CK      Precautions/Limitations, Vision corrective lenses needed for reading  -CK corrective  lenses needed for reading  -EC corrective lenses needed for reading  -CK      Precautions/Limitations, Hearing hearing impairment, bilaterally  -CK hearing impairment, bilaterally  -EC hearing impairment, bilaterally  -CK      Plans/Goals Discussed with patient  -CK patient  -EC patient  -CK         Pain Scale: Numbers Pre/Post-Treatment    Pretreatment Pain Rating 0/10 - no pain  -CK 0/10 - no pain  -EC 0/10 - no pain  -CK      Posttreatment Pain Rating 0/10 - no pain  -CK 0/10 - no pain  -EC 0/10 - no pain  -CK         Auditory Comprehension Assessment/Intervention    Auditory Comprehension (Communication) -- -- WFL  -CK      Able to Identify Objects/Pictures (Communication) -- -- WFL  -CK      Answers Questions (Communication) -- -- WFL  -CK      Able to Follow Commands (Communication) -- -- WFL  -CK         Oral Motor Structure and Function    Oral Motor Structure and Function -- -- WFL  -CK      Dentition Assessment -- -- natural, present and adequate  -CK         Oral Musculature and Cranial Nerve Assessment    Oral Motor General Assessment -- -- WFL  -CK         Cognitive Assessment Intervention- SLP    Orientation Status (Cognition) -- -- mild impairment  -CK      Memory (Cognitive) -- -- mild impairment  -CK      Thought Organization (Cognitive) -- -- WFL  -CK      Reasoning (Cognitive) -- -- WFL  -CK      Problem Solving (Cognitive) -- -- WFL  -CK      Functional Math (Cognitive) -- -- WFL  -CK      Executive Function (Cognition) -- -- WFL  -CK      Pragmatics (Communication) -- -- WFL  -CK         SLP Evaluation Clinical Impressions    SLP Diagnosis -- -- cognitive-linguistic disorder  Noted confusion however scored 15/15 on BIMs and 30/30 SLUMS  -CK      Rehab Potential/Prognosis -- -- good  -CK      SLC Criteria for Skilled Therapy Interventions Met -- -- yes  -CK      Functional Impact -- -- Poor Judgement  -CK         SLP Treatment Clinical Impressions    Treatment Assessment (SLP)  continued;cognitive-linguistic disorder  -CK continued;cognitive-linguistic disorder  -EC continued;cognitive-linguistic disorder  -CK      Treatment Assessment Comments (SLP) Pt seen for skilled ST services this date. Initially pt was very lethargic and falling asleep; however, pt aroused after tactile stimuli from SLP.  Pt continues to require mutliple repetitions of information and increased time for response formulation.  Pt also gave nonsensical answers for deductive reasoning questions.  Pt would smile and laugh after giving nonsensical responses.  Pt did meet orientation goal this date w/o use of visual aides.  Pt's lack of participation and willingness to provide legitimate answers is a barrier to progress.  -CK ST: pt with poor participation in tasks.  frequently needing repeititon likely d/t [place] but delayed processing cannot be ruled out. inconsistant responses noted during therapy tasks with egocentric questiions  -EC Pt seen for skilled ST services this date to address cognitive-lingusitic deficits.  Upon entry, pt was attempting to fill out his menu for the next three meals and was having notable dificulty despites attempts from dietary staff to assist.  Pt able to acknowledge his need for extended time, but demonstrated poor inhibition when help was offered and when he needed to ask for help.  Pt did participate well in all therapy tasks and met targets for 2/3 goals when given extended time and repetition of information/questions.  Continue POC.  -CK      Daily Summary of Progress (SLP) progress toward functional goals is gradual  -CK progress toward functional goals as expected  -EC progress toward functional goals is good  -CK      Barriers to Overall Progress (SLP) Cognitive status  -CK -- --      Plan for Continued Treatment (SLP) goals adjusted to reflect functional improvements demonstrated  -CK continue treatment per plan of care  -EC continue treatment per plan of care  -CK      Care Plan  Review evaluation/treatment results reviewed;care plan/treatment goals reviewed;risks/benefits reviewed;current/potential barriers reviewed;patient/other agree to care plan  -CK evaluation/treatment results reviewed;care plan/treatment goals reviewed;risks/benefits reviewed;current/potential barriers reviewed;patient/other agree to care plan  -EC evaluation/treatment results reviewed;care plan/treatment goals reviewed;risks/benefits reviewed;current/potential barriers reviewed;patient/other agree to care plan  -CK         Recommendations    Therapy Frequency (SLP SLC) 5 days per week  -CK 5 days per week  -EC 5 days per week  5-6 week  -CK      Predicted Duration Therapy Intervention (Days) until discharge  -CK until discharge  -EC --      Anticipated Discharge Disposition (SLP) home with assist  -CK home with assist  -EC --         Orientation Goal 1 (SLP)    Improve Orientation Through Goal 1 (SLP) demonstrating orientation to day;demonstrating orientation to month;demonstrating orientation to year;demonstrating orientation to place;90%;independently (over 90% accuracy)  -CK demonstrating orientation to day;demonstrating orientation to month;demonstrating orientation to year;demonstrating orientation to place;90%;independently (over 90% accuracy)  -EC demonstrating orientation to day;demonstrating orientation to month;demonstrating orientation to year;demonstrating orientation to place;90%;independently (over 90% accuracy)  -CK      Time Frame (Orientation Goal 1, SLP) by discharge  -CK by discharge  -EC by discharge  -CK      Progress (Orientation Goal 1, SLP) 90%;independently (over 90% accuracy)  -CK 90%;other (comment)  repetitions and inconsistant responses  -EC 90%  -CK      Progress/Outcomes (Orientation Goal 1, SLP) goal met  -CK goal ongoing  -EC goal ongoing  -CK         Reasoning Goal 1 (SLP)    Improve Reasoning Through Goal 1 (SLP) complete deductive reasoning task;90%;independently (over 90%  accuracy)  -CK complete deductive reasoning task;90%;independently (over 90% accuracy)  -EC complete deductive reasoning task;90%;independently (over 90% accuracy)  -CK      Time Frame (Reasoning Goal 1, SLP) by discharge  -CK by discharge  -EC by discharge  -CK      Progress (Reasoning Goal 1, SLP) --  didn't provide legitimate answers.  -CK with moderate cues (50-74%);80%  -%;with moderate cues (50-74%)  -CK      Progress/Outcomes (Reasoning Goal 1, SLP) goal ongoing  -CK goal ongoing  -EC goal ongoing  -CK         Functional Problem Solving Skills Goal 1 (SLP)    Improve Problem Solving Through Goal 1 (SLP) answer questions about ADL problems;90%;independently (over 90% accuracy)  -CK answer questions about ADL problems;90%;independently (over 90% accuracy)  -EC answer questions about ADL problems;90%;independently (over 90% accuracy)  -CK      Time Frame (Problem Solving Goal 1, SLP) by discharge  -CK by discharge  -EC by discharge  -CK      Progress (Problem Solving Goal 1, SLP) 60%  -CK 60%  -EC 60%  -CK      Progress/Outcomes (Problem Solving Goal 1, SLP) goal ongoing  -CK goal ongoing  -EC goal ongoing  -CK            User Key  (r) = Recorded By, (t) = Taken By, (c) = Cosigned By    Initials Name Effective Dates    EC Anita Siu, CCC-SLP 06/16/21 -     CK Latesha Ram, MS CCC-SLP 06/16/21 -                    EDUCATION  The patient has been educated in the following areas:     Cognitive Impairment Communication Impairment.           SLP GOALS     Row Name 01/04/23 1305 01/03/23 1218 01/02/23 0830       Orientation Goal 1 (SLP)    Improve Orientation Through Goal 1 (SLP) demonstrating orientation to day;demonstrating orientation to month;demonstrating orientation to year;demonstrating orientation to place;90%;independently (over 90% accuracy)  -CK demonstrating orientation to day;demonstrating orientation to month;demonstrating orientation to year;demonstrating orientation to  place;90%;independently (over 90% accuracy)  -EC demonstrating orientation to day;demonstrating orientation to month;demonstrating orientation to year;demonstrating orientation to place;90%;independently (over 90% accuracy)  -CK    Time Frame (Orientation Goal 1, SLP) by discharge  -CK by discharge  -EC by discharge  -CK    Progress (Orientation Goal 1, SLP) 90%;independently (over 90% accuracy)  -CK 90%;other (comment)  repetitions and inconsistant responses  -EC 90%  -CK    Progress/Outcomes (Orientation Goal 1, SLP) goal met  -CK goal ongoing  -EC goal ongoing  -CK       Reasoning Goal 1 (SLP)    Improve Reasoning Through Goal 1 (SLP) complete deductive reasoning task;90%;independently (over 90% accuracy)  -CK complete deductive reasoning task;90%;independently (over 90% accuracy)  -EC complete deductive reasoning task;90%;independently (over 90% accuracy)  -CK    Time Frame (Reasoning Goal 1, SLP) by discharge  -CK by discharge  -EC by discharge  -CK    Progress (Reasoning Goal 1, SLP) --  didn't provide legitimate answers.  -CK with moderate cues (50-74%);80%  -%;with moderate cues (50-74%)  -CK    Progress/Outcomes (Reasoning Goal 1, SLP) goal ongoing  -CK goal ongoing  -EC goal ongoing  -CK       Functional Problem Solving Skills Goal 1 (SLP)    Improve Problem Solving Through Goal 1 (SLP) answer questions about ADL problems;90%;independently (over 90% accuracy)  -CK answer questions about ADL problems;90%;independently (over 90% accuracy)  -EC answer questions about ADL problems;90%;independently (over 90% accuracy)  -CK    Time Frame (Problem Solving Goal 1, SLP) by discharge  -CK by discharge  -EC by discharge  -CK    Progress (Problem Solving Goal 1, SLP) 60%  -CK 60%  -EC 60%  -CK    Progress/Outcomes (Problem Solving Goal 1, SLP) goal ongoing  -CK goal ongoing  -EC goal ongoing  -CK          User Key  (r) = Recorded By, (t) = Taken By, (c) = Cosigned By    Initials Name Provider Type    EC Sherron  Anita TEIXEIRA CCC-SLP Speech and Language Pathologist    Latesha Andrews, MS CCC-SLP Speech and Language Pathologist                        Time Calculation:      Time Calculation- SLP     Row Name 01/04/23 1359             Time Calculation- SLP    SLP Start Time 1305  -CK      SLP Stop Time 1359  -CK      SLP Time Calculation (min) 54 min  -CK      Total Timed Code Minutes- SLP 54 minute(s)  -CK      SLP Received On 01/04/23  -CK      SLP Goal Re-Cert Due Date 01/13/23  -CK         Untimed Charges    72182-JI Treatment/ST Modification Prosth Aug Alter  54  -CK         Total Minutes    Untimed Charges Total Minutes 54  -CK       Total Minutes 54  -CK            User Key  (r) = Recorded By, (t) = Taken By, (c) = Cosigned By    Initials Name Provider Type    Latesha Andrews, MS CCC-SLP Speech and Language Pathologist                Therapy Charges for Today     Code Description Service Date Service Provider Modifiers Qty    [id number]  ST TREATMENT SPEECH 4 1/4/2023 Latesha Ram, MS CCC-SLP GN 1                     Latesha Ram MS CCC-LINDA  1/4/2023

## 2023-01-04 NOTE — PLAN OF CARE
Goal Outcome Evaluation:   Vss, up in chair most of the shift, appetite poor, eats bites with encouragement, [place], slow to respond, continent of bladder, no bm today, capable of voicing needs, will continue to monitor.

## 2023-01-04 NOTE — PLAN OF CARE
Goal Outcome Evaluation:  Plan of Care Reviewed With: patient        Progress: improving  Outcome Evaluation: Pt seen for skilled ST services this date. Initially pt was very lethargic and falling asleep; however, pt aroused after tactile stimuli from SLP.  Pt continues to require mutliple repetitions of information and increased time for response formulation.  Pt also gave nonsensical answers for deductive reasoning questions.  Pt would smile and laugh after giving nonsensical responses.  Pt did meet orientation goal this date w/o use of visual aides.  Pt's lack of participation and willingness to provide legitimate answers is a barrier to progress.

## 2023-01-04 NOTE — PROGRESS NOTES
Progress Note      Admit date: 12/30/2022 12:28 PM       Treatment Team     Provider Relationship Specialty Contact    Fan Rob MD Attending Rehabilitation   [phone]      Papi Hutchins PTA Physical Therapy Assistant Physical Therapy --    Aleja Tripp  -- --    Aldair Miller MD Consulting Physician Pulmonary Disease   [phone]      Latesha Ram MS CCC-SLP Speech Language Pathologist Speech Pathology   [phone]      Elayne Cohn LPN Licensed Practical Nurse -- --    Holly Sewell COTA Occupational Therapy Assistant Occupational Therapy --    Radha Pierre RN Registered Nurse -- --             Chief Complaint:  Nontraumatic multiple localized intracerebral hemorrhages (HCC)    HPI: No changes since admission    Vital Signs  Temp:  [98.8 °F (37.1 °C)-99.6 °F (37.6 °C)] 98.8 °F (37.1 °C)  Heart Rate:  [] 110  Resp:  [18] 18  BP: (145-169)/(73-76) 145/73    Physical Exam 1:    Constitutional: Alert stable cooperative physical therapy this morning on and Occupational Therapy     HENT:      Eyes:     Neck:      Cardiovascular: No chest pain    Pulmonary: No cough sputum production difficulty breathing of the normal on O2    Abdominal:      Genitourinary/Anorectal:       Musculoskeletal:     Skin:     Neurological: No progressive neurological changes    Psychiatric/Behavioral: Cooperative with physical therapy       Results Review:    I reviewed the patient's new clinical results.    Nursing notes and therapy notes have been reviewed.    I have reviewed medications.    Assessment/Plan  Principal Problem:    Nontraumatic multiple localized intracerebral hemorrhages (HCC) stable  Active Problems:    Pneumonia of both lower lobes due to infectious organism      Overview: - Discussed with Radiology (Dr. Davey), who reports that the patient has a       superior segment small patchy opacity in the left lung base likely       pneumonia, and a small patchy opacity  in the right lung base, likely       pneumonia. He recommended continuing with antibiotics and then doing       follow up imaging in 3 months.      - Blood cultures no growth to date.      - Levaquin day 4. Transitioned to PO today.      - Legionella and strep pneumo neg. Mycoplasma pending.  Talk to Dr. Miller who is reviewed his records he has an appointment with pulmonology in February anticipation him there is been some raising of a question of the cancer would imagine that that will be an issue for the future at this time this is not interfering with his self rehabilitation  Physical deconditioning improving      Overview: - PT/OT Consulted. Recommendations: home with home health.      - Patient may benefit from an assisted living.    Stage 4 very severe COPD by GOLD classification ([facility]) stable    Hypertension stable    Chronic respiratory failure with hypoxia, on home O2 therapy ([facility])    CVA (cerebral vascular accident) ([facility]) stable           Fan Rob MD  01/04/23  09:45 CST          This document has been electronically signed by Fan Rob MD on January 4, 2023 09:45 CST      Part of this note may be an electronic transcription/translation of spoken language to printed text using the Dragon Dictation System.

## 2023-01-04 NOTE — THERAPY TREATMENT NOTE
Inpatient Rehabilitation - Physical Therapy Treatment Note       [facility]     Patient Name: Brian Garcia  : 1950  MRN: [id number]    Today's Date: 2023                    Admit Date: 2022      Visit Dx:     ICD-10-CM ICD-9-CM   1. Dysphagia, unspecified type  R13.10 787.20   2. Symbolic dysfunction  R48.9 784.60   3. Impaired mobility and ADLs  Z74.09 V49.89    Z78.9    4. Impaired functional mobility, balance, gait, and endurance  Z74.09 V49.89       Patient Active Problem List   Diagnosis   • Pneumonia of both lower lobes due to infectious organism   • Physical deconditioning   • Pulmonary nodules   • Leukocytosis   • History of pulmonary embolism   • Stage 4 very severe COPD by GOLD classification ([facility])   • Chronic respiratory failure with hypoxia, on home O2 therapy (HCC)   • Nontraumatic multiple localized intracerebral hemorrhages (HCC)   • Hypertension   • CVA (cerebral vascular accident) ([facility])       Past Medical History:   Diagnosis Date   • COPD (chronic obstructive pulmonary disease) (HCC)    • Hypertension        Past Surgical History:   Procedure Laterality Date   • HIP ARTHROPLASTY         PT ASSESSMENT (last 12 hours)     IRF PT Evaluation and Treatment     Row Name 23 1408 23 1026       PT Time and Intention    Document Type daily treatment  -CHICO daily treatment  -CHICO    Mode of Treatment physical therapy;individual therapy  -CHICO physical therapy;individual therapy  -CHICO    Row Name 23 1408 23 1026       General Information    Patient Profile Reviewed yes  -JA yes  -JA    Existing Precautions/Restrictions fall  -JA fall  -JA    Limitations/Impairments hearing  -CHICO hearing  -CHICO    Row Name 23 1408 23 1026       Living Environment    Primary Care Provided by self  -JA self  -JA    Row Name 23 1408 23 1026       Home Use of Assistive/Adaptive Equipment    Equipment Currently Used at Home oxygen  -CHICO oxygen  -CHICO    Row Name  "01/04/23 1408 01/04/23 1026       Pain Assessment    Pretreatment Pain Rating 0/10 - no pain  -CHICO 0/10 - no pain  -CHICO    Posttreatment Pain Rating 0/10 - no pain  - 0/10 - no pain  -[facility] Name 01/04/23 1408 01/04/23 1026       Cognition/Psychosocial    Affect/Mental Status (Cognition) --  drowsy  - low arousal/lethargic  -    Orientation Status (Cognition) oriented x 4  - oriented to;person;place;situation  -    Follows Commands (Cognition) -- delayed response/completion;increased processing time needed;0-24% accuracy;follows one-step commands;follows two-step commands;initiation impaired;physical/tactile prompts required;repetition of directions required;verbal cues/prompting required;visual cue  -CHICO    Comment, Cognition -- Pt. with eyes closed throughout treatment required max v.c.'s for participation with limited activity  -JA    Row Name 01/04/23 1408 01/04/23 1026       Range of Motion Comprehensive    General Range of Motion bilateral lower extremity ROM WFL  - bilateral lower extremity ROM WFL  -JA    Row Name 01/04/23 1408          Bed Mobility    Comment, (Bed Mobility) Pt. deferred transferring to bed this p.m.  -JA     Row Name 01/04/23 1408 01/04/23 1026       Transfer Assessment/Treatment    Transfers sit-stand transfer;stand-sit transfer  -CHICO sit-stand transfer;stand-sit transfer  -CHICO    Comment, (Transfers) Pt. unable to achieve full erect stance this p.m. with increase L lateral lean and B knees flexed pt. states, \" I don't want to\"  -CHICO --    [facility] Name 01/04/23 1408 01/04/23 1026       Sit-Stand Transfer    Sit-Stand [place] (Transfers) minimum assist (75% patient effort);verbal cues;nonverbal cues (demo/gesture)  -CHICO minimum assist (75% patient effort);verbal cues;nonverbal cues (demo/gesture)  -CHICO    Assistive Device (Sit-Stand Transfers) walker, front-wheeled  -CHICO walker, front-wheeled  -[facility] Name 01/04/23 1408 01/04/23 1026       Stand-Sit Transfer    Stand-Sit " [place] (Transfers) minimum assist (75% patient effort);verbal cues;nonverbal cues (demo/gesture)  - minimum assist (75% patient effort);verbal cues;nonverbal cues (demo/gesture)  -    Assistive Device (Stand-Sit Transfers) walker, front-wheeled  - walker, front-wheeled  -    Row Name 01/04/23 1408 01/04/23 1026       Gait/Stairs (Locomotion)    Gait/Stairs Locomotion --  - gait/ambulation independence;gait/ambulation assistive device;distance ambulated;gait pattern;gait deviations  -    [place] Level (Gait) -- minimum assist (75% patient effort)  -    Assistive Device (Gait) --  - walker, front-wheeled  -    Distance in Feet (Gait) -- 4  -CHICO    Deviations/Abnormal Patterns (Gait) --  -CHICO festinating/shuffling;kimber decreased;stride length decreased  -    Bilateral Gait Deviations --  - forward flexed posture  albin hip and knee flexion. cueing for walker proximity.  -CHICO    Comment, (Gait/Stairs) Pt. deferred to attempt after 3 attempts with stance this p.m.  -CHICO w/c followed for safety, pt. sat impulsively with walker pushed out in front, increase forward flexed posture, and NBOS, with L lateral lean   -    Row Name 01/04/23 1408 01/04/23 1026       Safety Issues, Functional Mobility    Impairments Affecting Function (Mobility) balance;endurance/activity tolerance;shortness of breath;strength  - balance;endurance/activity tolerance;shortness of breath;strength  -    Row Name 01/04/23 1026          Motor Skills    Therapeutic Exercise hip;knee;ankle  -     Row Name 01/04/23 1026          Hip (Therapeutic Exercise)    Hip (Therapeutic Exercise) isometric exercises;AAROM (active assistive range of motion)  -     Hip AAROM (Therapeutic Exercise) bilateral;flexion  -     Hip Isometrics (Therapeutic Exercise) bilateral;aDduction  -     Row Name 01/04/23 1026          Knee (Therapeutic Exercise)    Knee (Therapeutic Exercise) AROM (active range of motion);strengthening  exercise  -     Knee AROM (Therapeutic Exercise) bilateral;LAQ (long arc quad)  -     Knee Strengthening (Therapeutic Exercise) sitting;bilateral;hamstring curls;resistance band;yellow  -     Row Name 01/04/23 1026          Ankle (Therapeutic Exercise)    Ankle (Therapeutic Exercise) AROM (active range of motion)  -     Ankle AROM (Therapeutic Exercise) bilateral;dorsiflexion;plantarflexion  -     Row Name 01/04/23 1408 01/04/23 1026       Positioning and Restraints    Pre-Treatment Position sitting in chair/recliner  - sitting in chair/recliner  -    Post Treatment Position wheelchair  - wheelchair  -    In Wheelchair sitting;call light within reach;encouraged to call for assist;exit alarm on  - sitting;call light within reach;encouraged to call for assist;exit alarm on  -    Row Name 01/04/23 1408 01/04/23 1026       Vital Signs    Pre Systolic BP Rehab 135  - 138  -    Pre Treatment Diastolic BP 63  - 63  -    Pretreatment Heart Rate (beats/min) 98  - 99  -    Pre SpO2 (%) 94  - 92  -    O2 Delivery Pre Treatment supplemental O2  - supplemental O2  -    Pre Patient Position Sitting  - Sitting  -    Intra Patient Position Standing  - Standing  -    Post Patient Position Sitting  - Sitting  -    Row Name 01/04/23 1408 01/04/23 1026       Therapy Assessment/Plan (PT)    Patient's Goals For Discharge return home  - return home  -    Row Name 01/04/23 1408 01/04/23 1026       Daily Progress Summary (PT)    Functional Goal Overall Progress (PT) not progressing toward functional goals as expected  - not progressing toward functional goals as expected  -    Row Name 01/04/23 1408 01/04/23 1026       Therapy Plan Review/Discharge Plan (PT)    Anticipated Equipment Needs at Discharge (PT Eval) front wheeled walker;hospital bed;wheelchair  - front wheeled walker;hospital bed;wheelchair  -    Anticipated Discharge Disposition (PT) home with 24/7 care;home with  home health;skilled nursing facility  [facility] home with 24/7 care;home with home health;skilled nursing facility  [facility]          User Key  (r) = Recorded By, (t) = Taken By, (c) = Cosigned By    Initials Name Provider Type    Papi Smith, PTA Physical Therapist Assistant                 Physical Therapy Education     Title: PT OT SLP Therapies (In Progress)     Topic: Physical Therapy (In Progress)     Point: Mobility training (In Progress)     Learning Progress Summary           Patient Acceptance, E, NR by  at 1/3/2023 1206    Acceptance, E, NR by  at 1/2/2023 1219    Acceptance, E, NR by [facility] at 12/31/2022 1345                   Point: Home exercise program (Not Started)     Learner Progress:  Not documented in this visit.          Point: Body mechanics (In Progress)     Learning Progress Summary           Patient Acceptance, E, NR by [facility] at 12/31/2022 1345                   Point: Precautions (In Progress)     Learning Progress Summary           Patient Acceptance, E, NR by [facility] at 12/31/2022 1345                               User Key     Initials Effective Dates Name Provider Type Discipline    [facility] 06/16/21 -  Nydia Muñoz, PT Physical Therapist PT     06/16/21 -  Yossi Haynes PTA Physical Therapist Assistant PT                PT Recommendation and Plan       Anticipated Equipment Needs at Discharge (PT Eval): front wheeled walker, hospital bed, wheelchair  Plan of Care Reviewed With: patient  Daily Progress Summary (PT)  Functional Goal Overall Progress (PT): not progressing toward functional goals as expected  Progress: no change  Outcome Evaluation: Pt. with limited participation with PT today. Pt. this a.m. able to perform seated therex x15 reps A-AAROM, sit-stand-sit CGA with RW, amb. ~4ft with RW Rachna with poor gt. quality with NBOS, short step length, forward flexed posture, and L lateral lean, pt. lethargic during treatment with eyes closed throughout majority of treatment, pt. would  arouse, but with max encouragement and v.c.'s. Pt. this p.m. agreeable to very limited mobility & deferred to ambulate/or transfer  to bed even though c/o's increase fatigue throughout treatment this p.m. Pt. peformed sit-stand-sit Rachna x3 reps with pt. unable to achieve full erect stance with any attempt this  p.m. Cont. efforts to mobilize. Pt. will need 24/7 assist upon d/c, ramp, RW, w/c, hospital bed, BSC, and HHPT. If assist not available for d/c  pt. would need SNF placement.       Outcome Measures     Row Name 01/03/23 1404 01/03/23 1044 01/02/23 1011       How much help from another person do you currently need...    Turning from your back to your side while in flat bed without using bedrails? -- 3  -EUGENE 3  -EUGENE    Moving from lying on back to sitting on the side of a flat bed without bedrails? -- 3  -EUGENE 3  -EUGENE    Moving to and from a bed to a chair (including a wheelchair)? -- 3  -EUGENE 3  -EUGENE    Standing up from a chair using your arms (e.g., wheelchair, bedside chair)? -- 3  -EUGENE 3  -EUGENE    Climbing 3-5 steps with a railing? -- 2  -EUGENE 2  -EUGENE    To walk in hospital room? -- 3  -EUGENE 3  -EUGENE    AM-PAC 6 Clicks Score (PT) -- 17  -EUGENE 17  -EUGENE       Functional Assessment    Outcome Measure Options AM-PAC 6 Clicks Basic Mobility (PT)  -EUGENE AM-PAC 6 Clicks Basic Mobility (PT)  -EUGENE AM-PAC 6 Clicks Basic Mobility (PT)  -EUGENE          User Key  (r) = Recorded By, (t) = Taken By, (c) = Cosigned By    Initials Name Provider Type    Yossi Ragland, PTA Physical Therapist Assistant                     Time Calculation:      PT Charges     Row Name 01/04/23 1453 01/04/23 1117          Time Calculation    Start Time 1408  -JA 1026  -JA     Stop Time 1431  -JA 1105  -JA     Time Calculation (min) 23 min  -JA 39 min  -JA        Time Calculation- PT    Total Timed Code Minutes- PT 23 minute(s)  -JA 39 minute(s)  -JA        Timed Charges    46362 - PT Therapeutic Exercise Minutes -- 30  -JA     42939 - PT Therapeutic Activity  Minutes 23  -JA 9  -JA        Total Minutes    Timed Charges Total Minutes 23  -JA 39  -JA      Total Minutes 23  -JA 39  -JA           User Key  (r) = Recorded By, (t) = Taken By, (c) = Cosigned By    Initials Name Provider Type    Papi Smith PTA Physical Therapist Assistant                Therapy Charges for Today     Code Description Service Date Service Provider Modifiers Qty    44372038887 HC PT THER PROC EA 15 MIN 1/4/2023 Papi Hutchins, REGINE GP 2    74994938103 HC PT THERAPEUTIC ACT EA 15 MIN 1/4/2023 Papi Hutchins, PTA GP 1    10157304656 HC PT THERAPEUTIC ACT EA 15 MIN 1/4/2023 Papi Hutchins, PTA GP 2            PT G-Codes  Outcome Measure Options: AM-PAC 6 Clicks Basic Mobility (PT)  AM-PAC 6 Clicks Score (PT): 17  AM-PAC 6 Clicks Score (OT): 18  Modified Clayton Scale: 0 - No Symptoms at all.      Papi Hutchins PTA  1/4/2023

## 2023-01-04 NOTE — PLAN OF CARE
Goal Outcome Evaluation:  Plan of Care Reviewed With: patient        Progress: no change  Outcome Evaluation: Pt. with limited participation with PT today. Pt. this a.m. able to perform seated therex x15 reps A-AAROM, sit-stand-sit CGA with RW, amb. ~4ft with RW Rachna with poor gt. quality with NBOS, short step length, forward flexed posture, and L lateral lean, pt. lethargic during treatment with eyes closed throughout majority of treatment, pt. would arouse, but with max encouragement and v.c.'s. Pt. this p.m. agreeable to very limited mobility & deferred to ambulate/or transfer  to bed even though c/o's increase fatigue throughout treatment this p.m. Pt. peformed sit-stand-sit Rachna x3 reps with pt. unable to achieve full erect stance with any attempt this  p.m. Cont. efforts to mobilize. Pt. will need 24/7 assist upon d/c, ramp, RW, w/c, hospital bed, BSC, and HHPT. If assist not available for d/c  pt. would need SNF placement.

## 2023-01-04 NOTE — PLAN OF CARE
Goal Outcome Evaluation:           Progress: improving  Outcome Evaluation: Alert and voices needs as they arise, VSS with no acute distress noted,Poor participation in tasks needs repeition. [place], up with gaitbelt and wlker with assist of 1 , denies pain.

## 2023-01-04 NOTE — PLAN OF CARE
Goal Outcome Evaluation:  Plan of Care Reviewed With: patient        Progress: improving  Outcome Evaluation: pt perf fair with OT pt perf bed mob with min a sitting eob sba perf grooming with shaving electric razor increased time cues to complete  morrow-cga  lb donning doffing with min a-mod a due to increased time and cues and shoes socks with mod a toilet tf increased time effort and pt very distracted with wanting toilet paper roll off  and ed on safety with tf.  Pt wanting wheelchair pulled up too far for safe tf  repo several times and ed

## 2023-01-04 NOTE — THERAPY TREATMENT NOTE
Inpatient Rehabilitation - Occupational Therapy Treatment Note    [facility]     Patient Name: Brian Garcia  : 1950  MRN: [id number]    Today's Date: 2023                 Admit Date: 2022         ICD-10-CM ICD-9-CM   1. Dysphagia, unspecified type  R13.10 787.20   2. Symbolic dysfunction  R48.9 784.60   3. Impaired mobility and ADLs  Z74.09 V49.89    Z78.9    4. Impaired functional mobility, balance, gait, and endurance  Z74.09 V49.89       Patient Active Problem List   Diagnosis   • Pneumonia of both lower lobes due to infectious organism   • Physical deconditioning   • Pulmonary nodules   • Leukocytosis   • History of pulmonary embolism   • Stage 4 very severe COPD by GOLD classification ([facility])   • Chronic respiratory failure with hypoxia, on home O2 therapy ([facility])   • Nontraumatic multiple localized intracerebral hemorrhages ([facility])   • Hypertension   • CVA (cerebral vascular accident) ([facility])       Past Medical History:   Diagnosis Date   • COPD (chronic obstructive pulmonary disease) ([facility])    • Hypertension        Past Surgical History:   Procedure Laterality Date   • HIP ARTHROPLASTY               IRF OT ASSESSMENT FLOWSHEET (last 12 hours)     IRF OT Evaluation and Treatment     Row Name 23 0825          OT Time and Intention    Document Type daily treatment  -LM     Mode of Treatment individual therapy;occupational therapy  -LM     Patient Effort good  -LM     Row Name 23 0825          General Information    Existing Precautions/Restrictions fall  -LM     Limitations/Impairments hearing  -LM     Row Name 23 0825          Pain Assessment    Pretreatment Pain Rating 0/10 - no pain  -LM     Posttreatment Pain Rating 0/10 - no pain  -LM     Row Name 23 0825          Cognition/Psychosocial    Orientation Status (Cognition) oriented x 4  -LM     Follows Commands (Cognition) WFL  -LM     Row Name 23 0825          Basic Activities of Daily Living (BADLs)     Basic Activities of Daily Living bathing;upper body dressing;lower body dressing;grooming;toileting;self-feeding  -[facility] Name 01/04/23 0825          Bathing    [place] Level (Bathing) bathing skills;lower body;distal lower extremities/feet;proximal lower extremities;perineal area;moderate assist (50% patient effort);other (see comments)  mod a due to vcs required to continue act and required incresaed time for all tasks  -[facility] Name 01/04/23 0825          Lower Body Dressing    [place] Level (Lower Body Dressing) doff;don;shoes/slippers;pants/bottoms;socks;underwear;minimum assist (75% patient effort)  -[facility] Name 01/04/23 0825          Grooming    [place] Level (Grooming) grooming skills;deodorant application;hair care, combing/brushing;wash face, hands;set up;verbal cues;contact guard assist  SBA  -[facility] Name 01/04/23 0825          Toileting    [place] Level (Toileting) toileting skills;adjust/manage clothing;bridging;perform perineal hygiene;minimum assist (75% patient effort);verbal cues  pt perf toileting with increased time and effort  -[facility] Name 01/04/23 0825          Bed Mobility    Bed Mobility bed mobility (all) activities;supine-sit  -     All Activities, [place] (Bed Mobility) moderate assist (50% patient effort)  due to pt lack of participation and effort  -[facility] Name 01/04/23 0825          Functional Mobility    Functional Mobility- Ind. Level independent  -[facility] Name 01/04/23 0825          Transfer Assessment/Treatment    Transfers bed-chair transfer;sit-stand transfer;stand-sit transfer  -[facility] Name 01/04/23 0825          Bed-Chair Transfer    Bed-Chair [place] (Transfers) minimum assist (75% patient effort)  -     Row Name 01/04/23 0825          Sit-Stand Transfer    Sit-Stand [place] (Transfers) minimum assist (75% patient effort)  -[facility] Name 01/04/23 0825          Toilet Transfer    Type (Toilet Transfer)  sit-stand;stand-sit;stand pivot/stand step  -LM     [place] Level (Toilet Transfer) --  toilet tf with min a  although pt required multi cues to perf tf and ed on tf pt distracted wanted toilet paper roll off and to pull chair up too far  ed on safety with wheelchair morrow positon.  -LM     Row Name 01/04/23 0825          Motor Skills    Motor Skills coordination;motor control/coordination interventions  -LM     Coordination WNL  -LM     Therapeutic Exercise shoulder;wrist;elbow/forearm;hand  -LM     Row Name 01/04/23 0825          Shoulder (Therapeutic Exercise)    Shoulder (Therapeutic Exercise) AROM (active range of motion)  -LM     Row Name 01/04/23 0825          Elbow/Forearm (Therapeutic Exercise)    Elbow/Forearm (Therapeutic Exercise) AROM (active range of motion)  -LM     Row Name 01/04/23 0825          Wrist (Therapeutic Exercise)    Wrist (Therapeutic Exercise) AROM (active range of motion)  -LM     Row Name 01/04/23 0825          Positioning and Restraints    Pre-Treatment Position in bed  -LM     Post Treatment Position other  toilet nursing sitting with pt at griffiths exit  -LM     Row Name 01/04/23 0825          Bathing Goal 1 (OT-IRF)    Activity/Device (Bathing Goal 1, OT-IRF) lower body bathing  -LM     [place] Level (Bathing Goal 1, OT-IRF) modified independence  -LM     Time Frame (Bathing Goal 1, OT-IRF) by discharge;long-term goal (LTG)  -LM     Progress/Outcomes (Bathing Goal 1, OT-IRF) goal not met  -LM     Row Name 01/04/23 0825          LB Dressing Goal 1 (OT-IRF)    Activity/Device (LB Dressing Goal 1, OT-IRF) lower body dressing  -LM     [place] (LB Dressing Goal 1, OT-IRF) modified independence  -LM     Time Frame (LB Dressing Goal 1, OT-IRF) long-term goal (LTG);by discharge  -LM     Progress/Outcomes (LB Dressing Goal 1, OT-IRF) goal not met  -LM     Row Name 01/04/23 0825          Strength Goal 1 (OT-IRF)    Strength Goal 1 (OT-IRF) Pt will increase BUE strength in all planes  by one level for increased strength and endurance required for ADL routine.  -LM     Time Frame (Strength Goal 1, OT-IRF) long-term goal (LTG);by discharge  -LM     Progress/Outcomes (Strength Goal 1, OT-IRF) goal not met  -LM     Row Name 01/04/23 0825           Endurance Goal 1 (OT)    Activity Level (Endurance Goal 1, OT) endurance 2 good  -LM     Progress/Outcome (Endurance Goal 1, OT) goal not met  -LM           User Key  (r) = Recorded By, (t) = Taken By, (c) = Cosigned By    Initials Name Effective Dates    LM Holly Sewell COTA 06/16/21 -                  Occupational Therapy Education     Title: PT OT SLP Therapies (In Progress)     Topic: Occupational Therapy (In Progress)     Point: ADL training (Done)     Description:   Instruct learner(s) on proper safety adaptation and remediation techniques during self care or transfers.   Instruct in proper use of assistive devices.              Learning Progress Summary           Patient Acceptance, E,TB, VU by MARCELL at 12/31/2022 1522    Acceptance, E,TB, VU by EDSON at 12/31/2022 1211    Comment: OT POC, Role of OT, safe ADL mobility and t/f                   Point: Home exercise program (Not Started)     Description:   Instruct learner(s) on appropriate technique for monitoring, assisting and/or progressing therapeutic exercises/activities.              Learner Progress:  Not documented in this visit.          Point: Precautions (Not Started)     Description:   Instruct learner(s) on prescribed precautions during self-care and functional transfers.              Learner Progress:  Not documented in this visit.          Point: Body mechanics (Not Started)     Description:   Instruct learner(s) on proper positioning and spine alignment during self-care, functional mobility activities and/or exercises.              Learner Progress:  Not documented in this visit.                      User Key     Initials Effective Dates Name Provider Type Discipline     06/16/21 -   Lisa José COTA Occupational Therapist Assistant OT    CM 11/18/22 -  Ghazala Alfonso OT Occupational Therapist OT                    OT Recommendation and Plan         Plan of Care Review  Plan of Care Reviewed With: patient  Progress: improving  Outcome Evaluation: pt perf fair with OT pt perf bed mob with min a sitting eob sba perf grooming with shaving electric razor increased time cues to complete  morrow-cga  lb donning doffing with min a-mod a due to increased time and cues and shoes socks with mod a  Plan of Care Reviewed With: patient  Progress: improving       Outcome Measures     Row Name 01/03/23 1404 01/03/23 1044 01/02/23 1011       How much help from another person do you currently need...    Turning from your back to your side while in flat bed without using bedrails? -- 3  -EUGENE 3  -EUGENE    Moving from lying on back to sitting on the side of a flat bed without bedrails? -- 3  -EUGENE 3  -EUGENE    Moving to and from a bed to a chair (including a wheelchair)? -- 3  -EUGENE 3  -EUGENE    Standing up from a chair using your arms (e.g., wheelchair, bedside chair)? -- 3  -EUGENE 3  -EUGENE    Climbing 3-5 steps with a railing? -- 2  -EUGENE 2  -EUGENE    To walk in hospital room? -- 3  -EUGENE 3  -EUGENE    AM-PAC 6 Clicks Score (PT) -- 17  -EUGENE 17  -EUGENE       Functional Assessment    Outcome Measure Options AM-PAC 6 Clicks Basic Mobility (PT)  -EUGENE AM-PAC 6 Clicks Basic Mobility (PT)  -EUGENE AM-PAC 6 Clicks Basic Mobility (PT)  -EUGENE          User Key  (r) = Recorded By, (t) = Taken By, (c) = Cosigned By    Initials Name Provider Type    Yossi Ragland, PTA Physical Therapist Assistant                  Time Calculation:      Time Calculation- OT     Row Name 01/04/23 1515             Time Calculation- OT    OT Start Time 0825  -LM      OT Stop Time 0955  -LM      OT Time Calculation (min) 90 min  -LM      Total Timed Code Minutes- OT 90 minute(s)  -LM      OT Received On 01/04/23  -LM         Timed Charges    99142 - OT Therapeutic Activity  Minutes 15  -LM      76264 - OT Self Care/Mgmt Minutes 75  -LM         Total Minutes    Timed Charges Total Minutes 90  -LM       Total Minutes 90  -LM            User Key  (r) = Recorded By, (t) = Taken By, (c) = Cosigned By    Initials Name Provider Type    Holly Broussard COTA Occupational Therapist Assistant              Therapy Charges for Today     Code Description Service Date Service Provider Modifiers Qty    45898037176 HC OT SELF CARE/MGMT/TRAIN EA 15 MIN 1/3/2023 Holly Sewell COTA GO 1    86432252619 HC OT THERAPEUTIC ACT EA 15 MIN 1/3/2023 Holly Sewell COTA GO 2    44759051431 HC OT THER PROC EA 15 MIN 1/3/2023 Holly Sewell COTA GO 3    70913739142 HC OT SELF CARE/MGMT/TRAIN EA 15 MIN 1/4/2023 Holly Sewell COTA GO 5    33013809450 HC OT THERAPEUTIC ACT EA 15 MIN 1/4/2023 Holly Sewell COTA GO 1                   ALEXANDER Fragoso  1/4/2023

## 2023-01-05 ENCOUNTER — APPOINTMENT (OUTPATIENT)
Dept: CT IMAGING | Facility: HOSPITAL | Age: 73
DRG: 949 | End: 2023-01-05
Payer: OTHER GOVERNMENT

## 2023-01-05 PROCEDURE — 92507 TX SP LANG VOICE COMM INDIV: CPT | Performed by: SPEECH-LANGUAGE PATHOLOGIST

## 2023-01-05 PROCEDURE — 97110 THERAPEUTIC EXERCISES: CPT

## 2023-01-05 PROCEDURE — 70450 CT HEAD/BRAIN W/O DYE: CPT

## 2023-01-05 PROCEDURE — 63710000001 PREDNISONE PER 5 MG: Performed by: ORTHOPAEDIC SURGERY

## 2023-01-05 PROCEDURE — 97535 SELF CARE MNGMENT TRAINING: CPT

## 2023-01-05 PROCEDURE — 97530 THERAPEUTIC ACTIVITIES: CPT

## 2023-01-05 PROCEDURE — 99231 SBSQ HOSP IP/OBS SF/LOW 25: CPT | Performed by: ORTHOPAEDIC SURGERY

## 2023-01-05 PROCEDURE — 94799 UNLISTED PULMONARY SVC/PX: CPT

## 2023-01-05 PROCEDURE — 94760 N-INVAS EAR/PLS OXIMETRY 1: CPT

## 2023-01-05 PROCEDURE — 97168 OT RE-EVAL EST PLAN CARE: CPT

## 2023-01-05 PROCEDURE — 97116 GAIT TRAINING THERAPY: CPT

## 2023-01-05 RX ADMIN — PREDNISONE 5 MG: 5 TABLET ORAL at 08:12

## 2023-01-05 RX ADMIN — Medication 1000 UNITS: at 08:12

## 2023-01-05 RX ADMIN — TIZANIDINE 2 MG: 2 TABLET ORAL at 20:55

## 2023-01-05 RX ADMIN — DILTIAZEM HYDROCHLORIDE 180 MG: 180 CAPSULE, COATED, EXTENDED RELEASE ORAL at 08:12

## 2023-01-05 RX ADMIN — MONTELUKAST SODIUM 10 MG: 10 TABLET, COATED ORAL at 20:55

## 2023-01-05 RX ADMIN — DOCUSATE SODIUM 100 MG: 100 CAPSULE, LIQUID FILLED ORAL at 20:55

## 2023-01-05 NOTE — NURSING NOTE
"Patient called nurse and stated \"I just had a dream about you and I haven't had much sleep tonight\", \"I need for you to help me get comfortable so I can get some sleep\" this nurse explained to patient that it was time to get up and that breakfast would be here soon, I also explained to patient that therapy would be here to work with him, patient stated \"I don't have the energy nor the desire to anything with anyone, what I am going to do is get comfortable in this bed so I can get some sleep\". Patient was unwilling to get in the wheelchair and put his feet up in the bed. Reiterated to patient as to why he was here he continued to state that he was going to get some sleep and that I could work with therapy.   "

## 2023-01-05 NOTE — PLAN OF CARE
Goal Outcome Evaluation:  Plan of Care Reviewed With: patient   Pt participated well w/ OT  after he became awake he was slow to wake and to get up to eob  he transferred bed>w/c w/ min A completed grooming act w/ morrow and vc's took extra time to complete. Donned shoes w/ min A,   needs 24/7 care   cont poc

## 2023-01-05 NOTE — PLAN OF CARE
Goal Outcome Evaluation:  Plan of Care Reviewed With: patient           Outcome Evaluation: OT re-evaluation completed. Pt sitting up in w/c upon arrival. Pt was CGA for sit <> stand t/f with FWW. Pt completed ambulation to the toilet with CGA and FWW. Completed toilet t/f with CGA and use of grab bars. Verbal cues and a lot of increase in time required for toileting with no tactile assistance required. Pt completed mobility to the sink with CGA and FWW. Completed hand washing in seated position at the sink with SBA. Pt was Mod A for donning/doffing socks and shoes in seated position with a step stool elevating his LEs. Pt requires increased time and decreased processing skills, more so than did during the initial evaluation. Pt was left sitting up in w/c with nursing notified and all needs in reach. Pt continues to demonstrate decreased UB strength, impaired safety with ADLs, as well as decreased (I) with ADLs and mobility. All goals continued. Continue OT POC.

## 2023-01-05 NOTE — THERAPY TREATMENT NOTE
Inpatient Rehabilitation - Occupational Therapy Treatment Note    [facility]     Patient Name: Brian Garcia  : 1950  MRN: [id number]    Today's Date: 2023                 Admit Date: 2022         ICD-10-CM ICD-9-CM   1. Dysphagia, unspecified type  R13.10 787.20   2. Symbolic dysfunction  R48.9 784.60   3. Impaired mobility and ADLs  Z74.09 V49.89    Z78.9    4. Impaired functional mobility, balance, gait, and endurance  Z74.09 V49.89       Patient Active Problem List   Diagnosis   • Pneumonia of both lower lobes due to infectious organism   • Physical deconditioning   • Pulmonary nodules   • Leukocytosis   • History of pulmonary embolism   • Stage 4 very severe COPD by GOLD classification ([facility])   • Chronic respiratory failure with hypoxia, on home O2 therapy ([facility])   • Nontraumatic multiple localized intracerebral hemorrhages ([facility])   • Hypertension   • CVA (cerebral vascular accident) ([facility])       Past Medical History:   Diagnosis Date   • COPD (chronic obstructive pulmonary disease) ([facility])    • Hypertension        Past Surgical History:   Procedure Laterality Date   • HIP ARTHROPLASTY               IRF OT ASSESSMENT FLOWSHEET (last 12 hours)     IRF OT Evaluation and Treatment     Row Name 23 0857          OT Time and Intention    Document Type daily treatment  -RC     Mode of Treatment individual therapy;occupational therapy  -RC     Total Minutes, Occupational Therapy 90  -RC     Patient Effort good  -RC     Row Name 23 0857          General Information    Existing Precautions/Restrictions fall  -RC     Limitations/Impairments hearing  -RC     Row Name 23 0857          Pain Assessment    Pretreatment Pain Rating 0/10 - no pain  -RC     Posttreatment Pain Rating 0/10 - no pain  -RC     Row Name 23 0857          Cognition/Psychosocial    Orientation Status (Cognition) oriented x 4  -RC     Follows Commands (Cognition) WFL  -RC     Row Name 23 0857           Basic Activities of Daily Living (BADLs)    Basic Activities of Daily Living bathing;upper body dressing;lower body dressing;grooming;toileting;self-feeding  -     Row Name 01/05/23 0857          Bathing    [place] Level (Bathing) bathing skills;lower body;distal lower extremities/feet;proximal lower extremities;perineal area;moderate assist (50% patient effort);other (see comments)  mod a due to vcs required to continue act and required incresaed time for all tasks  -     Row Name 01/05/23 0857          Lower Body Dressing    [place] Level (Lower Body Dressing) doff;don;shoes/slippers;pants/bottoms;socks;underwear;minimum assist (75% patient effort)  -[facility] Name 01/05/23 0857          Grooming    [place] Level (Grooming) grooming skills;deodorant application;hair care, combing/brushing;wash face, hands;set up;verbal cues  SBA  -     Position (Grooming) sink side;supported sitting  in w/c  -[facility] Name 01/05/23 0857          Toileting    [place] Level (Toileting) --  pt perf toileting with increased time and effort  -[facility] Name 01/05/23 0857          Bed Mobility    Bed Mobility --  -     All Activities, [place] (Bed Mobility) --  -     Supine-Sit [place] (Bed Mobility) minimum assist (75% patient effort)  -     Row Name 01/05/23 0857          Functional Mobility    Functional Mobility- Ind. Level independent  -[facility] Name 01/05/23 0857          Transfer Assessment/Treatment    Transfers bed-chair transfer;sit-stand transfer;stand-sit transfer  -[facility] Name 01/05/23 0857          Bed-Chair Transfer    Bed-Chair [place] (Transfers) minimum assist (75% patient effort)  -     Row Name 01/05/23 0857          Sit-Stand Transfer    Sit-Stand [place] (Transfers) minimum assist (75% patient effort)  -[facility] Name 01/05/23 0857          Toilet Transfer    Type (Toilet Transfer) --  -     [place] Level (Toilet Transfer) --  -[facility]  Name 01/05/23 0857          Balance    Balance Assessment --  sat eob for ~ 20 min w/ nelly no lob  -RC     Row Name 01/05/23 0857          Positioning and Restraints    Pre-Treatment Position in bed  -RC     Post Treatment Position wheelchair  -RC     In Wheelchair with PT  -RC     Row Name 01/05/23 0857          Vital Signs    Post SpO2 (%) 92  -RC     O2 Delivery Post Treatment supplemental O2  -RC     Row Name 01/05/23 0857          Daily Progress Summary (OT)    Overall Progress Toward Functional Goals (OT) progressing toward functional goals as expected  -RC     Barriers to Overall Progress (OT) left neglect, fatigue  -RC     Recommendations (OT) 24/7 assist  -RC     Row Name 01/05/23 0857          Bathing Goal 1 (OT-IRF)    Activity/Device (Bathing Goal 1, OT-IRF) lower body bathing  -RC     [place] Level (Bathing Goal 1, OT-IRF) modified independence  -RC     Time Frame (Bathing Goal 1, OT-IRF) by discharge;long-term goal (LTG)  -RC     Progress/Outcomes (Bathing Goal 1, OT-IRF) goal not met  -RC     Row Name 01/05/23 0857          LB Dressing Goal 1 (OT-IRF)    Activity/Device (LB Dressing Goal 1, OT-IRF) lower body dressing  -RC     [place] (LB Dressing Goal 1, OT-IRF) modified independence  -RC     Time Frame (LB Dressing Goal 1, OT-IRF) long-term goal (LTG);by discharge  -RC     Progress/Outcomes (LB Dressing Goal 1, OT-IRF) goal not met  -RC     Row Name 01/05/23 0857          Strength Goal 1 (OT-IRF)    Strength Goal 1 (OT-IRF) Pt will increase BUE strength in all planes by one level for increased strength and endurance required for ADL routine.  -RC     Time Frame (Strength Goal 1, OT-IRF) long-term goal (LTG);by discharge  -RC     Progress/Outcomes (Strength Goal 1, OT-IRF) goal not met  -RC     Row Name 01/05/23 0857           Endurance Goal 1 (OT)    Activity Level (Endurance Goal 1, OT) endurance 2 good  -RC     Progress/Outcome (Endurance Goal 1, OT) goal not met  -RC           User Key   (r) = Recorded By, (t) = Taken By, (c) = Cosigned By    Initials Name Effective Dates     Annette Carey COTA 06/16/21 -                  Occupational Therapy Education     Title: PT OT SLP Therapies (In Progress)     Topic: Occupational Therapy (In Progress)     Point: ADL training (Done)     Description:   Instruct learner(s) on proper safety adaptation and remediation techniques during self care or transfers.   Instruct in proper use of assistive devices.              Learning Progress Summary           Patient Acceptance, E, VU,NR by  at 1/5/2023 1517    Acceptance, E,TB, VU by  at 12/31/2022 1522    Acceptance, E,TB, VU by  at 12/31/2022 1211    Comment: OT POC, Role of OT, safe ADL mobility and t/f                   Point: Home exercise program (Not Started)     Description:   Instruct learner(s) on appropriate technique for monitoring, assisting and/or progressing therapeutic exercises/activities.              Learner Progress:  Not documented in this visit.          Point: Precautions (Done)     Description:   Instruct learner(s) on prescribed precautions during self-care and functional transfers.              Learning Progress Summary           Patient Acceptance, E, VU,NR by  at 1/5/2023 1517                   Point: Body mechanics (Done)     Description:   Instruct learner(s) on proper positioning and spine alignment during self-care, functional mobility activities and/or exercises.              Learning Progress Summary           Patient Acceptance, E, VU,NR by  at 1/5/2023 1517                               User Key     Initials Effective Dates Name Provider Type Discipline     06/16/21 -  Lisa José COTA Occupational Therapist Assistant OT    MURIEL 06/16/21 -  Annette Carey COTA Occupational Therapist Assistant OT     11/18/22 -  Ghazala Alfonso OT Occupational Therapist OT                    OT Recommendation and Plan         Plan of Care Review  Plan of Care Reviewed  With: patient  Daily Progress Summary (OT)  Overall Progress Toward Functional Goals (OT): progressing toward functional goals as expected  Barriers to Overall Progress (OT): left neglect, fatigue  Recommendations (OT): 24/7 assist  Plan of Care Reviewed With: patient       Outcome Measures     Row Name 01/03/23 1404 01/03/23 1044          How much help from another person do you currently need...    Turning from your back to your side while in flat bed without using bedrails? -- 3  -EUGENE     Moving from lying on back to sitting on the side of a flat bed without bedrails? -- 3  -EUGENE     Moving to and from a bed to a chair (including a wheelchair)? -- 3  -EUGENE     Standing up from a chair using your arms (e.g., wheelchair, bedside chair)? -- 3  -EUGENE     Climbing 3-5 steps with a railing? -- 2  -EUGENE     To walk in hospital room? -- 3  -EUGENE     AM-PAC 6 Clicks Score (PT) -- 17  -EUGENE        Functional Assessment    Outcome Measure Options AM-PAC 6 Clicks Basic Mobility (PT)  -EUGENE AM-PAC 6 Clicks Basic Mobility (PT)  -EUGENE           User Key  (r) = Recorded By, (t) = Taken By, (c) = Cosigned By    Initials Name Provider Type    Yossi Ragland, PTA Physical Therapist Assistant                  Time Calculation:      Time Calculation- OT     Row Name 01/05/23 1247 01/05/23 1135          Time Calculation- OT    OT Start Time 0857  -RC --     OT Stop Time 1027  -RC --     OT Time Calculation (min) 90 min  -RC --     Total Timed Code Minutes- OT 90 minute(s)  -RC --     OT Received On 01/05/23 -RC --        Timed Charges    83875 - Gait Training Minutes  -- 25  -JA     55281 - OT Therapeutic Activity Minutes 30  -RC --     09730 - OT Self Care/Mgmt Minutes 60  -RC --        Total Minutes    Timed Charges Total Minutes 90  -RC 25  -JA      Total Minutes 90  -RC 25  -JA           User Key  (r) = Recorded By, (t) = Taken By, (c) = Cosigned By    Initials Name Provider Type    Papi Smith, PTA Physical Therapist Assistant     Annette Gonzalez COTA Occupational Therapist Assistant              Therapy Charges for Today     Code Description Service Date Service Provider Modifiers Qty    23076173830  OT THERAPEUTIC ACT EA 15 MIN 1/5/2023 Annette Carey COTA GO 2    58330821032  OT SELF CARE/MGMT/TRAIN EA 15 MIN 1/5/2023 Annette Carey COTA GO 4                       Self-Care Admission Goal Date Discharge   Eating   4    Oral hygiene   4    Toileting hygiene   3    Shower/bathe self   3    Upper body dressing   4    Lower body dressing   3    Putting on/taking off footwear   3              ALEXANDER Mccoy  1/5/2023

## 2023-01-05 NOTE — PLAN OF CARE
Goal Outcome Evaluation:  Plan of Care Reviewed With: patient        Progress: improving  Outcome Evaluation: ST: pt met goals for orientation this date.  He is beginning to demonstrate increased awareness of deficits and awknowledges he will need a plan in order to return home independently.

## 2023-01-05 NOTE — THERAPY TREATMENT NOTE
Inpatient Rehabilitation - Speech Language Pathology Treatment Note  [facility]     Patient Name: Brian Garcia  : 1950  MRN: [id number]  Today's Date: 2023               Admit Date: 2022   Pt was generally defensive at beginning of session and then became more willing to participate in problem solving activities.  He met one goal.     Goals:  1.  Pt will answer orientation questions w/90% acc:  Pt was 90% acc w/orientation questions w/o assistance.  GOAL MET    2.  Pt will complete deductive reasoning tasks w/90% acc:  Pt was 75-80% accuracy with deductive reasoning for safety and problem solving for t/f scenarios.       3.  Pt will answer questions about ADL problems w/90% acc:  Pt was 80% acc w/simple problem solving scenarios pertaining to return home and ADLs.      Hearing, Speech and Vision Admission Goal  Date  1/3/23 Date 23\ Date  23 Date Date Discharge   Expression of Ideas and Wants (consider both verbal and non-verbal expression and excluding language barriers) 4 4  3  3  4         Understanding Verbal and Non-Verbal Content (with hearing aid or device, if used, and excluding language barriers) 4 4 3  3  3                Visit Dx:    ICD-10-CM ICD-9-CM   1. Dysphagia, unspecified type  R13.10 787.20   2. Symbolic dysfunction  R48.9 784.60   3. Impaired mobility and ADLs  Z74.09 V49.89    Z78.9    4. Impaired functional mobility, balance, gait, and endurance  Z74.09 V49.89     Patient Active Problem List   Diagnosis   • Pneumonia of both lower lobes due to infectious organism   • Physical deconditioning   • Pulmonary nodules   • Leukocytosis   • History of pulmonary embolism   • Stage 4 very severe COPD by GOLD classification ([facility])   • Chronic respiratory failure with hypoxia, on home O2 therapy ([facility])   • Nontraumatic multiple localized intracerebral hemorrhages ([facility])   • Hypertension   • CVA (cerebral vascular accident) ([facility])     Past Medical History:    Diagnosis Date   • COPD (chronic obstructive pulmonary disease) (HCC)    • Hypertension      Past Surgical History:   Procedure Laterality Date   • HIP ARTHROPLASTY         SLP Recommendation and Plan                 Anticipated Discharge Disposition (SLP): home with assist (01/05/23 1315)        Predicted Duration Therapy Intervention (Days): until discharge (01/05/23 1315)     Daily Summary of Progress (SLP): progress toward functional goals as expected (01/05/23 1315)           Treatment Assessment (SLP): improved, cognitive-linguistic disorder (01/05/23 1315)  Treatment Assessment Comments (SLP): ST: pt met goals for orientation this date.  He is beginning to demonstrate increased awareness of deficits and awknowledges he will need a plan in order to return home independently. (01/05/23 1315)  Plan for Continued Treatment (SLP): continue treatment per plan of care (01/05/23 1315)  Plan of Care Reviewed With: patient (01/05/23 1408)  Progress: improving (01/05/23 1408)  Outcome Evaluation: ST: pt met goals for orientation this date.  He is beginning to demonstrate increased awareness of deficits and awknowledges he will need a plan in order to return home independently. (01/05/23 1408)      SLP EVALUATION (last 72 hours)     SLP SLC Evaluation     Row Name 01/05/23 1315 01/04/23 1305 01/03/23 1218             Communication Assessment/Intervention    Document Type therapy note (daily note)  -EC therapy note (daily note)  -CK therapy note (daily note)  -EC      Subjective Information no complaints  -EC no complaints  -CK no complaints  -EC      Patient Observations alert;cooperative;agree to therapy  -EC alert;poorly cooperative;agree to therapy  -CK alert;poorly cooperative;agree to therapy  -EC      Patient/Family/Caregiver Comments/Observations none  -EC No family present at bedside  -CK none  -EC      Patient Effort good  -EC fair  -CK good  -EC         General Information    Patient Profile Reviewed yes  -EC  yes  -CK yes  -EC      Precautions/Limitations, Vision corrective lenses needed for reading  -EC corrective lenses needed for reading  -CK corrective lenses needed for reading  -EC      Precautions/Limitations, Hearing hearing impairment, bilaterally  -EC hearing impairment, bilaterally  -CK hearing impairment, bilaterally  -EC      Plans/Goals Discussed with patient  -EC patient  -CK patient  -EC         Pain Scale: Numbers Pre/Post-Treatment    Pretreatment Pain Rating 0/10 - no pain  -EC 0/10 - no pain  -CK 0/10 - no pain  -EC      Posttreatment Pain Rating 0/10 - no pain  -EC 0/10 - no pain  -CK 0/10 - no pain  -EC         SLP Treatment Clinical Impressions    Treatment Assessment (SLP) improved;cognitive-linguistic disorder  -EC continued;cognitive-linguistic disorder  -CK continued;cognitive-linguistic disorder  -EC      Treatment Assessment Comments (SLP) ST: pt met goals for orientation this date.  He is beginning to demonstrate increased awareness of deficits and awknowledges he will need a plan in order to return home independently.  -EC Pt seen for skilled ST services this date. Initially pt was very lethargic and falling asleep; however, pt aroused after tactile stimuli from SLP.  Pt continues to require mutliple repetitions of information and increased time for response formulation.  Pt also gave nonsensical answers for deductive reasoning questions.  Pt would smile and laugh after giving nonsensical responses.  Pt did meet orientation goal this date w/o use of visual aides.  Pt's lack of participation and willingness to provide legitimate answers is a barrier to progress.  -CK ST: pt with poor participation in tasks.  frequently needing repeititon likely d/t [place], but delayed processing cannot be ruled out. inconsistant responses noted during therapy tasks with egocentric questiions  -EC      Daily Summary of Progress (SLP) progress toward functional goals as expected  -EC progress toward functional  goals is gradual  -CK progress toward functional goals as expected  -EC      Barriers to Overall Progress (SLP) -- Cognitive status  -CK --      Plan for Continued Treatment (SLP) continue treatment per plan of care  -EC goals adjusted to reflect functional improvements demonstrated  -CK continue treatment per plan of care  -EC      Care Plan Review evaluation/treatment results reviewed;care plan/treatment goals reviewed;risks/benefits reviewed;current/potential barriers reviewed;patient/other agree to care plan  -EC evaluation/treatment results reviewed;care plan/treatment goals reviewed;risks/benefits reviewed;current/potential barriers reviewed;patient/other agree to care plan  -CK evaluation/treatment results reviewed;care plan/treatment goals reviewed;risks/benefits reviewed;current/potential barriers reviewed;patient/other agree to care plan  -EC         Recommendations    Therapy Frequency (SLP SLC) 5 days per week  -EC 5 days per week  -CK 5 days per week  -EC      Predicted Duration Therapy Intervention (Days) until discharge  -EC until discharge  -CK until discharge  -EC      Anticipated Discharge Disposition (SLP) home with assist  -EC home with assist  -CK home with assist  -EC         Orientation Goal 1 (SLP)    Improve Orientation Through Goal 1 (SLP) demonstrating orientation to day;demonstrating orientation to month;demonstrating orientation to year;demonstrating orientation to place;90%;independently (over 90% accuracy)  -EC demonstrating orientation to day;demonstrating orientation to month;demonstrating orientation to year;demonstrating orientation to place;90%;independently (over 90% accuracy)  -CK demonstrating orientation to day;demonstrating orientation to month;demonstrating orientation to year;demonstrating orientation to place;90%;independently (over 90% accuracy)  -EC      Time Frame (Orientation Goal 1, SLP) by discharge  -EC by discharge  -CK by discharge  -EC      Progress (Orientation  Goal 1, SLP) 90%;independently (over 90% accuracy)  -EC 90%;independently (over 90% accuracy)  -CK 90%;other (comment)  repetitions and inconsistant responses  -EC      Progress/Outcomes (Orientation Goal 1, SLP) goal met   -EC goal met  -CK goal ongoing  -EC         Reasoning Goal 1 (SLP)    Improve Reasoning Through Goal 1 (SLP) complete deductive reasoning task;90%;independently (over 90% accuracy)  -EC complete deductive reasoning task;90%;independently (over 90% accuracy)  -CK complete deductive reasoning task;90%;independently (over 90% accuracy)  -EC      Time Frame (Reasoning Goal 1, SLP) by discharge  -EC by discharge  -CK by discharge  -EC      Progress (Reasoning Goal 1, SLP) 80%  -EC --  didn't provide legitimate answers.  -CK with moderate cues (50-74%);80%  -EC      Progress/Outcomes (Reasoning Goal 1, SLP) goal ongoing  -EC goal ongoing  -CK goal ongoing  -EC      Comment (Reasoning Goal 1, SLP) improvement noted with thought organization toward safey and problem solving.  -EC -- --         Functional Problem Solving Skills Goal 1 (SLP)    Improve Problem Solving Through Goal 1 (SLP) answer questions about ADL problems;90%;independently (over 90% accuracy)  -EC answer questions about ADL problems;90%;independently (over 90% accuracy)  -CK answer questions about ADL problems;90%;independently (over 90% accuracy)  -EC      Time Frame (Problem Solving Goal 1, SLP) by discharge  -EC by discharge  -CK by discharge  -EC      Progress (Problem Solving Goal 1, SLP) 80%  -EC 60%  -CK 60%  -EC      Progress/Outcomes (Problem Solving Goal 1, SLP) goal ongoing  -EC goal ongoing  -CK goal ongoing  -EC            User Key  (r) = Recorded By, (t) = Taken By, (c) = Cosigned By    Initials Name Effective Dates    Anita Nj, CCC-SLP 06/16/21 -     CK Latesha Ram, MS CCC-SLP 06/16/21 -                    EDUCATION  The patient has been educated in the following areas:     Cognitive  Impairment.           SLP GOALS     Row Name 01/05/23 1315 01/04/23 1305 01/03/23 1218       Orientation Goal 1 ([facility])    Improve Orientation Through Goal 1 ([facility]) demonstrating orientation to day;demonstrating orientation to month;demonstrating orientation to year;demonstrating orientation to place;90%;independently (over 90% accuracy)  -EC demonstrating orientation to day;demonstrating orientation to month;demonstrating orientation to year;demonstrating orientation to place;90%;independently (over 90% accuracy)  -CK demonstrating orientation to day;demonstrating orientation to month;demonstrating orientation to year;demonstrating orientation to place;90%;independently (over 90% accuracy)  -EC    Time Frame (Orientation Goal 1, [facility]) by discharge  -EC by discharge  -CK by discharge  -EC    Progress (Orientation Goal 1, SLP) 90%;independently (over 90% accuracy)  -EC 90%;independently (over 90% accuracy)  -CK 90%;other (comment)  repetitions and inconsistant responses  -EC    Progress/Outcomes (Orientation Goal 1, SLP) goal met   -EC goal met  -CK goal ongoing  -EC       Reasoning Goal 1 (SLP)    Improve Reasoning Through Goal 1 (SLP) complete deductive reasoning task;90%;independently (over 90% accuracy)  -EC complete deductive reasoning task;90%;independently (over 90% accuracy)  -CK complete deductive reasoning task;90%;independently (over 90% accuracy)  -EC    Time Frame (Reasoning Goal 1, SLP) by discharge  -EC by discharge  -CK by discharge  -EC    Progress (Reasoning Goal 1, SLP) 80%  -EC --  didn't provide legitimate answers.  -CK with moderate cues (50-74%);80%  -EC    Progress/Outcomes (Reasoning Goal 1, SLP) goal ongoing  -EC goal ongoing  -CK goal ongoing  -EC    Comment (Reasoning Goal 1, SLP) improvement noted with thought organization toward safey and problem solving.  -EC -- --       Functional Problem Solving Skills Goal 1 (SLP)    Improve Problem Solving Through Goal 1 (SLP) answer questions about ADL  problems;90%;independently (over 90% accuracy)  -EC answer questions about ADL problems;90%;independently (over 90% accuracy)  -CK answer questions about ADL problems;90%;independently (over 90% accuracy)  -EC    Time Frame (Problem Solving Goal 1, SLP) by discharge  -EC by discharge  -CK by discharge  -EC    Progress (Problem Solving Goal 1, SLP) 80%  -EC 60%  -CK 60%  -EC    Progress/Outcomes (Problem Solving Goal 1, SLP) goal ongoing  -EC goal ongoing  -CK goal ongoing  -EC          User Key  (r) = Recorded By, (t) = Taken By, (c) = Cosigned By    Initials Name Provider Type    EC Anita Siu CCC-SLP Speech and Language Pathologist    CK Latesha Ram, MS CCC-SLP Speech and Language Pathologist                        Time Calculation:      Time Calculation- SLP     Row Name 01/05/23 1408             Time Calculation- SLP    SLP Start Time 1315  -EC      SLP Stop Time 1353  -EC      SLP Time Calculation (min) 38 min  -EC      Total Timed Code Minutes- SLP 38 minute(s)  -EC      SLP Received On 01/05/23  -EC      SLP Goal Re-Cert Due Date 01/13/23  -EC         Untimed Charges    12452-EQ Treatment/ST Modification Prosth Aug Alter  38  -EC         Total Minutes    Untimed Charges Total Minutes 38  -EC       Total Minutes 38  -EC            User Key  (r) = Recorded By, (t) = Taken By, (c) = Cosigned By    Initials Name Provider Type    EC Anita Siu CCC-SLP Speech and Language Pathologist                Therapy Charges for Today     Code Description Service Date Service Provider Modifiers Qty    [id number]  ST TREATMENT SPEECH 3 1/5/2023 Anita Siu CCC-SLP GN 1                     Anita Siu CCC-LINDA  1/5/2023

## 2023-01-05 NOTE — PATIENT CARE CONFERENCE
Attendance of weekly team conference:   Papi Hutchins PTA, Annette Carey MUNOZ/JARAD, Dr. Fan Rob, Lori Ram, SLP, Pema Penaloza, PT, GWEN Ibarra Coordinator, Yee Sifuentes, RN, Director of [facility], Kirstie Segal OTR/L and Sj Mari, ELIGIOW    Dr. Rob initiated and led today's multidisciplinary team meeting. Patient's progress reviewed, GG codes reviewed, discharge date discussed and equipment needs addressed.       Discharge Disposition:  Home with caregiver    Expected Discharge Date:1-9-2023 tentatively    Anticipated discharge orders/equipment:    Comment: Patient was discussed extensively his significant other cannot care for him independently at time he is very cooperative at other times he is more or less less cooperative.  Because of that a new CT scan which shows certainly brain edema the hemorrhage is decreasing.  Not sure that a lot of this is a due to the cerebral edema and that would just take time to resolve.  We had a discussion with him about participation as best he can in light of this change batteries out his hearing aids she states helps him here but I think some of this is out of his control some of it is his personality and some of it is due to the brain edema which will just take time to resolve.  There is no new hemorrhage.  I discussed with Dr. Liang office and we are not to anticoagulate him at least for a month he will see him in February and make that determination whether to wait really and continue anticoagulation for his atrial fibrillation or change his anticoagulation.  Patient's numbers have decreased but it depends upon his cooperation and how tired he seems to be    -Care Admission Goal Date 1/5/23 Discharge   Eating 6 6 4     Oral hygiene 6 6 4     Toileting hygiene 5 6 3     Shower/bathe self 3 6 3     Upper body dressing 5 6 4     Lower body dressing 3 6 3     Putting on/taking off footwear 3 6 4           Mobility  Admission Goal Date  1/5/23 Discharge    A.Roll left and right 4 6 4     B.Sit to lying 4 6 4     C. Lying to sitting on side of bed 4 6 3     D.Sit to stand 4 6 3     E. Chair/bed-to-chair transfer    4 6 3     F.Toilet transfer    88 6 3     G. Car Transfer 88 6 88     I.Walk 10 feet 4 6 3     J.Walk 50 feet with two turns. 4 6 1     K.Walk 150 feet:  88 6 88     L.Walking 10 feet on uneven surfaces  88 6 88     M.1 step (curb) 88 6 88     N.4 steps 88 6 88     O.12 steps 9 9 88     P.Picking up object 88 6 88     R. Wheel 50 feet with two turns 4 6 3     S.Wheel 150 feet 88 6 3        Hearing, Speech and Vision Admission Goal  Date  1/5/23 Discharge   Expression of Ideas and Wants (consider both verbal and non-verbal expression and excluding language barriers)  4- Without Difficulty  3- Some difficulty  2- Frequently exhibits difficulty  1- Rarely/never exhibits clear speech  4 4 3     Understanding Verbal and Non-Verbal Content (with hearing aid or device, if used, and excluding language barriers)  4- Comprehends without cues  3- Usually understands  2- Some Understands  1- Rarely/Never Understands 4 4 3

## 2023-01-05 NOTE — PLAN OF CARE
Pt has been pleasant today and compliant with therapy. Pt has been awake and interactive most of the day. Still requires gait belt, walker and assist x 1 to ambulate safely. Vital signs stable, voices no complaints.

## 2023-01-05 NOTE — PROGRESS NOTES
Progress Note      Admit date: 12/30/2022 12:28 PM       Treatment Team     Provider Relationship Specialty Contact    Fan Rob MD Attending Rehabilitation   [phone]      Papi Hutchins PTA Physical Therapy Assistant Physical Therapy --    Annette Carey COTA Occupational Therapy Assistant Occupational Therapy --    Anita Siu CCC-SLP Speech Language Pathologist Speech Pathology   [phone]      Aleja Tripp  -- --    Aldair Miller MD Consulting Physician Pulmonary Disease   [phone]      Aleja Sams, RRT Respiratory Therapist --   [phone]      Kina Galicia, PCT Patient Care Technician -- --    Maya Villavicencio RN Registered Nurse -- --             Chief Complaint:  Nontraumatic multiple localized intracerebral hemorrhages (HCC)    HPI: No changes since admission    Vital Signs  Temp:  [98.8 °F (37.1 °C)-98.9 °F (37.2 °C)] 98.9 °F (37.2 °C)  Heart Rate:  [] 103  Resp:  [18-20] 18  BP: (107-135)/(57-80) 107/57    Physical Exam 1:    Constitutional: Patient is alert but not very cooperative this morning see the nurses notes therapy note there is no focal signs     HENT:      Eyes:     Neck:      Cardiovascular: No chest pain    Pulmonary: Difficulty breathing    Abdominal:      Genitourinary/Anorectal:       Musculoskeletal:     Skin:     Neurological: No focal progression may be some increased confusion    Psychiatric/Behavioral:        Results Review:    I reviewed the patient's new clinical results.  Reviewed nurses notes    Nursing notes and therapy notes have been reviewed.    I have reviewed medications.    Assessment/Plan  Principal Problem:    Nontraumatic multiple localized intracerebral hemorrhages (HCC) plan is a CT scan  Active Problems:    Pneumonia of both lower lobes due to infectious organism      Overview: - Discussed with Radiology (Dr. Davey), who reports that the patient has a       superior segment small patchy opacity in the  left lung base likely       pneumonia, and a small patchy opacity in the right lung base, likely       pneumonia. He recommended continuing with antibiotics and then doing       follow up imaging in 3 months.      - Blood cultures no growth to date.      - Levaquin day 4. Transitioned to PO today.      - Legionella and strep pneumo neg. Mycoplasma pending.  Stable all antibiotics have been stopped    Physical deconditioning      Overview: - PT/OT Consulted. Recommendations: home with home health.      - Patient may benefit from an assisted living.  Needs continued therapy but not cooperative sense not sure this is not just confusion from his stroke    Stage 4 very severe COPD by GOLD classification ([facility]) stable    Hypertension stable    Chronic respiratory failure with hypoxia, on home O2 therapy ([facility]) able stable    CVA (cerebral vascular accident) ([facility]) plan is a CT without with and without contrast to see if there is any changes           Fan Rob MD  01/05/23  09:37 CST          This document has been electronically signed by Fan Rob MD on January 5, 2023 09:37 CST      Part of this note may be an electronic transcription/translation of spoken language to printed text using the Dragon Dictation System.

## 2023-01-05 NOTE — THERAPY TREATMENT NOTE
Inpatient Rehabilitation - Physical Therapy Treatment Note       [facility]     Patient Name: Brian Garcia  : 1950  MRN: [id number]    Today's Date: 2023                    Admit Date: 2022      Visit Dx:     ICD-10-CM ICD-9-CM   1. Dysphagia, unspecified type  R13.10 787.20   2. Symbolic dysfunction  R48.9 784.60   3. Impaired mobility and ADLs  Z74.09 V49.89    Z78.9    4. 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)   • Hypertension   • CVA (cerebral vascular accident) ([facility])       Past Medical History:   Diagnosis Date   • COPD (chronic obstructive pulmonary disease) ([facility])    • Hypertension        Past Surgical History:   Procedure Laterality Date   • HIP ARTHROPLASTY         PT ASSESSMENT (last 12 hours)     IRF PT Evaluation and Treatment     Row Name 23 1030          PT Time and Intention    Document Type daily treatment  -CHICO     Mode of Treatment physical therapy;individual therapy  -CHICO     Row Name 23 1030          General Information    Patient Profile Reviewed yes  -CHICO     Existing Precautions/Restrictions fall  -CHCIO     Limitations/Impairments hearing  -CHICO     Row Name 23 1030          Living Environment    Primary Care Provided by self  -CHICO     Row Name 23 1030          Home Use of Assistive/Adaptive Equipment    Equipment Currently Used at Home oxygen  -CHICO     Row Name 23 1030          Pain Assessment    Pretreatment Pain Rating 0/10 - no pain  -CHICO     Posttreatment Pain Rating 0/10 - no pain  -CHICO     Row Name  01/05/23 1030          Cognition/Psychosocial    Affect/Mental Status (Cognition) WNL  -     Orientation Status (Cognition) oriented x 4  -     Row Name 01/05/23 1030          Range of Motion Comprehensive    General Range of Motion bilateral lower extremity ROM WFL  -     Row Name 01/05/23 1030          Transfer Assessment/Treatment    Transfers sit-stand transfer;stand-sit transfer  -     Row Name 01/05/23 1030          Sit-Stand Transfer    Sit-Stand [place] (Transfers) minimum assist (75% patient effort);verbal cues;nonverbal cues (demo/gesture)  -     Assistive Device (Sit-Stand Transfers) walker, front-wheeled  -     Row Name 01/05/23 1030          Stand-Sit Transfer    Stand-Sit [place] (Transfers) minimum assist (75% patient effort);verbal cues;nonverbal cues (demo/gesture)  -     Assistive Device (Stand-Sit Transfers) walker, front-wheeled  -     Row Name 01/05/23 1030          Toilet Transfer    Type (Toilet Transfer) stand pivot/stand step  -CHICO     [place] Level (Toilet Transfer) minimum assist (75% patient effort)  -CHICO     Assistive Device (Toilet Transfer) grab bars/safety frame  -     Row Name 01/05/23 1030          Gait/Stairs (Locomotion)    [place] Level (Gait) minimum assist (75% patient effort);1 person assist;1 person to manage equipment  -CHICO     Assistive Device (Gait) walker, front-wheeled  -     Distance in Feet (Gait) 80x2  -     Pattern (Gait) step-through  -     Deviations/Abnormal Patterns (Gait) base of support, narrow;festinating/shuffling;gait speed decreased;stride length decreased  -     Bilateral Gait Deviations forward flexed posture;heel strike decreased  -     Left Sided Gait Deviations leans left  -CHICO     Comment, (Gait/Stairs) Pt. followed with w/c per nsg. for safety  -     Row Name 01/05/23 1030          Wheelchair Mobility/Management    Method of Wheelchair Locomotion (Mobility) bipedal (lower extremity) propulsion  -      Mobility Activities (Wheelchair) forward propulsion  -     Forward Propulsion [place] (Wheelchair) minimum assist (75% patient effort)  -     Distance Propelled in Feet (Wheelchair) 150  -     Row Name 01/05/23 1030          Safety Issues, Functional Mobility    Impairments Affecting Function (Mobility) balance;endurance/activity tolerance;shortness of breath;strength  -     Row Name 01/05/23 1030          Motor Skills    Therapeutic Exercise hip;knee;ankle  -     Row Name 01/05/23 1030          Hip (Therapeutic Exercise)    Hip (Therapeutic Exercise) isometric exercises  -     Hip AAROM (Therapeutic Exercise) bilateral;flexion  -     Hip Isometrics (Therapeutic Exercise) aDduction  -     Row Name 01/05/23 1030          Knee (Therapeutic Exercise)    Knee (Therapeutic Exercise) AROM (active range of motion)  -     Knee AROM (Therapeutic Exercise) bilateral;LAQ (long arc quad)  -     Knee Strengthening (Therapeutic Exercise) bilateral;hamstring curls;yellow  -     Row Name 01/05/23 1030          Positioning and Restraints    Pre-Treatment Position sitting in chair/recliner  -     Post Treatment Position wheelchair  -     In Wheelchair sitting;call light within reach;encouraged to call for assist;exit alarm on  -     Row Name 01/05/23 1030          Vital Signs    Pre Patient Position Sitting  -     Intra Patient Position Standing  -     Post Patient Position Sitting  -[facility] Name 01/05/23 1030          Therapy Assessment/Plan (PT)    Patient's Goals For Discharge return home  -     Row Name 01/05/23 1030          Therapy Plan Review/Discharge Plan (PT)    Anticipated Equipment Needs at Discharge (PT Eval) front wheeled walker;hospital bed;wheelchair  -     Anticipated Discharge Disposition (PT) home with 24/7 care;home with home health;skilled nursing facility  -[facility] Name 01/05/23 1030          IRF PT Goals    Bed Mobility Goal Selection (PT-IRF) bed mobility, PT goal  1  -JA     Transfer Goal Selection (PT-IRF) transfers, PT goal 1;transfers, PT goal 2  -JA     Gait (Walking Locomotion) Goal Selection (PT-IRF) gait, PT goal 1;gait, PT goal 2  -JA     Wheelchair Locomotion Goal Selection (PT-IRF) wheelchair locomotion, PT goal (free text)  -     Stairs Goal Selection (PT-IRF) stairs, PT goal 1  -     Strength Goal Selection (PT-IRF) strength, PT goal 3 (free text)  -     Balance Goal Selection (PT) balance, PT goal 1  -     Caregiver Training Goal Selection (PT-IRF) caregiver training, PT goal 1  -     Row Name 01/05/23 1030          Bed Mobility Goal 1 (PT-IRF)    Activity/Assistive Device (Bed Mobility Goal 1, PT-IRF) bed mobility activities, all  -JA     [place] Level (Bed Mobility Goal 1, PT-IRF) independent  -JA     Time Frame (Bed Mobility Goal 1, PT-IRF) by discharge  -     Progress/Outcomes (Bed Mobility Goal 1, PT-IRF) goal not met  -     Row Name 01/05/23 1030          Transfer Goal 1 (PT-IRF)    Activity/Assistive Device (Transfer Goal 1, PT-IRF) sit-to-stand/stand-to-sit;bed-to-chair/chair-to-bed;wheelchair transfer  -JA     [place] Level (Transfer Goal 1, PT-IRF) standby assist  -JA     Time Frame (Transfer Goal 1, PT-IRF) 1 week  -     Progress/Outcomes (Transfer Goal 1, PT-IRF) goal not met  -     Row Name 01/05/23 1030          Transfer Goal 2 (PT-IRF)    Activity/Assistive Device (Transfer Goal 2, PT-IRF) sit-to-stand/stand-to-sit;bed-to-chair/chair-to-bed;wheelchair transfer  -JA     [place] Level (Transfer Goal 2, PT-IRF) modified independence  -JA     Time Frame (Transfer Goal 2, PT-IRF) 2 weeks  -JA     Progress/Outcomes (Transfer Goal 2, PT-IRF) goal not met  -     Row Name 01/05/23 1030          Gait/Walking Locomotion Goal 1 (PT-IRF)    Activity/Assistive Device (Gait/Walking Locomotion Goal 1, PT-IRF) gait (walking locomotion);assistive device use;increase endurance/gait distance;decrease fall risk  -     Gait/Walking  Locomotion Distance Goal 1 (PT-IRF) 50ft or more x2  -JA     [place] Level (Gait/Walking Locomotion Goal 1, PT-IRF) standby assist  -     Time Frame (Gait/Walking Locomotion Goal 1, PT-IRF) 1 week  -     Progress/Outcomes (Gait/Walking Locomotion Goal 1, PT-IRF) goal not met  -     Row Name 01/05/23 1030          Gait/Walking Locomotion Goal 2 (PT-IRF)    Activity/Assistive Device (Gait/Walking Locomotion Goal 2, PT-IRF) gait (walking locomotion);assistive device use;decrease fall risk;increase endurance/gait distance  -     Gait/Walking Locomotion Distance Goal 2 (PT-IRF) 150ft or more  -JA     [place] Level (Gait/Walking Locomotion Goal 2, PT-IRF) modified independence  -     Time Frame (Gait/Walking Locomotion Goal 2, PT-IRF) 2 weeks  -     Progress/Outcomes (Gait/Walking Locomotion Goal 2, PT-IRF) goal not met  -     Row Name 01/05/23 1030          Wheelchair Locomotion Goal (PT-IRF)    Wheelchair Locomotion Goal (PT-IRF) Pt will propel w/c 150ft with 2 turns with supervision  -     Time Frame (Wheelchair Locomotion Goal, PT-IRF) by discharge  -     Progress/Outcomes (Wheelchair Locomotion Goal, PT-IRF) goal not met  -     Row Name 01/05/23 1030          Stairs Goal 1 (PT-IRF)    Activity/Assistive Device (Stairs Goal 1, PT-IRF) ascending stairs;descending stairs;using handrail, left;using handrail, right;decrease fall risk  -     Number of Stairs (Stairs Goal 1, PT-IRF) 3  -JA     [place] Level (Stairs Goal 1, PT-IRF) standby assist;modified independence  -     Time Frame (Stairs Goal 1, PT-IRF) 2 weeks  -     Progress/Outcomes (Stairs Goal 1, PT-IRF) goal not met  -     Row Name 01/05/23 1030          Strength Goal (PT-IRF)    Strength Goal 3 (PT-IRF) Pt will tolerate LE exercises OOB in chair and progress to standing exercises with VSS  -     Time Frame (Strength Goal 3, PT-IRF) by discharge  -     Progress/Outcomes (Strength Goal 3, PT-IRF) goal partially met   -     Row Name 01/05/23 1030          Balance Goal 1 (PT)    Activity/Assistive Device (Balance Goal 1, PT) assistive device use;sitting, static;sitting, dynamic;standing, static;standing, dynamic  -     [place] Level/Cues Needed (Balance Goal 1, PT) conditional independence  -     Time Frame (Balance Goal 1, PT) by discharge  -     Progress/Outcomes (Balance Goal 1, PT) goal not met  -     Row Name 01/05/23 1030          Caregiver Training Goal 1 (PT-IRF)    Caregiver Training Goal 1 (PT-IRF) Caregiver with acknowledge/demonstrate understanding of home care instructions  -     Time Frame (Caregiver Training Goal 1, PT-IRF) by discharge  -     Progress/Outcomes (Caregiver Training Goal 1, PT-IRF) goal not met  -           User Key  (r) = Recorded By, (t) = Taken By, (c) = Cosigned By    Initials Name Provider Type    Papi Smith, PTA Physical Therapist Assistant              Mobility  Admission Goal Date Discharge   A.Roll left and right   4    B.Sit to lying   4    C. Lying to sitting on side of bed   3    D.Sit to stand   3    E. Chair/bed-to-chair transfer     3    F.Toilet transfer      3    G. Car Transfer   88    I.Walk 10 feet   3    J.Walk 50 feet with two turns.   1    K.Walk 150 feet:    88    L.Walking 10 feet on uneven surfaces    88    M.1 step (curb)   88    N.4 steps   88    O.12 steps   88    P.Picking up object   88    R. Wheel 50 feet with two turns   3    S.Wheel 150 feet   3           Physical Therapy Education     Title: PT OT SLP Therapies (In Progress)     Topic: Physical Therapy (In Progress)     Point: Mobility training (In Progress)     Learning Progress Summary           Patient Acceptance, E, NR by EUGENE at 1/3/2023 1206    Acceptance, E, NR by EUGENE at 1/2/2023 1219    Acceptance, E, NR by JC1 at 12/31/2022 1345                   Point: Home exercise program (Not Started)     Learner Progress:  Not documented in this visit.          Point: Body mechanics (In  Progress)     Learning Progress Summary           Patient Acceptance, E, NR by [facility] at 12/31/2022 1345                   Point: Precautions (In Progress)     Learning Progress Summary           Patient Acceptance, E, NR by [facility] at 12/31/2022 1345                               User Key     Initials Effective Dates Name Provider Type Discipline    [facility] 06/16/21 -  Nydia Muñoz, PT Physical Therapist PT     06/16/21 -  Yossi Haynes, PTA Physical Therapist Assistant PT                PT Recommendation and Plan       Anticipated Equipment Needs at Discharge (PT Eval): front wheeled walker, hospital bed, wheelchair  Plan of Care Reviewed With: patient  Daily Progress Summary (PT)  Functional Goal Overall Progress (PT): not progressing toward functional goals as expected  Progress: improving  Outcome Evaluation: Pt. with improved tolerance to PT this a.m. Pt. transferred w/c-toilet [facility]achna st. pivot no AD, sit-stand-sit CGA/Rachna v.c.'s for hand placement, pt. amb. 80ftx2 with RW MinAx1 + nsg. to follow with w/c for safety, pt. with L lateral lean, NBOS, short step length with each gt. trip, pt. performed x15 reps A-AAROM B LE. Pt. will need 24/7 assist & HHPT/ or SNF placement upon d/c       Outcome Measures     Row Name 01/03/23 1404 01/03/23 1044          How much help from another person do you currently need...    Turning from your back to your side while in flat bed without using bedrails? -- 3  -EUGENE     Moving from lying on back to sitting on the side of a flat bed without bedrails? -- 3  -EUGENE     Moving to and from a bed to a chair (including a wheelchair)? -- 3  -EUGENE     Standing up from a chair using your arms (e.g., wheelchair, bedside chair)? -- 3  -EUGENE     Climbing 3-5 steps with a railing? -- 2  -EUGENE     To walk in hospital room? -- 3  -EUGENE     AM-PAC 6 Clicks Score (PT) -- 17  -        Functional Assessment    Outcome Measure Options AM-PAC 6 Clicks Basic Mobility (PT)  -EUGENE AM-PAC 6 Clicks Basic Mobility (PT)   -EUGENE           User Key  (r) = Recorded By, (t) = Taken By, (c) = Cosigned By    Initials Name Provider Type    Yossi Ragland, PTA Physical Therapist Assistant                     Time Calculation:      PT Charges     Row Name 01/05/23 1135             Time Calculation    Start Time 1030  -JA      Stop Time 1130  -JA      Time Calculation (min) 60 min  -JA         Time Calculation- PT    Total Timed Code Minutes- PT 60 minute(s)  -JA         Timed Charges    38986 - PT Therapeutic Exercise Minutes 20  -JA      00361 - Gait Training Minutes  25  -JA      38724 - PT Therapeutic Activity Minutes 15  -JA         Total Minutes    Timed Charges Total Minutes 60  -JA       Total Minutes 60  -JA            User Key  (r) = Recorded By, (t) = Taken By, (c) = Cosigned By    Initials Name Provider Type    Papi Smith PTA Physical Therapist Assistant                Therapy Charges for Today     Code Description Service Date Service Provider Modifiers Qty    97302754558 HC PT THER PROC EA 15 MIN 1/4/2023 Papi Hutchins, PTA GP 2    16158968039 HC PT THERAPEUTIC ACT EA 15 MIN 1/4/2023 Papi Hutchins, PTA GP 1    81483399930 HC PT THERAPEUTIC ACT EA 15 MIN 1/4/2023 Papi Hutchins, PTA GP 2    20232113733 HC PT THER PROC EA 15 MIN 1/5/2023 Papi Hutchins, PTA GP 1    14891766186 HC GAIT TRAINING EA 15 MIN 1/5/2023 Papi Hutchins, PTA GP 2    29606063437 HC PT THERAPEUTIC ACT EA 15 MIN 1/5/2023 Papi Hutchins, PTA GP 1            PT G-Codes  Outcome Measure Options: AM-PAC 6 Clicks Basic Mobility (PT)  AM-PAC 6 Clicks Score (PT): 17  AM-PAC 6 Clicks Score (OT): 18  Modified Chandler Scale: 0 - No Symptoms at all.      Papi Hutchins PTA  1/5/2023

## 2023-01-05 NOTE — PLAN OF CARE
Goal Outcome Evaluation:  Plan of Care Reviewed With: patient        Progress: improving  Outcome Evaluation: Pt. with improved tolerance to PT this a.m. Pt. transferred w/c-toilet Rachna st. pivot no AD, sit-stand-sit CGA/Rachna v.c.'s for hand placement, pt. amb. 80ftx2 with RW MinAx1 + nsg. to follow with w/c for safety, pt. with L lateral lean, NBOS, short step length with each gt. trip, pt. performed x15 reps A-ESTEBAN MULLER. Pt. will need 24/7 assist & HHPT/ or SNF placement upon d/c

## 2023-01-05 NOTE — THERAPY RE-EVALUATION
Inpatient Rehabilitation - Occupational Therapy - ReEvaluation    [facility]     Patient Name: Brian Garcia  : 1950  MRN: [id number]    Today's Date: 2023                 Admit Date: 2022         ICD-10-CM ICD-9-CM   1. Dysphagia, unspecified type  R13.10 787.20   2. Symbolic dysfunction  R48.9 784.60   3. Impaired mobility and ADLs  Z74.09 V49.89    Z78.9    4. Impaired functional mobility, balance, gait, and endurance  Z74.09 V49.89       Patient Active Problem List   Diagnosis   • Pneumonia of both lower lobes due to infectious organism   • Physical deconditioning   • Pulmonary nodules   • Leukocytosis   • History of pulmonary embolism   • Stage 4 very severe COPD by GOLD classification ([facility])   • Chronic respiratory failure with hypoxia, on home O2 therapy (HCC)   • Nontraumatic multiple localized intracerebral hemorrhages (HCC)   • Hypertension   • CVA (cerebral vascular accident) ([facility])       Past Medical History:   Diagnosis Date   • COPD (chronic obstructive pulmonary disease) (HCC)    • Hypertension        Past Surgical History:   Procedure Laterality Date   • HIP ARTHROPLASTY               IRF OT ASSESSMENT FLOWSHEET (last 12 hours)     IRF OT Evaluation and Treatment     Row Name 23 1353 23 0857       OT Time and Intention    Document Type re-evaluation  -CM daily treatment  -RC    Mode of Treatment occupational therapy  -CM individual therapy;occupational therapy  -RC    Total Minutes, Occupational Therapy 57  -CM 90  -RC    Patient Effort good  -CM good  -RC    Row Name 23 1353 23 0857       General Information    Patient Profile Reviewed yes  -CM --    General Observations of Patient sitting up in w/c upon arrival  -CM --    Existing Precautions/Restrictions fall  -CM fall  -RC    Limitations/Impairments hearing  -CM hearing  -RC    Row Name 23 1353 23 0857       Pain Assessment    Pretreatment Pain Rating 0/10 - no pain  -CM 0/10 -  no pain  -RC    Posttreatment Pain Rating 0/10 - no pain  -CM 0/10 - no pain  -RC    Row Name 01/05/23 1353 01/05/23 0857       Cognition/Psychosocial    Orientation Status (Cognition) oriented x 4  -CM oriented x 4  -RC    Follows Commands (Cognition) WFL  -CM WFL  -RC    Personal Safety Interventions fall prevention program maintained;gait belt;nonskid shoes/slippers when out of bed;supervised activity  - --    Row Name 01/05/23 1353          Range of Motion (ROM)    Range of Motion ROM is WFL  -CM     [facility] Name 01/05/23 1353          Strength (Manual Muscle Testing)    Strength (Manual Muscle Testing) bilateral upper extremities  -[facility] Name 01/05/23 [facility]          Strength Comprehensive (MMT)    Comment, General Manual Muscle Testing (MMT) Assessment MMT: B shoulders 3+/5; B elbow 4/5  -CM     [facility] Name 01/05/23 0857          Basic Activities of Daily Living (BADLs)    Basic Activities of Daily Living bathing;upper body dressing;lower body dressing;grooming;toileting;self-feeding  -[facility] Name 01/05/23 0857          Bathing    [place] Level (Bathing) bathing skills;lower body;distal lower extremities/feet;proximal lower extremities;perineal area;moderate assist (50% patient effort);other (see comments)  mod a due to vcs required to continue act and required incresaed time for all tasks  -[facility] Name 01/05/23 [facility] 01/05/23 0857       Lower Body Dressing    [place] Level (Lower Body Dressing) doff;don;socks;shoes/slippers;moderate assist (50% patient effort)  - doff;don;shoes/slippers;pants/bottoms;socks;underwear;minimum assist (75% patient effort)  -    Assistive Device Use (Lower Body Dressing) other (see comments)  step stool  -CM --    Position (Lower Body Dressing) supported sitting  -CM --    [facility] Name 01/05/23 [facility] 01/05/23 0857       Grooming    [place] Level (Grooming) grooming skills;hair care, combing/brushing;wash face, hands;standby assist  - grooming skills;deodorant  application;hair care, combing/brushing;wash face, hands;set up;verbal cues  SBA  -    Position (Grooming) sink side;supported sitting  w/c  -CM sink side;supported sitting  in w/c  -RC    Row Name 01/05/23 [facility] 01/05/23 0857       Toileting    [place] Level (Toileting) verbal cues;toileting skills;adjust/manage clothing;perform perineal hygiene  -CM --  pt perf toileting with increased time and effort  -    Assistive Device Use (Toileting) grab bar/safety frame  -CM --    Position (Toileting) supported sitting  -CM --    Row Name 01/05/23 0857          Bed Mobility    Bed Mobility --  -     All Activities, [place] (Bed Mobility) --  -     Supine-Sit [place] (Bed Mobility) minimum assist (75% patient effort)  -     Row Name 01/05/23 [facility] 01/05/23 0857       Functional Mobility    Functional Mobility- Ind. Level contact guard assist  -CM independent  -    Functional Mobility- Device walker, front-wheeled  -CM --    Functional Mobility- Safety Issues supplemental O2  -CM --    Row Name 01/05/23 [facility] 01/05/23 0857       Transfer Assessment/Treatment    Transfers sit-stand transfer;stand-sit transfer;toilet transfer  -CM bed-chair transfer;sit-stand transfer;stand-sit transfer  -    Row Name 01/05/23 0857          Bed-Chair Transfer    Bed-Chair [place] (Transfers) minimum assist (75% patient effort)  -     Row Name 01/05/23 [facility] 01/05/23 0857       Sit-Stand Transfer    Sit-Stand [place] (Transfers) contact guard  -CM minimum assist (75% patient effort)  -    Assistive Device (Sit-Stand Transfers) walker, front-wheeled  -CM --    Row Name 01/05/23 1353          Stand-Sit Transfer    Stand-Sit [place] (Transfers) contact guard  -CM     Assistive Device (Stand-Sit Transfers) walker, front-wheeled  -CM     Row Name 01/05/23 [facility] 01/05/23 0857       Toilet Transfer    Type (Toilet Transfer) stand-sit;sit-stand  -CM --  -    [place] Level (Toilet Transfer) contact guard   -CM --  -RC    Assistive Device (Toilet Transfer) walker, front-wheeled;grab bars/safety frame  -CM --    Row Name 01/05/23 0857          Balance    Balance Assessment --  sat eob for ~ 20 min w/ nelly no lob  -RC     Row Name 01/05/23 0857          Positioning and Restraints    Pre-Treatment Position in bed  -RC     Post Treatment Position wheelchair  -RC     In Wheelchair with PT  -RC     Row Name 01/05/23 1353 01/05/23 0857       Vital Signs    Pre SpO2 (%) 94  -CM --    O2 Delivery Pre Treatment nasal cannula  -CM --    O2 Delivery Intra Treatment nasal cannula  -CM --    Post SpO2 (%) 91  -CM 92  -RC    O2 Delivery Post Treatment -- supplemental O2  -RC    Pre Patient Position Sitting  -CM --    Intra Patient Position Sitting  -CM --    Row Name 01/05/23 1353          Therapy Assessment/Plan (OT)    Patient's Goals For Discharge return home  -     Row Name 01/05/23 1353          Therapy Assessment/Plan (OT)    OT Diagnosis Impaired mobility and ADLs  -CM     Rehab Potential/Prognosis (OT) good, to achieve stated therapy goals  -CM     Frequency of Treatment (OT) other (see comments)  5-6 days/week  -CM     Estimated Duration of Therapy (OT) until facility discharge  -CM     Problem List (OT) balance;coordination;mobility;strength;postural control  -CM     Activity Limitations Related to Problem List (OT) unable to ambulate safely;BADLs not performed adequately or safely;unable to transfer safely;IADLs not performed adequately or safely;community activities not performed adequately or safely;home management activity not performed adequately or safely  -CM     Planned Therapy Interventions (OT) activity tolerance training;adaptive equipment training;BADL retraining;functional balance retraining;IADL retraining;occupation/activity based interventions;patient/caregiver education/training;ROM/therapeutic exercise;strengthening exercise;transfer/mobility retraining;wheelchair assessment/training  -CM     Row Name  01/05/23 1353 01/05/23 0857       Daily Progress Summary (OT)    Overall Progress Toward Functional Goals (OT) progressing toward functional goals slower than expected  -CM progressing toward functional goals as expected  -RC    Barriers to Overall Progress (OT) -- left neglect, fatigue  -RC    Recommendations (OT) -- 24/7 assist  -RC    Row Name 01/05/23 1353          Therapy Plan Review/Discharge Plan (OT)    Therapy Plan Review (OT) evaluation/treatment results reviewed;care plan/treatment goals reviewed;risks/benefits reviewed;participants voiced agreement with care plan;current/potential barriers reviewed;participants included;patient  -CM     Anticipated Discharge Disposition (OT) home with 24/7 care  -CM     Row Name 01/05/23 0857          Bathing Goal 1 (OT-IRF)    Activity/Device (Bathing Goal 1, OT-IRF) lower body bathing  -RC     [place] Level (Bathing Goal 1, OT-IRF) modified independence  -RC     Time Frame (Bathing Goal 1, OT-IRF) by discharge;long-term goal (LTG)  -RC     Progress/Outcomes (Bathing Goal 1, OT-IRF) goal not met  -RC     Row Name 01/05/23 0857          LB Dressing Goal 1 (OT-IRF)    Activity/Device (LB Dressing Goal 1, OT-IRF) lower body dressing  -RC     [place] (LB Dressing Goal 1, OT-IRF) modified independence  -RC     Time Frame (LB Dressing Goal 1, OT-IRF) long-term goal (LTG);by discharge  -RC     Progress/Outcomes (LB Dressing Goal 1, OT-IRF) goal not met  -RC     Row Name 01/05/23 0857          Strength Goal 1 (OT-IRF)    Strength Goal 1 (OT-IRF) Pt will increase BUE strength in all planes by one level for increased strength and endurance required for ADL routine.  -RC     Time Frame (Strength Goal 1, OT-IRF) long-term goal (LTG);by discharge  -RC     Progress/Outcomes (Strength Goal 1, OT-IRF) goal not met  -RC     Row Name 01/05/23 0857           Endurance Goal 1 (OT)    Activity Level (Endurance Goal 1, OT) endurance 2 good  -RC     Progress/Outcome (Endurance Goal  1, OT) goal not met  -           User Key  (r) = Recorded By, (t) = Taken By, (c) = Cosigned By    Initials Name Effective Dates     Annette Carey COTA 06/16/21 -     Ghazala Lemus OT 11/18/22 -                  Occupational Therapy Education     Title: PT OT SLP Therapies (In Progress)     Topic: Occupational Therapy (In Progress)     Point: ADL training (Done)     Description:   Instruct learner(s) on proper safety adaptation and remediation techniques during self care or transfers.   Instruct in proper use of assistive devices.              Learning Progress Summary           Patient Acceptance, E, VU,NR by  at 1/5/2023 1517    Acceptance, E,TB, VU by  at 12/31/2022 1522    Acceptance, E,TB, VU by  at 12/31/2022 1211    Comment: OT POC, Role of OT, safe ADL mobility and t/f                   Point: Home exercise program (Not Started)     Description:   Instruct learner(s) on appropriate technique for monitoring, assisting and/or progressing therapeutic exercises/activities.              Learner Progress:  Not documented in this visit.          Point: Precautions (Done)     Description:   Instruct learner(s) on prescribed precautions during self-care and functional transfers.              Learning Progress Summary           Patient Acceptance, E, VU,NR by  at 1/5/2023 1517                   Point: Body mechanics (Done)     Description:   Instruct learner(s) on proper positioning and spine alignment during self-care, functional mobility activities and/or exercises.              Learning Progress Summary           Patient Acceptance, E, VU,NR by  at 1/5/2023 1517                               User Key     Initials Effective Dates Name Provider Type Discipline     06/16/21 -  Lisa José COTA Occupational Therapist Assistant OT     06/16/21 -  Annette Carey COTA Occupational Therapist Assistant OT    [facility] 11/18/22 -  Ghazala Alfonso OT Occupational Therapist OT                      Self-Care Admission Goal Date Discharge   Eating 05 06 1/5/23    Oral hygiene 04 06 1/5/23    Toileting hygiene 04 06 1/5/23    Shower/bathe self 03 06 1/5/23    Upper body dressing 04 06 1/5/23    Lower body dressing 03 06 1/5/23    Putting on/taking off footwear 04 06 1/5/23              OT Recommendation and Plan    Planned Therapy Interventions (OT): activity tolerance training, adaptive equipment training, BADL retraining, functional balance retraining, IADL retraining, occupation/activity based interventions, patient/caregiver education/training, ROM/therapeutic exercise, strengthening exercise, transfer/mobility retraining, wheelchair assessment/training    Plan of Care Review  Plan of Care Reviewed With: patient  Outcome Evaluation: OT re-evaluation completed. Pt sitting up in w/c upon arrival. Pt was CGA for sit <> stand t/f with FWW. Pt completed ambulation to the toilet with CGA and FWW. Completed toilet t/f with CGA and use of grab bars. Verbal cues and increased time required for toileting with no further assistnace required. Pt completed mobility to the sink with CGA and FWW. Completed hand washing in seated position at the sink with SBA. Pt was Mod A for donning/doffing socks and shoes in seated position with a step stool elevating his LEs. Pt was left sitting up in w/c with nursing notified and all needs in reach. Pt continues to demonstrate decreased UB strength, impaired safety with ADLs, as well as decreased (I) with ADLs and mobility. All goals continued. Continue OT POC.  Daily Progress Summary (OT)  Overall Progress Toward Functional Goals (OT): progressing toward functional goals slower than expected  Plan of Care Reviewed With: patient       Outcome Measures     Row Name 01/05/23 1353 01/03/23 1404 01/03/23 1044       How much help from another person do you currently need...    Turning from your back to your side while in flat bed without using bedrails? -- -- 3  -EUGENE    Moving from lying  on back to sitting on the side of a flat bed without bedrails? -- -- 3  -EUGENE    Moving to and from a bed to a chair (including a wheelchair)? -- -- 3  -EUGENE    Standing up from a chair using your arms (e.g., wheelchair, bedside chair)? -- -- 3  -EUGENE    Climbing 3-5 steps with a railing? -- -- 2  -EUGENE    To walk in hospital room? -- -- 3  -EUGENE    AM-PAC 6 Clicks Score (PT) -- -- 17  -EUGENE       How much help from another is currently needed...    Putting on and taking off regular lower body clothing? 2  -CM -- --    Bathing (including washing, rinsing, and drying) 2  -CM -- --    Toileting (which includes using toilet bed pan or urinal) 4  -CM -- --    Putting on and taking off regular upper body clothing 3  -CM -- --    Taking care of personal grooming (such as brushing teeth) 4  -CM -- --    Eating meals 4  -CM -- --    AM-[facility] 6 Clicks Score (OT) 19  -CM -- --       Modified Dk Scale    Pre-Stroke Modified [place] Scale 0 - No Symptoms at all.  -CM -- --    Modified Dk Scale 2 - Slight disability.  Unable to carry out all previous activities but able to look after own affairs without assistance.  -CM -- --       Functional Assessment    Outcome Measure Options AM-[facility] 6 Clicks Daily Activity (OT)  - AM-[facility] 6 Clicks Basic Mobility (PT)  - AM-[facility] 6 Clicks Basic Mobility (PT)  -          User Key  (r) = Recorded By, (t) = Taken By, (c) = Cosigned By    Initials Name Provider Type    EUGENE Yossi Haynes PTA Physical Therapist Assistant    Ghazala Lemus OT Occupational Therapist                  Time Calculation:      Time Calculation- OT     Row Name 01/05/23 1716 01/05/23 1247 01/05/23 1135       Time Calculation- OT    OT Start Time 1353  -CM 0857  -RC --    OT Stop Time 1450  -CM 1027  -RC --    OT Time Calculation (min) 57 min  -CM 90 min  -RC --    Total Timed Code Minutes- OT 23 minute(s)  -CM 90 minute(s)  -RC --    OT Received On 01/05/23  -CM 01/05/23  - --    OT Goal Re-Cert Due Date 01/18/23  -CM  -- --       Timed Charges    17299 - Gait Training Minutes  -- -- 25  -JA    86170 - OT Therapeutic Activity Minutes -- 30  -RC --    72456 - OT Self Care/Mgmt Minutes 23  -CM 60  -RC --       Untimed Charges    OT Eval/Re-eval Minutes 34  -CM -- --       Total Minutes    Timed Charges Total Minutes 23  -CM 90  -RC 25  -JA    Untimed Charges Total Minutes 34  -CM -- --     Total Minutes 57  -CM 90  -RC 25  -JA          User Key  (r) = Recorded By, (t) = Taken By, (c) = Cosigned By    Initials Name Provider Type    Papi Smith, PTA Physical Therapist Assistant    Annette Gonzalez COTA Occupational Therapist Assistant    CM Ghazala Alfonso OT Occupational Therapist              Therapy Charges for Today     Code Description Service Date Service Provider Modifiers Qty    73768873685 HC OT SELF CARE/MGMT/TRAIN EA 15 MIN 1/5/2023 Ghazala Alfonso OT GO 2    81263476034 HC OT RE-EVAL 2 1/5/2023 Ghazala Alfonso OT GO 1                   Ghazala Alfonso OT  1/5/2023

## 2023-01-05 NOTE — PLAN OF CARE
Goal Outcome Evaluation:  Plan of Care Reviewed With: patient        Progress: no change  Outcome Evaluation: patient has not slept much this night, medication compliant, zanaflex prn given for muscle spasams with good results

## 2023-01-06 PROBLEM — S06.36AA TRAUMATIC HEMORRHAGE OF CEREBRUM: Status: ACTIVE | Noted: 2023-01-06

## 2023-01-06 PROCEDURE — 94799 UNLISTED PULMONARY SVC/PX: CPT

## 2023-01-06 PROCEDURE — 97164 PT RE-EVAL EST PLAN CARE: CPT

## 2023-01-06 PROCEDURE — 94664 DEMO&/EVAL PT USE INHALER: CPT

## 2023-01-06 PROCEDURE — 99231 SBSQ HOSP IP/OBS SF/LOW 25: CPT | Performed by: ORTHOPAEDIC SURGERY

## 2023-01-06 PROCEDURE — 97535 SELF CARE MNGMENT TRAINING: CPT

## 2023-01-06 PROCEDURE — 94760 N-INVAS EAR/PLS OXIMETRY 1: CPT

## 2023-01-06 PROCEDURE — 97110 THERAPEUTIC EXERCISES: CPT

## 2023-01-06 PROCEDURE — 92507 TX SP LANG VOICE COMM INDIV: CPT | Performed by: SPEECH-LANGUAGE PATHOLOGIST

## 2023-01-06 RX ADMIN — DOCUSATE SODIUM 100 MG: 100 CAPSULE, LIQUID FILLED ORAL at 20:53

## 2023-01-06 RX ADMIN — IPRATROPIUM BROMIDE 0.5 MG: 0.5 SOLUTION RESPIRATORY (INHALATION) at 21:12

## 2023-01-06 RX ADMIN — MELATONIN 6 MG: 3 TAB ORAL at 20:54

## 2023-01-06 RX ADMIN — MONTELUKAST SODIUM 10 MG: 10 TABLET, COATED ORAL at 20:54

## 2023-01-06 RX ADMIN — DOCUSATE SODIUM 100 MG: 100 CAPSULE, LIQUID FILLED ORAL at 08:05

## 2023-01-06 RX ADMIN — IPRATROPIUM BROMIDE 0.5 MG: 0.5 SOLUTION RESPIRATORY (INHALATION) at 06:43

## 2023-01-06 RX ADMIN — Medication 1000 UNITS: at 08:05

## 2023-01-06 RX ADMIN — DILTIAZEM HYDROCHLORIDE 180 MG: 180 CAPSULE, COATED, EXTENDED RELEASE ORAL at 08:05

## 2023-01-06 NOTE — PLAN OF CARE
Problem: Rehabilitation (IRF) Plan of Care  Goal: Plan of Care Review  Flowsheets (Taken 1/6/2023 1236)  Plan of Care Reviewed With: patient  Outcome Evaluation: PT re-evaluation completed. Pt awake, alert and agreeable to therapy. Pt oriented x4 and able to follow at least 2 step commands usually with repetition and increased response time required. Function limited by decreased strength, balance, and tolerance for functional mobility and activities. Pt will benefit from continued PT to improve strength, balance, and tolerance for improved transfer and gait ability and to decrease fall risk. Anticipate 24/7 care at discharge with continued therapy services.   Goal Outcome Evaluation:  Plan of Care Reviewed With: patient           Outcome Evaluation: PT re-evaluation completed. Pt awake, alert and agreeable to therapy. Pt oriented x4 and able to follow at least 2 step commands usually with repetition and increased response time required. Function limited by decreased strength, balance, and tolerance for functional mobility and activities. Pt will benefit from continued PT to improve strength, balance, and tolerance for improved transfer and gait ability and to decrease fall risk. Anticipate 24/7 care at discharge with continued therapy services.

## 2023-01-06 NOTE — PLAN OF CARE
Goal Outcome Evaluation:  Plan of Care Reviewed With: patient        Progress: improving   Pt up in chair participated well w/ OT this day, sponge bath at sink, completed grooming w/ morrow, bathing min A   cga when standing and min A to wash feet,  ub dressing morrow, lb dressing min A w/ socks snd velcor on shoes, toilet transfer cga w/ vc's toileting cga,  sit<>stand cga w/c>bed cga sit>sup sba,   pt moves slowly sometimes stops and waits a few minutes to continue, needs vc's throughout tasks, recommend 24/7 care at d/c and cont OT at next level of care.  Cont poc

## 2023-01-06 NOTE — PLAN OF CARE
Problem: Rehabilitation (IRF) Plan of Care  Goal: Plan of Care Review  Outcome: Ongoing, Progressing  Flowsheets (Taken 1/6/2023 1417)  Plan of Care Reviewed With: patient   Goal Outcome Evaluation:  Plan of Care Reviewed With: patient      Continues to have depressed appetite.  Average intake <50%.  States foods are too salty but not receiving salted foods or salt on trays.  Will continue to send supplements as ordered.    Goal % intake at meals.

## 2023-01-06 NOTE — THERAPY TREATMENT NOTE
Inpatient Rehabilitation - Speech Language Pathology Treatment Note    [facility]     Patient Name: Brian Garcia  : 1950  MRN: [id number]    Today's Date: 2023                   Admit Date: 2022     Admit Date: 2022      Goals:  1.  Pt will answer orientation questions w/90% acc:   GOAL MET previously     2.  Pt will complete deductive reasoning tasks w/90% acc:  Pt was 70% accuracy with mod cues for deductive reasoning puzzles     3.  Pt will answer questions about ADL problems w/90% acc:  Pt was 80% acc w/simple problem solving scenarios pertaining to return home and ADLs. concern for pt ability to verbally provide feedback, but inability to perform needed action in a timely fashion.       Hearing, Speech and Vision Admission Goal  Date  1/3/23 Date 23\ Date  23 Date Date Discharge   Expression of Ideas and Wants (consider both verbal and non-verbal expression and excluding language barriers) 4 4  3  3  4         Understanding Verbal and Non-Verbal Content (with hearing aid or device, if used, and excluding language barriers) 4 4 3  3  3                   Visit Dx:      ICD-10-CM ICD-9-CM   1. Dysphagia, unspecified type  R13.10 787.20   2. Symbolic dysfunction  R48.9 784.60   3. Impaired mobility and ADLs  Z74.09 V49.89    Z78.9    4. Impaired functional mobility, balance, gait, and endurance  Z74.09 V49.89       Patient Active Problem List   Diagnosis   • Pneumonia of both lower lobes due to infectious organism   • Physical deconditioning   • Pulmonary nodules   • Leukocytosis   • History of pulmonary embolism   • Stage 4 very severe COPD by GOLD classification ([facility])   • Chronic respiratory failure with hypoxia, on home O2 therapy ([facility])   • Hypertension   • CVA (cerebral vascular accident) ([facility])   • Traumatic hemorrhage of cerebrum       Past Medical History:   Diagnosis Date   • COPD (chronic obstructive pulmonary disease) ([facility])    • Hypertension        Past  Surgical History:   Procedure Laterality Date   • HIP ARTHROPLASTY         SLP Recommendation and Plan                   Anticipated Discharge Disposition (SLP): home with assist (01/06/23 1320)        Predicted Duration Therapy Intervention (Days): until discharge (01/06/23 1320)     Daily Summary of Progress (SLP): progress toward functional goals as expected (01/06/23 1320)        Plan of Care Reviewed With: patient (01/06/23 1421)  Progress: improving (01/06/23 1421)  Outcome Evaluation: ST: pt performed dedctive reasoning tasks with moderate assistance and frequent repetition.  delayed processing noted. (01/06/23 1421)     Treatment Assessment (SLP): improved, cognitive-linguistic disorder (01/06/23 1320)  Treatment Assessment Comments (SLP): ST: pt performed dedctive reasoning tasks with moderate assistance and frequent repetition.  delayed processing noted. (01/06/23 1320)  Plan for Continued Treatment (SLP): continue treatment per plan of care (01/06/23 1320)    SLP EVALUATION (last 72 hours)     SLP SLC Evaluation     Row Name 01/06/23 1320 01/05/23 1315 01/04/23 1305             Communication Assessment/Intervention    Document Type therapy note (daily note)  -EC therapy note (daily note)  -EC therapy note (daily note)  -CK      Subjective Information no complaints  -EC no complaints  -EC no complaints  -CK      Patient Observations alert;cooperative;agree to therapy  -EC alert;cooperative;agree to therapy  -EC alert;poorly cooperative;agree to therapy  -CK      Patient/Family/Caregiver Comments/Observations none  -EC none  -EC No family present at bedside  -CK      Patient Effort good  -EC good  -EC fair  -CK         General Information    Patient Profile Reviewed yes  -EC yes  -EC yes  -CK      Precautions/Limitations, Vision corrective lenses needed for reading  -EC corrective lenses needed for reading  -EC corrective lenses needed for reading  -CK      Precautions/Limitations, Hearing hearing  impairment, bilaterally  -EC hearing impairment, bilaterally  -EC hearing impairment, bilaterally  -CK      Plans/Goals Discussed with patient  -EC patient  -EC patient  -CK         Pain Scale: Numbers Pre/Post-Treatment    Pretreatment Pain Rating 0/10 - no pain  -EC 0/10 - no pain  -EC 0/10 - no pain  -CK      Posttreatment Pain Rating 0/10 - no pain  -EC 0/10 - no pain  -EC 0/10 - no pain  -CK         SLP Treatment Clinical Impressions    Treatment Assessment (SLP) improved;cognitive-linguistic disorder  -EC improved;cognitive-linguistic disorder  -EC continued;cognitive-linguistic disorder  -CK      Treatment Assessment Comments (SLP) ST: pt performed dedctive reasoning tasks with moderate assistance and frequent repetition.  delayed processing noted.  -EC ST: pt met goals for orientation this date.  He is beginning to demonstrate increased awareness of deficits and awknowledges he will need a plan in order to return home independently.  -EC Pt seen for skilled ST services this date. Initially pt was very lethargic and falling asleep; however, pt aroused after tactile stimuli from SLP.  Pt continues to require mutliple repetitions of information and increased time for response formulation.  Pt also gave nonsensical answers for deductive reasoning questions.  Pt would smile and laugh after giving nonsensical responses.  Pt did meet orientation goal this date w/o use of visual aides.  Pt's lack of participation and willingness to provide legitimate answers is a barrier to progress.  -CK      Daily Summary of Progress (SLP) progress toward functional goals as expected  -EC progress toward functional goals as expected  -EC progress toward functional goals is gradual  -CK      Barriers to Overall Progress (SLP) Cognitive status  -EC -- Cognitive status  -CK      Plan for Continued Treatment (SLP) continue treatment per plan of care  -EC continue treatment per plan of care  -EC goals adjusted to reflect functional  improvements demonstrated  -CK      Care Plan Review evaluation/treatment results reviewed;care plan/treatment goals reviewed;risks/benefits reviewed;current/potential barriers reviewed;patient/other agree to care plan  -EC evaluation/treatment results reviewed;care plan/treatment goals reviewed;risks/benefits reviewed;current/potential barriers reviewed;patient/other agree to care plan  -EC evaluation/treatment results reviewed;care plan/treatment goals reviewed;risks/benefits reviewed;current/potential barriers reviewed;patient/other agree to care plan  -CK         Recommendations    Therapy Frequency (SLP SLC) 5 days per week  -EC 5 days per week  -EC 5 days per week  -CK      Predicted Duration Therapy Intervention (Days) until discharge  -EC until discharge  -EC until discharge  -CK      Anticipated Discharge Disposition (SLP) home with assist  -EC home with assist  -EC home with assist  -CK         Orientation Goal 1 (SLP)    Improve Orientation Through Goal 1 (SLP) -- demonstrating orientation to day;demonstrating orientation to month;demonstrating orientation to year;demonstrating orientation to place;90%;independently (over 90% accuracy)  -EC demonstrating orientation to day;demonstrating orientation to month;demonstrating orientation to year;demonstrating orientation to place;90%;independently (over 90% accuracy)  -CK      Time Frame (Orientation Goal 1, SLP) -- by discharge  -EC by discharge  -CK      Progress (Orientation Goal 1, SLP) -- 90%;independently (over 90% accuracy)  -EC 90%;independently (over 90% accuracy)  -CK      Progress/Outcomes (Orientation Goal 1, SLP) -- goal met   -EC goal met  -CK         Reasoning Goal 1 (SLP)    Improve Reasoning Through Goal 1 (SLP) complete deductive reasoning task;90%;independently (over 90% accuracy)  -EC complete deductive reasoning task;90%;independently (over 90% accuracy)  -EC complete deductive reasoning task;90%;independently (over 90% accuracy)  -CK       Time Frame (Reasoning Goal 1, SLP) by discharge  -EC by discharge  -EC by discharge  -CK      Progress (Reasoning Goal 1, SLP) 70%  -EC 80%  -EC --  didn't provide legitimate answers.  -CK      Progress/Outcomes (Reasoning Goal 1, SLP) goal ongoing  -EC goal ongoing  -EC goal ongoing  -CK      Comment (Reasoning Goal 1, SLP) slow processing noted and need for frequent repetition  -EC improvement noted with thought organization toward safey and problem solving.  -EC --         Functional Problem Solving Skills Goal 1 (SLP)    Improve Problem Solving Through Goal 1 (SLP) answer questions about ADL problems;90%;independently (over 90% accuracy)  -EC answer questions about ADL problems;90%;independently (over 90% accuracy)  -EC answer questions about ADL problems;90%;independently (over 90% accuracy)  -CK      Time Frame (Problem Solving Goal 1, SLP) by discharge  -EC by discharge  -EC by discharge  -CK      Progress (Problem Solving Goal 1, SLP) 80%  -EC 80%  -EC 60%  -CK      Progress/Outcomes (Problem Solving Goal 1, SLP) goal ongoing  -EC goal ongoing  -EC goal ongoing  -CK            User Key  (r) = Recorded By, (t) = Taken By, (c) = Cosigned By    Initials Name Effective Dates    EC Anita Siu, CCC-SLP 06/16/21 -     CK Latesha Ram MS CCC-SLP 06/16/21 -                    EDUCATION    The patient has been educated in the following areas:       Cognitive Impairment.             [facility] GOALS     Row Name 01/06/23 1320 01/05/23 1315 01/04/23 1305       Orientation Goal 1 (SLP)    Improve Orientation Through Goal 1 (SLP) -- demonstrating orientation to day;demonstrating orientation to month;demonstrating orientation to year;demonstrating orientation to place;90%;independently (over 90% accuracy)  -EC demonstrating orientation to day;demonstrating orientation to month;demonstrating orientation to year;demonstrating orientation to place;90%;independently (over 90% accuracy)  -CK    Time Frame (Orientation  Goal 1, SLP) -- by discharge  -EC by discharge  -CK    Progress (Orientation Goal 1, SLP) -- 90%;independently (over 90% accuracy)  -EC 90%;independently (over 90% accuracy)  -CK    Progress/Outcomes (Orientation Goal 1, SLP) -- goal met   -EC goal met  -CK       Reasoning Goal 1 (SLP)    Improve Reasoning Through Goal 1 (SLP) complete deductive reasoning task;90%;independently (over 90% accuracy)  -EC complete deductive reasoning task;90%;independently (over 90% accuracy)  -EC complete deductive reasoning task;90%;independently (over 90% accuracy)  -CK    Time Frame (Reasoning Goal 1, SLP) by discharge  -EC by discharge  -EC by discharge  -CK    Progress (Reasoning Goal 1, SLP) 70%  -EC 80%  -EC --  didn't provide legitimate answers.  -CK    Progress/Outcomes (Reasoning Goal 1, SLP) goal ongoing  -EC goal ongoing  -EC goal ongoing  -CK    Comment (Reasoning Goal 1, SLP) slow processing noted and need for frequent repetition  -EC improvement noted with thought organization toward safey and problem solving.  -EC --       Functional Problem Solving Skills Goal 1 (SLP)    Improve Problem Solving Through Goal 1 (SLP) answer questions about ADL problems;90%;independently (over 90% accuracy)  -EC answer questions about ADL problems;90%;independently (over 90% accuracy)  -EC answer questions about ADL problems;90%;independently (over 90% accuracy)  -CK    Time Frame (Problem Solving Goal 1, SLP) by discharge  -EC by discharge  -EC by discharge  -CK    Progress (Problem Solving Goal 1, SLP) 80%  -EC 80%  -EC 60%  -CK    Progress/Outcomes (Problem Solving Goal 1, SLP) goal ongoing  -EC goal ongoing  -EC goal ongoing  -CK          User Key  (r) = Recorded By, (t) = Taken By, (c) = Cosigned By    Initials Name Provider Type    EC Anita Siu CCC-SLP Speech and Language Pathologist    Latesha Andrews MS CCC-SLP Speech and Language Pathologist                            Time Calculation:        Time Calculation-  SLP     Row Name 01/06/23 1423             Time Calculation- SLP    SLP Start Time 1320  -EC      SLP Stop Time 1350  -EC      SLP Time Calculation (min) 30 min  -EC      Total Timed Code Minutes- SLP 30 minute(s)  -EC      SLP Received On 01/06/23  -EC      SLP Goal Re-Cert Due Date 01/13/23  -EC         Untimed Charges    33749-AF Treatment/ST Modification Prosth Aug Alter  30  -EC         Total Minutes    Untimed Charges Total Minutes 30  -EC       Total Minutes 30  -EC            User Key  (r) = Recorded By, (t) = Taken By, (c) = Cosigned By    Initials Name Provider Type    EC Anita Siu CCC-SLP Speech and Language Pathologist                  Therapy Charges for Today     Code Description Service Date Service Provider Modifiers Qty    61966106629 HC ST TREATMENT SPEECH 3 1/5/2023 Anita Siu CCC-SLP GN 1    37054008485 HC ST TREATMENT SPEECH 2 1/6/2023 Anita Siu CCC-SLP GN 1                           LOC Bernardo  1/6/2023

## 2023-01-06 NOTE — PLAN OF CARE
Goal Outcome Evaluation:  Plan of Care Reviewed With: patient        Progress: improving  Outcome Evaluation: Alert and voices needs they arise, is slow to follow through with verbal ques. Up with assist of 1 gaitbelt and walker. VSS with no acute distress noted.

## 2023-01-06 NOTE — PROGRESS NOTES
Progress Note      Admit date: 12/30/2022 12:28 PM       Treatment Team     Provider Relationship Specialty Contact    Fan Rob MD Attending Rehabilitation   [phone]      Papi Hutchins PTA Physical Therapy Assistant Physical Therapy --    Annette Carey COTA Occupational Therapy Assistant Occupational Therapy --    Nydia Muñoz, PT Physical Therapist Physical Therapy   [phone]      Anita Siu CCC-SLP Speech Language Pathologist Speech Pathology   [phone]      Aleja Tripp  -- --    Aldair Miller MD Consulting Physician Pulmonary Disease   [phone]      Dale Starr RN Registered Nurse --   [phone]      Asael Evans, PCT Patient Care Technician -- --    Mili Washington, RRT Respiratory Therapist Respiratory Therapy   [phone]      Maya Villavicencio RN Registered Nurse -- --    Radha Pierre RN Registered Nurse -- --    Manju Patterson LSW  -- --             Chief Complaint: traumatic multiple localized intracerebral hemorrhages (HCC)    HPI: No changes since admission    Vital Signs  Temp:  [97.8 °F (36.6 °C)-98.3 °F (36.8 °C)] 98.3 °F (36.8 °C)  Heart Rate:  [90-98] 96  Resp:  [18-20] 18  BP: (139-147)/(69-76) 147/69    Physical Exam 1:    Constitutional: Alert stable more cooperative today just slow I think his ability is depend upon how he feels and and and and he would goes at his own pace feels like  He will do it if he does not is like pulling teeth out of a chicken     HENT:      Eyes:     Neck:      Cardiovascular: No chest pain    Pulmonary: No cough or productive sputum no difficulty breathing on oxygen    Abdominal:      Genitourinary/Anorectal:       Musculoskeletal:     Skin:     Neurological: No focal signs    Psychiatric/Behavioral: More cooperative today with therapy       Results Review:    I reviewed the patient's new clinical results.    Nursing notes and therapy notes have been reviewed.    I have  reviewed medications.    Assessment/Plan  Principal Problem:    Traumatic hemorrhage of cerebrum stable no focal deterioration stable  Active Problems:    CVA (cerebral vascular accident) ([facility]) contacted the neurosurgeons office yesterday who recommended no anticoagulation until he is seen by the neurosurgeon which is in February    Pneumonia of both lower lobes due to infectious organism      Overview: - Discussed with Radiology (Dr. Davey), who reports that the patient has a       superior segment small patchy opacity in the left lung base likely       pneumonia, and a small patchy opacity in the right lung base, likely       pneumonia. He recommended continuing with antibiotics and then doing       follow up imaging in 3 months.      - Blood cultures no growth to date.      - Levaquin day 4. Transitioned to PO today.      - Legionella and strep pneumo neg. Mycoplasma pending.  No evidence of fever cough sputum production or increasing difficulty breathing the patient has a February appointment with the pulmonologist at [facility]    Physical deconditioning slow improvement      Overview: - PT/OT Consulted. Recommendations: home with home health.      - Patient may benefit from an assisted living.  He has a significant other but she is disabled and unable to independently care for him he needs an extension of his days because our goal is independent care or just assistive care rather than a nursing home or assisted living he is a .  And it is [facility] the [facility] if he needs additional time    Stage 4 very severe COPD by GOLD classification ([facility]) stable    Hypertension stable    Chronic respiratory failure with hypoxia, on home O2 therapy ([facility]) able     Comment: This individual is responding but it depends upon #1 how he feels in his cooperation.  Needs additional time to be sure that he is independently functioning to return to his home safely  because his significant other is unable to care for him because of her disabilities we do not want to discharge him to an unsafe environment and he does not want to go to a nursing home and is impossible to get him into the [facility] nursing [facility] because of the waiting list.    Fan Rob MD  01/06/23  09:06 CST          This document has been electronically signed by Fan Rob MD on January 6, 2023 09:06 CST      Part of this note may be an electronic transcription/translation of spoken language to printed text using the Dragon Dictation System.

## 2023-01-06 NOTE — PROGRESS NOTES
Adult Nutrition  Assessment/PES    Patient Name:  Brian Garcia  YOB: 1950  MRN: [id number]  Admit Date:  12/30/2022    Assessment Date:  1/6/2023    Comments:  Follow up.  Pt reports foods are too salty however he is not receiving salted foods or salt on his trays.  Reports low appetite that has been going on for 2-3 months.  Notes indicate he eats with lots of encouragement.  Average 42% for 9 meals.  Pt was pleasant today and was joking around.  Will continue to send supplements as ordered to encourage intake.  RDN staff will follow ARU course.     Reason for Assessment     Row Name 01/06/23 1411          Reason for Assessment    Reason For Assessment follow-up protocol                Nutrition/Diet History     Row Name 01/06/23 1411          Nutrition/Diet History    Typical Intake (Food/Fluid/EN/PN) pt's intake is still not at goal, but he reports appetite has been lower  for 2-3 months     Factors Affecting Nutritional Intake appetite                Labs/Tests/Procedures/Meds     Row Name 01/06/23 1412          Labs/Procedures/Meds    Lab Results Reviewed reviewed, pertinent     Lab Results Comments Na 131, Cl 91, Glu 109, Cr 0.57        Diagnostic Tests/Procedures    Diagnostic Test/Procedure Reviewed reviewed, pertinent        Medications    Pertinent Medications Reviewed reviewed, pertinent     Pertinent Medications Comments D3, colace, prednisone                    Nutrition Prescription Ordered     Row Name 01/06/23 1413          Nutrition Prescription PO    Current PO Diet Regular     Supplement Ice Cream;Other (comment)  PB     Supplement Frequency 2 times a day     Common Modifiers Cardiac                Evaluation of Received Nutrient/Fluid Intake     Row Name 01/06/23 1414          PO Evaluation    Number of Meals 9     % PO Intake 42                   Problem/Interventions:   Problem 1     Row Name 01/06/23 1414          Nutrition Diagnoses Problem 1    Problem 1 Predicted  Suboptimal Intake     Etiology (related to) Factors Affecting Nutrition     Appetite Fair;Poor     Signs/Symptoms (evidenced by) PO Intake     Percent (%) intake recorded 42 %     Over number of meals 9                      Intervention Goal     Row Name 01/06/23 1415          Intervention Goal    General Maintain nutrition;Meet nutritional needs for age/condition     PO Meet estimated needs;Increase intake     Weight No significant weight loss                Nutrition Intervention     Row Name 01/06/23 1415          Nutrition Intervention    RD/Tech Action Follow Tx progress;Care plan reviewd;Encourage intake                  Education/Evaluation     Row Name 01/06/23 1415          Education    Education Education topics     Education Topics Basic nutrition     Advised Regarding Habits/Behavior --  encouraged intake        Monitor/Evaluation    Monitor Per protocol;PO intake;Supplement intake;Pertinent labs;Weight;Skin status;Symptoms     Education Follow-up Reinforce PRN                 Electronically signed by:  Cami Callejas  01/06/23 14:15 CST

## 2023-01-06 NOTE — PLAN OF CARE
Goal Outcome Evaluation:  Plan of Care Reviewed With: patient        Progress: improving  Outcome Evaluation: Pt. performed x15 reps seated LE therex & hamstring stretch B [id number]. hold each this a.m. after PT re-eval treatment this a.m.

## 2023-01-06 NOTE — THERAPY TREATMENT NOTE
Inpatient Rehabilitation - Physical Therapy Treatment Note       [facility]     Patient Name: Brian Garcia  : 1950  MRN: [id number]    Today's Date: 2023                    Admit Date: 2022      Visit Dx:     ICD-10-CM ICD-9-CM   1. Dysphagia, unspecified type  R13.10 787.20   2. Symbolic dysfunction  R48.9 784.60   3. Impaired mobility and ADLs  Z74.09 V49.89    Z78.9    4. Impaired functional mobility, balance, gait, and endurance  Z74.09 V49.89       Patient Active Problem List   Diagnosis   • Pneumonia of both lower lobes due to infectious organism   • Physical deconditioning   • Pulmonary nodules   • Leukocytosis   • History of pulmonary embolism   • Stage 4 very severe COPD by GOLD classification ([facility])   • Chronic respiratory failure with hypoxia, on home O2 therapy ([facility])   • Hypertension   • CVA (cerebral vascular accident) ([facility])   • Traumatic hemorrhage of cerebrum       Past Medical History:   Diagnosis Date   • COPD (chronic obstructive pulmonary disease) ([facility])    • Hypertension        Past Surgical History:   Procedure Laterality Date   • HIP ARTHROPLASTY         PT ASSESSMENT (last 12 hours)     IRF PT Evaluation and Treatment     Row Name 23 1130 23 1105       PT Time and Intention    Document Type daily treatment  -JA re-evaluation  -EUGENE    Mode of Treatment physical therapy  - physical therapy  -EUGENE    Row Name 23 1130 23 1105       General Information    Patient Profile Reviewed yes  -JA yes  -EUGENE    Existing Precautions/Restrictions fall  -JA fall  -EUGENE    Limitations/Impairments hearing  -JA hearing  -EUGENE    Row Name 23 1105          Previous Level of Function/Home Environm    Bed Mobility, Premorbid Functional Level independent  -EUGENE     Transfers, Premorbid Functional Level independent  -EUGENE     Household Ambulation, Premorbid Functional Level independent  -EUGENE     Stairs, Premorbid Functional Level independent  -EUGENE     Community  Ambulation, Premorbid Functional Level independent  -     Row Name 01/06/23 1130 01/06/23 1105       Living Environment    Current Living Arrangements -- home  -    Home Accessibility -- wheelchair accessible  -    People in Home -- significant other  -    Primary Care Provided by self  - self  -    Row Name 01/06/23 1130 01/06/23 1105       Home Use of Assistive/Adaptive Equipment    Equipment Currently Used at Home oxygen  - oxygen  -    Row Name 01/06/23 1130 01/06/23 1105       Pain Assessment    Pretreatment Pain Rating 0/10 - no pain  - 0/10 - no pain  -    Posttreatment Pain Rating 0/10 - no pain  - --    Row Name 01/06/23 1130 01/06/23 1105       Cognition/Psychosocial    Orientation Status (Cognition) oriented x 4  - oriented x 4  -    Follows Commands (Cognition) delayed response/completion;increased processing time needed;follows two-step commands  - delayed response/completion;increased processing time needed;follows two-step commands  -    Personal Safety Interventions -- fall prevention program maintained;gait belt;supervised activity  -    Row Name 01/06/23 1105          Range of Motion (ROM)    Range of Motion ROM is WFL  -[facility] Name 01/06/23 1130 01/06/23 1105       Range of Motion Comprehensive    General Range of Motion bilateral upper extremity ROM WFL;bilateral lower extremity ROM WFL  - bilateral upper extremity ROM WFL;bilateral lower extremity ROM WFL  -    Row Name 01/06/23 1105          Strength (Manual Muscle Testing)    Strength (Manual Muscle Testing) bilateral lower extremities  -[facility] Name 01/06/23 1105          Strength Comprehensive (MMT)    Comment, General Manual Muscle Testing (MMT) Assessment BLE: RLE: 4-/5 ankle/foot, knee, 3+/5 hip; LLE: 4/5 ankle/foot, knee, hip  -     Row Name 01/06/23 1105          Bed Mobility    Rolling Left [place] (Bed Mobility) verbal cues;contact guard  -     Rolling Right [place] (Bed  Mobility) contact guard;verbal cues  -     Scooting/Bridging [place] (Bed Mobility) standby assist;verbal cues  -     Supine-Sit [place] (Bed Mobility) minimum assist (75% patient effort);verbal cues  -     Row Name 01/06/23 1105          Transfer Assessment/Treatment    Transfers sit-stand transfer;stand-sit transfer;wheelchair transfer  -     Row Name 01/06/23 1105          Sit-Stand Transfer    Sit-Stand [place] (Transfers) contact guard;verbal cues  -     Assistive Device (Sit-Stand Transfers) walker, front-wheeled  -     Row Name 01/06/23 1105          Stand-Sit Transfer    Stand-Sit [place] (Transfers) contact guard  -     Assistive Device (Stand-Sit Transfers) walker, front-wheeled  -     Row Name 01/06/23 1105          Wheelchair Transfer    Type (Wheelchair Transfer) stand pivot/stand step  -     [place] Level (Wheelchair Transfer) verbal cues;minimum assist (75% patient effort)  -     Assistive Device (Wheelchair Transfer) walker, front-wheeled  -     Row Name 01/06/23 1105          Gait/Stairs (Locomotion)    Gait/Stairs Locomotion gait/ambulation independence;gait/ambulation assistive device;distance ambulated;gait pattern;gait deviations  -     [place] Level (Gait) minimum assist (75% patient effort)  -     Assistive Device (Gait) walker, front-wheeled  -     Distance in Feet (Gait) 200  -     Deviations/Abnormal Patterns (Gait) base of support, narrow;kimber decreased;gait speed decreased;stride length decreased  -     Left Sided Gait Deviations leans left  -     Row Name 01/06/23 1105          Safety Issues, Functional Mobility    Safety Issues Affecting Function (Mobility) awareness of need for assistance;insight into deficits/self-awareness;safety precaution awareness  -     Row Name 01/06/23 1105          Balance    Static Sitting Balance standby assist  -     Dynamic Sitting Balance standby assist  -     Position, Sitting  Balance sitting edge of bed  -     Sit to Stand Dynamic Balance contact guard  -     Static Standing Balance contact guard  -     Dynamic Standing Balance minimal assist  -[facility] Name 01/06/23 1130          Motor Skills    Therapeutic Exercise hip;knee;stretching  -     Row Name 01/06/23 1130          Hip (Therapeutic Exercise)    Hip (Therapeutic Exercise) AROM (active range of motion)  -     Hip AROM (Therapeutic Exercise) right;flexion  -     Hip AAROM (Therapeutic Exercise) left;flexion  -[facility] Name 01/06/23 1130          Knee (Therapeutic Exercise)    Knee (Therapeutic Exercise) AROM (active range of motion)  -     Knee AROM (Therapeutic Exercise) bilateral;LAQ (long arc quad)  -[facility] Name 01/06/23 1130          Stretching (Therapeutic Exercise)    Stretching (Therapeutic Exercise) hamstring strech  -     Comment (Stretching Therapeutic Exercise) [id number]. hold B LE  -[facility] Name 01/06/23 1130          Positioning and Restraints    Pre-Treatment Position sitting in chair/recliner  -     Post Treatment Position wheelchair  -     In Wheelchair sitting;call light within reach;encouraged to call for assist;exit alarm on  -     Row Name 01/06/23 1130 01/06/23 1105       Vital Signs    Pre Systolic BP Rehab -- 120  -    Pre Treatment Diastolic BP -- 58  -    Pretreatment Heart Rate (beats/min) -- 87  -    Intratreatment Heart Rate (beats/min) -- 92  -    Pre SpO2 (%) -- 98  -    O2 Delivery Pre Treatment -- nasal cannula  4lpm  -EUGENE    Intra SpO2 (%) -- 95  -    O2 Delivery Intra Treatment -- nasal cannula  4lpm  -EUGENE    Pre Patient Position Sitting  - Supine  -    Intra Patient Position Sitting  - Sitting  -    Post Patient Position Sitting  - --    Row Name 01/06/23 1130 01/06/23 1105       Therapy Assessment/Plan (PT)    Patient's Goals For Discharge return home  - return home  -[facility] Name 01/06/23 1130 01/06/23 1105       Therapy Assessment/Plan  (PT)    PT Diagnosis (PT) -- impaired gait and mobility  -    Rehab Potential/Prognosis (PT) other (see comments)  fair  - other (see comments)  fair  -EUGENE    Frequency of Treatment (PT) --  5-6 days/wk  - --  5-6 days/wk  -    Estimated Duration of Therapy (PT) 2 weeks;4 weeks  - 2 weeks;4 weeks  -    Problem List (PT) balance;mobility;strength  - balance;mobility;strength  -    Row Name 01/06/23 1130 01/06/23 1105       Therapy Plan Review/Discharge Plan (PT)    Therapy Plan Review (PT) -- evaluation/treatment results reviewed;risks/benefits reviewed;participants voiced agreement with care plan;patient  -    Anticipated Equipment Needs at Discharge (PT Eval) front wheeled walker;hospital bed;wheelchair;wheelchair cushion  - front wheeled walker;hospital bed;wheelchair;wheelchair cushion  -    Anticipated Discharge Disposition (PT) skilled nursing facility;home with 24/7 care;home with home health  - skilled nursing facility;home with 24/7 care;home with home health  -    Row Name 01/06/23 1130 01/06/23 1105       IRF PT Goals    Bed Mobility Goal Selection (PT-IRF) bed mobility, PT goal 1  - bed mobility, PT goal 1  -    Transfer Goal Selection (PT-IRF) transfers, PT goal 1;transfers, PT goal 2  - transfers, PT goal 1;transfers, PT goal 2  -    Gait (Walking Locomotion) Goal Selection (PT-IRF) gait, PT goal 1;gait, PT goal 2  - gait, PT goal 1;gait, PT goal 2  -    Wheelchair Locomotion Goal Selection (PT-IRF) wheelchair locomotion, PT goal (free text)  - wheelchair locomotion, PT goal (free text)  -    Stairs Goal Selection (PT-IRF) stairs, PT goal 1  - stairs, PT goal 1  -    Strength Goal Selection (PT-IRF) strength, PT goal 1 (free text)  - strength, PT goal 1 (free text)  -    Balance Goal Selection (PT) balance, PT goal 1  - balance, PT goal 1  -    ROM Goal Selection (PT-IRF) ROM, PT goal 1 (free text)  - ROM, PT goal 1 (free text)  -    Row Name  01/06/23 1130 01/06/23 1105       Bed Mobility Goal 1 (PT-IRF)    Activity/Assistive Device (Bed Mobility Goal 1, PT-IRF) bed mobility activities, all  -JA bed mobility activities, all  -EUGENE    [place] Level (Bed Mobility Goal 1, PT-IRF) independent  -JA independent  -EUGENE    Time Frame (Bed Mobility Goal 1, PT-IRF) by discharge  -JA by discharge  -EUGENE    Progress/Outcomes (Bed Mobility Goal 1, PT-IRF) goal not met  -JA goal not met  -EUGENE    Row Name 01/06/23 1130 01/06/23 1105       Transfer Goal 1 (PT-IRF)    Activity/Assistive Device (Transfer Goal 1, PT-IRF) sit-to-stand/stand-to-sit;bed-to-chair/chair-to-bed;wheelchair transfer  -JA sit-to-stand/stand-to-sit;bed-to-chair/chair-to-bed;wheelchair transfer  -EUGENE    [place] Level (Transfer Goal 1, PT-IRF) standby assist  -JA standby assist  -EUGENE    Time Frame (Transfer Goal 1, PT-IRF) 1 week  -JA 1 week  -EUGENE    Progress/Outcomes (Transfer Goal 1, PT-IRF) goal not met  -JA goal not met  -EUGENE    Row Name 01/06/23 1130 01/06/23 1105       Transfer Goal 2 (PT-IRF)    Activity/Assistive Device (Transfer Goal 2, PT-IRF) bed-to-chair/chair-to-bed;sit-to-stand/stand-to-sit;wheelchair transfer  -JA bed-to-chair/chair-to-bed;sit-to-stand/stand-to-sit;wheelchair transfer  -EUGENE    [place] Level (Transfer Goal 2, PT-IRF) modified independence  -JA modified independence  -EUGENE    Time Frame (Transfer Goal 2, PT-IRF) 2 weeks  -JA 2 weeks  -EUGENE    Progress/Outcomes (Transfer Goal 2, PT-IRF) goal not met  -JA goal not met  -EUGENE    Row Name 01/06/23 1130 01/06/23 1105       Gait/Walking Locomotion Goal 1 (PT-IRF)    Activity/Assistive Device (Gait/Walking Locomotion Goal 1, PT-IRF) gait (walking locomotion);assistive device use;decrease fall risk;increase endurance/gait distance;walker, rolling  -JA gait (walking locomotion);assistive device use;decrease fall risk;increase endurance/gait distance;walker, rolling  -EUGENE    Gait/Walking Locomotion Distance Goal 1 (PT-IRF) 150ft x2   -JA 150ft x2  -EUGENE    [place] Level (Gait/Walking Locomotion Goal 1, PT-IRF) standby assist  - standby assist  -EUGENE    Time Frame (Gait/Walking Locomotion Goal 1, PT-IRF) 1 week  -JA 1 week  -EUGENE    Progress/Outcomes (Gait/Walking Locomotion Goal 1, PT-IRF) goal not met  - goal not met  -EUGENE    Row Name 01/06/23 1130 01/06/23 1105       Gait/Walking Locomotion Goal 2 (PT-IRF)    Activity/Assistive Device (Gait/Walking Locomotion Goal 2, PT-IRF) gait (walking locomotion);assistive device use;decrease fall risk;increase endurance/gait distance  -JA gait (walking locomotion);assistive device use;decrease fall risk;increase endurance/gait distance  -EUGENE    Gait/Walking Locomotion Distance Goal 2 (PT-IRF) 250ft x2  -JA 250ft x2  -EUGENE    [place] Level (Gait/Walking Locomotion Goal 2, PT-IRF) modified independence  - modified independence  -    Time Frame (Gait/Walking Locomotion Goal 2, PT-IRF) 2 weeks  -JA 2 weeks  -EUGENE    Progress/Outcomes (Gait/Walking Locomotion Goal 2, PT-IRF) goal not met  - goal not met  -EUGENE    Row Name 01/06/23 1130 01/06/23 1105       Wheelchair Locomotion Goal (PT-IRF)    Wheelchair Locomotion Goal (PT-IRF) Pt will propel w/c 150ft with 2 turns with supervison  -JA Pt will propel w/c 150ft with 2 turns with supervison  -EUGENE    Time Frame (Wheelchair Locomotion Goal, PT-IRF) by discharge  -JA by discharge  -EUGENE    Progress/Outcomes (Wheelchair Locomotion Goal, PT-IRF) goal not met  - goal not met  -EUGENE    Row Name 01/06/23 1130 01/06/23 1105       Stairs Goal 1 (PT-IRF)    Activity/Assistive Device (Stairs Goal 1, PT-IRF) ascending stairs;descending stairs;using handrail, left;using handrail, right;decrease fall risk  -JA ascending stairs;descending stairs;using handrail, left;using handrail, right;decrease fall risk  -EUGENE    Number of Stairs (Stairs Goal 1, PT-IRF) 3  -JA 3  -EUGENE    [place] Level (Stairs Goal 1, PT-IRF) standby assist;modified independence  - standby  assist;modified independence  -EUGENE    Time Frame (Stairs Goal 1, PT-IRF) 2 weeks  -JA 2 weeks  -EUGENE    Progress/Outcomes (Stairs Goal 1, PT-IRF) goal not met  -JA goal not met  -EUGENE    Row Name 01/06/23 1130 01/06/23 1105       Strength Goal (PT-IRF)    Strength Goal 3 (PT-IRF) Pt will tolerate LE exercises OOB in chair and progress to standing exercises with VSS  -JA Pt will tolerate LE exercises OOB in chair and progress to standing exercises with VSS  -EUGENE    Time Frame (Strength Goal 3, PT-IRF) by discharge  -JA by discharge  -EUGENE    Progress/Outcomes (Strength Goal 3, PT-IRF) goal not met  -JA goal not met  -EUGENE    Row Name 01/06/23 1130 01/06/23 1105       Balance Goal 1 (PT)    Activity/Assistive Device (Balance Goal 1, PT) assistive device use  -JA assistive device use  -    [place] Level/Cues Needed (Balance Goal 1, PT) conditional independence  -JA conditional independence  -EUGENE    Time Frame (Balance Goal 1, PT) by discharge  -JA by discharge  -EUGENE    Progress/Outcomes (Balance Goal 1, PT) goal not met  -JA goal not met  -EUGENE    Row Name 01/06/23 1130 01/06/23 1105       Caregiver Training Goal 1 (PT-IRF)    Caregiver Training Goal 1 (PT-IRF) Caregiver will acknowledge understanding of home care iosntructions  -JA Caregiver will acknowledge understanding of home care iosntructions  -EUGENE    Time Frame (Caregiver Training Goal 1, PT-IRF) by discharge  -JA by discharge  -EUGENE    Progress/Outcomes (Caregiver Training Goal 1, PT-IRF) goal not met  - goal not met  -EUGENE          User Key  (r) = Recorded By, (t) = Taken By, (c) = Cosigned By    Initials Name Provider Type    Nydia Alvarez PT Physical Therapist    Papi Smith, PTA Physical Therapist Assistant                 Physical Therapy Education     Title: PT OT SLP Therapies (In Progress)     Topic: Physical Therapy (In Progress)     Point: Mobility training (In Progress)     Learning Progress Summary           Patient Acceptance, E, NR by EUGENE at  1/3/2023 1206    Acceptance, E, NR by  at 1/2/2023 1219    Acceptance, E, NR by [facility] at 12/31/2022 1345                   Point: Home exercise program (Not Started)     Learner Progress:  Not documented in this visit.          Point: Body mechanics (In Progress)     Learning Progress Summary           Patient Acceptance, E, NR by [facility] at 12/31/2022 1345                   Point: Precautions (In Progress)     Learning Progress Summary           Patient Acceptance, E, NR by [facility] at 12/31/2022 1345                               User Key     Initials Effective Dates Name Provider Type Discipline    [facility] 06/16/21 -  Nydia Muñoz, PT Physical Therapist PT     06/16/21 -  Yossi Haynes, PTA Physical Therapist Assistant PT                PT Recommendation and Plan    Frequency of Treatment (PT):  (5-6 days/wk)  Anticipated Equipment Needs at Discharge (PT Eval): front wheeled walker, hospital bed, wheelchair, wheelchair cushion  Plan of Care Reviewed With: patient  Daily Progress Summary (PT)  Functional Goal Overall Progress (PT): not progressing toward functional goals as expected  Progress: improving  Outcome Evaluation: Pt. performed x15 reps seated LE therex & hamstring stretch B [id number]. hold each this a.m. after PT re-eval treatment this a.m.       Outcome Measures     Row Name 01/05/23 1353 01/03/23 1404          How much help from another is currently needed...    Putting on and taking off regular lower body clothing? 2  -CM --     Bathing (including washing, rinsing, and drying) 2  -CM --     Toileting (which includes using toilet bed pan or urinal) 4  -CM --     Putting on and taking off regular upper body clothing 3  -CM --     Taking care of personal grooming (such as brushing teeth) 4  -CM --     Eating meals 4  -CM --     AM-PAC 6 Clicks Score (OT) 19  -CM --        Modified Dk Scale    Pre-Stroke Modified [place] Scale 0 - No Symptoms at all.  -CM --     Modified [place] Scale 2 - Slight disability.   Unable to carry out all previous activities but able to look after own affairs without assistance.  -CM --        Functional Assessment    Outcome Measure Options AM-PAC 6 Clicks Daily Activity (OT)  -CM AM-PAC 6 Clicks Basic Mobility (PT)  -           User Key  (r) = Recorded By, (t) = Taken By, (c) = Cosigned By    Initials Name Provider Type    Yossi Ragland PTA Physical Therapist Assistant    Ghazala Lemus, OT Occupational Therapist                     Time Calculation:      PT Charges     Row Name 01/06/23 1256 01/06/23 1242          Time Calculation    Start Time 1130  -JA 1105  -EUGENE     Stop Time 1200  -JA 1130  -EUGENE     Time Calculation (min) 30 min  -JA 25 min  -EUGENE     PT Received On -- 01/06/23  -     PT Goal Re-Cert Due Date -- 01/19/23  -        Time Calculation- PT    Total Timed Code Minutes- PT 30 minute(s)  -JA 0 minute(s)  -EUGENE        Timed Charges    96182 - PT Therapeutic Exercise Minutes 30  -JA --        Untimed Charges    PT Eval/Re-eval Minutes -- 25  -EUGENE        Total Minutes    Timed Charges Total Minutes 30  -JA --     Untimed Charges Total Minutes -- 25  -EUGENE      Total Minutes 30  -JA 25  -EUGENE           User Key  (r) = Recorded By, (t) = Taken By, (c) = Cosigned By    Initials Name Provider Type    EUGENE Nydia Muñoz, PT Physical Therapist    Papi Smith PTA Physical Therapist Assistant                Therapy Charges for Today     Code Description Service Date Service Provider Modifiers Qty    39638703276 HC PT THER PROC EA 15 MIN 1/5/2023 Papi Hutchins, PTA GP 1    22791540484 HC GAIT TRAINING EA 15 MIN 1/5/2023 Papi Hutchins, PTA GP 2    74795534096 HC PT THERAPEUTIC ACT EA 15 MIN 1/5/2023 Papi Hutchins, PTA GP 1    16977715829 HC PT THER PROC EA 15 MIN 1/6/2023 Papi Hutchins, PTA GP 2            PT G-Codes  Outcome Measure Options: AM-PAC 6 Clicks Daily Activity (OT)  AM-PAC 6 Clicks Score (PT): 17  AM-PAC 6 Clicks Score (OT): 19  Modified  [place] Scale: 2 - Slight disability.  Unable to carry out all previous activities but able to look after own affairs without assistance.      Papi Hutchins, PTA  1/6/2023

## 2023-01-06 NOTE — THERAPY RE-EVALUATION
Patient Name: Brian Garcia  : 1950    MRN: [id number]                              Today's Date: 2023       Admit Date: 2022    Visit Dx:     ICD-10-CM ICD-9-CM   1. Dysphagia, unspecified type  R13.10 787.20   2. Symbolic dysfunction  R48.9 784.60   3. Impaired mobility and ADLs  Z74.09 V49.89    Z78.9    4. Impaired functional mobility, balance, gait, and endurance  Z74.09 V49.89     Patient Active Problem List   Diagnosis   • Pneumonia of both lower lobes due to infectious organism   • Physical deconditioning   • Pulmonary nodules   • Leukocytosis   • History of pulmonary embolism   • Stage 4 very severe COPD by GOLD classification ([facility])   • Chronic respiratory failure with hypoxia, on home O2 therapy ([facility])   • Hypertension   • CVA (cerebral vascular accident) ([facility])   • Traumatic hemorrhage of cerebrum     Past Medical History:   Diagnosis Date   • COPD (chronic obstructive pulmonary disease) ([facility])    • Hypertension      Past Surgical History:   Procedure Laterality Date   • HIP ARTHROPLASTY        General Information    No documentation.                Mobility    No documentation.                Obj/Interventions    No documentation.                Goals/Plan    No documentation.                Clinical Impression    No documentation.                Outcome Measures    No documentation.                              Physical Therapy Education     Title: PT OT SLP Therapies (In Progress)     Topic: Physical Therapy (In Progress)     Point: Mobility training (In Progress)     Learning Progress Summary           Patient Acceptance, E, NR by EUGENE at 1/3/2023 1206    Acceptance, E, NR by EUGENE at 2023 1219    Acceptance, E, NR by JC1 at 2022 1345                   Point: Home exercise program (Not Started)     Learner Progress:  Not documented in this visit.          Point: Body mechanics (In Progress)     Learning Progress Summary           Patient Acceptance, E, NR by JC1 at  12/31/2022 1345                   Point: Precautions (In Progress)     Learning Progress Summary           Patient Acceptance, E, NR by [facility] at 12/31/2022 1345                               User Key     Initials Effective Dates Name Provider Type Discipline    [facility] 06/16/21 -  Nydia Muñoz PT Physical Therapist PT     06/16/21 -  Yossi Haynes PTA Physical Therapist Assistant PT              PT Recommendation and Plan  Planned Therapy Interventions (PT): balance training, bed mobility training, gait training, home exercise program, neuromuscular re-education, patient/family education, stair training, strengthening, stretching, transfer training, wheelchair management/propulsion training  Plan of Care Reviewed With: patient  Outcome Evaluation: PT re-evaluation completed. Pt awake, alert and agreeable to therapy. Pt oriented x4 and able to follow at least 2 step commands usually with repetition and increased response time required. Function limited by decreased strength, balance, and tolerance for functional mobility and activities. Pt will benefit from continued PT to improve strength, balance, and tolerance for improved transfer and gait ability and to decrease fall risk. Anticipate 24/7 care at discharge with continued therapy services.     Time Calculation:    PT Charges     Row Name 01/06/23 1242             Time Calculation    Start Time 1105  -EUGENE      Stop Time 1130  -      Time Calculation (min) 25 min  -      PT Received On 01/06/23  -      PT Goal Re-Cert Due Date 01/19/23  -         Time Calculation- PT    Total Timed Code Minutes- PT 0 minute(s)  -EUGENE         Untimed Charges    PT Eval/Re-eval Minutes 25  -EUGENE         Total Minutes    Untimed Charges Total Minutes 25  -EUGENE       Total Minutes 25  -EUGENE            User Key  (r) = Recorded By, (t) = Taken By, (c) = Cosigned By    Initials Name Provider Type    EUGENE Nydia Muñoz, PT Physical Therapist              Therapy Charges for Today     Code  Description Service Date Service Provider Modifiers Qty    44743529299  PT RE-EVAL ESTABLISHED PLAN 2 1/6/2023 Nydia Muñoz, PT GP 1          PT G-Codes  Outcome Measure Options: AM-PAC 6 Clicks Daily Activity (OT)  AM-PAC 6 Clicks Score (PT): 17  AM-PAC 6 Clicks Score (OT): 19  Modified [place] Scale: 2 - Slight disability.  Unable to carry out all previous activities but able to look after own affairs without assistance.       Nydia Muñoz, PT  1/6/2023

## 2023-01-06 NOTE — PLAN OF CARE
Goal Outcome Evaluation:  Plan of Care Reviewed With: patient        Progress: improving  Outcome Evaluation: ST: pt performed dedctive reasoning tasks with moderate assistance and frequent repetition.  delayed processing noted.

## 2023-01-06 NOTE — THERAPY TREATMENT NOTE
Inpatient Rehabilitation - Occupational Therapy Treatment Note    [facility]     Patient Name: Brian Garcia  : 1950  MRN: [id number]    Today's Date: 2023                 Admit Date: 2022         ICD-10-CM ICD-9-CM   1. Dysphagia, unspecified type  R13.10 787.20   2. Symbolic dysfunction  R48.9 784.60   3. Impaired mobility and ADLs  Z74.09 V49.89    Z78.9    4. Impaired functional mobility, balance, gait, and endurance  Z74.09 V49.89       Patient Active Problem List   Diagnosis   • Pneumonia of both lower lobes due to infectious organism   • Physical deconditioning   • Pulmonary nodules   • Leukocytosis   • History of pulmonary embolism   • Stage 4 very severe COPD by GOLD classification ([facility])   • Chronic respiratory failure with hypoxia, on home O2 therapy ([facility])   • Hypertension   • CVA (cerebral vascular accident) ([facility])   • Traumatic hemorrhage of cerebrum       Past Medical History:   Diagnosis Date   • COPD (chronic obstructive pulmonary disease) ([facility])    • Hypertension        Past Surgical History:   Procedure Laterality Date   • HIP ARTHROPLASTY               IRF OT ASSESSMENT FLOWSHEET (last 12 hours)     IRF OT Evaluation and Treatment     Row Name 23 0740          OT Time and Intention    Document Type daily treatment  -RC     Mode of Treatment individual therapy;occupational therapy  -RC     Total Minutes, Occupational Therapy 120  -RC     Patient Effort good  -RC     Row Name 23 0740          General Information    Patient Profile Reviewed yes  -RC     Existing Precautions/Restrictions fall  -RC     Limitations/Impairments hearing  -RC     Row Name 23 0740          Pain Assessment    Pretreatment Pain Rating 0/10 - no pain  -RC     Posttreatment Pain Rating 0/10 - no pain  -RC     Row Name 23 0740          Cognition/Psychosocial    Orientation Status (Cognition) oriented x 4  -RC     Follows Commands (Cognition) WFL  -RC     Row Name 23  0740          Bathing    [place] Level (Bathing) bathing skills;minimum assist (75% patient effort)  -     Position (Bathing) supported standing;supported sitting;unsupported standing;sink side  -     Row Name 01/06/23 0740          Upper Body Dressing    [place] Level (Upper Body Dressing) don;doff;pull over garment;set up assistance  -     Position (Upper Body Dressing) supported sitting  -     Row Name 01/06/23 0740          Lower Body Dressing    [place] Level (Lower Body Dressing) doff;don;shoes/slippers;minimum assist (75% patient effort);socks;pants/bottoms;underwear  -     Assistive Device Use (Lower Body Dressing) dressing stick  -     Position (Lower Body Dressing) supported sitting  -     Row Name 01/06/23 0740          Grooming    [place] Level (Grooming) --  SBA  -     Position (Grooming) sink side;supported sitting  w/c  -     Row Name 01/06/23 0740          Toileting    [place] Level (Toileting) verbal cues;toileting skills;adjust/manage clothing;perform perineal hygiene;contact guard assist  -     Position (Toileting) supported sitting;supported standing  -     Row Name 01/06/23 0740          Bed Mobility    All Activities, [place] (Bed Mobility) --  due to pt lack of participation and effort  -     Supine-Sit [place] (Bed Mobility) standby assist  -     Row Name 01/06/23 0740          Functional Mobility    Functional Mobility- Ind. Level contact guard assist  -     Functional Mobility- Device walker, front-wheeled  -     Functional Mobility-Distance (Feet) 2  -     Row Name 01/06/23 0740          Transfer Assessment/Treatment    Transfers bed-chair transfer;sit-stand transfer;stand-sit transfer  -     Row Name 01/06/23 0740          Bed-Chair Transfer    Bed-Chair [place] (Transfers) contact guard;1 person assist  -     Assistive Device (Bed-Chair Transfers) walker, front-wheeled  -     Row Name 01/06/23 0740           Sit-Stand Transfer    Sit-Stand [place] (Transfers) contact guard  -RC     Assistive Device (Sit-Stand Transfers) walker, front-wheeled  -RC     Row Name 01/06/23 0740          Stand-Sit Transfer    Stand-Sit [place] (Transfers) contact guard  -RC     Assistive Device (Stand-Sit Transfers) walker, front-wheeled  -RC     Row Name 01/06/23 0740          Toilet Transfer    Type (Toilet Transfer) stand-sit;sit-stand  -     [place] Level (Toilet Transfer) contact guard  -RC     Assistive Device (Toilet Transfer) grab bars/safety frame;wheelchair  -     Row Name 01/06/23 0740          Positioning and Restraints    Pre-Treatment Position sitting in chair/recliner  -RC     Post Treatment Position bed  -RC     In Bed call light within reach;encouraged to call for assist;exit alarm on;notified nsg  -     Row Name 01/06/23 0740          Vital Signs    Intratreatment Heart Rate (beats/min) 101  -RC     Intra SpO2 (%) 98  -RC     O2 Delivery Intra Treatment nasal cannula  -     Row Name 01/06/23 0740          Therapy Assessment/Plan (OT)    Patient's Goals For Discharge return home  -     Row Name 01/06/23 0740          Therapy Assessment/Plan (OT)    Rehab Potential/Prognosis (OT) good, to achieve stated therapy goals  -     Frequency of Treatment (OT) other (see comments)  5-6 days/week  -     Estimated Duration of Therapy (OT) until facility discharge  -     Problem List (OT) balance;coordination;mobility;strength;postural control  -     Activity Limitations Related to Problem List (OT) unable to ambulate safely;BADLs not performed adequately or safely;unable to transfer safely;IADLs not performed adequately or safely;community activities not performed adequately or safely;home management activity not performed adequately or safely  -     Row Name 01/06/23 0740          Daily Progress Summary (OT)    Overall Progress Toward Functional Goals (OT) progressing toward functional goals as  expected  -RC     Daily Progress Summary (OT) worked well w/ OT this date moves slowly  -RC     Recommendations (OT) 24/7 assist  -RC     Row Name 01/06/23 0740          Therapy Plan Review/Discharge Plan (OT)    Anticipated Discharge Disposition (OT) home with 24/7 care  -RC     Row Name 01/06/23 0740          Bathing Goal 1 (OT-IRF)    Activity/Device (Bathing Goal 1, OT-IRF) lower body bathing  -RC     [place] Level (Bathing Goal 1, OT-IRF) modified independence  -RC     Time Frame (Bathing Goal 1, OT-IRF) by discharge;long-term goal (LTG)  -RC     Progress/Outcomes (Bathing Goal 1, OT-IRF) goal not met  -RC     Row Name 01/06/23 0740          LB Dressing Goal 1 (OT-IRF)    Activity/Device (LB Dressing Goal 1, OT-IRF) lower body dressing  -RC     [place] (LB Dressing Goal 1, OT-IRF) modified independence  -RC     Time Frame (LB Dressing Goal 1, OT-IRF) long-term goal (LTG);by discharge  -RC     Progress/Outcomes (LB Dressing Goal 1, OT-IRF) goal not met  -RC     Row Name 01/06/23 0740          Strength Goal 1 (OT-IRF)    Strength Goal 1 (OT-IRF) Pt will increase BUE strength in all planes by one level for increased strength and endurance required for ADL routine.  -RC     Time Frame (Strength Goal 1, OT-IRF) long-term goal (LTG);by discharge  -RC     Progress/Outcomes (Strength Goal 1, OT-IRF) goal not met  -RC     Row Name 01/06/23 0740           Endurance Goal 1 (OT)    Activity Level (Endurance Goal 1, OT) endurance 2 good  -RC     Progress/Outcome (Endurance Goal 1, OT) goal not met  -RC           User Key  (r) = Recorded By, (t) = Taken By, (c) = Cosigned By    Initials Name Effective Dates    RC Annette Carey COTA 06/16/21 -                  Occupational Therapy Education     Title: PT OT SLP Therapies (In Progress)     Topic: Occupational Therapy (In Progress)     Point: ADL training (Done)     Description:   Instruct learner(s) on proper safety adaptation and remediation techniques during  self care or transfers.   Instruct in proper use of assistive devices.              Learning Progress Summary           Patient Acceptance, E, VU,NR by RC at 1/6/2023 1255    Acceptance, E, VU,NR by RC at 1/5/2023 1517    Acceptance, E,TB, VU by BB at 12/31/2022 1522    Acceptance, E,TB, VU by CM at 12/31/2022 1211    Comment: OT POC, Role of OT, safe ADL mobility and t/f                   Point: Home exercise program (Not Started)     Description:   Instruct learner(s) on appropriate technique for monitoring, assisting and/or progressing therapeutic exercises/activities.              Learner Progress:  Not documented in this visit.          Point: Precautions (Done)     Description:   Instruct learner(s) on prescribed precautions during self-care and functional transfers.              Learning Progress Summary           Patient Acceptance, E, VU,NR by RC at 1/6/2023 1255    Acceptance, E, VU,NR by  at 1/5/2023 1517                   Point: Body mechanics (Done)     Description:   Instruct learner(s) on proper positioning and spine alignment during self-care, functional mobility activities and/or exercises.              Learning Progress Summary           Patient Acceptance, E, VU,NR by RC at 1/6/2023 1255    Acceptance, E, VU,NR by  at 1/5/2023 1517                               User Key     Initials Effective Dates Name Provider Type Discipline    BB 06/16/21 -  Lisa José COTA Occupational Therapist Assistant OT    RC 06/16/21 -  Annette Carey COTA Occupational Therapist Assistant OT    CM 11/18/22 -  Ghazala Alfonso OT Occupational Therapist OT                    OT Recommendation and Plan         Plan of Care Review  Plan of Care Reviewed With: patient  Progress: improving  Daily Progress Summary (OT)  Overall Progress Toward Functional Goals (OT): progressing toward functional goals as expected  Daily Progress Summary (OT): worked well w/ OT this date moves slowly  Barriers to Overall Progress  (OT): left neglect, fatigue  Recommendations (OT): 24/7 assist  Plan of Care Reviewed With: patient  Progress: improving       Outcome Measures     Row Name 01/05/23 1353 01/03/23 1404          How much help from another is currently needed...    Putting on and taking off regular lower body clothing? 2  -CM --     Bathing (including washing, rinsing, and drying) 2  -CM --     Toileting (which includes using toilet bed pan or urinal) 4  -CM --     Putting on and taking off regular upper body clothing 3  -CM --     Taking care of personal grooming (such as brushing teeth) 4  -CM --     Eating meals 4  -CM --     AM-PAC 6 Clicks Score (OT) 19  -CM --        Modified [place] Scale    Pre-Stroke Modified Dk Scale 0 - No Symptoms at all.  -CM --     Modified Dk Scale 2 - Slight disability.  Unable to carry out all previous activities but able to look after own affairs without assistance.  -CM --        Functional Assessment    Outcome Measure Options AM-PAC 6 Clicks Daily Activity (OT)  -CM AM-PAC 6 Clicks Basic Mobility (PT)  -EUGENE           User Key  (r) = Recorded By, (t) = Taken By, (c) = Cosigned By    Initials Name Provider Type    Yossi Ragland PTA Physical Therapist Assistant    Ghazala Lemus OT Occupational Therapist                  Time Calculation:      Time Calculation- OT     Row Name 01/06/23 1259             Time Calculation- OT    OT Start Time 0740  -RC      OT Stop Time 0940  -RC      OT Time Calculation (min) 120 min  -RC      Total Timed Code Minutes-  minute(s)  -RC      OT Received On 01/06/23  -RC         Timed Charges    70171 - OT Self Care/Mgmt Minutes 120  -RC         Total Minutes    Timed Charges Total Minutes 120  -RC       Total Minutes 120  -RC            User Key  (r) = Recorded By, (t) = Taken By, (c) = Cosigned By    Initials Name Provider Type    Annette Gonzalez COTA Occupational Therapist Assistant              Therapy Charges for Today     Code Description  Service Date Service Provider Modifiers Qty    87838830660 HC OT THERAPEUTIC ACT EA 15 MIN 1/5/2023 Cherry, Annette G, MUNOZ GO 2    20564201813 HC OT SELF CARE/MGMT/TRAIN EA 15 MIN 1/5/2023 Mira CareyALEXANDER Hallman GO 4    32423829235 HC OT SELF CARE/MGMT/TRAIN EA 15 MIN 1/6/2023 CherryAnnette MUNOZ GO 8                   Annette FOSTER Cherry MUNOZ  1/6/2023

## 2023-01-07 PROCEDURE — 94799 UNLISTED PULMONARY SVC/PX: CPT

## 2023-01-07 PROCEDURE — 99231 SBSQ HOSP IP/OBS SF/LOW 25: CPT | Performed by: ORTHOPAEDIC SURGERY

## 2023-01-07 PROCEDURE — 94760 N-INVAS EAR/PLS OXIMETRY 1: CPT

## 2023-01-07 PROCEDURE — 63710000001 PREDNISONE PER 5 MG: Performed by: ORTHOPAEDIC SURGERY

## 2023-01-07 RX ADMIN — DILTIAZEM HYDROCHLORIDE 180 MG: 180 CAPSULE, COATED, EXTENDED RELEASE ORAL at 08:11

## 2023-01-07 RX ADMIN — MELATONIN 6 MG: 3 TAB ORAL at 20:00

## 2023-01-07 RX ADMIN — PREDNISONE 5 MG: 5 TABLET ORAL at 08:11

## 2023-01-07 RX ADMIN — IPRATROPIUM BROMIDE 0.5 MG: 0.5 SOLUTION RESPIRATORY (INHALATION) at 08:01

## 2023-01-07 RX ADMIN — Medication 1000 UNITS: at 08:11

## 2023-01-07 RX ADMIN — MONTELUKAST SODIUM 10 MG: 10 TABLET, COATED ORAL at 20:00

## 2023-01-07 RX ADMIN — IPRATROPIUM BROMIDE 0.5 MG: 0.5 SOLUTION RESPIRATORY (INHALATION) at 14:28

## 2023-01-07 NOTE — PLAN OF CARE
Goal Outcome Evaluation:  Plan of Care Reviewed With: patient        Progress: improving  Outcome Evaluation: Resting in bed at present time. NADN. Denies pain this shift. Took all HS meds without difficulty. Has had 1 incontinent episode this shift; assisted to toilet per request as patient states he needed to have a bowel movement. Bed linens changed; partial bath completed and patient redressed in clean clothing. Ambulated with assist of walker; steady gait noted. Continue with current plan of care as patient continues to progress.

## 2023-01-07 NOTE — SIGNIFICANT NOTE
7P-7A start of shift vitals for 1/6/2023.   01/06/23 1849   Vital Signs   Temp 99 °F (37.2 °C)   Temp src Tympanic   Heart Rate 101   Heart Rate Source Monitor   Resp 18   Resp Rate Source Visual   /58   Noninvasive MAP (mmHg) 84   BP Location Left arm   BP Method Automatic   Patient Position Lying   Oxygen Therapy   SpO2 99 %   Device (Oxygen Therapy) nasal cannula   Flow (L/min) 4

## 2023-01-07 NOTE — PROGRESS NOTES
Progress Note      Admit date: 12/30/2022 12:28 PM       Treatment Team     Provider Relationship Specialty Contact    Fan Rob MD Attending Rehabilitation   [phone]      Annette Carey COTA Occupational Therapy Assistant Occupational Therapy --    Aleja Tripp  -- --    Aldair Miller MD Consulting Physician Pulmonary Disease   [phone]      Lashawn Hunter, RRT Respiratory Therapist Respiratory Therapy   [id number]      Dale Starr RN Registered Nurse --   [phone]      Marine Thornton RN Registered Nurse -- --    Maya Villavicencio RN Registered Nurse -- --    Radha Pierre RN Registered Nurse -- --    Manju Patterson LSW  -- --             Chief Complaint:  Traumatic hemorrhage of cerebrum    HPI: No changes since admission    Vital Signs  Temp:  [98.7 °F (37.1 °C)-99 °F (37.2 °C)] 98.7 °F (37.1 °C)  Heart Rate:  [] 95  Resp:  [16-18] 16  BP: (134-160)/(58-74) 160/74    Physical Exam 1:    Constitutional: Patient is alert sitting up in bed this morning she was here better seems to be more cooperative filling out his menu     HENT:      Eyes:     Neck:      Cardiovascular: No chest pain    Pulmonary: No difficulty breathing sputum production increased effort with breathing    Abdominal:      Genitourinary/Anorectal:       Musculoskeletal:     Skin:     Neurological: No focal neurological changes    Psychiatric/Behavioral: Cooperative with physical therapy       Results Review:    I reviewed the patient's new clinical results.    Nursing notes and therapy notes have been reviewed.    I have reviewed medications.    Assessment/Plan  Principal Problem:    Traumatic hemorrhage of cerebrum stable  Active Problems:    CVA (cerebral vascular accident) ([facility])    Pneumonia of both lower lobes due to infectious organism      Overview: - Discussed with Radiology (Dr. Davey), who reports that the patient has a       superior segment small patchy opacity in  the left lung base likely       pneumonia, and a small patchy opacity in the right lung base, likely       pneumonia. He recommended continuing with antibiotics and then doing       follow up imaging in 3 months.  Stable as pulmonary evaluation in February negative blood cultures      - Blood cultures no growth to date.      - Levaquin day 4. Transitioned to PO today.      - Legionella and strep pneumo neg. Mycoplasma pending.    Physical deconditioning      Overview: - PT/OT Consulted. Recommendations: home with home health.      - Patient may benefit from an assisted living.  Improving with physical therapy    Stage 4 very severe COPD by GOLD classification ([facility]) on O2 stable    Hypertension stable    Chronic respiratory failure with hypoxia, on home O2 therapy ([facility])           Fan Rob MD  01/07/23  08:01 CST          This document has been electronically signed by Fan Rob MD on January 7, 2023 08:01 CST      Part of this note may be an electronic transcription/translation of spoken language to printed text using the Dragon Dictation System.

## 2023-01-08 PROCEDURE — 94664 DEMO&/EVAL PT USE INHALER: CPT

## 2023-01-08 PROCEDURE — 94799 UNLISTED PULMONARY SVC/PX: CPT

## 2023-01-08 PROCEDURE — 99231 SBSQ HOSP IP/OBS SF/LOW 25: CPT | Performed by: ORTHOPAEDIC SURGERY

## 2023-01-08 PROCEDURE — 94760 N-INVAS EAR/PLS OXIMETRY 1: CPT

## 2023-01-08 RX ADMIN — IPRATROPIUM BROMIDE 0.5 MG: 0.5 SOLUTION RESPIRATORY (INHALATION) at 07:46

## 2023-01-08 RX ADMIN — DILTIAZEM HYDROCHLORIDE 180 MG: 180 CAPSULE, COATED, EXTENDED RELEASE ORAL at 08:01

## 2023-01-08 RX ADMIN — MELATONIN 6 MG: 3 TAB ORAL at 21:03

## 2023-01-08 RX ADMIN — TIZANIDINE 2 MG: 2 TABLET ORAL at 21:03

## 2023-01-08 RX ADMIN — Medication 1000 UNITS: at 08:01

## 2023-01-08 RX ADMIN — DOCUSATE SODIUM 100 MG: 100 CAPSULE, LIQUID FILLED ORAL at 20:16

## 2023-01-08 RX ADMIN — MONTELUKAST SODIUM 10 MG: 10 TABLET, COATED ORAL at 20:16

## 2023-01-08 RX ADMIN — TIZANIDINE 2 MG: 2 TABLET ORAL at 01:32

## 2023-01-08 RX ADMIN — IPRATROPIUM BROMIDE 0.5 MG: 0.5 SOLUTION RESPIRATORY (INHALATION) at 20:28

## 2023-01-08 NOTE — PROGRESS NOTES
Progress Note      Admit date: 12/30/2022 12:28 PM       Treatment Team     Provider Relationship Specialty Contact    Fan Rob MD Attending Rehabilitation   [phone]      Annette Carey COTA Occupational Therapy Assistant Occupational Therapy --    Aleja Tripp  -- --    Hailey Gomez, RRT Respiratory Therapist Respiratory Therapy   [phone]      Aldair Miller MD Consulting Physician Pulmonary Disease   [phone]      Dale Starr RN Registered Nurse --   [phone]      Maya Villavicencio RN Registered Nurse -- --    Radha Pierre RN Registered Nurse -- --    Manju Patterson LSW  -- --             Chief Complaint:  Traumatic hemorrhage of cerebrum    HPI: No changes since admission    Vital Signs  Temp:  [97 °F (36.1 °C)-98.5 °F (36.9 °C)] 97 °F (36.1 °C)  Heart Rate:  [] 84  Resp:  [16-18] 16  BP: (139-158)/(74-78) 158/78    Physical Exam 1:    Constitutional: Patient is alert oriented stable up in chair filling out his menu for the day     HENT:      Eyes:     Neck:      Cardiovascular: No chest pain    Pulmonary: No pulmonary difficulties sputum production continues on O2    Abdominal:      Genitourinary/Anorectal:       Musculoskeletal:     Skin:     Neurological: Improving except he is realizing that the stroke is really affected him that in itself is improvement and he seems to be more cooperative mention him that he can stay here for about 30 days such as [facility] is authorized that when he has to participate better so he can become independent otherwise he may have to go to a nursing home afterwards    Psychiatric/Behavioral: Cooperative more so     Results Review:    I reviewed the patient's new clinical results.    Nursing notes and therapy notes have been reviewed.    I have reviewed medications.    Assessment/Plan  Principal Problem:    Traumatic hemorrhage of cerebrum improving  Active Problems:    CVA (cerebral vascular accident)  (HCC)    Pneumonia of both lower lobes due to infectious organism      Overview: - Discussed with Radiology (Dr. Davey), who reports that the patient has a       superior segment small patchy opacity in the left lung base likely       pneumonia, and a small patchy opacity in the right lung base, likely       pneumonia. He recommended continuing with antibiotics and then doing       follow up imaging in 3 months.      - Blood cultures no growth to date.      - Levaquin day 4. Transitioned to PO today.      - Legionella and strep pneumo neg. Mycoplasma pending.  No evidence of pneumonia that appears to be resolved    Physical deconditioning      Overview: - PT/OT Consulted. Recommendations: home with home health.      - Patient may benefit from an assisted living.  Cooperative with therapy more so today than before overall improving depending upon his cooperation    Stage 4 very severe COPD by GOLD classification ([facility]) under treatment evaluation per pulmonary    Hypertension stable    Chronic respiratory failure with hypoxia, on home O2 therapy ([facility]) as above           Fan Rob MD  01/08/23  09:11 CST          This document has been electronically signed by Fan Rob MD on January 8, 2023 09:11 CST      Part of this note may be an electronic transcription/translation of spoken language to printed text using the Dragon Dictation System.

## 2023-01-09 PROCEDURE — 97535 SELF CARE MNGMENT TRAINING: CPT

## 2023-01-09 PROCEDURE — 94799 UNLISTED PULMONARY SVC/PX: CPT

## 2023-01-09 PROCEDURE — 63710000001 PREDNISONE PER 5 MG: Performed by: ORTHOPAEDIC SURGERY

## 2023-01-09 PROCEDURE — 94760 N-INVAS EAR/PLS OXIMETRY 1: CPT

## 2023-01-09 PROCEDURE — 97116 GAIT TRAINING THERAPY: CPT

## 2023-01-09 PROCEDURE — 92507 TX SP LANG VOICE COMM INDIV: CPT | Performed by: SPEECH-LANGUAGE PATHOLOGIST

## 2023-01-09 PROCEDURE — 97530 THERAPEUTIC ACTIVITIES: CPT

## 2023-01-09 PROCEDURE — 99231 SBSQ HOSP IP/OBS SF/LOW 25: CPT | Performed by: ORTHOPAEDIC SURGERY

## 2023-01-09 PROCEDURE — 97110 THERAPEUTIC EXERCISES: CPT

## 2023-01-09 RX ADMIN — ACETAMINOPHEN 650 MG: 325 TABLET, FILM COATED ORAL at 22:26

## 2023-01-09 RX ADMIN — TIZANIDINE 2 MG: 2 TABLET ORAL at 20:15

## 2023-01-09 RX ADMIN — IPRATROPIUM BROMIDE 0.5 MG: 0.5 SOLUTION RESPIRATORY (INHALATION) at 06:53

## 2023-01-09 RX ADMIN — DOCUSATE SODIUM 100 MG: 100 CAPSULE, LIQUID FILLED ORAL at 08:16

## 2023-01-09 RX ADMIN — DOCUSATE SODIUM 100 MG: 100 CAPSULE, LIQUID FILLED ORAL at 20:17

## 2023-01-09 RX ADMIN — Medication 1000 UNITS: at 08:16

## 2023-01-09 RX ADMIN — MELATONIN 6 MG: 3 TAB ORAL at 20:16

## 2023-01-09 RX ADMIN — DILTIAZEM HYDROCHLORIDE 180 MG: 180 CAPSULE, COATED, EXTENDED RELEASE ORAL at 08:17

## 2023-01-09 RX ADMIN — MONTELUKAST SODIUM 10 MG: 10 TABLET, COATED ORAL at 20:15

## 2023-01-09 RX ADMIN — PREDNISONE 5 MG: 5 TABLET ORAL at 08:17

## 2023-01-09 NOTE — PLAN OF CARE
Goal Outcome Evaluation:  Plan of Care Reviewed With: patient        Progress: no change  Outcome Evaluation: patient hs been cooperative, able to make needs known, prn flexril  and melatonin with fair results, will continue to monitor and provide for safety

## 2023-01-09 NOTE — PLAN OF CARE
Pt compliant with physical/OT therapy. Showered this morning with OT. Ambulated in room and to bathroom several times during shift. Ambulated with gait belt, walker and standby assist. States that he wants to get stronger so that he can return home. Vital signs stable, voices no complaints. Progressing.

## 2023-01-09 NOTE — PROGRESS NOTES
Progress Note      Admit date: 12/30/2022 12:28 PM       Treatment Team     Provider Relationship Specialty Contact    Fan Rob MD Attending Rehabilitation   [phone]      Yossi Haynes, REGINE Physical Therapy Assistant Physical Therapy --    Aleja Tripp  -- --    Hailey Gomez, RRT Respiratory Therapist Respiratory Therapy   [phone]      Aldair Miller MD Consulting Physician Pulmonary Disease   [phone]      Latesha Ram MS CCC-SLP Speech Language Pathologist Speech Pathology   [phone]      Maya Villavicencio RN Registered Nurse -- --    Latesha Ramos COTA Occupational Therapy Assistant Occupational Therapy --    Radha Pierre RN Registered Nurse -- --    Manju Patterson LSW  -- --             Chief Complaint:  Traumatic hemorrhage of cerebrum    HPI: No changes since admission    Vital Signs  Temp:  [97.6 °F (36.4 °C)-99.7 °F (37.6 °C)] 97.6 °F (36.4 °C)  Heart Rate:  [103-113] 104  Resp:  [15-18] 16  BP: (151-152)/(68-69) 151/69    Physical Exam 1:    Constitutional: Alert stable up in his chair with therapy getting a shower this morning     HENT:      Eyes:     Neck:      Cardiovascular: No chest pain    Pulmonary: No cough sputum production no evidence of increased respiratory efforts  Abdominal:      Genitourinary/Anorectal:       Musculoskeletal:     Skin:     Neurological: No progressive changes neurologically    Psychiatric/Behavioral: More cooperative physical therapy        Results Review:    I reviewed the patient's new clinical results.  I discussed with him his requirements to go home he is just not ready yet his caregiver says he is she is unable to care for him at this point his state told him he needs to be more aggressive with therapy and help himself and if so when the therapist says he is able to go home he can be discharged    Nursing notes and therapy notes have been reviewed.    I have reviewed  medications.    Assessment/Plan  Principal Problem:    Traumatic hemorrhage of cerebrum stable  Active Problems:    CVA (cerebral vascular accident) ([facility]) no change    Pneumonia of both lower lobes due to infectious organism      Overview: - Discussed with Radiology (Dr. Davey), who reports that the patient has a       superior segment small patchy opacity in the left lung base likely       pneumonia, and a small patchy opacity in the right lung base, likely       pneumonia. He recommended continuing with antibiotics and then doing       follow up imaging in 3 months.      - Blood cultures no growth to date.      - Levaquin day 4. Transitioned to PO today.      - Legionella and strep pneumo neg. Mycoplasma pending.  Pneumonia if she had it is resolved    Physical deconditioning      Overview: - PT/OT Consulted. Recommendations: home with home health.      - Patient may benefit from an assisted living.  Needs assistance more than his caregiver can give    Stage 4 very severe COPD by GOLD classification ([facility]) unchanged    Hypertension stable    Chronic respiratory failure with hypoxia, on home O2 therapy ([facility]) continued           Fan Rob MD  01/09/23  08:55 CST          This document has been electronically signed by Fan Rob MD on January 9, 2023 08:55 CST      Part of this note may be an electronic transcription/translation of spoken language to printed text using the Dragon Dictation System.

## 2023-01-09 NOTE — PLAN OF CARE
Goal Outcome Evaluation:  Plan of Care Reviewed With: patient     Problem: Rehabilitation (IRF) Plan of Care  Goal: Plan of Care Review  Outcome: Ongoing, Progressing  Flowsheets (Taken 1/9/2023 1244)  Progress: improving  Plan of Care Reviewed With: patient  Outcome Evaluation: pt responded well to PT. pt participated in gait training- 144, 150 ft CGA/SBA with RW. pt participated in stair training CGA w/ albin handrails. pt participated in LE ther ex with lvl 1 tband. pt is cooperative. improving with mobility and cognition. no new goals met at this time. pt would continue to benefit from PT services.

## 2023-01-09 NOTE — THERAPY TREATMENT NOTE
Inpatient Rehabilitation - Occupational Therapy Treatment Note    [facility]     Patient Name: Brian Garcia  : 1950  MRN: [id number]    Today's Date: 2023                 Admit Date: 2022         ICD-10-CM ICD-9-CM   1. Dysphagia, unspecified type  R13.10 787.20   2. Symbolic dysfunction  R48.9 784.60   3. Impaired mobility and ADLs  Z74.09 V49.89    Z78.9    4. Impaired functional mobility, balance, gait, and endurance  Z74.09 V49.89       Patient Active Problem List   Diagnosis   • Pneumonia of both lower lobes due to infectious organism   • Physical deconditioning   • Pulmonary nodules   • Leukocytosis   • History of pulmonary embolism   • Stage 4 very severe COPD by GOLD classification ([facility])   • Chronic respiratory failure with hypoxia, on home O2 therapy ([facility])   • Hypertension   • CVA (cerebral vascular accident) ([facility])   • Traumatic hemorrhage of cerebrum       Past Medical History:   Diagnosis Date   • COPD (chronic obstructive pulmonary disease) ([facility])    • Hypertension        Past Surgical History:   Procedure Laterality Date   • HIP ARTHROPLASTY               IRF OT ASSESSMENT FLOWSHEET (last 12 hours)     IRF OT Evaluation and Treatment     Row Name 23 0825          OT Time and Intention    Document Type daily treatment  -CS     Mode of Treatment individual therapy;occupational therapy  -CS     Patient Effort good  -CS     Row Name 2325          General Information    Patient Profile Reviewed yes  -CS     Existing Precautions/Restrictions fall  -CS     Limitations/Impairments hearing  -CS     Row Name 23 0825          Pain Assessment    Pretreatment Pain Rating 0/10 - no pain  -CS     Posttreatment Pain Rating 0/10 - no pain  -CS     Row Name 23 0825          Cognition/Psychosocial    Orientation Status (Cognition) oriented x 4  -CS     Follows Commands (Cognition) WFL  -CS     Row Name 23 0825          Bathing    [place] Level  (Bathing) bathing skills;minimum assist (75% patient effort)  -     Assistive Device (Bathing) bath mitt;hand held shower spray hose;shower chair  -     Position (Bathing) supported standing;supported sitting;unsupported standing  -     Set-up Assistance (Bathing) obtain supplies;adjust water temperature  -     Row Name 01/09/23 0825          Upper Body Dressing    [place] Level (Upper Body Dressing) doff;don;pull over garment;set up assistance  -CS     Position (Upper Body Dressing) supported sitting  -     Set-up Assistance (Upper Body Dressing) obtain clothing  -     Row Name 01/09/23 0825          Lower Body Dressing    [place] Level (Lower Body Dressing) doff;don;shoes/slippers;minimum assist (75% patient effort);socks;pants/bottoms;underwear  -     Position (Lower Body Dressing) supported sitting;supported standing  -     Row Name 01/09/23 0825          Grooming    [place] Level (Grooming) grooming skills;deodorant application;hair care, combing/brushing;oral care regimen;wash face, hands;standby assist  -     Position (Grooming) sink side;supported sitting  w/c  -     Row Name 01/09/23 0825          Bed Mobility    All Activities, [place] (Bed Mobility) --  due to pt lack of participation and effort  -     Supine-Sit [place] (Bed Mobility) standby assist  -     Row Name 01/09/23 0825          Functional Mobility    Functional Mobility- Ind. Level contact guard assist  -     Functional Mobility- Device walker, front-wheeled  -     Row Name 01/09/23 0825          Transfer Assessment/Treatment    Transfers sit-stand transfer;stand-sit transfer;shower transfer  -     Row Name 01/09/23 0825          Sit-Stand Transfer    Sit-Stand [place] (Transfers) contact guard  -     Assistive Device (Sit-Stand Transfers) walker, front-wheeled  -     Row Name 01/09/23 0825          Stand-Sit Transfer    Stand-Sit [place] (Transfers) contact guard  -CS      Assistive Device (Stand-Sit Transfers) walker, front-wheeled  -CS     Row Name 01/09/23 0825          Shower Transfer    Type (Shower Transfer) sit-stand;stand-sit  -CS     [place] Level (Shower Transfer) contact guard  -CS     Assistive Device (Shower Transfer) grab bar, tub/shower;shower chair  -CS     Row Name 01/09/23 0825          Positioning and Restraints    Pre-Treatment Position sitting in chair/recliner  -CS     Post Treatment Position wheelchair  -CS     In Wheelchair sitting;call light within reach;encouraged to call for assist;exit alarm on  -CS     Row Name 01/09/23 0825          Therapy Assessment/Plan (OT)    Patient's Goals For Discharge return home  -CS     Row Name 01/09/23 0825          Therapy Assessment/Plan (OT)    Rehab Potential/Prognosis (OT) good, to achieve stated therapy goals  -CS     Frequency of Treatment (OT) other (see comments)  5-6 days/week  -CS     Estimated Duration of Therapy (OT) until facility discharge  -CS     Problem List (OT) balance;coordination;mobility;strength;postural control  -CS     Activity Limitations Related to Problem List (OT) unable to ambulate safely;BADLs not performed adequately or safely;unable to transfer safely;IADLs not performed adequately or safely;community activities not performed adequately or safely;home management activity not performed adequately or safely  -CS     Row Name 01/09/23 0825          Therapy Plan Review/Discharge Plan (OT)    Anticipated Discharge Disposition (OT) home with 24/7 care  -CS     Row Name 01/09/23 0825          Bathing Goal 1 (OT-IRF)    Activity/Device (Bathing Goal 1, OT-IRF) lower body bathing  -CS     [place] Level (Bathing Goal 1, OT-IRF) modified independence  -CS     Time Frame (Bathing Goal 1, OT-IRF) by discharge;long-term goal (LTG)  -CS     Progress/Outcomes (Bathing Goal 1, OT-IRF) goal not met  -CS     Row Name 01/09/23 0825          LB Dressing Goal 1 (OT-IRF)    Activity/Device (LB Dressing  Goal 1, OT-IRF) lower body dressing  -CS     [place] (LB Dressing Goal 1, OT-IRF) modified independence  -CS     Time Frame (LB Dressing Goal 1, OT-IRF) long-term goal (LTG);by discharge  -CS     Progress/Outcomes (LB Dressing Goal 1, OT-IRF) goal not met  -CS     Row Name 01/09/23 0825          Strength Goal 1 (OT-IRF)    Strength Goal 1 (OT-IRF) Pt will increase BUE strength in all planes by one level for increased strength and endurance required for ADL routine.  -CS     Time Frame (Strength Goal 1, OT-IRF) long-term goal (LTG);by discharge  -CS     Progress/Outcomes (Strength Goal 1, OT-IRF) goal not met  -CS     Row Name 01/09/23 0825           Endurance Goal 1 (OT)    Endurance Goal 1 (OT) Pt will complete at least 20 minutes of standing ADLs with Mod (I) or less w/ min fatigue or less for increased endurance, safety, and (I) with ADLs and mobility.  -CS     Activity Level (Endurance Goal 1, OT) endurance 2 good  -CS     Time Frame (Endurance Goal 1, OT) long term goal (LTG);by discharge  -CS     Progress/Outcome (Endurance Goal 1, OT) goal not met  -CS           User Key  (r) = Recorded By, (t) = Taken By, (c) = Cosigned By    Initials Name Effective Dates    CS Latesha Ramos COTA 06/16/21 -                  Occupational Therapy Education     Title: PT OT SLP Therapies (In Progress)     Topic: Occupational Therapy (In Progress)     Point: ADL training (Done)     Description:   Instruct learner(s) on proper safety adaptation and remediation techniques during self care or transfers.   Instruct in proper use of assistive devices.              Learning Progress Summary           Patient Acceptance, E, VU,NR by RC at 1/6/2023 1255    Acceptance, E, VU,NR by RC at 1/5/2023 1517    Acceptance, E,TB, VU by BB at 12/31/2022 1522    Acceptance, E,TB, VU by CM at 12/31/2022 1211    Comment: OT POC, Role of OT, safe ADL mobility and t/f                   Point: Home exercise program (Not Started)      Description:   Instruct learner(s) on appropriate technique for monitoring, assisting and/or progressing therapeutic exercises/activities.              Learner Progress:  Not documented in this visit.          Point: Precautions (Done)     Description:   Instruct learner(s) on prescribed precautions during self-care and functional transfers.              Learning Progress Summary           Patient Acceptance, E, VU,NR by  at 1/6/2023 1255    Acceptance, E, VU,NR by  at 1/5/2023 1517                   Point: Body mechanics (Done)     Description:   Instruct learner(s) on proper positioning and spine alignment during self-care, functional mobility activities and/or exercises.              Learning Progress Summary           Patient Acceptance, E, VU,NR by  at 1/6/2023 1255    Acceptance, E, VU,NR by  at 1/5/2023 1517                               User Key     Initials Effective Dates Name Provider Type Discipline     06/16/21 -  Lisa José COTA Occupational Therapist Assistant OT    RC 06/16/21 -  Annette Carey COTA Occupational Therapist Assistant OT     11/18/22 -  Ghazala Alfonso OT Occupational Therapist OT                    OT Recommendation and Plan                    Outcome Measures     Row Name 01/09/23 1015             How much help from another person do you currently need...    Turning from your back to your side while in flat bed without using bedrails? 3  -EUGENE      Moving from lying on back to sitting on the side of a flat bed without bedrails? 3  -EUGENE      Moving to and from a bed to a chair (including a wheelchair)? 3  -EUGENE      Standing up from a chair using your arms (e.g., wheelchair, bedside chair)? 3  -EUGENE      Climbing 3-5 steps with a railing? 3  -EUGENE      To walk in hospital room? 3  -EUGENE      AM-PAC 6 Clicks Score (PT) 18  -EUGENE         Functional Assessment    Outcome Measure Options AM-PAC 6 Clicks Basic Mobility (PT)  -EUGENE            User Key  (r) = Recorded By, (t) = Taken  By, (c) = Cosigned By    Initials Name Provider Type    Yossi Ragland, REGINE Physical Therapist Assistant                  Time Calculation:      Time Calculation- OT     Row Name 01/09/23 1308 01/09/23 1248          Time Calculation- OT    OT Start Time 0825  -CS --     OT Stop Time 1009  -CS --     OT Time Calculation (min) 104 min  -CS --     Total Timed Code Minutes-  minute(s)  -CS --     OT Received On 01/09/23  -CS --        Timed Charges    05843 - Gait Training Minutes  -- 30  -EUGENE     25235 - OT Self Care/Mgmt Minutes 104  -CS --        Total Minutes    Timed Charges Total Minutes 104  -CS 30  -EUGENE      Total Minutes 104  -CS 30  -EUGENE           User Key  (r) = Recorded By, (t) = Taken By, (c) = Cosigned By    Initials Name Provider Type    Yossi Ragland PTA Physical Therapist Assistant    CS Latesha Ramos COTA Occupational Therapist Assistant              Therapy Charges for Today     Code Description Service Date Service Provider Modifiers Qty    [id number] HC OT SELF CARE/MGMT/TRAIN EA 15 MIN 1/9/2023 Latesha Ramos COTA GO 7                   ALEXANDER Carter  1/9/2023

## 2023-01-09 NOTE — THERAPY TREATMENT NOTE
Inpatient Rehabilitation - Speech Language Pathology Treatment Note  [facility]     Patient Name: Brian Garcia  : 1950  MRN: [id number]  Today's Date: 2023               Admit Date: 2022     Pt seen for skilled ST services this date to address cognitive-linguistic defcits.  Pt was seen in room sitting in w/c at bedside.  Pt participated well in all therapy tasks, but was unable to complete sequencing task.  Pt struggled w/deductive reasoning for math related questions pertaining to functional information; however, made attempts to answer all questions.  Pt continues to require a repetition of instructions and re-wording even in functional situations.  Pt often times needs to repeat and clarify steps/directions for functional tasks such as sequencing steps to getting to and using the bathroom and t/f from w/c to toilet.  Pt also demonstrates poor initiation.  No goals met this date; Continue POC.      Goals:  1.  Pt will complete deductive reasoning tasks w/90% acc:  Pt was 40% accuracy with mathmatical deductive reasoning questions pertaining to everyday information (3 day forcast).  Pt did independently use a calculator to correctly determine averages for high and low temps for the forcast.  Pt demonstrated difficulty w/functional deductive reasoning tasks (steps for getting to restroom, transfering, and getting ready to get in bed).  Pt demonstrates very poor initiation which may be impacted by poor processing, thought organizations, and sequencing for functional tasks.   2.  Pt will answer questions about ADL problems w/90% acc:  Pt was 90% acc w/reasoning and flexibility in thinking questions w/weather related issues.     Hearing, Speech and Vision Admission Goal  Date  1/3/23 Date 23\ Date  23 Date 23   Date Discharge   Expression of Ideas and Wants (consider both verbal and non-verbal expression and excluding language barriers) 4 4  3  3  4  4        Understanding Verbal and Non-Verbal Content (with hearing aid or device, if used, and excluding language barriers) 4 4 3  3  3  3                     Visit Dx:    ICD-10-CM ICD-9-CM   1. Dysphagia, unspecified type  R13.10 787.20   2. Symbolic dysfunction  R48.9 784.60   3. Impaired mobility and ADLs  Z74.09 V49.89    Z78.9    4. Impaired functional mobility, balance, gait, and endurance  Z74.09 V49.89     Patient Active Problem List   Diagnosis   • Pneumonia of both lower lobes due to infectious organism   • Physical deconditioning   • Pulmonary nodules   • Leukocytosis   • History of pulmonary embolism   • Stage 4 very severe COPD by GOLD classification ([facility])   • Chronic respiratory failure with hypoxia, on home O2 therapy ([facility])   • Hypertension   • CVA (cerebral vascular accident) ([facility])   • Traumatic hemorrhage of cerebrum     Past Medical History:   Diagnosis Date   • COPD (chronic obstructive pulmonary disease) ([facility])    • Hypertension      Past Surgical History:   Procedure Laterality Date   • HIP ARTHROPLASTY         SLP Recommendation and Plan                 Anticipated Discharge Disposition (SLP): home with assist (01/09/23 1300)        Predicted Duration Therapy Intervention (Days): until discharge (01/09/23 1300)     Daily Summary of Progress (SLP): progress toward functional goals is good (01/09/23 1300)           Treatment Assessment (SLP): improved, cognitive-linguistic disorder (01/09/23 1300)  Treatment Assessment Comments (SLP): Pt seen for skilled ST services this date to address cognitive-linguistic defcits.  Pt was seen in room sitting in w/c at bedside.  Pt participated well in all therapy tasks, but was unable to complete sequencing task.  Pt struggled w/deductive reasoning for math related questions pertaining to functional information; however, made attempts to answer all questions.  Pt continues to require a repetition of instructions and re-wording even in functional situations.  Pt often  times needs to repeat and clarify steps/directions for functional tasks such as sequencing steps to getting to and using the bathroom and t/f from w/c to toilet.  Pt also demonstrates poor initiation.  No goals met this date; Continue POC. (01/09/23 1300)  Plan for Continued Treatment (SLP): continue treatment per plan of care (01/09/23 1300)  Plan of Care Reviewed With: patient (01/09/23 1411)  Progress: improving (01/09/23 1411)  Outcome Evaluation: Pt seen for skilled ST services this date to address cognitive-linguistic defcits.  Pt was seen in room sitting in w/c at bedside.  Pt participated well in all therapy tasks, but was unable to complete sequencing task.  Pt struggled w/deductive reasoning for math related questions pertaining to functional information; however, made attempts to answer all questions.  Pt continues to require a repetition of instructions and re-wording even in functional situations.  Pt often times needs to repeat and clarify steps/directions for functional tasks such as sequencing steps to getting to and using the bathroom and t/f from w/c to toilet.  Pt also demonstrates poor initiation.  No goals met this date; Continue POC. (01/09/23 1411)      SLP EVALUATION (last 72 hours)     SLP SLC Evaluation     Row Name 01/09/23 1300                   Communication Assessment/Intervention    Document Type therapy note (daily note)  -CK        Subjective Information no complaints  -CK        Patient Observations alert;cooperative;agree to therapy  -CK        Patient/Family/Caregiver Comments/Observations No family present at bedside  -CK        Patient Effort good  -CK           General Information    Patient Profile Reviewed yes  -CK        Precautions/Limitations, Vision corrective lenses needed for reading  -CK        Precautions/Limitations, Hearing hearing impairment, bilaterally  -CK        Plans/Goals Discussed with patient  -CK           Pain Scale: Numbers Pre/Post-Treatment     Pretreatment Pain Rating 0/10 - no pain  -CK        Posttreatment Pain Rating 0/10 - no pain  -CK           SLP Treatment Clinical Impressions    Treatment Assessment (SLP) improved;cognitive-linguistic disorder  -CK        Treatment Assessment Comments (SLP) Pt seen for skilled ST services this date to address cognitive-linguistic defcits.  Pt was seen in room sitting in w/c at bedside.  Pt participated well in all therapy tasks, but was unable to complete sequencing task.  Pt struggled w/deductive reasoning for math related questions pertaining to functional information; however, made attempts to answer all questions.  Pt continues to require a repetition of instructions and re-wording even in functional situations.  Pt often times needs to repeat and clarify steps/directions for functional tasks such as sequencing steps to getting to and using the bathroom and t/f from w/c to toilet.  Pt also demonstrates poor initiation.  No goals met this date; Continue POC.  -CK        Daily Summary of Progress (SLP) progress toward functional goals is good  -CK        Barriers to Overall Progress (SLP) Cognitive status  -CK        Plan for Continued Treatment (SLP) continue treatment per plan of care  -CK        Care Plan Review evaluation/treatment results reviewed;care plan/treatment goals reviewed;current/potential barriers reviewed;patient/other agree to care plan  -CK           Recommendations    Therapy Frequency (SLP SLC) 5 days per week  -CK        Predicted Duration Therapy Intervention (Days) until discharge  -CK        Anticipated Discharge Disposition (SLP) home with assist  -CK           Reasoning Goal 1 (SLP)    Improve Reasoning Through Goal 1 (SLP) complete deductive reasoning task;90%;independently (over 90% accuracy)  -CK        Time Frame (Reasoning Goal 1, SLP) by discharge  -CK        Progress (Reasoning Goal 1, SLP) 40%;70%  -CK        Progress/Outcomes (Reasoning Goal 1, SLP) goal ongoing  -CK         Comment (Reasoning Goal 1, SLP) --  -CK           Functional Problem Solving Skills Goal 1 (SLP)    Improve Problem Solving Through Goal 1 (SLP) answer questions about ADL problems;90%;independently (over 90% accuracy)  -CK        Time Frame (Problem Solving Goal 1, SLP) by discharge  -CK        Progress (Problem Solving Goal 1, SLP) 90%  -CK        Progress/Outcomes (Problem Solving Goal 1, SLP) goal ongoing  -CK              User Key  (r) = Recorded By, (t) = Taken By, (c) = Cosigned By    Initials Name Effective Dates    Latesha Andrews MS CCC-SLP 06/16/21 -                    EDUCATION  The patient has been educated in the following areas:     Cognitive Impairment.           SLP GOALS     Row Name 01/09/23 1300             Reasoning Goal 1 (SLP)    Improve Reasoning Through Goal 1 (SLP) complete deductive reasoning task;90%;independently (over 90% accuracy)  -CK      Time Frame (Reasoning Goal 1, SLP) by discharge  -CK      Progress (Reasoning Goal 1, SLP) 40%;70%  -CK      Progress/Outcomes (Reasoning Goal 1, SLP) goal ongoing  -CK      Comment (Reasoning Goal 1, SLP) --  -CK         Functional Problem Solving Skills Goal 1 (SLP)    Improve Problem Solving Through Goal 1 (SLP) answer questions about ADL problems;90%;independently (over 90% accuracy)  -CK      Time Frame (Problem Solving Goal 1, SLP) by discharge  -CK      Progress (Problem Solving Goal 1, SLP) 90%  -CK      Progress/Outcomes (Problem Solving Goal 1, SLP) goal ongoing  -CK            User Key  (r) = Recorded By, (t) = Taken By, (c) = Cosigned By    Initials Name Provider Type    aLtesha Andrews MS CCC-SLP Speech and Language Pathologist                        Time Calculation:      Time Calculation- SLP     Row Name 01/09/23 1411             Time Calculation- SLP    SLP Start Time 1300  -CK      SLP Stop Time 1411  -CK      SLP Time Calculation (min) 71 min  -CK      Total Timed Code Minutes- SLP 71 minute(s)  -CK      SLP Received  On 01/09/23  -CK      SLP Goal Re-Cert Due Date 01/13/23  -CK         Untimed Charges    21237-IM Treatment/ST Modification Prosth Aug Alter  71  -CK         Total Minutes    Untimed Charges Total Minutes 71  -CK       Total Minutes 71  -CK            User Key  (r) = Recorded By, (t) = Taken By, (c) = Cosigned By    Initials Name Provider Type    Latesha Andrews, MS CCC-SLP Speech and Language Pathologist                Therapy Charges for Today     Code Description Service Date Service Provider Modifiers Qty    40825381403  ST TREATMENT SPEECH 5 1/9/2023 Latesha Ram, MS CCC-SLP GN 1                     Latesha Ram MS CCC-SLP  1/9/2023

## 2023-01-09 NOTE — PLAN OF CARE
Goal Outcome Evaluation:  Plan of Care Reviewed With: patient        Progress: improving  Outcome Evaluation: Pt seen for skilled ST services this date to address cognitive-linguistic defcits.  Pt was seen in room sitting in w/c at bedside.  Pt participated well in all therapy tasks, but was unable to complete sequencing task.  Pt struggled w/deductive reasoning for math related questions pertaining to functional information; however, made attempts to answer all questions.  Pt continues to require a repetition of instructions and re-wording even in functional situations.  Pt often times needs to repeat and clarify steps/directions for functional tasks such as sequencing steps to getting to and using the bathroom and t/f from w/c to toilet.  Pt also demonstrates poor initiation.  No goals met this date; Continue POC.

## 2023-01-09 NOTE — THERAPY TREATMENT NOTE
Acute Care - Physical Therapy Treatment Note  [facility]     Patient Name: Brian Garcia  : 1950  MRN: [id number]  Today's Date: 2023      Visit Dx:     ICD-10-CM ICD-9-CM   1. Dysphagia, unspecified type  R13.10 787.20   2. Symbolic dysfunction  R48.9 784.60   3. Impaired mobility and ADLs  Z74.09 V49.89    Z78.9    4. Impaired functional mobility, balance, gait, and endurance  Z74.09 V49.89     Patient Active Problem List   Diagnosis   • Pneumonia of both lower lobes due to infectious organism   • Physical deconditioning   • Pulmonary nodules   • Leukocytosis   • History of pulmonary embolism   • Stage 4 very severe COPD by GOLD classification ([facility])   • Chronic respiratory failure with hypoxia, on home O2 therapy ([facility])   • Hypertension   • CVA (cerebral vascular accident) ([facility])   • Traumatic hemorrhage of cerebrum     Past Medical History:   Diagnosis Date   • COPD (chronic obstructive pulmonary disease) ([facility])    • Hypertension      Past Surgical History:   Procedure Laterality Date   • HIP ARTHROPLASTY       PT Assessment (last 12 hours)     PT Evaluation and Treatment    No documentation.                 Physical Therapy Education     Title: PT OT SLP Therapies (In Progress)     Topic: Physical Therapy (In Progress)     Point: Mobility training (In Progress)     Learning Progress Summary           Patient Acceptance, E, NR by EUGENE at 2023 1243    Acceptance, E, NR by EUGENE at 2023 1112    Acceptance, E, NR by EUGENE at 1/3/2023 1206    Acceptance, E, NR by EUGENE at 2023 1219    Acceptance, E, NR by 1 at 2022 1345                   Point: Home exercise program (Not Started)     Learner Progress:  Not documented in this visit.          Point: Body mechanics (In Progress)     Learning Progress Summary           Patient Acceptance, E, NR by [email or web address] at 2022 1345                   Point: Precautions (In Progress)     Learning Progress Summary           Patient Acceptance, E, NR by  [email or web address] at 12/31/2022 1345                               User Key     Initials Effective Dates Name Provider Type Discipline    [facility] 06/16/21 -  Nydia Muñoz, PT Physical Therapist PT     06/16/21 -  Yossi Haynes, REGINE Physical Therapist Assistant PT              PT Recommendation and Plan     Plan of Care Reviewed With: patient  Progress: improving  Outcome Evaluation: pt responded well to PT. pt participated in gait training- 144, 150 ft CGA/SBA with RW. pt participated in stair training CGA w/ albin handrails. pt participated in LE ther ex with lvl 1 tband. pt is cooperative. improving with mobility and cognition. no new goals met at this time. pt would continue to benefit from PT services.   Outcome Measures     Row Name 01/09/23 1015             How much help from another person do you currently need...    Turning from your back to your side while in flat bed without using bedrails? 3  -EUGENE      Moving from lying on back to sitting on the side of a flat bed without bedrails? 3  -EUGENE      Moving to and from a bed to a chair (including a wheelchair)? 3  -EUGENE      Standing up from a chair using your arms (e.g., wheelchair, bedside chair)? 3  -EUGENE      Climbing 3-5 steps with a railing? 3  -EUGENE      To walk in hospital room? 3  -EUGENE      AM-PAC 6 Clicks Score (PT) 18  -         Functional Assessment    Outcome Measure Options AM-PAC 6 Clicks Basic Mobility (PT)  -            User Key  (r) = Recorded By, (t) = Taken By, (c) = Cosigned By    Initials Name Provider Type     Yossi Haynes PTA Physical Therapist Assistant                 Time Calculation:    PT Charges     Row Name 01/09/23 1248             Time Calculation    Start Time 1015  -      Stop Time 1146  -      Time Calculation (min) 91 min  -         Time Calculation- PT    Total Timed Code Minutes- PT 91 minute(s)  -         Timed Charges    51468 - PT Therapeutic Exercise Minutes 31  -EUGENE      66800 - Gait Training Minutes  30  -EUGENE      53538 -  PT Therapeutic Activity Minutes 30  -EUGENE         Total Minutes    Timed Charges Total Minutes 91  -EUGENE       Total Minutes 91  -EUGENE            User Key  (r) = Recorded By, (t) = Taken By, (c) = Cosigned By    Initials Name Provider Type    Yossi Ragladn PTA Physical Therapist Assistant              Therapy Charges for Today     Code Description Service Date Service Provider Modifiers Qty    90202099819 HC PT THER PROC EA 15 MIN 1/9/2023 Yossi Haynes PTA GP 2    04915274746 HC GAIT TRAINING EA 15 MIN 1/9/2023 Yossi Haynes PTA GP 2    12021630063  PT THERAPEUTIC ACT EA 15 MIN 1/9/2023 Yossi Haynes PTA GP 2          PT G-Codes  Outcome Measure Options: AM-PAC 6 Clicks Basic Mobility (PT)  AM-PAC 6 Clicks Score (PT): 18  AM-PAC 6 Clicks Score (OT): 19  Modified Dk Scale: 2 - Slight disability.  Unable to carry out all previous activities but able to look after own affairs without assistance.    Yossi Haynes PTA  1/9/2023

## 2023-01-09 NOTE — PLAN OF CARE
Problem: Rehabilitation ([facility]) Plan of Care  Goal: Plan of Care Review  Flowsheets  Taken 1/9/2023 1311 by Latesha Ramos COTA  Progress: improving  Outcome Evaluation: Pt tolerated tx well this date. Pt was CGA with sit-stand-sit. Pt was CGA with shower t/f. Pt completed an full ADL. No goals met this tx. Continue OT POC.  Taken 1/9/2023 1244 by Yossi Haynes PTA  Plan of Care Reviewed With: patient   Goal Outcome Evaluation:           Progress: improving  Outcome Evaluation: Pt tolerated tx well this date. Pt was CGA with sit-stand-sit. Pt was CGA with shower t/f. Pt completed an full ADL. No goals met this tx. Continue OT POC.

## 2023-01-10 PROBLEM — J18.9 PNEUMONIA OF BOTH LOWER LOBES DUE TO INFECTIOUS ORGANISM: Status: RESOLVED | Noted: 2018-02-06 | Resolved: 2023-01-10

## 2023-01-10 PROCEDURE — 97110 THERAPEUTIC EXERCISES: CPT

## 2023-01-10 PROCEDURE — 97116 GAIT TRAINING THERAPY: CPT

## 2023-01-10 PROCEDURE — 97535 SELF CARE MNGMENT TRAINING: CPT

## 2023-01-10 PROCEDURE — 97530 THERAPEUTIC ACTIVITIES: CPT

## 2023-01-10 PROCEDURE — 99231 SBSQ HOSP IP/OBS SF/LOW 25: CPT | Performed by: ORTHOPAEDIC SURGERY

## 2023-01-10 PROCEDURE — 92507 TX SP LANG VOICE COMM INDIV: CPT | Performed by: SPEECH-LANGUAGE PATHOLOGIST

## 2023-01-10 PROCEDURE — 97542 WHEELCHAIR MNGMENT TRAINING: CPT

## 2023-01-10 RX ADMIN — TIZANIDINE 2 MG: 2 TABLET ORAL at 20:22

## 2023-01-10 RX ADMIN — DOCUSATE SODIUM 100 MG: 100 CAPSULE, LIQUID FILLED ORAL at 20:22

## 2023-01-10 RX ADMIN — DILTIAZEM HYDROCHLORIDE 180 MG: 180 CAPSULE, COATED, EXTENDED RELEASE ORAL at 08:13

## 2023-01-10 RX ADMIN — MELATONIN 6 MG: 3 TAB ORAL at 20:22

## 2023-01-10 RX ADMIN — Medication 1000 UNITS: at 08:12

## 2023-01-10 RX ADMIN — MONTELUKAST SODIUM 10 MG: 10 TABLET, COATED ORAL at 20:22

## 2023-01-10 RX ADMIN — DOCUSATE SODIUM 100 MG: 100 CAPSULE, LIQUID FILLED ORAL at 08:12

## 2023-01-10 NOTE — PLAN OF CARE
Goal Outcome Evaluation:  Plan of Care Reviewed With: patient     Problem: Rehabilitation ([facility]) Plan of Care  Goal: Plan of Care Review  Outcome: Ongoing, Progressing  Flowsheets (Taken 1/10/2023 1350)  Progress: improving  Plan of Care Reviewed With: patient  Outcome Evaluation: pt continues to improve with PT. pt requires CGA for sit-stand-sit and gait- 150 ft w/ RW. pt participated in stair training with CGA. SBA with WC mobility- 70 ft x2. pt participated in standing LE ther ex this PM. no new goals met at this time. pt would continue to benefit from PT services.

## 2023-01-10 NOTE — THERAPY TREATMENT NOTE
Inpatient Rehabilitation - Occupational Therapy Treatment Note    [facility]     Patient Name: Brian Garcia  : 1950  MRN: [id number]    Today's Date: 1/10/2023                     Self-Care Admission Goal Date Discharge   Eating   6    Oral hygiene   5    Toileting hygiene   5    Shower/bathe self   3    Upper body dressing   5    Lower body dressing   3    Putting on/taking off footwear   2           Admit Date: 2022         ICD-10-CM ICD-9-CM   1. Dysphagia, unspecified type  R13.10 787.20   2. Symbolic dysfunction  R48.9 784.60   3. Impaired mobility and ADLs  Z74.09 V49.89    Z78.9    4. Impaired functional mobility, balance, gait, and endurance  Z74.09 V49.89       Patient Active Problem List   Diagnosis   • Physical deconditioning   • Pulmonary nodules   • Leukocytosis   • History of pulmonary embolism   • Stage 4 very severe COPD by GOLD classification ([facility])   • Chronic respiratory failure with hypoxia, on home O2 therapy ([facility])   • Hypertension   • CVA (cerebral vascular accident) ([facility])   • Traumatic hemorrhage of cerebrum       Past Medical History:   Diagnosis Date   • COPD (chronic obstructive pulmonary disease) ([facility])    • Hypertension        Past Surgical History:   Procedure Laterality Date   • HIP ARTHROPLASTY               IRF OT ASSESSMENT FLOWSHEET (last 12 hours)     IRF OT Evaluation and Treatment     Row Name 01/10/23 0830          OT Time and Intention    Document Type daily treatment  -LM     Mode of Treatment individual therapy;occupational therapy  -LM     Patient Effort good  -LM     Row Name 01/10/23 0830          General Information    Patient Profile Reviewed yes  -LM     Existing Precautions/Restrictions fall  -LM     Limitations/Impairments hearing  -LM     Row Name 01/10/23 0830          Pain Assessment    Pretreatment Pain Rating 0/10 - no pain  -LM     Posttreatment Pain Rating 0/10 - no pain  -LM     Row Name 01/10/23 0886           Cognition/Psychosocial    Orientation Status (Cognition) oriented x 4  -LM     Follows Commands (Cognition) WFL  -LM     Row Name 01/10/23 0830          Bathing    [place] Level (Bathing) bathing skills;lower body;upper body;upper extremities;chest/trunk;distal lower extremities/feet;moderate assist (50% patient effort)  -LM     Position (Bathing) supported standing;supported sitting;unsupported standing  -LM     Row Name 01/10/23 0830          Upper Body Dressing    [place] Level (Upper Body Dressing) upper body dressing skills;doff;don;minimum assist (75% or more patient effort)  -LM     Position (Upper Body Dressing) supported sitting  -LM     Row Name 01/10/23 0830          Lower Body Dressing    [place] Level (Lower Body Dressing) doff;don;shoes/slippers;minimum assist (75% patient effort);socks;pants/bottoms;underwear;moderate assist (50% patient effort)  -LM     Position (Lower Body Dressing) supported sitting;supported standing  -LM     Row Name 01/10/23 0830          Grooming    [place] Level (Grooming) grooming skills;deodorant application;hair care, combing/brushing;oral care regimen;wash face, hands;set up  -LM     Position (Grooming) sink side;supported sitting  w/c  -LM     Row Name 01/10/23 0830          Toileting    [place] Level (Toileting) toileting skills;perform perineal hygiene;adjust/manage clothing;contact guard assist  -LM     Row Name 01/10/23 0830          Bed Mobility    All Activities, [place] (Bed Mobility) --  due to pt lack of participation and effort  -LM     Supine-Sit [place] (Bed Mobility) standby assist  -LM     Row Name 01/10/23 0830          Functional Mobility    Functional Mobility- Ind. Level contact guard assist  -LM     Functional Mobility- Device walker, front-wheeled  -LM     Row Name 01/10/23 0830          Transfer Assessment/Treatment    Transfers sit-stand transfer;stand-sit transfer;shower transfer  -     Row Name 01/10/23 0830           Sit-Stand Transfer    Sit-Stand [place] (Transfers) contact guard  -LM     Assistive Device (Sit-Stand Transfers) walker, front-wheeled  -LM     Row Name 01/10/23 0830          Stand-Sit Transfer    Stand-Sit [place] (Transfers) contact guard  -LM     Assistive Device (Stand-Sit Transfers) walker, front-wheeled  -LM     Row Name 01/10/23 0830          Toilet Transfer    Type (Toilet Transfer) sit-stand;stand-sit;stand pivot/stand step  -LM     [place] Level (Toilet Transfer) contact guard  -LM     Row Name 01/10/23 0830          Motor Skills    Motor Skills coordination  -LM     Coordination WNL  -LM     Row Name 01/10/23 0830          Balance    Balance Assessment sitting static balance;sitting dynamic balance  -LM     Static Sitting Balance independent  -LM     Dynamic Sitting Balance independent  -LM     Row Name 01/10/23 0830          Positioning and Restraints    Pre-Treatment Position sitting in chair/recliner  -LM     Post Treatment Position wheelchair  -LM     Row Name 01/10/23 0830          Therapy Assessment/Plan (OT)    Patient's Goals For Discharge return home  -     Row Name 01/10/23 0830          Therapy Assessment/Plan (OT)    Rehab Potential/Prognosis (OT) good, to achieve stated therapy goals  -LM     Frequency of Treatment (OT) other (see comments)  5-6 days/week  -LM     Estimated Duration of Therapy (OT) until facility discharge  -LM     Problem List (OT) balance;coordination;mobility;strength;postural control  -LM     Activity Limitations Related to Problem List (OT) unable to ambulate safely;BADLs not performed adequately or safely;unable to transfer safely;IADLs not performed adequately or safely;community activities not performed adequately or safely;home management activity not performed adequately or safely  -LM     Row Name 01/10/23 0830          Therapy Plan Review/Discharge Plan (OT)    Anticipated Discharge Disposition (OT) home with 24/7 care  -     Row Name  01/10/23 0830          Bathing Goal 1 (OT-IRF)    Activity/Device (Bathing Goal 1, OT-IRF) lower body bathing  -LM     [place] Level (Bathing Goal 1, OT-IRF) modified independence  -LM     Time Frame (Bathing Goal 1, OT-IRF) by discharge;long-term goal (LTG)  -LM     Progress/Outcomes (Bathing Goal 1, OT-IRF) goal not met  -LM     Row Name 01/10/23 0830          LB Dressing Goal 1 (OT-IRF)    Activity/Device (LB Dressing Goal 1, OT-IRF) lower body dressing  -LM     [place] (LB Dressing Goal 1, OT-IRF) modified independence  -LM     Time Frame (LB Dressing Goal 1, OT-IRF) long-term goal (LTG);by discharge  -LM     Progress/Outcomes (LB Dressing Goal 1, OT-IRF) goal not met  -LM     Row Name 01/10/23 0830          Strength Goal 1 (OT-IRF)    Strength Goal 1 (OT-IRF) Pt will increase BUE strength in all planes by one level for increased strength and endurance required for ADL routine.  -LM     Time Frame (Strength Goal 1, OT-IRF) long-term goal (LTG);by discharge  -LM     Progress/Outcomes (Strength Goal 1, OT-IRF) goal not met  -LM     Row Name 01/10/23 0830           Endurance Goal 1 (OT)    Activity Level (Endurance Goal 1, OT) endurance 2 good  -LM     Progress/Outcome (Endurance Goal 1, OT) goal not met  -LM           User Key  (r) = Recorded By, (t) = Taken By, (c) = Cosigned By    Initials Name Effective Dates    LM Holly Sewell COTA 06/16/21 -                  Occupational Therapy Education     Title: PT OT SLP Therapies (In Progress)     Topic: Occupational Therapy (In Progress)     Point: ADL training (Done)     Description:   Instruct learner(s) on proper safety adaptation and remediation techniques during self care or transfers.   Instruct in proper use of assistive devices.              Learning Progress Summary           Patient Acceptance, E, VU,NR by RC at 1/6/2023 1255    Acceptance, E, VU,NR by RC at 1/5/2023 1517    Acceptance, E,TB, VU by BB at 12/31/2022 1522    Acceptance, E,TB,  VU by CM at 12/31/2022 1211    Comment: OT POC, Role of OT, safe ADL mobility and t/f                   Point: Home exercise program (Not Started)     Description:   Instruct learner(s) on appropriate technique for monitoring, assisting and/or progressing therapeutic exercises/activities.              Learner Progress:  Not documented in this visit.          Point: Precautions (Done)     Description:   Instruct learner(s) on prescribed precautions during self-care and functional transfers.              Learning Progress Summary           Patient Acceptance, E, VU,NR by  at 1/6/2023 1255    Acceptance, E, VU,NR by  at 1/5/2023 1517                   Point: Body mechanics (Done)     Description:   Instruct learner(s) on proper positioning and spine alignment during self-care, functional mobility activities and/or exercises.              Learning Progress Summary           Patient Acceptance, E, VU,NR by  at 1/6/2023 1255    Acceptance, E, VU,NR by  at 1/5/2023 1517                               User Key     Initials Effective Dates Name Provider Type Discipline     06/16/21 -  Lisa José COTA Occupational Therapist Assistant OT    RC 06/16/21 -  Annette Carey COTA Occupational Therapist Assistant OT     11/18/22 -  Ghazala Alfonso OT Occupational Therapist OT                    OT Recommendation and Plan         Plan of Care Review  Plan of Care Reviewed With: patient  Progress: improving  Outcome Evaluation: pt perf well with OT grooming with morrow,bathing ub with sba and lb dressing with mod a and toilet tf with min a  pt required incresed time to complete tasks.  pt unsure if had changed pants or not this date but insistant that he thought he had and defer to don clean pants at this time  Plan of Care Reviewed With: patient  Progress: improving       Outcome Measures     Row Name 01/10/23 1200 01/10/23 1106 01/09/23 1015       How much help from another person do you currently need...     Turning from your back to your side while in flat bed without using bedrails? -- 3  -EUGENE 3  -EUGENE    Moving from lying on back to sitting on the side of a flat bed without bedrails? -- 3  -EUGENE 3  -EUGENE    Moving to and from a bed to a chair (including a wheelchair)? -- 3  -EUGENE 3  -EUGENE    Standing up from a chair using your arms (e.g., wheelchair, bedside chair)? -- 3  -EUGENE 3  -EUGENE    Climbing 3-5 steps with a railing? -- 3  -EUGENE 3  -EUGENE    To walk in hospital room? -- 3  -EUGENE 3  -EUGENE    AM-PAC 6 Clicks Score (PT) -- 18  -EUGENE 18  -EUGENE       Functional Assessment    Outcome Measure Options AM-PAC 6 Clicks Basic Mobility (PT)  -EUGENE AM-PAC 6 Clicks Basic Mobility (PT)  -EUGENE AM-PAC 6 Clicks Basic Mobility (PT)  -EUGENE          User Key  (r) = Recorded By, (t) = Taken By, (c) = Cosigned By    Initials Name Provider Type    EUGENE Yossi Haynes PTA Physical Therapist Assistant                  Time Calculation:      Time Calculation- OT     Row Name 01/10/23 1353 01/10/23 1241          Time Calculation- OT    OT Start Time 0830  -LM --     OT Stop Time 1000  -LM --     OT Time Calculation (min) 90 min  -LM --     Total Timed Code Minutes- OT 90 minute(s)  -LM --     OT Received On 01/10/23  -LM --        Timed Charges    37477 - Gait Training Minutes  -- 15  -EUGENE     97095 - OT Self Care/Mgmt Minutes 90  -LM --        Total Minutes    Timed Charges Total Minutes 90  -LM 15  -EUGENE      Total Minutes 90  -LM 15  -EUGENE           User Key  (r) = Recorded By, (t) = Taken By, (c) = Cosigned By    Initials Name Provider Type    Yossi Ragland, PTA Physical Therapist Assistant     Holly Sewell COTA Occupational Therapist Assistant              Therapy Charges for Today     Code Description Service Date Service Provider Modifiers Qty    12932563924  OT SELF CARE/MGMT/TRAIN EA 15 MIN 1/10/2023 Holly Sewell COTA GO 6                   ALEXANDER Fragoso  1/10/2023

## 2023-01-10 NOTE — PLAN OF CARE
Goal Outcome Evaluation:  Plan of Care Reviewed With: patient        Progress: improving  Outcome Evaluation: pt perf well with OT grooming with morrow,bathing ub with sba and lb dressing with mod a and toilet tf with min a  pt required incresed time to complete tasks.  pt unsure if had changed pants or not this date but insistant that he thought he had and defer to don clean pants at this time

## 2023-01-10 NOTE — THERAPY TREATMENT NOTE
Inpatient Rehabilitation - Speech Language Pathology Treatment Note  [facility]     Patient Name: Brian Garcia  : 1950  MRN: [id number]  Today's Date: 1/10/2023               Admit Date: 2022     Pt seen for skilled ST services this date to address cognitive-linguistic tx.  Pt participated very well in all therapy tasks.  Pt demonstrated improvement in mathematical word problems over everyday information and he was able to complete sequencing task w/86% acc.  Pt was unable to complete the sequencing task the day prior.  Pt met answering ADL questions goal; POC to be updated to reflect progress.    Goals:  1.  Pt will complete deductive reasoning tasks w/90% acc:  Pt was 80% accuracy with mathematical deductive reasoning questions pertaining to everyday information (smoke alarm advertisement).  Pt completed a timeline activity in which he read a story and sequenced the 7 main events in that story w/ 86% acc.  He did attempt to self-correct, but required assist to fix error.  SLP also hi-lighted to seven events listed at the bottom of the paragraph.  Pt also completed a word deduction task w/ 60% acc.  2.  Pt will answer questions about ADL problems w/90% acc:  Pt was 100% acc w/reasoning and flexibility in thinking questions about home safety related to fires/fire safety/fire safety equipment/etc.     Hearing, Speech and Vision Admission Goal  Date  1/3/23 Date 23\ Date  23 Date 23    Date 01/10/23\ Discharge   Expression of Ideas and Wants (consider both verbal and non-verbal expression and excluding language barriers) 4 4  3  3  4  4  4     Understanding Verbal and Non-Verbal Content (with hearing aid or device, if used, and excluding language barriers) 4 4 3  3  3  3  4                   Visit Dx:    ICD-10-CM ICD-9-CM   1. Dysphagia, unspecified type  R13.10 787.20   2. Symbolic dysfunction  R48.9 784.60   3. Impaired mobility and ADLs  Z74.09 V49.89    Z78.9    4.  Impaired functional mobility, balance, gait, and endurance  Z74.09 V49.89     Patient Active Problem List   Diagnosis   • Physical deconditioning   • Pulmonary nodules   • Leukocytosis   • History of pulmonary embolism   • Stage 4 very severe COPD by GOLD classification ([facility])   • Chronic respiratory failure with hypoxia, on home O2 therapy ([facility])   • Hypertension   • CVA (cerebral vascular accident) ([facility])   • Traumatic hemorrhage of cerebrum     Past Medical History:   Diagnosis Date   • COPD (chronic obstructive pulmonary disease) ([facility])    • Hypertension      Past Surgical History:   Procedure Laterality Date   • HIP ARTHROPLASTY         SLP Recommendation and Plan                 Anticipated Discharge Disposition (SLP): home with assist (01/10/23 1002)        Predicted Duration Therapy Intervention (Days): until discharge (01/10/23 1002)     Daily Summary of Progress (SLP): progress toward functional goals is good (01/10/23 1002)           Treatment Assessment (SLP): improved, cognitive-linguistic disorder (01/10/23 1002)  Treatment Assessment Comments (SLP): Pt seen for skilled ST services this date to address cognitive-linguistic tx.  Pt participated very well in all therapy tasks.  Pt demonstrated improvement in mathematical word problems over everyday information and he was able to complete sequencing task w/86% acc.  Pt was unable to complete the sequencing task the day prior.  Pt met answering ADL questions goal; POC to be updated to reflect progress. (01/10/23 1002)  Plan for Continued Treatment (SLP): goals adjusted to reflect functional improvements demonstrated (01/10/23 1002)  Plan of Care Reviewed With: patient (01/10/23 1058)  Progress: improving (01/10/23 1058)  Outcome Evaluation: Pt seen for skilled ST services this date to address cognitive-linguistic tx.  Pt participated very well in all therapy tasks.  Pt demonstrated improvement in mathematical word problems over everyday information and he was  able to complete sequencing task w/86% acc.  Pt was unable to complete the sequencing task the day prior.  Pt met answering ADL questions goal; POC to be updated to reflect progress. (01/10/23 1053)      SLP EVALUATION (last 72 hours)     SLP SLC Evaluation     Row Name 01/10/23 1002 01/09/23 1300                Communication Assessment/Intervention    Document Type therapy note (daily note)  -CK therapy note (daily note)  -CK       Subjective Information no complaints  -CK no complaints  -CK       Patient Observations alert;cooperative;agree to therapy  -CK alert;cooperative;agree to therapy  -CK       Patient/Family/Caregiver Comments/Observations No family present at bedside  -CK No family present at bedside  -CK       Patient Effort good  -CK good  -CK          General Information    Patient Profile Reviewed yes  -CK yes  -CK       Precautions/Limitations, Vision corrective lenses needed for reading  -CK corrective lenses needed for reading  -CK       Precautions/Limitations, Hearing hearing impairment, bilaterally  -CK hearing impairment, bilaterally  -CK       Plans/Goals Discussed with patient  -CK patient  -CK          Pain Scale: Numbers Pre/Post-Treatment    Pretreatment Pain Rating 0/10 - no pain  -CK 0/10 - no pain  -CK       Posttreatment Pain Rating 0/10 - no pain  -CK 0/10 - no pain  -CK          SLP Treatment Clinical Impressions    Treatment Assessment (SLP) -- improved;cognitive-linguistic disorder  -CK       Treatment Assessment Comments (SLP) -- Pt seen for skilled ST services this date to address cognitive-linguistic defcits.  Pt was seen in room sitting in w/c at bedside.  Pt participated well in all therapy tasks, but was unable to complete sequencing task.  Pt struggled w/deductive reasoning for math related questions pertaining to functional information; however, made attempts to answer all questions.  Pt continues to require a repetition of instructions and re-wording even in functional  situations.  Pt often times needs to repeat and clarify steps/directions for functional tasks such as sequencing steps to getting to and using the bathroom and t/f from w/c to toilet.  Pt also demonstrates poor initiation.  No goals met this date; Continue POC.  -CK       Daily Summary of Progress (SLP) -- progress toward functional goals is good  -CK       Barriers to Overall Progress (SLP) -- Cognitive status  -CK       Plan for Continued Treatment (SLP) -- continue treatment per plan of care  -CK       Care Plan Review -- evaluation/treatment results reviewed;care plan/treatment goals reviewed;current/potential barriers reviewed;patient/other agree to care plan  -CK          Recommendations    Therapy Frequency (SLP SLC) 5 days per week  -CK 5 days per week  -CK       Predicted Duration Therapy Intervention (Days) until discharge  -CK until discharge  -CK       Anticipated Discharge Disposition (SLP) home with assist  -CK home with assist  -CK          Reasoning Goal 1 (SLP)    Improve Reasoning Through Goal 1 (SLP) complete deductive reasoning task;90%;independently (over 90% accuracy)  -CK complete deductive reasoning task;90%;independently (over 90% accuracy)  -CK       Time Frame (Reasoning Goal 1, SLP) by discharge  -CK by discharge  -CK       Progress (Reasoning Goal 1, SLP) 60%;80%  -CK 40%;70%  -CK       Progress/Outcomes (Reasoning Goal 1, SLP) goal ongoing  -CK goal ongoing  -CK       Comment (Reasoning Goal 1, SLP) -- --  -CK          Functional Problem Solving Skills Goal 1 (SLP)    Improve Problem Solving Through Goal 1 (SLP) answer questions about ADL problems;90%;independently (over 90% accuracy)  -CK answer questions about ADL problems;90%;independently (over 90% accuracy)  -CK       Time Frame (Problem Solving Goal 1, SLP) by discharge  -CK by discharge  -CK       Progress (Problem Solving Goal 1, SLP) 100%  -CK 90%  -CK       Progress/Outcomes (Problem Solving Goal 1, SLP) goal met   -CK goal  ongoing  -CK             User Key  (r) = Recorded By, (t) = Taken By, (c) = Cosigned By    Initials Name Effective Dates    CK Latesha Ram MS CCC-SLP 06/16/21 -                    EDUCATION  The patient has been educated in the following areas:     Cognitive Impairment Communication Impairment.           SLP GOALS     Row Name 01/10/23 1002 01/09/23 1300          Reasoning Goal 1 (SLP)    Improve Reasoning Through Goal 1 (SLP) complete deductive reasoning task;90%;independently (over 90% accuracy)  -CK complete deductive reasoning task;90%;independently (over 90% accuracy)  -CK     Time Frame (Reasoning Goal 1, SLP) by discharge  -CK by discharge  -CK     Progress (Reasoning Goal 1, SLP) 60%;80%  -CK 40%;70%  -CK     Progress/Outcomes (Reasoning Goal 1, SLP) goal ongoing  -CK goal ongoing  -CK     Comment (Reasoning Goal 1, SLP) -- --  -CK        Functional Problem Solving Skills Goal 1 (SLP)    Improve Problem Solving Through Goal 1 (SLP) answer questions about ADL problems;90%;independently (over 90% accuracy)  -CK answer questions about ADL problems;90%;independently (over 90% accuracy)  -CK     Time Frame (Problem Solving Goal 1, SLP) by discharge  -CK by discharge  -CK     Progress (Problem Solving Goal 1, SLP) 100%  -CK 90%  -CK     Progress/Outcomes (Problem Solving Goal 1, SLP) goal met   -CK goal ongoing  -CK           User Key  (r) = Recorded By, (t) = Taken By, (c) = Cosigned By    Initials Name Provider Type    Latesha Andrews MS CCC-SLP Speech and Language Pathologist                        Time Calculation:      Time Calculation- SLP     Row Name 01/10/23 1058             Time Calculation- SLP    SLP Start Time 1002  -CK      SLP Stop Time 1058  -CK      SLP Time Calculation (min) 56 min  -CK      Total Timed Code Minutes- SLP 1058 minute(s)  -CK      SLP Received On 01/10/23  -CK      SLP Goal Re-Cert Due Date 01/13/23  -CK         Untimed Charges    29566-OV Treatment/ST Modification  Prosth Aug Alter  56  -CK         Total Minutes    Untimed Charges Total Minutes 56  -CK       Total Minutes 56  -CK            User Key  (r) = Recorded By, (t) = Taken By, (c) = Cosigned By    Initials Name Provider Type    Latesha Andrews MS CCC-SLP Speech and Language Pathologist                Therapy Charges for Today     Code Description Service Date Service Provider Modifiers Qty    [id number] HC ST TREATMENT SPEECH 5 1/9/2023 Latesha Ram MS CCC-SLP GN 1    [id number] HC ST TREATMENT SPEECH 4 1/10/2023 Latesha Ram, MS IVEY-SLP GN 1                     MS LOC Nunez  1/10/2023

## 2023-01-10 NOTE — PLAN OF CARE
Goal Outcome Evaluation:  Plan of Care Reviewed With: patient   Vss, participating with therapy, up to bathroom as needed, confused at times to time & situation, oriented x 4 at this time, capable of making needs known, will continue to monitor.

## 2023-01-10 NOTE — PATIENT CARE CONFERENCE
Attendance of weekly team conference:   Dr. Fan Rob, Lori Ram, SLP, ALEXANDER Garcia/JARAD, Hyacinth Duran, GWEN Coordinator and Sj Mari, ELIGIOW, Yossi Haynes, REGINE and Cami Callejas, Dietectics specialist.     Dr. Rob initiated and led today's multidisciplinary team meeting. Patient's progress reviewed, GG codes reviewed, discharge date discussed and equipment needs addressed.       Discharge Disposition: Home with significant other; will need home health PT/OT    Expected Discharge Date: Will bring significant other in for education.    Anticipated discharge orders/equipment:  5 fixed inch wheeling rolling walker    Pt has shower bench.       Self-Care Admission Goal Date 1/10/23 Discharge   Eating 6 6 5     Oral hygiene 6 6 5     Toileting hygiene 5 6 4     Shower/bathe self 3 6 3     Upper body dressing 5 6 5     Lower body dressing 3 6 3     Putting on/taking off footwear 3 6 2           Mobility  Admission Goal Date  1/10/23 Discharge   A.Roll left and right 4 6 4     B.Sit to lying 4 6 4     C. Lying to sitting on side of bed 4 6 4     D.Sit to stand 4 6 4     E. Chair/bed-to-chair transfer    4 6 4     F.Toilet transfer    88 6 4     G. Car Transfer 88 6 88     I.Walk 10 feet 4 6 4     J.Walk 50 feet with two turns. 4 6 4     K.Walk 150 feet:  88 6 4     L.Walking 10 feet on uneven surfaces  88 6 88     M.1 step (curb) 88 6 4     N.4 steps 88 6 4     O.12 steps 9 9 88     P.Picking up object 88 6 88     R. Wheel 50 feet with two turns 4 6 4     S.Wheel 150 feet 88 6 3        Hearing, Speech and Vision Admission Goal  Date  1/10/23 Discharge   Expression of Ideas and Wants (consider both verbal and non-verbal expression and excluding language barriers)  4- Without Difficulty  3- Some difficulty  2- Frequently exhibits difficulty  1- Rarely/never exhibits clear speech  4 4 3     Understanding Verbal and Non-Verbal Content (with hearing aid or device, if used, and excluding language  barriers)  4- Comprehends without cues  3- Usually understands  2- Some Understands  1- Rarely/Never Understands 4 4 3

## 2023-01-10 NOTE — PLAN OF CARE
Goal Outcome Evaluation:  Plan of Care Reviewed With: patient        Progress: improving  Outcome Evaluation: Pt seen for skilled ST services this date to address cognitive-linguistic tx.  Pt participated very well in all therapy tasks.  Pt demonstrated improvement in mathematical word problems over everyday information and he was able to complete sequencing task w/86% acc.  Pt was unable to complete the sequencing task the day prior.  Pt met answering ADL questions goal; POC to be updated to reflect progress.

## 2023-01-10 NOTE — PLAN OF CARE
Goal Outcome Evaluation:  Plan of Care Reviewed With: patient        Progress: no change  Outcome Evaluation: patient has been cooperative and pleasant, has not slept much at all this night despite taking melatonin, fair results from tylenol given for back pain, able to make needs known, will continue to monitor and provide for safety

## 2023-01-10 NOTE — PROGRESS NOTES
Progress Note      Admit date: 12/30/2022 12:28 PM       Treatment Team     Provider Relationship Specialty Contact    Fan Rob MD Attending Rehabilitation   [phone]      Yossi Haynes, REGINE Physical Therapy Assistant Physical Therapy --    Aleja Tripp  -- --    Aldair Miller MD Consulting Physician Pulmonary Disease   [phone]      Latesha Ram, MS CCC-SLP Speech Language Pathologist Speech Pathology   [phone]      Elayne Cohn, LPN Licensed Practical Nurse -- --    Renu Brasher, RRT Respiratory Therapist Respiratory Therapy   [phone]      Holly Sewell, MUNOZ Occupational Therapy Assistant Occupational Therapy --    Maya Villavicencio RN Registered Nurse -- --    Radha Pierre RN Registered Nurse -- --    Manju Patterson LSW  -- --             Chief Complaint:  Traumatic hemorrhage of cerebrum    HPI: No changes since admission    Vital Signs  Temp:  [96.9 °F (36.1 °C)-98.7 °F (37.1 °C)] 96.9 °F (36.1 °C)  Heart Rate:  [103-109] 109  Resp:  [18-20] 20  BP: (118-177)/(56-85) 177/85    Physical Exam 1:    Constitutional: Patient is alert and oriented more cooperative today therapy     HENT:      Eyes:     Neck:      Cardiovascular: No chest pain    Pulmonary: No sputum production or cough or increase in difficulty breathing    Abdominal:      Genitourinary/Anorectal:       Musculoskeletal: Improving with physical therapy    Skin:     Neurological: No changes no focal signs    Psychiatric/Behavioral: Alert and oriented and cooperative       Results Review:    I reviewed the patient's new clinical results.    Nursing notes and therapy notes have been reviewed.    I have reviewed medications.    Assessment/Plan  Principal Problem:    Traumatic hemorrhage of cerebrum stable  Active Problems:    CVA (cerebral vascular accident) (HCC)    Pneumonia of both lower lobes due to infectious organism      Overview: - Discussed with Radiology (  Blanca, who reports that the patient has a       superior segment small patchy opacity in the left lung base likely       pneumonia, and a small patchy opacity in the right lung base, likely       pneumonia. He recommended continuing with antibiotics and then doing       follow up imaging in 3 months.      - Blood cultures no growth to date.      - Levaquin day 4. Transitioned to PO today.      - Legionella and strep pneumo neg. Mycoplasma pending.  Ammonia resolved physical deconditioning      Overview: - PT/OT Consulted. Recommendations: home with home health.      - Patient may benefit from an assisted living.    Stage 4 very severe COPD by GOLD classification (HCC) stable    Hypertension stable    Chronic respiratory failure with hypoxia, on home O2 therapy ([facility]) stable on O2           Fan Rob MD  01/10/23  09:04 CST          This document has been electronically signed by Fan Rob MD on January 10, 2023 09:04 CST      Part of this note may be an electronic transcription/translation of spoken language to printed text using the Dragon Dictation System.

## 2023-01-10 NOTE — THERAPY TREATMENT NOTE
Acute Care - Physical Therapy Treatment Note  [facility]     Patient Name: Brian Garcia  : 1950  MRN: [id number]  Today's Date: 1/10/2023      Visit Dx:     ICD-10-CM ICD-9-CM   1. Dysphagia, unspecified type  R13.10 787.20   2. Symbolic dysfunction  R48.9 784.60   3. Impaired mobility and ADLs  Z74.09 V49.89    Z78.9    4. Impaired functional mobility, balance, gait, and endurance  Z74.09 V49.89     Patient Active Problem List   Diagnosis   • Physical deconditioning   • Pulmonary nodules   • Leukocytosis   • History of pulmonary embolism   • Stage 4 very severe COPD by GOLD classification ([facility])   • Chronic respiratory failure with hypoxia, on home O2 therapy ([facility])   • Hypertension   • CVA (cerebral vascular accident) ([facility])   • Traumatic hemorrhage of cerebrum     Past Medical History:   Diagnosis Date   • COPD (chronic obstructive pulmonary disease) ([facility])    • Hypertension      Past Surgical History:   Procedure Laterality Date   • HIP ARTHROPLASTY       PT Assessment (last 12 hours)     PT Evaluation and Treatment    No documentation.                 Physical Therapy Education     Title: PT OT SLP Therapies (In Progress)     Topic: Physical Therapy (In Progress)     Point: Mobility training (In Progress)     Learning Progress Summary           Patient Acceptance, E, NR by EUGENE at 1/10/2023 1241    Acceptance, E, NR by EUGENE at 2023 1243    Acceptance, E, NR by EUGENE at 2023 1112    Acceptance, E, NR by EUGENE at 1/3/2023 1206    Acceptance, E, NR by EUGENE at 2023 1219    Acceptance, E, NR by 1 at 2022 1345                   Point: Home exercise program (Not Started)     Learner Progress:  Not documented in this visit.          Point: Body mechanics (In Progress)     Learning Progress Summary           Patient Acceptance, E, NR by [email or web address] at 2022 1345                   Point: Precautions (In Progress)     Learning Progress Summary           Patient Acceptance, E, NR by [email or web address] at  12/31/2022 1345                               User Key     Initials Effective Dates Name Provider Type Discipline    JC 06/16/21 -  Nydia Muñoz, PT Physical Therapist PT     06/16/21 -  Yossi Haynes, REGINE Physical Therapist Assistant PT              PT Recommendation and Plan     Plan of Care Reviewed With: patient  Progress: improving  Outcome Evaluation: pt continues to improve with PT. pt requires CGA for sit-stand-sit and gait- 150 ft w/ RW. pt participated in stair training with CGA. SBA with WC mobility- 70 ft x2. pt participated in standing LE ther ex this PM. no new goals met at this time. pt would continue to benefit from PT services.   Outcome Measures     Row Name 01/10/23 1200 01/10/23 1106 01/09/23 1015       How much help from another person do you currently need...    Turning from your back to your side while in flat bed without using bedrails? -- 3  -EUGENE 3  -EUGENE    Moving from lying on back to sitting on the side of a flat bed without bedrails? -- 3  -EUGENE 3  -EUGENE    Moving to and from a bed to a chair (including a wheelchair)? -- 3  -EUGENE 3  -EUGENE    Standing up from a chair using your arms (e.g., wheelchair, bedside chair)? -- 3  -EUGENE 3  -EUGENE    Climbing 3-5 steps with a railing? -- 3  -EUGENE 3  -EUGENE    To walk in hospital room? -- 3  -EUGENE 3  -EUGENE    AM-PAC 6 Clicks Score (PT) -- 18  -EUGENE 18  -EUGENE       Functional Assessment    Outcome Measure Options AM-PAC 6 Clicks Basic Mobility (PT)  -EUGENE AM-PAC 6 Clicks Basic Mobility (PT)  -EUGENE AM-PAC 6 Clicks Basic Mobility (PT)  -EUGENE          User Key  (r) = Recorded By, (t) = Taken By, (c) = Cosigned By    Initials Name Provider Type    EUGENE Yossi Haynes, REGINE Physical Therapist Assistant                 Time Calculation:    PT Charges     Row Name 01/10/23 1356 01/10/23 1241          Time Calculation    Start Time 1335  -EUGENE 1106  -EUGENE     Stop Time 1405  -EUGENE 1154  -EUGENE     Time Calculation (min) 30 min  -EUGENE 48 min  -EUGENE        Time Calculation- PT    Total Timed Code  Minutes- PT 30 minute(s)  -EUGENE 48 minute(s)  -EUGENE        Timed Charges    91066 - PT Therapeutic Exercise Minutes 30  -EUGENE --     31224 - Gait Training Minutes  -- 15  -EUGENE     85258 - PT Therapeutic Activity Minutes -- 18  -EUGENE     02654 - Wheelchair Management Training -- 15  -EUGENE        Total Minutes    Timed Charges Total Minutes 30  -EUGENE 48  -EUGENE      Total Minutes 30  -EUGENE 48  -EUGENE           User Key  (r) = Recorded By, (t) = Taken By, (c) = Cosigned By    Initials Name Provider Type    EUGENE Yossi Haynes PTA Physical Therapist Assistant              Therapy Charges for Today     Code Description Service Date Service Provider Modifiers Qty    35520852844 HC PT THER PROC EA 15 MIN 1/9/2023 Yossi Haynes, PTA GP 2    92190114431 HC GAIT TRAINING EA 15 MIN 1/9/2023 Yossi Haynes, PTA GP 2    44397931954 HC PT THERAPEUTIC ACT EA 15 MIN 1/9/2023 Yossi Haynes, PTA GP 2    31292648731 HC GAIT TRAINING EA 15 MIN 1/10/2023 Yossi Haynes, PTA GP 1    89776442888 HC PT THERAPEUTIC ACT EA 15 MIN 1/10/2023 Yossi Haynes, PTA GP 1    66674558241 HC PT WHEELCHAIR MGMT EA 15 MIN 1/10/2023 Yossi Haynes, PTA GP 1    39259429983 HC PT THER PROC EA 15 MIN 1/10/2023 Yossi Haynes, PTA GP 2          PT G-Codes  Outcome Measure Options: AM-PAC 6 Clicks Basic Mobility (PT)  AM-PAC 6 Clicks Score (PT): 18  AM-PAC 6 Clicks Score (OT): 19  Modified [place] Scale: 2 - Slight disability.  Unable to carry out all previous activities but able to look after own affairs without assistance.    Yossi Haynes PTA  1/10/2023

## 2023-01-11 PROCEDURE — 97530 THERAPEUTIC ACTIVITIES: CPT

## 2023-01-11 PROCEDURE — 63710000001 PREDNISONE PER 5 MG: Performed by: ORTHOPAEDIC SURGERY

## 2023-01-11 PROCEDURE — 97116 GAIT TRAINING THERAPY: CPT

## 2023-01-11 PROCEDURE — 99231 SBSQ HOSP IP/OBS SF/LOW 25: CPT | Performed by: ORTHOPAEDIC SURGERY

## 2023-01-11 PROCEDURE — 92507 TX SP LANG VOICE COMM INDIV: CPT | Performed by: SPEECH-LANGUAGE PATHOLOGIST

## 2023-01-11 PROCEDURE — 97110 THERAPEUTIC EXERCISES: CPT

## 2023-01-11 PROCEDURE — 97535 SELF CARE MNGMENT TRAINING: CPT

## 2023-01-11 PROCEDURE — 97542 WHEELCHAIR MNGMENT TRAINING: CPT

## 2023-01-11 RX ADMIN — Medication 1000 UNITS: at 08:22

## 2023-01-11 RX ADMIN — MELATONIN 6 MG: 3 TAB ORAL at 20:37

## 2023-01-11 RX ADMIN — DILTIAZEM HYDROCHLORIDE 180 MG: 180 CAPSULE, COATED, EXTENDED RELEASE ORAL at 08:21

## 2023-01-11 RX ADMIN — DOCUSATE SODIUM 100 MG: 100 CAPSULE, LIQUID FILLED ORAL at 08:22

## 2023-01-11 RX ADMIN — ACETAMINOPHEN 650 MG: 325 TABLET, FILM COATED ORAL at 20:37

## 2023-01-11 RX ADMIN — MONTELUKAST SODIUM 10 MG: 10 TABLET, COATED ORAL at 20:37

## 2023-01-11 RX ADMIN — TIZANIDINE 2 MG: 2 TABLET ORAL at 20:37

## 2023-01-11 RX ADMIN — DOCUSATE SODIUM 100 MG: 100 CAPSULE, LIQUID FILLED ORAL at 20:37

## 2023-01-11 RX ADMIN — PREDNISONE 5 MG: 5 TABLET ORAL at 08:22

## 2023-01-11 NOTE — PLAN OF CARE
Goal Outcome Evaluation:  Plan of Care Reviewed With: patient        Progress: improving  Outcome Evaluation: Pt has slept most of night.  He has gotten up to use urinal at bedside and also bedside commode with minimal  assist of one.  Patient has been pleasant and cooperative.  He is compliant with medications.  VSS.

## 2023-01-11 NOTE — THERAPY TREATMENT NOTE
Acute Care - Physical Therapy Treatment Note  [facility]     Patient Name: Brian Garcia  : 1950  MRN: [id number]  Today's Date: 2023      Visit Dx:     ICD-10-CM ICD-9-CM   1. Dysphagia, unspecified type  R13.10 787.20   2. Symbolic dysfunction  R48.9 784.60   3. Impaired mobility and ADLs  Z74.09 V49.89    Z78.9    4. Impaired functional mobility, balance, gait, and endurance  Z74.09 V49.89     Patient Active Problem List   Diagnosis   • Physical deconditioning   • Pulmonary nodules   • Leukocytosis   • History of pulmonary embolism   • Stage 4 very severe COPD by GOLD classification ([facility])   • Chronic respiratory failure with hypoxia, on home O2 therapy ([facility])   • Hypertension   • CVA (cerebral vascular accident) ([facility])   • Traumatic hemorrhage of cerebrum     Past Medical History:   Diagnosis Date   • COPD (chronic obstructive pulmonary disease) ([facility])    • Hypertension      Past Surgical History:   Procedure Laterality Date   • HIP ARTHROPLASTY       PT Assessment (last 12 hours)     PT Evaluation and Treatment    No documentation.                 Physical Therapy Education     Title: PT OT SLP Therapies (In Progress)     Topic: Physical Therapy (In Progress)     Point: Mobility training (In Progress)     Learning Progress Summary           Patient Acceptance, E, NR by EUGENE at 2023 1532    Acceptance, E, NR by EUGENE at 1/10/2023 1241    Acceptance, E, NR by EUGENE at 2023 1243    Acceptance, E, NR by EGUENE at 2023 1112    Acceptance, E, NR by EUGENE at 1/3/2023 1206    Acceptance, E, NR by EUGENE at 2023 1219    Acceptance, E, NR by 1 at 2022 1345                   Point: Home exercise program (Not Started)     Learner Progress:  Not documented in this visit.          Point: Body mechanics (In Progress)     Learning Progress Summary           Patient Acceptance, E, NR by 1 at 2022 1345                   Point: Precautions (In Progress)     Learning Progress Summary            Patient Acceptance, E, NR by [facility] at 12/31/2022 1345                               User Key     Initials Effective Dates Name Provider Type Discipline    [facility] 06/16/21 -  Nydia Muñoz, PT Physical Therapist PT     06/16/21 -  Yossi Haynes, PTA Physical Therapist Assistant PT              PT Recommendation and Plan     Plan of Care Reviewed With: patient  Progress: improving  Outcome Evaluation: PM tx completed. pt t/f to bed w/ CGA and particiapted in standing LE ther ex. pt left sitting EOB with caregiver. nsg aware. no new goals met at this time. pt would continue to benefit from PT services.   Outcome Measures     Row Name 01/11/23 1446 01/11/23 1059 01/10/23 1200       How much help from another person do you currently need...    Turning from your back to your side while in flat bed without using bedrails? 3  -EUGENE 3  -EUGENE --    Moving from lying on back to sitting on the side of a flat bed without bedrails? 3  -EUGENE 3  -EUGENE --    Moving to and from a bed to a chair (including a wheelchair)? 3  -EUGENE 3  -EUGENE --    Standing up from a chair using your arms (e.g., wheelchair, bedside chair)? 3  -EUGENE 3  -EUGENE --    Climbing 3-5 steps with a railing? 3  -EUGENE 3  -EUGENE --    To walk in hospital room? 3  -EUGENE 3  -EUGENE --    AM-PAC 6 Clicks Score (PT) 18  -EUGENE 18  -EUGENE --       Functional Assessment    Outcome Measure Options AM-PAC 6 Clicks Basic Mobility (PT)  -EUGENE AM-PAC 6 Clicks Basic Mobility (PT)  -EUGENE AM-PAC 6 Clicks Basic Mobility (PT)  -EUGENE    Row Name 01/10/23 1106 01/09/23 1015          How much help from another person do you currently need...    Turning from your back to your side while in flat bed without using bedrails? 3  -EUGENE 3  -EUGENE     Moving from lying on back to sitting on the side of a flat bed without bedrails? 3  -EUGENE 3  -EUGENE     Moving to and from a bed to a chair (including a wheelchair)? 3  -EUGENE 3  -EUGENE     Standing up from a chair using your arms (e.g., wheelchair, bedside chair)? 3  -EUGENE 3  -EUGENE     Climbing 3-5  steps with a railing? 3  -EUGENE 3  -EUGENE     To walk in hospital room? 3  -EUGENE 3  -EUGENE     AM-PAC 6 Clicks Score (PT) 18  -EUGENE 18  -EUGENE        Functional Assessment    Outcome Measure Options AM-PAC 6 Clicks Basic Mobility (PT)  -EUGENE AM-PAC 6 Clicks Basic Mobility (PT)  -EUGENE           User Key  (r) = Recorded By, (t) = Taken By, (c) = Cosigned By    Initials Name Provider Type    Yossi Ragland PTA Physical Therapist Assistant                 Time Calculation:    PT Charges     Row Name 01/11/23 1534 01/11/23 1310          Time Calculation    Start Time 1446  - 1059  -     Stop Time 1511  - 1212  -     Time Calculation (min) 25 min  -EUGENE 73 min  -EUGENE        Time Calculation- PT    Total Timed Code Minutes- PT 25 minute(s)  -EUGENE 73 minute(s)  -EUGENE        Timed Charges    97363 - PT Therapeutic Exercise Minutes 15  -EUGENE --     70918 - Gait Training Minutes  -- 15  -     94489 - PT Therapeutic Activity Minutes 10  -EUGENE 28  -EUGENE     90625 - PT Self Care/Mgmt Minutes -- 15  -EUGENE     37135 - Wheelchair Management Training -- 15  -EUGENE        Total Minutes    Timed Charges Total Minutes 25  -EUGENE 73  -EUGENE      Total Minutes 25  -EUGENE 73  -EUGENE           User Key  (r) = Recorded By, (t) = Taken By, (c) = Cosigned By    Initials Name Provider Type    Yossi Ragland PTA Physical Therapist Assistant              Therapy Charges for Today     Code Description Service Date Service Provider Modifiers Qty    32123427175 HC GAIT TRAINING EA 15 MIN 1/10/2023 Yossi Haynes, PTA GP 1    39244366523 HC PT THERAPEUTIC ACT EA 15 MIN 1/10/2023 Yossi Haynes, PTA GP 1    51367379299 HC PT WHEELCHAIR MGMT EA 15 MIN 1/10/2023 Yossi Haynes, PTA GP 1    16391196689 HC PT THER PROC EA 15 MIN 1/10/2023 Yossi Haynes, PTA GP 2    46785465241 HC PT THER SUPP EA 15 MIN 1/10/2023 Yossi Haynes, PTA GP 1    17990046459 HC GAIT TRAINING EA 15 MIN 1/11/2023 Yossi Haynes, PTA GP 1    59703921147 HC PT THERAPEUTIC ACT EA 15 MIN 1/11/2023  Yossi Haynes, PTA GP 2    21508066058 HC PT SELF CARE/MGMT/TRAIN EA 15 MIN 1/11/2023 Yossi Haynes, PTA GP 1    70997203617 HC PT WHEELCHAIR MGMT EA 15 MIN 1/11/2023 Yossi Haynes, PTA GP 1    65703869465 HC PT THER PROC EA 15 MIN 1/11/2023 Yossi Haynes, PTA GP 1    38369006970 HC PT THERAPEUTIC ACT EA 15 MIN 1/11/2023 Yossi Haynes, PTA GP 1          PT G-Codes  Outcome Measure Options: AM-PAC 6 Clicks Basic Mobility (PT)  AM-PAC 6 Clicks Score (PT): 18  AM-PAC 6 Clicks Score (OT): 19  Modified [place] Scale: 2 - Slight disability.  Unable to carry out all previous activities but able to look after own affairs without assistance.    Yossi Haynes PTA  1/11/2023

## 2023-01-11 NOTE — PLAN OF CARE
Goal Outcome Evaluation:  Plan of Care Reviewed With: patient        Progress: improving  Outcome Evaluation: pt perf well with OT toilet tf with cga sba toilet hygiene with sba some morrow  shower tf with cga and bathing ub lb with sba morrow and dressing ub with ind and lb with min a  demo use of reacher and sock aid withgood perf after ed  also ed on equipment needs and adls to caregiver

## 2023-01-11 NOTE — THERAPY TREATMENT NOTE
Inpatient Rehabilitation - Physical Therapy Treatment Note  [facility]     Patient Name: Brian Garcia  : 1950  MRN: [id number]  Today's Date: 2023      Visit Dx:     ICD-10-CM ICD-9-CM   1. Dysphagia, unspecified type  R13.10 787.20   2. Symbolic dysfunction  R48.9 784.60   3. Impaired mobility and ADLs  Z74.09 V49.89    Z78.9    4. Impaired functional mobility, balance, gait, and endurance  Z74.09 V49.89     Patient Active Problem List   Diagnosis   • Physical deconditioning   • Pulmonary nodules   • Leukocytosis   • History of pulmonary embolism   • Stage 4 very severe COPD by GOLD classification ([facility])   • Chronic respiratory failure with hypoxia, on home O2 therapy ([facility])   • Hypertension   • CVA (cerebral vascular accident) ([facility])   • Traumatic hemorrhage of cerebrum     Past Medical History:   Diagnosis Date   • COPD (chronic obstructive pulmonary disease) ([facility])    • Hypertension      Past Surgical History:   Procedure Laterality Date   • HIP ARTHROPLASTY       PT Assessment (last 12 hours)     PT Evaluation and Treatment    No documentation.                 Physical Therapy Education     Title: PT OT SLP Therapies (In Progress)     Topic: Physical Therapy (In Progress)     Point: Mobility training (In Progress)     Learning Progress Summary           Patient Acceptance, E, NR by EUGENE at 1/10/2023 1241    Acceptance, E, NR by EUGENE at 2023 1243    Acceptance, E, NR by EUGENE at 2023 1112    Acceptance, E, NR by EUGENE at 1/3/2023 1206    Acceptance, E, NR by EUGENE at 2023 1219    Acceptance, E, NR by 1 at 2022 1345                   Point: Home exercise program (Not Started)     Learner Progress:  Not documented in this visit.          Point: Body mechanics (In Progress)     Learning Progress Summary           Patient Acceptance, E, NR by [email or web address] at 2022 1345                   Point: Precautions (In Progress)     Learning Progress Summary           Patient Acceptance, E, NR by  1 at 12/31/2022 1345                               User Key     Initials Effective Dates Name Provider Type Discipline    [facility] 06/16/21 -  Nydia Muñoz, PT Physical Therapist PT     06/16/21 -  Yossi Haynes PTA Physical Therapist Assistant PT              PT Recommendation and Plan     Plan of Care Reviewed With: patient  Progress: improving  Outcome Evaluation: pt responded well to PT. pt caregiver was present and participated in caregiver training. pts caregiver assisted with all mobility tasks. pt requires CGA for gait training- 140 ft w/ RW. pt requries SBA for WC mobility, 140 ft. pt participated in stair training with CGA. pt demonstrated indep wit sup-sit-sup and CGA for t/fs. when fatigued pt seems more easily confused and requires frequent cueing and repetition. pt has met 2 new goals and partially met 1 other. pt would continue to benefit from PT services.   Outcome Measures     Row Name 01/11/23 1059 01/10/23 1200 01/10/23 1106       How much help from another person do you currently need...    Turning from your back to your side while in flat bed without using bedrails? 3  -EUGENE -- 3  -EUGENE    Moving from lying on back to sitting on the side of a flat bed without bedrails? 3  -EUGENE -- 3  -EUGENE    Moving to and from a bed to a chair (including a wheelchair)? 3  -EUGENE -- 3  -EUGENE    Standing up from a chair using your arms (e.g., wheelchair, bedside chair)? 3  -EUGENE -- 3  -EUGENE    Climbing 3-5 steps with a railing? 3  -EUGENE -- 3  -EUGENE    To walk in hospital room? 3  -EUGENE -- 3  -EUGENE    AM-PAC 6 Clicks Score (PT) 18  -EUGENE -- 18  -EUGENE       Functional Assessment    Outcome Measure Options AM-PAC 6 Clicks Basic Mobility (PT)  -EUGENE AM-PAC 6 Clicks Basic Mobility (PT)  -EUGENE AM-PAC 6 Clicks Basic Mobility (PT)  -EUGENE    Row Name 01/09/23 1015             How much help from another person do you currently need...    Turning from your back to your side while in flat bed without using bedrails? 3  -EUGENE      Moving from lying on back  to sitting on the side of a flat bed without bedrails? 3  -EUGENE      Moving to and from a bed to a chair (including a wheelchair)? 3  -EUGENE      Standing up from a chair using your arms (e.g., wheelchair, bedside chair)? 3  -EUGENE      Climbing 3-5 steps with a railing? 3  -EUGENE      To walk in hospital room? 3  -EUGENE      AM-PAC 6 Clicks Score (PT) 18  -EUGENE         Functional Assessment    Outcome Measure Options AM-PAC 6 Clicks Basic Mobility (PT)  -EUGENE            User Key  (r) = Recorded By, (t) = Taken By, (c) = Cosigned By    Initials Name Provider Type    Yossi Ragland PTA Physical Therapist Assistant                 Time Calculation:    PT Charges     Row Name 01/11/23 1310             Time Calculation    Start Time 1059  -EUGENE      Stop Time 1212  -EUGENE      Time Calculation (min) 73 min  -EUGENE         Time Calculation- PT    Total Timed Code Minutes- PT 73 minute(s)  -EUGENE         Timed Charges    98602 - Gait Training Minutes  15  -      74586 - PT Therapeutic Activity Minutes 28  -      11528 - PT Self Care/Mgmt Minutes 15  -      02956 - Wheelchair Management Training 15  -EUGENE         Total Minutes    Timed Charges Total Minutes 73  -EUGENE       Total Minutes 73  -EUGENE            User Key  (r) = Recorded By, (t) = Taken By, (c) = Cosigned By    Initials Name Provider Type    Yossi Ragland PTA Physical Therapist Assistant              Therapy Charges for Today     Code Description Service Date Service Provider Modifiers Qty    15400528165 HC GAIT TRAINING EA 15 MIN 1/10/2023 Yossi Haynes, PTA GP 1    62430856144 HC PT THERAPEUTIC ACT EA 15 MIN 1/10/2023 Yossi Haynes, PTA GP 1    34217931538 HC PT WHEELCHAIR MGMT EA 15 MIN 1/10/2023 Yossi Haynes, PTA GP 1    84056224240 HC PT THER PROC EA 15 MIN 1/10/2023 Yossi Haynes, PTA GP 2    14827558033 HC PT THER SUPP EA 15 MIN 1/10/2023 Yossi Haynes, PTA GP 1    34616746201 HC GAIT TRAINING EA 15 MIN 1/11/2023 Yossi Haynes, PTA GP 1    94398067639  HC PT THERAPEUTIC ACT EA 15 MIN 1/11/2023 Yossi Haynes, PTA GP 2    50010410514 HC PT SELF CARE/MGMT/TRAIN EA 15 MIN 1/11/2023 Yossi Haynes, PTA GP 1    85802019794 HC PT WHEELCHAIR MGMT EA 15 MIN 1/11/2023 Yossi Haynes, PTA GP 1          PT G-Codes  Outcome Measure Options: AM-PAC 6 Clicks Basic Mobility (PT)  AM-PAC 6 Clicks Score (PT): 18  AM-PAC 6 Clicks Score (OT): 19  Modified Dk Scale: 2 - Slight disability.  Unable to carry out all previous activities but able to look after own affairs without assistance.    Yossi Haynes PTA  1/11/2023

## 2023-01-11 NOTE — PROGRESS NOTES
Progress Note      Admit date: 12/30/2022 12:28 PM `      Treatment Team     Provider Relationship Specialty Contact    Fan Rob MD Attending Rehabilitation   [phone]      Skylar Sanches, RRT Respiratory Therapist Respiratory Therapy   [phone]      Yossi Haynes, PTA Physical Therapy Assistant Physical Therapy --    Aleja Tripp  -- --    Aldair Miller MD Consulting Physician Pulmonary Disease   [phone]      Latesha Ram MS CCC-SLP Speech Language Pathologist Speech Pathology   [phone]      Elayne Cohn LPN Licensed Practical Nurse -- --    Holly Sewell COTA Occupational Therapy Assistant Occupational Therapy --    Maya Villavicencio RN Registered Nurse -- --    Tarsha Hernandez RN Registered Nurse Behavioral Health --    Radha Pierre RN Registered Nurse -- --    Manju Patterson LSW  -- --             Chief Complaint:  Traumatic hemorrhage of cerebrum    HPI: No changes since admission    Vital Signs  Temp:  [98.6 °F (37 °C)] 98.6 °F (37 °C)  Heart Rate:  [104-111] 111  Resp:  [20] 20  BP: (143-151)/(72-73) 143/73    Physical Exam 1:    Constitutional: She is alert up in bed up in his chair his significant other is with him she is going to work with him today see what she can and cannot do we need to do for home assistance     HENT:      Eyes:     Neck:      Cardiovascular: No chest pain    Pulmonary: Difficulty breathing on oxygen    Abdominal:      Genitourinary/Anorectal:       Musculoskeletal:     Skin:     Neurological: No progressive neurological changes or focal changes    Psychiatric/Behavioral: Patient is becoming more cooperative physical therapy       Results Review:    I reviewed the patient's new clinical results.    Nursing notes and therapy notes have been reviewed.    I have reviewed medications.    Assessment/Plan  Principal Problem:    Traumatic hemorrhage of cerebrum stable  Active Problems:    CVA  (cerebral vascular accident) ([facility])    Physical deconditioning proving    overview: - PT/OT Consulted. Recommendations: home with home health.      - Patient may benefit from an assisted living.    Stage 4 very severe COPD by GOLD classification ([facility]) stable    Hypertension stable chronic respiratory failure with hypoxia, on home O2 therapy ([facility]) stable           Fan Rob MD  01/11/23  09:06 CST          This document has been electronically signed by Fan Rob MD on January 11, 2023 09:06 CST      Part of this note may be an electronic transcription/translation of spoken language to printed text using the Dragon Dictation System.

## 2023-01-11 NOTE — PLAN OF CARE
Goal Outcome Evaluation:  Plan of Care Reviewed With: patient, significant other        Progress: improving  Outcome Evaluation: Pt seen for skilled ST services this date w/SO at bedside.  SLP provided family education to pt's SO on POC, progress, goals for d/c, recommendations and suggestions for continued cognitive exercises once home, and deficits areas to expect/watch for at home and how they may present.  Pt's SO was very appreciative of the information and verbalized understanding.  Pt continues to present w/slow speed of processing and requires increased time for completion of both structured and functional daily tasks.

## 2023-01-11 NOTE — THERAPY TREATMENT NOTE
Inpatient Rehabilitation - Speech Language Pathology Treatment Note  [facility]     Patient Name: Brian Garcia  : 1950  MRN: [id number]  Today's Date: 2023               Admit Date: 2022     Pt seen for skilled ST services this date w/SO at bedside.  SLP provided family education to pt's SO on POC, progress, goals for d/c, recommendations and suggestions for continued cognitive exercises once home, and deficits areas to expect/watch for at home and how they may present.  Pt's SO was very appreciative of the information and verbalized understanding.  Pt continues to present w/slow speed of processing and requires increased time for completion of both structured and functional daily tasks.    Goals:  1.  Pt will complete deductive reasoning tasks w/90% acc:  Pt was 80% accuracy with mathematical deductive reasoning questions pertaining to everyday information (freee license information).  Pt completed word grid w/90% acc.  Pt completed abstract categorical naming task w/80% acc.       Hearing, Speech and Vision Admission Goal  Date  1/3/23 Date 23\ Date  23 Date 23    Date 01/10/23\ 23   Discharge   Expression of Ideas and Wants (consider both verbal and non-verbal expression and excluding language barriers) 4 4  3  3  4  4  4   4       Understanding Verbal and Non-Verbal Content (with hearing aid or device, if used, and excluding language barriers) 4 4 3  3  3  3  4  4                    Visit Dx:    ICD-10-CM ICD-9-CM   1. Dysphagia, unspecified type  R13.10 787.20   2. Symbolic dysfunction  R48.9 784.60   3. Impaired mobility and ADLs  Z74.09 V49.89    Z78.9    4. Impaired functional mobility, balance, gait, and endurance  Z74.09 V49.89     Patient Active Problem List   Diagnosis   • Physical deconditioning   • Pulmonary nodules   • Leukocytosis   • History of pulmonary embolism   • Stage 4 very severe COPD by GOLD classification ([facility])   • Chronic  respiratory failure with hypoxia, on home O2 therapy ([facility])   • Hypertension   • CVA (cerebral vascular accident) ([facility])   • Traumatic hemorrhage of cerebrum     Past Medical History:   Diagnosis Date   • COPD (chronic obstructive pulmonary disease) ([facility])    • Hypertension      Past Surgical History:   Procedure Laterality Date   • HIP ARTHROPLASTY         SLP Recommendation and Plan                 Anticipated Discharge Disposition (SLP): home with assist (01/11/23 1307)        Predicted Duration Therapy Intervention (Days): until discharge (01/11/23 1307)     Daily Summary of Progress (SLP): progress toward functional goals is good (01/11/23 1307)           Treatment Assessment (SLP): improved, cognitive-linguistic disorder (01/11/23 1307)  Treatment Assessment Comments (SLP): Pt seen for skilled ST services this date w/SO at bedside.  SLP provided family education to pt's SO on POC, progress, goals for d/c, recommendations and suggestions for continued cognitive exercises once home, and deficits areas to expect/watch for at home and how they may present.  Pt's SO was very appreciative of the information and verbalized understanding.  Pt continues to present w/slow speed of processing and requires increased time for completion of both structured and functional daily tasks. (01/11/23 1307)  Plan for Continued Treatment (SLP): continue treatment per plan of care (01/11/23 1307)  Plan of Care Reviewed With: patient, significant other (01/11/23 1416)  Progress: improving (01/11/23 1416)  Outcome Evaluation: Pt seen for skilled ST services this date w/SO at bedside.  SLP provided family education to pt's SO on POC, progress, goals for d/c, recommendations and suggestions for continued cognitive exercises once home, and deficits areas to expect/watch for at home and how they may present.  Pt's SO was very appreciative of the information and verbalized understanding.  Pt continues to present w/slow speed of processing and  requires increased time for completion of both structured and functional daily tasks. (01/11/23 9637)      SLP EVALUATION (last 72 hours)     SLP SLC Evaluation     Row Name 01/11/23 1307 01/10/23 1002 01/09/23 1300             Communication Assessment/Intervention    Document Type therapy note (daily note)  -CK therapy note (daily note)  -CK therapy note (daily note)  -CK      Subjective Information no complaints  -CK no complaints  -CK no complaints  -CK      Patient Observations alert;cooperative;agree to therapy  -CK alert;cooperative;agree to therapy  -CK alert;cooperative;agree to therapy  -CK      Patient/Family/Caregiver Comments/Observations Pt's SO at bedside  -CK No family present at bedside  -CK No family present at bedside  -CK      Patient Effort good  -CK good  -CK good  -CK         General Information    Patient Profile Reviewed yes  -CK yes  -CK yes  -CK      Precautions/Limitations, Vision corrective lenses needed for reading  -CK corrective lenses needed for reading  -CK corrective lenses needed for reading  -CK      Precautions/Limitations, Hearing hearing impairment, bilaterally  -CK hearing impairment, bilaterally  -CK hearing impairment, bilaterally  -CK      Plans/Goals Discussed with patient  -CK patient  -CK patient  -CK         Pain Scale: Numbers Pre/Post-Treatment    Pretreatment Pain Rating 0/10 - no pain  -CK 0/10 - no pain  -CK 0/10 - no pain  -CK      Posttreatment Pain Rating 0/10 - no pain  -CK 0/10 - no pain  -CK 0/10 - no pain  -CK         SLP Treatment Clinical Impressions    Treatment Assessment (SLP) improved;cognitive-linguistic disorder  -CK -- improved;cognitive-linguistic disorder  -CK      Treatment Assessment Comments (SLP) Pt seen for skilled ST services this date w/SO at bedside.  SLP provided family education to pt's SO on POC, progress, goals for d/c, recommendations and suggestions for continued cognitive exercises once home, and deficits areas to expect/watch for  at home and how they may present.  Pt's SO was very appreciative of the information and verbalized understanding.  Pt continues to present w/slow speed of processing and requires increased time for completion of both structured and functional daily tasks.  -CK -- Pt seen for skilled ST services this date to address cognitive-linguistic defcits.  Pt was seen in room sitting in w/c at bedside.  Pt participated well in all therapy tasks, but was unable to complete sequencing task.  Pt struggled w/deductive reasoning for math related questions pertaining to functional information; however, made attempts to answer all questions.  Pt continues to require a repetition of instructions and re-wording even in functional situations.  Pt often times needs to repeat and clarify steps/directions for functional tasks such as sequencing steps to getting to and using the bathroom and t/f from w/c to toilet.  Pt also demonstrates poor initiation.  No goals met this date; Continue POC.  -CK      Daily Summary of Progress (SLP) progress toward functional goals is good  -CK -- progress toward functional goals is good  -CK      Barriers to Overall Progress (SLP) -- -- Cognitive status  -CK      Plan for Continued Treatment (SLP) continue treatment per plan of care  -CK -- continue treatment per plan of care  -CK      Care Plan Review evaluation/treatment results reviewed;care plan/treatment goals reviewed;risks/benefits reviewed;current/potential barriers reviewed;patient/other agree to care plan  -CK -- evaluation/treatment results reviewed;care plan/treatment goals reviewed;current/potential barriers reviewed;patient/other agree to care plan  -CK      Care Plan Review, Other Participant(s) significant other  -CK -- --         Recommendations    Therapy Frequency (SLP SLC) 5 days per week  -CK 5 days per week  -CK 5 days per week  -CK      Predicted Duration Therapy Intervention (Days) until discharge  -CK until discharge  -CK until  discharge  -CK      Anticipated Discharge Disposition (SLP) home with assist  -CK home with assist  -CK home with assist  -CK         Reasoning Goal 1 (SLP)    Improve Reasoning Through Goal 1 (SLP) complete deductive reasoning task;90%;independently (over 90% accuracy)  -CK complete deductive reasoning task;90%;independently (over 90% accuracy)  -CK complete deductive reasoning task;90%;independently (over 90% accuracy)  -CK      Time Frame (Reasoning Goal 1, SLP) by discharge  -CK by discharge  -CK by discharge  -CK      Progress (Reasoning Goal 1, SLP) 80%  -CK 60%;80%  -CK 40%;70%  -CK      Progress/Outcomes (Reasoning Goal 1, SLP) goal ongoing  -CK goal ongoing  -CK goal ongoing  -CK      Comment (Reasoning Goal 1, SLP) -- -- --  -CK         Functional Problem Solving Skills Goal 1 (SLP)    Improve Problem Solving Through Goal 1 (SLP) --  -CK answer questions about ADL problems;90%;independently (over 90% accuracy)  -CK answer questions about ADL problems;90%;independently (over 90% accuracy)  -CK      Time Frame (Problem Solving Goal 1, SLP) --  -CK by discharge  -CK by discharge  -CK      Progress (Problem Solving Goal 1, SLP) --  -%  -CK 90%  -CK      Progress/Outcomes (Problem Solving Goal 1, SLP) --  -CK goal met   -CK goal ongoing  -CK            User Key  (r) = Recorded By, (t) = Taken By, (c) = Cosigned By    Initials Name Effective Dates    CK Latesha Ram, MS CCC-SLP 06/16/21 -                    EDUCATION  The patient has been educated in the following areas:     Cognitive Impairment Communication Impairment.           SLP GOALS     Row Name 01/11/23 1307 01/10/23 1002 01/09/23 1300       Reasoning Goal 1 (SLP)    Improve Reasoning Through Goal 1 (SLP) complete deductive reasoning task;90%;independently (over 90% accuracy)  -CK complete deductive reasoning task;90%;independently (over 90% accuracy)  -CK complete deductive reasoning task;90%;independently (over 90% accuracy)  -CK    Time  Frame (Reasoning Goal 1, SLP) by discharge  -CK by discharge  -CK by discharge  -CK    Progress (Reasoning Goal 1, SLP) 80%  -CK 60%;80%  -CK 40%;70%  -CK    Progress/Outcomes (Reasoning Goal 1, SLP) goal ongoing  -CK goal ongoing  -CK goal ongoing  -CK    Comment (Reasoning Goal 1, SLP) -- -- --  -CK       Functional Problem Solving Skills Goal 1 (SLP)    Improve Problem Solving Through Goal 1 (SLP) --  -CK answer questions about ADL problems;90%;independently (over 90% accuracy)  -CK answer questions about ADL problems;90%;independently (over 90% accuracy)  -CK    Time Frame (Problem Solving Goal 1, SLP) --  -CK by discharge  -CK by discharge  -CK    Progress (Problem Solving Goal 1, SLP) --  -%  -CK 90%  -CK    Progress/Outcomes (Problem Solving Goal 1, SLP) --  -CK goal met   -CK goal ongoing  -CK          User Key  (r) = Recorded By, (t) = Taken By, (c) = Cosigned By    Initials Name Provider Type    Latesha Andrews MS CCC-SLP Speech and Language Pathologist                        Time Calculation:      Time Calculation- SLP     Row Name 01/11/23 1412             Time Calculation- SLP    SLP Start Time 1307  -CK      SLP Stop Time 1403  -CK      SLP Time Calculation (min) 56 min  -CK      Total Timed Code Minutes- SLP 56 minute(s)  -CK      SLP Received On 01/11/23  -CK      SLP Goal Re-Cert Due Date 01/13/23  -CK         Untimed Charges    49019-MI Treatment/ST Modification Prosth Aug Alter  56  -CK         Total Minutes    Untimed Charges Total Minutes 56  -CK       Total Minutes 56  -CK            User Key  (r) = Recorded By, (t) = Taken By, (c) = Cosigned By    Initials Name Provider Type    Latesha Andrews MS CCC-SLP Speech and Language Pathologist                Therapy Charges for Today     Code Description Service Date Service Provider Modifiers Qty    57437846711 HC ST TREATMENT SPEECH 4 1/10/2023 Latesha Ram MS CCC-SLP GN 1    47277908974 HC ST TREATMENT SPEECH 4 1/11/2023  Leanna, Latesha ABRAHAM, MS CCC-SLP GN 1                     Ltaesha Ram, MS IVEY-SLP  1/11/2023

## 2023-01-11 NOTE — PLAN OF CARE
Goal Outcome Evaluation:  Plan of Care Reviewed With: patient     Problem: Rehabilitation ([facility]) Plan of Care  Goal: Plan of Care Review  Outcome: Ongoing, Progressing  Flowsheets (Taken 1/11/2023 1304)  Progress: improving  Plan of Care Reviewed With: patient  Outcome Evaluation: pt responded well to PT. pts caregiver was present and participated in caregiver training. pts caregiver assisted with all mobility tasks. pt requires CGA for gait training- 140 ft w/ RW. pt requries SBA for WC mobility, 140 ft. pt participated in stair training with CGA. pt demonstrated indep wit sup-sit-sup and CGA for t/fs. when fatigued pt seems more easily confused and requires frequent cueing and repetition. pt has met 2 new goals and partially met 1 other. pt would continue to benefit from PT services.

## 2023-01-11 NOTE — PLAN OF CARE
Goal Outcome Evaluation:  Plan of Care Reviewed With: patient     Problem: Rehabilitation ([facility]) Plan of Care  Goal: Plan of Care Review  1/11/2023 1532 by Yossi Haynes, PTA  Outcome: Ongoing, Progressing  Flowsheets (Taken 1/11/2023 1532)  Progress: improving  Plan of Care Reviewed With: patient  Outcome Evaluation: PM tx completed. pt t/f to bed w/ CGA and particiapted in standing LE ther ex. pt left sitting EOB with caregiver. nsg aware. no new goals met at this time. pt would continue to benefit from PT services.

## 2023-01-11 NOTE — THERAPY TREATMENT NOTE
Inpatient Rehabilitation - Occupational Therapy Treatment Note    [facility]     Patient Name: Brian Garcia  : 1950  MRN: [id number]    Today's Date: 2023                 Admit Date: 2022         ICD-10-CM ICD-9-CM   1. Dysphagia, unspecified type  R13.10 787.20   2. Symbolic dysfunction  R48.9 784.60   3. Impaired mobility and ADLs  Z74.09 V49.89    Z78.9    4. Impaired functional mobility, balance, gait, and endurance  Z74.09 V49.89       Patient Active Problem List   Diagnosis   • Physical deconditioning   • Pulmonary nodules   • Leukocytosis   • History of pulmonary embolism   • Stage 4 very severe COPD by GOLD classification (Formerly [facility])   • Chronic respiratory failure with hypoxia, on home O2 therapy (Formerly [facility])   • Hypertension   • CVA (cerebral vascular accident) (Formerly [facility])   • Traumatic hemorrhage of cerebrum       Past Medical History:   Diagnosis Date   • COPD (chronic obstructive pulmonary disease) (Formerly [facility])    • Hypertension        Past Surgical History:   Procedure Laterality Date   • HIP ARTHROPLASTY               IRF OT ASSESSMENT FLOWSHEET (last 12 hours)     IRF OT Evaluation and Treatment     Row Name 23          OT Time and Intention    Document Type daily treatment  -LM     Mode of Treatment individual therapy;occupational therapy  -LM     Patient Effort good  -LM     Row Name 23          General Information    Patient Profile Reviewed yes  -LM     Existing Precautions/Restrictions fall  -LM     Limitations/Impairments hearing  -LM     Row Name 23          Pain Assessment    Pretreatment Pain Rating 0/10 - no pain  -LM     Posttreatment Pain Rating 0/10 - no pain  -LM     Row Name 23          Cognition/Psychosocial    Orientation Status (Cognition) oriented x 4  -LM     Follows Commands (Cognition) WFL  -LM     Row Name 23          Bathing    [place] Level (Bathing) bathing skills;lower body;upper body;upper  extremities;chest/trunk;distal lower extremities/feet;supervision;set up  -LM     Position (Bathing) supported standing;supported sitting;unsupported standing  -     Row Name 01/11/23 0900          Upper Body Dressing    [place] Level (Upper Body Dressing) upper body dressing skills;doff;don;front opening garment;pull over garment;independent  -     Row Name 01/11/23 0900          Lower Body Dressing    [place] Level (Lower Body Dressing) doff;don;shoes/slippers;socks;pants/bottoms;underwear;minimum assist (75% patient effort)  -     Position (Lower Body Dressing) supported sitting;supported standing  -LM     Row Name 01/11/23 0900          Grooming    [place] Level (Grooming) grooming skills;deodorant application;hair care, combing/brushing;oral care regimen;wash face, hands;set up  -LM     Position (Grooming) sink side;supported sitting  w/c  -     Row Name 01/11/23 0900          Toileting    [place] Level (Toileting) toileting skills;perform perineal hygiene;adjust/manage clothing;contact guard assist  -     Row Name 01/11/23 0900          Bed Mobility    Bed Mobility bed mobility (all) activities  -     Row Name 01/11/23 0900          Functional Mobility    Functional Mobility- Ind. Level contact guard assist;verbal cues required;supervision required  -     Functional Mobility- Device walker, front-wheeled  -     Row Name 01/11/23 0900          Transfer Assessment/Treatment    Transfers sit-stand transfer;stand-sit transfer;shower transfer  -     Row Name 01/11/23 0900          Sit-Stand Transfer    Sit-Stand [place] (Transfers) contact guard  -     Assistive Device (Sit-Stand Transfers) walker, front-wheeled  -LM     Row Name 01/11/23 0900          Stand-Sit Transfer    Stand-Sit [place] (Transfers) contact guard  -     Assistive Device (Stand-Sit Transfers) walker, front-wheeled  -     Row Name 01/11/23 0900          Toilet Transfer    Type (Toilet  Transfer) sit-stand;stand-sit;stand pivot/stand step  -LM     [place] Level (Toilet Transfer) contact guard;standby assist;verbal cues  -LM     Row Name 01/11/23 0900          Shower Transfer    Type (Shower Transfer) sit-stand;stand-sit;stand pivot/stand step  -LM     [place] Level (Shower Transfer) contact guard  -LM     Row Name 01/11/23 0900          Motor Skills    Motor Skills coordination  -LM     Row Name 01/11/23 0900          Positioning and Restraints    Pre-Treatment Position sitting in chair/recliner  -LM     Post Treatment Position chair  -LM     Row Name 01/11/23 0900          Vital Signs    Intratreatment Heart Rate (beats/min) --  120  -LM     Intra SpO2 (%) 96  -LM     Row Name 01/11/23 0900          Therapy Assessment/Plan (OT)    Patient's Goals For Discharge return home  -LM     Row Name 01/11/23 0900          Therapy Assessment/Plan (OT)    Rehab Potential/Prognosis (OT) good, to achieve stated therapy goals  -LM     Frequency of Treatment (OT) other (see comments)  5-6 days/week  -LM     Estimated Duration of Therapy (OT) until facility discharge  -LM     Problem List (OT) balance;coordination;mobility;strength;postural control  -LM     Activity Limitations Related to Problem List (OT) unable to ambulate safely;BADLs not performed adequately or safely;unable to transfer safely;IADLs not performed adequately or safely;community activities not performed adequately or safely;home management activity not performed adequately or safely  -LM     Row Name 01/11/23 0900          Therapy Plan Review/Discharge Plan (OT)    Anticipated Discharge Disposition (OT) home with 24/7 care  -LM     Row Name 01/11/23 0900          Bathing Goal 1 (OT-IRF)    Activity/Device (Bathing Goal 1, OT-IRF) lower body bathing  -LM     [place] Level (Bathing Goal 1, OT-IRF) modified independence  -LM     Time Frame (Bathing Goal 1, OT-IRF) by discharge;long-term goal (LTG)  -LM     Progress/Outcomes (Bathing  Goal 1, OT-IRF) goal not met  -LM     Row Name 01/11/23 0900          LB Dressing Goal 1 (OT-IRF)    Activity/Device (LB Dressing Goal 1, OT-IRF) lower body dressing  -LM     [place] (LB Dressing Goal 1, OT-IRF) modified independence  -LM     Time Frame (LB Dressing Goal 1, OT-IRF) long-term goal (LTG);by discharge  -LM     Progress/Outcomes (LB Dressing Goal 1, OT-IRF) goal not met  -LM     Row Name 01/11/23 0900          Strength Goal 1 (OT-IRF)    Strength Goal 1 (OT-IRF) Pt will increase BUE strength in all planes by one level for increased strength and endurance required for ADL routine.  -LM     Time Frame (Strength Goal 1, OT-IRF) long-term goal (LTG);by discharge  -LM     Progress/Outcomes (Strength Goal 1, OT-IRF) goal not met  -LM     Row Name 01/11/23 0900           Endurance Goal 1 (OT)    Activity Level (Endurance Goal 1, OT) endurance 2 good  -LM     Progress/Outcome (Endurance Goal 1, OT) goal not met  -LM           User Key  (r) = Recorded By, (t) = Taken By, (c) = Cosigned By    Initials Name Effective Dates    LM Holly Sewell, ALEXANDER 06/16/21 -                  Occupational Therapy Education     Title: PT OT SLP Therapies (In Progress)     Topic: Occupational Therapy (In Progress)     Point: ADL training (Done)     Description:   Instruct learner(s) on proper safety adaptation and remediation techniques during self care or transfers.   Instruct in proper use of assistive devices.              Learning Progress Summary           Patient Acceptance, E, VU,NR by  at 1/6/2023 1255    Acceptance, E, VU,NR by RC at 1/5/2023 1517    Acceptance, E,TB, VU by BB at 12/31/2022 1522    Acceptance, E,TB, VU by CM at 12/31/2022 1211    Comment: OT POC, Role of OT, safe ADL mobility and t/f                   Point: Home exercise program (Not Started)     Description:   Instruct learner(s) on appropriate technique for monitoring, assisting and/or progressing therapeutic exercises/activities.               Learner Progress:  Not documented in this visit.          Point: Precautions (Done)     Description:   Instruct learner(s) on prescribed precautions during self-care and functional transfers.              Learning Progress Summary           Patient Acceptance, E, VU,NR by  at 1/6/2023 1255    Acceptance, E, VU,NR by  at 1/5/2023 1517                   Point: Body mechanics (Done)     Description:   Instruct learner(s) on proper positioning and spine alignment during self-care, functional mobility activities and/or exercises.              Learning Progress Summary           Patient Acceptance, E, VU,NR by  at 1/6/2023 1255    Acceptance, E, VU,NR by  at 1/5/2023 1517                               User Key     Initials Effective Dates Name Provider Type Discipline     06/16/21 -  Lisa José COTA Occupational Therapist Assistant OT    RC 06/16/21 -  Annette Carey COTA Occupational Therapist Assistant OT     11/18/22 -  Ghazala Alfonso OT Occupational Therapist OT                    OT Recommendation and Plan         Plan of Care Review  Plan of Care Reviewed With: patient  Progress: improving  Outcome Evaluation: pt perf well with OT toilet tf with cga sba toilet hygiene with sba some morrow  shower tf with cga and bathing ub lb with sba morrow and dressing ub with ind and lb with min a  demo use of reacher and sock aid withgood perf after ed  also ed on equipment needs and adls to caregiver  Plan of Care Reviewed With: patient  Progress: improving       Outcome Measures     Row Name 01/11/23 1059 01/10/23 1200 01/10/23 1106       How much help from another person do you currently need...    Turning from your back to your side while in flat bed without using bedrails? 3  -EUGENE -- 3  -EUGENE    Moving from lying on back to sitting on the side of a flat bed without bedrails? 3  -EUGENE -- 3  -EUGENE    Moving to and from a bed to a chair (including a wheelchair)? 3  -EUGENE -- 3  -EUGENE    Standing up from a chair using  your arms (e.g., wheelchair, bedside chair)? 3  -EUGENE -- 3  -EUGENE    Climbing 3-5 steps with a railing? 3  -EUGENE -- 3  -EUGENE    To walk in hospital room? 3  -EUGENE -- 3  -EUGENE    AM-PAC 6 Clicks Score (PT) 18  -EUGENE -- 18  -EUGENE       Functional Assessment    Outcome Measure Options AM-PAC 6 Clicks Basic Mobility (PT)  -EUGENE AM-PAC 6 Clicks Basic Mobility (PT)  -EUGENE AM-PAC 6 Clicks Basic Mobility (PT)  -EUGENE    Row Name 01/09/23 1015             How much help from another person do you currently need...    Turning from your back to your side while in flat bed without using bedrails? 3  -EUGENE      Moving from lying on back to sitting on the side of a flat bed without bedrails? 3  -EUGENE      Moving to and from a bed to a chair (including a wheelchair)? 3  -EUGENE      Standing up from a chair using your arms (e.g., wheelchair, bedside chair)? 3  -EUGENE      Climbing 3-5 steps with a railing? 3  -EUGENE      To walk in hospital room? 3  -EUGENE      AM-PAC 6 Clicks Score (PT) 18  -EUGENE         Functional Assessment    Outcome Measure Options AM-PAC 6 Clicks Basic Mobility (PT)  -EUGENE            User Key  (r) = Recorded By, (t) = Taken By, (c) = Cosigned By    Initials Name Provider Type    Yossi Ragland PTA Physical Therapist Assistant                  Time Calculation:      Time Calculation- OT     Row Name 01/11/23 1310 01/11/23 0928          Time Calculation- OT    OT Start Time -- 0852  -LM     OT Stop Time -- 1030  -LM     OT Time Calculation (min) -- 98 min  -LM     Total Timed Code Minutes- OT -- 98 minute(s)  -LM     OT Received On -- 01/11/23  -LM        Timed Charges    94829 - Gait Training Minutes  15  -EUGENE --     83323 - OT Self Care/Mgmt Minutes -- 98  -LM        Total Minutes    Timed Charges Total Minutes 15  -EUGENE 98  -LM      Total Minutes 15  -EUGENE 98  -LM           User Key  (r) = Recorded By, (t) = Taken By, (c) = Cosigned By    Initials Name Provider Type    Yossi Ragland PTA Physical Therapist Assistant    Holly Broussard,  ALEXANDER Occupational Therapist Assistant              Therapy Charges for Today     Code Description Service Date Service Provider Modifiers Qty    47966003234 HC OT SELF CARE/MGMT/TRAIN EA 15 MIN 1/10/2023 Holly Sewell, ALEXANDER GO 6    93657927898 HC OT SELF CARE/MGMT/TRAIN EA 15 MIN 1/11/2023 Holly Sewell, ALEXANDER GO 7                   ALEXANDER Fragoso  1/11/2023

## 2023-01-12 PROCEDURE — 92507 TX SP LANG VOICE COMM INDIV: CPT | Performed by: SPEECH-LANGUAGE PATHOLOGIST

## 2023-01-12 PROCEDURE — 99231 SBSQ HOSP IP/OBS SF/LOW 25: CPT | Performed by: ORTHOPAEDIC SURGERY

## 2023-01-12 PROCEDURE — 97530 THERAPEUTIC ACTIVITIES: CPT

## 2023-01-12 PROCEDURE — 97110 THERAPEUTIC EXERCISES: CPT

## 2023-01-12 PROCEDURE — 97116 GAIT TRAINING THERAPY: CPT

## 2023-01-12 RX ADMIN — Medication 1000 UNITS: at 08:11

## 2023-01-12 RX ADMIN — DOCUSATE SODIUM 100 MG: 100 CAPSULE, LIQUID FILLED ORAL at 20:22

## 2023-01-12 RX ADMIN — DILTIAZEM HYDROCHLORIDE 180 MG: 180 CAPSULE, COATED, EXTENDED RELEASE ORAL at 08:12

## 2023-01-12 RX ADMIN — MELATONIN 6 MG: 3 TAB ORAL at 20:22

## 2023-01-12 RX ADMIN — MONTELUKAST SODIUM 10 MG: 10 TABLET, COATED ORAL at 20:22

## 2023-01-12 RX ADMIN — TIZANIDINE 2 MG: 2 TABLET ORAL at 20:22

## 2023-01-12 RX ADMIN — ACETAMINOPHEN 650 MG: 325 TABLET, FILM COATED ORAL at 20:22

## 2023-01-12 NOTE — DISCHARGE PLACEMENT REQUEST
"Debbie Velasco (72 y.o. Male)     Date of Birth   1950    Social Security Number       Address   [address] [place] [address]    Home Phone   [phone]    MRN   [id number]       [facility]    Marital Status                               Admission Date   12/30/22    Admission Type   Urgent    Admitting Provider   Fan Rob MD    Attending Provider   Fan Rob MD    Department, Room/Bed   [facility] ACUTE REHAB, 526/1       Discharge Date       Discharge Disposition       Discharge Destination                               Attending Provider: Fan Rob MD    Allergies: Amoxicillin, Albuterol, Asmanex (120 Metered Doses) [Mometasone Furoate], Lortab [Hydrocodone-acetaminophen], Morphine And Related, Prilosec [Omeprazole], Sodium Clavulanate, Statins, Sulfa Antibiotics, Symbicort [Budesonide-formoterol Fumarate]    Isolation: None   Infection: None   Code Status: CPR    Ht: 165.1 cm (65\")   Wt: 64 kg (141 lb)    Admission Cmt: None   Principal Problem: Traumatic hemorrhage of cerebrum [S06.36AA]                 Active Insurance as of 12/30/2022     Primary Coverage     Payor Plan Insurance Group Employer/Plan Group    HUMA[employer] MEDICARE REPLACEMENT HU[employer]NA MEDICARE REPLACEMENT [id number]     Payor Plan Address Payor Plan Phone Number Payor Plan Fax Number Effective Dates    [address] [address] [phone]  10/1/2019 - None Entered    [facility] [address]       Subscriber Name Subscriber Birth Date Member ID       DEBBIE VELASCO 1950 [id number]           Secondary Coverage     Payor Plan Insurance Group Employer/Plan Group    [facility] [place] [id number]     Payor Plan Address Payor Plan Phone Number Payor Plan Fax Number Effective Dates    [facility] OFFICE OF [facility] CARE [phone]  2/24/2000 - None Entered    [address] [address]       [facility] [address]       Subscriber Name Subscriber Birth Date Member ID    " "   BRIAN VELASCO 1950 [id number]                 Emergency Contacts      (Rel.) Home Phone Work Phone Mobile Phone    Annette Harvey (Significant Other) [phone] -- [phone]            [facility] ACUTE REHAB  [address] [place] [id number]  Dept. Phone:  [phone]  Dept. Fax:   Date Ordered: 2023         Patient:  Brian Velasco MRN:  [id number]   [address] [facility] [address] :  1950  SSN:    Phone: [phone] Sex:  M     Weight: 64 kg (141 lb)         Ht Readings from Last 1 Encounters:   22 165.1 cm (65\")         Walker               (Order ID: [id number])    Diagnosis:  Cerebrovascular accident (CVA) due to other mechanism (HCC) (I63.89 [ICD-10-CM] 434.91 [ICD-9-CM])   Quantity:  1     Equipment:  Walker Folding with Wheels  Length of Need (99 Months = Lifetime): 99 Months = Lifetime        Authorizing Provider's Phone: [phone]  Verbal Order Mode: Verbal with readback   Authorizing Provider: Fan Rob MD  Authorizing Provider's NPI: [id number]     Order Entered By: Hyacinth Duran RN 2023  2:30 PM       "

## 2023-01-12 NOTE — PLAN OF CARE
Goal Outcome Evaluation:  Plan of Care Reviewed With: patient        Progress: improving  Outcome Evaluation: ST: pt continues to improve with cognitive linguistic skills.  He will benefit from assistance upon return home d/t deficits with reasoning that could affect overall safety in the home.

## 2023-01-12 NOTE — THERAPY TREATMENT NOTE
Inpatient Rehabilitation - Speech Language Pathology Treatment Note    [facility]     Patient Name: Brian Garcia  : 1950  MRN: [id number]    Today's Date: 2023                   Admit Date: 2022       Pt continues to present w/slow speed of processing and requires increased time for completion of both structured and functional daily tasks.     Goals:  1.  Pt will complete deductive reasoning tasks w/90% acc:  Pt was 75% accuracy with deductive reasoning task of organizing tasks in order given a written paragraph.  He required mod cues for functional completion of use of the bathroom when he began using urinal and then decided he needed to have BM on the toilet as well.                       Hearing, Speech and Vision Admission Goal  Date  1/3/23 Date 23\ Date  23 Date 23    Date 01/10/23\ 23    Discharge   Expression of Ideas and Wants (consider both verbal and non-verbal expression and excluding language barriers) 4 4  3  3  4  4  4    4        Understanding Verbal and Non-Verbal Content (with hearing aid or device, if used, and excluding language barriers) 4 4 3  3  3  3  4  3           Visit Dx:      ICD-10-CM ICD-9-CM   1. Dysphagia, unspecified type  R13.10 787.20   2. Symbolic dysfunction  R48.9 784.60   3. Impaired mobility and ADLs  Z74.09 V49.89    Z78.9    4. Impaired functional mobility, balance, gait, and endurance  Z74.09 V49.89       Patient Active Problem List   Diagnosis   • Physical deconditioning   • Pulmonary nodules   • Leukocytosis   • History of pulmonary embolism   • Stage 4 very severe COPD by GOLD classification ([facility])   • Chronic respiratory failure with hypoxia, on home O2 therapy ([facility])   • Hypertension   • CVA (cerebral vascular accident) ([facility])   • Traumatic hemorrhage of cerebrum       Past Medical History:   Diagnosis Date   • COPD (chronic obstructive pulmonary disease) ([facility])    • Hypertension        Past Surgical History:    Procedure Laterality Date   • HIP ARTHROPLASTY         SLP Recommendation and Plan                   Anticipated Discharge Disposition (SLP): home with assist (01/12/23 1237)        Predicted Duration Therapy Intervention (Days): until discharge (01/12/23 1237)     Daily Summary of Progress (SLP): progress toward functional goals as expected (01/12/23 1237)        Plan of Care Reviewed With: patient (01/12/23 1335)  Progress: improving (01/12/23 1335)  Outcome Evaluation: ST: pt continues to improve with cognitive linguistic skills.  He will benefit from assistance upon return home d/t deficits with reasoning that could affect overall safety in the home. (01/12/23 1335)     Treatment Assessment (SLP): improved, cognitive-linguistic disorder (01/12/23 1237)  Treatment Assessment Comments (SLP): ST: pt continues to improve with cognitive linguistic skills.  He will benefit from assistance upon return home d/t deficits with reasoning that could affect overall safety in the home. (01/12/23 1237)  Plan for Continued Treatment (SLP): continue treatment per plan of care (01/12/23 1237)    SLP EVALUATION (last 72 hours)     SLP SLC Evaluation     Row Name 01/12/23 1237 01/11/23 1307 01/10/23 1002             Communication Assessment/Intervention    Document Type therapy note (daily note)  -EC therapy note (daily note)  -CK therapy note (daily note)  -CK      Subjective Information no complaints  -EC no complaints  -CK no complaints  -CK      Patient Observations alert;cooperative;agree to therapy  -EC alert;cooperative;agree to therapy  -CK alert;cooperative;agree to therapy  -CK      Patient/Family/Caregiver Comments/Observations -- Pt's SO at bedside  -CK No family present at bedside  -CK      Patient Effort good  -EC good  -CK good  -CK         General Information    Patient Profile Reviewed yes  -EC yes  -CK yes  -CK      Precautions/Limitations, Vision corrective lenses needed for reading  -EC corrective lenses  needed for reading  -CK corrective lenses needed for reading  -CK      Precautions/Limitations, Hearing hearing impairment, bilaterally  -EC hearing impairment, bilaterally  -CK hearing impairment, bilaterally  -CK      Plans/Goals Discussed with patient  -EC patient  -CK patient  -CK         Pain Scale: Numbers Pre/Post-Treatment    Pretreatment Pain Rating 0/10 - no pain  -EC 0/10 - no pain  -CK 0/10 - no pain  -CK      Posttreatment Pain Rating 0/10 - no pain  -EC 0/10 - no pain  -CK 0/10 - no pain  -CK         SLP Treatment Clinical Impressions    Treatment Assessment (SLP) improved;cognitive-linguistic disorder  -EC improved;cognitive-linguistic disorder  -CK --      Treatment Assessment Comments (SLP) ST: pt continues to improve with cognitive linguistic skills.  He will benefit from assistance upon return home d/t deficits with reasoning that could affect overall safety in the home.  -EC Pt seen for skilled ST services this date w/SO at bedside.  SLP provided family education to pt's SO on POC, progress, goals for d/c, recommendations and suggestions for continued cognitive exercises once home, and deficits areas to expect/watch for at home and how they may present.  Pt's SO was very appreciative of the information and verbalized understanding.  Pt continues to present w/slow speed of processing and requires increased time for completion of both structured and functional daily tasks.  -CK --      Daily Summary of Progress (SLP) progress toward functional goals as expected  -EC progress toward functional goals is good  -CK --      Barriers to Overall Progress (SLP) Cognitive status  -EC -- --      Plan for Continued Treatment (SLP) continue treatment per plan of care  -EC continue treatment per plan of care  -CK --      Care Plan Review evaluation/treatment results reviewed;care plan/treatment goals reviewed;risks/benefits reviewed;current/potential barriers reviewed;patient/other agree to care plan  -EC  evaluation/treatment results reviewed;care plan/treatment goals reviewed;risks/benefits reviewed;current/potential barriers reviewed;patient/other agree to care plan  -CK --      Care Plan Review, Other Participant(s) -- significant other  -CK --         Recommendations    Therapy Frequency (SLP SLC) 5 days per week  -EC 5 days per week  -CK 5 days per week  -CK      Predicted Duration Therapy Intervention (Days) until discharge  -EC until discharge  -CK until discharge  -CK      Anticipated Discharge Disposition (SLP) home with assist  -EC home with assist  -CK home with assist  -CK         Reasoning Goal 1 (SLP)    Improve Reasoning Through Goal 1 (SLP) complete deductive reasoning task;90%;independently (over 90% accuracy)  -EC complete deductive reasoning task;90%;independently (over 90% accuracy)  -CK complete deductive reasoning task;90%;independently (over 90% accuracy)  -CK      Time Frame (Reasoning Goal 1, SLP) by discharge  -EC by discharge  -CK by discharge  -CK      Barriers (Reasoning Goal 1, SLP) judgement/problem solving deficits  -EC -- --      Progress (Reasoning Goal 1, SLP) 80%  -EC 80%  -CK 60%;80%  -CK      Progress/Outcomes (Reasoning Goal 1, SLP) goal ongoing  -EC goal ongoing  -CK goal ongoing  -CK         Functional Problem Solving Skills Goal 1 (SLP)    Improve Problem Solving Through Goal 1 (SLP) -- --  -CK answer questions about ADL problems;90%;independently (over 90% accuracy)  -CK      Time Frame (Problem Solving Goal 1, SLP) -- --  -CK by discharge  -CK      Progress (Problem Solving Goal 1, SLP) -- --  -%  -CK      Progress/Outcomes (Problem Solving Goal 1, SLP) -- --  -CK goal met   -CK            User Key  (r) = Recorded By, (t) = Taken By, (c) = Cosigned By    Initials Name Effective Dates    EC Anita Siu, CCC-SLP 06/16/21 -     CK Latesha Ram MS CCC-SLP 06/16/21 -                    EDUCATION    The patient has been educated in the following areas:        Cognitive Impairment.             SLP GOALS     Row Name 01/12/23 1237 01/11/23 1307 01/10/23 1002       Reasoning Goal 1 (SLP)    Improve Reasoning Through Goal 1 (SLP) complete deductive reasoning task;90%;independently (over 90% accuracy)  -EC complete deductive reasoning task;90%;independently (over 90% accuracy)  -CK complete deductive reasoning task;90%;independently (over 90% accuracy)  -CK    Time Frame (Reasoning Goal 1, SLP) by discharge  -EC by discharge  -CK by discharge  -CK    Barriers (Reasoning Goal 1, SLP) judgement/problem solving deficits  -EC -- --    Progress (Reasoning Goal 1, SLP) 80%  -EC 80%  -CK 60%;80%  -CK    Progress/Outcomes (Reasoning Goal 1, SLP) goal ongoing  -EC goal ongoing  -CK goal ongoing  -CK       Functional Problem Solving Skills Goal 1 (SLP)    Improve Problem Solving Through Goal 1 (SLP) -- --  -CK answer questions about ADL problems;90%;independently (over 90% accuracy)  -CK    Time Frame (Problem Solving Goal 1, SLP) -- --  -CK by discharge  -CK    Progress (Problem Solving Goal 1, SLP) -- --  -%  -CK    Progress/Outcomes (Problem Solving Goal 1, SLP) -- --  -CK goal met   -CK          User Key  (r) = Recorded By, (t) = Taken By, (c) = Cosigned By    Initials Name Provider Type    Anita Nj, CCC-SLP Speech and Language Pathologist    CK Latesha Ram, MS CCC-SLP Speech and Language Pathologist                            Time Calculation:        Time Calculation- SLP     Row Name 01/12/23 1336             Time Calculation- SLP    SLP Start Time 1237  -EC      SLP Stop Time 1324  -EC      SLP Time Calculation (min) 47 min  -EC      Total Timed Code Minutes- SLP 47 minute(s)  -EC      SLP Received On 01/12/23  -EC      SLP Goal Re-Cert Due Date 01/13/23  -EC         Untimed Charges    55248-EJ Treatment/ST Modification Prosth Aug Alter  47  -EC         Total Minutes    Untimed Charges Total Minutes 47  -EC       Total Minutes 47  -EC             User Key  (r) = Recorded By, (t) = Taken By, (c) = Cosigned By    Initials Name Provider Type     Anita Siu CCC-SLP Speech and Language Pathologist                  Therapy Charges for Today     Code Description Service Date Service Provider Modifiers Qty    [id number] [facility] TREATMENT SPEECH 3 1/12/2023 Anita Siu CCC-SLP GN 1                           LOC Bernardo  1/12/2023

## 2023-01-12 NOTE — PROGRESS NOTES
Nutrition Services    Patient Name:  Brian Garcia  YOB: 1950  MRN: [id number]  Admit Date:  12/30/2022    Reason for assessment:  Follow up     Nutrition Diet/History:    -Typical Intake: pt has increased intake at this time    Weight history:  CBW:  141#    Labs:  No new labs    Meds:  D3, colace, prednisone    Nutrition Prescription Order:  Cardiac; cranberry juice TID, ice cream BID, PB BID    Evaluation of Nutrition Intake:  % intake at meals    Pt has improved mentation and intake greatly improved.  Will continue to send supplements as ordered to have continued good intake.  RDN staff will follow ARU course.    Electronically signed by:  Cami Callejas  01/12/23 16:12 CST

## 2023-01-12 NOTE — PROGRESS NOTES
Progress Note      Admit date: 12/30/2022 12:28 PM       Treatment Team     Provider Relationship Specialty Contact    Fan Rob MD Attending Rehabilitation   [phone]      Anita Siu, CCC-SLP Speech Language Pathologist Speech Pathology   [phone]      Aleja Tripp  -- --    Aldair Miller MD Consulting Physician Pulmonary Disease   [phone]      Elayne Cohn LPN Licensed Practical Nurse -- --    Holly Sewell COTA Occupational Therapy Assistant Occupational Therapy --    Mami Jerry LPN Licensed Practical Nurse --   [phone]      Tarsha Hernandez RN Registered Nurse Behavioral Health --    Judi Pierre, CRT Respiratory Therapist Respiratory Therapy   [phone]      Radha Pierre RN Registered Nurse -- --    Manju Patterson LSW  -- --    Sima Mota PTA Physical Therapy Assistant Physical Therapy --             Chief Complaint:  Traumatic hemorrhage of cerebrum    HPI: No changes since admission    Vital Signs  Temp:  [97 °F (36.1 °C)-99.1 °F (37.3 °C)] 97 °F (36.1 °C)  Heart Rate:  [] 96  Resp:  [18-20] 18  BP: (126-150)/(67-70) 126/67    Physical Exam 1:    Constitutional: Patient is alert sitting up in his bed working on activities on his bedside table says he is ready to go home cognitive impairments have improved dramatically     HENT:      Eyes:     Neck:      Cardiovascular: No chest    Pulmonary: No difficulty breathing cough or expectoration    Abdominal:      Genitourinary/Anorectal:       Musculoskeletal: Improving strength and endurance    Skin:     Neurological: No focal changes    Psychiatric/Behavioral: Cooperative with physical therapy       Results Review:    I reviewed the patient's new clinical results.    Nursing notes and therapy notes have been reviewed.    I have reviewed medications.    Assessment/Plan  Principal Problem:    Traumatic hemorrhage of cerebrum stable continue treatment  Active  Problems:    CVA (cerebral vascular accident) ([facility])    Physical deconditioning      Overview: - PT/OT Consulted. Recommendations: home with home health.      - Patient may benefit from an assisted living.  Improving    Stage 4 very severe COPD by GOLD classification ([facility]) stable    Hypertension stable    Chronic respiratory failure with hypoxia, on home O2 therapy ([facility]) stable           Fan Rob MD  01/12/23  09:04 CST          This document has been electronically signed by Fan oRb MD on January 12, 2023 09:04 CST      Part of this note may be an electronic transcription/translation of spoken language to printed text using the Dragon Dictation System.

## 2023-01-12 NOTE — DISCHARGE PLACEMENT REQUEST
"Debbie Velasco (72 y.o. Male)     Date of Birth   1950    Social Security Number       Address   [address] [place] [id number]    Home Phone   [phone]    MRN   [id number]       [facility]    Marital Status                               Admission Date   12/30/22    Admission Type   Urgent    Admitting Provider   Fan Rob MD    Attending Provider   Fan Rob MD    Department, Room/Bed   [facility] ACUTE REHAB, 526/1       Discharge Date       Discharge Disposition       Discharge Destination                               Attending Provider: Fan Rob MD    Allergies: Amoxicillin, Albuterol, Asmanex (120 Metered Doses) [Mometasone Furoate], Lortab [Hydrocodone-acetaminophen], Morphine And Related, Prilosec [Omeprazole], Sodium Clavulanate, Statins, Sulfa Antibiotics, Symbicort [Budesonide-formoterol Fumarate]    Isolation: None   Infection: None   Code Status: CPR    Ht: 165.1 cm (65\")   Wt: 64 kg (141 lb)    Admission Cmt: None   Principal Problem: Traumatic hemorrhage of cerebrum [S06.36AA]                 Active Insurance as of 12/30/2022     Primary Coverage     Payor Plan Insurance Group Employer/Plan Group    HUMANA MEDICARE REPLACEMENT HUMANA MEDICARE REPLACEMENT [id number]     Payor Plan Address Payor Plan Phone Number Payor Plan Fax Number Effective Dates    [address] [address] [phone]  10/1/2019 - None Entered    [facility] [id number]       Subscriber Name Subscriber Birth Date Member ID       DEBBIE VELASCO 1950 [id number]           Secondary Coverage     Payor Plan Insurance Group Employer/Plan Group    [facility] [place] DEPT [id number]     Payor Plan Address Payor Plan Phone Number Payor Plan Fax Number Effective Dates    [facility] OFFICE OF [facility] CARE [phone]  2/24/2000 - None Entered    [address] [address]       [facility] [id number]       Subscriber Name Subscriber Birth Date Member ID    "    DEBBIE VELASCO 1950 [id number]                 Emergency Contacts      (Rel.) Home Phone Work Phone Mobile Phone    Annette Harvey (Significant Other) [phone] -- [phone]               History & Physical      Fan Rob MD at 22 1302          HISTORY AND PHYSICAL    Admit date: 22     Chief complaint: Confusion and balance    History of present illness (4):  Patient is a 72 y.o. male presents with CVA history initially seen in [place] transferred over the weekend to [place] studies performed shows CVA intracranial hemorrhage with  midline shift no focal significant weakness just balance issues and confusion issues some type of expressive aphasia cognition type aphasia so he knows what he is doing    Personal/Family/Social History: Patient lives alone or with his girlfriend significant other seem to be very compatible  Past Medical History:   Diagnosis Date   • COPD (chronic obstructive pulmonary disease) (HCC)    • Hypertension      Past Surgical History:   Procedure Laterality Date   • HIP ARTHROPLASTY       No family history on file.  Social History     Tobacco Use   • Smoking status: Former     Packs/day: 3.00     Types: Cigarettes     Start date:      Quit date: 2008     Years since quittin.6   • Smokeless tobacco: Never   Vaping Use   • Vaping Use: Never used   Substance Use Topics   • Alcohol use: Not Currently   • Drug use: Never       Review of Systems:(detailed)    Constitutional: Prior to the stroke extremely independent almost to the point of being obstinate     HENT: No significant abnormalities within his respiratory system other chronic obstructive lung disease    Eyes: No complaints visual difficulties    Respiratory: History of severe chronic obstructive pulm disease multiple medications have multiple episodes of pneumonia and admission to the hospital and respiratory failure    Cardiovascular: Significant cardiac abnormality  chest pain (R cardiac failure    Gastrointestinal: Negative    Endocrine: Negative patient states she is nondiabetic    Genitourinary: Negative      Musculoskeletal: Generalized weakness and poor coordination since the stroke    Skin: Negative    Allergic/Immunologic:    Allergies   Allergen Reactions   • Amoxicillin Anaphylaxis   • Albuterol Irritability     Facial flushing and palpitations.   • Asmanex (120 Metered Doses) [Mometasone Furoate] Unknown - Low Severity   • Lortab [Hydrocodone-Acetaminophen] Other (See Comments)     Can't remember   • Morphine And Related Hallucinations   • Prilosec [Omeprazole] Other (See Comments)     unknown   • Sodium Clavulanate Unknown - High Severity   • Statins GI Intolerance   • Sulfa Antibiotics Other (See Comments)     Can't remember also more and can't remember   • Symbicort [Budesonide-Formoterol Fumarate] Unknown (See Comments)     Doesn't remember reaction type       Neurological: Nothing prior to the stroke    Hematological: Negative    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 Authorizing Provider   Cholecalciferol (VITAMIN D3 PO) Take 2,000 Int'l Units/day by mouth Daily.   Yes Qamar Javed MD   dilTIAZem (TIAZAC) 180 MG 24 hr capsule Take 1 capsule by mouth Daily. 12/8/22  Yes Neville Meeks MD   ezetimibe (ZETIA) 10 MG tablet Take 10 mg by mouth Daily.   Yes Qamar Javed MD   montelukast (SINGULAIR) 10 MG tablet Take 10 mg by mouth Every Night.   Yes Qamar Javed MD   predniSONE (DELTASONE) 5 MG tablet Take 5 mg by mouth Every Other Day.   Yes Qamar Javed MD   acetaminophen (TYLENOL) 325 MG tablet Take 650 mg by mouth Every 6 (Six) Hours As Needed for Mild Pain .    Qamar Javed MD   apixaban (ELIQUIS) 5 MG tablet tablet Take 1 tablet by mouth  Every 12 (Twelve) Hours. 4/18/22   Jaycee Rose MD   ipratropium (ATROVENT HFA) 17 MCG/ACT inhaler Inhale 2 puffs 4 (Four) Times a Day As Needed for Wheezing.    ProviderQamar MD   ipratropium (ATROVENT) 0.02 % nebulizer solution Take 2.5 mL by nebulization 3 (Three) Times a Day. Sub amount for how much insurance allows 7/19/21   Anita Toney MD   tiZANidine (ZANAFLEX) 2 MG tablet Take 1 tablet by mouth At Night As Needed for Muscle Spasms. 12/12/22   Christin Sanderson APRN   dilTIAZem XR (DILACOR XR) 180 MG 24 hr capsule Take 1 capsule by mouth Daily. 6/30/22 10/7/22  Neville Meeks MD   tiotropium bromide-olodaterol (Stiolto Respimat) 2.5-2.5 MCG/ACT aerosol solution inhaler Inhale 2 puffs Daily.  12/30/22  ProviderQamar MD   tiZANidine (ZANAFLEX) 2 MG tablet Take 1 tablet by mouth At Night As Needed for Muscle Spasms. 12/12/22 12/30/22  Christin Sanderson APRN     Allergies   Allergen Reactions   • Amoxicillin Anaphylaxis   • Albuterol Irritability     Facial flushing and palpitations.   • Asmanex (120 Metered Doses) [Mometasone Furoate] Unknown - Low Severity   • Lortab [Hydrocodone-Acetaminophen] Other (See Comments)     Can't remember   • Morphine And Related Hallucinations   • Prilosec [Omeprazole] Other (See Comments)     unknown   • Sodium Clavulanate Unknown - High Severity   • Statins GI Intolerance   • Sulfa Antibiotics Other (See Comments)     Can't remember also more and can't remember   • Symbicort [Budesonide-Formoterol Fumarate] Unknown (See Comments)     Doesn't remember reaction type         Vital Signs  Temp:  [98.7 °F (37.1 °C)] 98.7 °F (37.1 °C)  Heart Rate:  [72] 72  Resp:  [18] 18  BP: (148)/(71) 148/71    Physical Exam:    Constitutional: Patient presents on transfer from [facility] in [place] he is in a wheelchair   He is on O2 has not had a eat I do not know if his medicines have been given to him this morning we will have to check on that I  discussed with him that we need to cooperate together the goal is to get him better independent and safe so he can go home and released to go home with his significant other and be safe he said he would try  HENT: Within normal limits for age    Eyes: EOM intact no visual field difficulties    Neck: Range of motion no carotid bruits    Cardiovascular: Regular sinus rhythm without murmur    Pulmonary: Distant breath sounds wheezes present throughout no cough or sputum production    Abdominal: Negative masses    Genitourinary/Anorectal: Furred at this time      Musculoskeletal: Normal ROM as functional poor coordination  Skin:     Neurological: Please see therapies assessment    Psychiatric/Behavioral: Hopefully he will be cooperative and he was told he required a lot of cueing where he was before he was more obstinate with the therapist wanting to do it the way he wanted to do it rather than allowing us to guide him and help him    Results Review:   Lab Results (last 24 hours)     ** No results found for the last 24 hours. **        Imaging Results (Last 24 Hours)     ** No results found for the last 24 hours. 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    Hypertension stable           Fan Rob MD  12/30/22  13:02 CST            This document has been electronically signed by Fan Rob MD on December 30, 2022 13:02 CST      Part of this note may be an electronic transcription/translation of spoken language to printed text using the Dragon Dictation System.       Electronically signed by Fan Rob MD at 12/30/22  1318          Physician Progress Notes (most recent note)      Fan Rob MD at 01/12/23 0904          Progress Note      Admit date: 12/30/2022 12:28 PM       Treatment Team     Provider Relationship Specialty Contact    Fan Rob MD Attending Rehabilitation   [phone]      Anita Siu, CCC-SLP Speech Language Pathologist Speech Pathology   [phone]      Aleja Tripp  -- --    Aldair Miller MD Consulting Physician Pulmonary Disease   [phone]      Elayne Cohn LPN Licensed Practical Nurse -- --    Holly Sewell COTA Occupational Therapy Assistant Occupational Therapy --    Mami Jerry LPN Licensed Practical Nurse --   [phone]      Tarsha Hernandez RN Registered Nurse Behavioral Health --    Judi Pierre, CRT Respiratory Therapist Respiratory Therapy   [phone]      Radha Pierre RN Registered Nurse -- --    Manju Patterson, [facility]  -- --    Sima Mota, [facility] Physical Therapy Assistant Physical Therapy --             Chief Complaint:  Traumatic hemorrhage of cerebrum    HPI: No changes since admission    Vital Signs  Temp:  [97 °F (36.1 °C)-99.1 °F (37.3 °C)] 97 °F (36.1 °C)  Heart Rate:  [] 96  Resp:  [18-20] 18  BP: (126-150)/(67-70) 126/67    Physical Exam 1:    Constitutional: Patient is alert sitting up in his bed working on activities on his bedside table says he is ready to go home cognitive impairments have improved dramatically     HENT:      Eyes:     Neck:      Cardiovascular: No chest    Pulmonary: No difficulty breathing cough or expectoration    Abdominal:      Genitourinary/Anorectal:       Musculoskeletal: Improving strength and endurance    Skin:     Neurological: No focal changes    Psychiatric/Behavioral: Cooperative with physical therapy       Results Review:    I reviewed the patient's new clinical results.    Nursing notes and therapy notes have been reviewed.    I have reviewed  medications.    Assessment/Plan  Principal Problem:    Traumatic hemorrhage of cerebrum stable continue treatment  Active Problems:    CVA (cerebral vascular accident) ([facility])    Physical deconditioning      Overview: - PT/OT Consulted. Recommendations: home with home health.      - Patient may benefit from an assisted living.  Improving    Stage 4 very severe COPD by GOLD classification ([facility]) stable    Hypertension stable    Chronic respiratory failure with hypoxia, on home O2 therapy ([facility]) stable           Fan Rob MD  23  09:04 CST          This document has been electronically signed by Fan Rob MD on 2023 09:04 CST      Part of this note may be an electronic transcription/translation of spoken language to printed text using the Dragon Dictation System.       Electronically signed by Fan Rob MD at 23 09          Physical Therapy Notes (most recent note)      Sima Mota PTA at 23 0930  Version 1 of 1         Inpatient Rehabilitation - Physical Therapy Treatment Note       [facility]     Patient Name: Brian Garcia  : 1950  MRN: [id number]    Today's Date: 2023                    Admit Date: 2022      Visit Dx:     ICD-10-CM ICD-9-CM   1. Dysphagia, unspecified type  R13.10 787.20   2. Symbolic dysfunction  R48.9 784.60   3. Impaired mobility and ADLs  Z74.09 V49.89    Z78.9    4. Impaired functional mobility, balance, gait, and endurance  Z74.09 V49.89       Patient Active Problem List   Diagnosis   • Physical deconditioning   • Pulmonary nodules   • Leukocytosis   • History of pulmonary embolism   • Stage 4 very severe COPD by GOLD classification ([facility])   • Chronic respiratory failure with hypoxia, on home O2 therapy ([facility])   • Hypertension   • CVA (cerebral vascular accident) ([facility])   • Traumatic hemorrhage of cerebrum       Past Medical History:   Diagnosis Date   • COPD (chronic obstructive pulmonary disease)  ([facility])    • Hypertension        Past Surgical History:   Procedure Laterality Date   • HIP ARTHROPLASTY         PT ASSESSMENT (last 12 hours)     IRF PT Evaluation and Treatment     Row Name 01/12/23 0930          PT Time and Intention    Document Type daily treatment  -     Mode of Treatment physical therapy;individual therapy  -     Row Name 01/12/23 0930          General Information    Patient Profile Reviewed yes  -     Existing Precautions/Restrictions fall  -     Limitations/Impairments hearing  -     Row Name 01/12/23 0930          Living Environment    Primary Care Provided by self  -     Row Name 01/12/23 0930          Home Use of Assistive/Adaptive Equipment    Equipment Currently Used at Home oxygen  -     Row Name 01/12/23 0930          Pain Assessment    Pretreatment Pain Rating 0/10 - no pain  -     Posttreatment Pain Rating 0/10 - no pain  -     Row Name 01/12/23 0930          Cognition/Psychosocial    Affect/Mental Status (Cognition) WFL  -     Orientation Status (Cognition) oriented x 4  -     Follows Commands (Cognition) delayed response/completion;increased processing time needed;follows two-step commands  -     Row Name 01/12/23 0930          Bed Mobility    Scooting/Bridging [place] (Bed Mobility) standby assist;verbal cues  -     Sit-Supine [place] (Bed Mobility) modified independence  -     Assistive Device (Bed Mobility) bed rails;head of bed elevated  -     Row Name 01/12/23 0930          Transfer Assessment/Treatment    Transfers sit-stand transfer;stand-sit transfer;bed-chair transfer;chair-bed transfer  -     Row Name 01/12/23 0930          Bed-Chair Transfer    Bed-Chair [place] (Transfers) standby assist  -     Assistive Device (Bed-Chair Transfers) walker, front-wheeled  -     Row Name 01/12/23 0930          Chair-Bed Transfer    Chair-Bed [place] (Transfers) standby assist  -     Assistive Device (Chair-Bed Transfers) walker,  front-wheeled  -JW     Row Name 01/12/23 0930          Sit-Stand Transfer    Sit-Stand [place] (Transfers) standby assist  -     Assistive Device (Sit-Stand Transfers) walker, front-wheeled  -SULAIMAN     Row Name 01/12/23 0930          Stand-Sit Transfer    Stand-Sit [place] (Transfers) standby assist  -     Assistive Device (Stand-Sit Transfers) walker, front-wheeled  -SULAIMAN     Row Name 01/12/23 0930          Toilet Transfer    Type (Toilet Transfer) sit-stand;stand-sit  -     [place] Level (Toilet Transfer) standby assist  -     Assistive Device (Toilet Transfer) commode;walker, front-wheeled  -SULAIMAN     Row Name 01/12/23 0930          Gait/Stairs (Locomotion)    Gait/Stairs Locomotion gait/ambulation independence;gait/ambulation assistive device;distance ambulated;gait pattern;gait deviations;maintains weight-bearing status  -     [place] Level (Gait) contact guard  -     Assistive Device (Gait) walker, front-wheeled  -     Distance in Feet (Gait) 4' x 5, 150', 150'  -     Deviations/Abnormal Patterns (Gait) kimber decreased;stride length decreased;gait speed decreased  -     Bilateral Gait Deviations forward flexed posture  cueing for walker proximity.  -     Comment, (Gait/Stairs) pt wanting to take time to check O2 levels after every gt distance, t/aram and exercise, pt takes extended time for tasks, performs mobility around room/bed to get personal belonging and put away personal belongings  -     Row Name 01/12/23 0930          Wheelchair Mobility/Management    Method of Wheelchair Locomotion (Mobility) bimanual (upper extremity) propulsion;bipedal (lower extremity) propulsion  -     Mobility Activities (Wheelchair) forward propulsion;backward propulsion;steering;turning  -     Forward Propulsion [place] (Wheelchair) standby assist  -     Distance Propelled in Feet (Wheelchair) 100'  -     Row Name 01/12/23 0930          Hip (Therapeutic Exercise)    Hip AROM  (Therapeutic Exercise) bilateral;flexion;sitting;20 repititions  -     Row Name 01/12/23 0930          Knee (Therapeutic Exercise)    Knee AROM (Therapeutic Exercise) bilateral;LAQ (long arc quad);sitting;20 repititions  -     Row Name 01/12/23 0930          Ankle (Therapeutic Exercise)    Ankle AROM (Therapeutic Exercise) bilateral;dorsiflexion;plantarflexion;sitting;20 repititions  -     Row Name 01/12/23 0930          Positioning and Restraints    Pre-Treatment Position in bed  -     Post Treatment Position bed  -JW     In Bed fowlers;call light within reach;encouraged to call for assist;exit alarm on  -[facility] Name 01/12/23 0930          Vital Signs    Pretreatment Heart Rate (beats/min) 100  -JW     Intratreatment Heart Rate (beats/min) 112  -     Posttreatment Heart Rate (beats/min) 108  -     Pre SpO2 (%) 99  -JW     O2 Delivery Pre Treatment supplemental O2  -     Intra SpO2 (%) 94  -JW     O2 Delivery Intra Treatment supplemental O2  -     Post SpO2 (%) 97  -JW     O2 Delivery Post Treatment supplemental O2  -JW     Pre Patient Position Sitting  -     Intra Patient Position Standing  -     Post Patient Position Supine  -[facility] Name 01/12/23 0930          Therapy Assessment/Plan (PT)    Patient's Goals For Discharge return home  -[facility] Name 01/12/23 0930          Therapy Assessment/Plan (PT)    Rehab Potential/Prognosis (PT) other (see comments)  fair  -     Frequency of Treatment (PT) --  5-6 days/wk  -     Estimated Duration of Therapy (PT) 2 weeks;4 weeks  -     Problem List (PT) balance;mobility;strength  -[facility] Name 01/12/23 0930          Therapy Plan Review/Discharge Plan (PT)    Anticipated Equipment Needs at Discharge (PT Eval) front wheeled walker;hospital bed;wheelchair;wheelchair cushion  -     Anticipated Discharge Disposition (PT) skilled nursing facility;home with 24/7 care;home with home health  -[facility] Name 01/12/23 0930          IRF PT Goals     Bed Mobility Goal Selection (PT-IRF) bed mobility, PT goal 1  -     Transfer Goal Selection (PT-IRF) transfers, PT goal 1;transfers, PT goal 2  -     Gait (Walking Locomotion) Goal Selection (PT-IRF) gait, PT goal 1;gait, PT goal 2  -     Wheelchair Locomotion Goal Selection (PT-IRF) wheelchair locomotion, PT goal (free text)  -     Stairs Goal Selection (PT-IRF) stairs, PT goal 1  -     Strength Goal Selection (PT-IRF) strength, PT goal 1 (free text)  -     Balance Goal Selection (PT) balance, PT goal 1  -     ROM Goal Selection (PT-IRF) ROM, PT goal 1 (free text)  -     Row Name 01/12/23 0930          Bed Mobility Goal 1 (PT-IRF)    Activity/Assistive Device (Bed Mobility Goal 1, PT-IRF) bed mobility activities, all  -JW     [place] Level (Bed Mobility Goal 1, PT-IRF) independent  -JW     Time Frame (Bed Mobility Goal 1, PT-IRF) by discharge  -     Progress/Outcomes (Bed Mobility Goal 1, PT-IRF) goal partially met  -     Row Name 01/12/23 0930          Transfer Goal 1 (PT-IRF)    Activity/Assistive Device (Transfer Goal 1, PT-IRF) sit-to-stand/stand-to-sit;bed-to-chair/chair-to-bed;wheelchair transfer  -     [place] (Transfer Goal 1, PT-IRF) standby assist  -     Time Frame (Transfer Goal 1, PT-IRF) 1 week  -     Progress/Outcomes (Transfer Goal 1, PT-IRF) goal partially met  -     Row Name 01/12/23 0930          Transfer Goal 2 (PT-IRF)    Activity/Assistive Device (Transfer Goal 2, PT-IRF) bed-to-chair/chair-to-bed;sit-to-stand/stand-to-sit;wheelchair transfer  -     [place] (Transfer Goal 2, PT-IRF) modified independence  -     Time Frame (Transfer Goal 2, PT-IRF) 2 weeks  -     Progress/Outcomes (Transfer Goal 2, PT-IRF) goal not met  -     Row Name 01/12/23 0930          Gait/Walking Locomotion Goal 1 (PT-IRF)    Activity/Assistive Device (Gait/Walking Locomotion Goal 1, PT-IRF) gait (walking locomotion);assistive device use;decrease fall  risk;increase endurance/gait distance;walker, rolling  -JW     Gait/Walking Locomotion Distance Goal 1 (PT-IRF) 150ft x2  -JW     [place] Level (Gait/Walking Locomotion Goal 1, PT-IRF) standby assist  -JW     Time Frame (Gait/Walking Locomotion Goal 1, PT-IRF) 1 week  -JW     Progress/Outcomes (Gait/Walking Locomotion Goal 1, PT-IRF) goal not met  -     Row Name 01/12/23 0930          Gait/Walking Locomotion Goal 2 (PT-IRF)    Activity/Assistive Device (Gait/Walking Locomotion Goal 2, PT-IRF) gait (walking locomotion);assistive device use;decrease fall risk;increase endurance/gait distance  -JW     Gait/Walking Locomotion Distance Goal 2 (PT-IRF) 250ft x2  -JW     [place] Level (Gait/Walking Locomotion Goal 2, PT-IRF) modified independence  -JW     Time Frame (Gait/Walking Locomotion Goal 2, PT-IRF) 2 weeks  -JW     Progress/Outcomes (Gait/Walking Locomotion Goal 2, PT-IRF) goal not met  -     Row Name 01/12/23 0930          Wheelchair Locomotion Goal (PT-IRF)    Wheelchair Locomotion Goal (PT-IRF) Pt will propel w/c 150ft with 2 turns with supervison  -JW     Time Frame (Wheelchair Locomotion Goal, PT-IRF) by discharge  -     Progress/Outcomes (Wheelchair Locomotion Goal, PT-IRF) goal not met  -     Row Name 01/12/23 0930          Stairs Goal 1 (PT-IRF)    Activity/Assistive Device (Stairs Goal 1, PT-IRF) ascending stairs;descending stairs;using handrail, left;using handrail, right;decrease fall risk  -     Number of Stairs (Stairs Goal 1, PT-IRF) 3  -JW     [place] Level (Stairs Goal 1, PT-IRF) standby assist;modified independence  -     Time Frame (Stairs Goal 1, PT-IRF) 2 weeks  -JW     Progress/Outcomes (Stairs Goal 1, PT-IRF) goal not met  -     Row Name 01/12/23 0930          Strength Goal (PT-IRF)    Strength Goal 3 (PT-IRF) Pt will tolerate LE exercises OOB in chair and progress to standing exercises with VSS  -     Time Frame (Strength Goal 3, PT-IRF) by discharge  -JW      Progress/Outcomes (Strength Goal 3, PT-IRF) goal met  -     Row Name 01/12/23 0930          Balance Goal 1 (PT)    Activity/Assistive Device (Balance Goal 1, PT) assistive device use  -     [place] Level/Cues Needed (Balance Goal 1, PT) conditional independence  -JW     Time Frame (Balance Goal 1, PT) by discharge  -     Progress/Outcomes (Balance Goal 1, PT) goal not met  -     Row Name 01/12/23 0930          Caregiver Training Goal 1 (PT-IRF)    Caregiver Training Goal 1 (PT-IRF) Caregiver will acknowledge understanding of home care iosntructions  -     Time Frame (Caregiver Training Goal 1, PT-IRF) by discharge  -     Progress/Outcomes (Caregiver Training Goal 1, PT-IRF) goal met  -           User Key  (r) = Recorded By, (t) = Taken By, (c) = Cosigned By    Initials Name Provider Type     Sima Mota, PTA Physical Therapist Assistant                 Physical Therapy Education     Title: PT OT SLP Therapies (In Progress)     Topic: Physical Therapy (In Progress)     Point: Mobility training (In Progress)     Learning Progress Summary           Patient Acceptance, E, NR by  at 1/11/2023 1532    Acceptance, E, NR by  at 1/10/2023 1241    Acceptance, E, NR by  at 1/9/2023 1243    Acceptance, E, NR by  at 1/9/2023 1112    Acceptance, E, NR by  at 1/3/2023 1206    Acceptance, E, NR by  at 1/2/2023 1219    Acceptance, E, NR by 1 at 12/31/2022 1345                   Point: Home exercise program (Not Started)     Learner Progress:  Not documented in this visit.          Point: Body mechanics (In Progress)     Learning Progress Summary           Patient Acceptance, E, NR by [facility] at 12/31/2022 1345                   Point: Precautions (In Progress)     Learning Progress Summary           Patient Acceptance, E, NR by [facility] at 12/31/2022 1345                               User Key     Initials Effective Dates Name Provider Type Discipline    [facility] 06/16/21 -  Nydia Muñoz, PT Physical  Therapist PT    [facility] 06/16/21 -  Yossi Haynes PTA Physical Therapist Assistant PT                PT Recommendation and Plan    Frequency of Treatment (PT):  (5-6 days/wk)  Anticipated Equipment Needs at Discharge (PT Eval): front wheeled walker, hospital bed, wheelchair, wheelchair cushion          Outcome Measures     Row Name 01/12/23 1100 01/11/23 1446 01/11/23 1059       How much help from another person do you currently need...    Turning from your back to your side while in flat bed without using bedrails? 3  - 3  -EUGENE 3  -EUGENE    Moving from lying on back to sitting on the side of a flat bed without bedrails? 3  - 3  -EUGENE 3  -EUGENE    Moving to and from a bed to a chair (including a wheelchair)? 3  - 3  - 3  -EUGENE    Standing up from a chair using your arms (e.g., wheelchair, bedside chair)? 3  - 3  - 3  -EUGENE    Climbing 3-5 steps with a railing? 3  - 3  - 3  -EUGENE    To walk in hospital room? 3  - 3  -EUGENE 3  -EUGENE    AM-PAC 6 Clicks Score (PT) 18  - 18  - 18  -       Functional Assessment    Outcome Measure Options AM-PAC 6 Clicks Basic Mobility (PT)  - AM-PAC 6 Clicks Basic Mobility (PT)  - AM-PAC 6 Clicks Basic Mobility (PT)  -    Row Name 01/10/23 1200 01/10/23 1106          How much help from another person do you currently need...    Turning from your back to your side while in flat bed without using bedrails? -- 3  -EUGENE     Moving from lying on back to sitting on the side of a flat bed without bedrails? -- 3  -EUGENE     Moving to and from a bed to a chair (including a wheelchair)? -- 3  -EUGENE     Standing up from a chair using your arms (e.g., wheelchair, bedside chair)? -- 3  -EUGENE     Climbing 3-5 steps with a railing? -- 3  -EUGENE     To walk in hospital room? -- 3  -EUGENE     AM-PAC 6 Clicks Score (PT) -- 18  -        Functional Assessment    Outcome Measure Options AM-PAC 6 Clicks Basic Mobility (PT)  -EUGENE AM-PAC 6 Clicks Basic Mobility (PT)  -EUGENE           User Key  (r) = Recorded By, (t) =  Taken By, (c) = Cosigned By    Initials Name Provider Type    Sima Callaway PTA Physical Therapist Assistant    Yossi Ragland PTA Physical Therapist Assistant                     Time Calculation:      PT Charges     Row Name 23 0930             Time Calculation    Start Time 0930  -      Stop Time 1055  -      Time Calculation (min) 85 min  -      PT Received On 23  -         Time Calculation- PT    Total Timed Code Minutes- PT 85 minute(s)  -            User Key  (r) = Recorded By, (t) = Taken By, (c) = Cosigned By    Initials Name Provider Type    Sima Callaway PTA Physical Therapist Assistant                Therapy Charges for Today     Code Description Service Date Service Provider Modifiers Qty    11215850154 HC PT THER PROC EA 15 MIN 2023 Sima Mota PTA GP 1    95558588173 HC PT THERAPEUTIC ACT EA 15 MIN 2023 Sima Mota PTA GP 3    74364109563 HC GAIT TRAINING EA 15 MIN 2023 Sima Mota PTA GP 2            PT G-Codes  Outcome Measure Options: AM-PAC 6 Clicks Basic Mobility (PT)  AM-PAC 6 Clicks Score (PT): 18  AM-PAC 6 Clicks Score (OT): 19  Modified Dawson Scale: 2 - Slight disability.  Unable to carry out all previous activities but able to look after own affairs without assistance.      Sima Mota PTA  2023      Electronically signed by Sima Mota PTA at 23 1136          Occupational Therapy Notes (most recent note)      Holly Sewell COTA at 23 1323          Inpatient Rehabilitation - Occupational Therapy Treatment Note    [facility]     Patient Name: Brian Garcia  : 1950  MRN: [id number]    Today's Date: 2023                 Admit Date: 2022         ICD-10-CM ICD-9-CM   1. Dysphagia, unspecified type  R13.10 787.20   2. Symbolic dysfunction  R48.9 784.60   3. Impaired mobility and ADLs  Z74.09 V49.89    Z78.9    4.  Impaired functional mobility, balance, gait, and endurance  Z74.09 V49.89       Patient Active Problem List   Diagnosis   • Physical deconditioning   • Pulmonary nodules   • Leukocytosis   • History of pulmonary embolism   • Stage 4 very severe COPD by GOLD classification ([facility])   • Chronic respiratory failure with hypoxia, on home O2 therapy ([facility])   • Hypertension   • CVA (cerebral vascular accident) ([facility])   • Traumatic hemorrhage of cerebrum       Past Medical History:   Diagnosis Date   • COPD (chronic obstructive pulmonary disease) ([facility])    • Hypertension        Past Surgical History:   Procedure Laterality Date   • HIP ARTHROPLASTY               IRF OT ASSESSMENT FLOWSHEET (last 12 hours)     IRF OT Evaluation and Treatment     Row Name 01/11/23 0900          OT Time and Intention    Document Type daily treatment  -LM     Mode of Treatment individual therapy;occupational therapy  -LM     Patient Effort good  -LM     Row Name 01/11/23 0900          General Information    Patient Profile Reviewed yes  -LM     Existing Precautions/Restrictions fall  -LM     Limitations/Impairments hearing  -LM     Row Name 01/11/23 0900          Pain Assessment    Pretreatment Pain Rating 0/10 - no pain  -LM     Posttreatment Pain Rating 0/10 - no pain  -LM     Row Name 01/11/23 0900          Cognition/Psychosocial    Orientation Status (Cognition) oriented x 4  -LM     Follows Commands (Cognition) WFL  -LM     Row Name 01/11/23 0900          Bathing    [place] Level (Bathing) bathing skills;lower body;upper body;upper extremities;chest/trunk;distal lower extremities/feet;supervision;set up  -LM     Position (Bathing) supported standing;supported sitting;unsupported standing  -LM     Row Name 01/11/23 0900          Upper Body Dressing    [place] Level (Upper Body Dressing) upper body dressing skills;doff;don;front opening garment;pull over garment;independent  -LM     Row Name 01/11/23 0900          Lower Body Dressing     [place] Level (Lower Body Dressing) doff;don;shoes/slippers;socks;pants/bottoms;underwear;minimum assist (75% patient effort)  -     Position (Lower Body Dressing) supported sitting;supported standing  -     Row Name 01/11/23 0900          Grooming    [place] Level (Grooming) grooming skills;deodorant application;hair care, combing/brushing;oral care regimen;wash face, hands;set up  -     Position (Grooming) sink side;supported sitting  w/c  -     Row Name 01/11/23 0900          Toileting    [place] Level (Toileting) toileting skills;perform perineal hygiene;adjust/manage clothing;contact guard assist  -     Row Name 01/11/23 0900          Bed Mobility    Bed Mobility bed mobility (all) activities  -     Row Name 01/11/23 0900          Functional Mobility    Functional Mobility- Ind. Level contact guard assist;verbal cues required;supervision required  -     Functional Mobility- Device walker, front-wheeled  -     Row Name 01/11/23 0900          Transfer Assessment/Treatment    Transfers sit-stand transfer;stand-sit transfer;shower transfer  -     Row Name 01/11/23 0900          Sit-Stand Transfer    Sit-Stand [place] (Transfers) contact guard  -     Assistive Device (Sit-Stand Transfers) walker, front-wheeled  -LM     Row Name 01/11/23 0900          Stand-Sit Transfer    Stand-Sit [place] (Transfers) contact guard  -     Assistive Device (Stand-Sit Transfers) walker, front-wheeled  -LM     Row Name 01/11/23 0900          Toilet Transfer    Type (Toilet Transfer) sit-stand;stand-sit;stand pivot/stand step  -     [place] Level (Toilet Transfer) contact guard;standby assist;verbal cues  -     Row Name 01/11/23 0900          Shower Transfer    Type (Shower Transfer) sit-stand;stand-sit;stand pivot/stand step  -     [place] Level (Shower Transfer) contact guard  -     Row Name 01/11/23 0900          Motor Skills    Motor Skills coordination  -[facility]  Name 01/11/23 0900          Positioning and Restraints    Pre-Treatment Position sitting in chair/recliner  -LM     Post Treatment Position chair  -LM     Row Name 01/11/23 0900          Vital Signs    Intratreatment Heart Rate (beats/min) --  120  -LM     Intra SpO2 (%) 96  -LM     Row Name 01/11/23 0900          Therapy Assessment/Plan (OT)    Patient's Goals For Discharge return home  -     Row Name 01/11/23 0900          Therapy Assessment/Plan (OT)    Rehab Potential/Prognosis (OT) good, to achieve stated therapy goals  -LM     Frequency of Treatment (OT) other (see comments)  5-6 days/week  -LM     Estimated Duration of Therapy (OT) until facility discharge  -LM     Problem List (OT) balance;coordination;mobility;strength;postural control  -LM     Activity Limitations Related to Problem List (OT) unable to ambulate safely;BADLs not performed adequately or safely;unable to transfer safely;IADLs not performed adequately or safely;community activities not performed adequately or safely;home management activity not performed adequately or safely  -LM     Row Name 01/11/23 0900          Therapy Plan Review/Discharge Plan (OT)    Anticipated Discharge Disposition (OT) home with 24/7 care  -LM     Row Name 01/11/23 0900          Bathing Goal 1 (OT-IRF)    Activity/Device (Bathing Goal 1, OT-IRF) lower body bathing  -LM     [place] Level (Bathing Goal 1, OT-IRF) modified independence  -LM     Time Frame (Bathing Goal 1, OT-IRF) by discharge;long-term goal (LTG)  -LM     Progress/Outcomes (Bathing Goal 1, OT-IRF) goal not met  -LM     Row Name 01/11/23 0900          LB Dressing Goal 1 (OT-IRF)    Activity/Device (LB Dressing Goal 1, OT-IRF) lower body dressing  -LM     [place] (LB Dressing Goal 1, OT-IRF) modified independence  -LM     Time Frame (LB Dressing Goal 1, OT-IRF) long-term goal (LTG);by discharge  -LM     Progress/Outcomes (LB Dressing Goal 1, OT-IRF) goal not met  -LM     Row Name 01/11/23 0900           Strength Goal 1 (OT-IRF)    Strength Goal 1 (OT-IRF) Pt will increase BUE strength in all planes by one level for increased strength and endurance required for ADL routine.  -LM     Time Frame (Strength Goal 1, OT-IRF) long-term goal (LTG);by discharge  -LM     Progress/Outcomes (Strength Goal 1, OT-IRF) goal not met  -LM     Row Name 01/11/23 0900           Endurance Goal 1 (OT)    Activity Level (Endurance Goal 1, OT) endurance 2 good  -LM     Progress/Outcome (Endurance Goal 1, OT) goal not met  -LM           User Key  (r) = Recorded By, (t) = Taken By, (c) = Cosigned By    Initials Name Effective Dates    LM Holly Sewell, MUNOZ 06/16/21 -                  Occupational Therapy Education     Title: PT OT SLP Therapies (In Progress)     Topic: Occupational Therapy (In Progress)     Point: ADL training (Done)     Description:   Instruct learner(s) on proper safety adaptation and remediation techniques during self care or transfers.   Instruct in proper use of assistive devices.              Learning Progress Summary           Patient Acceptance, E, VU,NR by RC at 1/6/2023 1255    Acceptance, E, VU,NR by RC at 1/5/2023 1517    Acceptance, E,TB, VU by BB at 12/31/2022 1522    Acceptance, E,TB, VU by CM at 12/31/2022 1211    Comment: OT POC, Role of OT, safe ADL mobility and t/f                   Point: Home exercise program (Not Started)     Description:   Instruct learner(s) on appropriate technique for monitoring, assisting and/or progressing therapeutic exercises/activities.              Learner Progress:  Not documented in this visit.          Point: Precautions (Done)     Description:   Instruct learner(s) on prescribed precautions during self-care and functional transfers.              Learning Progress Summary           Patient Acceptance, E, VU,NR by RC at 1/6/2023 1255    Acceptance, E, VU,NR by RC at 1/5/2023 1517                   Point: Body mechanics (Done)     Description:   Instruct  learner(s) on proper positioning and spine alignment during self-care, functional mobility activities and/or exercises.              Learning Progress Summary           Patient Acceptance, E, VU,NR by RC at 1/6/2023 1255    Acceptance, E, VU,NR by RC at 1/5/2023 1517                               User Key     Initials Effective Dates Name Provider Type Discipline    BB 06/16/21 -  Lisa José COTA Occupational Therapist Assistant OT    RC 06/16/21 -  Annette Carey COTA Occupational Therapist Assistant OT    CM 11/18/22 -  Ghazala Alfonso OT Occupational Therapist OT                    OT Recommendation and Plan         Plan of Care Review  Plan of Care Reviewed With: patient  Progress: improving  Outcome Evaluation: pt perf well with OT toilet tf with cga sba toilet hygiene with sba some morrow  shower tf with cga and bathing ub lb with sba morrow and dressing ub with ind and lb with min a  demo use of reacher and sock aid withgood perf after ed  also ed on equipment needs and adls to caregiver  Plan of Care Reviewed With: patient  Progress: improving       Outcome Measures     Row Name 01/11/23 1059 01/10/23 1200 01/10/23 1106       How much help from another person do you currently need...    Turning from your back to your side while in flat bed without using bedrails? 3  -EUGENE -- 3  -EUGENE    Moving from lying on back to sitting on the side of a flat bed without bedrails? 3  -EUGENE -- 3  -EUGENE    Moving to and from a bed to a chair (including a wheelchair)? 3  -EUGENE -- 3  -EUGENE    Standing up from a chair using your arms (e.g., wheelchair, bedside chair)? 3  -EUGENE -- 3  -EUGENE    Climbing 3-5 steps with a railing? 3  -EUGENE -- 3  -EUGENE    To walk in hospital room? 3  -EUGENE -- 3  -EUGENE    AM-PAC 6 Clicks Score (PT) 18  -EUGENE -- 18  -EUGENE       Functional Assessment    Outcome Measure Options AM-PAC 6 Clicks Basic Mobility (PT)  -EUGENE AM-PAC 6 Clicks Basic Mobility (PT)  -EUGENE AM-PAC 6 Clicks Basic Mobility (PT)  -EUGENE    Row Name 01/09/23 1015              How much help from another person do you currently need...    Turning from your back to your side while in flat bed without using bedrails? 3  -EUGENE      Moving from lying on back to sitting on the side of a flat bed without bedrails? 3  -EUGENE      Moving to and from a bed to a chair (including a wheelchair)? 3  -EUGENE      Standing up from a chair using your arms (e.g., wheelchair, bedside chair)? 3  -EUGENE      Climbing 3-5 steps with a railing? 3  -EUGENE      To walk in hospital room? 3  -EUGENE      AM-PAC 6 Clicks Score (PT) 18  -EUGENE         Functional Assessment    Outcome Measure Options AM-PAC 6 Clicks Basic Mobility (PT)  -EUGENE            User Key  (r) = Recorded By, (t) = Taken By, (c) = Cosigned By    Initials Name Provider Type    Yossi Ragland PTA Physical Therapist Assistant                  Time Calculation:      Time Calculation- OT     Row Name 01/11/23 1310 01/11/23 0928          Time Calculation- OT    OT Start Time -- 0852  -LM     OT Stop Time -- 1030  -LM     OT Time Calculation (min) -- 98 min  -LM     Total Timed Code Minutes- OT -- 98 minute(s)  -LM     OT Received On -- 01/11/23  -LM        Timed Charges    62736 - Gait Training Minutes  15  -EUGENE --     36447 - OT Self Care/Mgmt Minutes -- 98  -LM        Total Minutes    Timed Charges Total Minutes 15  -EUGENE 98  -LM      Total Minutes 15  -EUGENE 98  -LM           User Key  (r) = Recorded By, (t) = Taken By, (c) = Cosigned By    Initials Name Provider Type    Yossi Ragland PTA Physical Therapist Assistant     Holly Sewell COTA Occupational Therapist Assistant              Therapy Charges for Today     Code Description Service Date Service Provider Modifiers Qty    43261195374 HC OT SELF CARE/MGMT/TRAIN EA 15 MIN 1/10/2023 Holly Sewell COTA GO 6    20289012184 HC OT SELF CARE/MGMT/TRAIN EA 15 MIN 1/11/2023 Holly Sewell COTA GO 7                   ALEXANDER Fragoso  1/11/2023    Electronically signed by Donato  ALEXANDER Pearson at 23 1323       [facility]  [address] [address]  Phone:  [phone]  Fax:   Date: 2023      Ambulatory Referral to Home Health     Patient:  Brian Garcia MRN:  [id number]   [address]  [place] [address] :  1950  SSN:    Phone: [phone] Sex:  M      INSURANCE PAYOR PLAN GROUP # SUBSCRIBER ID   Primary:  Secondary:    [employer] MEDICARE REPLACEMENT  [employer] ADMIN[employer]RATION [id number]  [id number]  [id number]  [id number]      Referring Provider Information:  CATRACHO MCLEOD Phone: [phone] Fax: [phone]       Referral Information:   # Visits:  999 Referral Type: Home Health [42]   Urgency:  Routine Referral Reason: Specialty Services Required   Start Date: 2023 End Date:  To be determined by Insurer   Diagnosis: Cerebrovascular accident (CVA), unspecified mechanism (HCC) (I63.9 [ICD-10-CM] 434.91 [ICD-9-CM])      Refer to Dept:   Refer to Provider:   Refer to Provider Phone:   Refer to Facility:       Face to Face Visit Date: 2023  Follow-up provider for Plan of Care? I treated the patient in an acute care facility and will not continue treatment after discharge.  Follow-up provider: JAGDISH REES [[id number]]  Reason/Clinical Findings: cva  Describe mobility limitations that make leaving home difficult: walker pt very slow  Nursing/Therapeutic Services Requested: Physical Therapy  Nursing/Therapeutic Services Requested: Occupational Therapy  PT orders: Therapeutic exercise  PT orders: Gait Training  PT orders: Home safety assessment  Weight Bearing Status: Full Weight Bearing  Occupational orders: Activities of daily living  Occupational orders: Strengthening  Occupational orders: Cognition  Occupational orders: Home safety assessment  Frequency: 1 Week 1     This document serves as a request of services and does not constitute Insurance authorization or  approval of services.  To determine eligibility, please contact the members Insurance carrier to verify and review coverage.     If you have medical questions regarding this request for services. Please contact [facility] at [phone] during normal business hours.        Authorizing Provider:Fan Rob MD  Authorizing Provider's NPI: [id number]  Order Entered By: Fan Rob MD 1/12/2023  1:58 PM     Electronically signed by: Fan Rob MD 1/12/2023  1:58 PM

## 2023-01-12 NOTE — PLAN OF CARE
Goal Outcome Evaluation:      Pt vs WNL , pt continues on 4L o2 NC    . Pt participated in therapy today with positive results.

## 2023-01-12 NOTE — DISCHARGE PLACEMENT REQUEST
"Debbie Velasco (72 y.o. Male)     Date of Birth   1950    Social Security Number       Address   [address] [place] [id number]    Home Phone   [phone]    MRN   [id number]       [facility]    Marital Status                               Admission Date   12/30/22    Admission Type   Urgent    Admitting Provider   Fan Rob MD    Attending Provider   Fan Rob MD    Department, Room/Bed   [facility] ACUTE REHAB, 526/1       Discharge Date       Discharge Disposition       Discharge Destination                               Attending Provider: Fan Rob MD    Allergies: Amoxicillin, Albuterol, Asmanex (120 Metered Doses) [Mometasone Furoate], Lortab [Hydrocodone-acetaminophen], Morphine And Related, Prilosec [Omeprazole], Sodium Clavulanate, Statins, Sulfa Antibiotics, Symbicort [Budesonide-formoterol Fumarate]    Isolation: None   Infection: None   Code Status: CPR    Ht: 165.1 cm (65\")   Wt: 64 kg (141 lb)    Admission Cmt: None   Principal Problem: Traumatic hemorrhage of cerebrum [S06.36AA]                 Active Insurance as of 12/30/2022     Primary Coverage     Payor Plan Insurance Group Employer/Plan Group    HUMANA MEDICARE REPLACEMENT HUMANA MEDICARE REPLACEMENT [id number]     Payor Plan Address Payor Plan Phone Number Payor Plan Fax Number Effective Dates    [address] [address] [phone]  10/1/2019 - None Entered    [facility] [id number]       Subscriber Name Subscriber Birth Date Member ID       DEBBIE VELASCO 1950 [id number]           Secondary Coverage     Payor Plan Insurance Group Employer/Plan Group    [facility] [place] DEPT [id number]     Payor Plan Address Payor Plan Phone Number Payor Plan Fax Number Effective Dates    [facility] OFFICE OF [facility] CARE [phone]  2/24/2000 - None Entered    [address] [address]       [facility] [address]       Subscriber Name Subscriber Birth Date Member ID    "    BRIAN VELASCO 1950 [id number]                 Emergency Contacts      (Rel.) Home Phone Work Phone Mobile Phone    Annette Harvey (Significant Other) [phone] -- [phone]            [facility]  [address] [place] [address]  Phone:  [phone]  Fax:   Date: 2023      Ambulatory Referral to Home Health     Patient:  Brian Velasco MRN:  [id number]   [address] [facility] [address] :  1950  SSN:    Phone: [phone] Sex:  M      INSURANCE PAYOR PLAN GROUP # SUBSCRIBER ID   Primary:  Secondary:    HUMANA MEDICARE REPLACEMENT  [employer] [id number]  [id number]  [id number]  [id number]      Referring Provider Information:  CATRACHO MCLEOD Phone: [phone] Fax: [phone]       Referral Information:   # Visits:  999 Referral Type: Home Health [42]   Urgency:  Routine Referral Reason: Specialty Services Required   Start Date: 2023 End Date:  To be determined by Insurer   Diagnosis: Cerebrovascular accident (CVA) due to other mechanism (HCC) (I63.89 [ICD-10-CM] 434.91 [ICD-9-CM])      Refer to Dept:   Refer to Provider:   Refer to Provider Phone:   Refer to Facility:       Face to Face Visit Date: 2023  Follow-up provider for Plan of Care? I treated the patient in an acute care facility and will not continue treatment after discharge.  Follow-up provider: JAGDISH REES [[id number]]  Reason/Clinical Findings: cva  Describe mobility limitations that make leaving home difficult: walkes patient very slow  Nursing/Therapeutic Services Requested: Physical Therapy  Nursing/Therapeutic Services Requested: Occupational Therapy  PT orders: Therapeutic exercise  PT orders: Gait Training  PT orders: Home safety assessment  Weight Bearing Status: Full Weight Bearing  Occupational orders: Activities of daily living  Occupational orders: Strengthening  Occupational  orders: Cognition  Occupational orders: Fine motor  Occupational orders: Home safety assessment  Frequency: 1 Week 1     This document serves as a request of services and does not constitute Insurance authorization or approval of services.  To determine eligibility, please contact the members Insurance carrier to verify and review coverage.     If you have medical questions regarding this request for services. Please contact [facility] at [phone] during normal business hours.        Authorizing Provider:Fan Rob MD  Authorizing Provider's NPI: [id number]  Order Entered By: Fan Rob MD 1/12/2023  2:10 PM     Electronically signed by: Fan Rob MD 1/12/2023  2:10 PM

## 2023-01-12 NOTE — PLAN OF CARE
Goal Outcome Evaluation:      Pt t/fers sit<>stand w/ SBA, ambulates ~150' x 2 w/ RW w/ CGA and vc's for wx proximity, pt SBA for toilet t/aram w/ RW - does require vc's to stay on task or steps to perform task, pt performs B LE seated there ex x 20 reps, t/fers sit to sup w/ Mod Ind, pt uses RW to ambulate in room to perform tasks such as retrieving and putting away personal belongings, pt has good safety awareness, stays concerned w/ his O2 sats even when not feeling SOA, O2 94-99% during tx

## 2023-01-12 NOTE — PLAN OF CARE
Goal Outcome Evaluation:         Pt has slept well thus far this shift; Pt needs encouragement to complete tasks; Uses urinal without incident at bedside; Compliant with medication; VSS

## 2023-01-12 NOTE — THERAPY TREATMENT NOTE
Inpatient Rehabilitation - Physical Therapy Treatment Note       [facility]     Patient Name: Brian Garcia  : 1950  MRN: [id number]    Today's Date: 2023                    Admit Date: 2022      Visit Dx:     ICD-10-CM ICD-9-CM   1. Dysphagia, unspecified type  R13.10 787.20   2. Symbolic dysfunction  R48.9 784.60   3. Impaired mobility and ADLs  Z74.09 V49.89    Z78.9    4. Impaired functional mobility, balance, gait, and endurance  Z74.09 V49.89       Patient Active Problem List   Diagnosis   • Physical deconditioning   • Pulmonary nodules   • Leukocytosis   • History of pulmonary embolism   • Stage 4 very severe COPD by GOLD classification ([facility])   • Chronic respiratory failure with hypoxia, on home O2 therapy ([facility])   • Hypertension   • CVA (cerebral vascular accident) ([facility])   • Traumatic hemorrhage of cerebrum       Past Medical History:   Diagnosis Date   • COPD (chronic obstructive pulmonary disease) ([facility])    • Hypertension        Past Surgical History:   Procedure Laterality Date   • HIP ARTHROPLASTY         PT ASSESSMENT (last 12 hours)     IRF PT Evaluation and Treatment     Row Name 23          PT Time and Intention    Document Type daily treatment  -     Mode of Treatment physical therapy;individual therapy  -     Row Name 23          General Information    Patient Profile Reviewed yes  -     Existing Precautions/Restrictions fall  -     Limitations/Impairments hearing  -     Row Name 23          Living Environment    Primary Care Provided by self  -     Row Name 23          Home Use of Assistive/Adaptive Equipment    Equipment Currently Used at Home oxygen  -     Row Name 23          Pain Assessment    Pretreatment Pain Rating 0/10 - no pain  -     Posttreatment Pain Rating 0/10 - no pain  -     Row Name 23          Cognition/Psychosocial    Affect/Mental Status (Cognition) WFL  -      Orientation Status (Cognition) oriented x 4  -     Follows Commands (Cognition) delayed response/completion;increased processing time needed;follows two-step commands  -     Row Name 01/12/23 0930          Bed Mobility    Scooting/Bridging Musella (Bed Mobility) standby assist;verbal cues  -     Sit-Supine [place] (Bed Mobility) modified independence  -     Assistive Device (Bed Mobility) bed rails;head of bed elevated  -     Row Name 01/12/23 0930          Transfer Assessment/Treatment    Transfers sit-stand transfer;stand-sit transfer;bed-chair transfer;chair-bed transfer  -     Row Name 01/12/23 0930          Bed-Chair Transfer    Bed-Chair Musella (Transfers) standby assist  -     Assistive Device (Bed-Chair Transfers) walker, front-wheeled  -     Row Name 01/12/23 0930          Chair-Bed Transfer    Chair-Bed Musella (Transfers) standby assist  -     Assistive Device (Chair-Bed Transfers) walker, front-wheeled  -     Row Name 01/12/23 0930          Sit-Stand Transfer    Sit-Stand Musella (Transfers) standby assist  -     Assistive Device (Sit-Stand Transfers) walker, front-wheeled  -     Row Name 01/12/23 0930          Stand-Sit Transfer    Stand-Sit Musella (Transfers) standby assist  -     Assistive Device (Stand-Sit Transfers) walker, front-wheeled  -     Row Name 01/12/23 0930          Toilet Transfer    Type (Toilet Transfer) sit-stand;stand-sit  -     Musella Level (Toilet Transfer) standby assist  -     Assistive Device (Toilet Transfer) commode;walker, front-wheeled  -     Row Name 01/12/23 0930          Gait/Stairs (Locomotion)    Gait/Stairs Locomotion gait/ambulation independence;gait/ambulation assistive device;distance ambulated;gait pattern;gait deviations;maintains weight-bearing status  -     Musella Level (Gait) contact guard  -     Assistive Device (Gait) walker, front-wheeled  -     Distance in Feet (Gait) 4' x 5,  150', 150'  -     Deviations/Abnormal Patterns (Gait) kimber decreased;stride length decreased;gait speed decreased  -     Bilateral Gait Deviations forward flexed posture  cueing for walker proximity.  -     Comment, (Gait/Stairs) pt wanting to take time to check O2 levels after every gt distance, t/aram and exercise, pt takes extended time for tasks, performs mobility around room/bed to get personal belonging and put away personal belongings  -     Row Name 01/12/23 0930          Wheelchair Mobility/Management    Method of Wheelchair Locomotion (Mobility) bimanual (upper extremity) propulsion;bipedal (lower extremity) propulsion  -     Mobility Activities (Wheelchair) forward propulsion;backward propulsion;steering;turning  -     Forward Propulsion [place] (Wheelchair) standby assist  -     Distance Propelled in Feet (Wheelchair) 100'  -     Row Name 01/12/23 0930          Hip (Therapeutic Exercise)    Hip AROM (Therapeutic Exercise) bilateral;flexion;sitting;20 repititions  -     Row Name 01/12/23 0930          Knee (Therapeutic Exercise)    Knee AROM (Therapeutic Exercise) bilateral;LAQ (long arc quad);sitting;20 repititions  -     Row Name 01/12/23 0930          Ankle (Therapeutic Exercise)    Ankle AROM (Therapeutic Exercise) bilateral;dorsiflexion;plantarflexion;sitting;20 repititions  -     Row Name 01/12/23 0930          Positioning and Restraints    Pre-Treatment Position in bed  -     Post Treatment Position bed  -JW     In Bed fowlers;call light within reach;encouraged to call for assist;exit alarm on  -     Row Name 01/12/23 0930          Vital Signs    Pretreatment Heart Rate (beats/min) 100  -     Intratreatment Heart Rate (beats/min) 112  -     Posttreatment Heart Rate (beats/min) 108  -     Pre SpO2 (%) 99  -JW     O2 Delivery Pre Treatment supplemental O2  -     Intra SpO2 (%) 94  -     O2 Delivery Intra Treatment supplemental O2  -     Post SpO2 (%) 97   -     O2 Delivery Post Treatment supplemental O2  -     Pre Patient Position Sitting  -     Intra Patient Position Standing  -     Post Patient Position Supine  -     Row Name 01/12/23 0930          Therapy Assessment/Plan (PT)    Patient's Goals For Discharge return home  -     Row Name 01/12/23 0930          Therapy Assessment/Plan (PT)    Rehab Potential/Prognosis (PT) other (see comments)  fair  -     Frequency of Treatment (PT) --  5-6 days/wk  -     Estimated Duration of Therapy (PT) 2 weeks;4 weeks  -     Problem List (PT) balance;mobility;strength  -     Row Name 01/12/23 0930          Therapy Plan Review/Discharge Plan (PT)    Anticipated Equipment Needs at Discharge (PT Eval) front wheeled walker;hospital bed;wheelchair;wheelchair cushion  -     Anticipated Discharge Disposition (PT) skilled nursing facility;home with 24/7 care;home with home health  -     Row Name 01/12/23 0930          IRF PT Goals    Bed Mobility Goal Selection (PT-IRF) bed mobility, PT goal 1  -     Transfer Goal Selection (PT-IRF) transfers, PT goal 1;transfers, PT goal 2  -     Gait (Walking Locomotion) Goal Selection (PT-IRF) gait, PT goal 1;gait, PT goal 2  -     Wheelchair Locomotion Goal Selection (PT-IRF) wheelchair locomotion, PT goal (free text)  -     Stairs Goal Selection (PT-IRF) stairs, PT goal 1  -     Strength Goal Selection (PT-IRF) strength, PT goal 1 (free text)  -     Balance Goal Selection (PT) balance, PT goal 1  -     ROM Goal Selection (PT-IRF) ROM, PT goal 1 (free text)  -     Row Name 01/12/23 0930          Bed Mobility Goal 1 (PT-IRF)    Activity/Assistive Device (Bed Mobility Goal 1, PT-IRF) bed mobility activities, all  -     [place] Level (Bed Mobility Goal 1, PT-IRF) independent  -     Time Frame (Bed Mobility Goal 1, PT-IRF) by discharge  -     Progress/Outcomes (Bed Mobility Goal 1, PT-IRF) goal partially met  -     Row Name 01/12/23 0930           Transfer Goal 1 (PT-IRF)    Activity/Assistive Device (Transfer Goal 1, PT-IRF) sit-to-stand/stand-to-sit;bed-to-chair/chair-to-bed;wheelchair transfer  -JW     [place] Level (Transfer Goal 1, PT-IRF) standby assist  -JW     Time Frame (Transfer Goal 1, PT-IRF) 1 week  -JW     Progress/Outcomes (Transfer Goal 1, PT-IRF) goal partially met  -     Row Name 01/12/23 0930          Transfer Goal 2 (PT-IRF)    Activity/Assistive Device (Transfer Goal 2, PT-IRF) bed-to-chair/chair-to-bed;sit-to-stand/stand-to-sit;wheelchair transfer  -JW     [place] Level (Transfer Goal 2, PT-IRF) modified independence  -JW     Time Frame (Transfer Goal 2, PT-IRF) 2 weeks  -JW     Progress/Outcomes (Transfer Goal 2, PT-IRF) goal not met  -     Row Name 01/12/23 0930          Gait/Walking Locomotion Goal 1 (PT-IRF)    Activity/Assistive Device (Gait/Walking Locomotion Goal 1, PT-IRF) gait (walking locomotion);assistive device use;decrease fall risk;increase endurance/gait distance;walker, rolling  -JW     Gait/Walking Locomotion Distance Goal 1 (PT-IRF) 150ft x2  -JW     [place] Level (Gait/Walking Locomotion Goal 1, PT-IRF) standby assist  -JW     Time Frame (Gait/Walking Locomotion Goal 1, PT-IRF) 1 week  -JW     Progress/Outcomes (Gait/Walking Locomotion Goal 1, PT-IRF) goal not met  -     Row Name 01/12/23 0930          Gait/Walking Locomotion Goal 2 (PT-IRF)    Activity/Assistive Device (Gait/Walking Locomotion Goal 2, PT-IRF) gait (walking locomotion);assistive device use;decrease fall risk;increase endurance/gait distance  -JW     Gait/Walking Locomotion Distance Goal 2 (PT-IRF) 250ft x2  -JW     [place] Level (Gait/Walking Locomotion Goal 2, PT-IRF) modified independence  -JW     Time Frame (Gait/Walking Locomotion Goal 2, PT-IRF) 2 weeks  -JW     Progress/Outcomes (Gait/Walking Locomotion Goal 2, PT-IRF) goal not met  -     Row Name 01/12/23 0930          Wheelchair Locomotion Goal (PT-IRF)    Wheelchair  Locomotion Goal (PT-IRF) Pt will propel w/c 150ft with 2 turns with supervison  -     Time Frame (Wheelchair Locomotion Goal, PT-IRF) by discharge  -JW     Progress/Outcomes (Wheelchair Locomotion Goal, PT-IRF) goal not met  -     Row Name 01/12/23 0930          Stairs Goal 1 (PT-IRF)    Activity/Assistive Device (Stairs Goal 1, PT-IRF) ascending stairs;descending stairs;using handrail, left;using handrail, right;decrease fall risk  -     Number of Stairs (Stairs Goal 1, PT-IRF) 3  -JW     Mobile Level (Stairs Goal 1, PT-IRF) standby assist;modified independence  -JW     Time Frame (Stairs Goal 1, PT-IRF) 2 weeks  -JW     Progress/Outcomes (Stairs Goal 1, PT-IRF) goal not met  -     Row Name 01/12/23 0930          Strength Goal (PT-IRF)    Strength Goal 3 (PT-IRF) Pt will tolerate LE exercises OOB in chair and progress to standing exercises with VSS  -     Time Frame (Strength Goal 3, PT-IRF) by discharge  -JW     Progress/Outcomes (Strength Goal 3, PT-IRF) goal met  -     Row Name 01/12/23 0930          Balance Goal 1 (PT)    Activity/Assistive Device (Balance Goal 1, PT) assistive device use  -     Mobile Level/Cues Needed (Balance Goal 1, PT) conditional independence  -JW     Time Frame (Balance Goal 1, PT) by discharge  -     Progress/Outcomes (Balance Goal 1, PT) goal not met  -     Row Name 01/12/23 0930          Caregiver Training Goal 1 (PT-IRF)    Caregiver Training Goal 1 (PT-IRF) Caregiver will acknowledge understanding of home care iosntructions  -     Time Frame (Caregiver Training Goal 1, PT-IRF) by discharge  -     Progress/Outcomes (Caregiver Training Goal 1, PT-IRF) goal met  -           User Key  (r) = Recorded By, (t) = Taken By, (c) = Cosigned By    Initials Name Provider Type    Sima Callaway, PTA Physical Therapist Assistant                 Physical Therapy Education     Title: PT OT SLP Therapies (In Progress)     Topic: Physical Therapy (In  Progress)     Point: Mobility training (In Progress)     Learning Progress Summary           Patient Acceptance, E, NR by  at 1/11/2023 1532    Acceptance, E, NR by EUGENE at 1/10/2023 1241    Acceptance, E, NR by EUGENE at 1/9/2023 1243    Acceptance, E, NR by EUGENE at 1/9/2023 1112    Acceptance, E, NR by EUGENE at 1/3/2023 1206    Acceptance, E, NR by EUGENE at 1/2/2023 1219    Acceptance, E, NR by 1 at 12/31/2022 1345                   Point: Home exercise program (Not Started)     Learner Progress:  Not documented in this visit.          Point: Body mechanics (In Progress)     Learning Progress Summary           Patient Acceptance, E, NR by [facility] at 12/31/2022 1345                   Point: Precautions (In Progress)     Learning Progress Summary           Patient Acceptance, E, NR by [facility] at 12/31/2022 1345                               User Key     Initials Effective Dates Name Provider Type Discipline    [facility] 06/16/21 -  Nydia Muñoz, PT Physical Therapist PT     06/16/21 -  Yossi Haynes, PTA Physical Therapist Assistant PT                PT Recommendation and Plan    Frequency of Treatment (PT):  (5-6 days/wk)  Anticipated Equipment Needs at Discharge (PT Eval): front wheeled walker, hospital bed, wheelchair, wheelchair cushion          Outcome Measures     Row Name 01/12/23 1100 01/11/23 1446 01/11/23 1059       How much help from another person do you currently need...    Turning from your back to your side while in flat bed without using bedrails? 3  - 3  - 3  -EUGENE    Moving from lying on back to sitting on the side of a flat bed without bedrails? 3  - 3  - 3  -EUGENE    Moving to and from a bed to a chair (including a wheelchair)? 3  - 3  - 3  -EUGENE    Standing up from a chair using your arms (e.g., wheelchair, bedside chair)? 3  - 3  - 3  -EUGENE    Climbing 3-5 steps with a railing? 3  - 3  - 3  -EUGENE    To walk in hospital room? 3  - 3  - 3  -EUGENE    AM-PAC 6 Clicks Score (PT) 18  - 18  - 18  -EUGENE        Functional Assessment    Outcome Measure Options AM-PAC 6 Clicks Basic Mobility (PT)  - AM-PAC 6 Clicks Basic Mobility (PT)  - AM-PAC 6 Clicks Basic Mobility (PT)  -    Row Name 01/10/23 1200 01/10/23 1106          How much help from another person do you currently need...    Turning from your back to your side while in flat bed without using bedrails? -- 3  -EUGENE     Moving from lying on back to sitting on the side of a flat bed without bedrails? -- 3  -EUGENE     Moving to and from a bed to a chair (including a wheelchair)? -- 3  -EUGENE     Standing up from a chair using your arms (e.g., wheelchair, bedside chair)? -- 3  -EUGENE     Climbing 3-5 steps with a railing? -- 3  -EUGENE     To walk in hospital room? -- 3  -EUGENE     AM-PAC 6 Clicks Score (PT) -- 18  -EUGENE        Functional Assessment    Outcome Measure Options AM-PAC 6 Clicks Basic Mobility (PT)  - AM-[facility] 6 Clicks Basic Mobility (PT)  -           User Key  (r) = Recorded By, (t) = Taken By, (c) = Cosigned By    Initials Name Provider Type    Sima Callaway PTA Physical Therapist Assistant    Yossi Ragland PTA Physical Therapist Assistant                     Time Calculation:      PT Charges     Row Name 01/12/23 0930             Time Calculation    Start Time 0930  -      Stop Time 1055  -      Time Calculation (min) 85 min  -      PT Received On 01/12/23  -         Time Calculation- PT    Total Timed Code Minutes- PT 85 minute(s)  -            User Key  (r) = Recorded By, (t) = Taken By, (c) = Cosigned By    Initials Name Provider Type    Sima Callaway PTA Physical Therapist Assistant                Therapy Charges for Today     Code Description Service Date Service Provider Modifiers Qty    69164184975 HC PT THER PROC EA 15 MIN 1/12/2023 Sima Mota, PTA GP 1    36862888251 HC PT THERAPEUTIC ACT EA 15 MIN 1/12/2023 Sima Mota, PTA GP 3    52956407084 HC GAIT TRAINING EA 15 MIN 1/12/2023 Nakul  Sima LANDERS, PTA GP 2            PT G-Codes  Outcome Measure Options: AM-PAC 6 Clicks Basic Mobility (PT)  AM-PAC 6 Clicks Score (PT): 18  AM-PAC 6 Clicks Score (OT): 19  Modified Dk Scale: 2 - Slight disability.  Unable to carry out all previous activities but able to look after own affairs without assistance.      Sima Mota, PTA  1/12/2023

## 2023-01-12 NOTE — PATIENT CARE CONFERENCE
Attendance of weekly team conference:   Dr. Fan Rob, Lori Ram, SLP, Holly Sewell, MUNOZ/L, Pema Penaloza, PT, Hyacinth Duran, GWEN Coordinator, Yee Sifuentes, RN, Director of [facility], Kirstie Segal, OTR/L and Sj Mari, ELIGIOW, Jessica Carlson, Agency PT.     Dr. Rob initiated and led today's multidisciplinary team meeting. Patient's progress reviewed, GG codes reviewed, discharge date discussed and equipment needs addressed.       Discharge Disposition: Home with wife; home health PT/OT with care tenders.    Expected Discharge Date: 1-    Anticipated discharge orders/equipment: 5 fixed inch wheel rolling walker. Pt has shower bench.     Self-Care Admission Goal Date 1/12/23 Discharge   Eating 6 6 6     Oral hygiene 6 6 6     Toileting hygiene 5 6 6     Shower/bathe self 3 6 4     Upper body dressing 5 6 5     Lower body dressing 3 6 3     Putting on/taking off footwear 3 6 3           Mobility  Admission Goal Date  1/12/23 Discharge   A.Roll left and right 4 6 4     B.Sit to lying 4 6 4     C. Lying to sitting on side of bed 4 6 4     D.Sit to stand 4 6 4     E. Chair/bed-to-chair transfer    4 6 4     F.Toilet transfer    88 6 4     G. Car Transfer 88 6 88     I.Walk 10 feet 4 6 4     J.Walk 50 feet with two turns. 4 6 4     K.Walk 150 feet:  88 6 4     L.Walking 10 feet on uneven surfaces  88 6 88     M.1 step (curb) 88 6 4     N.4 steps 88 6 4     O.12 steps 9 9 88     P.Picking up object 88 6 88     R. Wheel 50 feet with two turns 4 6 4     S.Wheel 150 feet 88 6 3        Hearing, Speech and Vision Admission Goal  Date  1/12/23 Discharge   Expression of Ideas and Wants (consider both verbal and non-verbal expression and excluding language barriers)  4- Without Difficulty  3- Some difficulty  2- Frequently exhibits difficulty  1- Rarely/never exhibits clear speech  4 4 4     Understanding Verbal and Non-Verbal Content (with hearing aid or device, if used, and excluding language barriers)  4-  Comprehends without cues  3- Usually understands  2- 4 4 3

## 2023-01-13 VITALS
BODY MASS INDEX: 23.49 KG/M2 | WEIGHT: 141 LBS | DIASTOLIC BLOOD PRESSURE: 67 MMHG | HEART RATE: 106 BPM | SYSTOLIC BLOOD PRESSURE: 118 MMHG | TEMPERATURE: 98.9 F | HEIGHT: 65 IN | RESPIRATION RATE: 16 BRPM | OXYGEN SATURATION: 95 %

## 2023-01-13 PROBLEM — D50.8 IRON DEFICIENCY ANEMIA SECONDARY TO INADEQUATE DIETARY IRON INTAKE: Chronic | Status: ACTIVE | Noted: 2023-01-13

## 2023-01-13 PROCEDURE — 97535 SELF CARE MNGMENT TRAINING: CPT

## 2023-01-13 PROCEDURE — 97116 GAIT TRAINING THERAPY: CPT

## 2023-01-13 PROCEDURE — 97530 THERAPEUTIC ACTIVITIES: CPT

## 2023-01-13 PROCEDURE — 99238 HOSP IP/OBS DSCHRG MGMT 30/<: CPT | Performed by: ORTHOPAEDIC SURGERY

## 2023-01-13 PROCEDURE — 97110 THERAPEUTIC EXERCISES: CPT

## 2023-01-13 PROCEDURE — 63710000001 PREDNISONE PER 5 MG: Performed by: ORTHOPAEDIC SURGERY

## 2023-01-13 RX ORDER — FERROUS SULFATE TAB EC 324 MG (65 MG FE EQUIVALENT) 324 (65 FE) MG
324 TABLET DELAYED RESPONSE ORAL
Status: DISCONTINUED | OUTPATIENT
Start: 2023-01-13 | End: 2023-01-13 | Stop reason: HOSPADM

## 2023-01-13 RX ORDER — FERROUS SULFATE 324(65)MG
324 TABLET, DELAYED RELEASE (ENTERIC COATED) ORAL
Qty: 90 TABLET | Refills: 0 | Status: SHIPPED | OUTPATIENT
Start: 2023-01-13

## 2023-01-13 RX ADMIN — FERROUS SULFATE TAB EC 324 MG (65 MG FE EQUIVALENT) 324 MG: 324 (65 FE) TABLET DELAYED RESPONSE at 09:53

## 2023-01-13 RX ADMIN — PREDNISONE 5 MG: 5 TABLET ORAL at 08:03

## 2023-01-13 RX ADMIN — DOCUSATE SODIUM 100 MG: 100 CAPSULE, LIQUID FILLED ORAL at 08:03

## 2023-01-13 RX ADMIN — DILTIAZEM HYDROCHLORIDE 180 MG: 180 CAPSULE, COATED, EXTENDED RELEASE ORAL at 08:03

## 2023-01-13 RX ADMIN — Medication 1000 UNITS: at 08:03

## 2023-01-13 NOTE — DISCHARGE PLACEMENT REQUEST
"Patient is going to utilize [employer]. They have requested that you fax anything they need to [phone].    Debbie Velasco (72 y.o. Male)     Date of Birth   1950    Social Security Number       Address   [address] [place] [address]    Home Phone   [phone]    MRN   [id number]       Caodaism   Rastafari    Marital Status                               Admission Date   12/30/22    Admission Type   Urgent    Admitting Provider   Fan Rob MD    Attending Provider       Department, Room/Bed   [facility] ACUTE REHAB, 526/1       Discharge Date   1/13/2023    Discharge Disposition   Home or Self Care    Discharge Destination                               Attending Provider: (none)   Allergies: Amoxicillin, Albuterol, Asmanex (120 Metered Doses) [Mometasone Furoate], Lortab [Hydrocodone-acetaminophen], Morphine And Related, Prilosec [Omeprazole], Sodium Clavulanate, Statins, Sulfa Antibiotics, Symbicort [Budesonide-formoterol Fumarate]    Isolation: None   Infection: None   Code Status: CPR    Ht: 165.1 cm (65\")   Wt: 64 kg (141 lb)    Admission Cmt: None   Principal Problem: Traumatic hemorrhage of cerebrum [S06.36AA]                 Active Insurance as of 12/30/2022     Primary Coverage     Payor Plan Insurance Group Employer/Plan Group    HUMANA MEDICARE REPLACEMENT HUMANA MEDICARE REPLACEMENT [id number]     Payor Plan Address Payor Plan Phone Number Payor Plan Fax Number Effective Dates    [address] [address] [phone]  10/1/2019 - None Entered    [facility] [id number]       Subscriber Name Subscriber Birth Date Member ID       DEBBIE VELASCO 1950 [id number]           Secondary Coverage     Payor Plan Insurance Group Employer/Plan Group    [employer] [facility] [id number]     Payor Plan Address Payor Plan Phone Number Payor Plan Fax Number Effective Dates    [facility] OFFICE OF COMMUNITY CARE [phone]  2/24/2000 - None " Entered    [address] [address]       [facility] [address]       Subscriber Name Subscriber Birth Date Member ID       DEBBIE VELASCO 1950 [id number]                 Emergency Contacts      (Rel.) Home Phone Work Phone Mobile Phone    Annette Harvey (Significant Other) [phone] -- [phone]               Physical Therapy Notes (last 24 hours)      Yossi Haynes PTA at 23 1121  Version 1 of 1       Goal Outcome Evaluation:  Plan of Care Reviewed With: patient     Pt responded well to PT.  Pt is indep with bed mobility and requires SBA for t/fs and gait- 150 ft w/ RW. Pt educated on HEP and home safety. Pt is indep with WC mobility- 200 ft. Pts personal walker arrived and adjusted for best fit. Pt is to CT home with  care and  PT. Pt has met 5/10 goals.     Electronically signed by Yossi Haynes PTA at 23 1127     Yossi Haynes PTA at 23 1130  Version 1 of 1         Inpatient Rehabilitation - Physical Therapy Treatment Note/Discharge  [facility]     Patient Name: Debbie Velasco  : 1950  MRN: [id number]  Today's Date: 2023                Admit Date: 2022    Visit Dx:    ICD-10-CM ICD-9-CM   1. Cerebrovascular accident (CVA) due to other mechanism ([facility])  I63.89 434.91   2. Dysphagia, unspecified type  R13.10 787.20   3. Symbolic dysfunction  R48.9 784.60   4. Impaired mobility and ADLs  Z74.09 V49.89    Z78.9    5. Impaired functional mobility, balance, gait, and endurance  Z74.09 V49.89   6. Cerebrovascular accident (CVA), unspecified mechanism ([facility])  I63.9 434.91     Patient Active Problem List   Diagnosis   • Physical deconditioning   • Pulmonary nodules   • Leukocytosis   • History of pulmonary embolism   • Stage 4 very severe COPD by GOLD classification ([facility])   • Chronic respiratory failure with hypoxia, on home O2 therapy ([facility])   • Hypertension   • CVA (cerebral vascular accident) ([facility])   • Traumatic hemorrhage of cerebrum  "  • Iron deficiency anemia secondary to inadequate dietary iron intake     Past Medical History:   Diagnosis Date   • COPD (chronic obstructive pulmonary disease) (HCC)    • Hypertension      Past Surgical History:   Procedure Laterality Date   • HIP ARTHROPLASTY         PT ASSESSMENT (last 12 hours)     IRF PT Evaluation and Treatment     Row Name 01/13/23 0828          PT Time and Intention    Document Type daily treatment  -     Mode of Treatment physical therapy;individual therapy  -     Row Name 01/13/23 0828          General Information    Patient Profile Reviewed yes  -     Existing Precautions/Restrictions fall  -     Limitations/Impairments hearing  -     Row Name 01/13/23 0828          Living Environment    Primary Care Provided by self  -     Row Name 01/13/23 0828          Home Use of Assistive/Adaptive Equipment    Equipment Currently Used at Home oxygen  -     Row Name 01/13/23 0828          Pain Assessment    Pretreatment Pain Rating 0/10 - no pain  \"not to speak of\"  -     Posttreatment Pain Rating 0/10 - no pain  -     Row Name 01/13/23 0828          Cognition/Psychosocial    Affect/Mental Status (Cognition) WFL  -     Orientation Status (Cognition) oriented x 4  -     Follows Commands (Cognition) delayed response/completion;increased processing time needed;follows two-step commands  -     Row Name 01/13/23 0828          Bed Mobility    Bed Mobility rolling left;rolling right;supine-sit;sit-supine  -EUGENE     All Activities, [place] (Bed Mobility) independent  -EUGENE     Rolling Left [place] (Bed Mobility) independent  -EUGENE     Rolling Right [place] (Bed Mobility) independent  -EUGENE     Scooting/Bridging [place] (Bed Mobility) independent  -EUGENE     Supine-Sit [place] (Bed Mobility) independent  -EUGENE     Sit-Supine [place] (Bed Mobility) independent  -     Assistive Device (Bed Mobility) --  -EUGENE     Comment, (Bed Mobility) HOB flat. no bed rails  -EUGENE     " Row Name 01/13/23 0828          Transfer Assessment/Treatment    Transfers sit-stand transfer;stand-sit transfer;bed-chair transfer;chair-bed transfer  -EUGENE     Row Name 01/13/23 0828          Bed-Chair Transfer    Bed-Chair [place] (Transfers) standby assist  -EUGENE     Assistive Device (Bed-Chair Transfers) walker, front-wheeled  -EUGENE     Row Name 01/13/23 0828          Chair-Bed Transfer    Chair-Bed [place] (Transfers) standby assist  -EUGENE     Assistive Device (Chair-Bed Transfers) walker, front-wheeled  -EUGENE     Row Name 01/13/23 0828          Sit-Stand Transfer    Sit-Stand [place] (Transfers) standby assist  -EUGENE     Assistive Device (Sit-Stand Transfers) walker, front-wheeled  -EUGENE     Row Name 01/13/23 0828          Stand-Sit Transfer    Stand-Sit [place] (Transfers) standby assist  -EUGENE     Assistive Device (Stand-Sit Transfers) walker, front-wheeled  -EUGENE     Row Name 01/13/23 0828          Toilet Transfer    Type (Toilet Transfer) --  -EUGENE     [place] Level (Toilet Transfer) --  -EUGENE     Assistive Device (Toilet Transfer) --  -EUGENE     Row Name 01/13/23 0828          Wheelchair Transfer    Type (Wheelchair Transfer) sit-stand;stand-sit  -EUGENE     [place] Level (Wheelchair Transfer) standby assist  -     Assistive Device (Wheelchair Transfer) walker, front-wheeled  -EUGENE     Row Name 01/13/23 0828          Gait/Stairs (Locomotion)    Gait/Stairs Locomotion gait/ambulation independence;gait/ambulation assistive device;distance ambulated;gait pattern;gait deviations;maintains weight-bearing status  -EUGENE     [place] Level (Gait) standby assist  -EUGENE     Assistive Device (Gait) walker, front-wheeled  -     Distance in Feet (Gait) 150 ft  -EUGENE     Deviations/Abnormal Patterns (Gait) kimber decreased;stride length decreased;gait speed decreased  -EUGENE     Bilateral Gait Deviations forward flexed posture  cueing for walker proximity.  -EUGENE     Comment, (Gait/Stairs) pts personal walker arrived.  adjusted in accordingly  -     Row Name 01/13/23 0828          Wheelchair Mobility/Management    Method of Wheelchair Locomotion (Mobility) bimanual (upper extremity) propulsion;bipedal (lower extremity) propulsion  -     Mobility Activities (Wheelchair) forward propulsion;backward propulsion;steering;turning  -     Forward Propulsion [place] (Wheelchair) independent  -     Distance Propelled in Feet (Wheelchair) 200  -[facility] Name 01/13/23 0828          Motor Skills    Therapeutic Exercise --  edu on HEP and home safety. pt demonstrated understanding  -[facility] Name 01/13/23 0828          Positioning and Restraints    Pre-Treatment Position sitting in chair/recliner  -     Post Treatment Position wheelchair  -     Row Name 01/13/23 0828          Vital Signs    Pretreatment Heart Rate (beats/min) 108  -     Intratreatment Heart Rate (beats/min) 111  -EUGENE     Posttreatment Heart Rate (beats/min) 110  -EUGENE     Pre SpO2 (%) 98  -EUGENE     O2 Delivery Pre Treatment supplemental O2  -     Intra SpO2 (%) 97  -     O2 Delivery Intra Treatment supplemental O2  -     Post SpO2 (%) 97  -     O2 Delivery Post Treatment supplemental O2  -EUGENE     Pre Patient Position Sitting  -     Post Patient Position Sitting  -[facility] Name 01/13/23 0828          Therapy Assessment/Plan (PT)    Patient's Goals For Discharge return home  -[facility] Name 01/13/23 0828          Therapy Assessment/Plan (PT)    Rehab Potential/Prognosis (PT) other (see comments)  fair  -     Frequency of Treatment (PT) --  5-6 days/wk  -     Estimated Duration of Therapy (PT) 2 weeks;4 weeks  -     Problem List (PT) balance;mobility;strength  -     Row Name 01/13/23 0828          Therapy Plan Review/Discharge Plan (PT)    Anticipated Equipment Needs at Discharge (PT Eval) front wheeled walker;hospital bed;wheelchair;wheelchair cushion  -     Anticipated Discharge Disposition (PT) skilled nursing facility;home with 24/7  care;home with home health  -     Row Name 01/13/23 0828          IRF PT Goals    Bed Mobility Goal Selection (PT-IRF) bed mobility, PT goal 1  -     Transfer Goal Selection (PT-IRF) transfers, PT goal 1;transfers, PT goal 2  -     Gait (Walking Locomotion) Goal Selection (PT-IRF) gait, PT goal 1;gait, PT goal 2  -     Wheelchair Locomotion Goal Selection (PT-IRF) wheelchair locomotion, PT goal (free text)  -     Stairs Goal Selection (PT-IRF) stairs, PT goal 1  -EUGENE     Strength Goal Selection (PT-IRF) strength, PT goal 1 (free text)  -     Balance Goal Selection (PT) balance, PT goal 1  -     ROM Goal Selection (PT-IRF) ROM, PT goal 1 (free text)  -     Row Name 01/13/23 0828          Bed Mobility Goal 1 (PT-IRF)    Activity/Assistive Device (Bed Mobility Goal 1, PT-IRF) bed mobility activities, all  -EUGENE     [place] Level (Bed Mobility Goal 1, PT-IRF) independent  -EUGENE     Time Frame (Bed Mobility Goal 1, PT-IRF) by discharge  -EUGENE     Progress/Outcomes (Bed Mobility Goal 1, PT-IRF) goal met   -     Row Name 01/13/23 0828          Transfer Goal 1 (PT-IRF)    Activity/Assistive Device (Transfer Goal 1, PT-IRF) sit-to-stand/stand-to-sit;bed-to-chair/chair-to-bed;wheelchair transfer  -EUGENE     [place] Level (Transfer Goal 1, PT-IRF) standby assist  -EUGENE     Time Frame (Transfer Goal 1, PT-IRF) 1 week  -EUGENE     Progress/Outcomes (Transfer Goal 1, PT-IRF) goal met   -     Row Name 01/13/23 0828          Transfer Goal 2 (PT-IRF)    Activity/Assistive Device (Transfer Goal 2, PT-IRF) bed-to-chair/chair-to-bed;sit-to-stand/stand-to-sit;wheelchair transfer  -EUGENE     [place] Level (Transfer Goal 2, PT-IRF) modified independence  -EUGENE     Time Frame (Transfer Goal 2, PT-IRF) 2 weeks  -EUGENE     Progress/Outcomes (Transfer Goal 2, PT-IRF) goal not met  -     Row Name 01/13/23 0828          Gait/Walking Locomotion Goal 1 (PT-IRF)    Activity/Assistive Device (Gait/Walking Locomotion Goal 1, PT-IRF)  gait (walking locomotion);assistive device use;decrease fall risk;increase endurance/gait distance;walker, rolling  -EUGENE     Gait/Walking Locomotion Distance Goal 1 (PT-IRF) 150ft x2  -EUGENE     [place] Level (Gait/Walking Locomotion Goal 1, PT-IRF) standby assist  -EUGENE     Time Frame (Gait/Walking Locomotion Goal 1, PT-IRF) 1 week  -EUGENE     Progress/Outcomes (Gait/Walking Locomotion Goal 1, PT-IRF) goal not met  -     Row Name 01/13/23 0828          Gait/Walking Locomotion Goal 2 (PT-IRF)    Activity/Assistive Device (Gait/Walking Locomotion Goal 2, PT-IRF) gait (walking locomotion);assistive device use;decrease fall risk;increase endurance/gait distance  -EUGENE     Gait/Walking Locomotion Distance Goal 2 (PT-IRF) 250ft x2  -EUGENE     [place] Level (Gait/Walking Locomotion Goal 2, PT-IRF) modified independence  -EUGENE     Time Frame (Gait/Walking Locomotion Goal 2, PT-IRF) 2 weeks  -EUGENE     Progress/Outcomes (Gait/Walking Locomotion Goal 2, PT-IRF) goal not met  -     Row Name 01/13/23 0828          Wheelchair Locomotion Goal (PT-IRF)    Wheelchair Locomotion Goal (PT-IRF) Pt will propel w/c 150ft with 2 turns with supervison  -EUGENE     Time Frame (Wheelchair Locomotion Goal, PT-IRF) by discharge  -EUGENE     Progress/Outcomes (Wheelchair Locomotion Goal, PT-IRF) goal met   -     Row Name 01/13/23 0828          Stairs Goal 1 (PT-IRF)    Activity/Assistive Device (Stairs Goal 1, PT-IRF) ascending stairs;descending stairs;using handrail, left;using handrail, right;decrease fall risk  -EUGENE     Number of Stairs (Stairs Goal 1, PT-IRF) 3  -EUGENE     [place] Level (Stairs Goal 1, PT-IRF) standby assist;modified independence  -EUGENE     Time Frame (Stairs Goal 1, PT-IRF) 2 weeks  -EUGENE     Progress/Outcomes (Stairs Goal 1, PT-IRF) goal not met  -     Row Name 01/13/23 0828          Strength Goal (PT-IRF)    Strength Goal 3 (PT-IRF) Pt will tolerate LE exercises OOB in chair and progress to standing exercises with VSS  -EUGENE      Time Frame (Strength Goal 3, PT-IRF) by discharge  -EUGENE     Progress/Outcomes (Strength Goal 3, PT-IRF) goal met   -EUGENE     Row Name 01/13/23 0828          Balance Goal 1 (PT)    Activity/Assistive Device (Balance Goal 1, PT) assistive device use  -EUGENE     [place] Level/Cues Needed (Balance Goal 1, PT) conditional independence  -EUGENE     Time Frame (Balance Goal 1, PT) by discharge  -EUGENE     Progress/Outcomes (Balance Goal 1, PT) goal not met  -EUGENE     Row Name 01/13/23 0828          Caregiver Training Goal 1 (PT-IRF)    Caregiver Training Goal 1 (PT-IRF) Caregiver will acknowledge understanding of home care iosntructions  -EUGENE     Time Frame (Caregiver Training Goal 1, PT-IRF) by discharge  -EUGENE     Progress/Outcomes (Caregiver Training Goal 1, PT-IRF) goal met   -EUGENE     Row Name 01/13/23 0828          Discharge Summary (PT)    Outcomes Achieved/Progress Made Upon Discharge (PT) goals partially achieved prior to discharge;progress was made toward goals  -EUGENE     Transfer to Another Level of Care or Facility (PT) home with assist recommended  -     Discharge Summary Statement (PT) pts caregiver will provide 24/7 care and particiapted in caregiver training.  -EUGENE           User Key  (r) = Recorded By, (t) = Taken By, (c) = Cosigned By    Initials Name Provider Type    Yossi Ragland, PTA Physical Therapist Assistant                Physical Therapy Education     Title: PT OT SLP Therapies (In Progress)     Topic: Physical Therapy (In Progress)     Point: Mobility training (In Progress)     Learning Progress Summary           Patient Acceptance, E, NR by EUGENE at 1/13/2023 1118    Acceptance, E, NR by EUGENE at 1/11/2023 1532    Acceptance, E, NR by EUGENE at 1/10/2023 1241    Acceptance, E, NR by EUGENE at 1/9/2023 1243    Acceptance, E, NR by EUGENE at 1/9/2023 1112    Acceptance, E, NR by EUGENE at 1/3/2023 1206    Acceptance, E, NR by EUGENE at 1/2/2023 1219    Acceptance, E, NR by JC1 at 12/31/2022 1345                   Point: Home  exercise program (Not Started)     Learner Progress:  Not documented in this visit.          Point: Body mechanics (In Progress)     Learning Progress Summary           Patient Acceptance, E, NR by [facility] at 12/31/2022 1345                   Point: Precautions (In Progress)     Learning Progress Summary           Patient Acceptance, E, NR by [facility] at 12/31/2022 1345                               User Key     Initials Effective Dates Name Provider Type Discipline    [facility] 06/16/21 -  Nydia Muñoz, PT Physical Therapist PT     06/16/21 -  Yossi Haynes, PTA Physical Therapist Assistant PT                PT Recommendation and Plan  Frequency of Treatment (PT):  (5-6 days/wk)  Anticipated Equipment Needs at Discharge (PT Eval): front wheeled walker, hospital bed, wheelchair, wheelchair cushion  Plan of Care Reviewed With: patient  Progress: improving  Outcome Evaluation: PM tx completed. pt t/f to bed w/ CGA and particiapted in standing LE ther ex. pt left sitting EOB with caregiver. nsg aware. no new goals met at this time. pt would continue to benefit from PT services.     Outcome Measures     Row Name 01/13/23 0828 01/12/23 1100 01/11/23 1446       How much help from another person do you currently need...    Turning from your back to your side while in flat bed without using bedrails? 4  -EUGENE 3  - 3  -EUGENE    Moving from lying on back to sitting on the side of a flat bed without bedrails? 4  - 3  - 3  -EUGENE    Moving to and from a bed to a chair (including a wheelchair)? 3  -EUGENE 3  - 3  -EUGENE    Standing up from a chair using your arms (e.g., wheelchair, bedside chair)? 3  -EUGENE 3  - 3  -EUGENE    Climbing 3-5 steps with a railing? 3  -EUGENE 3  -JW 3  -EUGENE    To walk in hospital room? 3  -EUGENE 3  -JW 3  -EUGENE    AM-PAC 6 Clicks Score (PT) 20  -EUGENE 18  - 18  -       Functional Assessment    Outcome Measure Options AM-PAC 6 Clicks Basic Mobility (PT)  -EUGENE AM-PAC 6 Clicks Basic Mobility (PT)  - AM-PAC 6 Clicks Basic Mobility  (PT)  -EUGENE    Row Name 01/11/23 1059 01/10/23 1200          How much help from another person do you currently need...    Turning from your back to your side while in flat bed without using bedrails? 3  -EUGENE --     Moving from lying on back to sitting on the side of a flat bed without bedrails? 3  -EUGENE --     Moving to and from a bed to a chair (including a wheelchair)? 3  -EUGENE --     Standing up from a chair using your arms (e.g., wheelchair, bedside chair)? 3  -EUGENE --     Climbing 3-5 steps with a railing? 3  -EUGENE --     To walk in hospital room? 3  -EUGENE --     AM-PAC 6 Clicks Score (PT) 18  -EUGENE --        Functional Assessment    Outcome Measure Options AM-PAC 6 Clicks Basic Mobility (PT)  -EUGENE AM-PAC 6 Clicks Basic Mobility (PT)  -EUGENE           User Key  (r) = Recorded By, (t) = Taken By, (c) = Cosigned By    Initials Name Provider Type    Sima Callaway, PTA Physical Therapist Assistant    Yossi Ragland, REGINE Physical Therapist Assistant                 Time Calculation:    PT Charges     Row Name 01/13/23 1129             Time Calculation    Start Time 0828  -      Stop Time 0916  -      Time Calculation (min) 48 min  -         Time Calculation- PT    Total Timed Code Minutes- PT 48 minute(s)  -         Timed Charges    97770 - Gait Training Minutes  15  -      64213 - PT Therapeutic Activity Minutes 8  -      68508 - PT Self Care/Mgmt Minutes 15  -      59407 - Wheelchair Management Training 10  -         Total Minutes    Timed Charges Total Minutes 48  -EUGENE       Total Minutes 48  -EUGENE            User Key  (r) = Recorded By, (t) = Taken By, (c) = Cosigned By    Initials Name Provider Type    Yossi Ragland, REGINE Physical Therapist Assistant                Therapy Charges for Today     Code Description Service Date Service Provider Modifiers Qty    66874606582 HC GAIT TRAINING EA 15 MIN 1/13/2023 Yossi Haynes, REGINE GP 1    28827642237 HC PT THERAPEUTIC ACT EA 15 MIN 1/13/2023 [place]  Yossi PORTILLO PTA GP 1    99547327631  PT SELF CARE/MGMT/TRAIN EA 15 MIN 2023 Yossi Haynes PTA GP 1        Mobility  Admission Goal Date Discharge   A.Roll left and right    6   B.Sit to lying    6   C. Lying to sitting on side of bed    6   D.Sit to stand    4   E. Chair/bed-to-chair transfer      4   F.Toilet transfer       4   G. Car Transfer    88   I.Walk 10 feet    4   J.Walk 50 feet with two turns.    4   K.Walk 150 feet:     4   L.Walking 10 feet on uneven surfaces     88   M.1 step (curb)    4   N.4 steps    4   O.12 steps    88   P.Picking up object    88   R. Wheel 50 feet with two turns    6   S.Wheel 150 feet    6           PT G-Codes  Outcome Measure Options: AM-PAC 6 Clicks Basic Mobility (PT)  AM-PAC 6 Clicks Score (PT): 20  AM-PAC 6 Clicks Score (OT): 19  Modified [place] Scale: 2 - Slight disability.  Unable to carry out all previous activities but able to look after own affairs without assistance.         Yossi Haynes PTA  2023         Electronically signed by Yossi Haynes PTA at 23 1134          Occupational Therapy Notes (last 24 hours)      Holly Sewell COTA at 23 1820          Inpatient Rehabilitation - Occupational Therapy Treatment Note    [facility]     Patient Name: Brian Garcia  : 1950  MRN: [id number]    Today's Date: 2023                 Admit Date: 2022         Self-Care Admission Goal Date Discharge   Eating  6     Oral hygiene  6     Toileting hygiene  5     Shower/bathe self  5     Upper body dressing  5     Lower body dressing  4     Putting on/taking off footwear  3              ICD-10-CM ICD-9-CM   1. Cerebrovascular accident (CVA) due to other mechanism ([facility])  I63.89 434.91   2. Dysphagia, unspecified type  R13.10 787.20   3. Symbolic dysfunction  R48.9 784.60   4. Impaired mobility and ADLs  Z74.09 V49.89    Z78.9    5. Impaired functional mobility, balance, gait, and endurance  Z74.09 V49.89   6.  Cerebrovascular accident (CVA), unspecified mechanism ([facility])  I63.9 434.91       Patient Active Problem List   Diagnosis   • Physical deconditioning   • Pulmonary nodules   • Leukocytosis   • History of pulmonary embolism   • Stage 4 very severe COPD by GOLD classification ([facility])   • Chronic respiratory failure with hypoxia, on home O2 therapy ([facility])   • Hypertension   • CVA (cerebral vascular accident) ([facility])   • Traumatic hemorrhage of cerebrum       Past Medical History:   Diagnosis Date   • COPD (chronic obstructive pulmonary disease) ([facility])    • Hypertension        Past Surgical History:   Procedure Laterality Date   • HIP ARTHROPLASTY               IRF OT ASSESSMENT FLOWSHEET (last 12 hours)     IRF OT Evaluation and Treatment     Row Name 01/12/23 1450 01/12/23 1120       OT Time and Intention    Document Type daily treatment  -LM daily treatment  -LM    Mode of Treatment individual therapy;occupational therapy  -LM individual therapy;occupational therapy  -LM    Patient Effort good  -LM good  -LM    Row Name 01/12/23 1450 01/12/23 1120       General Information    Patient Profile Reviewed yes  -LM yes  -LM    Existing Precautions/Restrictions fall  -LM fall  -LM    Limitations/Impairments hearing  -LM hearing  -LM    Row Name 01/12/23 1450 01/12/23 1120       Pain Assessment    Pretreatment Pain Rating 0/10 - no pain  -LM 0/10 - no pain  -LM    Posttreatment Pain Rating 0/10 - no pain  -LM 0/10 - no pain  -LM    Row Name 01/12/23 1450 01/12/23 1120       Cognition/Psychosocial    Orientation Status (Cognition) oriented x 4  -LM oriented x 4  -LM    Follows Commands (Cognition) WFL  -LM WFL  -LM    Row Name 01/12/23 1450 01/12/23 1120       Lower Body Dressing    [place] Level (Lower Body Dressing) doff;don;shoes/slippers;socks;independent  -LM doff;don;shoes/slippers;socks;independent  -LM    Position (Lower Body Dressing) supported sitting;supported standing  -LM supported sitting;supported standing  -LM     Row Name 01/12/23 1450 01/12/23 1120       Grooming    [place] Level (Grooming) grooming skills;deodorant application;hair care, combing/brushing;oral care regimen;wash face, hands;set up  -LM grooming skills;deodorant application;hair care, combing/brushing;oral care regimen;wash face, hands;set up  -LM    Position (Grooming) sink side;supported sitting  w/c  -LM sink side;supported sitting  w/c  -LM    Row Name 01/12/23 1450 01/12/23 1120       Bed Mobility    Bed Mobility bed mobility (all) activities  -LM bed mobility (all) activities  -LM    Supine-Sit [place] (Bed Mobility) independent  -LM independent  -LM    Sit-Supine [place] (Bed Mobility) independent  -LM independent  -LM    Row Name 01/12/23 1450 01/12/23 1120       Functional Mobility    Functional Mobility- Ind. Level contact guard assist;verbal cues required;supervision required  -LM contact guard assist;verbal cues required;supervision required  -LM    Functional Mobility- Device walker, front-wheeled  -LM walker, front-wheeled  -LM    Row Name 01/12/23 1450 01/12/23 1120       Transfer Assessment/Treatment    Transfers sit-stand transfer;stand-sit transfer;shower transfer  -LM sit-stand transfer;stand-sit transfer;shower transfer  -LM    Row Name 01/12/23 1450 01/12/23 1120       Sit-Stand Transfer    Sit-Stand [place] (Transfers) contact guard  -LM contact guard  -LM    Assistive Device (Sit-Stand Transfers) walker, front-wheeled  -LM walker, front-wheeled  -LM    Row Name 01/12/23 1450 01/12/23 1120       Stand-Sit Transfer    Stand-Sit [place] (Transfers) contact guard  -LM contact guard  -LM    Assistive Device (Stand-Sit Transfers) walker, front-wheeled  -LM walker, front-wheeled  -LM    Row Name 01/12/23 1450 01/12/23 1120       Toilet Transfer    Type (Toilet Transfer) sit-stand;stand-sit;stand pivot/stand step  -LM sit-stand;stand-sit;stand pivot/stand step  -LM    [place] Level (Toilet Transfer) contact  guard;standby assist;verbal cues  -LM contact guard;standby assist;verbal cues  -LM    Row Name 01/12/23 1120          Shower Transfer    Type (Shower Transfer) sit-stand;stand-sit;stand pivot/stand step  -LM     [place] Level (Shower Transfer) contact guard  -LM     Row Name 01/12/23 1450 01/12/23 1120       Motor Skills    Motor Skills coordination  -LM coordination  -LM    Coordination WNL  -LM --    Additional Documentation --   ther act at table top with problem solving act with clock and good perf no cues simple puzzle act with good perf slight delay in initation and difficult puzzle with good perf after instructions and light delay in initation and processing perf well  -LM --    Row Name 01/12/23 1120          Shoulder (Therapeutic Exercise)    Shoulder (Therapeutic Exercise) AROM (active range of motion)  -LM     Row Name 01/12/23 1120          Elbow/Forearm (Therapeutic Exercise)    Elbow/Forearm (Therapeutic Exercise) AROM (active range of motion)  -LM     Row Name 01/12/23 1120          Aerobic Exercise    Type (Aerobic Exercise) arm bike  -LM     Time Performed (Aerobic Exercise) 15  -LM     Row Name 01/12/23 1450          Positioning and Restraints    Pre-Treatment Position in bed  -LM     Post Treatment Position chair  -LM     Row Name 01/12/23 1450 01/12/23 1120       Therapy Assessment/Plan (OT)    Patient's Goals For Discharge return home  -LM return home  -LM    Row Name 01/12/23 1450 01/12/23 1120       Therapy Assessment/Plan (OT)    Rehab Potential/Prognosis (OT) good, to achieve stated therapy goals  -LM good, to achieve stated therapy goals  -LM    Frequency of Treatment (OT) other (see comments)  5-6 days/week  -LM other (see comments)  5-6 days/week  -LM    Estimated Duration of Therapy (OT) until facility discharge  -LM until facility discharge  -LM    Problem List (OT) balance;coordination;mobility;strength;postural control  -LM balance;coordination;mobility;strength;postural control   -LM    Activity Limitations Related to Problem List (OT) unable to ambulate safely;BADLs not performed adequately or safely;unable to transfer safely;IADLs not performed adequately or safely;community activities not performed adequately or safely;home management activity not performed adequately or safely  -LM unable to ambulate safely;BADLs not performed adequately or safely;unable to transfer safely;IADLs not performed adequately or safely;community activities not performed adequately or safely;home management activity not performed adequately or safely  -LM    Row Name 01/12/23 1450 01/12/23 1120       Therapy Plan Review/Discharge Plan (OT)    Anticipated Discharge Disposition (OT) home with 24/7 care  -LM home with 24/7 care  -LM    Row Name 01/12/23 1450 01/12/23 1120       Bathing Goal 1 (OT-IRF)    Activity/Device (Bathing Goal 1, OT-IRF) lower body bathing  -LM lower body bathing  -LM    [place] Level (Bathing Goal 1, OT-IRF) modified independence  -LM modified independence  -LM    Time Frame (Bathing Goal 1, OT-IRF) by discharge;long-term goal (LTG)  -LM by discharge;long-term goal (LTG)  -LM    Progress/Outcomes (Bathing Goal 1, OT-IRF) goal not met  -LM goal not met  -LM    Row Name 01/12/23 1450 01/12/23 1120       LB Dressing Goal 1 (OT-IRF)    Activity/Device (LB Dressing Goal 1, OT-IRF) lower body dressing  -LM lower body dressing  -LM    [place] (LB Dressing Goal 1, OT-IRF) modified independence  -LM modified independence  -LM    Time Frame (LB Dressing Goal 1, OT-IRF) long-term goal (LTG);by discharge  -LM long-term goal (LTG);by discharge  -LM    Progress/Outcomes (LB Dressing Goal 1, OT-IRF) goal not met  -LM goal not met  -LM    Row Name 01/12/23 1450 01/12/23 1120       Strength Goal 1 (OT-IRF)    Strength Goal 1 (OT-IRF) Pt will increase BUE strength in all planes by one level for increased strength and endurance required for ADL routine.  -LM Pt will increase BUE strength in all  planes by one level for increased strength and endurance required for ADL routine.  -LM    Time Frame (Strength Goal 1, OT-IRF) long-term goal (LTG);by discharge  -LM long-term goal (LTG);by discharge  -LM    Progress/Outcomes (Strength Goal 1, OT-IRF) goal not met  -LM goal not met  -LM    Row Name 01/12/23 1450 01/12/23 1120        Endurance Goal 1 (OT)    Activity Level (Endurance Goal 1, OT) endurance 2 good  -LM endurance 2 good  -LM    Progress/Outcome (Endurance Goal 1, OT) goal not met  -LM goal not met  -LM          User Key  (r) = Recorded By, (t) = Taken By, (c) = Cosigned By    Initials Name Effective Dates    LM Holly Sewell, ALEXANDER 06/16/21 -                  Occupational Therapy Education     Title: PT OT SLP Therapies (In Progress)     Topic: Occupational Therapy (In Progress)     Point: ADL training (Done)     Description:   Instruct learner(s) on proper safety adaptation and remediation techniques during self care or transfers.   Instruct in proper use of assistive devices.              Learning Progress Summary           Patient Acceptance, E, VU,NR by RC at 1/6/2023 1255    Acceptance, E, VU,NR by RC at 1/5/2023 1517    Acceptance, E,TB, VU by BB at 12/31/2022 1522    Acceptance, E,TB, VU by CM at 12/31/2022 1211    Comment: OT POC, Role of OT, safe ADL mobility and t/f                   Point: Home exercise program (Not Started)     Description:   Instruct learner(s) on appropriate technique for monitoring, assisting and/or progressing therapeutic exercises/activities.              Learner Progress:  Not documented in this visit.          Point: Precautions (Done)     Description:   Instruct learner(s) on prescribed precautions during self-care and functional transfers.              Learning Progress Summary           Patient Acceptance, E, VU,NR by RC at 1/6/2023 1255    Acceptance, E, VU,NR by RC at 1/5/2023 1517                   Point: Body mechanics (Done)     Description:   Instruct  learner(s) on proper positioning and spine alignment during self-care, functional mobility activities and/or exercises.              Learning Progress Summary           Patient Acceptance, E, VU,NR by  at 1/6/2023 1255    Acceptance, E, VU,NR by RC at 1/5/2023 1517                               User Key     Initials Effective Dates Name Provider Type Discipline    BB 06/16/21 -  Lisa José COTA Occupational Therapist Assistant OT    RC 06/16/21 -  Annette Carey COTA Occupational Therapist Assistant OT    CM 11/18/22 -  Ghazala Alfonso OT Occupational Therapist OT                    OT Recommendation and Plan         Plan of Care Review  Plan of Care Reviewed With: patient  Progress: improving  Outcome Evaluation: perf well thsi date  bed mob with ind x 2 donning shoes with cga and use of shoe horn.  also perf funct tf to wheelchair with rw perf with sba  wheelchair mob with ind   perf ther ex am and problem solving and puzzle act in pm  pt able to perf clock task adding numbers and time with no difficult no cues  also perf simple and difficult puzzle tasks with no cues although requiring time initially for initation and processing  Plan of Care Reviewed With: patient  Progress: improving       Outcome Measures     Row Name 01/12/23 1100 01/11/23 1446 01/11/23 1059       How much help from another person do you currently need...    Turning from your back to your side while in flat bed without using bedrails? 3  - 3  -EUGENE 3  -EUGENE    Moving from lying on back to sitting on the side of a flat bed without bedrails? 3  - 3  -EUGENE 3  -EUGENE    Moving to and from a bed to a chair (including a wheelchair)? 3  - 3  -EUGENE 3  -EUGENE    Standing up from a chair using your arms (e.g., wheelchair, bedside chair)? 3  - 3  -EUGENE 3  -EUGENE    Climbing 3-5 steps with a railing? 3  - 3  -EUGENE 3  -EUGENE    To walk in hospital room? 3  - 3  -EUGENE 3  -EUGENE    AM-PAC 6 Clicks Score (PT) 18  -JW 18  -EUGENE 18  -EUGENE       Functional Assessment     Outcome Measure Options -PAC 6 Clicks Basic Mobility (PT)  -[facility]-PAC 6 Clicks Basic Mobility (PT)  - AM-PAC 6 Clicks Basic Mobility (PT)  -    Row Name 01/10/23 1200 01/10/23 1106          How much help from another person do you currently need...    Turning from your back to your side while in flat bed without using bedrails? -- 3  -EUGENE     Moving from lying on back to sitting on the side of a flat bed without bedrails? -- 3  -EUGENE     Moving to and from a bed to a chair (including a wheelchair)? -- 3  -EUGENE     Standing up from a chair using your arms (e.g., wheelchair, bedside chair)? -- 3  -EUGENE     Climbing 3-5 steps with a railing? -- 3  -EUGENE     To walk in hospital room? -- 3  -EUGENE     AM-PAC 6 Clicks Score (PT) -- 18  -EUGENE        Functional Assessment    Outcome Measure Options -[facility] 6 Clicks Basic Mobility (PT)  -[facility] 6 Clicks Basic Mobility (PT)  -           User Key  (r) = Recorded By, (t) = Taken By, (c) = Cosigned By    Initials Name Provider Type    JW Sima Mota, PTA Physical Therapist Assistant    Yossi Ragland, PTA Physical Therapist Assistant                  Time Calculation:      Time Calculation- OT     Row Name 01/12/23 1811 01/12/23 1756          Time Calculation- OT    OT Start Time 1120  -LM 1450  -LM     OT Stop Time 1205  -LM 1535  -LM     OT Time Calculation (min) 45 min  -LM 45 min  -LM     Total Timed Code Minutes- OT -- 45 minute(s)  -LM     OT Received On 01/12/23  -LM 01/12/23  -LM        Timed Charges    24088 - OT Therapeutic Exercise Minutes -- 45  -LM     74780 - OT Therapeutic Activity Minutes 45  -LM --        Total Minutes    Timed Charges Total Minutes 45  -LM 45  -LM      Total Minutes 45  -LM 45  -LM           User Key  (r) = Recorded By, (t) = Taken By, (c) = Cosigned By    Initials Name Provider Type    LM Holly Sewell COTA Occupational Therapist Assistant              Therapy Charges for Today     Code Description Service Date Service  Provider Modifiers Qty    16555012530 HC OT SELF CARE/MGMT/TRAIN EA 15 MIN 2023 Holly Sewell COTA GO 7    47053086868 HC OT THERAPEUTIC ACT EA 15 MIN 2023 Holly Sewell COTA GO 2    32443060734 HC OT THER PROC EA 15 MIN 2023 Holly Sewell COTA GO 1    27294844723 HC OT THERAPEUTIC ACT EA 15 MIN 2023 Holly Sewell COTA GO 3                   ALEXANDER Fragoso  2023    Electronically signed by Holly Sewell COTA at 23     Holly Sewell COTA at 23        Goal Outcome Evaluation:  Plan of Care Reviewed With: patient        Progress: improving  Outcome Evaluation: perf well thsi date  bed mob with ind x 2 donning shoes with cga and use of shoe horn.  also perf funct tf to wheelchair with rw perf with sba  wheelchair mob with ind   perf ther ex am and problem solving and puzzle act in pm  pt able to perf clock task adding numbers and time with no difficult no cues  also perf simple and difficult puzzle tasks with no cues although requiring time initially for initation and processing    Electronically signed by Holly Sewell COTA at 23          Discharge Summary      Fan Rob MD at 23 0940          REHABILITATION DISCHARGE SUMMARY    NAME: Brian Garcia     : 1950  MRN: [id number]    ADMITTING PROVIDER: Fan Rob MD  DISCHARGING PROVIDER: Fan Rob MD     ADMITTED: 2022  DISCHARGED:  2023    ADMISSION DIAGNOSES:  CVA (cerebral vascular accident) ([facility]) [I63.9]    DISCHARGE DIAGNOSES:     Traumatic hemorrhage of cerebrum    CVA (cerebral vascular accident) ([facility])    Physical deconditioning    Stage 4 very severe COPD by GOLD classification ([facility])    Hypertension    Chronic respiratory failure with hypoxia, on home O2 therapy ([facility])    Iron deficiency anemia secondary to inadequate dietary iron intake      CONSULTS:   Consults     Date and Time Order Name Status  "Description    1/2/2023  9:47 AM Inpatient Pulmonology Consult          PRIMARY CARE PROVIDER AT DISCHARGE: Marina Kitchen MD    PROCEDURES: Cognitive therapy treatment and aggressive physical and occupational therapy    /67 (BP Location: Left arm, Patient Position: Sitting)   Pulse 106   Temp 98.9 °F (37.2 °C) (Tympanic)   Resp 16   Ht 165.1 cm (65\")   Wt 64 kg (141 lb)   SpO2 95%   BMI 23.46 kg/m²     LABS:   Lab Results (last 24 hours)     ** No results found for the last 24 hours. **               Self-Care Admission Discharge   Eating 6 6   Oral hygiene 6 6   Toileting hygiene 5 6   Shower/bathe self 3 4   Upper body dressing 5 5   Lower body dressing 3 3   Putting on/taking off footwear 3 3     Mobility Admission Discharge   A. Roll left and right 4  4   B. Sit to lying 4 4   C. Lying to sitting on side of bed 4 4   D. Sit to stand 4 4   E. Chair/bed-to-chair transfer 4 4   F. Toilet transfer 88 88   G. Car transfer 88 88   I.    Walk 10 feet 4 4   J.   Walk 50 feet with two turns 4 4   K.  Walk 150 feet 88 4   L.  Walking 10 feet on uneven surfaces 88 88   M.  1 step (curb) 88 4   N.  4 steps 88 4   O.  12 steps 9 88   P.  Picking up object 88 88   R.  Wheel 50 feet with two turns 4 4   S.  Wheel 150 feet 88 3       Hearing, Speech and Vision Admission Discharge   Expression of Ideas and Wants (consider both verbal and non-verbal expression and excluding language barriers)  4- Without difficulty  3-Some difficulty  2-Frequently exhibits difficulty  1-Rarely/never exhibits clear speech 4 4   Understanding Verbal and Non-Verbal Content (with hearing aid or device, if used, and excluding language barriers)  4-Comprehends without cues  3-Usually understands  2-Some understands  1-Rarely/never understands 4 4     REHABILITATION course: Patient had a extensive course initially because of cognitive problems and his ability to cooperate with therapy once that cleared he was able to can increase his " ability and functional status his cognitive conditions cleared and improved his amatory abilities      CONDITION ON DISCHARGE: Patient still require walker use balance is poor but hopefully with outpatient therapy    Disposition patient is to go home with outpatient therapy to continue his rehabilitation follow-up with Dr. Palacios and his neurosurgeon in a couple weeks may be restart his anticoagulation because of his atrial fib is also discovered he has iron deficiency anemia and begin on oral iron supplements    DISCHARGE DIET: Same as Admission diet. See below  Dietary Orders (From admission, onward)     Start     Ordered    01/06/23 1800  DIET MESSAGE Pt likes to eat:  Biscuits and gravy, rowe ([facility] staff okay rowe on trays), roast beef, mashed potatoes, fresh steamed vegetables, hamburgers, also sandwiches including Baked chicken, fish, tuna salad.  Daily With Breakfast, Lunch & Dinner      Comments: Pt likes to eat:  Biscuits and gravy, rowe ([facility] staff okay rowe on trays), roast beef, mashed potatoes, fresh steamed vegetables, hamburgers, also sandwiches including Baked chicken, fish, tuna salad.    01/06/23 1418    01/03/23 1800  Dietary Nutrition Supplements Other (See Comment); cranberry juice  Daily With Breakfast, Lunch & Dinner      Question Answer Comment   Select Supplement: Other (See Comment)    Other cranberry juice        01/03/23 1347    12/31/22 1200  Dietary Nutrition Supplements Ice Cream  Daily With Lunch & Dinner      Question:  Select Supplement:  Answer:  Ice Cream    12/31/22 1102    12/31/22 1102  Diet: Cardiac Diets; Healthy Heart (2-3 Na+); Texture: Regular Texture (IDDSI 7); Fluid Consistency: Thin (IDDSI 0)  Diet Effective Now        References:    Diet Order [place]   Question Answer Comment   Diets: Cardiac Diets    Cardiac Diet: Healthy Heart (2-3 Na+)    Texture: Regular Texture (IDDSI 7)    Fluid Consistency: Thin (IDDSI 0)        12/31/22 1102    12/31/22 1102  Dietary  Nutrition Supplements Other (See Comment); PB and crackers or 1/2 PB sandwich  Daily With Breakfast & Dinner      Question Answer Comment   Select Supplement: Other (See Comment)    Other PB and crackers or 1/2 PB sandwich        22 1102                 DISCHARGE ACTIVITY: Protective ambulatory status to continue strengthening outpatient physical therapy patient's family request about driving this patient should not drive until he is cleared by the neurosurgeon Dr. Liang  REHABILITATION DISCHARGE SUMMARY    NAME: Brian Garcia     : 1950  MRN: [id number]    ADMITTING PROVIDER: Catracho Mcleod MD  DISCHARGING PROVIDER: Catracho Mcleod MD       Electronically signed by Catracho Mcleod MD at 23 1003       Discharge Order (From admission, onward)     Start     Ordered    23 0921  Discharge patient  Once        Expected Discharge Date: 23    Expected Discharge Time: Afternoon    Discharge Disposition: Home or Self Care    Physician of Record for Attribution - Please select from Treatment Team: CATRACHO MCLEOD [[id number]]    Review needed by CMO to determine Physician of Record: No       Question Answer Comment   Physician of Record for Attribution - Please select from Treatment Team CATRACHO MCLEOD    Review needed by CMO to determine Physician of Record No        23 0940    23 1351  Discharge patient  Once,   Status:  Canceled        Expected Discharge Date: 23    Expected Discharge Time: Afternoon    Discharge Disposition: Home or Self Care    Physician of Record for Attribution - Please select from Treatment Team: CATRACHO MCLEOD [[id number]]    Review needed by CMO to determine Physician of Record: No       Question Answer Comment   Physician of Record for Attribution - Please select from Treatment Team CATRACHO MCLEOD    Review needed by CMO to determine Physician of Record No        23 1358

## 2023-01-13 NOTE — THERAPY DISCHARGE NOTE
Inpatient Rehabilitation - IRF Occupational Therapy Treatment Note/Discharge  [facility]     Patient Name: Brian Garcia  : 1950  MRN: [id number]  Today's Date: 2023                    Self-Care Admission Goal Date Discharge   Eating   6    Oral hygiene   6    Toileting hygiene   5    Shower/bathe self   5    Upper body dressing   6    Lower body dressing   4    Putting on/taking off footwear   3          Admit Date: 2022       ICD-10-CM ICD-9-CM   1. Cerebrovascular accident (CVA) due to other mechanism ([facility])  I63.89 434.91   2. Dysphagia, unspecified type  R13.10 787.20   3. Symbolic dysfunction  R48.9 784.60   4. Impaired mobility and ADLs  Z74.09 V49.89    Z78.9    5. Impaired functional mobility, balance, gait, and endurance  Z74.09 V49.89   6. Cerebrovascular accident (CVA), unspecified mechanism ([facility])  I63.9 434.91     Patient Active Problem List   Diagnosis   • Physical deconditioning   • Pulmonary nodules   • Leukocytosis   • History of pulmonary embolism   • Stage 4 very severe COPD by GOLD classification ([facility])   • Chronic respiratory failure with hypoxia, on home O2 therapy ([facility])   • Hypertension   • CVA (cerebral vascular accident) ([facility])   • Traumatic hemorrhage of cerebrum   • Iron deficiency anemia secondary to inadequate dietary iron intake     Past Medical History:   Diagnosis Date   • COPD (chronic obstructive pulmonary disease) ([facility])    • Hypertension      Past Surgical History:   Procedure Laterality Date   • HIP ARTHROPLASTY         IRF OT ASSESSMENT FLOWSHEET (last 12 hours)     IRF OT Evaluation and Treatment     Row Name 23 0915          OT Time and Intention    Document Type daily treatment  ed on home safety fall prevention, ed on hep and ther ex b ue tband ex  -LM     Mode of Treatment individual therapy;occupational therapy  -LM     Patient Effort good  -LM     Row Name 23 0915          General Information    Patient Profile Reviewed yes  -LM      Existing Precautions/Restrictions fall  -LM     Limitations/Impairments hearing  -LM     Row Name 01/13/23 0915          Pain Assessment    Pretreatment Pain Rating 0/10 - no pain  -LM     Posttreatment Pain Rating 0/10 - no pain  -LM     Row Name 01/13/23 0915          Cognition/Psychosocial    Orientation Status (Cognition) oriented x 4  -LM     Follows Commands (Cognition) WFL  -LM     Row Name 01/13/23 0915          Bed Mobility    Bed Mobility bed mobility (all) activities  -LM     Supine-Sit [place] (Bed Mobility) independent  -LM     Sit-Supine [place] (Bed Mobility) independent  -LM     Row Name 01/13/23 0915          Functional Mobility    Functional Mobility- Ind. Level contact guard assist;verbal cues required;supervision required  -LM     Functional Mobility- Device walker, front-wheeled  -LM     Row Name 01/13/23 0915          Transfer Assessment/Treatment    Transfers sit-stand transfer;stand-sit transfer;shower transfer  -LM     Row Name 01/13/23 0915          Sit-Stand Transfer    Sit-Stand [place] (Transfers) contact guard  -LM     Assistive Device (Sit-Stand Transfers) walker, front-wheeled  -LM     Row Name 01/13/23 0915          Stand-Sit Transfer    Stand-Sit [place] (Transfers) contact guard  -LM     Assistive Device (Stand-Sit Transfers) walker, front-wheeled  -LM     Row Name 01/13/23 0915          Toilet Transfer    Type (Toilet Transfer) sit-stand;stand-sit;stand pivot/stand step  -LM     [place] Level (Toilet Transfer) contact guard;standby assist;verbal cues  -LM     Row Name 01/13/23 0915          Wheelchair Transfer    Type (Wheelchair Transfer) sit-stand;stand-sit;stand pivot/stand step  -LM     [place] Level (Wheelchair Transfer) supervision  -LM     Row Name 01/13/23 0915          Wheelchair Mobility/Management    Method of Wheelchair Locomotion (Mobility) bipedal (lower extremity) propulsion  -LM     Mobility Activities (Wheelchair) mobility (all)  activities  -LM     Forward Propulsion [place] (Wheelchair) independent  -LM     Row Name 01/13/23 0915          Motor Skills    Therapeutic Exercise shoulder;elbow/forearm  ther ex b ue all panes 10-20 reps with hep handout given and ed  -LM     Row Name 01/13/23 0915          Shoulder (Therapeutic Exercise)    Shoulder (Therapeutic Exercise) AROM (active range of motion)  -LM     Row Name 01/13/23 0915          Elbow/Forearm (Therapeutic Exercise)    Elbow/Forearm (Therapeutic Exercise) AROM (active range of motion)  -LM     Row Name 01/13/23 0915          Balance    Balance Assessment sitting static balance;sitting dynamic balance;sit to stand dynamic balance;standing static balance  -LM     Row Name 01/13/23 0915          Positioning and Restraints    Pre-Treatment Position sitting in chair/recliner  -LM     Post Treatment Position wheelchair  -LM     Row Name 01/13/23 0915          Therapy Assessment/Plan (OT)    Patient's Goals For Discharge return home  -     Row Name 01/13/23 0915          Therapy Assessment/Plan (OT)    Rehab Potential/Prognosis (OT) good, to achieve stated therapy goals  -LM     Frequency of Treatment (OT) other (see comments)  5-6 days/week  -LM     Estimated Duration of Therapy (OT) until facility discharge  -     Problem List (OT) balance;coordination;mobility;strength;postural control  -     Activity Limitations Related to Problem List (OT) unable to ambulate safely;BADLs not performed adequately or safely;unable to transfer safely;IADLs not performed adequately or safely;community activities not performed adequately or safely;home management activity not performed adequately or safely  -     Row Name 01/13/23 0915          Therapy Plan Review/Discharge Plan (OT)    Anticipated Discharge Disposition (OT) home with 24/7 care  -LM     Row Name 01/13/23 0915          Bathing Goal 1 (OT-IRF)    Activity/Device (Bathing Goal 1, OT-IRF) lower body bathing  -     [place]  Level (Bathing Goal 1, OT-IRF) modified independence  -LM     Time Frame (Bathing Goal 1, OT-IRF) by discharge;long-term goal (LTG)  -LM     Progress/Outcomes (Bathing Goal 1, OT-IRF) goal not met  -LM     Row Name 01/13/23 0915          LB Dressing Goal 1 (OT-IRF)    Activity/Device (LB Dressing Goal 1, OT-IRF) lower body dressing  -LM     [place] (LB Dressing Goal 1, OT-IRF) modified independence  -LM     Time Frame (LB Dressing Goal 1, OT-IRF) long-term goal (LTG);by discharge  -LM     Progress/Outcomes (LB Dressing Goal 1, OT-IRF) goal not met  -LM     Row Name 01/13/23 0915          Strength Goal 1 (OT-IRF)    Strength Goal 1 (OT-IRF) Pt will increase BUE strength in all planes by one level for increased strength and endurance required for ADL routine.  -LM     Time Frame (Strength Goal 1, OT-IRF) long-term goal (LTG);by discharge  -LM     Progress/Outcomes (Strength Goal 1, OT-IRF) goal met  -LM     Row Name 01/13/23 0915           Endurance Goal 1 (OT)    Activity Level (Endurance Goal 1, OT) endurance 2 good  -LM     Progress/Outcome (Endurance Goal 1, OT) goal met  -LM           User Key  (r) = Recorded By, (t) = Taken By, (c) = Cosigned By    Initials Name Effective Dates    LM Holly Sewell COTA 06/16/21 -                    Occupational Therapy Education     Title: PT OT SLP Therapies (In Progress)     Topic: Occupational Therapy (In Progress)     Point: ADL training (Done)     Description:   Instruct learner(s) on proper safety adaptation and remediation techniques during self care or transfers.   Instruct in proper use of assistive devices.              Learning Progress Summary           Patient Acceptance, E, VU,NR by RC at 1/6/2023 1255    Acceptance, E, VU,NR by RC at 1/5/2023 1517    Acceptance, E,TB, VU by BB at 12/31/2022 1522    Acceptance, E,TB, VU by CM at 12/31/2022 1211    Comment: OT POC, Role of OT, safe ADL mobility and t/f                   Point: Home exercise program (Not  Started)     Description:   Instruct learner(s) on appropriate technique for monitoring, assisting and/or progressing therapeutic exercises/activities.              Learner Progress:  Not documented in this visit.          Point: Precautions (Done)     Description:   Instruct learner(s) on prescribed precautions during self-care and functional transfers.              Learning Progress Summary           Patient Acceptance, E, VU,NR by RC at 1/6/2023 1255    Acceptance, E, VU,NR by  at 1/5/2023 1517                   Point: Body mechanics (Done)     Description:   Instruct learner(s) on proper positioning and spine alignment during self-care, functional mobility activities and/or exercises.              Learning Progress Summary           Patient Acceptance, E, VU,NR by RC at 1/6/2023 1255    Acceptance, E, VU,NR by  at 1/5/2023 1517                               User Key     Initials Effective Dates Name Provider Type Discipline     06/16/21 -  Lisa José COTA Occupational Therapist Assistant OT    RC 06/16/21 -  Annette Carey COTA Occupational Therapist Assistant OT     11/18/22 -  Ghazala Alfonso OT Occupational Therapist OT                OT Recommendation and Plan  Planned Therapy Interventions (OT): activity tolerance training, adaptive equipment training, BADL retraining, functional balance retraining, IADL retraining, occupation/activity based interventions, patient/caregiver education/training, ROM/therapeutic exercise, strengthening exercise, transfer/mobility retraining, wheelchair assessment/training    Plan of Care Reviewed With: patient  Progress: improving      OT IRF GOALS     Row Name 01/13/23 0915 01/12/23 1450 01/12/23 1120       Bathing Goal 1 (OT-IRF)    Activity/Device (Bathing Goal 1, OT-IRF) lower body bathing  -LM lower body bathing  -LM lower body bathing  -LM    [place] Level (Bathing Goal 1, OT-IRF) modified independence  -LM modified independence  -LM modified  independence  -LM    Time Frame (Bathing Goal 1, OT-IRF) by discharge;long-term goal (LTG)  -LM by discharge;long-term goal (LTG)  -LM by discharge;long-term goal (LTG)  -LM    Progress/Outcomes (Bathing Goal 1, OT-IRF) goal not met  -LM goal not met  -LM goal not met  -LM       LB Dressing Goal 1 (OT-IRF)    Activity/Device (LB Dressing Goal 1, OT-IRF) lower body dressing  -LM lower body dressing  -LM lower body dressing  -LM    [place] (LB Dressing Goal 1, OT-IRF) modified independence  -LM modified independence  -LM modified independence  -LM    Time Frame (LB Dressing Goal 1, OT-IRF) long-term goal (LTG);by discharge  -LM long-term goal (LTG);by discharge  -LM long-term goal (LTG);by discharge  -LM    Progress/Outcomes (LB Dressing Goal 1, OT-IRF) goal not met  -LM goal not met  -LM goal not met  -LM       Strength Goal 1 (OT-IRF)    Strength Goal 1 (OT-IRF) Pt will increase BUE strength in all planes by one level for increased strength and endurance required for ADL routine.  -LM Pt will increase BUE strength in all planes by one level for increased strength and endurance required for ADL routine.  -LM Pt will increase BUE strength in all planes by one level for increased strength and endurance required for ADL routine.  -LM    Time Frame (Strength Goal 1, OT-IRF) long-term goal (LTG);by discharge  -LM long-term goal (LTG);by discharge  -LM long-term goal (LTG);by discharge  -LM    Progress/Outcomes (Strength Goal 1, OT-IRF) goal met  -LM goal not met  -LM goal not met  -LM    Row Name 01/11/23 0900 01/10/23 0830 01/09/23 0825       Bathing Goal 1 (OT-IRF)    Activity/Device (Bathing Goal 1, OT-IRF) lower body bathing  -LM lower body bathing  -LM lower body bathing  -CS    [place] Level (Bathing Goal 1, OT-IRF) modified independence  -LM modified independence  -LM modified independence  -CS    Time Frame (Bathing Goal 1, OT-IRF) by discharge;long-term goal (LTG)  -LM by discharge;long-term goal (LTG)   -LM by discharge;long-term goal (LTG)  -CS    Progress/Outcomes (Bathing Goal 1, OT-IRF) goal not met  -LM goal not met  -LM goal not met  -CS       LB Dressing Goal 1 (OT-IRF)    Activity/Device (LB Dressing Goal 1, OT-IRF) lower body dressing  -LM lower body dressing  -LM lower body dressing  -CS    [place] (LB Dressing Goal 1, OT-IRF) modified independence  -LM modified independence  -LM modified independence  -CS    Time Frame (LB Dressing Goal 1, OT-IRF) long-term goal (LTG);by discharge  -LM long-term goal (LTG);by discharge  -LM long-term goal (LTG);by discharge  -CS    Progress/Outcomes (LB Dressing Goal 1, OT-IRF) goal not met  -LM goal not met  -LM goal not met  -CS       Strength Goal 1 (OT-IRF)    Strength Goal 1 (OT-IRF) Pt will increase BUE strength in all planes by one level for increased strength and endurance required for ADL routine.  -LM Pt will increase BUE strength in all planes by one level for increased strength and endurance required for ADL routine.  -LM Pt will increase BUE strength in all planes by one level for increased strength and endurance required for ADL routine.  -CS    Time Frame (Strength Goal 1, OT-IRF) long-term goal (LTG);by discharge  -LM long-term goal (LTG);by discharge  -LM long-term goal (LTG);by discharge  -CS    Progress/Outcomes (Strength Goal 1, OT-IRF) goal not met  -LM goal not met  -LM goal not met  -CS    Row Name 01/06/23 0740 01/05/23 0857 01/04/23 0825       Bathing Goal 1 (OT-IRF)    Activity/Device (Bathing Goal 1, OT-IRF) lower body bathing  -RC lower body bathing  -RC lower body bathing  -LM    [place] Level (Bathing Goal 1, OT-IRF) modified independence  -RC modified independence  -RC modified independence  -LM    Time Frame (Bathing Goal 1, OT-IRF) by discharge;long-term goal (LTG)  -RC by discharge;long-term goal (LTG)  -RC by discharge;long-term goal (LTG)  -LM    Progress/Outcomes (Bathing Goal 1, OT-IRF) goal not met  -RC goal not met  -RC  goal not met  -LM       LB Dressing Goal 1 (OT-IRF)    Activity/Device (LB Dressing Goal 1, OT-IRF) lower body dressing  -RC lower body dressing  -RC lower body dressing  -LM    [place] (LB Dressing Goal 1, OT-IRF) modified independence  -RC modified independence  -RC modified independence  -LM    Time Frame (LB Dressing Goal 1, OT-IRF) long-term goal (LTG);by discharge  -RC long-term goal (LTG);by discharge  -RC long-term goal (LTG);by discharge  -LM    Progress/Outcomes (LB Dressing Goal 1, OT-IRF) goal not met  -RC goal not met  -RC goal not met  -LM       Strength Goal 1 (OT-IRF)    Strength Goal 1 (OT-IRF) Pt will increase BUE strength in all planes by one level for increased strength and endurance required for ADL routine.  -RC Pt will increase BUE strength in all planes by one level for increased strength and endurance required for ADL routine.  -RC Pt will increase BUE strength in all planes by one level for increased strength and endurance required for ADL routine.  -LM    Time Frame (Strength Goal 1, OT-IRF) long-term goal (LTG);by discharge  -RC long-term goal (LTG);by discharge  -RC long-term goal (LTG);by discharge  -LM    Progress/Outcomes (Strength Goal 1, OT-IRF) goal not met  -RC goal not met  -RC goal not met  -LM    Row Name 01/03/23 0855 01/02/23 1257 01/02/23 0925       Bathing Goal 1 (OT-IRF)    Activity/Device (Bathing Goal 1, OT-IRF) lower body bathing  -LM lower body bathing  -LM lower body bathing  -LM    [place] Level (Bathing Goal 1, OT-IRF) modified independence  -LM modified independence  -LM modified independence  -LM    Time Frame (Bathing Goal 1, OT-IRF) by discharge;long-term goal (LTG)  -LM by discharge;long-term goal (LTG)  -LM by discharge;long-term goal (LTG)  -LM    Progress/Outcomes (Bathing Goal 1, OT-IRF) goal not met  -LM goal not met  -LM goal not met  -LM       LB Dressing Goal 1 (OT-IRF)    Activity/Device (LB Dressing Goal 1, OT-IRF) lower body dressing  -LM  lower body dressing  -LM lower body dressing  -LM    [place] (LB Dressing Goal 1, OT-IRF) modified independence  -LM modified independence  -LM modified independence  -LM    Time Frame (LB Dressing Goal 1, OT-IRF) long-term goal (LTG);by discharge  -LM long-term goal (LTG);by discharge  -LM long-term goal (LTG);by discharge  -LM    Progress/Outcomes (LB Dressing Goal 1, OT-IRF) goal not met  -LM goal not met  -LM goal not met  -LM       Strength Goal 1 (OT-IRF)    Strength Goal 1 (OT-IRF) Pt will increase BUE strength in all planes by one level for increased strength and endurance required for ADL routine.  -LM Pt will increase BUE strength in all planes by one level for increased strength and endurance required for ADL routine.  -LM Pt will increase BUE strength in all planes by one level for increased strength and endurance required for ADL routine.  -LM    Time Frame (Strength Goal 1, OT-IRF) long-term goal (LTG);by discharge  -LM long-term goal (LTG);by discharge  -LM long-term goal (LTG);by discharge  -LM    Progress/Outcomes (Strength Goal 1, OT-IRF) goal not met  -LM goal not met  -LM goal not met  -LM    Row Name 12/31/22 1230 12/31/22 1033          Bathing Goal 1 (OT-IRF)    Activity/Device (Bathing Goal 1, OT-IRF) lower body bathing  -BB lower body bathing  -CM     [place] Level (Bathing Goal 1, OT-IRF) modified independence  -BB modified independence  -CM     Time Frame (Bathing Goal 1, OT-IRF) by discharge;long-term goal (LTG)  -BB by discharge;long-term goal (LTG)  -CM     Progress/Outcomes (Bathing Goal 1, OT-IRF) goal not met  -BB goal not met  -CM        LB Dressing Goal 1 (OT-IRF)    Activity/Device (LB Dressing Goal 1, OT-IRF) lower body dressing  -BB lower body dressing  -CM     [place] (LB Dressing Goal 1, OT-IRF) modified independence  -BB modified independence  -CM     Time Frame (LB Dressing Goal 1, OT-IRF) long-term goal (LTG);by discharge  -BB long-term goal (LTG);by discharge   -CM     Progress/Outcomes (LB Dressing Goal 1, OT-IRF) goal not met  -BB goal not met  -CM        Strength Goal 1 (OT-IRF)    Strength Goal 1 (OT-IRF) Pt will increase BUE strength in all planes by one level for increased strength and endurance required for ADL routine.  -BB Pt will increase BUE strength in all planes by one level for increased strength and endurance required for ADL routine.  -CM     Time Frame (Strength Goal 1, OT-IRF) long-term goal (LTG);by discharge  -BB long-term goal (LTG);by discharge  -CM     Progress/Outcomes (Strength Goal 1, OT-IRF) goal not met  -BB goal not met  -CM           User Key  (r) = Recorded By, (t) = Taken By, (c) = Cosigned By    Initials Name Provider Type    BB Lisa José, MUNOZ Occupational Therapist Assistant    RC Annette Carey, MUNOZ Occupational Therapist Assistant    LM Holly Sewell, MUNOZ Occupational Therapist Assistant    CS Latesha Ramos, MUNOZ Occupational Therapist Assistant    CM Ghazala Alfonso OT Occupational Therapist                 Outcome Measures     Row Name 01/13/23 0828 01/12/23 1100 01/11/23 1446       How much help from another person do you currently need...    Turning from your back to your side while in flat bed without using bedrails? 4  -EUGENE 3  -JW 3  -EUGENE    Moving from lying on back to sitting on the side of a flat bed without bedrails? 4  -EUGENE 3  -JW 3  -EUGENE    Moving to and from a bed to a chair (including a wheelchair)? 3  -EUGENE 3  -JW 3  -EUGENE    Standing up from a chair using your arms (e.g., wheelchair, bedside chair)? 3  -EUGENE 3  -JW 3  -EUGENE    Climbing 3-5 steps with a railing? 3  -EUGENE 3  -JW 3  -EUGENE    To walk in hospital room? 3  -EUGENE 3  -JW 3  -EUGENE    AM-PAC 6 Clicks Score (PT) 20  -EUGENE 18  -JW 18  -EUGENE       Functional Assessment    Outcome Measure Options AM-PAC 6 Clicks Basic Mobility (PT)  -EUGENE AM-PAC 6 Clicks Basic Mobility (PT)  -JW AM-PAC 6 Clicks Basic Mobility (PT)  -EUGENE    Row Name 01/11/23 1059             How much help  from another person do you currently need...    Turning from your back to your side while in flat bed without using bedrails? 3  -EUGENE      Moving from lying on back to sitting on the side of a flat bed without bedrails? 3  -EUGENE      Moving to and from a bed to a chair (including a wheelchair)? 3  -EUGENE      Standing up from a chair using your arms (e.g., wheelchair, bedside chair)? 3  -EUGENE      Climbing 3-5 steps with a railing? 3  -EUGENE      To walk in hospital room? 3  -EUGENE      AM-PAC 6 Clicks Score (PT) 18  -EUGENE         Functional Assessment    Outcome Measure Options AM-PAC 6 Clicks Basic Mobility (PT)  -EUGENE            User Key  (r) = Recorded By, (t) = Taken By, (c) = Cosigned By    Initials Name Provider Type    Sima Callaway, PTA Physical Therapist Assistant    Yossi Ragland, REGINE Physical Therapist Assistant                Time Calculation:    Time Calculation- OT     Row Name 01/13/23 1516 01/13/23 1129          Time Calculation- OT    OT Start Time 0915  -LM --     OT Stop Time 0940  -LM --     OT Time Calculation (min) 25 min  -LM --     Total Timed Code Minutes- OT 25 minute(s)  -LM --     OT Received On 01/13/23  -LM --        Timed Charges    81568 - OT Therapeutic Exercise Minutes 25  -LM --     49919 - Gait Training Minutes  -- 15  -EUGENE        Total Minutes    Timed Charges Total Minutes 25  -LM 15  -EUGENE      Total Minutes 25  -LM 15  -EUGENE           User Key  (r) = Recorded By, (t) = Taken By, (c) = Cosigned By    Initials Name Provider Type    Yossi Ragland, REGINE Physical Therapist Assistant     Holly Sewell COTA Occupational Therapist Assistant                Therapy Charges for Today     Code Description Service Date Service Provider Modifiers Qty    69823981240 HC OT THERAPEUTIC ACT EA 15 MIN 1/12/2023 Holly Sewell COTA GO 2    92534832166 HC OT THER PROC EA 15 MIN 1/12/2023 Holly Sewell COTA GO 1    09572679465 HC OT THERAPEUTIC ACT EA 15 MIN 1/12/2023 Donato  ALEXANDER Pearson 3    [id number]  OT THER PROC EA 15 MIN 1/13/2023 Holly Sewell COTA GO 3                    ALEXANDER Fragoso  1/13/2023

## 2023-01-13 NOTE — PLAN OF CARE
Goal Outcome Evaluation:  Plan of Care Reviewed With: patient        Progress: improving  Outcome Evaluation: perf well thsi date  bed mob with ind x 2 donning shoes with cga and use of shoe horn.  also perf funct tf to wheelchair with rw perf with sba  wheelchair mob with ind   perf ther ex am and problem solving and puzzle act in pm  pt able to perf clock task adding numbers and time with no difficult no cues  also perf simple and difficult puzzle tasks with no cues although requiring time initially for initation and processing

## 2023-01-13 NOTE — PLAN OF CARE
Goal Outcome Evaluation:  Plan of Care Reviewed With: patient     Pt responded well to PT.  Pt is indep with bed mobility and requires SBA for t/fs and gait- 150 ft w/ RW. Pt educated on HEP and home safety. Pt is indep with WC mobility- 200 ft. Pts personal walker arrived and adjusted for best fit. Pt is to DC home with 24/7 care and  PT. Pt has met 5/10 goals.

## 2023-01-13 NOTE — PLAN OF CARE
Goal Outcome Evaluation:  Plan of Care Reviewed With: patient        Progress: no change  Outcome Evaluation: Patient has had fair results from prn melatonin, tizadine for spasms, patient has been pleasant and interacts in appropriate manner and is able to make his needs known, patient possible discharge 1/13/2023

## 2023-01-13 NOTE — THERAPY TREATMENT NOTE
Inpatient Rehabilitation - Occupational Therapy Treatment Note    [facility]     Patient Name: Brian Garcia  : 1950  MRN: [id number]    Today's Date: 2023                 Admit Date: 2022         Self-Care Admission Goal Date Discharge   Eating  6     Oral hygiene  6     Toileting hygiene  5     Shower/bathe self  5     Upper body dressing  5     Lower body dressing  4     Putting on/taking off footwear  3              ICD-10-CM ICD-9-CM   1. Cerebrovascular accident (CVA) due to other mechanism ([facility])  I63.89 434.91   2. Dysphagia, unspecified type  R13.10 787.20   3. Symbolic dysfunction  R48.9 784.60   4. Impaired mobility and ADLs  Z74.09 V49.89    Z78.9    5. Impaired functional mobility, balance, gait, and endurance  Z74.09 V49.89   6. Cerebrovascular accident (CVA), unspecified mechanism ([facility])  I63.9 434.91       Patient Active Problem List   Diagnosis   • Physical deconditioning   • Pulmonary nodules   • Leukocytosis   • History of pulmonary embolism   • Stage 4 very severe COPD by GOLD classification ([facility])   • Chronic respiratory failure with hypoxia, on home O2 therapy ([facility])   • Hypertension   • CVA (cerebral vascular accident) ([facility])   • Traumatic hemorrhage of cerebrum       Past Medical History:   Diagnosis Date   • COPD (chronic obstructive pulmonary disease) ([facility])    • Hypertension        Past Surgical History:   Procedure Laterality Date   • HIP ARTHROPLASTY               IRF OT ASSESSMENT FLOWSHEET (last 12 hours)     IRF OT Evaluation and Treatment     Row Name 23 1450 23 1120       OT Time and Intention    Document Type daily treatment  -LM daily treatment  -LM    Mode of Treatment individual therapy;occupational therapy  -LM individual therapy;occupational therapy  -LM    Patient Effort good  -LM good  -LM    Row Name 23 1450 23 1120       General Information    Patient Profile Reviewed yes  -LM yes  -LM    Existing  Precautions/Restrictions fall  -LM fall  -LM    Limitations/Impairments hearing  -LM hearing  -LM    Row Name 01/12/23 1450 01/12/23 1120       Pain Assessment    Pretreatment Pain Rating 0/10 - no pain  -LM 0/10 - no pain  -LM    Posttreatment Pain Rating 0/10 - no pain  -LM 0/10 - no pain  -LM    Row Name 01/12/23 1450 01/12/23 1120       Cognition/Psychosocial    Orientation Status (Cognition) oriented x 4  -LM oriented x 4  -LM    Follows Commands (Cognition) WFL  -LM WFL  -LM    Row Name 01/12/23 1450 01/12/23 1120       Lower Body Dressing    [place] Level (Lower Body Dressing) doff;don;shoes/slippers;socks;independent  -LM doff;don;shoes/slippers;socks;independent  -LM    Position (Lower Body Dressing) supported sitting;supported standing  -LM supported sitting;supported standing  -LM    Row Name 01/12/23 1450 01/12/23 1120       Grooming    [place] Level (Grooming) grooming skills;deodorant application;hair care, combing/brushing;oral care regimen;wash face, hands;set up  -LM grooming skills;deodorant application;hair care, combing/brushing;oral care regimen;wash face, hands;set up  -LM    Position (Grooming) sink side;supported sitting  w/c  -LM sink side;supported sitting  w/c  -LM    Row Name 01/12/23 1450 01/12/23 1120       Bed Mobility    Bed Mobility bed mobility (all) activities  -LM bed mobility (all) activities  -LM    Supine-Sit [place] (Bed Mobility) independent  -LM independent  -LM    Sit-Supine [place] (Bed Mobility) independent  -LM independent  -LM    Row Name 01/12/23 1450 01/12/23 1120       Functional Mobility    Functional Mobility- Ind. Level contact guard assist;verbal cues required;supervision required  -LM contact guard assist;verbal cues required;supervision required  -LM    Functional Mobility- Device walker, front-wheeled  -LM walker, front-wheeled  -LM    Row Name 01/12/23 1450 01/12/23 1120       Transfer Assessment/Treatment    Transfers sit-stand  transfer;stand-sit transfer;shower transfer  -LM sit-stand transfer;stand-sit transfer;shower transfer  -LM    Row Name 01/12/23 1450 01/12/23 1120       Sit-Stand Transfer    Sit-Stand [place] (Transfers) contact guard  -LM contact guard  -LM    Assistive Device (Sit-Stand Transfers) walker, front-wheeled  -LM walker, front-wheeled  -LM    Row Name 01/12/23 1450 01/12/23 1120       Stand-Sit Transfer    Stand-Sit [place] (Transfers) contact guard  -LM contact guard  -LM    Assistive Device (Stand-Sit Transfers) walker, front-wheeled  -LM walker, front-wheeled  -LM    Row Name 01/12/23 1450 01/12/23 1120       Toilet Transfer    Type (Toilet Transfer) sit-stand;stand-sit;stand pivot/stand step  -LM sit-stand;stand-sit;stand pivot/stand step  -LM    [place] Level (Toilet Transfer) contact guard;standby assist;verbal cues  -LM contact guard;standby assist;verbal cues  -LM    Row Name 01/12/23 1120          Shower Transfer    Type (Shower Transfer) sit-stand;stand-sit;stand pivot/stand step  -LM     [place] Level (Shower Transfer) contact guard  -LM     Row Name 01/12/23 1450 01/12/23 1120       Motor Skills    Motor Skills coordination  -LM coordination  -LM    Coordination WNL  -LM --    Additional Documentation --   ther act at table top with problem solving act with clock and good perf no cues simple puzzle act with good perf slight delay in initation and difficult puzzle with good perf after instructions and light delay in initation and processing perf well  -LM --    Row Name 01/12/23 1120          Shoulder (Therapeutic Exercise)    Shoulder (Therapeutic Exercise) AROM (active range of motion)  -LM     Row Name 01/12/23 1120          Elbow/Forearm (Therapeutic Exercise)    Elbow/Forearm (Therapeutic Exercise) AROM (active range of motion)  -LM     Row Name 01/12/23 1120          Aerobic Exercise    Type (Aerobic Exercise) arm bike  -LM     Time Performed (Aerobic Exercise) 15  -LM     Row Name  01/12/23 1450          Positioning and Restraints    Pre-Treatment Position in bed  -LM     Post Treatment Position chair  -LM     Row Name 01/12/23 1450 01/12/23 1120       Therapy Assessment/Plan (OT)    Patient's Goals For Discharge return home  -LM return home  -LM    Row Name 01/12/23 1450 01/12/23 1120       Therapy Assessment/Plan (OT)    Rehab Potential/Prognosis (OT) good, to achieve stated therapy goals  -LM good, to achieve stated therapy goals  -LM    Frequency of Treatment (OT) other (see comments)  5-6 days/week  -LM other (see comments)  5-6 days/week  -LM    Estimated Duration of Therapy (OT) until facility discharge  -LM until facility discharge  -LM    Problem List (OT) balance;coordination;mobility;strength;postural control  -LM balance;coordination;mobility;strength;postural control  -LM    Activity Limitations Related to Problem List (OT) unable to ambulate safely;BADLs not performed adequately or safely;unable to transfer safely;IADLs not performed adequately or safely;community activities not performed adequately or safely;home management activity not performed adequately or safely  -LM unable to ambulate safely;BADLs not performed adequately or safely;unable to transfer safely;IADLs not performed adequately or safely;community activities not performed adequately or safely;home management activity not performed adequately or safely  -LM    Row Name 01/12/23 1450 01/12/23 1120       Therapy Plan Review/Discharge Plan (OT)    Anticipated Discharge Disposition (OT) home with 24/7 care  -LM home with 24/7 care  -LM    Row Name 01/12/23 1450 01/12/23 1120       Bathing Goal 1 (OT-IRF)    Activity/Device (Bathing Goal 1, OT-IRF) lower body bathing  -LM lower body bathing  -LM    [place] Level (Bathing Goal 1, OT-IRF) modified independence  -LM modified independence  -LM    Time Frame (Bathing Goal 1, OT-IRF) by discharge;long-term goal (LTG)  -LM by discharge;long-term goal (LTG)  -LM     Progress/Outcomes (Bathing Goal 1, OT-IRF) goal not met  -LM goal not met  -LM    Row Name 01/12/23 1450 01/12/23 1120       LB Dressing Goal 1 (OT-IRF)    Activity/Device (LB Dressing Goal 1, OT-IRF) lower body dressing  -LM lower body dressing  -LM    [place] (LB Dressing Goal 1, OT-IRF) modified independence  -LM modified independence  -LM    Time Frame (LB Dressing Goal 1, OT-IRF) long-term goal (LTG);by discharge  -LM long-term goal (LTG);by discharge  -LM    Progress/Outcomes (LB Dressing Goal 1, OT-IRF) goal not met  -LM goal not met  -LM    Row Name 01/12/23 1450 01/12/23 1120       Strength Goal 1 (OT-IRF)    Strength Goal 1 (OT-IRF) Pt will increase BUE strength in all planes by one level for increased strength and endurance required for ADL routine.  -LM Pt will increase BUE strength in all planes by one level for increased strength and endurance required for ADL routine.  -LM    Time Frame (Strength Goal 1, OT-IRF) long-term goal (LTG);by discharge  -LM long-term goal (LTG);by discharge  -LM    Progress/Outcomes (Strength Goal 1, OT-IRF) goal not met  -LM goal not met  -LM    Row Name 01/12/23 1450 01/12/23 1120        Endurance Goal 1 (OT)    Activity Level (Endurance Goal 1, OT) endurance 2 good  -LM endurance 2 good  -LM    Progress/Outcome (Endurance Goal 1, OT) goal not met  -LM goal not met  -LM          User Key  (r) = Recorded By, (t) = Taken By, (c) = Cosigned By    Initials Name Effective Dates    LM Holly Sewell COTA 06/16/21 -                  Occupational Therapy Education     Title: PT OT SLP Therapies (In Progress)     Topic: Occupational Therapy (In Progress)     Point: ADL training (Done)     Description:   Instruct learner(s) on proper safety adaptation and remediation techniques during self care or transfers.   Instruct in proper use of assistive devices.              Learning Progress Summary           Patient Acceptance, E, VU,NR by  at 1/6/2023 1259    Acceptance, E,  VU,NR by RC at 1/5/2023 1517    Acceptance, E,TB, VU by BB at 12/31/2022 1522    Acceptance, E,TB, VU by CM at 12/31/2022 1211    Comment: OT POC, Role of OT, safe ADL mobility and t/f                   Point: Home exercise program (Not Started)     Description:   Instruct learner(s) on appropriate technique for monitoring, assisting and/or progressing therapeutic exercises/activities.              Learner Progress:  Not documented in this visit.          Point: Precautions (Done)     Description:   Instruct learner(s) on prescribed precautions during self-care and functional transfers.              Learning Progress Summary           Patient Acceptance, E, VU,NR by RC at 1/6/2023 1255    Acceptance, E, VU,NR by  at 1/5/2023 1517                   Point: Body mechanics (Done)     Description:   Instruct learner(s) on proper positioning and spine alignment during self-care, functional mobility activities and/or exercises.              Learning Progress Summary           Patient Acceptance, E, VU,NR by  at 1/6/2023 1255    Acceptance, E, VU,NR by  at 1/5/2023 1517                               User Key     Initials Effective Dates Name Provider Type Discipline    BB 06/16/21 -  Lisa José COTA Occupational Therapist Assistant OT    RC 06/16/21 -  Annette Carey COTA Occupational Therapist Assistant OT    CM 11/18/22 -  Ghazala Alfonso OT Occupational Therapist OT                    OT Recommendation and Plan         Plan of Care Review  Plan of Care Reviewed With: patient  Progress: improving  Outcome Evaluation: perf well thsi date  bed mob with ind x 2 donning shoes with cga and use of shoe horn.  also perf funct tf to wheelchair with rw perf with sba  wheelchair mob with ind   perf ther ex am and problem solving and puzzle act in pm  pt able to perf clock task adding numbers and time with no difficult no cues  also perf simple and difficult puzzle tasks with no cues although requiring time  initially for initation and processing  Plan of Care Reviewed With: patient  Progress: improving       Outcome Measures     Row Name 01/12/23 1100 01/11/23 1446 01/11/23 1059       How much help from another person do you currently need...    Turning from your back to your side while in flat bed without using bedrails? 3  - 3  - 3  -EUGENE    Moving from lying on back to sitting on the side of a flat bed without bedrails? 3  - 3  -EUGENE 3  -EUGENE    Moving to and from a bed to a chair (including a wheelchair)? 3  - 3  -EUGENE 3  -EUGENE    Standing up from a chair using your arms (e.g., wheelchair, bedside chair)? 3  - 3  -EUGENE 3  -EUGENE    Climbing 3-5 steps with a railing? 3  - 3  -EUGENE 3  -EUGENE    To walk in hospital room? 3  - 3  -EUGENE 3  -EUGENE    AM-PAC 6 Clicks Score (PT) 18  -JW 18  -EUGENE 18  -EUGENE       Functional Assessment    Outcome Measure Options AM-PAC 6 Clicks Basic Mobility (PT)  - AM-PAC 6 Clicks Basic Mobility (PT)  - AM-PAC 6 Clicks Basic Mobility (PT)  -EUGENE    Row Name 01/10/23 1200 01/10/23 1106          How much help from another person do you currently need...    Turning from your back to your side while in flat bed without using bedrails? -- 3  -EUGENE     Moving from lying on back to sitting on the side of a flat bed without bedrails? -- 3  -EUGENE     Moving to and from a bed to a chair (including a wheelchair)? -- 3  -EUGENE     Standing up from a chair using your arms (e.g., wheelchair, bedside chair)? -- 3  -EUGENE     Climbing 3-5 steps with a railing? -- 3  -EUGENE     To walk in hospital room? -- 3  -EUGENE     AM-PAC 6 Clicks Score (PT) -- 18  -EUGENE        Functional Assessment    Outcome Measure Options AM-PAC 6 Clicks Basic Mobility (PT)  -EUGENE AM-PAC 6 Clicks Basic Mobility (PT)  -EUGENE           User Key  (r) = Recorded By, (t) = Taken By, (c) = Cosigned By    Initials Name Provider Type    Sima Callaway, PTA Physical Therapist Assistant    Yossi Ragland, REGINE Physical Therapist Assistant                  Time  Calculation:      Time Calculation- OT     Row Name 01/12/23 1811 01/12/23 1756          Time Calculation- OT    OT Start Time 1120  -LM 1450  -LM     OT Stop Time 1205  -LM 1535  -LM     OT Time Calculation (min) 45 min  -LM 45 min  -LM     Total Timed Code Minutes- OT -- 45 minute(s)  -LM     OT Received On 01/12/23  -LM 01/12/23  -LM        Timed Charges    71414 - OT Therapeutic Exercise Minutes -- 45  -LM     21472 - OT Therapeutic Activity Minutes 45  -LM --        Total Minutes    Timed Charges Total Minutes 45  -LM 45  -LM      Total Minutes 45  -LM 45  -LM           User Key  (r) = Recorded By, (t) = Taken By, (c) = Cosigned By    Initials Name Provider Type    LM Holly Sewell COTA Occupational Therapist Assistant              Therapy Charges for Today     Code Description Service Date Service Provider Modifiers Qty    41805567770 HC OT SELF CARE/MGMT/TRAIN EA 15 MIN 1/11/2023 Holly Sewell COTA GO 7    56002057087 HC OT THERAPEUTIC ACT EA 15 MIN 1/12/2023 Holly Sewell COTA GO 2    13558991039 HC OT THER PROC EA 15 MIN 1/12/2023 Holly Sewell COTA GO 1    25358057786 HC OT THERAPEUTIC ACT EA 15 MIN 1/12/2023 Holly Sewell COTA GO 3                   ALEXANDER Fragoso  1/12/2023

## 2023-01-13 NOTE — THERAPY DISCHARGE NOTE
Inpatient Rehabilitation - Physical Therapy Treatment Note/Dis[facility]  [facility]     Patient Name: Brian Garcia  : 1950  MRN: [id number]  Today's Date: 2023                Admit Date: 2022    Visit Dx:    ICD-10-CM ICD-9-CM   1. Cerebrovascular accident (CVA) due to other mechanism ([facility])  I63.89 434.91   2. Dysphagia, unspecified type  R13.10 787.20   3. Symbolic dysfunction  R48.9 784.60   4. Impaired mobility and ADLs  Z74.09 V49.89    Z78.9    5. Impaired functional mobility, balance, gait, and endurance  Z74.09 V49.89   6. Cerebrovascular accident (CVA), unspecified mechanism ([facility])  I63.9 434.91     Patient Active Problem List   Diagnosis   • Physical deconditioning   • Pulmonary nodules   • Leukocytosis   • History of pulmonary embolism   • Stage 4 very severe COPD by GOLD classification ([facility])   • Chronic respiratory failure with hypoxia, on home O2 therapy ([facility])   • Hypertension   • CVA (cerebral vascular accident) ([facility])   • Traumatic hemorrhage of cerebrum   • Iron deficiency anemia secondary to inadequate dietary iron intake     Past Medical History:   Diagnosis Date   • COPD (chronic obstructive pulmonary disease) ([facility])    • Hypertension      Past Surgical History:   Procedure Laterality Date   • HIP ARTHROPLASTY         PT ASSESSMENT (last 12 hours)     IRF PT Evaluation and Treatment     Row Name 23 0828          PT Time and Intention    Document Type daily treatment  -EUGENE     Mode of Treatment physical therapy;individual therapy  -EUGENE     Row Name 23 0828          General Information    Patient Profile Reviewed yes  -EUGENE     Existing Precautions/Restrictions fall  -EUGENE     Limitations/Impairments hearing  -EUGENE     Row Name 23 0828          Living Environment    Primary Care Provided by self  -EUGENE     Row Name 23 0828          Home Use of Assistive/Adaptive Equipment    Equipment Currently Used at Home oxygen  -EUGENE     Row Name 23 0828           "Pain Assessment    Pretreatment Pain Rating 0/10 - no pain  \"not to speak of\"  -     Posttreatment Pain Rating 0/10 - no pain  -     Row Name 01/13/23 0828          Cognition/Psychosocial    Affect/Mental Status (Cognition) WFL  -     Orientation Status (Cognition) oriented x 4  -     Follows Commands (Cognition) delayed response/completion;increased processing time needed;follows two-step commands  -     Row Name 01/13/23 0828          Bed Mobility    Bed Mobility rolling left;rolling right;supine-sit;sit-supine  -     All Activities, [place] (Bed Mobility) independent  -     Rolling Left [place] (Bed Mobility) independent  -     Rolling Right [place] (Bed Mobility) independent  -     Scooting/Bridging [place] (Bed Mobility) independent  -     Supine-Sit [place] (Bed Mobility) independent  -     Sit-Supine [place] (Bed Mobility) independent  -     Assistive Device (Bed Mobility) --  -     Comment, (Bed Mobility) HOB flat. no bed rails  -     Row Name 01/13/23 0828          Transfer Assessment/Treatment    Transfers sit-stand transfer;stand-sit transfer;bed-chair transfer;chair-bed transfer  -     Row Name 01/13/23 0828          Bed-Chair Transfer    Bed-Chair [place] (Transfers) standby assist  -     Assistive Device (Bed-Chair Transfers) walker, front-wheeled  -     Row Name 01/13/23 0828          Chair-Bed Transfer    Chair-Bed [place] (Transfers) standby assist  -     Assistive Device (Chair-Bed Transfers) walker, front-wheeled  -     Row Name 01/13/23 0828          Sit-Stand Transfer    Sit-Stand [place] (Transfers) standby assist  -     Assistive Device (Sit-Stand Transfers) walker, front-wheeled  -     Row Name 01/13/23 0828          Stand-Sit Transfer    Stand-Sit [place] (Transfers) standby assist  -     Assistive Device (Stand-Sit Transfers) walker, front-wheeled  -     Row Name 01/13/23 0828          Toilet " Transfer    Type (Toilet Transfer) --  -     [place] Level (Toilet Transfer) --  -     Assistive Device (Toilet Transfer) --  -     Row Name 01/13/23 0828          Wheelchair Transfer    Type (Wheelchair Transfer) sit-stand;stand-sit  -     [place] Level (Wheelchair Transfer) standby assist  -     Assistive Device (Wheelchair Transfer) walker, front-wheeled  -     Row Name 01/13/23 0828          Gait/Stairs (Locomotion)    Gait/Stairs Locomotion gait/ambulation independence;gait/ambulation assistive device;distance ambulated;gait pattern;gait deviations;maintains weight-bearing status  -     [place] Level (Gait) standby assist  -     Assistive Device (Gait) walker, front-wheeled  -     Distance in Feet (Gait) 150 ft  -     Deviations/Abnormal Patterns (Gait) kimber decreased;stride length decreased;gait speed decreased  -     Bilateral Gait Deviations forward flexed posture  cueing for walker proximity.  -     Comment, (Gait/Stairs) pts personal walker arrived. adjusted in accordingly  -     Row Name 01/13/23 0828          Wheelchair Mobility/Management    Method of Wheelchair Locomotion (Mobility) bimanual (upper extremity) propulsion;bipedal (lower extremity) propulsion  -     Mobility Activities (Wheelchair) forward propulsion;backward propulsion;steering;turning  -     Forward Propulsion [place] (Wheelchair) independent  -     Distance Propelled in Feet (Wheelchair) 200  -     Row Name 01/13/23 0828          Motor Skills    Therapeutic Exercise --  edu on HEP and home safety. pt demonstrated understanding  -     Row Name 01/13/23 0828          Positioning and Restraints    Pre-Treatment Position sitting in chair/recliner  -     Post Treatment Position wheelchair  -     Row Name 01/13/23 0828          Vital Signs    Pretreatment Heart Rate (beats/min) 108  -     Intratreatment Heart Rate (beats/min) 111  -     Posttreatment Heart Rate (beats/min)  110  -     Pre SpO2 (%) 98  -EUGENE     O2 Delivery Pre Treatment supplemental O2  -EUGENE     Intra SpO2 (%) 97  -EUGENE     O2 Delivery Intra Treatment supplemental O2  -EUGENE     Post SpO2 (%) 97  -EUGENE     O2 Delivery Post Treatment supplemental O2  -EUGENE     Pre Patient Position Sitting  -EUGENE     Post Patient Position Sitting  -     Row Name 01/13/23 0828          Therapy Assessment/Plan (PT)    Patient's Goals For Discharge return home  -     Row Name 01/13/23 0828          Therapy Assessment/Plan (PT)    Rehab Potential/Prognosis (PT) other (see comments)  fair  -     Frequency of Treatment (PT) --  5-6 days/wk  -     Estimated Duration of Therapy (PT) 2 weeks;4 weeks  -     Problem List (PT) balance;mobility;strength  -     Row Name 01/13/23 0828          Therapy Plan Review/Discharge Plan (PT)    Anticipated Equipment Needs at Discharge (PT Eval) front wheeled walker;hospital bed;wheelchair;wheelchair cushion  -     Anticipated Discharge Disposition (PT) skilled nursing facility;home with 24/7 care;home with home health  -     Row Name 01/13/23 0828          IRF PT Goals    Bed Mobility Goal Selection (PT-IRF) bed mobility, PT goal 1  -     Transfer Goal Selection (PT-IRF) transfers, PT goal 1;transfers, PT goal 2  -     Gait (Walking Locomotion) Goal Selection (PT-IRF) gait, PT goal 1;gait, PT goal 2  -     Wheelchair Locomotion Goal Selection (PT-IRF) wheelchair locomotion, PT goal (free text)  -     Stairs Goal Selection (PT-IRF) stairs, PT goal 1  -     Strength Goal Selection (PT-IRF) strength, PT goal 1 (free text)  -     Balance Goal Selection (PT) balance, PT goal 1  -     ROM Goal Selection (PT-IRF) ROM, PT goal 1 (free text)  -     Row Name 01/13/23 0828          Bed Mobility Goal 1 (PT-IRF)    Activity/Assistive Device (Bed Mobility Goal 1, PT-IRF) bed mobility activities, all  -     [place] Level (Bed Mobility Goal 1, PT-IRF) independent  -     Time Frame (Bed Mobility  Goal 1, PT-IRF) by discharge  -EUGENE     Progress/Outcomes (Bed Mobility Goal 1, PT-IRF) goal met   -     Row Name 01/13/23 0828          Transfer Goal 1 (PT-IRF)    Activity/Assistive Device (Transfer Goal 1, PT-IRF) sit-to-stand/stand-to-sit;bed-to-chair/chair-to-bed;wheelchair transfer  -EUGENE     [place] Level (Transfer Goal 1, PT-IRF) standby assist  -EUGENE     Time Frame (Transfer Goal 1, PT-IRF) 1 week  -EUGENE     Progress/Outcomes (Transfer Goal 1, PT-IRF) goal met   -     Row Name 01/13/23 0828          Transfer Goal 2 (PT-IRF)    Activity/Assistive Device (Transfer Goal 2, PT-IRF) bed-to-chair/chair-to-bed;sit-to-stand/stand-to-sit;wheelchair transfer  -EUGENE     [place] Level (Transfer Goal 2, PT-IRF) modified independence  -EUGENE     Time Frame (Transfer Goal 2, PT-IRF) 2 weeks  -EUGENE     Progress/Outcomes (Transfer Goal 2, PT-IRF) goal not met  -     Row Name 01/13/23 0828          Gait/Walking Locomotion Goal 1 (PT-IRF)    Activity/Assistive Device (Gait/Walking Locomotion Goal 1, PT-IRF) gait (walking locomotion);assistive device use;decrease fall risk;increase endurance/gait distance;walker, rolling  -EUGENE     Gait/Walking Locomotion Distance Goal 1 (PT-IRF) 150ft x2  -EUGENE     [place] Level (Gait/Walking Locomotion Goal 1, PT-IRF) standby assist  -EUGENE     Time Frame (Gait/Walking Locomotion Goal 1, PT-IRF) 1 week  -EUGENE     Progress/Outcomes (Gait/Walking Locomotion Goal 1, PT-IRF) goal not met  -     Row Name 01/13/23 0828          Gait/Walking Locomotion Goal 2 (PT-IRF)    Activity/Assistive Device (Gait/Walking Locomotion Goal 2, PT-IRF) gait (walking locomotion);assistive device use;decrease fall risk;increase endurance/gait distance  -EUGENE     Gait/Walking Locomotion Distance Goal 2 (PT-IRF) 250ft x2  -EUGENE     [place] Level (Gait/Walking Locomotion Goal 2, PT-IRF) modified independence  -EUGENE     Time Frame (Gait/Walking Locomotion Goal 2, PT-IRF) 2 weeks  -EUGENE     Progress/Outcomes (Gait/Walking  Locomotion Goal 2, PT-IRF) goal not met  -     Row Name 01/13/23 0828          Wheelchair Locomotion Goal (PT-IRF)    Wheelchair Locomotion Goal (PT-IRF) Pt will propel w/c 150ft with 2 turns with supervison  -EUGENE     Time Frame (Wheelchair Locomotion Goal, PT-IRF) by discharge  -EUGENE     Progress/Outcomes (Wheelchair Locomotion Goal, PT-IRF) goal met   -     Row Name 01/13/23 0828          Stairs Goal 1 (PT-IRF)    Activity/Assistive Device (Stairs Goal 1, PT-IRF) ascending stairs;descending stairs;using handrail, left;using handrail, right;decrease fall risk  -EUGENE     Number of Stairs (Stairs Goal 1, PT-IRF) 3  -EUGENE     [place] Level (Stairs Goal 1, PT-IRF) standby assist;modified independence  -EUGENE     Time Frame (Stairs Goal 1, PT-IRF) 2 weeks  -EUGENE     Progress/Outcomes (Stairs Goal 1, PT-IRF) goal not met  -     Row Name 01/13/23 0828          Strength Goal (PT-IRF)    Strength Goal 3 (PT-IRF) Pt will tolerate LE exercises OOB in chair and progress to standing exercises with VSS  -     Time Frame (Strength Goal 3, PT-IRF) by discharge  -EUGENE     Progress/Outcomes (Strength Goal 3, PT-IRF) goal met   -     Row Name 01/13/23 0828          Balance Goal 1 (PT)    Activity/Assistive Device (Balance Goal 1, PT) assistive device use  -     [place] Level/Cues Needed (Balance Goal 1, PT) conditional independence  -EUGENE     Time Frame (Balance Goal 1, PT) by discharge  -EUGENE     Progress/Outcomes (Balance Goal 1, PT) goal not met  -     Row Name 01/13/23 0828          Caregiver Training Goal 1 (PT-IRF)    Caregiver Training Goal 1 (PT-IRF) Caregiver will acknowledge understanding of home care iosntructions  -EUGENE     Time Frame (Caregiver Training Goal 1, PT-IRF) by discharge  -EUGENE     Progress/Outcomes (Caregiver Training Goal 1, PT-IRF) goal met   -     Row Name 01/13/23 0828          Discharge Summary (PT)    Outcomes Achieved/Progress Made Upon Discharge (PT) goals partially achieved prior to  discharge;progress was made toward goals  -     Transfer to Another Level of Care or Facility (PT) home with assist recommended  -     Discharge Summary Statement (PT) pts caregiver will provide 24/7 care and particiapted in caregiver training.  -           User Key  (r) = Recorded By, (t) = Taken By, (c) = Cosigned By    Initials Name Provider Type     Yossi Haynes PTA Physical Therapist Assistant                Physical Therapy Education     Title: PT OT SLP Therapies (In Progress)     Topic: Physical Therapy (In Progress)     Point: Mobility training (In Progress)     Learning Progress Summary           Patient Acceptance, E, NR by  at 1/13/2023 1118    Acceptance, E, NR by  at 1/11/2023 1532    Acceptance, E, NR by  at 1/10/2023 1241    Acceptance, E, NR by  at 1/9/2023 1243    Acceptance, E, NR by  at 1/9/2023 1112    Acceptance, E, NR by  at 1/3/2023 1206    Acceptance, E, NR by  at 1/2/2023 1219    Acceptance, E, NR by 1 at 12/31/2022 1345                   Point: Home exercise program (Not Started)     Learner Progress:  Not documented in this visit.          Point: Body mechanics (In Progress)     Learning Progress Summary           Patient Acceptance, E, NR by [facility] at 12/31/2022 1345                   Point: Precautions (In Progress)     Learning Progress Summary           Patient Acceptance, E, NR by [facility] at 12/31/2022 1345                               User Key     Initials Effective Dates Name Provider Type Discipline    [facility] 06/16/21 -  Nydia Muñoz, PT Physical Therapist PT     06/16/21 -  Yosis Haynes PTA Physical Therapist Assistant PT                PT Recommendation and Plan  Frequency of Treatment (PT):  (5-6 days/wk)  Anticipated Equipment Needs at Discharge (PT Eval): front wheeled walker, hospital bed, wheelchair, wheelchair cushion  Plan of Care Reviewed With: patient  Progress: improving  Outcome Evaluation: PM tx completed. pt t/f to bed w/ CGA and  particiapted in standing LE ther ex. pt left sitting EOB with caregiver. nsg aware. no new goals met at this time. pt would continue to benefit from PT services.     Outcome Measures     Row Name 01/13/23 0828 01/12/23 1100 01/11/23 1446       How much help from another person do you currently need...    Turning from your back to your side while in flat bed without using bedrails? 4  -EUGENE 3  -JW 3  -EUGENE    Moving from lying on back to sitting on the side of a flat bed without bedrails? 4  -EUGENE 3  -JW 3  -EUGENE    Moving to and from a bed to a chair (including a wheelchair)? 3  -EUGENE 3  -JW 3  -EUGENE    Standing up from a chair using your arms (e.g., wheelchair, bedside chair)? 3  -EUGENE 3  -JW 3  -EUGENE    Climbing 3-5 steps with a railing? 3  -EUGENE 3  -JW 3  -EUGENE    To walk in hospital room? 3  -EUGENE 3  -JW 3  -EUGENE    AM-PAC 6 Clicks Score (PT) 20  -EUGENE 18  -JW 18  -EUGENE       Functional Assessment    Outcome Measure Options AM-PAC 6 Clicks Basic Mobility (PT)  -EUGENE AM-PAC 6 Clicks Basic Mobility (PT)  - AM-PAC 6 Clicks Basic Mobility (PT)  -EUGENE    Row Name 01/11/23 1059 01/10/23 1200          How much help from another person do you currently need...    Turning from your back to your side while in flat bed without using bedrails? 3  -EUGENE --     Moving from lying on back to sitting on the side of a flat bed without bedrails? 3  -EUGENE --     Moving to and from a bed to a chair (including a wheelchair)? 3  -EUGENE --     Standing up from a chair using your arms (e.g., wheelchair, bedside chair)? 3  -EUGENE --     Climbing 3-5 steps with a railing? 3  -EUGENE --     To walk in hospital room? 3  -EUGENE --     AM-PAC 6 Clicks Score (PT) 18  -EUGENE --        Functional Assessment    Outcome Measure Options AM-PAC 6 Clicks Basic Mobility (PT)  -EUGENE AM-PAC 6 Clicks Basic Mobility (PT)  -EUGENE           User Key  (r) = Recorded By, (t) = Taken By, (c) = Cosigned By    Initials Name Provider Type    Sima Callaway, PTA Physical Therapist Assistant    EUGENE Haynes,  Yossi PORTILLO PTA Physical Therapist Assistant                 Time Calculation:    PT Charges     Row Name 01/13/23 1129             Time Calculation    Start Time 0828  -      Stop Time 0916  -      Time Calculation (min) 48 min  -EUGENE         Time Calculation- PT    Total Timed Code Minutes- PT 48 minute(s)  -         Timed Charges    99787 - Gait Training Minutes  15  -      68411 - PT Therapeutic Activity Minutes 8  -      53085 - PT Self Care/Mgmt Minutes 15  -      80006 - Wheelchair Management Training 10  -EUGENE         Total Minutes    Timed Charges Total Minutes 48  -EUGENE       Total Minutes 48  -EUGENE            User Key  (r) = Recorded By, (t) = Taken By, (c) = Cosigned By    Initials Name Provider Type     Yossi Haynes PTA Physical Therapist Assistant                Therapy Charges for Today     Code Description Service Date Service Provider Modifiers Qty    58214113448 HC GAIT TRAINING EA 15 MIN 1/13/2023 Yossi Haynes, PTA GP 1    04939007881 HC PT THERAPEUTIC ACT EA 15 MIN 1/13/2023 Yossi Haynes, PTA GP 1    30114042816 HC PT SELF CARE/MGMT/TRAIN EA 15 MIN 1/13/2023 Yossi Haynes, PTA GP 1        Mobility  Admission Goal Date Discharge   A.Roll left and right    6   B.Sit to lying    6   C. Lying to sitting on side of bed    6   D.Sit to stand    4   E. Chair/bed-to-chair transfer      4   F.Toilet transfer       4   G. Car Transfer    88   I.Walk 10 feet    4   J.Walk 50 feet with two turns.    4   K.Walk 150 feet:     4   L.Walking 10 feet on uneven surfaces     88   M.1 step (curb)    4   N.4 steps    4   O.12 steps    88   P.Picking up object    88   R. Wheel 50 feet with two turns    6   S.Wheel 150 feet    6           PT G-Codes  Outcome Measure Options: AM-PAC 6 Clicks Basic Mobility (PT)  AM-PAC 6 Clicks Score (PT): 20  AM-PAC 6 Clicks Score (OT): 19  Modified Traverse Scale: 2 - Slight disability.  Unable to carry out all previous activities but able to look after own affairs  without assistance.         Yossi Haynes, PTA  1/13/2023

## 2023-01-13 NOTE — THERAPY DISCHARGE NOTE
Inpatient Rehabilitation - Speech Language Pathology Discharge Summary  [facility]       Patient Name: Brian Garcia  : 1950  MRN: [id number]    Today's Date: 2023                   Admit Date: 2022      SLP Recommendation and Plan  Pt made progress on all goals.        Hearing, Speech and Vision Admission Goal  D/c    Expression of Ideas and Wants (consider both verbal and non-verbal expression and excluding language barriers) 4 4 4   4.Expresses complex messages without difficulty and with speech that is clear and easy to understand.      3.Exhibits some difficulty with expressing needs and ideas (e.g., some words or finishing thoughts) or speech is not clear.      2.Frequently exhibits difficulty with expressing needs and ideas.      1.Rarely/Never expresses self or speech is very difficult to understand.      Understanding Verbal and Non-Verbal Content (with hearing aid or device, if used, and excluding language barriers) 4 3 3   4.Understands: Clear comprehension without cues or repetitions.      3.Usually Understands: Understands most conversations, but misses some part/intent of message. Requires cues at times to understand      2.Sometimes Understands: Understands only basic conversations or simple, direct phrases. Frequently requires cues to understand      1.Rarely/Never Understands          Visit Dx:    ICD-10-CM ICD-9-CM   1. Cerebrovascular accident (CVA) due to other mechanism ([facility])  I63.89 434.91   2. Dysphagia, unspecified type  R13.10 787.20   3. Symbolic dysfunction  R48.9 784.60   4. Impaired mobility and ADLs  Z74.09 V49.89    Z78.9    5. Impaired functional mobility, balance, gait, and endurance  Z74.09 V49.89   6. Cerebrovascular accident (CVA), unspecified mechanism ([facility])  I63.9 434.91                SLP GOALS     Row Name 23 1237 23 1307          Reasoning Goal 1 (SLP)    Improve Reasoning Through Goal 1 (SLP) complete deductive reasoning  task;90%;independently (over 90% accuracy)  -EC complete deductive reasoning task;90%;independently (over 90% accuracy)  -CK     Time Frame (Reasoning Goal 1, SLP) by discharge  -EC by discharge  -CK     Barriers (Reasoning Goal 1, SLP) judgement/problem solving deficits  -EC --     Progress (Reasoning Goal 1, SLP) 80%  -EC 80%  -CK     Progress/Outcomes (Reasoning Goal 1, SLP) goal ongoing  -EC goal ongoing  -CK        Functional Problem Solving Skills Goal 1 (SLP)    Improve Problem Solving Through Goal 1 (SLP) -- --  -CK     Time Frame (Problem Solving Goal 1, SLP) -- --  -CK     Progress (Problem Solving Goal 1, SLP) -- --  -CK     Progress/Outcomes (Problem Solving Goal 1, SLP) -- --  -CK           User Key  (r) = Recorded By, (t) = Taken By, (c) = Cosigned By    Initials Name Provider Type    EC Anita Siu CCC-SLP Speech and Language Pathologist    CK Latesha Ram, MS CCC-SLP Speech and Language Pathologist                  Therapy Charges for Today     Code Description Service Date Service Provider Modifiers Qty    [id number] [facility] TREATMENT SPEECH 3 1/12/2023 Anita Siu CCC-SLP GN 1            SLP Discharge Summary  Anticipated Discharge Disposition (SLP): home with assist      LOC Bernardo  1/13/2023

## 2023-01-13 NOTE — DISCHARGE SUMMARY
"REHABILITATION DISCHARGE SUMMARY    NAME: Brian Garcia     : 1950  MRN: [id number]    ADMITTING PROVIDER: Fan Rob MD  DISCHARGING PROVIDER: Fan Rob MD     ADMITTED: 2022  DISCHARGED:  2023    ADMISSION DIAGNOSES:  CVA (cerebral vascular accident) ([facility]) [I63.9]    DISCHARGE DIAGNOSES:     Traumatic hemorrhage of cerebrum    CVA (cerebral vascular accident) ([facility])    Physical deconditioning    Stage 4 very severe COPD by GOLD classification ([facility])    Hypertension    Chronic respiratory failure with hypoxia, on home O2 therapy ([facility])    Iron deficiency anemia secondary to inadequate dietary iron intake      CONSULTS:   Consults     Date and Time Order Name Status Description    2023  9:47 AM Inpatient Pulmonology Consult          PRIMARY CARE PROVIDER AT DISCHARGE: Marina Kitchen MD    PROCEDURES: Cognitive therapy treatment and aggressive physical and occupational therapy    /67 (BP Location: Left arm, Patient Position: Sitting)   Pulse 106   Temp 98.9 °F (37.2 °C) (Tympanic)   Resp 16   Ht 165.1 cm (65\")   Wt 64 kg (141 lb)   SpO2 95%   BMI 23.46 kg/m²     LABS:   Lab Results (last 24 hours)     ** No results found for the last 24 hours. **               Self-Care Admission Discharge   Eating 6 6   Oral hygiene 6 6   Toileting hygiene 5 6   Shower/bathe self 3 4   Upper body dressing 5 5   Lower body dressing 3 3   Putting on/taking off footwear 3 3     Mobility Admission Discharge   A. Roll left and right 4  4   B. Sit to lying 4 4   C. Lying to sitting on side of bed 4 4   D. Sit to stand 4 4   E. Chair/bed-to-chair transfer 4 4   F. Toilet transfer 88 88   G. Car transfer 88 88   I.    Walk 10 feet 4 4   J.   Walk 50 feet with two turns 4 4   K.  Walk 150 feet 88 4   L.  Walking 10 feet on uneven surfaces 88 88   M.  1 step (curb) 88 4   N.  4 steps 88 4   O.  12 steps 9 88   P.  Picking up object 88 88   R.  Wheel 50 feet with two turns 4 4   S.  " Wheel 150 feet 88 3       Hearing, Speech and Vision Admission Discharge   Expression of Ideas and Wants (consider both verbal and non-verbal expression and excluding language barriers)  4- Without difficulty  3-Some difficulty  2-Frequently exhibits difficulty  1-Rarely/never exhibits clear speech 4 4   Understanding Verbal and Non-Verbal Content (with hearing aid or device, if used, and excluding language barriers)  4-Comprehends without cues  3-Usually understands  2-Some understands  1-Rarely/never understands 4 4     REHABILITATION course: Patient had a extensive course initially because of cognitive problems and his ability to cooperate with therapy once that cleared he was able to can increase his ability and functional status his cognitive conditions cleared and improved his amatory abilities      CONDITION ON DISCHARGE: Patient still require walker use balance is poor but hopefully with outpatient therapy    Disposition patient is to go home with outpatient therapy to continue his rehabilitation follow-up with Dr. Palacios and his neurosurgeon in a couple weeks may be restart his anticoagulation because of his atrial fib is also discovered he has iron deficiency anemia and begin on oral iron supplements    DISCHARGE DIET: Same as Admission diet. See below  Dietary Orders (From admission, onward)     Start     Ordered    01/06/23 1800  DIET MESSAGE Pt likes to eat:  Biscuits and gravy, rowe (RD[facility] staff cora hsuon on trays), roast beef, mashed potatoes, fresh steamed vegetables, hamburgers, also sandwiches including Baked chicken, fish, tuna salad.  Daily With Breakfast, Lunch & Dinner      Comments: Pt likes to eat:  Biscuits and gravy, rowe ([facility] staff cora hsuon on trays), roast beef, mashed potatoes, fresh steamed vegetables, hamburgers, also sandwiches including Baked chicken, fish, tuna salad.    01/06/23 1418    01/03/23 1800  Dietary Nutrition Supplements Other (See Comment); cranberry juice  Daily With  Breakfast, Lunch & Dinner      Question Answer Comment   Select Supplement: Other (See Comment)    Other cranberry juice        23 1347    22 1200  Dietary Nutrition Supplements Ice Cream  Daily With Lunch & Dinner      Question:  Select Supplement:  Answer:  Ice Cream    22 1102    22 1102  Diet: Cardiac Diets; Healthy Heart (2-3 Na+); Texture: Regular Texture (IDDSI 7); Fluid Consistency: Thin (IDDSI 0)  Diet Effective Now        References:    Diet Order [facility]   Question Answer Comment   Diets: Cardiac Diets    Cardiac Diet: Healthy Heart (2-3 Na+)    Texture: Regular Texture (IDDSI 7)    Fluid Consistency: Thin (IDDSI 0)        22 1102    22 1102  Dietary Nutrition Supplements Other (See Comment); PB and crackers or 1/2 PB sandwich  Daily With Breakfast & Dinner      Question Answer Comment   Select Supplement: Other (See Comment)    Other PB and crackers or 1/2 PB sandwich        22 110                 DISCHARGE ACTIVITY: Protective ambulatory status to continue strengthening outpatient physical therapy patient's family request about driving this patient should not drive until he is cleared by the neurosurgeon Dr. Liang  REHABILITATION DISCHARGE SUMMARY    NAME: Brian Garcia     : 1950  MRN: [id number]    ADMITTING PROVIDER: Fan Rob MD  DISCHARGING PROVIDER: Fan Rob MD

## 2023-02-03 ENCOUNTER — TELEPHONE (OUTPATIENT)
Dept: CARDIOLOGY | Facility: CLINIC | Age: 73
End: 2023-02-03
Payer: OTHER GOVERNMENT

## 2023-02-03 RX ORDER — DILTIAZEM HYDROCHLORIDE 180 MG/1
180 CAPSULE, EXTENDED RELEASE ORAL DAILY
Qty: 90 CAPSULE | Refills: 1 | Status: SHIPPED | OUTPATIENT
Start: 2023-02-03 | End: 2023-03-01 | Stop reason: SDUPTHER

## 2023-02-03 NOTE — TELEPHONE ENCOUNTER
----- Message from Joanne Gerard sent at 2/2/2023  1:24 PM CST -----  Contact: [phone]  He [facility]- they will send by mail to pt

## 2023-02-10 ENCOUNTER — LAB (OUTPATIENT)
Dept: ONCOLOGY | Facility: HOSPITAL | Age: 73
End: 2023-02-10
Payer: OTHER GOVERNMENT

## 2023-02-10 ENCOUNTER — OFFICE VISIT (OUTPATIENT)
Dept: ONCOLOGY | Facility: CLINIC | Age: 73
End: 2023-02-10
Payer: OTHER GOVERNMENT

## 2023-02-10 VITALS
DIASTOLIC BLOOD PRESSURE: 54 MMHG | OXYGEN SATURATION: 99 % | TEMPERATURE: 97.7 F | HEART RATE: 90 BPM | WEIGHT: 134.4 LBS | BODY MASS INDEX: 22.37 KG/M2 | SYSTOLIC BLOOD PRESSURE: 108 MMHG

## 2023-02-10 DIAGNOSIS — D72.828 OTHER ELEVATED WHITE BLOOD CELL (WBC) COUNT: ICD-10-CM

## 2023-02-10 DIAGNOSIS — Z86.711 HISTORY OF PULMONARY EMBOLISM: ICD-10-CM

## 2023-02-10 DIAGNOSIS — D64.9 ANEMIA, UNSPECIFIED TYPE: ICD-10-CM

## 2023-02-10 DIAGNOSIS — Z86.711 HISTORY OF PULMONARY EMBOLISM: Chronic | ICD-10-CM

## 2023-02-10 DIAGNOSIS — D72.828 OTHER ELEVATED WHITE BLOOD CELL (WBC) COUNT: Chronic | ICD-10-CM

## 2023-02-10 DIAGNOSIS — R91.8 PULMONARY NODULES: ICD-10-CM

## 2023-02-10 DIAGNOSIS — R91.8 PULMONARY NODULES: Primary | Chronic | ICD-10-CM

## 2023-02-10 LAB
ALBUMIN SERPL-MCNC: 3.8 G/DL (ref 3.5–5.2)
ALBUMIN/GLOB SERPL: 1.2 G/DL
ALP SERPL-CCNC: 116 U/L (ref 39–117)
ALT SERPL W P-5'-P-CCNC: 10 U/L (ref 1–41)
ANION GAP SERPL CALCULATED.3IONS-SCNC: 8 MMOL/L (ref 5–15)
AST SERPL-CCNC: 13 U/L (ref 1–40)
BASOPHILS # BLD AUTO: 0.1 10*3/MM3 (ref 0–0.2)
BASOPHILS NFR BLD AUTO: 0.9 % (ref 0–1.5)
BILIRUB SERPL-MCNC: 0.2 MG/DL (ref 0–1.2)
BUN SERPL-MCNC: 9 MG/DL (ref 8–23)
BUN/CREAT SERPL: 14.5 (ref 7–25)
CALCIUM SPEC-SCNC: 8.9 MG/DL (ref 8.6–10.5)
CHLORIDE SERPL-SCNC: 99 MMOL/L (ref 98–107)
CO2 SERPL-SCNC: 32 MMOL/L (ref 22–29)
CREAT SERPL-MCNC: 0.62 MG/DL (ref 0.76–1.27)
DEPRECATED RDW RBC AUTO: 42.2 FL (ref 37–54)
EGFRCR SERPLBLD CKD-EPI 2021: 100.9 ML/MIN/1.73
EOSINOPHIL # BLD AUTO: 0.51 10*3/MM3 (ref 0–0.4)
EOSINOPHIL NFR BLD AUTO: 4.6 % (ref 0.3–6.2)
ERYTHROCYTE [DISTWIDTH] IN BLOOD BY AUTOMATED COUNT: 12 % (ref 12.3–15.4)
FERRITIN SERPL-MCNC: 266 NG/ML (ref 30–400)
FOLATE SERPL-MCNC: 9.81 NG/ML (ref 4.78–24.2)
GLOBULIN UR ELPH-MCNC: 3.2 GM/DL
GLUCOSE SERPL-MCNC: 111 MG/DL (ref 65–99)
HCT VFR BLD AUTO: 37.4 % (ref 37.5–51)
HGB BLD-MCNC: 12.2 G/DL (ref 13–17.7)
HOLD SPECIMEN: NORMAL
IMM GRANULOCYTES # BLD AUTO: 0.04 10*3/MM3 (ref 0–0.05)
IMM GRANULOCYTES NFR BLD AUTO: 0.4 % (ref 0–0.5)
IRON 24H UR-MRATE: 39 MCG/DL (ref 59–158)
IRON SATN MFR SERPL: 12 % (ref 20–50)
LYMPHOCYTES # BLD AUTO: 0.92 10*3/MM3 (ref 0.7–3.1)
LYMPHOCYTES NFR BLD AUTO: 8.3 % (ref 19.6–45.3)
MCH RBC QN AUTO: 30.8 PG (ref 26.6–33)
MCHC RBC AUTO-ENTMCNC: 32.6 G/DL (ref 31.5–35.7)
MCV RBC AUTO: 94.4 FL (ref 79–97)
MONOCYTES # BLD AUTO: 0.98 10*3/MM3 (ref 0.1–0.9)
MONOCYTES NFR BLD AUTO: 8.9 % (ref 5–12)
NEUTROPHILS NFR BLD AUTO: 76.9 % (ref 42.7–76)
NEUTROPHILS NFR BLD AUTO: 8.51 10*3/MM3 (ref 1.7–7)
NRBC BLD AUTO-RTO: 0 /100 WBC (ref 0–0.2)
PLATELET # BLD AUTO: 299 10*3/MM3 (ref 140–450)
PMV BLD AUTO: 9.5 FL (ref 6–12)
POTASSIUM SERPL-SCNC: 4.1 MMOL/L (ref 3.5–5.2)
PROT SERPL-MCNC: 7 G/DL (ref 6–8.5)
RBC # BLD AUTO: 3.96 10*6/MM3 (ref 4.14–5.8)
SODIUM SERPL-SCNC: 139 MMOL/L (ref 136–145)
TIBC SERPL-MCNC: 331 MCG/DL (ref 298–536)
TRANSFERRIN SERPL-MCNC: 222 MG/DL (ref 200–360)
VIT B12 BLD-MCNC: 370 PG/ML (ref 211–946)
WBC NRBC COR # BLD: 11.06 10*3/MM3 (ref 3.4–10.8)

## 2023-02-10 PROCEDURE — 82746 ASSAY OF FOLIC ACID SERUM: CPT | Performed by: INTERNAL MEDICINE

## 2023-02-10 PROCEDURE — 80053 COMPREHEN METABOLIC PANEL: CPT

## 2023-02-10 PROCEDURE — 1125F AMNT PAIN NOTED PAIN PRSNT: CPT | Performed by: INTERNAL MEDICINE

## 2023-02-10 PROCEDURE — 85025 COMPLETE CBC W/AUTO DIFF WBC: CPT

## 2023-02-10 PROCEDURE — 82728 ASSAY OF FERRITIN: CPT | Performed by: INTERNAL MEDICINE

## 2023-02-10 PROCEDURE — G0463 HOSPITAL OUTPT CLINIC VISIT: HCPCS | Performed by: INTERNAL MEDICINE

## 2023-02-10 PROCEDURE — 1123F ACP DISCUSS/DSCN MKR DOCD: CPT | Performed by: INTERNAL MEDICINE

## 2023-02-10 PROCEDURE — 83540 ASSAY OF IRON: CPT | Performed by: INTERNAL MEDICINE

## 2023-02-10 PROCEDURE — 99214 OFFICE O/P EST MOD 30 MIN: CPT | Performed by: INTERNAL MEDICINE

## 2023-02-10 PROCEDURE — 84466 ASSAY OF TRANSFERRIN: CPT | Performed by: INTERNAL MEDICINE

## 2023-02-10 PROCEDURE — 82607 VITAMIN B-12: CPT | Performed by: INTERNAL MEDICINE

## 2023-02-10 RX ORDER — MONTELUKAST SODIUM 10 MG/1
10 TABLET ORAL NIGHTLY
COMMUNITY

## 2023-02-10 RX ORDER — CHOLECALCIFEROL (VITAMIN D3) 125 MCG
5 CAPSULE ORAL NIGHTLY PRN
COMMUNITY

## 2023-02-10 RX ORDER — OMEGA-3S/DHA/EPA/FISH OIL/D3 300MG-1000
400 CAPSULE ORAL DAILY
COMMUNITY

## 2023-02-10 RX ORDER — ACETAMINOPHEN 325 MG/1
650 TABLET ORAL EVERY 4 HOURS PRN
COMMUNITY
End: 2023-02-14

## 2023-02-10 NOTE — PROGRESS NOTES
DATE OF VISIT: 2/11/2023      REASON FOR VISIT: Lung nodule, history of pulmonary embolism, leukocytosis      HISTORY OF PRESENT ILLNESS:   72-year-old male with medical problems significant for hypertension, history of pulmonary embolism in 2015 for which patient took Coumadin for 1 year, chronic obstructive pulmonary disease, history of nicotine addiction that he quit in 2008 is here for follow-up appointment today.  Patient has been following up with [facility] for spiculated right lung lesion, leukocytosis and history of pulmonary embolism.  Patient was last seen at [facility] on November 9, 2022 to go over the result of PET/CT that showed enlarging lung lesion on right lung for which biopsy was recommended.  Patient states he followed up with Dr. Washington who recommended 3-month follow-up rather than biopsy at that point.  Continues to have chronic tingling and numbness affecting lower extremity.  Continues to have chronic hearing loss.  Complains of chronic shortness of breath with recent worsening.  States since last clinic visit he had a stroke in December as well as self-limiting brain bleed.  Denies any hemoptysis.              Past Medical History, Past Surgical History, Social History, Family History have been reviewed and are without significant changes except as mentioned.    Review of Systems   Constitutional: Positive for unexpected weight change (Has lost 12 pounds since last clinic visit).      A comprehensive 14 point review of systems was performed and was negative except as mentioned in HPI.    Medications:  The current medication list was reviewed in the EMR    ALLERGIES:    Allergies   Allergen Reactions   • Amoxicillin Anaphylaxis   • Albuterol Irritability     Facial flushing and palpitations.   • Asmanex (120 Metered Doses) [Mometasone Furoate] Unknown - Low Severity   • Lortab [Hydrocodone-Acetaminophen] Other (See Comments)     Can't remember   • Morphine And Related  Hallucinations   • Prilosec [Omeprazole] Other (See Comments)     unknown   • Sodium Clavulanate Unknown - High Severity   • Statins GI Intolerance   • Sulfa Antibiotics Other (See Comments)     Can't remember also more and can't remember   • Symbicort [Budesonide-Formoterol Fumarate] Unknown (See Comments)     Doesn't remember reaction type       Objective      Vitals:    02/10/23 1306   BP: 108/54   Pulse: 90   Temp: 97.7 °F (36.5 °C)   TempSrc: Temporal   SpO2: 99%   Weight: 61 kg (134 lb 6.4 oz)   PainSc:   5   PainLoc: Shoulder  Comment: right shoulder pain     Current Status 8/2/2021   ECOG score 0       Physical Exam  Pulmonary:      Breath sounds: Wheezing present. No rhonchi.   Neurological:      Mental Status: He is alert and oriented to person, place, and time.           RECENT LABS:  Glucose   Date Value Ref Range Status   02/10/2023 111 (H) 65 - 99 mg/dL Final     Glucose, Arterial   Date Value Ref Range Status   02/06/2018 106 mmol/L Final     Sodium   Date Value Ref Range Status   02/10/2023 139 136 - 145 mmol/L Final     Potassium   Date Value Ref Range Status   02/10/2023 4.1 3.5 - 5.2 mmol/L Final     CO2   Date Value Ref Range Status   02/10/2023 32.0 (H) 22.0 - 29.0 mmol/L Final     Chloride   Date Value Ref Range Status   02/10/2023 99 98 - 107 mmol/L Final     Anion Gap   Date Value Ref Range Status   02/10/2023 8.0 5.0 - 15.0 mmol/L Final     Creatinine   Date Value Ref Range Status   02/10/2023 0.62 (L) 0.76 - 1.27 mg/dL Final     BUN   Date Value Ref Range Status   02/10/2023 9 8 - 23 mg/dL Final     BUN/Creatinine Ratio   Date Value Ref Range Status   02/10/2023 14.5 7.0 - 25.0 Final     Calcium   Date Value Ref Range Status   02/10/2023 8.9 8.6 - 10.5 mg/dL Final     eGFR Non  Amer   Date Value Ref Range Status   11/08/2021 81 >60 mL/min/1.73 Final     Alkaline Phosphatase   Date Value Ref Range Status   02/10/2023 116 39 - 117 U/L Final     Total Protein   Date Value Ref Range  Status   02/10/2023 7.0 6.0 - 8.5 g/dL Final     ALT (SGPT)   Date Value Ref Range Status   02/10/2023 10 1 - 41 U/L Final     AST (SGOT)   Date Value Ref Range Status   02/10/2023 13 1 - 40 U/L Final     Total Bilirubin   Date Value Ref Range Status   02/10/2023 0.2 0.0 - 1.2 mg/dL Final     Albumin   Date Value Ref Range Status   02/10/2023 3.8 3.5 - 5.2 g/dL Final     Globulin   Date Value Ref Range Status   02/10/2023 3.2 gm/dL Final     Lab Results   Component Value Date    WBC 11.06 (H) 02/10/2023    HGB 12.2 (L) 02/10/2023    HCT 37.4 (L) 02/10/2023    MCV 94.4 02/10/2023     02/10/2023     Lab Results   Component Value Date    NEUTROABS 8.51 (H) 02/10/2023    IRON 39 (L) 02/10/2023    IRON 37 (L) 01/02/2023    TIBC 331 02/10/2023    TIBC 286 (L) 01/02/2023    LABIRON 12 (L) 02/10/2023    LABIRON 13 (L) 01/02/2023    FERRITIN 266.00 02/10/2023    [id number] 370 02/10/2023    FOLATE 9.81 02/10/2023     No results found for: , LABCA2, AFPTM, HCGQUANT, , CHROMGRNA, [id number], CEA, REFLABREPO      PATHOLOGY:  * Cannot find OR log *         RADIOLOGY DATA :  No radiology results for the last 7 days        Assessment & Plan     1.  Lung nodule:  - PET/CT on September 12, 2022 showed lung nodule with SUV around 2.7 involving right middle lobe with some uptake in left rib cage region which was nonspecific at that point  - Follow-up PET/CT done on October 31, 2022 showed increased uptake in right middle lobe with SUV around 5.  - Option of tissue diagnosis were discussed at that point.  - Patient had follow-up appointment with Dr. Washington on November 15, 2022 and opted for observation with follow-up CT scan which has been ordered for next week by Dr. Washington.  - Recommend continue following with Dr. Washington to go over the result of CT scan.  - We will have patient return to clinic in 3 months with repeat CBC, iron studies, ferritin, B12,folate and CMP on that day.    2.  History of pulmonary  embolism  - Patient was diagnosed with pulmonary embolism in 2015 for which he took Coumadin for 1 year  - CT angiogram in July 2021 was negative for pulmonary embolism    3.  Leukocytosis:  - Reactive.  - Most recent white blood cell count is 11.02  - We will monitor with CBC    4.  Anemia: Problem is new to me  - Hemoglobin is 12.2.  Will do anemia work-up today consisting of iron studies, ferritin, B12 and folate.  -Iron studies are consistent with iron deficiency.  Patient states she [facility] but he is awaiting prescription to be delivered.  - B12 is 370, folate level is 9.  Recommend starting B12 and folic acid p.o. daily as well.    5.  Health maintenance: Patient does not smoke    6. Advance Care Planning: For now patient remains full code and is able to make decisions.  Patient has health care surrogate mentioned on chart.    7.  Prescription: Prescription for B12 and folic acid has been sent to his pharmacy today             PHQ-9 Total Score: 2   -Patient is not homicidal or suicidal.  No acute intervention required.    Brian Pace Radha reports a pain score of 5.  Given his pain assessment as noted, treatment options were discussed and the following options were decided upon as a follow-up plan to address the patient's pain: continuation of current treatment plan for pain.         Omega Davis MD  2/11/2023  10:08 CST        Part of this note may be an electronic transcription/translation of spoken language to printed text using the Dragon Dictation System.          CC:

## 2023-02-11 RX ORDER — FOLIC ACID 1 MG/1
1 TABLET ORAL DAILY
Qty: 90 TABLET | Refills: 1 | Status: SHIPPED | OUTPATIENT
Start: 2023-02-11

## 2023-02-11 RX ORDER — LANOLIN ALCOHOL/MO/W.PET/CERES
1000 CREAM (GRAM) TOPICAL DAILY
Qty: 90 TABLET | Refills: 1 | Status: SHIPPED | OUTPATIENT
Start: 2023-02-11

## 2023-02-13 ENCOUNTER — HOSPITAL ENCOUNTER (OUTPATIENT)
Dept: CT IMAGING | Facility: HOSPITAL | Age: 73
Discharge: HOME | End: 2023-02-13
Admitting: INTERNAL MEDICINE
Payer: OTHER GOVERNMENT

## 2023-02-13 ENCOUNTER — TELEPHONE (OUTPATIENT)
Dept: ONCOLOGY | Facility: CLINIC | Age: 73
End: 2023-02-13
Payer: OTHER GOVERNMENT

## 2023-02-13 DIAGNOSIS — R91.8 PULMONARY NODULES: Chronic | ICD-10-CM

## 2023-02-13 PROCEDURE — 71250 CT THORAX DX C-: CPT

## 2023-02-13 NOTE — TELEPHONE ENCOUNTER
----- Message from Omega Davis MD sent at 2/11/2023 10:11 AM CST -----  Please let patient know, his iron level is again low in blood.  He needs to start taking iron supplementation once [facility].  His B12 and folate level is on the lower end of normal as well.  He needs to start taking B12 and folic acid p.o. daily as well.  I have sent prescription for B12 and [facility]olic acid [facility].  Thank you

## 2023-02-13 NOTE — TELEPHONE ENCOUNTER
Called and spoke with pt regarding lab results and medication instructions as per Dr. Davis, v/u obtained.

## 2023-02-14 ENCOUNTER — OFFICE VISIT (OUTPATIENT)
Dept: ORTHOPEDIC SURGERY | Facility: CLINIC | Age: 73
End: 2023-02-14
Payer: OTHER GOVERNMENT

## 2023-02-14 VITALS — HEIGHT: 65 IN | BODY MASS INDEX: 22.37 KG/M2

## 2023-02-14 DIAGNOSIS — M19.011 ARTHROSIS OF RIGHT ACROMIOCLAVICULAR JOINT: ICD-10-CM

## 2023-02-14 DIAGNOSIS — M75.101 ROTATOR CUFF SYNDROME OF RIGHT SHOULDER: ICD-10-CM

## 2023-02-14 DIAGNOSIS — M75.41 IMPINGEMENT SYNDROME OF RIGHT SHOULDER: ICD-10-CM

## 2023-02-14 DIAGNOSIS — G89.29 CHRONIC RIGHT SHOULDER PAIN: Primary | ICD-10-CM

## 2023-02-14 DIAGNOSIS — M25.511 CHRONIC RIGHT SHOULDER PAIN: Primary | ICD-10-CM

## 2023-02-14 DIAGNOSIS — M25.619 LIMITED RANGE OF MOTION (ROM) OF SHOULDER: ICD-10-CM

## 2023-02-14 PROCEDURE — 20610 DRAIN/INJ JOINT/BURSA W/O US: CPT | Performed by: NURSE PRACTITIONER

## 2023-02-14 PROCEDURE — 99214 OFFICE O/P EST MOD 30 MIN: CPT | Performed by: NURSE PRACTITIONER

## 2023-02-14 RX ADMIN — LIDOCAINE HYDROCHLORIDE 2 ML: 10 INJECTION, SOLUTION INFILTRATION; PERINEURAL at 15:11

## 2023-02-14 RX ADMIN — TRIAMCINOLONE ACETONIDE 40 MG: 40 INJECTION, SUSPENSION INTRA-ARTICULAR; INTRAMUSCULAR at 15:11

## 2023-02-14 NOTE — PROGRESS NOTES
Brian Garcia is a 72 y.o. male   Primary provider:  Marina Kitchen MD       Chief Complaint   Patient presents with   • Right Shoulder - Initial Evaluation, Pain     HISTORY OF PRESENT ILLNESS:    History of Present Illness  72-year-old male patient presents to office accompanied by his spouse for evaluation of chronic right shoulder pain.  Initial onset of pain occurred approximately 5 months ago with no known injury or incident.  Patient was previously evaluated at  urgent care for his right shoulder pain on 12/12/2022 with x-rays performed.  He was prescribed Zanaflex.  Patient has also been evaluated by his primary care physician, Dr. Kitchen, @ the [facility].  Pain is described as constant and moderate to severe.  Pain is described as aching and burning in nature with associated popping sensations and limited range of motion.  Pain is worse with movement/use of his right arm/shoulder and is also worse with driving.  Patient also reports increased pain at nighttime and difficulty finding a comfortable position for sleep.  Pain improves mildly with rest, OTC anti-inflammatories (Aleve, Advil) and Tylenol.  Current pain scale is 3/10.    CONCURRENT MEDICAL HISTORY:    Past Medical History:   Diagnosis Date   • COPD (chronic obstructive pulmonary disease) ([facility])    • Hypertension        Allergies   Allergen Reactions   • Amoxicillin Anaphylaxis   • Albuterol Irritability     Facial flushing and palpitations.   • Asmanex (120 Metered Doses) [Mometasone Furoate] Unknown - Low Severity   • Lortab [Hydrocodone-Acetaminophen] Other (See Comments)     Can't remember   • Morphine And Related Hallucinations   • Prilosec [Omeprazole] Other (See Comments)     unknown   • Sodium Clavulanate Unknown - High Severity   • Statins GI Intolerance   • Sulfa Antibiotics Other (See Comments)     Can't remember also more and can't remember   • Symbicort [Budesonide-Formoterol Fumarate] Unknown (See Comments)     Doesn't  remember reaction type         Current Outpatient Medications:   •  cholecalciferol (VITAMIN D3) 10 MCG (400 UNIT) tablet, Take 400 Units by mouth Daily., Disp: , Rfl:   •  dilTIAZem (TIAZAC) 180 MG 24 hr capsule, Take 1 capsule by mouth Daily., Disp: 90 capsule, Rfl: 1  •  ferrous sulfate 324 MG tablet delayed-release, Take 1 tablet by mouth Daily With Breakfast., Disp: 90 tablet, Rfl: 0  •  ipratropium (ATROVENT HFA) 17 MCG/ACT inhaler, Inhale 2 puffs 4 (Four) Times a Day As Needed for Wheezing., Disp: , Rfl:   •  ipratropium (ATROVENT) 0.02 % nebulizer solution, Take 2.5 mL by nebulization 3 (Three) Times a Day. Sub amount for how much insurance allows, Disp: 670 mL, Rfl: 0  •  melatonin 5 MG tablet tablet, Take 5 mg by mouth At Night As Needed., Disp: , Rfl:   •  montelukast (SINGULAIR) 10 MG tablet, Take 10 mg by mouth., Disp: , Rfl:   •  predniSONE (DELTASONE) 5 MG tablet, Take 5 mg by mouth Every Other Day., Disp: , Rfl:   •  tiZANidine (ZANAFLEX) 2 MG tablet, Take 1 tablet by mouth At Night As Needed for Muscle Spasms., Disp: 21 tablet, Rfl: 0  •  folic acid (FOLVITE) 1 MG tablet, Take 1 tablet by mouth Daily., Disp: 90 tablet, Rfl: 1  •  vitamin B-12 (CYANOCOBALAMIN) 1000 MCG tablet, Take 1 tablet by mouth Daily., Disp: 90 tablet, Rfl: 1    Past Surgical History:   Procedure Laterality Date   • HIP ARTHROPLASTY         History reviewed. No pertinent family history.     Social History     Socioeconomic History   • Marital status:    Tobacco Use   • Smoking status: Former     Packs/day: 3.00     Types: Cigarettes     Start date:      Quit date: 2008     Years since quittin.8   • Smokeless tobacco: Never   Vaping Use   • Vaping Use: Never used   Substance and Sexual Activity   • Alcohol use: Not Currently   • Drug use: Never   • Sexual activity: Defer        Review of Systems   Constitutional: Positive for unexpected weight change.   HENT: Positive for postnasal drip.    Eyes: Positive for  "visual disturbance.   Respiratory: Positive for shortness of breath.    Cardiovascular: Positive for palpitations.   Gastrointestinal: Negative.    Endocrine: Negative.    Genitourinary: Negative.    Musculoskeletal: Positive for arthralgias.        Right shoulder pain.   Skin: Negative.    Allergic/Immunologic: Negative.    Neurological: Negative.    Hematological: Negative.    Psychiatric/Behavioral: Positive for sleep disturbance.       PHYSICAL EXAMINATION:       Ht 165.1 cm (65\")   BMI 22.37 kg/m²     Physical Exam  Vitals reviewed.   Constitutional:       General: He is not in acute distress.     Appearance: He is well-developed. He is not ill-appearing.   HENT:      Head: Normocephalic.   Pulmonary:      Effort: Pulmonary effort is normal. No respiratory distress.   Abdominal:      General: There is no distension.      Palpations: Abdomen is soft.   Musculoskeletal:         General: Tenderness (Mild, right shoulder) present. No swelling, deformity or signs of injury.   Skin:     General: Skin is warm and dry.      Capillary Refill: Capillary refill takes less than 2 seconds.      Findings: No erythema.   Neurological:      Mental Status: He is alert and oriented to person, place, and time.      GCS: GCS eye subscore is 4. GCS verbal subscore is 5. GCS motor subscore is 6.      Sensory: No sensory deficit.   Psychiatric:         Speech: Speech normal.         Behavior: Behavior normal.         Thought Content: Thought content normal.         Judgment: Judgment normal.         GAIT:     []  Normal  []  Antalgic    Assistive device: []  None  []  Walker     []  Crutches  []  Cane     [x]  Wheelchair  []  Stretcher    Right Shoulder Exam     Tenderness   The patient is experiencing tenderness in the acromioclavicular joint and acromion (Mild, diffuse).    Range of Motion   Active abduction: 120   External rotation: 70   Forward flexion: 130   Internal rotation 90 degrees: 60     Muscle Strength   Abduction: 4/5 "   Supraspinatus: 4/5     Tests   Jones test: positive  Cross arm: positive  Impingement: positive  Drop arm: negative    Other   Erythema: absent  Sensation: normal  Pulse: present    Comments:  Pain and limitations with range of motion.  Empty can test positive.  Full can test positive.  Mild weakness is noted.  No swelling appreciated.  No deformity.  Neurovascularly intact.        PROCEDURE:  Right  Shoulder 3 views     REASON FOR EXAM:  pain, decreased range of motion, M25.511 Pain  in right shoulder M25.60 Stiffness of unspecified joint, not  elsewhere classified     FINDINGS: Bony structures of the right shoulder normal. There is  no evidence of fracture or dislocation. Soft tissue structures  are normal.        IMPRESSION:  Negative shoulder.     Electronically signed by:  Guero Davey MD  12/12/2022 1:47 PM CST  Workstation: [id number]    ASSESSMENT:    Diagnoses and all orders for this visit:    Chronic right shoulder pain  -     Large Joint Arthrocentesis: R subacromial bursa  -     MRI Shoulder Right Without Contrast; Future    Arthrosis of right acromioclavicular joint  -     Large Joint Arthrocentesis: R subacromial bursa  -     MRI Shoulder Right Without Contrast; Future    Impingement syndrome of right shoulder  -     Large Joint Arthrocentesis: R subacromial bursa  -     MRI Shoulder Right Without Contrast; Future    Rotator cuff syndrome of right shoulder  -     Large Joint Arthrocentesis: R subacromial bursa  -     MRI Shoulder Right Without Contrast; Future    Limited range of motion (ROM) of shoulder  -     Large Joint Arthrocentesis: R subacromial bursa  -     MRI Shoulder Right Without Contrast; Future    PLAN    Large Joint Arthrocentesis: R subacromial bursa  Date/Time: 2/14/2023 3:11 PM  Consent given by: patient  Timeout: Immediately prior to procedure a time out was called to verify the correct patient, procedure, equipment, support staff and site/side marked as required   Supporting  "Documentation  Indications: pain and diagnostic evaluation   Procedure Details  Location: shoulder - R subacromial bursa  Preparation: Patient was prepped and draped in the usual sterile fashion  Needle size: 22 G  Approach: posterior  Medications administered: 40 mg triamcinolone acetonide 40 MG/ML; 2 mL lidocaine 1 %  Patient tolerance: patient tolerated the procedure well with no immediate complications      X-rays of the right shoulder previously performed on 12/12/2022 are independently reviewed by myself today.  Despite the radiology interpretation indicating \"normal\", the patient is noted to have advanced degenerative changes at the AC joint with osteophyte formations and significant joint space narrowing.  There are some milder degenerative changes noted at the glenohumeral joint.  Patient complains of chronic right shoulder pain that started approximately 5 months ago with no known injury or incident.  Patient has been evaluated at urgent care as well as evaluated and treated by his PCP but his pain has continued.  Subjective complaints and physical exam are suspicious for rotator cuff pathology and/or impingement.  Recommend proceeding today with a trial of a subacromial injection of steroid to the right shoulder for management of pain/inflammation.  Recommend MRI of the right shoulder to evaluate for rotator cuff tear, tendinitis/tendinosis, bursitis and/impingement.    It is notable that the patient does have some complex comorbid medical conditions, primarily issues with advanced COPD.  He is oxygen dependent.  However, when we discussed potential sources of his pain and whether or not to proceed with MRI imaging for further evaluation, patient states that he wants to determine what is wrong with his right shoulder for a more definitive diagnosis even if he is deemed at some point to not be a surgical candidate.    Recommend the following:     -Rest and activity modification with avoidance of heavy " lifting, pulling, tugging and repetitive motions for now.  -Gentle, progressive range of motion exercises with the affected shoulder intermittently several times daily as pain allows.  -Ice therapy to the affected shoulder intermittently 3-4 times daily for 15 minutes at a time to minimize pain/inflammation.  -Tylenol, Aleve or Ibuprofen as needed for pain/discomfort.  -Gradual progression of activity and use of the affected arm/shoulder as tolerated and based on pain/symptoms.    Follow-up after MRI to discuss results and further treatment recommendations.  Consider referral to physical therapy if his pain persists.    Time spent of a minimum of 30 minutes including the face to face evaluation, reviewing of medical history and prior medial records, reviewing of diagnostic studies, ordering additional tests, documentation, patient education and coordination of care.     EMR Dragon/Transciption Disclaimer: Some of this note may be an electronic transcription/translation of spoken language to printed text using the Dragon Dictation System.     Return for follow up after MRI.        This document has been electronically signed by GUIDO Perez on February 16, 2023 13:27 CST      GUIDO Perez

## 2023-02-16 DIAGNOSIS — R91.8 PULMONARY NODULES: Primary | ICD-10-CM

## 2023-02-16 PROBLEM — M19.011 ARTHROSIS OF RIGHT ACROMIOCLAVICULAR JOINT: Status: ACTIVE | Noted: 2023-02-16

## 2023-02-16 PROBLEM — M75.41 IMPINGEMENT SYNDROME OF RIGHT SHOULDER: Status: ACTIVE | Noted: 2023-02-16

## 2023-02-16 PROBLEM — M25.619 LIMITED RANGE OF MOTION (ROM) OF SHOULDER: Status: ACTIVE | Noted: 2023-02-16

## 2023-02-16 PROBLEM — M75.101 ROTATOR CUFF SYNDROME OF RIGHT SHOULDER: Status: ACTIVE | Noted: 2023-02-16

## 2023-02-16 RX ORDER — LIDOCAINE HYDROCHLORIDE 10 MG/ML
2 INJECTION, SOLUTION INFILTRATION; PERINEURAL
Status: COMPLETED | OUTPATIENT
Start: 2023-02-14 | End: 2023-02-14

## 2023-02-16 RX ORDER — TRIAMCINOLONE ACETONIDE 40 MG/ML
40 INJECTION, SUSPENSION INTRA-ARTICULAR; INTRAMUSCULAR
Status: COMPLETED | OUTPATIENT
Start: 2023-02-14 | End: 2023-02-14

## 2023-02-20 ENCOUNTER — HOSPITAL ENCOUNTER (OUTPATIENT)
Dept: MRI IMAGING | Facility: HOSPITAL | Age: 73
Discharge: HOME | End: 2023-02-20
Admitting: NURSE PRACTITIONER
Payer: OTHER GOVERNMENT

## 2023-02-20 DIAGNOSIS — M75.101 ROTATOR CUFF SYNDROME OF RIGHT SHOULDER: ICD-10-CM

## 2023-02-20 DIAGNOSIS — M25.619 LIMITED RANGE OF MOTION (ROM) OF SHOULDER: ICD-10-CM

## 2023-02-20 DIAGNOSIS — M75.41 IMPINGEMENT SYNDROME OF RIGHT SHOULDER: ICD-10-CM

## 2023-02-20 DIAGNOSIS — G89.29 CHRONIC RIGHT SHOULDER PAIN: ICD-10-CM

## 2023-02-20 DIAGNOSIS — M19.011 ARTHROSIS OF RIGHT ACROMIOCLAVICULAR JOINT: ICD-10-CM

## 2023-02-20 DIAGNOSIS — M25.511 CHRONIC RIGHT SHOULDER PAIN: ICD-10-CM

## 2023-02-20 PROCEDURE — 73221 MRI JOINT UPR EXTREM W/O DYE: CPT

## 2023-02-23 ENCOUNTER — OFFICE VISIT (OUTPATIENT)
Dept: ORTHOPEDIC SURGERY | Facility: CLINIC | Age: 73
End: 2023-02-23
Payer: OTHER GOVERNMENT

## 2023-02-23 VITALS — WEIGHT: 134 LBS | HEIGHT: 65 IN | BODY MASS INDEX: 22.33 KG/M2

## 2023-02-23 DIAGNOSIS — G89.29 CHRONIC RIGHT SHOULDER PAIN: Primary | ICD-10-CM

## 2023-02-23 DIAGNOSIS — M19.011 PRIMARY OSTEOARTHRITIS OF RIGHT SHOULDER: ICD-10-CM

## 2023-02-23 DIAGNOSIS — M25.619 LIMITED RANGE OF MOTION (ROM) OF SHOULDER: ICD-10-CM

## 2023-02-23 DIAGNOSIS — M75.111 NONTRAUMATIC INCOMPLETE TEAR OF RIGHT ROTATOR CUFF: ICD-10-CM

## 2023-02-23 DIAGNOSIS — M75.41 IMPINGEMENT SYNDROME OF RIGHT SHOULDER: ICD-10-CM

## 2023-02-23 DIAGNOSIS — M75.101 ROTATOR CUFF SYNDROME OF RIGHT SHOULDER: ICD-10-CM

## 2023-02-23 DIAGNOSIS — M19.011 ARTHROSIS OF RIGHT ACROMIOCLAVICULAR JOINT: ICD-10-CM

## 2023-02-23 DIAGNOSIS — M25.511 CHRONIC RIGHT SHOULDER PAIN: Primary | ICD-10-CM

## 2023-02-23 PROCEDURE — 99213 OFFICE O/P EST LOW 20 MIN: CPT | Performed by: NURSE PRACTITIONER

## 2023-02-23 NOTE — PROGRESS NOTES
"Brian Garcia is a 72 y.o. male returns for     Chief Complaint   Patient presents with   • Right Shoulder - Follow-up, Pain     MRI results     HISTORY OF PRESENT ILLNESS: Patient presents to office accompanied by his spouse for follow-up of chronic right shoulder pain and to discuss MRI results of right shoulder.  Initial onset of pain occurred approximately 5 months ago with no known injury or incident.  Patient established care with orthopedics on 2/14/2023 and was given a subacromial injection of steroid, which he states did offer good pain relief.  Prior to that, the patient has been evaluated by his PCP and had also been evaluated at  urgent care.  Patient has previously tried rest/activity modification, OTC anti-inflammatories, Tylenol and muscle relaxants with minimal improvement.  Overall, patient states he is doing better since last office visit following the injection.  No new complaints or concerns noted since last office visit.  No falls or injuries reported since last office visit.  Current pain scale is 2/10.     CONCURRENT MEDICAL HISTORY:    The following portions of the patient's history were reviewed and updated as appropriate: allergies, current medications, past family history, past medical history, past social history, past surgical history and problem list.     ROS  No fevers or chills.  No chest pain or shortness of air.  No GI or  disturbances. Right shoulder pain.     PHYSICAL EXAMINATION:       Ht 165.1 cm (65\")   Wt 60.8 kg (134 lb)   BMI 22.30 kg/m²     Physical Exam  Vitals reviewed.   Constitutional:       General: He is not in acute distress.     Appearance: He is well-developed. He is not ill-appearing.   HENT:      Head: Normocephalic.   Pulmonary:      Effort: Pulmonary effort is normal. No respiratory distress.   Abdominal:      General: There is no distension.      Palpations: Abdomen is soft.   Musculoskeletal:         General: Tenderness (Mild, right shoulder) " present. No swelling, deformity or signs of injury.   Skin:     General: Skin is warm and dry.      Capillary Refill: Capillary refill takes less than 2 seconds.      Findings: No erythema.   Neurological:      Mental Status: He is alert and oriented to person, place, and time.      GCS: GCS eye subscore is 4. GCS verbal subscore is 5. GCS motor subscore is 6.   Psychiatric:         Speech: Speech normal.         Behavior: Behavior normal.         Thought Content: Thought content normal.         Judgment: Judgment normal.         GAIT:     []  Normal  [x]  Antalgic    Assistive device: []  None  [x]  Walker     []  Crutches  []  Cane     []  Wheelchair  []  Stretcher    Right Shoulder Exam     Tenderness   The patient is experiencing tenderness in the acromioclavicular joint and acromion (Mild, diffuse).    Range of Motion   Active abduction: 120   External rotation: 70   Forward flexion: 130   Internal rotation 90 degrees: 60     Muscle Strength   Abduction: 4/5   Supraspinatus: 4/5     Tests   Jones test: positive  Cross arm: positive  Impingement: positive  Drop arm: negative    Other   Erythema: absent  Sensation: normal  Pulse: present    Comments:  Pain and limitations with range of motion.  Empty can test positive.  Full can test positive.  Mild weakness is noted.  No swelling appreciated.  No deformity.  Neurovascularly intact.              MRI Shoulder Right Without Contrast    Result Date: 2/21/2023  Narrative: EXAM: MRI SHOULDER RIGHT WO CONTRAST CLINICAL INDICATION: Right shoulder pain COMPARISON: There is no previous study for comparison. TECHNIQUE: The MRI was done using coronal T1, PD and T2 fat sat, sagittal T1 and T2 fat sat and axial T1 and T2 fat sat images. FINDINGS: The subscapularis is intact. There is a full-thickness tear involving anterior fibers of the supraspinatus at the insertion which measures 8.9 x 14.3 mm, with partial tear of the remainder of the tendon. The infraspinatus and teres  minor are intact. There are moderate degenerative changes of the glenohumeral and AC joints. The visualized osseous structures otherwise have normal morphology and signal characteristics with no evidence of bone marrow edema, occult fractures, or marrow-replacing bone lesions. The glenoid labrum is grossly unremarkable.     Impression: 1. Focal full-thickness tear involving anterior fibers of the supraspinatus at the insertion, with partial tear of the remainder of the tendon. 2. Moderate degenerative joint disease of the glenohumeral and AC joints.  Electronically signed by:  Emmanuel Esparza MD  2/21/2023 5:15 PM CST Workstation: [id number]    RADIOLOGY    Result Date: 2/8/2023  Narrative: This result has an attachment that is not available.    PROCEDURE:  Right  Shoulder 3 views     REASON FOR EXAM:  pain, decreased range of motion, M25.511 Pain  in right shoulder M25.60 Stiffness of unspecified joint, not  elsewhere classified     FINDINGS: Bony structures of the right shoulder normal. There is  no evidence of fracture or dislocation. Soft tissue structures  are normal.        IMPRESSION:  Negative shoulder.     Electronically signed by:  Guero Davey MD  12/12/2022 1:47 PM CST  Workstation: [id number]      ASSESSMENT:    Diagnoses and all orders for this visit:    Chronic right shoulder pain    Arthrosis of right acromioclavicular joint    Impingement syndrome of right shoulder    Rotator cuff syndrome of right shoulder    Limited range of motion (ROM) of shoulder    Nontraumatic incomplete tear of right rotator cuff    Primary osteoarthritis of right shoulder    PLAN     MRI right shoulder reviewed and results are discussed with patient and spouse today.  We discussed that he has evidence of a partial, but full-thickness tear involving the anterior fibers of the supraspinatus tendon.  Additionally, there is evidence of partial tearing of the remaining portion of the intact supraspinatus tendon.  There is  evidence of moderate osteoarthritic changes of both the glenohumeral and AC joints.  As previously noted, the patient has experienced ongoing right shoulder pain for the past 5 months approximately with no known injury or incident.  Since he has not had any particular injury or trauma, we discussed that his rotator cuff tear is likely degenerative in nature.  We discussed continued conservative management efforts versus surgical consult.  We discussed again that he may not be a good surgical candidate based on his complex comorbid medical conditions, primarily issues with advanced COPD and oxygen dependency.  However, we did discuss that he is welcome to follow-up with one of the surgeons to further discuss his surgical candidacy for rotator cuff repair.  At this time, the patient states he is not interested in surgical intervention and wants to continue with conservative management.  He reports that he did have good improvement following the subacromial injection of steroid given at last office visit on 2/14/2023.    We discussed and I recommended a referral to physical therapy with a focus on conditioning and strengthening exercises, which we discussed will likely help with his chronic right shoulder pain as well as improve his functional use of the right arm/shoulder and improve his range of motion and strength.  However, patient declines a referral to physical therapy at this time and states that overall he is doing better since the injection.  We discussed that he can notify me at any time if he changes his mind and I will be happy to place an order to physical therapy for him.    Recommend the following:      -Rest and activity modification with avoidance of heavy lifting, pulling, tugging and repetitive motions for now.  -Gentle, progressive range of motion exercises with the affected shoulder intermittently several times daily as pain allows.  -Ice therapy to the affected shoulder intermittently 3-4 times  daily for 15 minutes at a time to minimize pain/inflammation.  -Tylenol, Aleve or Ibuprofen as needed for pain/discomfort.  -Gradual progression of activity and use of the affected arm/shoulder as tolerated and based on pain/symptoms.    Follow-up as needed for any new, worsening or persistent symptoms.    Time spent of a minimum of 20 minutes including the face to face evaluation, reviewing of medical history and prior medial records, reviewing of diagnostic studies, documentation, patient education and coordination of care.     EMR Dragon/Transciption Disclaimer: Some of this note may be an electronic transcription/translation of spoken language to printed text using the Dragon Dictation System.     Return if symptoms worsen or fail to improve.        This document has been electronically signed by GUIDO Perez on February 26, 2023 15:53 CST      GUIDO Perez

## 2023-02-26 PROBLEM — G89.29 CHRONIC RIGHT SHOULDER PAIN: Status: ACTIVE | Noted: 2023-02-26

## 2023-02-26 PROBLEM — M75.111 NONTRAUMATIC INCOMPLETE TEAR OF RIGHT ROTATOR CUFF: Status: ACTIVE | Noted: 2023-02-26

## 2023-02-26 PROBLEM — M25.511 CHRONIC RIGHT SHOULDER PAIN: Status: ACTIVE | Noted: 2023-02-26

## 2023-02-26 PROBLEM — M19.011 PRIMARY OSTEOARTHRITIS OF RIGHT SHOULDER: Status: ACTIVE | Noted: 2023-02-26

## 2023-03-01 ENCOUNTER — OFFICE VISIT (OUTPATIENT)
Dept: CARDIOLOGY | Facility: CLINIC | Age: 73
End: 2023-03-01
Payer: OTHER GOVERNMENT

## 2023-03-01 VITALS
HEIGHT: 65 IN | DIASTOLIC BLOOD PRESSURE: 58 MMHG | WEIGHT: 135 LBS | SYSTOLIC BLOOD PRESSURE: 120 MMHG | HEART RATE: 90 BPM | OXYGEN SATURATION: 99 % | BODY MASS INDEX: 22.49 KG/M2

## 2023-03-01 DIAGNOSIS — I48.0 PAROXYSMAL ATRIAL FIBRILLATION: Primary | ICD-10-CM

## 2023-03-01 DIAGNOSIS — R00.2 PALPITATIONS: ICD-10-CM

## 2023-03-01 PROCEDURE — 99214 OFFICE O/P EST MOD 30 MIN: CPT | Performed by: INTERNAL MEDICINE

## 2023-03-01 PROCEDURE — 93000 ELECTROCARDIOGRAM COMPLETE: CPT | Performed by: INTERNAL MEDICINE

## 2023-03-01 RX ORDER — DILTIAZEM HYDROCHLORIDE 180 MG/1
180 CAPSULE, EXTENDED RELEASE ORAL DAILY
Qty: 90 CAPSULE | Refills: 1 | Status: SHIPPED | OUTPATIENT
Start: 2023-03-01

## 2023-03-01 NOTE — PROGRESS NOTES
[facility] Cardiology  OFFICE NOTE    Cardiovascular Medicine  Neville Meeks M.D., [facility]         No referring provider defined for this encounter.    History of Present Illness    Brian Garcia is a 72 y.o. male who presents for a follow up visit today.  He is transitioning care from Dr. Rose to me.    According to Dr. Rose's prior notes, he has a known history of paroxysmal atrial fibrillation.  He is on a regimen of Eliquis 5 mg oral twice daily and diltiazem 180 mg orally daily.  He was diagnosed with A. fib on MCT study in August 2021.   He is also taking baby aspirin.  He denies having any prior history of heart attacks, stroke or peripheral vascular disease.  He has advanced COPD and is dependent on 4 L/min nasal cannula oxygen at all times.  He denies having any chest discomfort, palpitations, lightheadedness, dizziness, presyncope or syncope.  Has not any PND, orthopnea or leg swelling.    3/1/2023:  He presented today for a follow-up visit.  In December 2022, he had a hemorrhagic stroke.  According to him, this was thought to be related to trauma and/or hypertension.  His Eliquis was stopped.  Fortunately, he has recovered reasonably well.  He is currently using a walker to walk and has been largely able to do everything by himself.  He reports weakness all over his body.  His dyspnea remains stable.  He has not had any chest discomfort, palpitations, dizziness, presyncope or syncope since hospital discharge.    Review of Systems - ROS  Constitution: Negative for  weight gain and weight loss.   HENT: Negative for congestion.    Eyes: Negative for blurred vision.   Cardiovascular: As mentioned above  Respiratory: Negative for cough and hemoptysis.    Endocrine: Negative for polydipsia and polyuria.   Hematologic/Lymphatic: Negative for active bleeding problem.   Skin: Negative for flushing.   Musculoskeletal: Negative for neck pain and stiffness.   Gastrointestinal:  Negative for abdominal pain, nausea and vomiting.   Genitourinary: Negative for dysuria and hematuria.   Neurological: Positive for diffuse weakness.  Negative for speech difficulties.  Psychiatric/Behavioral: Negative for altered mental status and depression.      All other systems were reviewed and were negative.    Past Medical History:   Diagnosis Date   • COPD (chronic obstructive pulmonary disease) (HCC)    • Hypertension        Family History:  family history is not on file.    Social History: He quit smoking in 2008.  Denies current alcohol or illicit drug abuse.   reports that he quit smoking about 14 years ago. His smoking use included cigarettes. He started smoking about 62 years ago. He smoked an average of 3 packs per day. He has never used smokeless tobacco. He reports that he does not currently use alcohol. He reports that he does not use drugs.    Allergies:  Allergies   Allergen Reactions   • Amoxicillin Anaphylaxis   • Albuterol Irritability     Facial flushing and palpitations.   • Asmanex (120 Metered Doses) [Mometasone Furoate] Unknown - Low Severity   • Lortab [Hydrocodone-Acetaminophen] Other (See Comments)     Can't remember   • Morphine And Related Hallucinations   • Prilosec [Omeprazole] Other (See Comments)     unknown   • Sodium Clavulanate Unknown - High Severity   • Statins GI Intolerance   • Sulfa Antibiotics Other (See Comments)     Can't remember also more and can't remember   • Symbicort [Budesonide-Formoterol Fumarate] Unknown (See Comments)     Doesn't remember reaction type         Current Outpatient Medications:   •  cholecalciferol (VITAMIN D3) 10 MCG (400 UNIT) tablet, Take 1 tablet by mouth Daily., Disp: , Rfl:   •  ferrous sulfate 324 MG tablet delayed-release, Take 1 tablet by mouth Daily With Breakfast., Disp: 90 tablet, Rfl: 0  •  folic acid (FOLVITE) 1 MG tablet, Take 1 tablet by mouth Daily., Disp: 90 tablet, Rfl: 1  •  ipratropium (ATROVENT HFA) 17 MCG/ACT inhaler,  "Inhale 2 puffs 4 (Four) Times a Day As Needed for Wheezing., Disp: , Rfl:   •  ipratropium (ATROVENT) 0.02 % nebulizer solution, Take 2.5 mL by nebulization 3 (Three) Times a Day. Sub amount for how much insurance allows, Disp: 670 mL, Rfl: 0  •  melatonin 5 MG tablet tablet, Take 1 tablet by mouth At Night As Needed., Disp: , Rfl:   •  montelukast (SINGULAIR) 10 MG tablet, Take 1 tablet by mouth., Disp: , Rfl:   •  predniSONE (DELTASONE) 5 MG tablet, Take 1 tablet by mouth Every Other Day., Disp: , Rfl:   •  tiZANidine (ZANAFLEX) 2 MG tablet, Take 1 tablet by mouth At Night As Needed for Muscle Spasms., Disp: 21 tablet, Rfl: 0  •  vitamin B-12 (CYANOCOBALAMIN) 1000 MCG tablet, Take 1 tablet by mouth Daily., Disp: 90 tablet, Rfl: 1  •  dilTIAZem (TIAZAC) 180 MG 24 hr capsule, Take 1 capsule by mouth Daily., Disp: 90 capsule, Rfl: 1    Physical Exam:  Vitals:    03/01/23 1151   BP: 120/58   Pulse: 90   SpO2: 99%   Weight: 61.2 kg (135 lb)   Height: 165.1 cm (65\")   PainSc: 0-No pain     Current Pain Level: none  Pulse Ox: Normal  on room air  General: alert, appears stated age and cooperative, uses a walker     Body Habitus: Well-nourished  HEENT: Head: Normocephalic, no lesions, without obvious abnormality.     Neuro: alert, oriented x3  JVP: Volume/Pulsation: Normal.  Normal waveforms.   Appropriate inspiratory decrease.    Carotid Exam: no bruit normal pulsation bilaterally   Carotid Volume: normal.     Respirations: no increased work of breathing   Chest:  Normal    Pulmonary:Normal   Heart rate: normal    Heart Rhythm: regular     Heart Sounds: S1: normal  S2: normal  S3: absent      Abdomen:   Appearance: normal .  Palpation: Soft, non-tender to palpation, bowel sounds positive in all four quadrants  Extremity: no significant pitting edema.     DATA REVIEWED:     EKG. I personally reviewed and interpreted the EKG.  normal sinus rhythm, incomplete RBBB, nonspecific T wave abnormality on 9/14/2022    ECG/EMG " Results (all)     Procedure Component Value Units Date/Time    ECG 12 Lead [[id number]] Collected: 09/14/22 1328     Updated: 09/14/22 1335     QT Interval 364 ms      QTC Interval 419 ms     Narrative:      Test Reason : a fib  Blood Pressure :   */*   mmHG  Vent. Rate :  80 BPM     Atrial Rate :  80 BPM     P-R Int : 138 ms          QRS Dur : 102 ms      QT Int : 364 ms       P-R-T Axes :  92  81  81 degrees     QTc Int : 419 ms    Normal sinus rhythm  Incomplete right bundle branch block  Borderline ECG  When compared with ECG of 12-JUL-2021 16:32,  No significant change was found    Referred By: WYATT           Confirmed By:         ---------------------------------------------------  TTE/LUCILLE:  Results for orders placed in visit on 08/17/21    Adult Transthoracic Echo Complete W/ Cont if Necessary Per Protocol    Interpretation Summary  · Left ventricular ejection fraction appears to be 66 - 70%.  · Left ventricular diastolic function is consistent with (grade I) impaired relaxation.  · Estimated right ventricular systolic pressure from tricuspid regurgitation is normal (<35 mmHg).      -----------------------------------------------------  CXR/Imaging:   Imaging Results (Most Recent)     None          -----------------------------------------------------  CT:   CT Chest Without Contrast Diagnostic    Result Date: 2/16/2023  1.  Worsened right middle, upper and lower lobe consolidative opacities and septal thickening. Given the appearance of a lytic lesion in the left posterior rib 11 an underlying neoplasm obscured by consolidation is the primary consideration.  A potential superimposed chronic or recurrent infection is possible. 2.  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 3.  Probable enlarged right subcarinal/hilar lymph node measures 2 cm in short axis diameter. Electronically signed by:  Tom Day MD  2/16/2023 8:13 AM [facility] Workstation: [id number]    MRI Shoulder Right Without  Contrast    Result Date: 2/21/2023  1. Focal full-thickness tear involving anterior fibers of the supraspinatus at the insertion, with partial tear of the remainder of the tendon. 2. Moderate degenerative joint disease of the glenohumeral and AC joints.  Electronically signed by:  Emmanuel Esparza MD  2/21/2023 5:15 PM [facility] Workstation: [id number]      ----------------------------------------------------      --------------------------------------------------------------------------------------------------  LABS:     The CVD Risk score (Perez, et al., 2008) failed to calculate for the following reasons:    Cannot find a previous HDL lab    Cannot find a previous total cholesterol lab         Lab Results   Component Value Date    GLUCOSE 111 (H) 02/10/2023    BUN 9 02/10/2023    CREATININE 0.62 (L) 02/10/2023    EGFRIFNONA 81 11/08/2021    BCR 14.5 02/10/2023    K 4.1 02/10/2023    CO2 32.0 (H) 02/10/2023    CALCIUM 8.9 02/10/2023    ALBUMIN 3.8 02/10/2023    AST 13 02/10/2023    ALT 10 02/10/2023     Lab Results   Component Value Date    WBC 11.06 (H) 02/10/2023    HGB 12.2 (L) 02/10/2023    HCT 37.4 (L) 02/10/2023    MCV 94.4 02/10/2023     02/10/2023     Lab Results   Component Value Date    CHLPL 179 12/24/2022    TRIG 167 (H) 12/24/2022    HDL 49 12/24/2022    LDL 97 12/24/2022     Lab Results   Component Value Date    TSH 2.070 01/02/2023     Lab Results   Component Value Date    CKTOTAL 34 12/24/2022    CKMB 0.8 03/14/2016    TROPONINI <0.012 11/06/2018    TROPONINT <0.010 12/24/2022     Lab Results   Component Value Date    HGBA1C 5.50 01/02/2023     Lab Results   Component Value Date    DDIMER 2,063 (H) 11/29/2015     Lab Results   Component Value Date    ALT 10 02/10/2023     Lab Results   Component Value Date    HGBA1C 5.50 01/02/2023    HGBA1C 5.6 12/24/2022     Lab Results   Component Value Date    CREATININE 0.62 (L) 02/10/2023     Lab Results   Component Value Date    IRON 39 (L) 02/10/2023     TIBC 331 02/10/2023    FERRITIN 266.00 02/10/2023     Lab Results   Component Value Date    INR 1.07 12/24/2022    INR 0.93 07/12/2021    INR 1.02 11/06/2018    PROTIME 13.8 12/24/2022    PROTIME 12.4 07/12/2021    PROTIME 13.2 11/06/2018       [unfilled]    1. Paroxysmal atrial fibrillation (HCC)  2. Palpitations    The patient is a 72-year-old male who transitioned care from Dr. Rose to me in September 2022.  He has a known history of paroxysmal atrial fibrillation which was diagnosed by Dr. Rose on MCT study in August 2021.  He also has a history of PE several years ago. Serum TSH level was normal in January 2023.  He was previously on Eliquis and diltiazem therapy.  In December 2022, he had a hemorrhagic stroke.  According to him, this was thought to be related to trauma and/or hypertension.  His Eliquis was stopped.  Fortunately, he has recovered reasonably well.  He is currently using a walker to walk and has been largely able to do everything by himself.  He reports weakness all over his body.  He denies any symptoms that could be attributable to atrial fibrillation at this point.  His [id number] score appears to be 4 based on currently available data.  He does not wish to be on Eliquis at this point given the recent occurrence of intracranial hemorrhage.  He is aware of the increased risk of cardioembolic stroke associated with this approach and is okay with that risk.  He was also informed of alternative approaches like left atrial appendage occlusion devices that can help him get off of anticoagulation therapy for atrial fibrillation.  He wants to think about this procedure at this point and will let us know if he would like to proceed with it.    Prevention:  Body mass index is 22.47 kg/m².      Brian Garcia  reports that he quit smoking about 14 years ago. His smoking use included cigarettes. He started smoking about 62 years ago. He smoked an average of 3 packs per day. He has  never used smokeless tobacco.. I have educated him on continued tobacco cessation.      Return in about 3 months (around 6/1/2023).            Electronically signed by Neville Meeks MD on 03/01/23 at 13:50 CDT

## 2023-03-02 ENCOUNTER — OFFICE VISIT (OUTPATIENT)
Dept: PULMONOLOGY | Facility: CLINIC | Age: 73
End: 2023-03-02
Payer: OTHER GOVERNMENT

## 2023-03-02 ENCOUNTER — TELEPHONE (OUTPATIENT)
Dept: ONCOLOGY | Facility: CLINIC | Age: 73
End: 2023-03-02
Payer: OTHER GOVERNMENT

## 2023-03-02 VITALS
HEART RATE: 100 BPM | WEIGHT: 135 LBS | BODY MASS INDEX: 22.49 KG/M2 | DIASTOLIC BLOOD PRESSURE: 50 MMHG | OXYGEN SATURATION: 95 % | HEIGHT: 65 IN | SYSTOLIC BLOOD PRESSURE: 104 MMHG

## 2023-03-02 DIAGNOSIS — J44.9 STAGE 4 VERY SEVERE COPD BY GOLD CLASSIFICATION: Primary | Chronic | ICD-10-CM

## 2023-03-02 DIAGNOSIS — R91.8 LUNG MASS: ICD-10-CM

## 2023-03-02 PROCEDURE — 99214 OFFICE O/P EST MOD 30 MIN: CPT | Performed by: INTERNAL MEDICINE

## 2023-03-02 NOTE — PROGRESS NOTES
Pulmonary Consultation    No ref. provider found,    Thank you for asking me to see Brian Garcia for   Chief Complaint   Patient presents with   • CT results   .      History of Present Illness  Brian Garcia is a 72 y.o. male     3/2/23  Patient here to follow up on CT scan  Since last visit patient had a stroke- wife tells me patient was having confusion, unsteadiness before he fell at home. Came into the ED and CT head showed an acute intracranial bleed. He was transferred to [facility] for a week and then came back here, was in rehab for a few weeks, has recovered well. Now off his Eliquis  He had a repeat CT chest that showed enlargement of the right infiltrate, and a left 11th posterior rib lesion with new soft tissue componant concerning for met. He has an appt next week with Rad Onc    11/15/22  Patient here to follow up on Pet scan. He's already seen Dr Davis about this, but tells me he didn't really understand what he was saying so not sure about what's going on  He stopped his Stiolto a few weeks ago. He's been using his Atrovent inhaler, rarely using his nebulizer    8/23/22  Patient here for follow up CT. Formal read pending but on my review, RUL infiltrate is still present and unchanged. Patient still having intermittent mild hemoptysis and very fatigued, taking several naps a day    7/26/22  Patient here to follow up on CT scan. There was a new RUL infiltrate  He finished his antibiotics this morning for his dental infection. No further hemoptysis. Breathing has been ok.      7/15/22  Patient here for follow up. Saw Coco Mares earlier in the week for increased O2 requirement. This has been stable since then. He has an abscessed tooth and has seen the dentist again and getting another round of antibiotics before it can be extracted. He tells me today that he's had another episode of coughing up blood, not a large amount      6/13/22  Patient here for acute visit. He tells me  he's coughed up some brihgt red blood last week, more short of breath and lower O2 sats (but not low enough to require increased supplemental o2). Denies fever, fatigue, muscle aches, runny nose or GI symptoms    4/25/22  [facility] He tells me his pulmonologist retired and he needs a new one. Was going every 6 months  Patient has Stiolto  He has atrovent HFA and atrovent nebs. He report on albuterol he gets racing heart and flushing.  He is taking Prednisone 5mg every other day for several years  He has supplemental O2 for a few years, DME [facility] of [place], [place]. 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  Review of Systems  As described in the HPI. Otherwise, remainder of ROS (14 systems) were negative.    Patient Active Problem List   Diagnosis   • Physical deconditioning   • Pulmonary nodules   • Leukocytosis   • History of pulmonary embolism   • Stage 4 very severe COPD by GOLD classification ([facility])   • Chronic respiratory failure with hypoxia, on home O2 therapy ([facility])   • Hypertension   • CVA (cerebral vascular accident) ([facility])   • Traumatic hemorrhage of cerebrum   • Iron deficiency anemia secondary to inadequate dietary iron intake   • Acute pain of right shoulder   • Limited range of motion (ROM) of shoulder   • Rotator cuff syndrome of right shoulder   • Impingement syndrome of right shoulder   • Arthrosis of right acromioclavicular joint   • Chronic right shoulder pain   • Nontraumatic incomplete tear of right rotator cuff   • Primary osteoarthritis of right shoulder         Current Outpatient Medications:   •  cholecalciferol (VITAMIN D3) 10 MCG (400 UNIT) tablet, Take 1 tablet by mouth Daily., Disp: , Rfl:   •  dilTIAZem (TIAZAC) 180 MG  24 hr capsule, Take 1 capsule by mouth Daily., Disp: 90 capsule, Rfl: 1  •  ferrous sulfate 324 MG tablet delayed-release, Take 1 tablet by mouth Daily With Breakfast., Disp: 90 tablet, Rfl: 0  •  folic acid (FOLVITE) 1 MG tablet, Take 1 tablet by mouth Daily., Disp: 90 tablet, Rfl: 1  •  ipratropium (ATROVENT HFA) 17 MCG/ACT inhaler, Inhale 2 puffs 4 (Four) Times a Day As Needed for Wheezing., Disp: , Rfl:   •  melatonin 5 MG tablet tablet, Take 1 tablet by mouth At Night As Needed., Disp: , Rfl:   •  montelukast (SINGULAIR) 10 MG tablet, Take 1 tablet by mouth., Disp: , Rfl:   •  predniSONE (DELTASONE) 5 MG tablet, Take 1 tablet by mouth Every Other Day., Disp: , Rfl:   •  tiotropium bromide-olodaterol (STIOLTO RESPIMAT) 2.5-2.5 MCG/ACT aerosol solution inhaler, Inhale Daily., Disp: , Rfl:   •  tiZANidine (ZANAFLEX) 2 MG tablet, Take 1 tablet by mouth At Night As Needed for Muscle Spasms., Disp: 21 tablet, Rfl: 0  •  vitamin B-12 (CYANOCOBALAMIN) 1000 MCG tablet, Take 1 tablet by mouth Daily., Disp: 90 tablet, Rfl: 1  •  ipratropium (ATROVENT) 0.02 % nebulizer solution, Take 2.5 mL by nebulization 3 (Three) Times a Day. Sub amount for how much insurance allows, Disp: 670 mL, Rfl: 0    Allergies   Allergen Reactions   • Amoxicillin Anaphylaxis   • Albuterol Irritability     Facial flushing and palpitations.   • Asmanex (120 Metered Doses) [Mometasone Furoate] Unknown - Low Severity   • Lortab [Hydrocodone-Acetaminophen] Other (See Comments)     Can't remember   • Morphine And Related Hallucinations   • Prilosec [Omeprazole] Other (See Comments)     unknown   • Sodium Clavulanate Unknown - High Severity   • Statins GI Intolerance   • Sulfa Antibiotics Other (See Comments)     Can't remember also more and can't remember   • Symbicort [Budesonide-Formoterol Fumarate] Unknown (See Comments)     Doesn't remember reaction type       Past Medical History:   Diagnosis Date   • COPD (chronic obstructive pulmonary disease)  "([facility])    • Hypertension      Past Surgical History:   Procedure Laterality Date   • HIP ARTHROPLASTY       Social History     Socioeconomic History   • Marital status:    Tobacco Use   • Smoking status: Former     Packs/day: 3.00     Types: Cigarettes     Start date:      Quit date: 2008     Years since quittin.8   • Smokeless tobacco: Never   Vaping Use   • Vaping Use: Never used   Substance and Sexual Activity   • Alcohol use: Not Currently   • Drug use: Never   • Sexual activity: Defer     History reviewed. No pertinent family history.       Objective     Blood pressure 104/50, pulse 100, height 165.1 cm (65\"), weight 61.2 kg (135 lb), SpO2 95 %.  Physical Exam  Vitals reviewed.   Constitutional:       Appearance: Normal appearance.   HENT:      Head: Normocephalic and atraumatic.      Right Ear: Tympanic membrane normal.      Left Ear: Tympanic membrane normal.      Nose: Nose normal.      Mouth/Throat:      Mouth: Mucous membranes are moist.      Pharynx: Oropharynx is clear.   Eyes:      Conjunctiva/sclera: Conjunctivae normal.      Pupils: Pupils are equal, round, and reactive to light.   Cardiovascular:      Rate and Rhythm: Normal rate and regular rhythm.      Pulses: Normal pulses.      Heart sounds: Normal heart sounds.   Pulmonary:      Effort: Pulmonary effort is normal.      Breath sounds: Normal breath sounds.   Abdominal:      General: Abdomen is flat. Bowel sounds are normal.      Palpations: Abdomen is soft.   Musculoskeletal:         General: Normal range of motion.      Cervical back: Normal range of motion and neck supple.   Skin:     General: Skin is warm and dry.   Neurological:      General: No focal deficit present.      Mental Status: He is alert and oriented to person, place, and time.   Psychiatric:         Mood and Affect: Mood normal.         Behavior: Behavior normal.         PFTs: (independently reviewed and interpreted by me)  22  FVC 2.04L, 58%  FEV1 0.80L, " 29%  Ratio 39%  TLC 6.92L, 114%  %  DLCO 36%  Very severe obstructive pattern, increased lung volumes suggestive of hyperinflation and air trapping, and severe diffusion impairment      Radiology (independently reviewed and interpreted by me):  CTA chest 7/12/21- severe emphysema, RML nodule  PET 7/30/21- nodule not appreciated, no aviditiy in that area  CT chest 7/21/22- ne wRUL infiltrate, severe emphysema unchanged  CT chest 8/23/22- persistent infiltrate  PET 9/12/22- increased avidity in the RML infiltrate  PET 10/31/22- increased uptake in the same area, no other areas of concern  CT chest 2/13/23- increased size of RML infiltrate, lytic lesion in left posterior rib 11 with new soft tissue componant concerning for met     Assessment & Plan     Diagnoses and all orders for this visit:    1. Stage 4 very severe COPD by GOLD classification (HCC) (Primary)    2. Lung mass         Discussion/ Recommendations:   Patient with enlarging right lung lesion, and now with concern for rib met. Unfortunately, due to patients severe COPD, poor lung architecture and high O2 requirement, lung biopsy has previously been untenable. Now with evidence of rib, that is a potential biopsy site that would be lower risk. I spoke with Dr Downs in Rad Onc who recommended biopsy of the rib and repeat of the PET scan. I don't know that we have anyone who will do a rib biopsy locally. May need to go to IR in [place], patient and wife are agreeable to that. Depending on what the biopsy shows will determine next steps. Will call patient once I know who can do the biopsy          No follow-ups on file.      Thank you for allowing me to participate in the care of Brian Garcia. Please do not hesitate to contact me with any questions.         This document has been electronically signed by Clotilde Washington DO on March 2, 2023 11:47 CST

## 2023-03-02 NOTE — TELEPHONE ENCOUNTER
Spoke with pt and provided PET scan appt. Educated pt on PET scan prep and expectations. PT verbalizes understanding and denies any further questions.

## 2023-03-03 ENCOUNTER — PREP FOR SURGERY (OUTPATIENT)
Dept: OTHER | Facility: HOSPITAL | Age: 73
End: 2023-03-03
Payer: OTHER GOVERNMENT

## 2023-03-03 ENCOUNTER — DOCUMENTATION (OUTPATIENT)
Dept: PULMONOLOGY | Facility: CLINIC | Age: 73
End: 2023-03-03
Payer: OTHER GOVERNMENT

## 2023-03-03 DIAGNOSIS — M89.9 RIB LESION: Primary | ICD-10-CM

## 2023-03-03 NOTE — PROGRESS NOTES
Diana from [facility] called stating that Mr. Krishna procedure has been approved through insurance.  It has been scheduled for 3/15/2023  @ 10:00, he is to arrive at 8:45 and is to be NPO after midnight, but he can take his heart and blood pressure medicine with a sip of water.    I contacted Ms. Radha and went over the directions and appointment times and location.  She repeated all information back to me.  She verbalized understanding.

## 2023-03-06 ENCOUNTER — HOSPITAL ENCOUNTER (OUTPATIENT)
Dept: RADIATION ONCOLOGY | Facility: HOSPITAL | Age: 73
Setting detail: RADIATION/ONCOLOGY SERIES
End: 2023-03-06
Payer: OTHER GOVERNMENT

## 2023-03-06 ENCOUNTER — CONSULT (OUTPATIENT)
Dept: RADIATION ONCOLOGY | Facility: HOSPITAL | Age: 73
End: 2023-03-06
Payer: OTHER GOVERNMENT

## 2023-03-06 ENCOUNTER — LAB (OUTPATIENT)
Dept: ONCOLOGY | Facility: HOSPITAL | Age: 73
End: 2023-03-06
Payer: OTHER GOVERNMENT

## 2023-03-06 ENCOUNTER — HOSPITAL ENCOUNTER (OUTPATIENT)
Dept: PET IMAGING | Facility: HOSPITAL | Age: 73
Discharge: HOME | End: 2023-03-06
Admitting: INTERNAL MEDICINE
Payer: OTHER GOVERNMENT

## 2023-03-06 VITALS
TEMPERATURE: 96.8 F | SYSTOLIC BLOOD PRESSURE: 128 MMHG | OXYGEN SATURATION: 97 % | DIASTOLIC BLOOD PRESSURE: 56 MMHG | RESPIRATION RATE: 22 BRPM | WEIGHT: 128.9 LBS | BODY MASS INDEX: 21.45 KG/M2 | HEART RATE: 87 BPM

## 2023-03-06 DIAGNOSIS — R91.8 PULMONARY NODULES: Chronic | ICD-10-CM

## 2023-03-06 DIAGNOSIS — R91.8 PULMONARY NODULES: Primary | ICD-10-CM

## 2023-03-06 DIAGNOSIS — C34.81 MALIGNANT NEOPLASM OF OVERLAPPING SITES OF RIGHT LUNG: Primary | ICD-10-CM

## 2023-03-06 DIAGNOSIS — J18.1 LUNG CONSOLIDATION: ICD-10-CM

## 2023-03-06 DIAGNOSIS — R04.2 BLOOD IN SPUTUM: ICD-10-CM

## 2023-03-06 DIAGNOSIS — R91.8 LUNG MASS: ICD-10-CM

## 2023-03-06 LAB
BASOPHILS # BLD AUTO: 0.09 10*3/MM3 (ref 0–0.2)
BASOPHILS NFR BLD AUTO: 0.8 % (ref 0–1.5)
DEPRECATED RDW RBC AUTO: 42.7 FL (ref 37–54)
EOSINOPHIL # BLD AUTO: 0.15 10*3/MM3 (ref 0–0.4)
EOSINOPHIL NFR BLD AUTO: 1.3 % (ref 0.3–6.2)
ERYTHROCYTE [DISTWIDTH] IN BLOOD BY AUTOMATED COUNT: 12.2 % (ref 12.3–15.4)
HCT VFR BLD AUTO: 36 % (ref 37.5–51)
HGB BLD-MCNC: 11.5 G/DL (ref 13–17.7)
HOLD SPECIMEN: NORMAL
IMM GRANULOCYTES # BLD AUTO: 0.07 10*3/MM3 (ref 0–0.05)
IMM GRANULOCYTES NFR BLD AUTO: 0.6 % (ref 0–0.5)
LYMPHOCYTES # BLD AUTO: 0.75 10*3/MM3 (ref 0.7–3.1)
LYMPHOCYTES NFR BLD AUTO: 6.4 % (ref 19.6–45.3)
MCH RBC QN AUTO: 30.4 PG (ref 26.6–33)
MCHC RBC AUTO-ENTMCNC: 31.9 G/DL (ref 31.5–35.7)
MCV RBC AUTO: 95.2 FL (ref 79–97)
MONOCYTES # BLD AUTO: 0.71 10*3/MM3 (ref 0.1–0.9)
MONOCYTES NFR BLD AUTO: 6.1 % (ref 5–12)
NEUTROPHILS NFR BLD AUTO: 84.8 % (ref 42.7–76)
NEUTROPHILS NFR BLD AUTO: 9.92 10*3/MM3 (ref 1.7–7)
NRBC BLD AUTO-RTO: 0 /100 WBC (ref 0–0.2)
PLATELET # BLD AUTO: 325 10*3/MM3 (ref 140–450)
PMV BLD AUTO: 9.4 FL (ref 6–12)
RBC # BLD AUTO: 3.78 10*6/MM3 (ref 4.14–5.8)
WBC NRBC COR # BLD: 11.69 10*3/MM3 (ref 3.4–10.8)

## 2023-03-06 PROCEDURE — 99205 OFFICE O/P NEW HI 60 MIN: CPT | Performed by: STUDENT IN AN ORGANIZED HEALTH CARE EDUCATION/TRAINING PROGRAM

## 2023-03-06 PROCEDURE — 85025 COMPLETE CBC W/AUTO DIFF WBC: CPT | Performed by: STUDENT IN AN ORGANIZED HEALTH CARE EDUCATION/TRAINING PROGRAM

## 2023-03-06 PROCEDURE — 0 FLUDEOXYGLUCOSE F18 SOLUTION: Performed by: INTERNAL MEDICINE

## 2023-03-06 PROCEDURE — G0463 HOSPITAL OUTPT CLINIC VISIT: HCPCS | Performed by: STUDENT IN AN ORGANIZED HEALTH CARE EDUCATION/TRAINING PROGRAM

## 2023-03-06 PROCEDURE — 78815 PET IMAGE W/CT SKULL-THIGH: CPT

## 2023-03-06 PROCEDURE — 36415 COLL VENOUS BLD VENIPUNCTURE: CPT | Performed by: STUDENT IN AN ORGANIZED HEALTH CARE EDUCATION/TRAINING PROGRAM

## 2023-03-06 PROCEDURE — A9552 F18 FDG: HCPCS | Performed by: INTERNAL MEDICINE

## 2023-03-06 PROCEDURE — 36415 COLL VENOUS BLD VENIPUNCTURE: CPT

## 2023-03-06 RX ADMIN — SODIUM FLUORIDE F 18 1 DOSE: 200 INJECTION, SOLUTION INTRAVENOUS at 13:43

## 2023-03-06 NOTE — PROGRESS NOTES
Radiation Oncology Consult    Patient: Brian Garcia   YOB: 1950   Medical Record Number: [id number]   Date of Visit  March 6, 2023   Primary Diagnosis:  Questionable right lung cancer and rib metastasis      ASSESSMENT/PLAN:  Mr. Garcia is a 72 y.o. 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 Personally reviewed available physician notes, imaging, and pathology. Reviewed NCCN guidelines and discussed treatment options.    Case was previously discussed with Dr. Washington. Hemoptysis is new and concerning, however without a diagnosis of malignancy I do not recommend emergent radiotherapy to the lung, as it is not obviously malignant on imaging. Blood counts about a month ago showed slightly low H/H; will recheck CBC today. Instructed patient to go to the ER if hemoptysis worsens; if it does worsen, I may recommend empiric radiation to the right lung over a few fractions in case of malignancy. Will follow up on PET scan today. Rib lesion is concerning for metastatic disease. Rib biopsy may be safer than lung biopsy in his case. Agree with rib biopsy to establish diagnosis. Will tentatively plan to follow up after the rib biopsy, if emergent radiation is not required. Patient is agreeable to this plan.        Prior Treatment:  none    Implanted Devices: none    History of Present Illness:  Mr. Garcia is a 72 y.o. 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 He had a CT chest on 7/21/2022 for intermittent hemoptysis which showed RML consolidation with potential 1.7 cm nodule; repeat CT on 8/23/2022 was stable. PET on 9/12/2022 showed SUV of 3.6-5.04 in the RML; left 11th rib had a small nodular sclerotic foci with SUV 3.57. Repeat PET on 10/31/2022 showed increasing consolidation in the RML and RLL, SUV 5.2, and left 11th rib fracture vs metastasis. Due to high risk for biopsy, he continued on surveillance at this time. CT  chest on 2/13/2023 showed increased consolidation in the RML, probable right subcarinal/hilar lymph node, and left rib more expansile lytic lesion. He met with pulmonology with recommendation for repeat PET and possible left rib biopsy. Referred to rad onc for consideration of radiation therapy.    He is doing fairly well; he does have residual weakness from a stroke late last December, walks with a walker. He has been off his blood thinner (Eliquis) since then. Coughing up bright red blood, started about a week ago. Anywhere from a teaspoon to tablespoon daily. He has had it happen before, but before it was specks of blood. He feels like the hemoptysis has leveled out. No lightheadedness or dizziness. Denies nosebleeds. Uses supplemental oxygen 4L, sometimes up to 5L, no significant change. No dysphagia. Notes about 15 or more lb weight loss since January.    This is a new problem to me, and one that is potentially life-threatening.  (Old medical records were requested, reviewed, and summarized in the HPI)    Review of Systems    All other systems reviewed and are negative.    Past Medical History:   Diagnosis Date   • Blood in sputum 02/22/2023    bright red blood, teaspoon - tablespoon daily   • COPD (chronic obstructive pulmonary disease) (HCC)    • CVA (cerebral vascular accident) (HCC) 12/24/2022   • Hearing aid worn     bilateral   • History of pulmonary embolism    • History of recurrent pneumonia    • Hypertension    • Oxygen dependent     since @ age 65   • Pulmonary nodules    • Risk for falls     uses walker   • Rotator cuff syndrome of right shoulder    • 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 HTN/Eliquis   • Wears glasses     readers        Past Surgical History:   Procedure Laterality Date   • HIP ARTHROPLASTY Right       Family History   Problem Relation Age of Onset   • Cancer Sister    • Brain cancer Brother    • Cancer Brother    • No Known Problems Daughter    • No Known Problems  Daughter    • No Known Problems Son         Social History     Socioeconomic History   • Marital status:    • Number of children: 3   • Highest education level: Associate degree: occupational, technical, or vocational program   Tobacco Use   • Smoking status: Former     Packs/day: 3.00     Years: 47.00     Pack years: 141.00     Types: Cigarettes     Start date:      Quit date: 2008     Years since quittin.8   • Smokeless tobacco: Never   Vaping Use   • Vaping Use: Never used   Substance and Sexual Activity   • Alcohol use: Not Currently     Comment: not last 40 years   • Drug use: Not Currently   • Sexual activity: Not Currently     Partners: Female        Allergies:  Amoxicillin, Albuterol, Asmanex (120 metered doses) [mometasone furoate], Lortab [hydrocodone-acetaminophen], Morphine and related, Prilosec [omeprazole], Sodium clavulanate, Statins, Sulfa antibiotics, and Symbicort [budesonide-formoterol fumarate]   Prior to Admission medications    Medication Sig Start Date End Date Taking? Authorizing Provider   cholecalciferol (VITAMIN D3) 10 MCG (400 UNIT) tablet Take 1 tablet by mouth Daily.    ProviderQamar MD   dilTIAZem (TIAZAC) 180 MG 24 hr capsule Take 1 capsule by mouth Daily. 3/1/23   Neville Meeks MD   ferrous sulfate 324 MG tablet delayed-release Take 1 tablet by mouth Daily With Breakfast. 23   Fan Rob MD   folic acid (FOLVITE) 1 MG tablet Take 1 tablet by mouth Daily. 23   Omega Davis MD   ipratropium (ATROVENT HFA) 17 MCG/ACT inhaler Inhale 2 puffs 4 (Four) Times a Day As Needed for Wheezing.    ProviderQamar MD   ipratropium (ATROVENT) 0.02 % nebulizer solution Take 2.5 mL by nebulization 3 (Three) Times a Day. Sub amount for how much insurance allows 21   Anita Toney MD   melatonin 5 MG tablet tablet Take 1 tablet by mouth At Night As Needed.    ProviderQamar MD   montelukast (SINGULAIR) 10 MG tablet Take 1  tablet by mouth.    Provider, MD Qamar   predniSONE (DELTASONE) 5 MG tablet Take 1 tablet by mouth Every Other Day.    ProviderQamar MD   tiotropium bromide-olodaterol (STIOLTO RESPIMAT) 2.5-2.5 MCG/ACT aerosol solution inhaler Inhale Daily.    ProviderQamar MD   tiZANidine (ZANAFLEX) 2 MG tablet Take 1 tablet by mouth At Night As Needed for Muscle Spasms. 12/12/22   Christin Sanderson APRN   vitamin B-12 (CYANOCOBALAMIN) 1000 MCG tablet Take 1 tablet by mouth Daily. 2/11/23   Omega Davis MD   dilTIAZem XR (DILACOR XR) 180 MG 24 hr capsule Take 1 capsule by mouth Daily. 6/30/22 10/7/22  Neville Meeks MD      Pain:(on a scale of 0-10)    Pain Score    03/06/23 1130   PainSc: 0-No pain      Brian Garcia reports a pain score of 0.  Given his pain assessment as noted, treatment options were discussed and the following options were decided upon as a follow-up plan to address the patient's pain: continuation of current treatment plan for pain.    Quality of Life:   ECOG: (2) Ambulatory and capable of self care, unable to carry out work activity, up and about > 50% or waking hours    Advance Directives (For Healthcare)  Pre-existing AND/MOST/POLST Order: No  Advance Directive Status: Patient does not have advance directive  Have you reviewed your Advance Directive and is it valid for this stay?: Not applicable  Literature Provided on Advance Directives: No  Patient Requests Assistance on Advance Directives: Patient Declined    PHQ-9 Depression Screening:  Little interest or pleasure in doing things? 1-->several days   Feeling down, depressed, or hopeless? 1-->several days   Trouble falling or staying asleep, or sleeping too much? 1-->several days (right shoulder)   Feeling tired or having little energy? 3-->nearly every day   Poor appetite or overeating? 2-->more than half the days   Feeling bad about yourself - or that you are a failure or have let yourself or your family down?  1-->several days   Trouble concentrating on things, such as reading the newspaper or watching television? 0-->not at all   Moving or speaking so slowly that other people could have noticed? Or the opposite - being so fidgety or restless that you have been moving around a lot more than usual? 0-->not at all   Thoughts that you would be better off dead, or of hurting yourself in some way? 0-->not at all   PHQ-9 Total Score 9   If you checked off any problems, how difficult have these problems made it for you to do your work, take care of things at home, or get along with other people? somewhat difficult    PHQ-9 Total Score: 9     Patient screened positive for depression based on a PHQ-9 score of 9 on 3/6/2023. Follow-up recommendations include: Elevated PHQ score reflective of acute illness, not depression.      Physical Examination:  Vitals:    03/06/23 1130   BP: 128/56   Pulse: 87   Resp: 22   Temp: 96.8 °F (36 °C)   SpO2: 97%     Wt Readings from Last 3 Encounters:   03/06/23 58.5 kg (128 lb 14.4 oz)   03/02/23 61.2 kg (135 lb)   03/01/23 61.2 kg (135 lb)     Body mass index is 21.45 kg/m².    Constitutional: The patient is a well-developed, well-nourished male in no acute distress.  HEENT: Normocephalic, atraumatic. EOMI. Nose and ears without abnormalities upon visual inspection. Oral cavity and oropharynx unremarkable. No signs of blood in the oral cavity or oropharynx.  Lymphatics: No cervical/supraclavicular lymphadenopathy is palpated bilaterally. No edema.  CV: Regular rate and rhythm. No murmurs/rubs/gallops/clicks.  Respiratory: Breathing comfortably on room air. Lungs clear to auscultation bilaterally.  GI: Abdomen soft, nontender, nondistended, with no hepatosplenomegaly or masses palpated. Bowel sounds normoactive.  Musculoskeletal: Spine and hips nontender to palpation. No rib tenderness bilaterally. No clubbing or cyanosis. Possible nodular lesion palpated in the area of medial left 11th rib.  Skin:  No abnormal lesions noted on visible skin  Neurologic: Cranial nerves grossly intact, with no focal neurological deficits noted on exam. Strength 4/5 in all extremities.  Psychiatric: Alert and oriented. Normal affect, with no anxiety or depression noted.      Imaging:  XR Shoulder 2+ View Right    Result Date: 12/12/2022  Negative shoulder. Electronically signed by:  Guero Davey MD  12/12/2022 1:47 PM CST Workstation: [id number]    CT Head Without Contrast    Result Date: 1/5/2023  1. Moderately decreased blood products within the right lateral ventricle relative to 12/24/2022. There is residual blood within the atrium and temporal horn of the right lateral ventricle. 2. Small region of low attenuation in the right basal ganglia/posterior limb of the right internal capsule, suggesting an evolving infarct. 3. No midline shift at the level of the septum pellucidum. 4. No new or increasing blood product relative to the prior CT. Electronically signed by:  Igor Yoder MD  1/5/2023 12:23 PM CST Workstation: [id number]    CT Head Without Contrast    Result Date: 12/25/2022  IMPRESSION: Redemonstrated large intraventricular hemorrhage, which does not appear significantly changed from prior. No new hemorrhage.    CT Head Without Contrast    Result Date: 12/25/2022  IMPRESSION: 1. Overall size of large intraventricular hemorrhage is relatively stable. The degree of ventricular dilatation is similar as well. 2. There is a trace amount of subarachnoid hemorrhage on the left which appears grossly similar to prior.    CT Chest Without Contrast Diagnostic    Result Date: 2/16/2023  1.  Worsened right middle, upper and lower lobe consolidative opacities and septal thickening. Given the appearance of a lytic lesion in the left posterior rib 11 an underlying neoplasm obscured by consolidation is the primary consideration.  A potential superimposed chronic or recurrent infection is possible. 2.  Lytic lesion in the left  posterior rib 11, likely metastasis. 3.  Probable enlarged right subcarinal/hilar lymph node measures 2 cm in short axis diameter. Electronically signed by:  Tom Day MD  2/16/2023 8:13 AM CST Workstation: [id number]    XR Chest 1 View    Result Date: 12/24/2022  CONCLUSION: Chronic obstructive pulmonary disease. Progressive infiltrates right middle and lower lobes from CT August 23, 2022. Possible 2.5 cm nodule right midlung adjacent to the minor fissure. [id number] Electronically signed by:  Vishnu Staton MD  12/24/2022 12:39 PM CST Workstation: [id number]    MRI Shoulder Right Without Contrast    Result Date: 2/21/2023  1. Focal full-thickness tear involving anterior fibers of the supraspinatus at the insertion, with partial tear of the remainder of the tendon. 2. Moderate degenerative joint disease of the glenohumeral and AC joints.  Electronically signed by:  Emmanuel Esparza MD  2/21/2023 5:15 PM CST Workstation: [id number]    CT Head Without Contrast Stroke Protocol    Result Date: 12/24/2022  CONCLUSION: Acute hemorrhage distending the right lateral ventricle, most pronounced in the temporal horn. Hemorrhage in the third ventricle as well. Etiology most likely traumatic in nature. Cerebral and cerebellar atrophy. Findings discussed with ER physician at the time of dictation. [id number] Electronically signed by:  Vishnu Staton MD  12/24/2022 1:02 PM CST Workstation: [id number]    XR Chest PA & Lateral    Result Date: 1/1/2023  1.  Stable appearing right lung base interstitial groundglass opacity. This would be suspicious for pneumonia. Electronically signed by:  Guero Davey MD  1/1/2023 9:52 AM CST Workstation: [id number]    CTA HEAD AND NECK W CONTRAST    Result Date: 12/24/2022  IMPRESSION: In the setting of known hemorrhage; there is blushing contrast which appears to originate from a striate vessel on the right concerning for contrast extravasation into the existing hemorrhage likely originating at the proximal  most right MCA/distalmost common carotid artery. No clear evidence of aneurysm. Findings communicated with bedside nurse at 1816 12/24/2022.        Pathology:  none       Labs:   Lab Results   Component Value Date    GLUCOSE 111 (H) 02/10/2023    BUN 9 02/10/2023    CREATININE 0.62 (L) 02/10/2023    EGFRIFNONA 81 11/08/2021    BCR 14.5 02/10/2023    K 4.1 02/10/2023    CO2 32.0 (H) 02/10/2023    CALCIUM 8.9 02/10/2023    ALBUMIN 3.8 02/10/2023    AST 13 02/10/2023    ALT 10 02/10/2023       WBC   Date Value Ref Range Status   02/10/2023 11.06 (H) 3.40 - 10.80 10*3/mm3 Final     Hemoglobin   Date Value Ref Range Status   02/10/2023 12.2 (L) 13.0 - 17.7 g/dL Final     Hematocrit   Date Value Ref Range Status   02/10/2023 37.4 (L) 37.5 - 51.0 % Final     Platelets   Date Value Ref Range Status   02/10/2023 299 140 - 450 10*3/mm3 Final     PFTs:        Electronically signed by Yusuf Downs MD 03/06/23 11:59 CST

## 2023-03-07 LAB
QT INTERVAL: 336 MS
QTC INTERVAL: 411 MS

## 2023-03-10 ENCOUNTER — DOCUMENTATION (OUTPATIENT)
Dept: NUTRITION | Facility: HOSPITAL | Age: 73
End: 2023-03-10
Payer: OTHER GOVERNMENT

## 2023-03-10 NOTE — CONSULTS
"Adult Outpatient Nutrition  Assessment    Patient Name:  Brian Garcia  YOB: 1950  MRN: [id number]    Assessment Date:  3/10/2023    Comments:  CHART REVIEW - New Assessment  Brian Garcia   1950  72 y.o.  Wt: 128#  Ht: 65\"  Dx: Right lung cancer and left rib metastasis.  Tx: Possibly Rad  Labs: Alb 3.8, A1c 5.5    PATIENT/FAMILY COMMENTS  Usual Body Weight: 157#  Weight Change: Pt states he has been losing a lot of wt  Diet: Reg  Food Allergies: N/A  Number of Feedings Daily: 3 small meals  Appetite/Intake: Pt reports his appetite is very poor. He is not very hungry and experiences early satiety.  Supplements: N/A  Meal Preparation: Wife/self  Nutrition Concerns: Poor intake due to altered taste/early satiety, significant loss of LBM.   No chewing/swallowing issues reported.         GOAL(s)  -to optimize nutrition in attempt to prevent loss of LBM      EDUCATION  Discussed  -importance of balanced nutrition/adequate protein  -ways to increase protein/calorie intake to prevent further loss of LBM   -Coupons for boost supplements were mailed to pt  -importance of staying hydrated  -food safety    To reinforce education, written information with RD's name & number provided.  Patient receptive to suggestions--agreed to call on dietitian as needed.              Meggan Zambrano                            Electronically signed by:  Meggan Zambrano  03/10/23 09:13 CST   "

## 2023-03-23 ENCOUNTER — PATIENT OUTREACH (OUTPATIENT)
Dept: ONCOLOGY | Facility: CLINIC | Age: 73
End: 2023-03-23
Payer: OTHER GOVERNMENT

## 2023-03-23 ENCOUNTER — OFFICE VISIT (OUTPATIENT)
Dept: RADIATION ONCOLOGY | Facility: HOSPITAL | Age: 73
End: 2023-03-23
Payer: OTHER GOVERNMENT

## 2023-03-23 VITALS
WEIGHT: 129.5 LBS | OXYGEN SATURATION: 95 % | DIASTOLIC BLOOD PRESSURE: 65 MMHG | BODY MASS INDEX: 21.55 KG/M2 | HEART RATE: 100 BPM | SYSTOLIC BLOOD PRESSURE: 153 MMHG | TEMPERATURE: 97.1 F | RESPIRATION RATE: 22 BRPM

## 2023-03-23 DIAGNOSIS — C34.81 MALIGNANT NEOPLASM OF OVERLAPPING SITES OF RIGHT LUNG: ICD-10-CM

## 2023-03-23 PROCEDURE — 99214 OFFICE O/P EST MOD 30 MIN: CPT | Performed by: STUDENT IN AN ORGANIZED HEALTH CARE EDUCATION/TRAINING PROGRAM

## 2023-03-23 PROCEDURE — G0463 HOSPITAL OUTPT CLINIC VISIT: HCPCS | Performed by: STUDENT IN AN ORGANIZED HEALTH CARE EDUCATION/TRAINING PROGRAM

## 2023-03-23 NOTE — PROGRESS NOTES
Radiation Oncology Follow Up    Patient: Brian Garcia   YOB: 1950   Medical Record Number: [id number]   Date of Visit  March 23, 2023   Primary Diagnosis:   Cancer Staging   Malignant neoplasm of overlapping sites of right lung (HCC)  Staging form: Lung, AJCC 8th Edition  - Clinical: Stage BRITTNI (cT4, cN0, cM1b) - Signed by Yusuf Downs MD on 3/20/2023        ASSESSMENT/PLAN:  Mr. Garcia is a 72 y.o. 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 Personally reviewed available physician notes, imaging, and pathology. Reviewed NCCN guidelines and discussed treatment options.    Hemoptysis is improving, no urgent radiation therapy at this time. Discussed biopsy of the left rib which is positive for metastatic lung adenocarcinoma. Discussed that he therefore has a stage IV lung cancer, however it is oligometastatic. Recommend starting with systemic therapy followed by consolidative radiation therapy to residual sites of disease. Will refer him back to Dr. Davis to discuss systemic therapy. Will get MRI brain to complete staging. Patient is agreeable to this plan.        Prior Treatment:  none    Implanted Devices: none    History of Present Illness:  Mr. Garcia is a 72 y.o. male with right lung adenocarcinoma metastatic to left 11th rib. He had a CT chest on 7/21/2022 for intermittent hemoptysis which showed RML consolidation with potential 1.7 cm nodule; repeat CT on 8/23/2022 was stable. PET on 9/12/2022 showed SUV of 3.6-5.04 in the RML; left 11th rib had a small nodular sclerotic foci with SUV 3.57. Repeat PET on 10/31/2022 showed increasing consolidation in the RML and RLL, SUV 5.2, and left 11th rib fracture vs metastasis. Due to high risk for biopsy, he continued on surveillance at this time. CT chest on 2/13/2023 showed increased consolidation in the RML, probable right subcarinal/hilar lymph node, and left rib more expansile lytic lesion. Repeat PET on 3/6/2023  showed progression in the lung and left rib. He underwent left rib biopsy on 3/15/2023 which was consistent with metastatic lung adenocarcinoma.    Doing fairly well today. Weakness unchanged. Hemoptysis is improving, now only notices streaks of blood in his sputum on occasion. Denies new lightheadedness or dizziness.      Review of Systems    All other systems reviewed and are negative.    Past Medical History:   Diagnosis Date   • Blood in sputum 2023    bright red blood, teaspoon - tablespoon daily   • COPD (chronic obstructive pulmonary disease) (HCC)    • CVA (cerebral vascular accident) (HCC) 2022   • Hearing aid worn     bilateral   • History of pulmonary embolism    • History of recurrent pneumonia    • Hypertension    • Lung cancer ([facility]) 2023   • Oxygen dependent     since @ age 65   • Pulmonary nodules    • Risk for falls     uses walker   • Rotator cuff syndrome of right shoulder    • Traumatic hemorrhage of cerebrum 2022    stroke ? HTN/Eliquis   • Wears glasses     readers        Past Surgical History:   Procedure Laterality Date   • HIP ARTHROPLASTY Right    • RIB BIOPSY Left 2023    10      Family History   Problem Relation Age of Onset   • Cancer Sister    • Brain cancer Brother    • Cancer Brother    • No Known Problems Daughter    • No Known Problems Daughter    • No Known Problems Son         Social History     Socioeconomic History   • Marital status:    • Number of children: 3   • Highest education level: Associate degree: occupational, technical, or vocational program   Tobacco Use   • Smoking status: Former     Packs/day: 3.00     Years: 47.00     Pack years: 141.00     Types: Cigarettes     Start date:      Quit date: 2008     Years since quittin.9   • Smokeless tobacco: Never   Vaping Use   • Vaping Use: Never used   Substance and Sexual Activity   • Alcohol use: Not Currently     Comment: not last 40 years   • Drug use: Not Currently   •  Sexual activity: Not Currently     Partners: Female        Allergies:  Amoxicillin, Albuterol, Asmanex (120 metered doses) [mometasone furoate], Lortab [hydrocodone-acetaminophen], Morphine and related, Prilosec [omeprazole], Sodium clavulanate, Statins, Sulfa antibiotics, and Symbicort [budesonide-formoterol fumarate]   Prior to Admission medications    Medication Sig Start Date End Date Taking? Authorizing Provider   cholecalciferol (VITAMIN D3) 10 MCG (400 UNIT) tablet Take 1 tablet by mouth Daily.    Qamar Javed MD   dilTIAZem (TIAZAC) 180 MG 24 hr capsule Take 1 capsule by mouth Daily. 3/1/23   Neville Meeks MD   ferrous sulfate 324 MG tablet delayed-release Take 1 tablet by mouth Daily With Breakfast. 1/13/23   Fan Rob MD   folic acid (FOLVITE) 1 MG tablet Take 1 tablet by mouth Daily. 2/11/23   Omega Davis MD   ipratropium (ATROVENT HFA) 17 MCG/ACT inhaler Inhale 2 puffs 4 (Four) Times a Day As Needed for Wheezing.    ProviderQamar MD   ipratropium (ATROVENT) 0.02 % nebulizer solution Take 2.5 mL by nebulization 3 (Three) Times a Day. Sub amount for how much insurance allows 7/19/21   Anita Toney MD   melatonin 5 MG tablet tablet Take 1 tablet by mouth At Night As Needed.    ProviderQamar MD   montelukast (SINGULAIR) 10 MG tablet Take 1 tablet by mouth.    Qamar Javed MD   predniSONE (DELTASONE) 5 MG tablet Take 1 tablet by mouth Every Other Day.    Qamar Javed MD   tiotropium bromide-olodaterol (STIOLTO RESPIMAT) 2.5-2.5 MCG/ACT aerosol solution inhaler Inhale Daily.    Qamar Javed MD   tiZANidine (ZANAFLEX) 2 MG tablet Take 1 tablet by mouth At Night As Needed for Muscle Spasms. 12/12/22   Christin Sanderson APRN   vitamin B-12 (CYANOCOBALAMIN) 1000 MCG tablet Take 1 tablet by mouth Daily. 2/11/23   Omega Davis MD   dilTIAZem XR (DILACOR XR) 180 MG 24 hr capsule Take 1 capsule by mouth Daily. 6/30/22 10/7/22  Jeanna,  Neville Coto MD      Pain:(on a scale of 0-10)    Pain Score    03/23/23 1301   PainSc: 0-No pain      Brian Garcia reports a pain score of 0.  Given his pain assessment as noted, treatment options were discussed and the following options were decided upon as a follow-up plan to address the patient's pain: continuation of current treatment plan for pain.    Quality of Life:   ECOG: (2) Ambulatory and capable of self care, unable to carry out work activity, up and about > 50% or waking hours    Advance Directives (For Healthcare)  Pre-existing AND/MOST/POLST Order: No  Advance Directive Status: Patient does not have advance directive  Have you reviewed your Advance Directive and is it valid for this stay?: Not applicable  Literature Provided on Advance Directives: No  Patient Requests Assistance on Advance Directives: Patient Declined    PHQ-9 Depression Screening:  Little interest or pleasure in doing things? 1-->several days   Feeling down, depressed, or hopeless? 1-->several days   Trouble falling or staying asleep, or sleeping too much? 1-->several days   Feeling tired or having little energy? 3-->nearly every day   Poor appetite or overeating? 0-->not at all   Feeling bad about yourself - or that you are a failure or have let yourself or your family down? 0-->not at all   Trouble concentrating on things, such as reading the newspaper or watching television? 0-->not at all   Moving or speaking so slowly that other people could have noticed? Or the opposite - being so fidgety or restless that you have been moving around a lot more than usual? 0-->not at all   Thoughts that you would be better off dead, or of hurting yourself in some way? 0-->not at all   PHQ-9 Total Score 6   If you checked off any problems, how difficult have these problems made it for you to do your work, take care of things at home, or get along with other people? somewhat difficult    PHQ-9 Total Score: 6     Patient screened  positive for depression based on a PHQ-9 score of 6 on 3/23/2023. Follow-up recommendations include: Elevated PHQ score reflective of acute illness, not depression.      Physical Examination:  Vitals:    03/23/23 1301   BP: 153/65   Pulse: 100   Resp: 22   Temp: 97.1 °F (36.2 °C)   SpO2: 95%     Wt Readings from Last 3 Encounters:   03/23/23 58.7 kg (129 lb 8 oz)   03/06/23 58.5 kg (128 lb 14.4 oz)   03/02/23 61.2 kg (135 lb)     Body mass index is 21.55 kg/m².    Constitutional: The patient is a well-developed, well-nourished male in no acute distress.  HEENT: Normocephalic, atraumatic. EOMI. Nose and ears without abnormalities upon visual inspection.  Respiratory: Breathing comfortably on nasal cannula. Lungs clear to auscultation bilaterally.  Skin: No abnormal lesions noted on visible skin  Neurologic: Cranial nerves grossly intact, with no focal neurological deficits noted on exam. Strength 4/5 in all extremities. No ataxia.  Psychiatric: Alert and oriented. Normal affect, with no anxiety or depression noted.      Imaging:  XR Shoulder 2+ View Right    Result Date: 12/12/2022  Negative shoulder. Electronically signed by:  Guero Davey MD  12/12/2022 1:47 PM CST Workstation: [id number]    CT Head Without Contrast    Result Date: 1/5/2023  1. Moderately decreased blood products within the right lateral ventricle relative to 12/24/2022. There is residual blood within the atrium and temporal horn of the right lateral ventricle. 2. Small region of low attenuation in the right basal ganglia/posterior limb of the right internal capsule, suggesting an evolving infarct. 3. No midline shift at the level of the septum pellucidum. 4. No new or increasing blood product relative to the prior CT. Electronically signed by:  Igor Yoder MD  1/5/2023 12:23 PM CST Workstation: [id number]    CT Head Without Contrast    Result Date: 12/25/2022  IMPRESSION: Redemonstrated large intraventricular hemorrhage, which does not appear  significantly changed from prior. No new hemorrhage.    CT Head Without Contrast    Result Date: 12/25/2022  IMPRESSION: 1. Overall size of large intraventricular hemorrhage is relatively stable. The degree of ventricular dilatation is similar as well. 2. There is a trace amount of subarachnoid hemorrhage on the left which appears grossly similar to prior.    CT Chest Without Contrast Diagnostic    Result Date: 2/16/2023  1.  Worsened right middle, upper and lower lobe consolidative opacities and septal thickening. Given the appearance of a lytic lesion in the left posterior rib 11 an underlying neoplasm obscured by consolidation is the primary consideration.  A potential superimposed chronic or recurrent infection is possible. 2.  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 3.  Probable enlarged right subcarinal/hilar lymph node measures 2 cm in short axis diameter. Electronically signed by:  Tom Day MD  2/16/2023 8:13 AM CST Workstation: [id number]    XR Chest 1 View    Result Date: 12/24/2022  CONCLUSION: Chronic obstructive pulmonary disease. Progressive infiltrates right middle and lower lobes from CT August 23, 2022. Possible 2.5 cm nodule right midlung adjacent to the minor fissure. [id number] Electronically signed by:  Vishnu Staton MD  12/24/2022 12:39 PM CST Workstation: [id number]    MRI Shoulder Right Without Contrast    Result Date: 2/21/2023  1. Focal full-thickness tear involving anterior fibers of the supraspinatus at the insertion, with partial tear of the remainder of the tendon. 2. Moderate degenerative joint disease of the glenohumeral and AC joints.  Electronically signed by:  Emmanuel Esparza MD  2/21/2023 5:15 PM CST Workstation: [id number]    CT Head Without Contrast Stroke Protocol    Result Date: 12/24/2022  CONCLUSION: Acute hemorrhage distending the right lateral ventricle, most pronounced in the temporal horn. Hemorrhage in the third ventricle as well. Etiology most  likely traumatic in nature. Cerebral and cerebellar atrophy. Findings discussed with ER physician at the time of dictation. [id number] Electronically signed by:  Vishnu tSaton MD  12/24/2022 1:02 PM CST Workstation: [id number]    NM PET/CT Skull Base to Mid Thigh    Result Date: 3/8/2023    Central right lung mass with pathologic increase F-18 FDG glucose uptake and associated infiltrative changes in both the right middle lobe and right lower lobe. Mass and infiltrative changes are larger than on prior examination. Destructive metastatic lesion left 11th rib, also larger than on prior examination. Unfavorable change. Electronically signed by:  Shane Wray MD  3/8/2023 3:19 PM CST Workstation: [id number]    XR Chest PA & Lateral    Result Date: 1/1/2023  1.  Stable appearing right lung base interstitial groundglass opacity. This would be suspicious for pneumonia. Electronically signed by:  Guero Davey MD  1/1/2023 9:52 AM CST Workstation: [id number]    CT GUIDED NEEDLE ASPIRATION BX - BONE    Result Date: 3/15/2023  IMPRESSION:: CT-guided biopsy of left 11th rib PLAN: Specimens sent for evaluation PROCEDURE SUMMARY: -Percutaneous CT-guided fine-needle aspiration left 11th rib -Additional procedures: None PROCEDURE DETAILS: Preprocedure The patient's reference imaging was reviewed. Consent: Informed consent for the procedure including risks, benefits and alternatives was obtained. Preparation: Following acquisition of planning images and appropriate skin site was selected.  Timeout was then performed per Hospital protocol.  The site was prepped and draped utilizing maximal sterile  barrier technique as well as cutaneous antisepsis. Anesthesia/sedation Level of anesthesia/sedation No sedation/minimal sedation Anesthesia/sedation administered by: Not applicable Total intraservice sedation time (minutes): Not applicable Planning imaging The patient was positioned within the CT gantry in prone position.  Initial imaging was  performed utilizing Noncontrast Biopsy targeted: -Lesion size 2 cm Other findings: None Biopsy Local anesthesia was provided with 1% lidocaine.  Under CT fluoroscopic guidance, the biopsy needle was advanced to the target and biopsy was performed. Coaxial needle: 19-gauge FNA biopsy needle size:22-gauge [place] Number fine-needle aspiration samples: 4 Core needle biopsy device: Not applicable Core needle size: Not applicable Number core specimens: Not applicable On site biopsy touch preparation: Yes Preliminary assessment of sample adequacy: Adequacy confirmed Needle removal The biopsy needle was removed and sterile dressing applied. Track embolization: None Additional details Additional description of procedure: None Estimated blood loss: Less than 10 mL CT-guided biopsy    CTA HEAD AND NECK W CONTRAST    Result Date: 2022  IMPRESSION: In the setting of known hemorrhage; there is blushing contrast which appears to originate from a striate vessel on the right concerning for contrast extravasation into the existing hemorrhage likely originating at the proximal most right MCA/distalmost common carotid artery. No clear evidence of aneurysm. Findings communicated with bedside nurse at 1816 2022.        Pathology:  CASE: [id number]   PATIENT: DEBBIE VELASCO   CLINICAL INFORMATION: LT 11TH RIB   SPECIMEN CONTENT: Direct smear: 2; Cell Blk.: 1; Quick stain smear: 1;   W   PERFORMED BY: Location: [place] [id number]     SPECIMEN: A. FNA     DIAGNOSIS:   Left 11th rib, fine needle aspiration (aspirate smears and cell block):   -  Positive for malignancy, lung adenocarcinoma.     COMMENT:  The patient's outside imaging demonstrating a central right lung   mass as well as destructive metastatic lesion of the left 11th rib is   noted.  The current specimen demonstrates poorly differentiated tumor   cells.  Immunohistochemical stains are performed.  Tumor cells are positive   for CK7, TTF1, and  napsin.  Tumor cells are negative for p40, CK5/6, and   CK20.  Satisfactory controls.     JPE:jfj       Report Dictation performed by RADHA SYKES([facility]).  Electronically signed   3/16/2023 8:42:37 AM  [address] [place] [address] [place] [id number] [phone]   Final Diagnosis performed by DORA ARROYO M.D.  Electronically   signed 3/17/2023 3:34:09 PM  [address] [place] [address] [place] [id number] [phone]  Specimen Collected: 03/15/23 00:00           Labs:   Lab Results   Component Value Date    GLUCOSE 111 (H) 02/10/2023    BUN 9 02/10/2023    CREATININE 0.62 (L) 02/10/2023    EGFRIFNONA 81 11/08/2021    BCR 14.5 02/10/2023    K 4.1 02/10/2023    CO2 32.0 (H) 02/10/2023    CALCIUM 8.9 02/10/2023    ALBUMIN 3.8 02/10/2023    AST 13 02/10/2023    ALT 10 02/10/2023       WBC   Date Value Ref Range Status   03/15/2023 9.6 4.1 - 10.9 THOUS/uL Final     Hemoglobin   Date Value Ref Range Status   03/15/2023 10.6 (L) 12.9 - 16.6 GM/DL Final     Hematocrit   Date Value Ref Range Status   03/15/2023 34.1 (L) 38.0 - 48.0 % Final     Platelets   Date Value Ref Range Status   03/15/2023 337 130 - 400 THOUS/uL Final     PFTs:        Electronically signed by Yusuf Downs MD 03/23/23 13:11 CDT

## 2023-03-25 ENCOUNTER — HOSPITAL ENCOUNTER (OUTPATIENT)
Dept: MRI IMAGING | Facility: HOSPITAL | Age: 73
Discharge: HOME | End: 2023-03-25
Admitting: STUDENT IN AN ORGANIZED HEALTH CARE EDUCATION/TRAINING PROGRAM
Payer: OTHER GOVERNMENT

## 2023-03-25 DIAGNOSIS — C34.81 MALIGNANT NEOPLASM OF OVERLAPPING SITES OF RIGHT LUNG: ICD-10-CM

## 2023-03-25 PROCEDURE — 70553 MRI BRAIN STEM W/O & W/DYE: CPT

## 2023-03-25 PROCEDURE — A9579 GAD-BASE MR CONTRAST NOS,1ML: HCPCS | Performed by: STUDENT IN AN ORGANIZED HEALTH CARE EDUCATION/TRAINING PROGRAM

## 2023-03-25 PROCEDURE — 25010000002 GADOTERIDOL PER 1 ML: Performed by: STUDENT IN AN ORGANIZED HEALTH CARE EDUCATION/TRAINING PROGRAM

## 2023-03-25 RX ADMIN — GADOTERIDOL 12 ML: 279.3 INJECTION, SOLUTION INTRAVENOUS at 14:06

## 2023-03-27 ENCOUNTER — DOCUMENTATION (OUTPATIENT)
Dept: ONCOLOGY | Facility: CLINIC | Age: 73
End: 2023-03-27
Payer: OTHER GOVERNMENT

## 2023-03-27 ENCOUNTER — OFFICE VISIT (OUTPATIENT)
Dept: ONCOLOGY | Facility: CLINIC | Age: 73
End: 2023-03-27
Payer: OTHER GOVERNMENT

## 2023-03-27 VITALS
TEMPERATURE: 97.5 F | OXYGEN SATURATION: 94 % | DIASTOLIC BLOOD PRESSURE: 57 MMHG | BODY MASS INDEX: 21.37 KG/M2 | HEART RATE: 105 BPM | WEIGHT: 128.4 LBS | SYSTOLIC BLOOD PRESSURE: 141 MMHG

## 2023-03-27 DIAGNOSIS — D72.828 OTHER ELEVATED WHITE BLOOD CELL (WBC) COUNT: Chronic | ICD-10-CM

## 2023-03-27 DIAGNOSIS — D50.8 IRON DEFICIENCY ANEMIA SECONDARY TO INADEQUATE DIETARY IRON INTAKE: Chronic | ICD-10-CM

## 2023-03-27 DIAGNOSIS — Z86.711 HISTORY OF PULMONARY EMBOLISM: Chronic | ICD-10-CM

## 2023-03-27 DIAGNOSIS — C34.81 MALIGNANT NEOPLASM OF OVERLAPPING SITES OF RIGHT LUNG: Primary | ICD-10-CM

## 2023-03-27 PROCEDURE — G0463 HOSPITAL OUTPT CLINIC VISIT: HCPCS | Performed by: INTERNAL MEDICINE

## 2023-03-27 RX ORDER — ONDANSETRON HYDROCHLORIDE 8 MG/1
8 TABLET, FILM COATED ORAL 3 TIMES DAILY PRN
Qty: 30 TABLET | Refills: 3 | Status: SHIPPED | OUTPATIENT
Start: 2023-03-27

## 2023-03-27 RX ORDER — DEXAMETHASONE 4 MG/1
TABLET ORAL
Qty: 6 TABLET | Refills: 3 | Status: SHIPPED | OUTPATIENT
Start: 2023-03-27

## 2023-03-27 RX ORDER — CYANOCOBALAMIN 1000 UG/ML
1000 INJECTION, SOLUTION INTRAMUSCULAR; SUBCUTANEOUS ONCE
Status: CANCELLED | OUTPATIENT
Start: 2023-03-28 | End: 2023-03-28

## 2023-03-27 RX ORDER — OLANZAPINE 5 MG/1
5 TABLET ORAL NIGHTLY
Qty: 3 TABLET | Refills: 3 | Status: SHIPPED | OUTPATIENT
Start: 2023-03-27

## 2023-03-27 RX ORDER — FOLIC ACID 1 MG/1
1 TABLET ORAL DAILY
Qty: 30 TABLET | Refills: 4 | Status: SHIPPED | OUTPATIENT
Start: 2023-03-27 | End: 2023-03-28

## 2023-03-27 NOTE — PROGRESS NOTES
DATE OF VISIT: 3/27/2023      REASON FOR VISIT: Metastatic lung adenocarcinoma, history of pulmonary embolism, leukocytosis      HISTORY OF PRESENT ILLNESS:   72-year-old male with medical problems significant for hypertension, history of pulmonary embolism in 2015 for which he took Coumadin for 1 year, chronic obstructive pulmonary disease, history of nicotine addiction that he quit in 2008 had a repeat PET/CT done as well as CT-guided fine-needle aspiration of left 11th rib lesion done at [facility].  Patient is here to discuss the results and further recommendation.  Complains of chronic shortness of breath.  Complains of chronic hearing loss.  Complains of chronic tingling and numbness affecting lower extremity.  Denies any hemoptysis.  Denies any new lymph node enlargement.                Past Medical History, Past Surgical History, Social History, Family History have been reviewed and are without significant changes except as mentioned.    Review of Systems   A comprehensive 14 point review of systems was performed and was negative except as mentioned in HPI.    Medications:  The current medication list was reviewed in the EMR    ALLERGIES:    Allergies   Allergen Reactions   • Amoxicillin Anaphylaxis   • Albuterol Irritability     Facial flushing and palpitations.   • Asmanex (120 Metered Doses) [Mometasone Furoate] Unknown - Low Severity   • Lortab [Hydrocodone-Acetaminophen] Other (See Comments)     Can't remember   • Morphine And Related Hallucinations   • Prilosec [Omeprazole] Other (See Comments)     unknown   • Sodium Clavulanate Unknown - High Severity   • Statins GI Intolerance   • Sulfa Antibiotics Other (See Comments)     Can't remember also more and can't remember   • Symbicort [Budesonide-Formoterol Fumarate] Unknown (See Comments)     Doesn't remember reaction type       Objective      Vitals:    03/27/23 1006   BP: 141/57   Pulse: 105   Temp: 97.5 °F (36.4 °C)   TempSrc: Temporal   SpO2:  94%  Comment: PT OXYGEN TANK IS SET ON 5   Weight: 58.2 kg (128 lb 6.4 oz)   PainSc:   2   PainLoc: Generalized     Current Status 8/2/2021   ECOG score 0       Physical Exam  Pulmonary:      Comments: Decreased air entry in right lower lobe lung field.  No rhonchi or wheezing  Lymphadenopathy:      Cervical: No cervical adenopathy.   Neurological:      Mental Status: He is alert and oriented to person, place, and time.           RECENT LABS:  Glucose   Date Value Ref Range Status   02/10/2023 111 (H) 65 - 99 mg/dL Final     Glucose, Arterial   Date Value Ref Range Status   02/06/2018 106 mmol/L Final     Sodium   Date Value Ref Range Status   02/10/2023 139 136 - 145 mmol/L Final     Potassium   Date Value Ref Range Status   02/10/2023 4.1 3.5 - 5.2 mmol/L Final     CO2   Date Value Ref Range Status   02/10/2023 32.0 (H) 22.0 - 29.0 mmol/L Final     Chloride   Date Value Ref Range Status   02/10/2023 99 98 - 107 mmol/L Final     Anion Gap   Date Value Ref Range Status   02/10/2023 8.0 5.0 - 15.0 mmol/L Final     Creatinine   Date Value Ref Range Status   02/10/2023 0.62 (L) 0.76 - 1.27 mg/dL Final     BUN   Date Value Ref Range Status   02/10/2023 9 8 - 23 mg/dL Final     BUN/Creatinine Ratio   Date Value Ref Range Status   02/10/2023 14.5 7.0 - 25.0 Final     Calcium   Date Value Ref Range Status   02/10/2023 8.9 8.6 - 10.5 mg/dL Final     eGFR Non  Amer   Date Value Ref Range Status   11/08/2021 81 >60 mL/min/1.73 Final     Alkaline Phosphatase   Date Value Ref Range Status   02/10/2023 116 39 - 117 U/L Final     Total Protein   Date Value Ref Range Status   02/10/2023 7.0 6.0 - 8.5 g/dL Final     ALT (SGPT)   Date Value Ref Range Status   02/10/2023 10 1 - 41 U/L Final     AST (SGOT)   Date Value Ref Range Status   02/10/2023 13 1 - 40 U/L Final     Total Bilirubin   Date Value Ref Range Status   02/10/2023 0.2 0.0 - 1.2 mg/dL Final     Albumin   Date Value Ref Range Status   02/10/2023 3.8 3.5 - 5.2 g/dL  Final     Globulin   Date Value Ref Range Status   02/10/2023 3.2 gm/dL Final     Lab Results   Component Value Date    WBC 9.6 03/15/2023    HGB 10.6 (L) 03/15/2023    HCT 34.1 (L) 03/15/2023    MCV 98.0 (H) 03/15/2023     03/15/2023     Lab Results   Component Value Date    NEUTROABS 6.8 03/15/2023    IRON 39 (L) 02/10/2023    IRON 37 (L) 01/02/2023    TIBC 331 02/10/2023    TIBC 286 (L) 01/02/2023    LABIRON 12 (L) 02/10/2023    LABIRON 13 (L) 01/02/2023    FERRITIN 266.00 02/10/2023    YHYCNCRB15 370 02/10/2023    FOLATE 9.81 02/10/2023     No results found for: , LABCA2, AFPTM, HCGQUANT, , CHROMGRNA, [id number], CEA, REFLABREPO      PATHOLOGY:  * Cannot find OR log *         RADIOLOGY DATA :  PET/CT done on March 6, 2023 showed:       FINDINGS:  --------------     HEAD and NECK:       - Scintigraphic:  physiologic distribution of radiotracer       - Anatomic:  no discernable pathologic findings     THORAX: Mass and associated infiltrative changes central right  lung right lower lobe, and right middle lobe SUV 4.92.     Subcarinal mediastinal metastasis SUV 10.8   ABDOMEN:      - Scintigraphic:  physiologic distribution of radiotracer       - Anatomic:  no discernable pathologic findings     PELVIS:      - Scintigraphic:  physiologic distribution of radiotracer       - Anatomic:  no discernable pathologic findings     OSSEOUS / MISC:     Expansile destructive mass left 11th rib posteriorly SUV 6.05.         IMPRESSION:    Central right lung mass with pathologic increase F-18 FDG  glucose uptake and associated infiltrative changes in both the  right middle lobe and right lower lobe. Mass and infiltrative  changes are larger than on prior examination.      Destructive metastatic lesion left 11th rib, also larger than on  prior examination. Unfavorable change.            MRI Brain With & Without Contrast    Result Date: 3/27/2023  1. A small amount of heterogeneously increased T1 signal within the  temporal horn of the right lateral ventricle likely reflects sequelae of the hemorrhage noted on the 12/24/2022 head CT. No evidence for associated enhancement, allowing for the intrinsic T1 increased signal. Given the history of lung malignancy, however, short interval follow-up imaging, however, can be obtained to assess for evolution. 2. No discrete evidence for abnormal mass. 3. No midline shift. 4. Mild underlying chronic small vessel ischemic change. Electronically signed by:  Igor Yoder MD  3/27/2023 9:22 AM CDT Workstation: [id number]          Assessment & Plan     1.  Metastatic lung adenocarcinoma with bone metastasis stage IV:  - Problem is new to me and potentially life-threatening  - Recent PET/CT done on March 2, 2023 was reviewed with radiologist, discussed with patient, it shows enlarging right middle lobe as well as right lower lobe mass with infiltrative changes as compared to prior PET/CT.  There was increase in size of destructive lesion involving left 11th rib.  - Patient underwent fine-needle aspiration of left 11th rib mass at [facility] that showed poorly differentiated pulmonary adenocarcinoma.  - Result of pathology and staging of cancer being stage IV was discussed with patient and family.  - Option of palliative chemotherapy/immunotherapy versus hospice were discussed with patient today.  - Recommend sending out [employer] advance testing to look for targetable mutations.  - Patient wants to try palliative chemotherapy/immunotherapy at present.  Recommend starting carboplatin, Alimta and Keytruda.  -Chemotherapy side effects of carboplatin and Alimta including but not limited to risk of low blood counts, risk of infection, need for blood transfusion, risk of bleeding, risk of kidney failure, diarrhea, nausea, vomiting, risk of neuropathy and risk of allergic reaction which can be life-threatening were discussed with patient and family. We will also provide them some information  today to read about the chemotherapy.  -Side effect of immunotherapy of Keytruda including but not limited to immune mediated pneumonitis, hepatitis, colitis, thyroiditis, encephalitis, nephritis, pan hypopituitarism, dermatitis and skin rash were discussed with the patient.  We will provide them information about immunotherapy today.  It was also discussed with them rarely immune mediated side effect can be severe and life-threatening.  - Case has been discussed with the radiation oncologist Dr. Downs  -We will refer patient to surgery team for port placement.  - Plan is to start treatment in next 1 to 2 weeks from now.      2.  History of pulmonary embolism:  - Patient was diagnosed with pulmonary embolism in 2015 for which he took Coumadin for 1 year  - CT angiogram in July 2021 was negative for pulmonary embolism    3.  Leukocytosis:  - Reactive.  - White blood cell count is 9.6  - We will monitor with CBC    4.  Anemia:  - Hemoglobin is 10.6  - Currently on ferrous sulfate, B12 and folic acid p.o. daily  - We will repeat anemia work-up upon next clinic visit with start of chemotherapy    5.  Health maintenance: Patient does not smoke    6.  Advance care planning:  - CODE STATUS and resuscitation were discussed with patient today.  Patient remains full code.    7.  Prescriptions: Prescription for Zyprexa, Zofran, dexamethasone and folic acid has been sent to patient's pharmacy today                 PHQ-9 Total Score: 0   -Patient is not homicidal or suicidal.  No acute intervention required.    Brian Garcia reports a pain score of 2.  Given his pain assessment as noted, treatment options were discussed and the following options were decided upon as a follow-up plan to address the patient's pain: continuation of current treatment plan for pain.         Omega Davis MD  3/27/2023  10:20 CDT        Part of this note may be an electronic transcription/translation of spoken language to printed text  using the Dragon Dictation System.          CC:

## 2023-03-27 NOTE — SIGNIFICANT NOTE
Called pt to discuss my role as the nurse navigator in cancer care and my support services to pt and S/O. Informed pt of my role as the nurse navigator in cancer care at [facility] and my support to pt and family. Stressed the importance of education and informed decision making and my assistance as needed to pt and family. Provided my contact information and encouraged pt to reach out anytime to me with any concerns or questions. Pt stated understanding of discussion and denied any further concerns or questions today. Will f/u with pt as needed throughout cancer treatment course.

## 2023-03-27 NOTE — PROGRESS NOTES
..Distress Screening Follow-up    Diagnosis: Brian Garcia presents as a 72 year old male newly diagnosed metastatic lung cancer with bone metastasis.  He is accompanied in the room by his support person and female significant other, Annette Harvey, who presents attentive, supportive and offers collaborative feedback. LCSW introduced self and explained role and support to include scope of clinical practice in oncology setting.      Location of Distress Screening: Medical/ Radiation oncology . Out patient.     Distress Level: 6 (3/6/2023 11:33 AM)    Physical Concerns: Physical indicators of distress addressed by physician.      Fatigue: Y    Practical Problems: Pt. States no barriers to care. Pt. Is retired and his primary support person is his significant other. He states openly that he wishes for her to be his health care surrogate.  SW offered feedback as to ACP to include  living will, documentation of HCS, POA documents, Most form.  SW explained need for documentation and pt / significant other wish to look into POA. SW offered direction as to the ability to include HCS and Living will decisions on the POA document. Pt. States understanding to provide document when completed.        Emotional Concerns:  Pt. Presents alert and oriented x 3. Pt. Evidences full decision making capacity, sharing thoughts and feelings. Pt. With bright affect and normal mood. Pt utilizes humor and engages openly and calmly.  Pt. Responded receptive to SW feedback.      Family Concerns:Pt. Has adult children.  Estrangement among children is mentioned. Pt. Primary support is his significant other.                Interventions: LCSW offered person centered therapeutic feedback by means of collecting history, emotional support, active listening, validation of emotions and clarification of feedback and recommendations for care.  Provided education related to oncology supports and services.    * ACP  Discussed. LW education.   Discussed POA with documentation as HCS and pt leaning toward POA document as he requests his S/o as his decision maker.        Comments: Pt states plan to follow up with obtaining POA and provide to SW. He states understanding of support if he opts for LW.      LCSW will follow up per advisement and/or as needs arise as pt proceeds with treatment.

## 2023-03-28 ENCOUNTER — PREP FOR SURGERY (OUTPATIENT)
Dept: OTHER | Facility: HOSPITAL | Age: 73
End: 2023-03-28
Payer: OTHER GOVERNMENT

## 2023-03-28 ENCOUNTER — ANESTHESIA EVENT (OUTPATIENT)
Dept: PERIOP | Facility: HOSPITAL | Age: 73
End: 2023-03-28
Payer: OTHER GOVERNMENT

## 2023-03-28 ENCOUNTER — PRE-ADMISSION TESTING (OUTPATIENT)
Dept: PREADMISSION TESTING | Facility: HOSPITAL | Age: 73
End: 2023-03-28
Payer: OTHER GOVERNMENT

## 2023-03-28 VITALS
OXYGEN SATURATION: 90 % | RESPIRATION RATE: 14 BRPM | WEIGHT: 128 LBS | HEIGHT: 65 IN | HEART RATE: 98 BPM | BODY MASS INDEX: 21.33 KG/M2 | SYSTOLIC BLOOD PRESSURE: 130 MMHG | DIASTOLIC BLOOD PRESSURE: 70 MMHG

## 2023-03-28 DIAGNOSIS — C34.90 PRIMARY LUNG ADENOCARCINOMA: Primary | ICD-10-CM

## 2023-03-28 DIAGNOSIS — C34.90 PRIMARY LUNG ADENOCARCINOMA: ICD-10-CM

## 2023-03-28 LAB
MRSA DNA SPEC QL NAA+PROBE: NEGATIVE
QT INTERVAL: 326 MS
QTC INTERVAL: 418 MS

## 2023-03-28 PROCEDURE — 93010 ELECTROCARDIOGRAM REPORT: CPT | Performed by: INTERNAL MEDICINE

## 2023-03-28 PROCEDURE — 93005 ELECTROCARDIOGRAM TRACING: CPT

## 2023-03-28 PROCEDURE — 87641 MR-STAPH DNA AMP PROBE: CPT

## 2023-03-28 RX ORDER — SODIUM CHLORIDE 0.9 % (FLUSH) 0.9 %
10 SYRINGE (ML) INJECTION AS NEEDED
Status: CANCELLED | OUTPATIENT
Start: 2023-03-29

## 2023-03-28 RX ORDER — SODIUM CHLORIDE 0.9 % (FLUSH) 0.9 %
10 SYRINGE (ML) INJECTION EVERY 12 HOURS SCHEDULED
Status: CANCELLED | OUTPATIENT
Start: 2023-03-29

## 2023-03-28 RX ORDER — SODIUM CHLORIDE 9 MG/ML
40 INJECTION, SOLUTION INTRAVENOUS AS NEEDED
Status: CANCELLED | OUTPATIENT
Start: 2023-03-29

## 2023-03-28 RX ORDER — SODIUM CHLORIDE, SODIUM LACTATE, POTASSIUM CHLORIDE, CALCIUM CHLORIDE 600; 310; 30; 20 MG/100ML; MG/100ML; MG/100ML; MG/100ML
100 INJECTION, SOLUTION INTRAVENOUS CONTINUOUS
Status: CANCELLED | OUTPATIENT
Start: 2023-03-29

## 2023-03-28 NOTE — ANESTHESIA PREPROCEDURE EVALUATION
Anesthesia Evaluation     Patient summary reviewed and Nursing notes reviewed   no history of anesthetic complications:  NPO Solid Status: > 8 hours  NPO Liquid Status: > 2 hours           Airway   Mallampati: II  TM distance: >3 FB  Neck ROM: full  possible difficult intubation  Dental    (+) poor dentation    Pulmonary     breath sounds clear to auscultation  (+) a smoker Former, lung cancer (Right lung.), COPD severe, home oxygen (4 liter/minute), shortness of breath, decreased breath sounds,   (-) rhonchi, wheezes, rales    ROS comment: ESSION:  1.  Stable appearing right lung base interstitial groundglass  opacity. This would be suspicious for pneumonia.     Electronically signed by:  Guero Davey MD  1/1/2023 9:52 AM CST  PE comment: Decreased right chest breath sounds.  Cardiovascular   Exercise tolerance: poor (<4 METS)    ECG reviewed  Rhythm: regular  Rate: normal    (+) hypertension, dysrhythmias Atrial Fib, CABRERA,   (-) past MI, angina, murmur, cardiac stents    ROS comment: EKG: 3/28/23  sinus rhythm  Incomplete right bundle branch block  Borderline ECG  When compared with ECG of 01-MAR-2023 11:49,  Premature atrial complexes are no longer PresentLeft ventricular ejection fraction appears to be 66 - 70%.    Neuro/Psych  (+) CVA residual symptoms,    GI/Hepatic/Renal/Endo - negative ROS     Musculoskeletal     Abdominal    Substance History - negative use     OB/GYN negative ob/gyn ROS         Other   arthritis,    history of cancer (lung) active    ROS/Med Hx Other: HGB 10.6                Anesthesia Plan    ASA 4     general and MAC   total IV anesthesia  intravenous induction     Anesthetic plan, risks, benefits, and alternatives have been provided, discussed and informed consent has been obtained with: patient and spouse/significant other.  Pre-procedure education provided      CODE STATUS:

## 2023-03-28 NOTE — PAT
Chlorhexidine given with written and verbal instructions, patient relates that he understands information given and has no questions.  Opioid pain medication pamphlet given.  Dr. Stone here to review EKG and pt history no further orders or intructions may proceed with surgery.

## 2023-03-28 NOTE — DISCHARGE INSTRUCTIONS
[facility]  Pre-op Information and Guidelines    You will be called after 2 p.m. the day before your surgery (Friday for Monday surgery) and notified of your time for arrival and approximate surgery time.  If you have not received a call by 4P.M., please contact Same Day Surgery at [phone] of if outside [facility] call [phone].    Please Follow these Important Safety Guidelines:    The morning of your procedure, take only the medications listed below with   A sip of water:_____________________________________________       ______________________________________________    DO NOT eat or drink anything after 12:00 midnight the night before surgery  Specific instructions concerning drinking clear liquids will be discussed during  the pre-surgery instruction call the day before your surgery.    If you take a blood thinner (ex. Plavix, Coumadin, aspirin), ask your doctor when to stop it before surgery  STOP DATE: _________________    Only 1 visitor is allowed in patient rooms at a time  Your visitors will be asked to wait in the lobby until the admission process is complete with the exception of a parent with a child and patients in need of special assistance.    YOU CANNOT DRIVE YOURSELF HOME  You must be accompanied by someone who will be responsible for driving you home after surgery and for your care at home.    DO NOT chew gum, use breath mints, hard candy, or smoke the day of surgery  DO NOT drink alcohol for at least 24 hours before your surgery  DO NOT wear any jewelry and remove all body piercing before coming to the hospital  DO NOT wear make-up to the hospital  If you are having surgery on an extremity (arm/leg/foot) remove nail polish/artificial nails on the surgical side  Clothing, glasses, contacts, dentures, and hairpieces must be removed before surgery  Bathe the night before or the morning of your surgery and do not use powders/lotions on skin.

## 2023-03-29 ENCOUNTER — ANESTHESIA (OUTPATIENT)
Dept: PERIOP | Facility: HOSPITAL | Age: 73
End: 2023-03-29
Payer: OTHER GOVERNMENT

## 2023-03-29 ENCOUNTER — DOCUMENTATION (OUTPATIENT)
Dept: NUTRITION | Facility: HOSPITAL | Age: 73
End: 2023-03-29
Payer: OTHER GOVERNMENT

## 2023-03-29 NOTE — PROGRESS NOTES
Adult Outpatient Nutrition  Assessment    Patient Name:  Brian Garcia  YOB: 1950  MRN: [id number]    Assessment Date:  3/29/2023    Comments:  Follow up to check nutrition. Wt stable the last month - #. Previously mailed pt diet info and coupons - has received information. Pt continues to struggle with eating/ preparing food d/t low energy. Unsure about commercial supplements due to pricing. Encouraged pt to use coupons provided and try supplement recipes provided via mail. Pt has surgery for port placement to begin chemo tomorrow and would like samples of commercial supplements before purchasing. Encouraged pt to eat frequent balanced meals throughout the day to prevent further loss of LBM.                    Electronically signed by:  Meggan Zambrano  03/29/23 17:23 CDT

## 2023-03-30 ENCOUNTER — APPOINTMENT (OUTPATIENT)
Dept: GENERAL RADIOLOGY | Facility: HOSPITAL | Age: 73
End: 2023-03-30
Payer: OTHER GOVERNMENT

## 2023-03-30 ENCOUNTER — HOSPITAL ENCOUNTER (OUTPATIENT)
Facility: HOSPITAL | Age: 73
Setting detail: HOSPITAL OUTPATIENT SURGERY
Discharge: HOME | End: 2023-03-30
Attending: STUDENT IN AN ORGANIZED HEALTH CARE EDUCATION/TRAINING PROGRAM | Admitting: STUDENT IN AN ORGANIZED HEALTH CARE EDUCATION/TRAINING PROGRAM
Payer: OTHER GOVERNMENT

## 2023-03-30 VITALS
TEMPERATURE: 96.8 F | HEIGHT: 65 IN | WEIGHT: 126.76 LBS | BODY MASS INDEX: 21.12 KG/M2 | HEART RATE: 99 BPM | DIASTOLIC BLOOD PRESSURE: 64 MMHG | SYSTOLIC BLOOD PRESSURE: 146 MMHG | OXYGEN SATURATION: 95 % | RESPIRATION RATE: 20 BRPM

## 2023-03-30 DIAGNOSIS — C34.90 PRIMARY LUNG ADENOCARCINOMA: ICD-10-CM

## 2023-03-30 DIAGNOSIS — C34.90 PRIMARY ADENOCARCINOMA OF LUNG, UNSPECIFIED LATERALITY: ICD-10-CM

## 2023-03-30 PROCEDURE — 71045 X-RAY EXAM CHEST 1 VIEW: CPT

## 2023-03-30 PROCEDURE — 77001 FLUOROGUIDE FOR VEIN DEVICE: CPT | Performed by: STUDENT IN AN ORGANIZED HEALTH CARE EDUCATION/TRAINING PROGRAM

## 2023-03-30 PROCEDURE — 25010000002 HEPARIN LOCK FLUSH PER 10 UNITS: Performed by: STUDENT IN AN ORGANIZED HEALTH CARE EDUCATION/TRAINING PROGRAM

## 2023-03-30 PROCEDURE — 25010000002 MIDAZOLAM PER 1 MG: Performed by: NURSE ANESTHETIST, CERTIFIED REGISTERED

## 2023-03-30 PROCEDURE — 76000 FLUOROSCOPY <1 HR PHYS/QHP: CPT

## 2023-03-30 PROCEDURE — 76937 US GUIDE VASCULAR ACCESS: CPT | Performed by: STUDENT IN AN ORGANIZED HEALTH CARE EDUCATION/TRAINING PROGRAM

## 2023-03-30 PROCEDURE — 25010000002 PROPOFOL 200 MG/20ML EMULSION: Performed by: NURSE ANESTHETIST, CERTIFIED REGISTERED

## 2023-03-30 PROCEDURE — 36561 INSERT TUNNELED CV CATH: CPT | Performed by: STUDENT IN AN ORGANIZED HEALTH CARE EDUCATION/TRAINING PROGRAM

## 2023-03-30 PROCEDURE — C1788 PORT, INDWELLING, IMP: HCPCS | Performed by: STUDENT IN AN ORGANIZED HEALTH CARE EDUCATION/TRAINING PROGRAM

## 2023-03-30 DEVICE — PRT INTRO VASC/INTERV VORTEX FILL/HL PREATT/POLYURET/CATH 8F: Type: IMPLANTABLE DEVICE | Site: CHEST | Status: FUNCTIONAL

## 2023-03-30 RX ORDER — SODIUM CHLORIDE 0.9 % (FLUSH) 0.9 %
10 SYRINGE (ML) INJECTION EVERY 12 HOURS SCHEDULED
Status: DISCONTINUED | OUTPATIENT
Start: 2023-03-30 | End: 2023-03-30 | Stop reason: HOSPADM

## 2023-03-30 RX ORDER — HEPARIN SODIUM (PORCINE) LOCK FLUSH IV SOLN 100 UNIT/ML 100 UNIT/ML
SOLUTION INTRAVENOUS AS NEEDED
Status: DISCONTINUED | OUTPATIENT
Start: 2023-03-30 | End: 2023-03-30 | Stop reason: HOSPADM

## 2023-03-30 RX ORDER — SODIUM CHLORIDE, SODIUM LACTATE, POTASSIUM CHLORIDE, CALCIUM CHLORIDE 600; 310; 30; 20 MG/100ML; MG/100ML; MG/100ML; MG/100ML
100 INJECTION, SOLUTION INTRAVENOUS CONTINUOUS
Status: DISCONTINUED | OUTPATIENT
Start: 2023-03-30 | End: 2023-03-30 | Stop reason: HOSPADM

## 2023-03-30 RX ORDER — PROPOFOL 10 MG/ML
INJECTION, EMULSION INTRAVENOUS AS NEEDED
Status: DISCONTINUED | OUTPATIENT
Start: 2023-03-30 | End: 2023-03-30 | Stop reason: SURG

## 2023-03-30 RX ORDER — BUPIVACAINE HYDROCHLORIDE AND EPINEPHRINE 2.5; 5 MG/ML; UG/ML
INJECTION, SOLUTION EPIDURAL; INFILTRATION; INTRACAUDAL; PERINEURAL AS NEEDED
Status: DISCONTINUED | OUTPATIENT
Start: 2023-03-30 | End: 2023-03-30 | Stop reason: HOSPADM

## 2023-03-30 RX ORDER — SODIUM CHLORIDE 0.9 % (FLUSH) 0.9 %
10 SYRINGE (ML) INJECTION AS NEEDED
Status: DISCONTINUED | OUTPATIENT
Start: 2023-03-30 | End: 2023-03-30 | Stop reason: HOSPADM

## 2023-03-30 RX ORDER — SODIUM CHLORIDE 9 MG/ML
40 INJECTION, SOLUTION INTRAVENOUS AS NEEDED
Status: DISCONTINUED | OUTPATIENT
Start: 2023-03-30 | End: 2023-03-30 | Stop reason: HOSPADM

## 2023-03-30 RX ORDER — TRAMADOL HYDROCHLORIDE 50 MG/1
50 TABLET ORAL EVERY 6 HOURS PRN
Qty: 12 TABLET | Refills: 0 | Status: ON HOLD | OUTPATIENT
Start: 2023-03-30

## 2023-03-30 RX ORDER — MIDAZOLAM HYDROCHLORIDE 1 MG/ML
INJECTION INTRAMUSCULAR; INTRAVENOUS AS NEEDED
Status: DISCONTINUED | OUTPATIENT
Start: 2023-03-30 | End: 2023-03-30 | Stop reason: SURG

## 2023-03-30 RX ADMIN — PROPOFOL 20 MG: 10 INJECTION, EMULSION INTRAVENOUS at 11:33

## 2023-03-30 RX ADMIN — PROPOFOL 20 MG: 10 INJECTION, EMULSION INTRAVENOUS at 11:47

## 2023-03-30 RX ADMIN — PROPOFOL 20 MG: 10 INJECTION, EMULSION INTRAVENOUS at 11:40

## 2023-03-30 RX ADMIN — SODIUM CHLORIDE, POTASSIUM CHLORIDE, SODIUM LACTATE AND CALCIUM CHLORIDE 100 ML/HR: 600; 310; 30; 20 INJECTION, SOLUTION INTRAVENOUS at 09:58

## 2023-03-30 RX ADMIN — PROPOFOL 20 MG: 10 INJECTION, EMULSION INTRAVENOUS at 12:11

## 2023-03-30 RX ADMIN — PROPOFOL 20 MG: 10 INJECTION, EMULSION INTRAVENOUS at 11:54

## 2023-03-30 RX ADMIN — MIDAZOLAM HYDROCHLORIDE 1 MG: 1 INJECTION, SOLUTION INTRAMUSCULAR; INTRAVENOUS at 11:36

## 2023-03-30 RX ADMIN — PROPOFOL 20 MG: 10 INJECTION, EMULSION INTRAVENOUS at 12:03

## 2023-03-30 NOTE — ANESTHESIA POSTPROCEDURE EVALUATION
Patient: Brian Garcia    Procedure Summary     Date: 03/30/23 Room / Location: [facility] OR 06 / [facility] OR    Anesthesia Start: 1133 Anesthesia Stop: 1223    Procedure: MEDIPORT PLACEMENT          (C-ARM) Diagnosis:       Primary lung adenocarcinoma (HCC)      (Primary lung adenocarcinoma (HCC) [C34.90])    Surgeons: Lavon Cochran MD Provider:     Anesthesia Type: general, MAC ASA Status: 4          Anesthesia Type: general, MAC    Vitals  No vitals data found for the desired time range.          Post Anesthesia Care and Evaluation    Patient location during evaluation: PHASE II  Patient participation: complete - patient participated  Level of consciousness: awake and alert  Pain score: 0  Pain management: adequate    Airway patency: patent  Anesthetic complications: No anesthetic complications  PONV Status: none  Cardiovascular status: acceptable and hemodynamically stable  Respiratory status: acceptable, face mask and spontaneous ventilation  Hydration status: acceptable    Comments: ---------------------------               03/30/23                      0950         ---------------------------   BP:          125/59         Pulse:         106          Resp:          18           Temp:   97.2 °F (36.2 °C)   SpO2:          96%         ---------------------------

## 2023-03-30 NOTE — OP NOTE
Operative Note    Brian Garcia  3/30/2023    Pre-op Diagnosis:   Primary lung adenocarcinoma (HCC) [C34.90]    Post-op Diagnosis:     Post-Op Diagnosis Codes:     * Primary lung adenocarcinoma (HCC) [C34.90]    Procedure/CPT® Codes:      Procedure(s):  MEDIPORT PLACEMENT          (C-ARM)    Surgeon(s):  Lavon Cochran MD    Anesthesia: General    Staff:   Circulator: Gabby Martinez RN  Radiology Technologist: Matilda Rooney  Scrub Person: Sg Sher  Assistant: Matilda Madison    Estimated Blood Loss: minimal    Specimens:                None      Drains: * No LDAs found *    Findings: none    Complications: none    Indication: 71 yo gentleman with lung cancer who presents for port placement    Operative Note:    Patient was seen and consented preoperatively.  Patient was brought to the OR and placed in supine position.  Patient had a shoulder roll placed and arms tucked to their sides.  IV sedation was given by Anesthesia who monitored patient throughout the procedure.  The upper chest and neck bilaterally were prepped and draped following a briefing.   A timeout was then performed.     Sterile ultrasound was used to evaluate the right neck.  A patent and compressible right internal jugular vein was seen.  Local anesthetic was injected overlying the vein.  A large gauge needle attached to a syringe was used to access the vein under U/S visualization.  Venous blood was aspirated.  A wire was inserted through the needle and shown to be in the SVC on fluoroscopy.  The needle was removed.  Additional local was then injected along the right neck base and below the clavicle.  A right chest incision was carried down to the pectoral fascia where a pocket was bluntly developed.  A small incision was made adjacent to the wire and a dilator and sheath passed over the wire without issue.  Confirmed to be in good position in the SVC on fluoroscopy.  The wire and dilator were removed.  The catheter  was placed through the sheath which was peeled away.  A  was used to bring the end of the catheter out in the right chest incision.  The catheter was backed up to appropriate length under fluoroscopic visualization.  The catheter was trimmed and attached to the port.  The port was affixed to the pectoral fascia using Prolene sutures.  Once it was placed into the subcutaneous pocket it was shown to be in good position on fluoro.  It was accessed percutaneously with a Gunderson needle and blood was aspirated.  It was then flushed with heparinized saline.      The skin incision was then closed with 3-0 and 4-0 Vicryl.  Skin Affix was placed over the incisions.           Assistant: Matilda Madison was responsible for performing the following activities: Retraction, Suction, Irrigation, Suturing, Closing and Placing Dressing and their skilled assistance was necessary for the success of this case.    Lavon Cochran MD     Date: 3/30/2023  Time: 12:29 CDT

## 2023-03-30 NOTE — H&P
General Surgery History and Physical Exam        Chief complaint Metastatic adenocarcinoma      HPI: 73 yo gentleman with metastatic lung adenocarcinoma who is in need of port placement for chemotherapy        Review of Systems   Constitutional: Negative for activity change and unexpected weight change.   HENT: Negative for congestion and dental problem.    Eyes: Negative for discharge and itching.   Respiratory: Negative for apnea and chest tightness.    Cardiovascular: Negative for chest pain and palpitations.   Gastrointestinal: Negative for abdominal distention and abdominal pain.   Endocrine: Negative for polydipsia and polyuria.   Musculoskeletal: Negative for arthralgias and back pain.   Skin: Negative for color change and pallor.   Neurological: Negative for dizziness and facial asymmetry.   Psychiatric/Behavioral: Negative for agitation and behavioral problems.        Past Medical History:   Diagnosis Date   • A-fib (Formerly [facility])    • Anesthesia     hip surgery (spinal caused him unpleasant sensations never wants it again)   • Arthritis    • Blood in sputum 02/22/2023    bright red blood, teaspoon - tablespoon daily   • COPD (chronic obstructive pulmonary disease) (Formerly [facility])    • CVA (cerebral vascular accident) (Formerly [facility]) 12/24/2022   • Hearing aid worn     bilateral   • History of pulmonary embolism    • History of recurrent pneumonia    • Hypertension    • Lung cancer (Formerly [facility]) 03/17/2023   • Oxygen dependent     since @ age 65   • Pulmonary nodules    • Risk for falls     uses walker   • Rotator cuff syndrome of right shoulder    • Tachycardia    • 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 HTN/Eliquis   • Wears glasses     readers   • Wears glasses     reading     Past Surgical History:   Procedure Laterality Date   • HIP ARTHROPLASTY Right    • RIB BIOPSY Left 03/17/2023    11th     Family History   Problem Relation Age of Onset   • Cancer Sister    • Brain cancer Brother    • Cancer Brother    • No Known  Problems Daughter    • No Known Problems Daughter    • No Known Problems Son      Social History     Tobacco Use   • Smoking status: Former     Packs/day: 3.00     Years: 47.00     Pack years: 141.00     Types: Cigarettes     Start date:      Quit date: 2008     Years since quittin.9   • Smokeless tobacco: Never   Vaping Use   • Vaping Use: Never used   Substance Use Topics   • Alcohol use: Not Currently     Comment: not last 40 years   • Drug use: Never     Medications Prior to Admission   Medication Sig Dispense Refill Last Dose   • cholecalciferol (VITAMIN D3) 10 MCG (400 UNIT) tablet Take 1 tablet by mouth Daily. 50 mcg daily   3/29/2023 at 1400   • dilTIAZem (TIAZAC) 180 MG 24 hr capsule Take 1 capsule by mouth Daily. 90 capsule 1 3/30/2023 at 0715   • ferrous sulfate 324 MG tablet delayed-release Take 1 tablet by mouth Daily With Breakfast. 90 tablet 0 3/29/2023 at 1400   • folic acid (FOLVITE) 1 MG tablet Take 1 tablet by mouth Daily. 90 tablet 1 3/29/2023 at 1400   • ipratropium (ATROVENT HFA) 17 MCG/ACT inhaler Inhale 2 puffs 4 (Four) Times a Day As Needed for Wheezing.   3/30/2023 at 0715   • melatonin 5 MG tablet tablet Take 1 tablet by mouth At Night As Needed.   3/29/2023 at 2000   • montelukast (SINGULAIR) 10 MG tablet Take 1 tablet by mouth Every Night.   3/29/2023   • OLANZapine (zyPREXA) 5 MG tablet Take 1 tablet by mouth Every Night. Take on days 2, 3 and 4 after chemotherapy. 3 tablet 3 not started   • tiotropium bromide-olodaterol (STIOLTO RESPIMAT) 2.5-2.5 MCG/ACT aerosol solution inhaler Inhale 2 puffs Daily.   3/30/2023 at 0715   • vitamin B-12 (CYANOCOBALAMIN) 1000 MCG tablet Take 1 tablet by mouth Daily. 90 tablet 1 3/29/2023 at 1400   • dexamethasone (DECADRON) 4 MG tablet Take 1 tablet twice daily starting the day before chemo, day of chemo, and day after chemo.  Take with food. 6 tablet 3 not started   • ipratropium (ATROVENT) 0.02 % nebulizer solution Take 2.5 mL by  nebulization 3 (Three) Times a Day. Sub amount for how much insurance allows 670 mL 0 More than a month   • ondansetron (ZOFRAN) 8 MG tablet Take 1 tablet by mouth 3 (Three) Times a Day As Needed for Nausea or Vomiting. 30 tablet 3 not started   • predniSONE (DELTASONE) 5 MG tablet Take 1 tablet by mouth Every Other Day.   3/28/2023     Allergies:  Amoxicillin, Albuterol, Asmanex (120 metered doses) [mometasone furoate], Gabapentin, Lortab [hydrocodone-acetaminophen], Morphine and related, Prilosec [omeprazole], Sodium clavulanate, Statins, Sulfa antibiotics, and Symbicort [budesonide-formoterol fumarate]    Objective      Vital Signs  Temp:  [97.2 °F (36.2 °C)] 97.2 °F (36.2 °C)  Heart Rate:  [106] 106  Resp:  [18] 18  BP: (125)/(59) 125/59    Allergies   Allergen Reactions   • Amoxicillin Anaphylaxis   • Albuterol Irritability     Facial flushing and palpitations.   • Asmanex (120 Metered Doses) [Mometasone Furoate] Unknown - Low Severity   • Gabapentin Hallucinations   • Lortab [Hydrocodone-Acetaminophen] Other (See Comments)     Can't remember   • Morphine And Related Hallucinations   • Prilosec [Omeprazole] Other (See Comments)     unknown   • Sodium Clavulanate Unknown - High Severity   • Statins GI Intolerance   • Sulfa Antibiotics Other (See Comments)     Can't remember also more and can't remember   • Symbicort [Budesonide-Formoterol Fumarate] Unknown (See Comments)     Doesn't remember reaction type       Prior to Admission medications    Medication Sig Start Date End Date Taking? Authorizing Provider   cholecalciferol (VITAMIN D3) 10 MCG (400 UNIT) tablet Take 1 tablet by mouth Daily. 50 mcg daily   Yes ProviderQamar MD   dilTIAZem (TIAZAC) 180 MG 24 hr capsule Take 1 capsule by mouth Daily. 3/1/23  Yes Neville Meeks MD   ferrous sulfate 324 MG tablet delayed-release Take 1 tablet by mouth Daily With Breakfast. 1/13/23  Yes Fan Rob MD   folic acid (FOLVITE) 1 MG tablet Take 1  tablet by mouth Daily. 2/11/23  Yes Omega Davis MD   ipratropium (ATROVENT HFA) 17 MCG/ACT inhaler Inhale 2 puffs 4 (Four) Times a Day As Needed for Wheezing.   Yes ProviderQamar MD   melatonin 5 MG tablet tablet Take 1 tablet by mouth At Night As Needed.   Yes ProviderQamar MD   montelukast (SINGULAIR) 10 MG tablet Take 1 tablet by mouth Every Night.   Yes ProviderQamar MD   OLANZapine (zyPREXA) 5 MG tablet Take 1 tablet by mouth Every Night. Take on days 2, 3 and 4 after chemotherapy. 3/27/23  Yes Omega Davis MD   tiotropium bromide-olodaterol (STIOLTO RESPIMAT) 2.5-2.5 MCG/ACT aerosol solution inhaler Inhale 2 puffs Daily.   Yes ProviderQamar MD   vitamin B-12 (CYANOCOBALAMIN) 1000 MCG tablet Take 1 tablet by mouth Daily. 2/11/23  Yes Omega Davis MD   dexamethasone (DECADRON) 4 MG tablet Take 1 tablet twice daily starting the day before chemo, day of chemo, and day after chemo.  Take with food. 3/27/23   Omega Davis MD   ipratropium (ATROVENT) 0.02 % nebulizer solution Take 2.5 mL by nebulization 3 (Three) Times a Day. Sub amount for how much insurance allows 7/19/21   Anita Toney MD   ondansetron (ZOFRAN) 8 MG tablet Take 1 tablet by mouth 3 (Three) Times a Day As Needed for Nausea or Vomiting. 3/27/23   Omega Davis MD   predniSONE (DELTASONE) 5 MG tablet Take 1 tablet by mouth Every Other Day.    Provider, MD Qamar   dilTIAZem XR (DILACOR XR) 180 MG 24 hr capsule Take 1 capsule by mouth Daily. 6/30/22 10/7/22  Neville Meeks MD       Physical Exam:  Constitutional: Alert, nontoxic  Head: Normocephalic, atraumatic  Nose: No congestion, no rhinorrhea  Mouth: Moist, no exudate  Eyes: Conjunctivae within normal limits, no scleral icterus  Neck: Supple, normal range of motion  Cardiovascular: Normal rate, no lower extremity edema  Pulmonary: Normal effort, symmetrical chest rise  Abdomen: Soft, nontender, nondistended    Results Review:  Lab  Results (last 48 hours)     ** No results found for the last 48 hours. **        Imaging Results (Last 48 Hours)     ** No results found for the last 48 hours. **           I reviewed the patient's new clinical results.  I reviewed the patient's new imaging results and agree with the interpretation.  I reviewed the patient's other test results and agree with the interpretation        Assessment & Plan   73 yo gentleman with lung adenocarcinoma    - plan for port  I have counseled the patient about the nature of the problem, the natural history of the disease, the risk and benefits of surgery, the expected recovery, and the alternatives to surgery.  After this discussion, they were given an opportunity to ask questions, have an understanding of diagnosis and treatment options, and wish to proceed with surgery.         Primary lung adenocarcinoma (HCC)          I discussed the patients findings and my recommendations with patient and family    Lavon Cochran MD  03/30/23  11:03 CDT

## 2023-03-31 ENCOUNTER — DOCUMENTATION (OUTPATIENT)
Dept: NUTRITION | Facility: HOSPITAL | Age: 73
End: 2023-03-31
Payer: OTHER GOVERNMENT

## 2023-03-31 NOTE — PROGRESS NOTES
Adult Outpatient Nutrition  Assessment    Patient Name:  Brian Garcia  YOB: 1950  MRN: [id number]    Assessment Date:  Entry from 3/30/23    Comments: Visited pt and significant other in SDS before port placed. Samples/discount coupons Boost Plus and Ensure Complete + recipes for homemade reduced lactose shakes provided. States does not tolerate milk.  Reinforced needs for calories/protein/hydration. Verbalized understanding.                      Electronically signed by:  Antonia Kapoor RD  03/31/23 09:02 CDT

## 2023-04-04 ENCOUNTER — OFFICE VISIT (OUTPATIENT)
Dept: ONCOLOGY | Facility: CLINIC | Age: 73
End: 2023-04-04
Payer: OTHER GOVERNMENT

## 2023-04-04 ENCOUNTER — INFUSION (OUTPATIENT)
Dept: ONCOLOGY | Facility: HOSPITAL | Age: 73
End: 2023-04-04
Payer: OTHER GOVERNMENT

## 2023-04-04 VITALS
SYSTOLIC BLOOD PRESSURE: 152 MMHG | HEART RATE: 104 BPM | DIASTOLIC BLOOD PRESSURE: 58 MMHG | OXYGEN SATURATION: 91 % | RESPIRATION RATE: 17 BRPM | BODY MASS INDEX: 21.13 KG/M2 | WEIGHT: 127 LBS

## 2023-04-04 DIAGNOSIS — C34.81 MALIGNANT NEOPLASM OF OVERLAPPING SITES OF RIGHT LUNG: Primary | ICD-10-CM

## 2023-04-04 DIAGNOSIS — C34.81 MALIGNANT NEOPLASM OF OVERLAPPING SITES OF RIGHT LUNG: ICD-10-CM

## 2023-04-04 PROCEDURE — 99215 OFFICE O/P EST HI 40 MIN: CPT | Performed by: NURSE PRACTITIONER

## 2023-04-04 PROCEDURE — 96372 THER/PROPH/DIAG INJ SC/IM: CPT | Performed by: INTERNAL MEDICINE

## 2023-04-04 PROCEDURE — 25010000002 CYANOCOBALAMIN PER 1000 MCG: Performed by: INTERNAL MEDICINE

## 2023-04-04 RX ORDER — CYANOCOBALAMIN 1000 UG/ML
1000 INJECTION, SOLUTION INTRAMUSCULAR; SUBCUTANEOUS ONCE
Status: COMPLETED | OUTPATIENT
Start: 2023-04-04 | End: 2023-04-04

## 2023-04-04 RX ADMIN — CYANOCOBALAMIN 1000 MCG: 1000 INJECTION, SOLUTION INTRAMUSCULAR at 11:54

## 2023-04-04 NOTE — PROGRESS NOTES
4/4/2023    CHEMOTHERAPY PREPARATION    Brian Garcia  [id number]  1950    Chief Complaint: chemo education     History of present illness:  Brian Garcia is a 72 y.o. year old male who is here today for chemotherapy preparation and needs assessment. The patient has been diagnosed with lung cancer and is scheduled to begin treatment with Carboplatin, Alimta and Keytruda.     Oncology History:    Oncology/Hematology History   Malignant neoplasm of overlapping sites of right lung   3/6/2023 Initial Diagnosis    Malignant neoplasm of overlapping sites of right lung (HCC)     3/20/2023 Cancer Staged    Staging form: Lung, AJCC 8th Edition  - Clinical: Stage BRITTNI (cT4, cN0, cM1b) - Signed by Yusuf Downs MD on 3/20/2023     4/5/2023 -  Chemotherapy    OP LUNG Pembrolizumab 200 mg / Pemetrexed / Carboplatin AUC=5         Past Medical History:   Diagnosis Date   • A-fib    • Anesthesia     hip surgery (spinal caused him unpleasant sensations never wants it again)   • Arthritis    • Blood in sputum 02/22/2023    bright red blood, teaspoon - tablespoon daily   • COPD (chronic obstructive pulmonary disease)    • CVA (cerebral vascular accident) 12/24/2022   • Hearing aid worn     bilateral   • History of pulmonary embolism    • History of recurrent pneumonia    • Hypertension    • Lung cancer 03/17/2023   • Oxygen dependent     since @ age 65   • Pulmonary nodules    • Risk for falls     uses walker   • Rotator cuff syndrome of right shoulder    • Tachycardia    • 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 HTN/Eliquis   • Wears glasses     readers   • Wears glasses     reading       Past Surgical History:   Procedure Laterality Date   • HIP ARTHROPLASTY Right    • RIB BIOPSY Left 03/17/2023 11th       MEDICATIONS: The current medication list was reviewed and reconciled.     Allergies:  is allergic to amoxicillin, albuterol, asmanex (120 metered doses) [mometasone furoate],  gabapentin, lortab [hydrocodone-acetaminophen], morphine and related, prilosec [omeprazole], sodium clavulanate, statins, sulfa antibiotics, and symbicort [budesonide-formoterol fumarate].    Family History   Problem Relation Age of Onset   • Cancer Sister    • Brain cancer Brother    • Cancer Brother    • No Known Problems Daughter    • No Known Problems Daughter    • No Known Problems Son          Review of Systems    Physical Exam  Vital Signs: /58   Pulse 104   Resp 17   Wt 57.6 kg (127 lb)   SpO2 91%   BMI 21.13 kg/m²    General Appearance:  alert, cooperative, no apparent distress, appears stated age and normal weight   Neurologic/Psychiatric: A&O x 3, gait steady, appropriate affect   HEENT:  Normocephalic, without obvious abnormality, mucous membranes moist   Lungs:   Clear to auscultation bilaterally; respirations regular, even, and unlabored bilaterally   Heart:  Regular rate and rhythm, no murmurs appreciated   Extremities: Normal, atraumatic; no clubbing, cyanosis, or edema    Skin: No rashes, lesions, or abnormal coloration noted     ECOG Performance Status: (1) Restricted in Physically Strenuous Activity, Ambulatory & Able to Do Work of Light Nature          NEEDS ASSESSMENTS    Genetics  The patient's new diagnosis and family history have been reviewed for genetic counseling needs. A genetic referral is not recommended.     Psychosocial  The patient has completed a PHQ-2 Depression Screening today.   PHQ-2 results show 0 (No Depression).   Copies of patient's questionnaires will be scanned into EMR for details and further reference.    Barriers to care  A barriers form was also completed by the patient today. Based upon barriers assessment today, the patient will not require a follow-up call from the  to further discuss needs.   A copy of the barriers form will also be scanned into EMR for details and further reference.     VAD Assessment  The patient has medi port in  "place    Advanced Care Planning  The patient and I discussed advanced care planning, \"Conversations that Matter\".   This service was offered, free of charge, for development of advance directives with a certified ACP facilitator.  The patient does not have an up-to-date advanced directive. This document is not on file with our office. The patient is interested in an appointment with one of our facilitators to create or update their advanced directives.      Palliative Care  The patient and I discussed palliative care services. Palliative care is not the same as Hospice care. This is specialized medical care for people living with serious illness with the goal of improving quality of life for the patient and their family. [facility] has partnered with [facility] to offer our patients outpatient palliative care early along with their treatment to assist in coordination of care, symptom management, pain management, and medical decision making.  Oncology criteria for palliative care referral is met at this time. The patient is not interested in a palliative care consultation.     Additional Referral needs  none      CHEMOTHERAPY EDUCATION    Booklets Given: Chemo cares education sheets on Carboplatin, Alimta and [facility] chemotherapy binder.  Consent form signed today      Chemotherapy/Biotherapy Education Sheets: (list all that apply)  nausea management, acid reflux management, diarrhea management, Cancer resourse contacts information and skin and mouth care                                                                                                                                                                 Chemotherapy Regimen:   Treatment Plans     Name Type Plan Dates Plan Provider         Active    OP LUNG Pembrolizumab 200 mg / Pemetrexed / Carboplatin AUC=5 ONCOLOGY TREATMENT  3/27/2023 - Present Omega Davis MD                    TOPICS EDUCATION PROVIDED COMMENTS   ANEMIA:  " role of RBC, cause, s/s, ways to manage, role of transfusion [x]    THROMBOCYTOPENIA:  role of platelet, cause, s/s, ways to prevent bleeding, things to avoid, when to seek help [x]    NEUTROPENIA:  role of WBC, cause, infection precautions, s/s of infection, when to call MD [x] Educated on neutropenic fever and to come to ER immediately with any temp 100.4 or higher.   NUTRITION & APPETITE CHANGES:  importance of maintaining healthy diet & weight, ways to manage to improve intake, dietary consult, exercise regimen [x]    DIARRHEA:  causes, s/s of dehydration, ways to manage, dietary changes, when to call MD [x]    CONSTIPATION:  causes, ways to manage, dietary changes, when to call MD [x]    NAUSEA & VOMITING:  cause, use of antiemetics, dietary changes, when to call MD [x]    MOUTH SORES:  causes, oral care, ways to manage [x]    ALOPECIA:  cause, ways to manage, resources [x]    INFERTILITY & SEXUALITY:  causes, fertility preservation options, sexuality changes, ways to manage, importance of birth control [x]    NERVOUS SYSTEM CHANGES:  causes, s/s, neuropathies, cognitive changes, ways to manage [x]    PAIN:  causes, ways to manage [x] ????   SKIN & NAIL CHANGES:  cause, s/s, ways to manage [x]    ORGAN TOXICITIES:  cause, s/s, need for diagnostic tests, labs, when to notify MD [x]    SURVIVORSHIP:  distress, distress assessment, secondary malignancies, early/late effects, follow-up, social issues, social support [x]    HOME CARE:  use of spill kits, storing of PO chemo, how to manage bodily fluids [x]    MISCELLANEOUS:  drug interactions, administration, vesicant, et [x]        Assessment and Plan:    Diagnoses and all orders for this visit:    1. Malignant neoplasm of overlapping sites of right lung        This was a 40 minute face-to-face visit with 40 minutes spent in  counseling and coordination of care as documented above.   The patient and I have reviewed their new cancer diagnosis and scheduled treatment  plan. Needs assessment was completed including genetics, psychosocial needs, barriers to care, VAD evaluation, advanced care planning, and palliative care services. Referrals have been ordered as appropriate based upon our evaluation and patient desires.     Chemotherapy teaching was also completed today as documented above. Adequate time was given to answer all questions to his satisfaction. Patient and family are aware of their care team members and contact information if they have questions or problems throughout the treatment course. Needs assessments and education has been completed. The patient is adequately prepared to begin treatment as scheduled.       Adriana Adams, APRN

## 2023-04-06 ENCOUNTER — DOCUMENTATION (OUTPATIENT)
Dept: NUTRITION | Facility: HOSPITAL | Age: 73
End: 2023-04-06
Payer: OTHER GOVERNMENT

## 2023-04-06 NOTE — PROGRESS NOTES
Adult Outpatient Nutrition  Assessment    Patient Name:  Brian Garcia  YOB: 1950  MRN: [id number]    Assessment Date:  4/6/2023    Comments: Follow-up to check nutrition. Wt 127 lb--slow trend down. Pt stated enjoyed the commercial supplement samples. Case of Boost Plus ordered for pt.                     Electronically signed by:  Antonia Kapoor RD  04/06/23 10:17 CDT

## 2023-04-07 ENCOUNTER — TELEPHONE (OUTPATIENT)
Dept: NUTRITION | Facility: HOSPITAL | Age: 73
End: 2023-04-07
Payer: OTHER GOVERNMENT

## 2023-04-07 NOTE — PROGRESS NOTES
Adult Outpatient Nutrition  Assessment    Patient Name:  Brian Garcia  YOB: 1950  MRN: [id number]    Assessment Date:  4/7/2023    Comments: Informed pt that case of Boost Plus ready for pick-up at [facility].                     Electronically signed by:  Antonia Kapoor RD  04/07/23 11:37 CDT

## 2023-04-10 ENCOUNTER — INFUSION (OUTPATIENT)
Dept: ONCOLOGY | Facility: HOSPITAL | Age: 73
End: 2023-04-10
Payer: OTHER GOVERNMENT

## 2023-04-10 ENCOUNTER — TELEPHONE (OUTPATIENT)
Dept: ONCOLOGY | Facility: CLINIC | Age: 73
End: 2023-04-10
Payer: OTHER GOVERNMENT

## 2023-04-10 DIAGNOSIS — D50.8 IRON DEFICIENCY ANEMIA SECONDARY TO INADEQUATE DIETARY IRON INTAKE: Chronic | ICD-10-CM

## 2023-04-10 DIAGNOSIS — C34.81 MALIGNANT NEOPLASM OF OVERLAPPING SITES OF RIGHT LUNG: Primary | ICD-10-CM

## 2023-04-10 DIAGNOSIS — Z45.2 ENCOUNTER FOR VENOUS ACCESS DEVICE CARE: ICD-10-CM

## 2023-04-10 LAB
ALBUMIN SERPL-MCNC: 3.6 G/DL (ref 3.5–5.2)
ALBUMIN/GLOB SERPL: 1.2 G/DL
ALP SERPL-CCNC: 87 U/L (ref 39–117)
ALT SERPL W P-5'-P-CCNC: 11 U/L (ref 1–41)
ANION GAP SERPL CALCULATED.3IONS-SCNC: 7 MMOL/L (ref 5–15)
AST SERPL-CCNC: 12 U/L (ref 1–40)
BASOPHILS # BLD AUTO: NORMAL 10*3/UL
BASOPHILS NFR BLD AUTO: NORMAL %
BILIRUB SERPL-MCNC: 0.3 MG/DL (ref 0–1.2)
BUN SERPL-MCNC: 15 MG/DL (ref 8–23)
BUN/CREAT SERPL: 26.8 (ref 7–25)
CALCIUM SPEC-SCNC: 8.8 MG/DL (ref 8.6–10.5)
CHLORIDE SERPL-SCNC: 102 MMOL/L (ref 98–107)
CO2 SERPL-SCNC: 30 MMOL/L (ref 22–29)
CREAT SERPL-MCNC: 0.56 MG/DL (ref 0.76–1.27)
EGFRCR SERPLBLD CKD-EPI 2021: 104.1 ML/MIN/1.73
EOSINOPHIL # BLD AUTO: NORMAL 10*3/UL
EOSINOPHIL NFR BLD AUTO: NORMAL %
ERYTHROCYTE [DISTWIDTH] IN BLOOD BY AUTOMATED COUNT: NORMAL %
FERRITIN SERPL-MCNC: 235.3 NG/ML (ref 30–400)
FOLATE SERPL-MCNC: >20 NG/ML (ref 4.78–24.2)
GLOBULIN UR ELPH-MCNC: 3 GM/DL
GLUCOSE SERPL-MCNC: 107 MG/DL (ref 65–99)
HCT VFR BLD AUTO: NORMAL %
HGB BLD-MCNC: NORMAL G/DL
IRON 24H UR-MRATE: 36 MCG/DL (ref 59–158)
IRON SATN MFR SERPL: 10 % (ref 20–50)
LYMPHOCYTES # BLD AUTO: NORMAL 10*3/UL
LYMPHOCYTES NFR BLD AUTO: NORMAL %
MCH RBC QN AUTO: NORMAL PG
MCHC RBC AUTO-ENTMCNC: NORMAL G/DL
MCV RBC AUTO: NORMAL FL
MONOCYTES # BLD AUTO: NORMAL 10*3/UL
MONOCYTES NFR BLD AUTO: NORMAL %
NEUTROPHILS NFR BLD AUTO: NORMAL %
NEUTROPHILS NFR BLD AUTO: NORMAL %
PLATELET # BLD AUTO: NORMAL 10*3/UL
POTASSIUM SERPL-SCNC: 4 MMOL/L (ref 3.5–5.2)
PROT SERPL-MCNC: 6.6 G/DL (ref 6–8.5)
RBC # BLD AUTO: NORMAL 10*6/UL
SODIUM SERPL-SCNC: 139 MMOL/L (ref 136–145)
T4 FREE SERPL-MCNC: 1.28 NG/DL (ref 0.93–1.7)
TIBC SERPL-MCNC: 346 MCG/DL (ref 298–536)
TRANSFERRIN SERPL-MCNC: 232 MG/DL (ref 200–360)
TSH SERPL DL<=0.05 MIU/L-ACNC: 0.62 UIU/ML (ref 0.27–4.2)
VIT B12 BLD-MCNC: 1010 PG/ML (ref 211–946)
WBC NRBC COR # BLD: NORMAL 10*3/UL

## 2023-04-10 PROCEDURE — 84466 ASSAY OF TRANSFERRIN: CPT

## 2023-04-10 PROCEDURE — 82746 ASSAY OF FOLIC ACID SERUM: CPT

## 2023-04-10 PROCEDURE — 36591 DRAW BLOOD OFF VENOUS DEVICE: CPT | Performed by: INTERNAL MEDICINE

## 2023-04-10 PROCEDURE — 82607 VITAMIN B-12: CPT

## 2023-04-10 PROCEDURE — 82728 ASSAY OF FERRITIN: CPT

## 2023-04-10 PROCEDURE — 84439 ASSAY OF FREE THYROXINE: CPT

## 2023-04-10 PROCEDURE — 84443 ASSAY THYROID STIM HORMONE: CPT

## 2023-04-10 PROCEDURE — 80053 COMPREHEN METABOLIC PANEL: CPT

## 2023-04-10 PROCEDURE — 83540 ASSAY OF IRON: CPT

## 2023-04-10 PROCEDURE — 85025 COMPLETE CBC W/AUTO DIFF WBC: CPT

## 2023-04-10 RX ORDER — HEPARIN SODIUM (PORCINE) LOCK FLUSH IV SOLN 100 UNIT/ML 100 UNIT/ML
500 SOLUTION INTRAVENOUS AS NEEDED
Status: CANCELLED | OUTPATIENT
Start: 2023-04-10

## 2023-04-10 RX ORDER — SODIUM CHLORIDE 0.9 % (FLUSH) 0.9 %
10 SYRINGE (ML) INJECTION AS NEEDED
Status: CANCELLED | OUTPATIENT
Start: 2023-04-10

## 2023-04-10 RX ORDER — SODIUM CHLORIDE 0.9 % (FLUSH) 0.9 %
10 SYRINGE (ML) INJECTION AS NEEDED
Status: DISCONTINUED | OUTPATIENT
Start: 2023-04-10 | End: 2023-04-10 | Stop reason: HOSPADM

## 2023-04-10 RX ORDER — HEPARIN SODIUM (PORCINE) LOCK FLUSH IV SOLN 100 UNIT/ML 100 UNIT/ML
500 SOLUTION INTRAVENOUS AS NEEDED
Status: DISCONTINUED | OUTPATIENT
Start: 2023-04-10 | End: 2023-04-10 | Stop reason: HOSPADM

## 2023-04-10 NOTE — TELEPHONE ENCOUNTER
PT called and states he has been having issues with 2-3 episodes of diarrhea per day. PT has not tried any medications. Encouraged pt to use OTC Imodium as directed on box. PT is seeing Dr Davis tomorrow. Advised to discuss at appt. PT states he is eating and drinking fine. No concerns with dehydration. PT will start imodium today. Will call office if continues. PT denies any further questions.

## 2023-04-10 NOTE — PROGRESS NOTES
DATE OF VISIT: 4/11/2023      REASON FOR VISIT: Metastatic lung adenocarcinoma, history of pulmonary embolism, leukocytosis      HISTORY OF PRESENT ILLNESS:   72-year-old male with medical problem significant for hypertension, history of pulmonary embolism in 2015 for which patient took Coumadin for 1 year, chronic obstructive pulmonary disease, history of nicotine addiction that he quit in 2008, metastatic lung adenocarcinoma with bone metastasis for which patient is scheduled to start cycle 1 of chemotherapy with carboplatin, Alimta and Keytruda today on April 11, 2023.  Complains of chronic shortness of breath.  Complains of chronic hearing loss.  Complains of chronic neuropathy affecting lower extremity.  Denies any hemoptysis.  Denies any new lymph node enlargement.  Denies any fevers.              Past Medical History, Past Surgical History, Social History, Family History have been reviewed and are without significant changes except as mentioned.    Review of Systems   A comprehensive 14 point review of systems was performed and was negative except as mentioned in HPI.    Medications:  The current medication list was reviewed in the EMR    ALLERGIES:    Allergies   Allergen Reactions   • Amoxicillin Anaphylaxis   • Albuterol Irritability     Facial flushing and palpitations.   • Asmanex (120 Metered Doses) [Mometasone Furoate] Unknown - Low Severity   • Gabapentin Hallucinations   • Lortab [Hydrocodone-Acetaminophen] Other (See Comments)     Can't remember   • Morphine And Related Hallucinations   • Prilosec [Omeprazole] Other (See Comments)     unknown   • Sodium Clavulanate Unknown - High Severity   • Statins GI Intolerance   • Sulfa Antibiotics Other (See Comments)     Can't remember also more and can't remember   • Symbicort [Budesonide-Formoterol Fumarate] Unknown (See Comments)     Doesn't remember reaction type       Objective      Vitals:    04/11/23 0905   BP: 132/75   Pulse: 101   Resp: 18   Temp:  97.2 °F (36.2 °C)   SpO2: 97%   Weight: 58.5 kg (129 lb)   PainSc: 0-No pain         8/2/2021    12:55 PM   Current Status   ECOG score 0       Physical Exam  Pulmonary:      Breath sounds: Normal breath sounds.   Neurological:      Mental Status: He is alert and oriented to person, place, and time.           RECENT LABS:  Glucose   Date Value Ref Range Status   04/10/2023 107 (H) 65 - 99 mg/dL Final     Glucose, Arterial   Date Value Ref Range Status   02/06/2018 106 mmol/L Final     Sodium   Date Value Ref Range Status   04/10/2023 139 136 - 145 mmol/L Final     Potassium   Date Value Ref Range Status   04/10/2023 4.0 3.5 - 5.2 mmol/L Final     CO2   Date Value Ref Range Status   04/10/2023 30.0 (H) 22.0 - 29.0 mmol/L Final     Chloride   Date Value Ref Range Status   04/10/2023 102 98 - 107 mmol/L Final     Anion Gap   Date Value Ref Range Status   04/10/2023 7.0 5.0 - 15.0 mmol/L Final     Creatinine   Date Value Ref Range Status   04/10/2023 0.56 (L) 0.76 - 1.27 mg/dL Final     BUN   Date Value Ref Range Status   04/10/2023 15 8 - 23 mg/dL Final     BUN/Creatinine Ratio   Date Value Ref Range Status   04/10/2023 26.8 (H) 7.0 - 25.0 Final     Calcium   Date Value Ref Range Status   04/10/2023 8.8 8.6 - 10.5 mg/dL Final     eGFR Non  Amer   Date Value Ref Range Status   11/08/2021 81 >60 mL/min/1.73 Final     Alkaline Phosphatase   Date Value Ref Range Status   04/10/2023 87 39 - 117 U/L Final     Total Protein   Date Value Ref Range Status   04/10/2023 6.6 6.0 - 8.5 g/dL Final     ALT (SGPT)   Date Value Ref Range Status   04/10/2023 11 1 - 41 U/L Final     AST (SGOT)   Date Value Ref Range Status   04/10/2023 12 1 - 40 U/L Final     Total Bilirubin   Date Value Ref Range Status   04/10/2023 0.3 0.0 - 1.2 mg/dL Final     Albumin   Date Value Ref Range Status   04/10/2023 3.6 3.5 - 5.2 g/dL Final     Globulin   Date Value Ref Range Status   04/10/2023 3.0 gm/dL Final     Lab Results   Component Value Date     WBC  04/10/2023      Comment:      See scanned document    HGB  04/10/2023      Comment:      See scanned document    HCT  04/10/2023      Comment:      See scanned document    MCV  04/10/2023      Comment:      See scanned document    PLT  04/10/2023      Comment:      See scanned document     Lab Results   Component Value Date    NEUTROABS 6.8 03/15/2023    IRON 36 (L) 04/10/2023    IRON 39 (L) 02/10/2023    IRON 37 (L) 01/02/2023    TIBC 346 04/10/2023    TIBC 331 02/10/2023    TIBC 286 (L) 01/02/2023    LABIRON 10 (L) 04/10/2023    LABIRON 12 (L) 02/10/2023    LABIRON 13 (L) 01/02/2023    FERRITIN 235.30 04/10/2023    FERRITIN 266.00 02/10/2023    NJFQDKRQ94 1,010 (H) 04/10/2023    IEDGOVAQ40 370 02/10/2023    FOLATE >20.00 04/10/2023    FOLATE 9.81 02/10/2023     No results found for: , LABCA2, AFPTM, HCGQUANT, , CHROMGRNA, [id number], CEA, REFLABREPO      PATHOLOGY:  * Cannot find OR log *         RADIOLOGY DATA :  No radiology results for the last 7 days        Assessment & Plan     1.  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  - Patient will be started on cycle 1 of carboplatin, Alimta and Keytruda today on April 10, 2023.  - We will continue with close monitoring for chemotherapy as well as immune mediated side effect  - Patient will return to clinic in 3 weeks with repeat CBC, CMP to be done on that day.  - Patient was encouraged to call our office with any unusual side effect from chemotherapy  - Plan is to repeat PET/CT after cycle 4 of treatment.    2.  History of pulmonary embolism:  - Patient was diagnosed with pulmonary embolism in 2015 for which he took Coumadin for 1 year.  - CT angiogram in July 2021 was negative for pulmonary embolism    3.  Leukocytosis:  - Reactive in nature  - White blood cell count is 13.7  - We will monitor with CBC    4.  Anemia:  - Hemoglobin is 9.7  - Anemia work-up done on  April 10, 2023 does not show any improvement in iron level despite of patient being on ferrous sulfate for 2 months consistent with iron malabsorption.  Recommend starting intravenous Injectafer starting next week.  Side effect of Injectafer including allergic reaction were discussed with patient  - Currently on B12 injection monthly with folic acid p.o. daily.  -We will repeat anemia work-up in June 2023    5.  Health maintenance: Patient does not smoke    6.  Advance care planning:  - Patient remains full code.    7.  Thrombocytosis:  -  reactive due to malignancy plus iron deficiency platelet count is 161,000.  Will monitor with CBC.                 PHQ-9 Total Score: 0   -Patient is not homicidal or suicidal.  No acute intervention required.      Brian Garcia reports a pain score of 0.  Given his pain assessment as noted, treatment options were discussed and the following options were decided upon as a follow-up plan to address the patient's pain: continuation of current treatment plan for pain.         Omega Davis MD  4/11/2023  09:19 CDT        Part of this note may be an electronic transcription/translation of spoken language to printed text using the Dragon Dictation System.          CC:

## 2023-04-11 ENCOUNTER — INFUSION (OUTPATIENT)
Dept: ONCOLOGY | Facility: HOSPITAL | Age: 73
End: 2023-04-11
Payer: OTHER GOVERNMENT

## 2023-04-11 ENCOUNTER — DOCUMENTATION (OUTPATIENT)
Dept: ONCOLOGY | Facility: CLINIC | Age: 73
End: 2023-04-11
Payer: OTHER GOVERNMENT

## 2023-04-11 ENCOUNTER — APPOINTMENT (OUTPATIENT)
Dept: ONCOLOGY | Facility: HOSPITAL | Age: 73
End: 2023-04-11
Payer: OTHER GOVERNMENT

## 2023-04-11 ENCOUNTER — OFFICE VISIT (OUTPATIENT)
Dept: ONCOLOGY | Facility: CLINIC | Age: 73
End: 2023-04-11
Payer: OTHER GOVERNMENT

## 2023-04-11 VITALS
TEMPERATURE: 97.2 F | OXYGEN SATURATION: 97 % | RESPIRATION RATE: 18 BRPM | HEART RATE: 101 BPM | DIASTOLIC BLOOD PRESSURE: 75 MMHG | SYSTOLIC BLOOD PRESSURE: 132 MMHG | BODY MASS INDEX: 21.47 KG/M2 | WEIGHT: 129 LBS

## 2023-04-11 DIAGNOSIS — C34.81 MALIGNANT NEOPLASM OF OVERLAPPING SITES OF RIGHT LUNG: Primary | ICD-10-CM

## 2023-04-11 DIAGNOSIS — K90.9 IRON MALABSORPTION: ICD-10-CM

## 2023-04-11 DIAGNOSIS — Z86.711 HISTORY OF PULMONARY EMBOLISM: Chronic | ICD-10-CM

## 2023-04-11 DIAGNOSIS — C34.81 MALIGNANT NEOPLASM OF OVERLAPPING SITES OF RIGHT LUNG: Primary | Chronic | ICD-10-CM

## 2023-04-11 DIAGNOSIS — D72.828 OTHER ELEVATED WHITE BLOOD CELL (WBC) COUNT: Chronic | ICD-10-CM

## 2023-04-11 DIAGNOSIS — Z45.2 ENCOUNTER FOR VENOUS ACCESS DEVICE CARE: ICD-10-CM

## 2023-04-11 DIAGNOSIS — D50.8 IRON DEFICIENCY ANEMIA SECONDARY TO INADEQUATE DIETARY IRON INTAKE: Chronic | ICD-10-CM

## 2023-04-11 PROCEDURE — 96375 TX/PRO/DX INJ NEW DRUG ADDON: CPT | Performed by: INTERNAL MEDICINE

## 2023-04-11 PROCEDURE — 25010000002 FOSAPREPITANT PER 1 MG: Performed by: INTERNAL MEDICINE

## 2023-04-11 PROCEDURE — 96411 CHEMO IV PUSH ADDL DRUG: CPT | Performed by: INTERNAL MEDICINE

## 2023-04-11 PROCEDURE — 96367 TX/PROPH/DG ADDL SEQ IV INF: CPT | Performed by: INTERNAL MEDICINE

## 2023-04-11 PROCEDURE — 25010000002 HEPARIN LOCK FLUSH PER 10 UNITS: Performed by: INTERNAL MEDICINE

## 2023-04-11 PROCEDURE — 25010000002 PALONOSETRON PER 25 MCG: Performed by: INTERNAL MEDICINE

## 2023-04-11 PROCEDURE — 25010000002 PEMETREXED PER 10 MG: Performed by: INTERNAL MEDICINE

## 2023-04-11 PROCEDURE — 96413 CHEMO IV INFUSION 1 HR: CPT | Performed by: INTERNAL MEDICINE

## 2023-04-11 PROCEDURE — 96417 CHEMO IV INFUS EACH ADDL SEQ: CPT | Performed by: INTERNAL MEDICINE

## 2023-04-11 PROCEDURE — 25010000002 PEMBROLIZUMAB 100 MG/4ML SOLUTION 4 ML VIAL: Performed by: INTERNAL MEDICINE

## 2023-04-11 PROCEDURE — 25010000002 PEMETREXED 100 MG RECONSTITUTED SOLUTION 1 EACH VIAL: Performed by: INTERNAL MEDICINE

## 2023-04-11 PROCEDURE — 25010000002 CARBOPLATIN PER 50 MG: Performed by: INTERNAL MEDICINE

## 2023-04-11 RX ORDER — SODIUM CHLORIDE 9 MG/ML
250 INJECTION, SOLUTION INTRAVENOUS ONCE
Status: CANCELLED | OUTPATIENT
Start: 2023-04-18 | End: 2023-04-18

## 2023-04-11 RX ORDER — FAMOTIDINE 10 MG/ML
20 INJECTION, SOLUTION INTRAVENOUS AS NEEDED
Status: DISCONTINUED | OUTPATIENT
Start: 2023-04-11 | End: 2023-04-11 | Stop reason: HOSPADM

## 2023-04-11 RX ORDER — OLANZAPINE 5 MG/1
5 TABLET ORAL ONCE
Status: COMPLETED | OUTPATIENT
Start: 2023-04-11 | End: 2023-04-11

## 2023-04-11 RX ORDER — SODIUM CHLORIDE 9 MG/ML
250 INJECTION, SOLUTION INTRAVENOUS ONCE
Status: COMPLETED | OUTPATIENT
Start: 2023-04-11 | End: 2023-04-11

## 2023-04-11 RX ORDER — DIPHENHYDRAMINE HCL 25 MG
25 CAPSULE ORAL ONCE
Status: CANCELLED | OUTPATIENT
Start: 2023-04-18

## 2023-04-11 RX ORDER — DIPHENHYDRAMINE HYDROCHLORIDE 50 MG/ML
50 INJECTION INTRAMUSCULAR; INTRAVENOUS AS NEEDED
Status: CANCELLED | OUTPATIENT
Start: 2023-04-11

## 2023-04-11 RX ORDER — HEPARIN SODIUM (PORCINE) LOCK FLUSH IV SOLN 100 UNIT/ML 100 UNIT/ML
500 SOLUTION INTRAVENOUS AS NEEDED
Status: CANCELLED | OUTPATIENT
Start: 2023-04-11

## 2023-04-11 RX ORDER — SODIUM CHLORIDE 0.9 % (FLUSH) 0.9 %
10 SYRINGE (ML) INJECTION AS NEEDED
Status: CANCELLED | OUTPATIENT
Start: 2023-04-11

## 2023-04-11 RX ORDER — DIPHENHYDRAMINE HYDROCHLORIDE 50 MG/ML
50 INJECTION INTRAMUSCULAR; INTRAVENOUS AS NEEDED
Status: CANCELLED | OUTPATIENT
Start: 2023-04-18

## 2023-04-11 RX ORDER — DIPHENHYDRAMINE HYDROCHLORIDE 50 MG/ML
50 INJECTION INTRAMUSCULAR; INTRAVENOUS AS NEEDED
Status: DISCONTINUED | OUTPATIENT
Start: 2023-04-11 | End: 2023-04-11 | Stop reason: HOSPADM

## 2023-04-11 RX ORDER — PROCHLORPERAZINE MALEATE 5 MG/1
10 TABLET ORAL ONCE
Status: CANCELLED | OUTPATIENT
Start: 2023-04-18 | End: 2023-04-18

## 2023-04-11 RX ORDER — CETIRIZINE HYDROCHLORIDE 10 MG/1
10 TABLET ORAL ONCE
Status: CANCELLED | OUTPATIENT
Start: 2023-04-18 | End: 2023-04-18

## 2023-04-11 RX ORDER — PALONOSETRON 0.05 MG/ML
0.25 INJECTION, SOLUTION INTRAVENOUS ONCE
Status: CANCELLED | OUTPATIENT
Start: 2023-04-11

## 2023-04-11 RX ORDER — HEPARIN SODIUM (PORCINE) LOCK FLUSH IV SOLN 100 UNIT/ML 100 UNIT/ML
500 SOLUTION INTRAVENOUS AS NEEDED
Status: DISCONTINUED | OUTPATIENT
Start: 2023-04-11 | End: 2023-04-11 | Stop reason: HOSPADM

## 2023-04-11 RX ORDER — OLANZAPINE 5 MG/1
5 TABLET ORAL ONCE
Status: CANCELLED | OUTPATIENT
Start: 2023-04-11 | End: 2023-04-11

## 2023-04-11 RX ORDER — SODIUM CHLORIDE 0.9 % (FLUSH) 0.9 %
10 SYRINGE (ML) INJECTION AS NEEDED
Status: DISCONTINUED | OUTPATIENT
Start: 2023-04-11 | End: 2023-04-11 | Stop reason: HOSPADM

## 2023-04-11 RX ORDER — FAMOTIDINE 10 MG/ML
20 INJECTION, SOLUTION INTRAVENOUS AS NEEDED
Status: CANCELLED | OUTPATIENT
Start: 2023-04-11

## 2023-04-11 RX ORDER — SODIUM CHLORIDE 9 MG/ML
250 INJECTION, SOLUTION INTRAVENOUS ONCE
Status: CANCELLED | OUTPATIENT
Start: 2023-04-11

## 2023-04-11 RX ORDER — PALONOSETRON 0.05 MG/ML
0.25 INJECTION, SOLUTION INTRAVENOUS ONCE
Status: COMPLETED | OUTPATIENT
Start: 2023-04-11 | End: 2023-04-11

## 2023-04-11 RX ADMIN — CARBOPLATIN 370 MG: 10 INJECTION, SOLUTION INTRAVENOUS at 13:07

## 2023-04-11 RX ADMIN — SODIUM CHLORIDE 200 MG: 9 INJECTION, SOLUTION INTRAVENOUS at 11:24

## 2023-04-11 RX ADMIN — FOSAPREPITANT 100 ML: 150 INJECTION, POWDER, LYOPHILIZED, FOR SOLUTION INTRAVENOUS at 10:29

## 2023-04-11 RX ADMIN — Medication 10 ML: at 14:21

## 2023-04-11 RX ADMIN — PALONOSETRON 0.25 MG: 0.05 INJECTION, SOLUTION INTRAVENOUS at 10:23

## 2023-04-11 RX ADMIN — SODIUM CHLORIDE 250 ML: 9 INJECTION, SOLUTION INTRAVENOUS at 10:20

## 2023-04-11 RX ADMIN — PEMETREXED DISODIUM 800 MG: 500 INJECTION, POWDER, LYOPHILIZED, FOR SOLUTION INTRAVENOUS at 12:30

## 2023-04-11 RX ADMIN — OLANZAPINE 5 MG: 5 TABLET, FILM COATED ORAL at 10:20

## 2023-04-11 RX ADMIN — HEPARIN 500 UNITS: 100 SYRINGE at 14:21

## 2023-04-11 NOTE — PROGRESS NOTES
"Oncology SW met with pt  And his wife while pt in infusion area undergoing first treatment.  SW reinforced support and availability . Pt had mentioned prior about resources to discuss cancer with grand child.  SW provided book \"Because someone I Love Has Cancer\" Childrens workbook to discuss feelings and open conversation.  Pt. States he has initiated conversation and accepted the book for future use.  SW offered emotional support and ongoing availability as pt proceeds with treatment.   "

## 2023-04-14 ENCOUNTER — INFUSION (OUTPATIENT)
Dept: ONCOLOGY | Facility: HOSPITAL | Age: 73
End: 2023-04-14
Payer: OTHER GOVERNMENT

## 2023-04-14 ENCOUNTER — DOCUMENTATION (OUTPATIENT)
Dept: NUTRITION | Facility: HOSPITAL | Age: 73
End: 2023-04-14
Payer: OTHER GOVERNMENT

## 2023-04-14 VITALS
SYSTOLIC BLOOD PRESSURE: 109 MMHG | HEART RATE: 96 BPM | DIASTOLIC BLOOD PRESSURE: 55 MMHG | OXYGEN SATURATION: 95 % | TEMPERATURE: 97.9 F

## 2023-04-14 DIAGNOSIS — K90.9 IRON MALABSORPTION: ICD-10-CM

## 2023-04-14 DIAGNOSIS — Z45.2 ENCOUNTER FOR VENOUS ACCESS DEVICE CARE: Primary | ICD-10-CM

## 2023-04-14 DIAGNOSIS — D50.8 IRON DEFICIENCY ANEMIA SECONDARY TO INADEQUATE DIETARY IRON INTAKE: ICD-10-CM

## 2023-04-14 PROCEDURE — 25010000002 FERRIC CARBOXYMALTOSE 750 MG/15ML SOLUTION 15 ML VIAL: Performed by: INTERNAL MEDICINE

## 2023-04-14 PROCEDURE — 63710000001 PROCHLORPERAZINE MALEATE PER 10 MG: Performed by: INTERNAL MEDICINE

## 2023-04-14 PROCEDURE — 63710000001 DIPHENHYDRAMINE PER 50 MG: Performed by: INTERNAL MEDICINE

## 2023-04-14 PROCEDURE — 25010000002 HEPARIN LOCK FLUSH PER 10 UNITS: Performed by: INTERNAL MEDICINE

## 2023-04-14 RX ORDER — DIPHENHYDRAMINE HCL 25 MG
25 CAPSULE ORAL ONCE
OUTPATIENT
Start: 2023-04-21

## 2023-04-14 RX ORDER — HEPARIN SODIUM (PORCINE) LOCK FLUSH IV SOLN 100 UNIT/ML 100 UNIT/ML
500 SOLUTION INTRAVENOUS AS NEEDED
OUTPATIENT
Start: 2023-04-14

## 2023-04-14 RX ORDER — PROCHLORPERAZINE MALEATE 10 MG
10 TABLET ORAL ONCE
Status: COMPLETED | OUTPATIENT
Start: 2023-04-14 | End: 2023-04-14

## 2023-04-14 RX ORDER — HEPARIN SODIUM (PORCINE) LOCK FLUSH IV SOLN 100 UNIT/ML 100 UNIT/ML
500 SOLUTION INTRAVENOUS AS NEEDED
Status: DISCONTINUED | OUTPATIENT
Start: 2023-04-14 | End: 2023-04-14 | Stop reason: HOSPADM

## 2023-04-14 RX ORDER — SODIUM CHLORIDE 0.9 % (FLUSH) 0.9 %
10 SYRINGE (ML) INJECTION AS NEEDED
OUTPATIENT
Start: 2023-04-14

## 2023-04-14 RX ORDER — PROCHLORPERAZINE MALEATE 10 MG
10 TABLET ORAL ONCE
OUTPATIENT
Start: 2023-04-21 | End: 2023-04-21

## 2023-04-14 RX ORDER — CETIRIZINE HYDROCHLORIDE 10 MG/1
10 TABLET ORAL ONCE
OUTPATIENT
Start: 2023-04-21 | End: 2023-04-21

## 2023-04-14 RX ORDER — DIPHENHYDRAMINE HYDROCHLORIDE 50 MG/ML
50 INJECTION INTRAMUSCULAR; INTRAVENOUS AS NEEDED
Status: DISCONTINUED | OUTPATIENT
Start: 2023-04-14 | End: 2023-04-14 | Stop reason: HOSPADM

## 2023-04-14 RX ORDER — SODIUM CHLORIDE 9 MG/ML
250 INJECTION, SOLUTION INTRAVENOUS ONCE
OUTPATIENT
Start: 2023-04-21 | End: 2023-04-21

## 2023-04-14 RX ORDER — DIPHENHYDRAMINE HYDROCHLORIDE 50 MG/ML
50 INJECTION INTRAMUSCULAR; INTRAVENOUS AS NEEDED
OUTPATIENT
Start: 2023-04-21

## 2023-04-14 RX ORDER — SODIUM CHLORIDE 0.9 % (FLUSH) 0.9 %
10 SYRINGE (ML) INJECTION AS NEEDED
Status: DISCONTINUED | OUTPATIENT
Start: 2023-04-14 | End: 2023-04-14 | Stop reason: HOSPADM

## 2023-04-14 RX ORDER — CETIRIZINE HYDROCHLORIDE 10 MG/1
10 TABLET ORAL ONCE
Status: COMPLETED | OUTPATIENT
Start: 2023-04-14 | End: 2023-04-14

## 2023-04-14 RX ORDER — SODIUM CHLORIDE 9 MG/ML
250 INJECTION, SOLUTION INTRAVENOUS ONCE
Status: COMPLETED | OUTPATIENT
Start: 2023-04-14 | End: 2023-04-14

## 2023-04-14 RX ORDER — DIPHENHYDRAMINE HCL 25 MG
25 CAPSULE ORAL ONCE
Status: COMPLETED | OUTPATIENT
Start: 2023-04-14 | End: 2023-04-14

## 2023-04-14 RX ADMIN — FERRIC CARBOXYMALTOSE INJECTION 750 MG: 50 INJECTION, SOLUTION INTRAVENOUS at 11:43

## 2023-04-14 RX ADMIN — HEPARIN 500 UNITS: 100 SYRINGE at 12:28

## 2023-04-14 RX ADMIN — CETIRIZINE HYDROCHLORIDE 10 MG: 10 TABLET, FILM COATED ORAL at 11:12

## 2023-04-14 RX ADMIN — PROCHLORPERAZINE MALEATE 10 MG: 10 TABLET ORAL at 11:12

## 2023-04-14 RX ADMIN — SODIUM CHLORIDE 250 ML: 9 INJECTION, SOLUTION INTRAVENOUS at 11:19

## 2023-04-14 RX ADMIN — DIPHENHYDRAMINE HYDROCHLORIDE 25 MG: 25 CAPSULE ORAL at 11:12

## 2023-04-14 NOTE — PROGRESS NOTES
Adult Outpatient Nutrition  Assessment    Patient Name:  Brian Garcia  YOB: 1950  MRN: [id number]    Assessment Date:  Entry from 4/10/2023    Comments: Pt picked up case of [employer].                    Electronically signed by:  Antonia Kapoor RD  04/14/23 12:47 CDT

## 2023-04-17 ENCOUNTER — OFFICE VISIT (OUTPATIENT)
Dept: ONCOLOGY | Facility: CLINIC | Age: 73
End: 2023-04-17
Payer: OTHER GOVERNMENT

## 2023-04-17 ENCOUNTER — TELEPHONE (OUTPATIENT)
Dept: ONCOLOGY | Facility: HOSPITAL | Age: 73
End: 2023-04-17
Payer: OTHER GOVERNMENT

## 2023-04-17 VITALS
SYSTOLIC BLOOD PRESSURE: 141 MMHG | HEART RATE: 96 BPM | OXYGEN SATURATION: 92 % | DIASTOLIC BLOOD PRESSURE: 56 MMHG | RESPIRATION RATE: 17 BRPM

## 2023-04-17 DIAGNOSIS — Z86.711 HISTORY OF PULMONARY EMBOLISM: Chronic | ICD-10-CM

## 2023-04-17 DIAGNOSIS — D72.828 OTHER ELEVATED WHITE BLOOD CELL (WBC) COUNT: Chronic | ICD-10-CM

## 2023-04-17 DIAGNOSIS — D50.8 IRON DEFICIENCY ANEMIA SECONDARY TO INADEQUATE DIETARY IRON INTAKE: Chronic | ICD-10-CM

## 2023-04-17 DIAGNOSIS — C34.81 MALIGNANT NEOPLASM OF OVERLAPPING SITES OF RIGHT LUNG: Primary | Chronic | ICD-10-CM

## 2023-04-17 DIAGNOSIS — K90.9 IRON MALABSORPTION: Chronic | ICD-10-CM

## 2023-04-17 RX ORDER — FUROSEMIDE 20 MG/1
20 TABLET ORAL DAILY PRN
Qty: 7 TABLET | Refills: 0 | Status: ON HOLD | OUTPATIENT
Start: 2023-04-17

## 2023-04-17 NOTE — TELEPHONE ENCOUNTER
----- Message from Omega Davis MD sent at 4/17/2023 10:17 AM CDT -----  He can come in later today or tomorrow for us to examine him and see what needs to be done about swelling.  Thank you  ----- Message -----  From: Gilma Griggs RN  Sent: 4/17/2023   8:45 AM CDT  To: Omega Davis MD, Melissa Causey RN    Pt's friend called and states that she noticed bilateral below the knee swelling yesterday. No shortness of breath, chest pain. States swelling usually not a problem. No pain.

## 2023-04-17 NOTE — PROGRESS NOTES
DATE OF VISIT: 4/17/2023      REASON FOR VISIT: Metastatic lung adenocarcinoma, history of pulmonary embolism, leukocytosis, swelling of lower extremity, iron deficiency anemia, iron malabsorption      HISTORY OF PRESENT ILLNESS:   72-year-old male with medical problems significant for hypertension, history of pulmonary embolism in 2015 for which patient took Coumadin for 1 year, chronic obstructive pulmonary disease, history of nicotine addiction that he quit in 2008, metastatic lung adenocarcinoma with bone metastasis for which patient was started on cycle 1 of carboplatin, Alimta and Keytruda on April 11, 2023.  Patient shows up to clinic today for unscheduled clinic visit with complaint of swelling of bilateral lower extremity that started on Sunday.  Patient states he slept in his chair Saturday night and woke up with swelling affecting bilateral lower extremity.  Denies any pain associated with swelling.  Swelling is limited to his ankle.  Denies any bleeding.  Denies any hemoptysis.  Complains of chronic neuropathy affecting lower extremity no fever reported.              Past Medical History, Past Surgical History, Social History, Family History have been reviewed and are without significant changes except as mentioned.    Review of Systems   A comprehensive 14 point review of systems was performed and was negative except as mentioned in HPI.    Medications:  The current medication list was reviewed in the EMR    ALLERGIES:    Allergies   Allergen Reactions   • Amoxicillin Anaphylaxis   • Albuterol Irritability     Facial flushing and palpitations.   • Asmanex (120 Metered Doses) [Mometasone Furoate] Unknown - Low Severity   • Gabapentin Hallucinations   • Lortab [Hydrocodone-Acetaminophen] Other (See Comments)     Can't remember   • Morphine And Related Hallucinations   • Prilosec [Omeprazole] Other (See Comments)     unknown   • Sodium Clavulanate Unknown - High Severity   • Statins GI Intolerance   •  4 [facility] team Sulfa Antibiotics Other (See Comments)     Can't remember also more and can't remember   • Symbicort [Budesonide-Formoterol Fumarate] Unknown (See Comments)     Doesn't remember reaction type       Objective      Vitals:    04/17/23 1338   BP: 141/56   Pulse: 96   Resp: 17   SpO2: 92%   PainSc: 2  Comment: feet         4/11/2023    10:12 AM   Current Status   ECOG score 3       Physical Exam  Musculoskeletal:      Comments: Bilateral lower extremity pitting edema present up to ankle level.  No calf tenderness   Neurological:      Mental Status: He is alert and oriented to person, place, and time.           RECENT LABS:  Glucose   Date Value Ref Range Status   04/10/2023 107 (H) 65 - 99 mg/dL Final     Glucose, Arterial   Date Value Ref Range Status   02/06/2018 106 mmol/L Final     Sodium   Date Value Ref Range Status   04/10/2023 139 136 - 145 mmol/L Final     Potassium   Date Value Ref Range Status   04/10/2023 4.0 3.5 - 5.2 mmol/L Final     CO2   Date Value Ref Range Status   04/10/2023 30.0 (H) 22.0 - 29.0 mmol/L Final     Chloride   Date Value Ref Range Status   04/10/2023 102 98 - 107 mmol/L Final     Anion Gap   Date Value Ref Range Status   04/10/2023 7.0 5.0 - 15.0 mmol/L Final     Creatinine   Date Value Ref Range Status   04/10/2023 0.56 (L) 0.76 - 1.27 mg/dL Final     BUN   Date Value Ref Range Status   04/10/2023 15 8 - 23 mg/dL Final     BUN/Creatinine Ratio   Date Value Ref Range Status   04/10/2023 26.8 (H) 7.0 - 25.0 Final     Calcium   Date Value Ref Range Status   04/10/2023 8.8 8.6 - 10.5 mg/dL Final     eGFR Non  Amer   Date Value Ref Range Status   11/08/2021 81 >60 mL/min/1.73 Final     Alkaline Phosphatase   Date Value Ref Range Status   04/10/2023 87 39 - 117 U/L Final     Total Protein   Date Value Ref Range Status   04/10/2023 6.6 6.0 - 8.5 g/dL Final     ALT (SGPT)   Date Value Ref Range Status   04/10/2023 11 1 - 41 U/L Final     AST (SGOT)   Date Value Ref Range Status    04/10/2023 12 1 - 40 U/L Final     Total Bilirubin   Date Value Ref Range Status   04/10/2023 0.3 0.0 - 1.2 mg/dL Final     Albumin   Date Value Ref Range Status   04/10/2023 3.6 3.5 - 5.2 g/dL Final     Globulin   Date Value Ref Range Status   04/10/2023 3.0 gm/dL Final     Lab Results   Component Value Date    WBC  04/10/2023      Comment:      See scanned document    HGB  04/10/2023      Comment:      See scanned document    HCT  04/10/2023      Comment:      See scanned document    MCV  04/10/2023      Comment:      See scanned document    PLT  04/10/2023      Comment:      See scanned document     Lab Results   Component Value Date    NEUTROABS 6.8 03/15/2023    IRON 36 (L) 04/10/2023    IRON 39 (L) 02/10/2023    IRON 37 (L) 01/02/2023    TIBC 346 04/10/2023    TIBC 331 02/10/2023    TIBC 286 (L) 01/02/2023    LABIRON 10 (L) 04/10/2023    LABIRON 12 (L) 02/10/2023    LABIRON 13 (L) 01/02/2023    FERRITIN 235.30 04/10/2023    FERRITIN 266.00 02/10/2023    [id number] 1,010 (H) 04/10/2023    [id number] 370 02/10/2023    FOLATE >20.00 04/10/2023    FOLATE 9.81 02/10/2023     No results found for: , LABCA2, AFPTM, HCGQUANT, , CHROMGRNA, [id number], CEA, REFLABREPO      PATHOLOGY:  * Cannot find OR log *         RADIOLOGY DATA :  No radiology results for the last 7 days        Assessment & Plan     1.  Metastatic lung adenocarcinoma with bone metastasis stage IV  - [employer] advanced testing is pending.  - Patient started carboplatin Alimta and Keytruda cycle 1 on April 11, 2023.  - Plan is to continue with treatment every 3 weeks.  - Patient will return to clinic with cycle 2 of treatment  - Plan is to repeat PET/CT after cycle 4    2.  Swelling of bilateral lower extremity  - Patient has a pitting edema up to ankle level bilaterally that started on Sunday after he slept in a chair Saturday overnight.  - Recommend elevating his legs while he is sitting in a chair or at night when he is in bed.  - We will  4 [facility] medical and nursing team 7 [facility] medical and nursing staff and apheresis nurses 4 [facility] team 7 [facility] staff 7 [facility] staff 7 [facility] team Dr. Hoff and [address] [place] [address] send prescription for Lasix 20 mg daily as needed for swelling for next 7 days.  Prescription has been sent to his pharmacy  - Patient was instructed not to start taking Lasix if swelling is getting improved over the next 1 to 2 days with propping up his legs.    3.  History of pulmonary embolism  - Patient was diagnosed with pulmonary embolism in 2015 for which he took Coumadin for 1 year  - CT angiogram in July 2021 was negative for pulmonary embolism    4.  Leukocytosis  - Reactive in nature  - We will monitor with CBC    5.  Iron deficiency anemia:  - Due to iron malabsorption patient has been started on intravenous Injectafer.  - Remains on B12 injection monthly with folic acid p.o. daily  - We will repeat anemia work-up in June 2023    6.  Health maintenance: Patient does not smoke    7.  Advance care planning:  - Patient remains full code.                     PHQ-9 Total Score: 0   -Patient is not homicidal or suicidal.  No acute intervention required.    Brian Garcia reports a pain score of 2.  Given his pain assessment as noted, treatment options were discussed and the following options were decided upon as a follow-up plan to address the patient's pain: continuation of current treatment plan for pain.         Omega Davis MD  4/17/2023  13:55 CDT        Part of this note may be an electronic transcription/translation of spoken language to printed text using the Dragon Dictation System.          CC:           Dr. Hoff, Dr. Cano, 7 [facility] team team, patient MICU Team resident team and patient. House staff 4 [facility] Team Dr. Field (MICU Fellow), [facility], [facility], [facility] SW MICU Team Dr. Gonzalez, GYN Resident Dr. Lisa FREGOSO, Dr. Hartley MICU Team Pall Med SW gyn oncology, Dr. Sellers (neurology) MICU team, 7 [facility] Team as above resident team and patient. 4 [facility] Team house staff house staff house staff hs resident team and patient. resident team and patient. resident team and patient. resident team and patient. resident team and patient. resident team and patient. resident team and patient. resident team and patient. resident team and patient. resident team, patient and patient's sister. house staff resident team and patient. MICU Team, Dr. Hartley house staff Dr. Christian. house staff Dr. Gutierrez, MICU Team, Dr. Puente gyn oncology as above

## 2023-04-18 LAB — REF LAB TEST RESULTS: NORMAL

## 2023-04-21 ENCOUNTER — APPOINTMENT (OUTPATIENT)
Dept: GENERAL RADIOLOGY | Facility: HOSPITAL | Age: 73
End: 2023-04-21
Payer: OTHER GOVERNMENT

## 2023-04-21 ENCOUNTER — APPOINTMENT (OUTPATIENT)
Dept: CT IMAGING | Facility: HOSPITAL | Age: 73
End: 2023-04-21
Payer: OTHER GOVERNMENT

## 2023-04-21 ENCOUNTER — HOSPITAL ENCOUNTER (INPATIENT)
Facility: HOSPITAL | Age: 73
LOS: 4 days | Discharge: HOME | End: 2023-04-25
Attending: FAMILY MEDICINE | Admitting: HOSPITALIST
Payer: OTHER GOVERNMENT

## 2023-04-21 ENCOUNTER — INFUSION (OUTPATIENT)
Dept: ONCOLOGY | Facility: HOSPITAL | Age: 73
End: 2023-04-21
Payer: OTHER GOVERNMENT

## 2023-04-21 VITALS
SYSTOLIC BLOOD PRESSURE: 126 MMHG | HEART RATE: 108 BPM | DIASTOLIC BLOOD PRESSURE: 60 MMHG | OXYGEN SATURATION: 97 % | RESPIRATION RATE: 20 BRPM | TEMPERATURE: 97.5 F

## 2023-04-21 DIAGNOSIS — J96.01 ACUTE RESPIRATORY FAILURE WITH HYPOXIA AND HYPERCAPNIA: Primary | ICD-10-CM

## 2023-04-21 DIAGNOSIS — Z74.09 IMPAIRED MOBILITY AND ACTIVITIES OF DAILY LIVING: ICD-10-CM

## 2023-04-21 DIAGNOSIS — Z74.09 IMPAIRED FUNCTIONAL MOBILITY, BALANCE, GAIT, AND ENDURANCE: ICD-10-CM

## 2023-04-21 DIAGNOSIS — J18.9 PNEUMONIA DUE TO INFECTIOUS ORGANISM, UNSPECIFIED LATERALITY, UNSPECIFIED PART OF LUNG: ICD-10-CM

## 2023-04-21 DIAGNOSIS — J96.02 ACUTE RESPIRATORY FAILURE WITH HYPOXIA AND HYPERCAPNIA: Primary | ICD-10-CM

## 2023-04-21 DIAGNOSIS — Z78.9 IMPAIRED MOBILITY AND ACTIVITIES OF DAILY LIVING: ICD-10-CM

## 2023-04-21 LAB
ALBUMIN SERPL-MCNC: 3.3 G/DL (ref 3.5–5.2)
ALBUMIN/GLOB SERPL: 1.1 G/DL
ALP SERPL-CCNC: 98 U/L (ref 39–117)
ALT SERPL W P-5'-P-CCNC: 28 U/L (ref 1–41)
ANION GAP SERPL CALCULATED.3IONS-SCNC: 7 MMOL/L (ref 5–15)
ANISOCYTOSIS BLD QL: ABNORMAL
ARTERIAL PATENCY WRIST A: ABNORMAL
ARTERIAL PATENCY WRIST A: ABNORMAL
AST SERPL-CCNC: 21 U/L (ref 1–40)
ATMOSPHERIC PRESS: 745 MMHG
ATMOSPHERIC PRESS: 746 MMHG
BASE EXCESS BLDA CALC-SCNC: 1.2 MMOL/L (ref 0–2)
BASE EXCESS BLDA CALC-SCNC: 3.6 MMOL/L (ref 0–2)
BASOPHILS # BLD MANUAL: 0.05 10*3/MM3 (ref 0–0.2)
BASOPHILS NFR BLD MANUAL: 1 % (ref 0–1.5)
BDY SITE: ABNORMAL
BDY SITE: ABNORMAL
BILIRUB SERPL-MCNC: 0.2 MG/DL (ref 0–1.2)
BUN SERPL-MCNC: 10 MG/DL (ref 8–23)
BUN/CREAT SERPL: 24.4 (ref 7–25)
CALCIUM SPEC-SCNC: 8.5 MG/DL (ref 8.6–10.5)
CHLORIDE SERPL-SCNC: 101 MMOL/L (ref 98–107)
CO2 SERPL-SCNC: 31 MMOL/L (ref 22–29)
CREAT SERPL-MCNC: 0.41 MG/DL (ref 0.76–1.27)
D-DIMER, QUANTITATIVE (MAD,POW, STR): 1770 NG/ML (FEU) (ref 0–730)
D-LACTATE SERPL-SCNC: 0.8 MMOL/L (ref 0.5–2)
DEPRECATED RDW RBC AUTO: 45.7 FL (ref 37–54)
EGFRCR SERPLBLD CKD-EPI 2021: 114.3 ML/MIN/1.73
EOSINOPHIL # BLD MANUAL: 0.31 10*3/MM3 (ref 0–0.4)
EOSINOPHIL NFR BLD MANUAL: 6 % (ref 0.3–6.2)
EPAP: 8
ERYTHROCYTE [DISTWIDTH] IN BLOOD BY AUTOMATED COUNT: 14.8 % (ref 12.3–15.4)
GAS FLOW AIRWAY: 4 LPM
GLOBULIN UR ELPH-MCNC: 2.9 GM/DL
GLUCOSE BLDC GLUCOMTR-MCNC: 122 MG/DL (ref 70–130)
GLUCOSE SERPL-MCNC: 122 MG/DL (ref 65–99)
HCO3 BLDA-SCNC: 30.7 MMOL/L (ref 20–26)
HCO3 BLDA-SCNC: 31.7 MMOL/L (ref 20–26)
HCT VFR BLD AUTO: 30 % (ref 37.5–51)
HGB BLD-MCNC: 9.2 G/DL (ref 13–17.7)
HOLD SPECIMEN: NORMAL
HOLD SPECIMEN: NORMAL
HYPOCHROMIA BLD QL: ABNORMAL
INHALED O2 CONCENTRATION: 45 %
IPAP: 15
LYMPHOCYTES # BLD MANUAL: 0.99 10*3/MM3 (ref 0.7–3.1)
LYMPHOCYTES NFR BLD MANUAL: 21 % (ref 5–12)
Lab: ABNORMAL
Lab: ABNORMAL
MCH RBC QN AUTO: 28.5 PG (ref 26.6–33)
MCHC RBC AUTO-ENTMCNC: 30.7 G/DL (ref 31.5–35.7)
MCV RBC AUTO: 92.9 FL (ref 79–97)
METAMYELOCYTES NFR BLD MANUAL: 2 % (ref 0–0)
MODALITY: ABNORMAL
MODALITY: ABNORMAL
MONOCYTES # BLD: 1.09 10*3/MM3 (ref 0.1–0.9)
NEUTROPHILS # BLD AUTO: 2.65 10*3/MM3 (ref 1.7–7)
NEUTROPHILS NFR BLD MANUAL: 51 % (ref 42.7–76)
NT-PROBNP SERPL-MCNC: 64.9 PG/ML (ref 0–900)
PCO2 BLDA: 60.5 MM HG (ref 35–45)
PCO2 BLDA: 91.8 MM HG (ref 35–45)
PH BLDA: 7.15 PH UNITS (ref 7.35–7.45)
PH BLDA: 7.31 PH UNITS (ref 7.35–7.45)
PLATELET # BLD AUTO: 188 10*3/MM3 (ref 140–450)
PMV BLD AUTO: 9.4 FL (ref 6–12)
PO2 BLDA: 126 MM HG (ref 83–108)
PO2 BLDA: 131 MM HG (ref 83–108)
POTASSIUM SERPL-SCNC: 3.4 MMOL/L (ref 3.5–5.2)
PROT SERPL-MCNC: 6.2 G/DL (ref 6–8.5)
QT INTERVAL: 356 MS
QTC INTERVAL: 430 MS
RBC # BLD AUTO: 3.23 10*6/MM3 (ref 4.14–5.8)
SAO2 % BLDCOA: 98.1 % (ref 94–99)
SAO2 % BLDCOA: 99.4 % (ref 94–99)
SET MECH RESP RATE: 18
SMALL PLATELETS BLD QL SMEAR: ADEQUATE
SODIUM SERPL-SCNC: 139 MMOL/L (ref 136–145)
TROPONIN T SERPL HS-MCNC: 13 NG/L
TSH SERPL DL<=0.05 MIU/L-ACNC: 0.47 UIU/ML (ref 0.27–4.2)
VARIANT LYMPHS NFR BLD MANUAL: 19 % (ref 19.6–45.3)
VENTILATOR MODE: ABNORMAL
VENTILATOR MODE: ABNORMAL
WBC MORPH BLD: NORMAL
WBC NRBC COR # BLD: 5.2 10*3/MM3 (ref 3.4–10.8)
WHOLE BLOOD HOLD COAG: NORMAL
WHOLE BLOOD HOLD SPECIMEN: NORMAL

## 2023-04-21 PROCEDURE — 25010000002 METHYLPREDNISOLONE PER 125 MG: Performed by: HOSPITALIST

## 2023-04-21 PROCEDURE — 36600 WITHDRAWAL OF ARTERIAL BLOOD: CPT

## 2023-04-21 PROCEDURE — 71275 CT ANGIOGRAPHY CHEST: CPT

## 2023-04-21 PROCEDURE — 94660 CPAP INITIATION&MGMT: CPT

## 2023-04-21 PROCEDURE — 25510000001 IOPAMIDOL PER 1 ML: Performed by: FAMILY MEDICINE

## 2023-04-21 PROCEDURE — 82803 BLOOD GASES ANY COMBINATION: CPT

## 2023-04-21 PROCEDURE — 80053 COMPREHEN METABOLIC PANEL: CPT | Performed by: FAMILY MEDICINE

## 2023-04-21 PROCEDURE — 94799 UNLISTED PULMONARY SVC/PX: CPT

## 2023-04-21 PROCEDURE — 83880 ASSAY OF NATRIURETIC PEPTIDE: CPT | Performed by: FAMILY MEDICINE

## 2023-04-21 PROCEDURE — 25010000002 LEVOFLOXACIN PER 250 MG: Performed by: FAMILY MEDICINE

## 2023-04-21 PROCEDURE — 84443 ASSAY THYROID STIM HORMONE: CPT | Performed by: HOSPITALIST

## 2023-04-21 PROCEDURE — 85007 BL SMEAR W/DIFF WBC COUNT: CPT | Performed by: FAMILY MEDICINE

## 2023-04-21 PROCEDURE — 94640 AIRWAY INHALATION TREATMENT: CPT

## 2023-04-21 PROCEDURE — 94664 DEMO&/EVAL PT USE INHALER: CPT

## 2023-04-21 PROCEDURE — 99285 EMERGENCY DEPT VISIT HI MDM: CPT

## 2023-04-21 PROCEDURE — 85025 COMPLETE CBC W/AUTO DIFF WBC: CPT | Performed by: FAMILY MEDICINE

## 2023-04-21 PROCEDURE — 85379 FIBRIN DEGRADATION QUANT: CPT | Performed by: FAMILY MEDICINE

## 2023-04-21 PROCEDURE — 25010000002 ONDANSETRON PER 1 MG: Performed by: FAMILY MEDICINE

## 2023-04-21 PROCEDURE — 83605 ASSAY OF LACTIC ACID: CPT | Performed by: FAMILY MEDICINE

## 2023-04-21 PROCEDURE — 25010000002 HEPARIN (PORCINE) PER 1000 UNITS: Performed by: HOSPITALIST

## 2023-04-21 PROCEDURE — 87040 BLOOD CULTURE FOR BACTERIA: CPT | Performed by: FAMILY MEDICINE

## 2023-04-21 PROCEDURE — 84484 ASSAY OF TROPONIN QUANT: CPT | Performed by: FAMILY MEDICINE

## 2023-04-21 PROCEDURE — 93005 ELECTROCARDIOGRAM TRACING: CPT | Performed by: FAMILY MEDICINE

## 2023-04-21 PROCEDURE — 71046 X-RAY EXAM CHEST 2 VIEWS: CPT

## 2023-04-21 PROCEDURE — 94761 N-INVAS EAR/PLS OXIMETRY MLT: CPT

## 2023-04-21 PROCEDURE — 36415 COLL VENOUS BLD VENIPUNCTURE: CPT

## 2023-04-21 PROCEDURE — 82962 GLUCOSE BLOOD TEST: CPT

## 2023-04-21 RX ORDER — FUROSEMIDE 20 MG/1
20 TABLET ORAL DAILY PRN
Status: DISCONTINUED | OUTPATIENT
Start: 2023-04-21 | End: 2023-04-25 | Stop reason: HOSPADM

## 2023-04-21 RX ORDER — ONDANSETRON 4 MG/1
4 TABLET, FILM COATED ORAL EVERY 6 HOURS PRN
Status: DISCONTINUED | OUTPATIENT
Start: 2023-04-21 | End: 2023-04-25 | Stop reason: HOSPADM

## 2023-04-21 RX ORDER — HEPARIN SODIUM 5000 [USP'U]/ML
5000 INJECTION, SOLUTION INTRAVENOUS; SUBCUTANEOUS EVERY 12 HOURS SCHEDULED
Status: DISCONTINUED | OUTPATIENT
Start: 2023-04-21 | End: 2023-04-25 | Stop reason: HOSPADM

## 2023-04-21 RX ORDER — DILTIAZEM HYDROCHLORIDE 180 MG/1
180 CAPSULE, COATED, EXTENDED RELEASE ORAL
Status: DISCONTINUED | OUTPATIENT
Start: 2023-04-22 | End: 2023-04-23

## 2023-04-21 RX ORDER — POLYETHYLENE GLYCOL 3350 17 G/17G
17 POWDER, FOR SOLUTION ORAL DAILY PRN
Status: DISCONTINUED | OUTPATIENT
Start: 2023-04-21 | End: 2023-04-25 | Stop reason: HOSPADM

## 2023-04-21 RX ORDER — LEVOFLOXACIN 5 MG/ML
750 INJECTION, SOLUTION INTRAVENOUS ONCE
Status: COMPLETED | OUTPATIENT
Start: 2023-04-21 | End: 2023-04-21

## 2023-04-21 RX ORDER — CALCIUM CARBONATE 200(500)MG
2 TABLET,CHEWABLE ORAL 2 TIMES DAILY PRN
Status: DISCONTINUED | OUTPATIENT
Start: 2023-04-21 | End: 2023-04-25 | Stop reason: HOSPADM

## 2023-04-21 RX ORDER — SODIUM CHLORIDE 0.9 % (FLUSH) 0.9 %
10 SYRINGE (ML) INJECTION AS NEEDED
Status: DISCONTINUED | OUTPATIENT
Start: 2023-04-21 | End: 2023-04-25 | Stop reason: HOSPADM

## 2023-04-21 RX ORDER — FOLIC ACID 1 MG/1
1 TABLET ORAL DAILY
Status: DISCONTINUED | OUTPATIENT
Start: 2023-04-22 | End: 2023-04-25 | Stop reason: HOSPADM

## 2023-04-21 RX ORDER — POTASSIUM CHLORIDE 7.45 MG/ML
10 INJECTION INTRAVENOUS
Status: DISCONTINUED | OUTPATIENT
Start: 2023-04-21 | End: 2023-04-25 | Stop reason: HOSPADM

## 2023-04-21 RX ORDER — BISACODYL 10 MG
10 SUPPOSITORY, RECTAL RECTAL DAILY PRN
Status: DISCONTINUED | OUTPATIENT
Start: 2023-04-21 | End: 2023-04-25 | Stop reason: HOSPADM

## 2023-04-21 RX ORDER — LORAZEPAM 0.5 MG/1
0.5 TABLET ORAL EVERY 8 HOURS PRN
Status: DISCONTINUED | OUTPATIENT
Start: 2023-04-21 | End: 2023-04-25 | Stop reason: HOSPADM

## 2023-04-21 RX ORDER — SODIUM CHLORIDE 0.9 % (FLUSH) 0.9 %
10 SYRINGE (ML) INJECTION EVERY 12 HOURS SCHEDULED
Status: DISCONTINUED | OUTPATIENT
Start: 2023-04-21 | End: 2023-04-25 | Stop reason: HOSPADM

## 2023-04-21 RX ORDER — SODIUM CHLORIDE 9 MG/ML
40 INJECTION, SOLUTION INTRAVENOUS AS NEEDED
Status: DISCONTINUED | OUTPATIENT
Start: 2023-04-21 | End: 2023-04-25 | Stop reason: HOSPADM

## 2023-04-21 RX ORDER — PANTOPRAZOLE SODIUM 40 MG/10ML
40 INJECTION, POWDER, LYOPHILIZED, FOR SOLUTION INTRAVENOUS
Status: DISCONTINUED | OUTPATIENT
Start: 2023-04-22 | End: 2023-04-23

## 2023-04-21 RX ORDER — ONDANSETRON 2 MG/ML
4 INJECTION INTRAMUSCULAR; INTRAVENOUS ONCE
Status: COMPLETED | OUTPATIENT
Start: 2023-04-21 | End: 2023-04-21

## 2023-04-21 RX ORDER — LEVOFLOXACIN 5 MG/ML
750 INJECTION, SOLUTION INTRAVENOUS EVERY 24 HOURS
Status: DISCONTINUED | OUTPATIENT
Start: 2023-04-22 | End: 2023-04-25 | Stop reason: HOSPADM

## 2023-04-21 RX ORDER — FERROUS SULFATE TAB EC 324 MG (65 MG FE EQUIVALENT) 324 (65 FE) MG
324 TABLET DELAYED RESPONSE ORAL
Status: DISCONTINUED | OUTPATIENT
Start: 2023-04-22 | End: 2023-04-23

## 2023-04-21 RX ORDER — IPRATROPIUM BROMIDE AND ALBUTEROL SULFATE 2.5; .5 MG/3ML; MG/3ML
SOLUTION RESPIRATORY (INHALATION)
Status: COMPLETED
Start: 2023-04-21 | End: 2023-04-21

## 2023-04-21 RX ORDER — IPRATROPIUM BROMIDE AND ALBUTEROL SULFATE 2.5; .5 MG/3ML; MG/3ML
3 SOLUTION RESPIRATORY (INHALATION)
Status: DISCONTINUED | OUTPATIENT
Start: 2023-04-21 | End: 2023-04-21 | Stop reason: SDUPTHER

## 2023-04-21 RX ORDER — TRAMADOL HYDROCHLORIDE 50 MG/1
50 TABLET ORAL EVERY 6 HOURS PRN
Status: DISCONTINUED | OUTPATIENT
Start: 2023-04-21 | End: 2023-04-25 | Stop reason: HOSPADM

## 2023-04-21 RX ORDER — IPRATROPIUM BROMIDE AND ALBUTEROL SULFATE 2.5; .5 MG/3ML; MG/3ML
3 SOLUTION RESPIRATORY (INHALATION)
Status: DISCONTINUED | OUTPATIENT
Start: 2023-04-21 | End: 2023-04-25 | Stop reason: HOSPADM

## 2023-04-21 RX ORDER — GUAIFENESIN 600 MG/1
600 TABLET, EXTENDED RELEASE ORAL EVERY 12 HOURS SCHEDULED
Status: DISCONTINUED | OUTPATIENT
Start: 2023-04-21 | End: 2023-04-25 | Stop reason: HOSPADM

## 2023-04-21 RX ORDER — MONTELUKAST SODIUM 10 MG/1
10 TABLET ORAL NIGHTLY
Status: DISCONTINUED | OUTPATIENT
Start: 2023-04-21 | End: 2023-04-25 | Stop reason: HOSPADM

## 2023-04-21 RX ORDER — ONDANSETRON 2 MG/ML
4 INJECTION INTRAMUSCULAR; INTRAVENOUS EVERY 6 HOURS PRN
Status: DISCONTINUED | OUTPATIENT
Start: 2023-04-21 | End: 2023-04-25 | Stop reason: HOSPADM

## 2023-04-21 RX ORDER — POTASSIUM CHLORIDE 1.5 G/1.77G
40 POWDER, FOR SOLUTION ORAL AS NEEDED
Status: DISCONTINUED | OUTPATIENT
Start: 2023-04-21 | End: 2023-04-25 | Stop reason: HOSPADM

## 2023-04-21 RX ORDER — METHYLPREDNISOLONE SODIUM SUCCINATE 125 MG/2ML
80 INJECTION, POWDER, LYOPHILIZED, FOR SOLUTION INTRAMUSCULAR; INTRAVENOUS EVERY 8 HOURS
Status: DISCONTINUED | OUTPATIENT
Start: 2023-04-21 | End: 2023-04-25 | Stop reason: HOSPADM

## 2023-04-21 RX ORDER — BISACODYL 5 MG/1
5 TABLET, DELAYED RELEASE ORAL DAILY PRN
Status: DISCONTINUED | OUTPATIENT
Start: 2023-04-21 | End: 2023-04-25 | Stop reason: HOSPADM

## 2023-04-21 RX ORDER — CHOLECALCIFEROL (VITAMIN D3) 125 MCG
5 CAPSULE ORAL NIGHTLY PRN
Status: DISCONTINUED | OUTPATIENT
Start: 2023-04-21 | End: 2023-04-25 | Stop reason: HOSPADM

## 2023-04-21 RX ORDER — ACETAMINOPHEN 325 MG/1
650 TABLET ORAL EVERY 4 HOURS PRN
Status: DISCONTINUED | OUTPATIENT
Start: 2023-04-21 | End: 2023-04-25 | Stop reason: HOSPADM

## 2023-04-21 RX ORDER — POTASSIUM CHLORIDE 750 MG/1
40 CAPSULE, EXTENDED RELEASE ORAL AS NEEDED
Status: DISCONTINUED | OUTPATIENT
Start: 2023-04-21 | End: 2023-04-25 | Stop reason: HOSPADM

## 2023-04-21 RX ADMIN — IPRATROPIUM BROMIDE AND ALBUTEROL SULFATE 3 ML: .5; 3 SOLUTION RESPIRATORY (INHALATION) at 19:18

## 2023-04-21 RX ADMIN — METHYLPREDNISOLONE SODIUM SUCCINATE 80 MG: 125 INJECTION, POWDER, FOR SOLUTION INTRAMUSCULAR; INTRAVENOUS at 17:44

## 2023-04-21 RX ADMIN — IPRATROPIUM BROMIDE AND ALBUTEROL SULFATE 3 ML: .5; 3 SOLUTION RESPIRATORY (INHALATION) at 13:42

## 2023-04-21 RX ADMIN — LEVOFLOXACIN 750 MG: 5 INJECTION, SOLUTION INTRAVENOUS at 13:09

## 2023-04-21 RX ADMIN — HEPARIN SODIUM 5000 UNITS: 5000 INJECTION INTRAVENOUS; SUBCUTANEOUS at 21:11

## 2023-04-21 RX ADMIN — POTASSIUM CHLORIDE 40 MEQ: 10 CAPSULE, COATED, EXTENDED RELEASE ORAL at 17:44

## 2023-04-21 RX ADMIN — ONDANSETRON 4 MG: 2 INJECTION INTRAMUSCULAR; INTRAVENOUS at 13:57

## 2023-04-21 RX ADMIN — GUAIFENESIN 600 MG: 600 TABLET, EXTENDED RELEASE ORAL at 21:11

## 2023-04-21 RX ADMIN — MONTELUKAST 10 MG: 10 TABLET, FILM COATED ORAL at 21:11

## 2023-04-21 RX ADMIN — Medication 10 ML: at 21:54

## 2023-04-21 RX ADMIN — IOPAMIDOL 59 ML: 755 INJECTION, SOLUTION INTRAVENOUS at 12:55

## 2023-04-21 RX ADMIN — POTASSIUM CHLORIDE 40 MEQ: 10 CAPSULE, COATED, EXTENDED RELEASE ORAL at 21:53

## 2023-04-21 RX ADMIN — SODIUM CHLORIDE 1000 ML: 9 INJECTION, SOLUTION INTRAVENOUS at 13:09

## 2023-04-21 RX ADMIN — IPRATROPIUM BROMIDE AND ALBUTEROL SULFATE 3 ML: 2.5; .5 SOLUTION RESPIRATORY (INHALATION) at 13:42

## 2023-04-21 NOTE — H&P
[facility] Medicine  HISTORY AND PHYSICAL      Date of Admission: 4/21/2023  Primary Care Physician: Marina Kitchen MD    Subjective     Chief Complaint:   Shortness of breath    History of Present Illness  The patient he is a 72-year-old male admitted to the hospital via the ER.  The patient comes in on day of admission complaining of severe shortness of breath.  On evaluation in the ER initial ABG shows pH 7.1 PCO2 is 91.  Patient has been having cough shortness of breath hemoptysis fevers chills.  No nausea or vomiting.  Positive for headache and myalgias.  Fatigue and weakness.  Labs have been reviewed the urine shows no signs of infection.  Blood cultures have been obtained.  BUN is 10 creatinine is 0.4 liver function test are within normal limits potassium was 3.4.  White count 5.2 H&H is 9.2 and 30 and platelets are 188.  The patient will be admitted to CCU and transferred to medical floor if remains stable BiPAP.    Review of Systems   Constitutional: Positive for fatigue. Negative for chills and fever.   HENT: Negative.    Respiratory: Positive for cough and shortness of breath.    Cardiovascular: Negative.    Gastrointestinal: Negative.    Endocrine: Negative.    Genitourinary: Negative.    Musculoskeletal: Negative.    Skin: Negative.    Neurological: Positive for dizziness and weakness.   Hematological: Negative.    Psychiatric/Behavioral: Positive for confusion.        Otherwise complete ROS reviewed and negative except as mentioned in the HPI.    Past Medical History:   Past Medical History:   Diagnosis Date   • A-fib    • Anesthesia     hip surgery (spinal caused him unpleasant sensations never wants it again)   • Arthritis    • Blood in sputum 02/22/2023    bright red blood, teaspoon - tablespoon daily   • COPD (chronic obstructive pulmonary disease)    • CVA (cerebral vascular accident) 12/24/2022   • Hearing aid worn     bilateral   • History of  pulmonary embolism    • History of recurrent pneumonia    • Hypertension    • Lung cancer 03/17/2023   • Oxygen dependent     since @ age 65   • Pulmonary nodules    • Risk for falls     uses walker   • Rotator cuff syndrome of right shoulder    • Tachycardia    • 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 HTN/Eliquis   • Wears glasses     readers   • Wears glasses     reading     Past Surgical History:  Past Surgical History:   Procedure Laterality Date   • HIP ARTHROPLASTY Right    • RIB BIOPSY Left 03/17/2023 11th   • VENOUS ACCESS DEVICE (PORT) INSERTION N/A 3/30/2023    Procedure: MEDIPORT PLACEMENT          (C-ARM);  Surgeon: Lavon Cochran MD;  Location: [facility];  Service: General;  Laterality: N/A;     Social History:  reports that he quit smoking about 14 years ago. His smoking use included cigarettes. He started smoking about 62 years ago. He has a 141.00 pack-year smoking history. He has never used smokeless tobacco. He reports that he does not currently use alcohol. He reports that he does not use drugs.    Family History: family history includes Brain cancer in his brother; Cancer in his brother and sister; No Known Problems in his daughter, daughter, and son.       Allergies:  Allergies   Allergen Reactions   • Amoxicillin Anaphylaxis   • Albuterol Irritability     Facial flushing and palpitations.   • Asmanex (120 Metered Doses) [Mometasone Furoate] Unknown - Low Severity   • Gabapentin Hallucinations   • Lortab [Hydrocodone-Acetaminophen] Other (See Comments)     Can't remember   • Morphine And Related Hallucinations   • Prilosec [Omeprazole] Other (See Comments)     unknown   • Sodium Clavulanate Unknown - High Severity   • Statins GI Intolerance   • Sulfa Antibiotics Other (See Comments)     Can't remember also more and can't remember   • Symbicort [Budesonide-Formoterol Fumarate] Unknown (See Comments)     Doesn't remember reaction type       Medications:  Prior to Admission  medications    Medication Sig Start Date End Date Taking? Authorizing Provider   cholecalciferol (VITAMIN D3) 10 MCG (400 UNIT) tablet Take 1 tablet by mouth Daily. 50 mcg daily   Yes Qamar Javed MD   dexamethasone (DECADRON) 4 MG tablet Take 1 tablet twice daily starting the day before chemo, day of chemo, and day after chemo.  Take with food. 3/27/23  Yes Omega Davis MD   dilTIAZem (TIAZAC) 180 MG 24 hr capsule Take 1 capsule by mouth Daily. 3/1/23  Yes Neville Meeks MD   ferrous sulfate 324 MG tablet delayed-release Take 1 tablet by mouth Daily With Breakfast. 1/13/23  Yes Fan Rob MD   folic acid (FOLVITE) 1 MG tablet Take 1 tablet by mouth Daily. 2/11/23  Yes Omega Davis MD   furosemide (LASIX) 20 MG tablet Take 1 tablet by mouth Daily As Needed (Swelling of lower extremity). 4/17/23  Yes Omega Davis MD   ipratropium (ATROVENT) 0.02 % nebulizer solution Take 2.5 mL by nebulization 3 (Three) Times a Day. Sub amount for how much insurance allows 7/19/21  Yes Anita Toney MD   melatonin 5 MG tablet tablet Take 1 tablet by mouth At Night As Needed.   Yes ProviderQamar MD   montelukast (SINGULAIR) 10 MG tablet Take 1 tablet by mouth Every Night.   Yes Qamar Javed MD   OLANZapine (zyPREXA) 5 MG tablet Take 1 tablet by mouth Every Night. Take on days 2, 3 and 4 after chemotherapy. 3/27/23  Yes Omega Davis MD   ondansetron (ZOFRAN) 8 MG tablet Take 1 tablet by mouth 3 (Three) Times a Day As Needed for Nausea or Vomiting. 3/27/23  Yes Omega Davis MD   predniSONE (DELTASONE) 5 MG tablet Take 1 tablet by mouth Every Other Day.   Yes Qamar Javed MD   tiotropium bromide-olodaterol (STIOLTO RESPIMAT) 2.5-2.5 MCG/ACT aerosol solution inhaler Inhale 2 puffs Daily.   Yes Qamar Javed MD   traMADol (ULTRAM) 50 MG tablet Take 1 tablet by mouth Every 6 (Six) Hours As Needed for Moderate Pain for up to 12 doses. 3/30/23  Yes Lavon Cochran MD  "  vitamin B-12 (CYANOCOBALAMIN) 1000 MCG tablet Take 1 tablet by mouth Daily. 2/11/23  Yes Omega Davis MD   ipratropium (ATROVENT HFA) 17 MCG/ACT inhaler Inhale 2 puffs 4 (Four) Times a Day As Needed for Wheezing.    Provider, MD Qamar   dilTIAZem XR (DILACOR XR) 180 MG 24 hr capsule Take 1 capsule by mouth Daily. 6/30/22 10/7/22  Neville Meeks MD     I have utilized all available immediate resources to obtain, update, and review the patient's current medications.    Objective     Vital Signs: /60   Pulse 78   Temp 98.1 °F (36.7 °C) (Oral)   Resp 18   Ht 165.1 cm (65\")   Wt 58.5 kg (129 lb)   SpO2 100%   BMI 21.47 kg/m²   Physical Exam  Vitals and nursing note reviewed.   Constitutional:       Appearance: He is ill-appearing.      Comments: Cachectic appearing male.   HENT:      Head: Normocephalic and atraumatic.      Right Ear: External ear normal.      Left Ear: External ear normal.      Nose: Nose normal.      Mouth/Throat:      Mouth: Mucous membranes are dry.      Pharynx: Oropharynx is clear.   Eyes:      General: No scleral icterus.     Extraocular Movements: Extraocular movements intact.      Conjunctiva/sclera: Conjunctivae normal.      Pupils: Pupils are equal, round, and reactive to light.   Cardiovascular:      Rate and Rhythm: Normal rate and regular rhythm.      Pulses: Normal pulses.      Heart sounds: Normal heart sounds.   Pulmonary:      Effort: Pulmonary effort is normal.      Breath sounds: Wheezing and rhonchi present.   Abdominal:      General: Bowel sounds are normal. There is no distension.      Palpations: Abdomen is soft.      Tenderness: There is no abdominal tenderness.   Musculoskeletal:         General: Normal range of motion.      Cervical back: Normal range of motion and neck supple.   Skin:     General: Skin is warm and dry.      Coloration: Skin is not jaundiced.   Neurological:      General: No focal deficit present.      Mental Status: He is alert and " oriented to person, place, and time.      Motor: Weakness present.   Psychiatric:         Mood and Affect: Mood normal.         Behavior: Behavior normal.              Results Reviewed:  Lab Results (last 24 hours)     Procedure Component Value Units Date/Time    POC Glucose Once [[id number]]  (Normal) Collected: 04/21/23 1325    Specimen: Blood Updated: 04/21/23 1349     Glucose 122 mg/dL      Comment: Result Not ConfirmedOperator: [id number] BRIANNE MEGANMeter ID: [id number]       Blood Gas, Arterial - [[id number]]  (Abnormal) Collected: 04/21/23 1341    Specimen: Arterial Blood Updated: 04/21/23 1341     Site Left Radial     Anson's Test N/A     pH, Arterial 7.146 pH units      Comment: 85 Value below critical limit        pCO2, Arterial 91.8 mm Hg      Comment: 86 Value above critical limit        pO2, Arterial 126.0 mm Hg      Comment: 83 Value above reference range        HCO3, Arterial 31.7 mmol/L      Comment: 83 Value above reference range        Base Excess, Arterial 1.2 mmol/L      O2 Saturation, Arterial 98.1 %      Barometric Pressure for Blood Gas 746 mmHg      Modality Nasal Cannula     Flow Rate 4.0 lpm      Ventilator Mode NA     Collected by [email or web address]     Comment: Meter: [id number]     :  [id number]       Lactic Acid, Plasma [[id number]]  (Normal) Collected: 04/21/23 1233    Specimen: Blood Updated: 04/21/23 1250     Lactate 0.8 mmol/L     Blood Culture - Blood, Hand, Right [[id number]] Collected: 04/21/23 1233    Specimen: Blood from Hand, Right Updated: 04/21/23 1237    Blood Culture - Blood, Arm, Left [[id number]] Collected: 04/21/23 1216    Specimen: Blood from Arm, Left Updated: 04/21/23 1216    [place] Draw [[id number]] Collected: 04/21/23 1050    Specimen: Blood Updated: 04/21/23 1201    Narrative:      The following orders were created for panel order [place] Draw.  Procedure                               Abnormality         Status                     ---------                                -----------         ------                     Green Top (Gel)[[id number]]                                  Final result               Lavender Top[[id number]]                                     Final result               Gold Top - SST[[id number]]                                   Final result               Light Blue Top[[id number]]                                   Final result                 Please view results for these tests on the individual orders.    Gold Top - SST [[id number]] Collected: 04/21/23 1050    Specimen: Blood Updated: 04/21/23 1201     Extra Tube Hold for add-ons.     Comment: Auto resulted.       Green Top (Gel) [[id number]] Collected: 04/21/23 1050    Specimen: Blood Updated: 04/21/23 1201     Extra Tube Hold for add-ons.     Comment: Auto resulted.       Lavender Top [[id number]] Collected: 04/21/23 1050    Specimen: Blood Updated: 04/21/23 1201     Extra Tube hold for add-on     Comment: Auto resulted       Light Blue Top [[id number]] Collected: 04/21/23 1050    Specimen: Blood Updated: 04/21/23 1201     Extra Tube Hold for add-ons.     Comment: Auto resulted       Manual Differential [[id number]]  (Abnormal) Collected: 04/21/23 1050    Specimen: Blood Updated: 04/21/23 1142     Neutrophil % 51.0 %      Lymphocyte % 19.0 %      Monocyte % 21.0 %      Eosinophil % 6.0 %      Basophil % 1.0 %      Metamyelocyte % 2.0 %      Neutrophils Absolute 2.65 10*3/mm3      Lymphocytes Absolute 0.99 10*3/mm3      Monocytes Absolute 1.09 10*3/mm3      Eosinophils Absolute 0.31 10*3/mm3      Basophils Absolute 0.05 10*3/mm3      Anisocytosis Slight/1+     Hypochromia --     Comment: Few hypochromic cells observed        WBC Morphology Normal     Platelet Estimate Adequate    Comprehensive Metabolic Panel [[id number]]  (Abnormal) Collected: 04/21/23 1050    Specimen: Blood Updated: 04/21/23 1116     Glucose 122 mg/dL      BUN 10 mg/dL      Creatinine 0.41 mg/dL      Sodium 139 mmol/L      Potassium 3.4 mmol/L       Chloride 101 mmol/L      CO2 31.0 mmol/L      Calcium 8.5 mg/dL      Total Protein 6.2 g/dL      Albumin 3.3 g/dL      ALT (SGPT) 28 U/L      AST (SGOT) 21 U/L      Alkaline Phosphatase 98 U/L      Total Bilirubin 0.2 mg/dL      Globulin 2.9 gm/dL      A/G Ratio 1.1 g/dL      BUN/Creatinine Ratio 24.4     Anion Gap 7.0 mmol/L      eGFR 114.3 mL/min/1.73     Narrative:      GFR Normal >60  Chronic Kidney Disease <60  Kidney Failure <15    The GFR formula is only valid for adults with stable renal function between ages 18 and 70.    Single High Sensitivity Troponin T [[id number]]  (Normal) Collected: 04/21/23 1050    Specimen: Blood Updated: 04/21/23 1115     HS Troponin T 13 ng/L     Narrative:      High Sensitive Troponin T Reference Range:  <10.0 ng/L- Negative Female for AMI  <15.0 ng/L- Negative Male for AMI  >=10 - Abnormal Female indicating possible myocardial injury.  >=15 - Abnormal Male indicating possible myocardial injury.   Clinicians would have to utilize clinical acumen, EKG, Troponin, and serial changes to determine if it is an Acute Myocardial Infarction or myocardial injury due to an underlying chronic condition.         D-dimer, Quantitative [[id number]]  (Abnormal) Collected: 04/21/23 1050    Specimen: Blood Updated: 04/21/23 1114     D-Dimer, Quantitative 1,770 ng/mL (FEU)     Narrative:      According to the assay 's published package insert, a normal (<500 ng/mL (FEU)) D-dimer result in conjunction with a non-high clinical probability assessment, excludes deep vein thrombosis (DVT) and pulmonary embolism (PE) with high sensitivity.    D-dimer values increase with age and this can make VTE exclusion of an older population difficult. To address this, the [employer], based on best available evidence and recent guidelines, recommends that clinicians use age-adjusted D-dimer thresholds in patients greater than 50 years of age with: a) a low probability of PE who do  "not meet all Pulmonary Embolism Rule Out Criteria, or b) in those with intermediate probability of PE.   The formula for an age-adjusted D-dimer cut-off is \"age*10\".  For example, a 60 year old patient would have an age-adjusted cut-off of 600 ng/mL (FEU) and an 80 year old 800 ng/mL (FEU).      BNP [[id number]]  (Normal) Collected: 04/21/23 1050    Specimen: Blood Updated: 04/21/23 1114     proBNP 64.9 pg/mL     Narrative:      Among patients with dyspnea, NT-proBNP is highly sensitive for the detection of acute congestive heart failure. In addition NT-proBNP of <300 pg/ml effectively rules out acute congestive heart failure with 99% negative predictive value.      CBC & Differential [[id number]]  (Abnormal) Collected: 04/21/23 1050    Specimen: Blood Updated: 04/21/23 1103    Narrative:      The following orders were created for panel order CBC & Differential.  Procedure                               Abnormality         Status                     ---------                               -----------         ------                     CBC Auto Differential[[id number]]        Abnormal            Final result               Scan Slide[[id number]]                                                                    Please view results for these tests on the individual orders.    CBC Auto Differential [[id number]]  (Abnormal) Collected: 04/21/23 1050    Specimen: Blood Updated: 04/21/23 1103     WBC 5.20 10*3/mm3      RBC 3.23 10*6/mm3      Hemoglobin 9.2 g/dL      Hematocrit 30.0 %      MCV 92.9 fL      MCH 28.5 pg      MCHC 30.7 g/dL      RDW 14.8 %      RDW-SD 45.7 fl      MPV 9.4 fL      Platelets 188 10*3/mm3         Imaging Results (Last 24 Hours)     Procedure Component Value Units Date/Time    CT Angiogram Chest [[id number]] Collected: 04/21/23 1339     Updated: 04/21/23 1405    Narrative:      Indication:  Chest pain.    TECHNIQUE:  IV contrast was administered and axial images from the thoracic inlet through  the " diaphragms were performed.  3D multiplanar reformats of the thoracic aorta  were completed on a separate workstation under concurrent supervision.    COMPARISON:  02/13/2023.    FINDINGS:  No pulmonary embolism is seen.  No thoracic aortic dissection or aneurysm is  seen.  A small right pleural effusion is seen and there is extensive right  middle and lower lobe consolidation suspicious for pneumonia.  A right middle  lobe mass seen on the prior CT is difficult to differentiate from the new  surrounding inflammatory changes.  The right hilar and mediastinal  lymphadenopathy is likely secondary to the lung changes.  Severe upper lobe  dominant emphysema is seen.  Visualized upper abdomen shows no significant  abnormality.  On bone windows, there is a lytic expansile abnormality of the  left 11th rib that is stable, but suspicious for metastatic disease.  This large  abnormality measures 5 x 3.3 cm on coronal image 113 of series 7.  The remaining  bones show no significant abnormality.      Impression:      1.  No pulmonary embolism seen.    2.  No thoracic aortic dissection or aneurysm seen.    3.  Worsening airspace disease of the right lung suspicious for pneumonia.  An  underlying right middle lobe lung mass persists but is difficult to clearly  define due to the new changes.  This mass suspicious for malignancy can be  further evaluated with bronchoscopy.    4.  Again seen is a lytic lesion of the left 11th rib measuring 5 x 3.3 cm  associated with a large soft tissue component suspicious for metastatic disease.    5.  Severe upper lobe dominant emphysema.    XR Chest 2 View [[id number]] Collected: 04/21/23 1132     Updated: 04/21/23 1138    Narrative:      FINDINGS:  Right IJ Mediport terminating in the mid superior vena cava. Cardiomediastinal  silhouette is within normal limits.  Emphysema. There is consolidation within  the  middle lobe and right lower lobe consistent with pneumonia. Osseous  structures  age-appropriate.      Impression:      1.  Pneumonia in the middle lobe and right lower lobe.    2.  Emphysema.        I have personally reviewed and interpreted the radiology studies and ECG obtained at time of admission.     Assessment / Plan     Assessment:   Active Hospital Problems    Diagnosis    • **Pneumonia      Impressions/treatment  Pneumonia right side.  Middle and lower lobe  Attempt to tolerate BiPAP.  ABG within an hour.  Initial ABG shows a pH 7.1  PCO2 is 91.8, PO2 is 126, bicarb 31.7.  Patient has multiple allergies allergic to penicillin we will start the patient on Levaquin.  Continue DuoNebs  Continue Singulair  Continue guaifenesin  Continue Solu-Medrol.  Incentive spirometry.  Monitor for possible intubation.  Follow blood cultures.    COPD exacerbation  Continue treatments as noted above.    Check echocardiogram.    Hypertension   continue Cardizem    Iron deficiency  Continue ferrous sulfate    GERD  Continue Protonix.    Deconditioning  PT OT    CODE STATUS full code  DVT prophylaxis heparin      Medical Decision Making  Number and Complexity of problems: 3 complex conditions  Differential Diagnosis: Pneumonia versus PE    Conditions and Status:        Condition is unchanged.     [facility] Data  External documents reviewed: Hospital document  My EKG interpretation: 4/21/2023.  Normal sinus rhythm  Incomplete right bundle branch block   My plain film interpretation:   Chest x-ray completed 4/21/2023  IMPRESSION:  1.  Pneumonia in the middle lobe and right lower lobe.   Tests considered but not ordered: None  April 21, 2023  IMPRESSION:  1.  No pulmonary embolism seen.     2.  No thoracic aortic dissection or aneurysm seen.     3.  Worsening airspace disease of the right lung suspicious for pneumonia.  An  underlying right middle lobe lung mass persists but is difficult to clearly  define due to the new changes.  This mass suspicious for malignancy can be  further evaluated with  bronchoscopy.     4.  Again seen is a lytic lesion of the left 11th rib measuring 5 x 3.3 cm  associated with a large soft tissue component suspicious for metastatic disease.     5.  Severe upper lobe dominant emphysema.      Decision rules/scores evaluated (example [id number], Wells, etc): N/A     Discussed with: The ER physician, respiratory therapist, the patient and his significant other.  They agree to proceed with current treatment plan.     Treatment Plan  As above    Care Planning  Shared decision making: Patient updated on current status and informed of proposed care plan; is in agreement with plan  Code status and discussions: Full code    Disposition  Social Determinants of Health that impact treatment or disposition: None  I expect the patient to be discharged to skilled nursing facility    I confirmed that the patient's Advance Care Plan is present, code status is documented, or surrogate decision maker is listed in the patient's medical record.       The patient's surrogate decision maker is the patient's significant other    I discussed my findings and recommendations with the the patient and his significant other.    Estimated length of stay is 3 days  Critical care time 40 minutes.  For admission and patient evaluation.    The patient was seen and examined by me on 4/21/2023 at 2:30 PM    Electronically signed by Eliazar Zhang DO, 04/21/23, 15:05 CDT.

## 2023-04-21 NOTE — NURSING NOTE
"Pt presented today c/o spitting up blood that is much worse than last week. He states \"jimenez been spitting up teaspoon fulls at a time\" then he states that this morning it was yellow in color. He is also c/o worsening swelling of his feet. Dr Davis made aware of situation and advises pt be evaluated in ER. Pt and family informed and pt taken to ER via wheelchair.   "

## 2023-04-21 NOTE — PROGRESS NOTES
Per Dr Burton leave patient on BiPAP for 1 hour and recheck ABG results. Expressed concerns pertaining to low VT consistently in the 200 range and even as low at 80. Patient will breathe deeper if reminded every minute or so. Will recheck ABG in 1 hour and reassess pt.

## 2023-04-21 NOTE — PLAN OF CARE
Goal Outcome Evaluation:           Progress: no change   A&O x4, wife at bedside. Remains on BiPap, voicing frustration over having to wear the mask. Educated on need for BiPap. VSS. Urine output adequate.

## 2023-04-21 NOTE — Clinical Note
Level of Care: Critical Care [6]   Diagnosis: Pneumonia [[id number]]   Admitting Physician: MERCEDES ALLRED [[id number]]   Certification: I Certify That Inpatient Hospital Services Are Medically Necessary For Greater Than 2 Midnights

## 2023-04-21 NOTE — ED NOTES
This RN in pt room when O2 dropped to 55% in room. This RN put pt on a nonrebreather after oxygen saturation dropped. Pt now in room 6. Multiple staff at bedside, Dr. Burton at bedside.

## 2023-04-21 NOTE — ED NOTES
Nursing report ED to floor  Brian Garcia  72 y.o.  male    HPI:   Chief Complaint   Patient presents with    Coughing Up Blood       Admitting doctor:   Eliazar Zhang DO    Consulting provider(s):  Consults       No orders found from 3/23/2023 to 4/22/2023.             Admitting diagnosis:   There were no encounter diagnoses.    Code status:   Current Code Status       Date Active Code Status Order ID Comments User Context       4/21/2023 1449 CPR (Attempt to Resuscitate) [id number]  Eliazar Zhang DO ED        Question Answer    Code Status (Patient has no pulse and is not breathing) CPR (Attempt to Resuscitate)    Medical Interventions (Patient has pulse or is breathing) Full Support    Level Of Support Discussed With Patient                    Allergies:   Amoxicillin, Albuterol, Asmanex (120 metered doses) [mometasone furoate], Gabapentin, Lortab [hydrocodone-acetaminophen], Morphine and related, Prilosec [omeprazole], Sodium clavulanate, Statins, Sulfa antibiotics, and Symbicort [budesonide-formoterol fumarate]    Intake and Output    Intake/Output Summary (Last 24 hours) at 4/21/2023 1508  Last data filed at 4/21/2023 1507  Gross per 24 hour   Intake 1150 ml   Output --   Net 1150 ml       Weight:       04/21/23  1015   Weight: 58.5 kg (129 lb)       Most recent vitals:   Vitals:    04/21/23 1427 04/21/23 1430 04/21/23 1456 04/21/23 1507   BP:   98/52 101/52   BP Location:       Patient Position:       Pulse: 80 78 74 76   Resp:    21   Temp:       TempSrc:       SpO2: 100% 100% 100% 100%   Weight:       Height:         Oxygen Therapy: bipap    Active LDAs/IV Access:   Lines, Drains & Airways       Active LDAs       Name Placement date Placement time Site Days    Peripheral IV 04/21/23 1051 Anterior;Right Forearm 04/21/23  1051  Forearm  less than 1    Peripheral IV 04/21/23 1325 Posterior;Right Hand 04/21/23  1325  Hand  less than 1    Single Lumen Implantable Port 03/30/23 Right Chest  "03/30/23  1203  Chest  22                    Labs (abnormal labs have a star):   Labs Reviewed   COMPREHENSIVE METABOLIC PANEL - Abnormal; Notable for the following components:       Result Value    Glucose 122 (*)     Creatinine 0.41 (*)     Potassium 3.4 (*)     CO2 31.0 (*)     Calcium 8.5 (*)     Albumin 3.3 (*)     All other components within normal limits    Narrative:     GFR Normal >60  Chronic Kidney Disease <60  Kidney Failure <15    The GFR formula is only valid for adults with stable renal function between ages 18 and 70.   D-DIMER, QUANTITATIVE - Abnormal; Notable for the following components:    D-Dimer, Quantitative 1,770 (*)     All other components within normal limits    Narrative:     According to the assay 's published package insert, a normal (<500 ng/mL (FEU)) D-dimer result in conjunction with a non-high clinical probability assessment, excludes deep vein thrombosis (DVT) and pulmonary embolism (PE) with high sensitivity.    D-dimer values increase with age and this can make VTE exclusion of an older population difficult. To address this, the [employer], based on best available evidence and recent guidelines, recommends that clinicians use age-adjusted D-dimer thresholds in patients greater than 50 years of age with: a) a low probability of PE who do not meet all Pulmonary Embolism Rule Out Criteria, or b) in those with intermediate probability of PE.   The formula for an age-adjusted D-dimer cut-off is \"age*10\".  For example, a 60 year old patient would have an age-adjusted cut-off of 600 ng/mL (FEU) and an 80 year old 800 ng/mL (FEU).     CBC WITH AUTO DIFFERENTIAL - Abnormal; Notable for the following components:    RBC 3.23 (*)     Hemoglobin 9.2 (*)     Hematocrit 30.0 (*)     MCHC 30.7 (*)     All other components within normal limits   MANUAL DIFFERENTIAL - Abnormal; Notable for the following components:    Lymphocyte % 19.0 (*)     Monocyte % 21.0 (*)  "    Metamyelocyte % 2.0 (*)     Monocytes Absolute 1.09 (*)     All other components within normal limits   BLOOD GAS, ARTERIAL - Abnormal; Notable for the following components:    pH, Arterial 7.146 (*)     pCO2, Arterial 91.8 (*)     pO2, Arterial 126.0 (*)     HCO3, Arterial 31.7 (*)     All other components within normal limits   BNP (IN-HOUSE) - Normal    Narrative:     Among patients with dyspnea, NT-proBNP is highly sensitive for the detection of acute congestive heart failure. In addition NT-proBNP of <300 pg/ml effectively rules out acute congestive heart failure with 99% negative predictive value.     SINGLE HSTROPONIN T - Normal    Narrative:     High Sensitive Troponin T Reference Range:  <10.0 ng/L- Negative Female for AMI  <15.0 ng/L- Negative Male for AMI  >=10 - Abnormal Female indicating possible myocardial injury.  >=15 - Abnormal Male indicating possible myocardial injury.   Clinicians would have to utilize clinical acumen, EKG, Troponin, and serial changes to determine if it is an Acute Myocardial Infarction or myocardial injury due to an underlying chronic condition.        LACTIC ACID, PLASMA - Normal   POCT GLUCOSE FINGERSTICK - Normal   BLOOD CULTURE   BLOOD CULTURE   RAINBOW DRAW    Narrative:     The following orders were created for panel order [place] Draw.  Procedure                               Abnormality         Status                     ---------                               -----------         ------                     Green Top (Gel)[[id number]]                                  Final result               Lavender Top[[id number]]                                     Final result               Gold Top - SST[[id number]]                                   Final result               Light Blue Top[[id number]]                                   Final result                 Please view results for these tests on the individual orders.   BLOOD GAS, ARTERIAL   CBC AND DIFFERENTIAL     Narrative:     The following orders were created for panel order CBC & Differential.  Procedure                               Abnormality         Status                     ---------                               -----------         ------                     CBC Auto Differential[[id number]]        Abnormal            Final result               Scan Slide[[id number]]                                                                    Please view results for these tests on the individual orders.   GREEN TOP   LAVENDER TOP   GOLD TOP - SST   LIGHT BLUE TOP       Meds given in ED:   Medications   sodium chloride 0.9 % flush 10 mL (has no administration in time range)   ipratropium-albuterol (DUO-NEB) nebulizer solution 3 mL ( Nebulization Canceled Entry 4/21/23 1630)   sodium chloride 0.9 % bolus 1,000 mL (0 mL Intravenous Stopped 4/21/23 1507)   levoFLOXacin (LEVAQUIN) 750 mg/150 mL D5W (premix) (LEVAQUIN) 750 mg (0 mg Intravenous Stopped 4/21/23 1507)   iopamidol (ISOVUE-370) 76 % injection 100 mL (59 mL Intravenous Given 4/21/23 1255)   ondansetron (ZOFRAN) injection 4 mg (4 mg Intravenous Given 4/21/23 1357)           NIH Stroke Scale:       Isolation/Infection(s):  No active isolations   No active infections     COVID Testing  Collected no  Resulted n/a    Nursing report ED to floor:  Mental status: alert and oriented x4  Ambulatory status: bed bound at this time, but was stand by assist  Precautions: none    ED nurse phone extentsion- [phone]

## 2023-04-21 NOTE — ED NOTES
Dr. Burton at bedside, states to leave pt on bipap for 1 hour and repeat ABG and see if pt improves before opting for intubation. Pt states he feels better, however RT did express concern for low tidal volumes to Dr. Burton. Dr. Burton still wishes to leave pt on bipap at this time.

## 2023-04-21 NOTE — ED PROVIDER NOTES
Subjective   History of Present Illness  The patient is a 72 years old with past medical history of COPD, hypertension, and lung cancer who presented here today from Dr. Davis's office due to coughing up blood.  Denies any fever chills or sweating.  He said he has been going on for the past 4 to 5 hours.  Denies any chest pain.  Patient uses oxygen at home about 3 L.    History provided by:  Patient   used: No        Review of Systems   Respiratory: Positive for shortness of breath.    All other systems reviewed and are negative.      Past Medical History:   Diagnosis Date   • A-fib    • Anesthesia     hip surgery (spinal caused him unpleasant sensations never wants it again)   • Arthritis    • Blood in sputum 02/22/2023    bright red blood, teaspoon - tablespoon daily   • COPD (chronic obstructive pulmonary disease)    • CVA (cerebral vascular accident) 12/24/2022   • Hearing aid worn     bilateral   • History of pulmonary embolism    • History of recurrent pneumonia    • Hypertension    • Lung cancer 03/17/2023   • Oxygen dependent     since @ age 65   • Pulmonary nodules    • Risk for falls     uses walker   • Rotator cuff syndrome of right shoulder    • Tachycardia    • 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 HTN/Eliquis   • Wears glasses     readers   • Wears glasses     reading       Allergies   Allergen Reactions   • Amoxicillin Anaphylaxis   • Albuterol Irritability     Facial flushing and palpitations.   • Asmanex (120 Metered Doses) [Mometasone Furoate] Unknown - Low Severity   • Gabapentin Hallucinations   • Lortab [Hydrocodone-Acetaminophen] Other (See Comments)     Can't remember   • Morphine And Related Hallucinations   • Prilosec [Omeprazole] Other (See Comments)     unknown   • Sodium Clavulanate Unknown - High Severity   • Statins GI Intolerance   • Sulfa Antibiotics Other (See Comments)     Can't remember also more and can't remember   • Symbicort  [Budesonide-Formoterol Fumarate] Unknown (See Comments)     Doesn't remember reaction type       Past Surgical History:   Procedure Laterality Date   • HIP ARTHROPLASTY Right    • RIB BIOPSY Left 2023   • VENOUS ACCESS DEVICE (PORT) INSERTION N/A 3/30/2023    Procedure: MEDIPORT PLACEMENT          (C-ARM);  Surgeon: Lavon Cochran MD;  Location[facility];  Service: General;  Laterality: N/A;       Family History   Problem Relation Age of Onset   • Cancer Sister    • Brain cancer Brother    • Cancer Brother    • No Known Problems Daughter    • No Known Problems Daughter    • No Known Problems Son        Social History     Socioeconomic History   • Marital status:    • Number of children: 3   • Highest education level: Associate degree: occupational, technical, or vocational program   Tobacco Use   • Smoking status: Former     Packs/day: 3.00     Years: 47.00     Pack years: 141.00     Types: Cigarettes     Start date:      Quit date: 2008     Years since quittin.9   • Smokeless tobacco: Never   Vaping Use   • Vaping Use: Never used   Substance and Sexual Activity   • Alcohol use: Not Currently     Comment: not last 40 years   • Drug use: Never   • Sexual activity: Not Currently     Partners: Female           Objective   Physical Exam  Vitals and nursing note reviewed.   Constitutional:       Appearance: Normal appearance. He is normal weight.   HENT:      Head: Normocephalic and atraumatic.      Right Ear: Tympanic membrane, ear canal and external ear normal.      Left Ear: Tympanic membrane, ear canal and external ear normal.      Nose: Nose normal.   Eyes:      Extraocular Movements: Extraocular movements intact.      Conjunctiva/sclera: Conjunctivae normal.      Pupils: Pupils are equal, round, and reactive to light.   Cardiovascular:      Rate and Rhythm: Normal rate and regular rhythm.      Pulses: Normal pulses.      Heart sounds: Normal heart sounds.   Pulmonary:       Effort: Pulmonary effort is normal.      Breath sounds: Decreased breath sounds present.   Abdominal:      General: Abdomen is flat. Bowel sounds are normal.      Palpations: Abdomen is soft.   Musculoskeletal:         General: Normal range of motion.      Cervical back: Normal range of motion and neck supple.   Skin:     General: Skin is warm.      Capillary Refill: Capillary refill takes less than 2 seconds.   Neurological:      General: No focal deficit present.      Mental Status: He is alert and oriented to person, place, and time.   Psychiatric:         Mood and Affect: Mood normal.         Behavior: Behavior normal.         Procedures           ED Course  ED Course as of 04/21/23 1553   Fri Apr 21, 2023   1340 After patient came back from the CT he still having more shortness of breath.  ABG was done and also DuoNeb was given to patient. [MO]      ED Course User Index  [MO] Kaden Burton MD           Labs Reviewed   COMPREHENSIVE METABOLIC PANEL - Abnormal; Notable for the following components:       Result Value    Glucose 122 (*)     Creatinine 0.41 (*)     Potassium 3.4 (*)     CO2 31.0 (*)     Calcium 8.5 (*)     Albumin 3.3 (*)     All other components within normal limits    Narrative:     GFR Normal >60  Chronic Kidney Disease <60  Kidney Failure <15    The GFR formula is only valid for adults with stable renal function between ages 18 and 70.   D-DIMER, QUANTITATIVE - Abnormal; Notable for the following components:    D-Dimer, Quantitative 1,770 (*)     All other components within normal limits    Narrative:     According to the assay 's published package insert, a normal (<500 ng/mL (FEU)) D-dimer result in conjunction with a non-high clinical probability assessment, excludes deep vein thrombosis (DVT) and pulmonary embolism (PE) with high sensitivity.    D-dimer values increase with age and this can make VTE exclusion of an older population difficult. To address this, the  "American College [employer], based on best available evidence and recent guidelines, recommends that clinicians use age-adjusted D-dimer thresholds in patients greater than 50 years of age with: a) a low probability of PE who do not meet all Pulmonary Embolism Rule Out Criteria, or b) in those with intermediate probability of PE.   The formula for an age-adjusted D-dimer cut-off is \"age*10\".  For example, a 60 year old patient would have an age-adjusted cut-off of 600 ng/mL (FEU) and an 80 year old 800 ng/mL (FEU).     CBC WITH AUTO DIFFERENTIAL - Abnormal; Notable for the following components:    RBC 3.23 (*)     Hemoglobin 9.2 (*)     Hematocrit 30.0 (*)     MCHC 30.7 (*)     All other components within normal limits   MANUAL DIFFERENTIAL - Abnormal; Notable for the following components:    Lymphocyte % 19.0 (*)     Monocyte % 21.0 (*)     Metamyelocyte % 2.0 (*)     Monocytes Absolute 1.09 (*)     All other components within normal limits   BLOOD GAS, ARTERIAL - Abnormal; Notable for the following components:    pH, Arterial 7.146 (*)     pCO2, Arterial 91.8 (*)     pO2, Arterial 126.0 (*)     HCO3, Arterial 31.7 (*)     All other components within normal limits   BLOOD GAS, ARTERIAL - Abnormal; Notable for the following components:    pH, Arterial 7.313 (*)     pCO2, Arterial 60.5 (*)     pO2, Arterial 131.0 (*)     HCO3, Arterial 30.7 (*)     Base Excess, Arterial 3.6 (*)     O2 Saturation, Arterial 99.4 (*)     All other components within normal limits   BNP (IN-HOUSE) - Normal    Narrative:     Among patients with dyspnea, NT-proBNP is highly sensitive for the detection of acute congestive heart failure. In addition NT-proBNP of <300 pg/ml effectively rules out acute congestive heart failure with 99% negative predictive value.     SINGLE HSTROPONIN T - Normal    Narrative:     High Sensitive Troponin T Reference Range:  <10.0 ng/L- Negative Female for AMI  <15.0 ng/L- Negative Male for AMI  >=10 - Abnormal " Female indicating possible myocardial injury.  >=15 - Abnormal Male indicating possible myocardial injury.   Clinicians would have to utilize clinical acumen, EKG, Troponin, and serial changes to determine if it is an Acute Myocardial Infarction or myocardial injury due to an underlying chronic condition.        LACTIC ACID, PLASMA - Normal   POCT GLUCOSE FINGERSTICK - Normal   BLOOD CULTURE   BLOOD CULTURE   RAINBOW DRAW    Narrative:     The following orders were created for panel order [place] Draw.  Procedure                               Abnormality         Status                     ---------                               -----------         ------                     Green Top (Gel)[[id number]]                                  Final result               Lavender Top[[id number]]                                     Final result               Gold Top - SST[[id number]]                                   Final result               Light Blue Top[[id number]]                                   Final result                 Please view results for these tests on the individual orders.   BLOOD GAS, ARTERIAL   CBC AND DIFFERENTIAL    Narrative:     The following orders were created for panel order CBC & Differential.  Procedure                               Abnormality         Status                     ---------                               -----------         ------                     CBC Auto Differential[[id number]]        Abnormal            Final result               Scan Slide[[id number]]                                                                    Please view results for these tests on the individual orders.   GREEN TOP   LAVENDER TOP   GOLD TOP - SST   LIGHT BLUE TOP       CT Angiogram Chest   Final Result   1.  No pulmonary embolism seen.      2.  No thoracic aortic dissection or aneurysm seen.      3.  Worsening airspace disease of the right lung suspicious for pneumonia.  An   underlying right middle lobe  lung mass persists but is difficult to clearly   define due to the new changes.  This mass suspicious for malignancy can be   further evaluated with bronchoscopy.      4.  Again seen is a lytic lesion of the left 11th rib measuring 5 x 3.3 cm   associated with a large soft tissue component suspicious for metastatic disease.      5.  Severe upper lobe dominant emphysema.      XR Chest 2 View   Final Result   1.  Pneumonia in the middle lobe and right lower lobe.      2.  Emphysema.                                      Medical Decision Making  Patient is 72 years old who present here today because of shortness of breath.  Chest x-ray was done which showed some pneumonia.  CT angiogram was done which shows pneumonia.  Dr. Allred was called who accepted patient.  Patient was put on BiPAP.    Acute respiratory failure with hypoxia and hypercapnia: acute illness or injury  Pneumonia due to infectious organism, unspecified laterality, unspecified part of lung: acute illness or injury  Amount and/or Complexity of Data Reviewed  Labs: ordered.  Radiology: ordered.  ECG/medicine tests: ordered.      Risk  Prescription drug management.  Decision regarding hospitalization.          Final diagnoses:   Acute respiratory failure with hypoxia and hypercapnia   Pneumonia due to infectious organism, unspecified laterality, unspecified part of lung       ED Disposition  ED Disposition     ED Disposition   Decision to Admit    Condition   --    Comment   Level of Care: Stepdown [25]   Diagnosis: Acute respiratory failure with hypoxia and hypercapnia [[id number]]   Admitting Physician: MERCEDES ALLRED [[id number]]   Attending Physician: MERCEDES ALLRED [[id number]]   Certification: I Certify That Inpatient Hospital Services Are Medically Necessary For Greater Than 2 Midnights               No follow-up provider specified.       Medication List      No changes were made to your prescriptions during this visit.          Kaden Burton  MD [place]  04/21/23 3499

## 2023-04-22 ENCOUNTER — APPOINTMENT (OUTPATIENT)
Dept: CARDIOLOGY | Facility: HOSPITAL | Age: 73
End: 2023-04-22
Payer: OTHER GOVERNMENT

## 2023-04-22 LAB
ALBUMIN SERPL-MCNC: 3.2 G/DL (ref 3.5–5.2)
ALBUMIN/GLOB SERPL: 1.2 G/DL
ALP SERPL-CCNC: 81 U/L (ref 39–117)
ALT SERPL W P-5'-P-CCNC: 26 U/L (ref 1–41)
ANION GAP SERPL CALCULATED.3IONS-SCNC: 7 MMOL/L (ref 5–15)
ARTERIAL PATENCY WRIST A: ABNORMAL
AST SERPL-CCNC: 18 U/L (ref 1–40)
ATMOSPHERIC PRESS: 744 MMHG
BASE EXCESS BLDA CALC-SCNC: 5.3 MMOL/L (ref 0–2)
BASOPHILS # BLD AUTO: 0 10*3/MM3 (ref 0–0.2)
BASOPHILS NFR BLD AUTO: 0 % (ref 0–1.5)
BDY SITE: ABNORMAL
BILIRUB SERPL-MCNC: 0.2 MG/DL (ref 0–1.2)
BUN SERPL-MCNC: 12 MG/DL (ref 8–23)
BUN/CREAT SERPL: 29.3 (ref 7–25)
CALCIUM SPEC-SCNC: 8.1 MG/DL (ref 8.6–10.5)
CHLORIDE SERPL-SCNC: 101 MMOL/L (ref 98–107)
CO2 SERPL-SCNC: 30 MMOL/L (ref 22–29)
CREAT SERPL-MCNC: 0.41 MG/DL (ref 0.76–1.27)
DEPRECATED RDW RBC AUTO: 45.1 FL (ref 37–54)
EGFRCR SERPLBLD CKD-EPI 2021: 114.3 ML/MIN/1.73
EOSINOPHIL # BLD AUTO: 0 10*3/MM3 (ref 0–0.4)
EOSINOPHIL NFR BLD AUTO: 0 % (ref 0.3–6.2)
EPAP: 8
ERYTHROCYTE [DISTWIDTH] IN BLOOD BY AUTOMATED COUNT: 14.8 % (ref 12.3–15.4)
GLOBULIN UR ELPH-MCNC: 2.6 GM/DL
GLUCOSE SERPL-MCNC: 138 MG/DL (ref 65–99)
HCO3 BLDA-SCNC: 31.7 MMOL/L (ref 20–26)
HCT VFR BLD AUTO: 27.3 % (ref 37.5–51)
HGB BLD-MCNC: 8.5 G/DL (ref 13–17.7)
IMM GRANULOCYTES # BLD AUTO: 0.07 10*3/MM3 (ref 0–0.05)
IMM GRANULOCYTES NFR BLD AUTO: 1.2 % (ref 0–0.5)
INHALED O2 CONCENTRATION: 30 %
IPAP: 15
LYMPHOCYTES # BLD AUTO: 0.46 10*3/MM3 (ref 0.7–3.1)
LYMPHOCYTES NFR BLD AUTO: 8.2 % (ref 19.6–45.3)
Lab: ABNORMAL
MAXIMAL PREDICTED HEART RATE: 147 BPM
MCH RBC QN AUTO: 28.8 PG (ref 26.6–33)
MCHC RBC AUTO-ENTMCNC: 31.1 G/DL (ref 31.5–35.7)
MCV RBC AUTO: 92.5 FL (ref 79–97)
MODALITY: ABNORMAL
MONOCYTES # BLD AUTO: 0.07 10*3/MM3 (ref 0.1–0.9)
MONOCYTES NFR BLD AUTO: 1.2 % (ref 5–12)
NEUTROPHILS NFR BLD AUTO: 5.02 10*3/MM3 (ref 1.7–7)
NEUTROPHILS NFR BLD AUTO: 89.4 % (ref 42.7–76)
NRBC BLD AUTO-RTO: 0 /100 WBC (ref 0–0.2)
PAW @ PEAK INSP FLOW SETTING VENT: 15 CMH2O
PCO2 BLDA: 54.8 MM HG (ref 35–45)
PH BLDA: 7.37 PH UNITS (ref 7.35–7.45)
PLATELET # BLD AUTO: 171 10*3/MM3 (ref 140–450)
PMV BLD AUTO: 9.3 FL (ref 6–12)
PO2 BLDA: 81.4 MM HG (ref 83–108)
POTASSIUM SERPL-SCNC: 4.9 MMOL/L (ref 3.5–5.2)
PROT SERPL-MCNC: 5.8 G/DL (ref 6–8.5)
RBC # BLD AUTO: 2.95 10*6/MM3 (ref 4.14–5.8)
SAO2 % BLDCOA: 97 % (ref 94–99)
SODIUM SERPL-SCNC: 138 MMOL/L (ref 136–145)
STRESS TARGET HR: 125 BPM
VENTILATOR MODE: ABNORMAL
VT ON VENT VENT: 471 ML
WBC NRBC COR # BLD: 5.62 10*3/MM3 (ref 3.4–10.8)

## 2023-04-22 PROCEDURE — 94799 UNLISTED PULMONARY SVC/PX: CPT

## 2023-04-22 PROCEDURE — 94660 CPAP INITIATION&MGMT: CPT

## 2023-04-22 PROCEDURE — 25010000002 HEPARIN (PORCINE) PER 1000 UNITS: Performed by: HOSPITALIST

## 2023-04-22 PROCEDURE — 94760 N-INVAS EAR/PLS OXIMETRY 1: CPT

## 2023-04-22 PROCEDURE — 85025 COMPLETE CBC W/AUTO DIFF WBC: CPT | Performed by: HOSPITALIST

## 2023-04-22 PROCEDURE — 80053 COMPREHEN METABOLIC PANEL: CPT | Performed by: HOSPITALIST

## 2023-04-22 PROCEDURE — 93306 TTE W/DOPPLER COMPLETE: CPT

## 2023-04-22 PROCEDURE — 25010000002 LEVOFLOXACIN PER 250 MG: Performed by: HOSPITALIST

## 2023-04-22 PROCEDURE — 93306 TTE W/DOPPLER COMPLETE: CPT | Performed by: INTERNAL MEDICINE

## 2023-04-22 PROCEDURE — 82803 BLOOD GASES ANY COMBINATION: CPT

## 2023-04-22 PROCEDURE — 25010000002 METHYLPREDNISOLONE PER 125 MG: Performed by: HOSPITALIST

## 2023-04-22 PROCEDURE — 36600 WITHDRAWAL OF ARTERIAL BLOOD: CPT

## 2023-04-22 PROCEDURE — 94664 DEMO&/EVAL PT USE INHALER: CPT

## 2023-04-22 PROCEDURE — 97166 OT EVAL MOD COMPLEX 45 MIN: CPT

## 2023-04-22 PROCEDURE — 97162 PT EVAL MOD COMPLEX 30 MIN: CPT

## 2023-04-22 RX ADMIN — Medication 10 ML: at 20:29

## 2023-04-22 RX ADMIN — IPRATROPIUM BROMIDE AND ALBUTEROL SULFATE 3 ML: .5; 3 SOLUTION RESPIRATORY (INHALATION) at 19:08

## 2023-04-22 RX ADMIN — Medication 10 ML: at 09:47

## 2023-04-22 RX ADMIN — PANTOPRAZOLE SODIUM 40 MG: 40 INJECTION, POWDER, FOR SOLUTION INTRAVENOUS at 05:13

## 2023-04-22 RX ADMIN — GUAIFENESIN 600 MG: 600 TABLET, EXTENDED RELEASE ORAL at 20:29

## 2023-04-22 RX ADMIN — METHYLPREDNISOLONE SODIUM SUCCINATE 80 MG: 125 INJECTION, POWDER, FOR SOLUTION INTRAMUSCULAR; INTRAVENOUS at 09:46

## 2023-04-22 RX ADMIN — GUAIFENESIN 600 MG: 600 TABLET, EXTENDED RELEASE ORAL at 09:46

## 2023-04-22 RX ADMIN — METHYLPREDNISOLONE SODIUM SUCCINATE 80 MG: 125 INJECTION, POWDER, FOR SOLUTION INTRAMUSCULAR; INTRAVENOUS at 17:54

## 2023-04-22 RX ADMIN — METHYLPREDNISOLONE SODIUM SUCCINATE 80 MG: 125 INJECTION, POWDER, FOR SOLUTION INTRAMUSCULAR; INTRAVENOUS at 01:14

## 2023-04-22 RX ADMIN — DILTIAZEM HYDROCHLORIDE 180 MG: 180 CAPSULE, COATED, EXTENDED RELEASE ORAL at 09:46

## 2023-04-22 RX ADMIN — FERROUS SULFATE TAB EC 324 MG (65 MG FE EQUIVALENT) 324 MG: 324 (65 FE) TABLET DELAYED RESPONSE at 09:46

## 2023-04-22 RX ADMIN — IPRATROPIUM BROMIDE AND ALBUTEROL SULFATE 3 ML: .5; 3 SOLUTION RESPIRATORY (INHALATION) at 14:38

## 2023-04-22 RX ADMIN — IPRATROPIUM BROMIDE AND ALBUTEROL SULFATE 3 ML: .5; 3 SOLUTION RESPIRATORY (INHALATION) at 11:00

## 2023-04-22 RX ADMIN — HEPARIN SODIUM 5000 UNITS: 5000 INJECTION INTRAVENOUS; SUBCUTANEOUS at 09:46

## 2023-04-22 RX ADMIN — HEPARIN SODIUM 5000 UNITS: 5000 INJECTION INTRAVENOUS; SUBCUTANEOUS at 20:29

## 2023-04-22 RX ADMIN — FOLIC ACID 1 MG: 1 TABLET ORAL at 09:46

## 2023-04-22 RX ADMIN — MONTELUKAST 10 MG: 10 TABLET, FILM COATED ORAL at 20:29

## 2023-04-22 RX ADMIN — LEVOFLOXACIN 750 MG: 5 INJECTION, SOLUTION INTRAVENOUS at 15:49

## 2023-04-22 NOTE — PROGRESS NOTES
Pt placed on 4L NC (home setting) at this time. Will monitor closely and return to [facility] at HS until AM.

## 2023-04-22 NOTE — PLAN OF CARE
Goal Outcome Evaluation:  Plan of Care Reviewed With: patient, significant other            OT eval on this date as co-eval with PT.  Pt was CGA with all bed mobility.  He was CGA for sit to stand and CGA of 2 for toilet transfer.  Donning sock with min A and toileting with supervision for scooby care.  Pt is fall risk and fx rist due to bony mets.  Caution with belt placement due to rib fx.  Pt could benefit from OT services to increase independence with ADLs and functional mobility.

## 2023-04-22 NOTE — THERAPY EVALUATION
Patient Name: Brian Garcia  : 1950    MRN: [id number]                              Today's Date: 2023     PT Evaluation  Admit Date: 2023    Visit Dx:     ICD-10-CM ICD-9-CM   1. Acute respiratory failure with hypoxia and hypercapnia  J96.01 518.81    J96.02    2. Pneumonia due to infectious organism, unspecified laterality, unspecified part of lung  J18.9 486   3. Impaired functional mobility, balance, gait, and endurance  Z74.09 V49.89     Patient Active Problem List   Diagnosis   • Physical deconditioning   • Pulmonary nodules   • Leukocytosis   • History of pulmonary embolism   • Stage 4 very severe COPD by GOLD classification   • Chronic respiratory failure with hypoxia, on home O2 therapy   • Hypertension   • CVA (cerebral vascular accident)   • Traumatic hemorrhage of cerebrum   • Iron deficiency anemia secondary to inadequate dietary iron intake   • Acute pain of right shoulder   • Limited range of motion (ROM) of shoulder   • Rotator cuff syndrome of right shoulder   • Impingement syndrome of right shoulder   • Arthrosis of right acromioclavicular joint   • Chronic right shoulder pain   • Nontraumatic incomplete tear of right rotator cuff   • Primary osteoarthritis of right shoulder   • Malignant neoplasm of overlapping sites of right lung   • Lung consolidation   • Primary lung adenocarcinoma   • Encounter for venous access device care   • Iron malabsorption   • Pneumonia   • Acute respiratory failure with hypoxia and hypercapnia     Past Medical History:   Diagnosis Date   • A-fib    • Anesthesia     hip surgery (spinal caused him unpleasant sensations never wants it again)   • Arthritis    • Blood in sputum 2023    bright red blood, teaspoon - tablespoon daily   • COPD (chronic obstructive pulmonary disease)    • CVA (cerebral vascular accident) 2022   • Hearing aid worn     bilateral   • History of pulmonary embolism    • History of recurrent pneumonia    •  Hypertension    • Lung cancer 03/17/2023   • Oxygen dependent     since @ age 65   • Pulmonary nodules    • Risk for falls     uses walker   • Rotator cuff syndrome of right shoulder    • Tachycardia    • 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 HTN/Eliquis   • Wears glasses     readers   • Wears glasses     reading     Past Surgical History:   Procedure Laterality Date   • HIP ARTHROPLASTY Right    • RIB BIOPSY Left 03/17/2023    11th   • VENOUS ACCESS DEVICE (PORT) INSERTION N/A 3/30/2023    Procedure: MEDIPORT PLACEMENT          (C-ARM);  Surgeon: Lavon Cochran MD;  Location: [facility] OR;  Service: General;  Laterality: N/A;      General Information     Row Name 04/22/23 0954          Physical Therapy Time and Intention    Document Type evaluation  -GB     Mode of Treatment individual therapy;physical therapy  -GB     Row Name 04/22/23 0954          General Information    Patient Profile Reviewed yes  -GB     Prior Level of Function independent:;gait;transfer  -GB     Existing Precautions/Restrictions fall;oxygen therapy device and L/min   fracture risk w/ bone mets per chart, 11 rib w/ met; watch gait belt placement and beware of fall/LE collapse  -GB     Barriers to Rehab previous functional deficit;medically complex  -GB     Row Name 04/22/23 0954          Living Environment    People in Home significant other  -GB     Row Name 04/22/23 0954          Cognition    Orientation Status (Cognition) oriented x 4  -GB           User Key  (r) = Recorded By, (t) = Taken By, (c) = Cosigned By    Initials Name Provider Type    Pema Nugent, PT Physical Therapist               Mobility     Row Name 04/22/23 1016          Bed Mobility    Bed Mobility bed mobility (all) activities  -GB     All Activities, [place] (Bed Mobility) contact guard  -GB     Assistive Device (Bed Mobility) bed rails;head of bed elevated  -GB     Row Name 04/22/23 1016          Bed-Chair Transfer    Bed-Chair  [place] (Transfers) contact guard;2 person assist  -GB     Assistive Device (Bed-Chair Transfers) walker, front-wheeled  -GB     Row Name 04/22/23 1016          Sit-Stand Transfer    Sit-Stand [place] (Transfers) contact guard  -GB     Assistive Device (Sit-Stand Transfers) walker, front-wheeled  -GB     Row Name 04/22/23 1016          Gait/Stairs (Locomotion)    [place] Level (Gait) modified independence;standby assist;contact guard;1 person assist;1 person to manage equipment  -GB     Assistive Device (Gait) walker, front-wheeled  -GB     Distance in Feet (Gait) 49, 8  -GB     Deviations/Abnormal Patterns (Gait) gait speed decreased  -GB           User Key  (r) = Recorded By, (t) = Taken By, (c) = Cosigned By    Initials Name Provider Type    Pema Nugent PT Physical Therapist               Obj/Interventions     Row Name 04/22/23 1224          Range of Motion Comprehensive    General Range of Motion bilateral lower extremity ROM WFL  -GB     Row Name 04/22/23 1224          Strength Comprehensive (MMT)    Comment, General Manual Muscle Testing (MMT) Assessment grossly 4-4-/5 hip knee flx/ext and DF/PF albin  -GB     Row Name 04/22/23 1224          Balance    Balance Assessment sitting static balance;sitting dynamic balance  -GB     Static Sitting Balance independent  -GB     Dynamic Sitting Balance independent  -GB     Position, Sitting Balance unsupported;sitting edge of bed  -GB     Row Name 04/22/23 1224          Sensory Assessment (Somatosensory)    Sensory Assessment (Somatosensory) LE sensation intact  -GB           User Key  (r) = Recorded By, (t) = Taken By, (c) = Cosigned By    Initials Name Provider Type    Pema Nugent PT Physical Therapist               Goals/Plan     Row Name 04/22/23 1227          Transfer Goal 1 (PT)    Activity/Assistive Device (Transfer Goal 1, PT) bed-to-chair/chair-to-bed;toilet  -GB     [place] Level/Cues Needed (Transfer Goal  1, PT) modified independence  -GB     Time Frame (Transfer Goal 1, PT) by discharge  -GB     Progress/Outcome (Transfer Goal 1, PT) goal partially met  -GB     Row Name 04/22/23 1227          Gait Training Goal 1 (PT)    Activity/Assistive Device (Gait Training Goal 1, PT) gait (walking locomotion);assistive device use  -GB     [place] Level (Gait Training Goal 1, PT) modified independence  -GB     Distance (Gait Training Goal 1, PT) 80 ft or more per trip w/out LOB or SOA  -GB     Time Frame (Gait Training Goal 1, PT) by discharge  -GB     Progress/Outcome (Gait Training Goal 1, PT) new goal  -GB     Row Name 04/22/23 1227          Stairs Goal 1 (PT)    Activity/Assistive Device (Stairs Goal 1, PT) ascending stairs;descending stairs  -GB     [place] Level/Cues Needed (Stairs Goal 1, PT) modified independence  -GB     Number of Stairs (Stairs Goal 1, PT) 1 or more steps as needed w/ handrail  -GB     Row Name 04/22/23 1227          Therapy Assessment/Plan (PT)    Planned Therapy Interventions (PT) balance training;bed mobility training;gait training;neuromuscular re-education;stair training;strengthening;patient/family education;home exercise program  -GB           User Key  (r) = Recorded By, (t) = Taken By, (c) = Cosigned By    Initials Name Provider Type    Pema Nugent, PT Physical Therapist               Clinical Impression     Row Name 04/22/23 1013          Pain    Pretreatment Pain Rating 0/10 - no pain  -GB     Posttreatment Pain Rating 0/10 - no pain  -GB     Pain Intervention(s) Repositioned  -GB     Row Name 04/22/23 1013          Plan of Care Review    Plan of Care Reviewed With patient;spouse  -GB     Outcome Evaluation PT/OT co evals completed. Pt and significant other present & participated. Pt shows upright and mobility tolerance and was able to move sup to sit, sit to stand w/ CGA and transfer w/ CGA x 2. He walked in room 49 ft and 8 w/ CGA and 1 for equipment and sats  dropped to about 83% on first gait  but he demonstrated abilty to raise sats to low-mid 90s in 2-3 mi period while talking and sitting EOB. He can benefit from cont therapy to work on better ex [place] as he was compromised yesterday and 02 dependent  per report. Pt reported per chart as having bone mets, Rib 11,  so gait belt placement and fx/fall risk precautions  noted; Good rehab candidate in case rehab to home needed but he appears to be ready to progress further w/ ex [place] w. goal d/c home  -GB     Row Name 04/22/23 1013          Therapy Assessment/Plan (PT)    Patient/Family Therapy Goals Statement (PT) Pt goal is d/c home w/ family which is rec of PT as well.  -GB     Rehab Potential (PT) good, to achieve stated therapy goals  -GB     Criteria for Skilled Interventions Met (PT) yes  -GB     Therapy Frequency (PT) other (see comments)  5-7 d/w  -GB     Row Name 04/22/23 1013          Vital Signs    Pre Systolic BP Rehab 128  -GB     Pre Treatment Diastolic BP 60  -GB     Intra Systolic BP Rehab 124  -GB     Intra Treatment Diastolic BP 54  -GB     Post Systolic BP Rehab 130  -GB     Post Treatment Diastolic BP 58  -GB     Pretreatment Heart Rate (beats/min) 113  -GB     Intratreatment Heart Rate (beats/min) 109  -GB     Pre SpO2 (%) 96  -GB     O2 Delivery Pre Treatment nasal cannula  -GB     Intra SpO2 (%) 89  post transfer to [facility]  -GB     O2 Delivery Intra Treatment nasal cannula  -GB     Post SpO2 (%) 94  -GB     O2 Delivery Post Treatment nasal cannula  -GB     Pre Patient Position Supine  -GB     Intra Patient Position Sitting  -GB     Post Patient Position Supine  -GB     Row Name 04/22/23 1013          Positioning and Restraints    Pre-Treatment Position in bed  -GB     Post Treatment Position bed  -GB           User Key  (r) = Recorded By, (t) = Taken By, (c) = Cosigned By    Initials Name Provider Type    Pema Nugent, PT Physical Therapist               Outcome Measures     Row Name  04/22/23 1229          How much help from another person do you currently need...    Turning from your back to your side while in flat bed without using bedrails? 4  -GB     Moving from lying on back to sitting on the side of a flat bed without bedrails? 4  -GB     Moving to and from a bed to a chair (including a wheelchair)? 3  -GB     Standing up from a chair using your arms (e.g., wheelchair, bedside chair)? 3  -GB     Climbing 3-5 steps with a railing? 2  -GB     To walk in hospital room? 3  -GB     AM-PAC 6 Clicks Score (PT) 19  -GB     Highest level of mobility 6 --> Walked 10 steps or more  -GB     Row Name 04/22/23 1229 04/22/23 0949       Functional Assessment    Outcome Measure Options AM-PAC 6 Clicks Basic Mobility (PT)  -GB AM-PAC 6 Clicks Daily Activity (OT)  -RB          User Key  (r) = Recorded By, (t) = Taken By, (c) = Cosigned By    Initials Name Provider Type    RB Abhi Chang, OT Occupational Therapist    Pema Nugent, PT Physical Therapist                             Physical Therapy Education     Title: PT OT SLP Therapies (In Progress)     Topic: Physical Therapy (Done)     Point: Mobility training (Done)     Learning Progress Summary           Patient Acceptance, E,D, VU,NR by HIMANSHU at 4/22/2023 1235    Comment: POC: mobility  ; discussed rest between walks w/ breathing ex to raise 02 sats   Family Acceptance, E,D, VU,NR by HIMANSHU at 4/22/2023 1235    Comment: POC: mobility  ; discussed rest between walks w/ breathing ex to raise 02 sats                   Point: Home exercise program (Done)     Learning Progress Summary           Patient Acceptance, E,D, VU,NR by HIMANSHU at 4/22/2023 1235    Comment: POC: mobility  ; discussed rest between walks w/ breathing ex to raise 02 sats   Family Acceptance, E,D, VU,NR by HIMANSHU at 4/22/2023 1235    Comment: POC: mobility  ; discussed rest between walks w/ breathing ex to raise 02 sats                   Point: Body mechanics (Done)     Learning  Progress Summary           Patient Acceptance, E,D, VU,NR by  at 4/22/2023 1235    Comment: POC: mobility  ; discussed rest between walks w/ breathing ex to raise 02 sats   Family Acceptance, E,D, VU,NR by  at 4/22/2023 1235    Comment: POC: mobility  ; discussed rest between walks w/ breathing ex to raise 02 sats                   Point: Precautions (Done)     Learning Progress Summary           Patient Acceptance, E,D, VU,NR by  at 4/22/2023 1235    Comment: POC: mobility  ; discussed rest between walks w/ breathing ex to raise 02 sats   Family Acceptance, E,D, VU,NR by  at 4/22/2023 1235    Comment: POC: mobility  ; discussed rest between walks w/ breathing ex to raise 02 sats                               User Key     Initials Effective Dates Name Provider Type Discipline     06/16/21 -  Pema Espino, PT Physical Therapist PT              PT Recommendation and Plan  Planned Therapy Interventions (PT): balance training, bed mobility training, gait training, neuromuscular re-education, stair training, strengthening, patient/family education, home exercise program  Plan of Care Reviewed With: patient, spouse  Outcome Evaluation: PT/OT co maya completed. Pt and significant other present & participated. Pt shows upright and mobility tolerance and was able to move sup to sit, sit to stand w/ CGA and transfer w/ CGA x 2. He walked in room 49 ft and 8 w/ CGA and 1 for equipment and sats dropped to about 83% on first gait  but he demonstrated abilty to raise sats to low-mid 90s in 2-3 mi period while talking and sitting EOB. He can benefit from cont therapy to work on better ex [place] as he was compromised yesterday and 02 dependent  per report. Pt reported per chart as having bone mets, Rib 11,  so gait belt placement and fx/fall risk precautions  noted; Good rehab candidate in case rehab to home needed but he appears to be ready to progress further w/ ex [place] w. goal d/c home     Time Calculation:     PT Charges     Row Name 04/22/23 0954             Time Calculation    Start Time 0954  -GB      Stop Time 1041  -GB      Time Calculation (min) 47 min  -GB      PT Received On 04/22/23  -GB      PT Goal Re-Cert Due Date 05/05/23  -GB         Untimed Charges    PT Eval/Re-eval Minutes 47  -GB         Total Minutes    Untimed Charges Total Minutes 47  -GB       Total Minutes 47  -GB            User Key  (r) = Recorded By, (t) = Taken By, (c) = Cosigned By    Initials Name Provider Type    Pema Nugent, PT Physical Therapist              Therapy Charges for Today     Code Description Service Date Service Provider Modifiers Qty    53501785845 HC PT EVAL MOD COMPLEXITY 3 4/22/2023 Pema Espino, PT GP 1    72892210394 HC PT THER SUPP EA 15 MIN 4/22/2023 Pema Espino, PT GP 1          PT G-Codes  Outcome Measure Options: AM-PAC 6 Clicks Basic Mobility (PT)  AM-PAC 6 Clicks Score (PT): 19  AM-PAC 6 Clicks Score (OT): 20  PT Discharge Summary  Anticipated Discharge Disposition (PT): home with 24/7 care, home with home health, other (see comments) (rehab to home pending outcome and pt/wife choice)    Pema Espino, TYLER  4/22/2023

## 2023-04-22 NOTE — PLAN OF CARE
Goal Outcome Evaluation:  Plan of Care Reviewed With: patient        Progress: improving  Outcome Evaluation: Pt remained n Bipap overnight.  Pts vitals remained WNL.  Pt denies CP, SOB.  UOP is adequate.  Pt wanting to be able to sit in chair during day.  Will continue to monitor.

## 2023-04-22 NOTE — THERAPY EVALUATION
Acute Care - Occupational Therapy Initial Evaluation  [facility]     Patient Name: Brian Garcia  : 1950  MRN: [id number]  Today's Date: 2023     Date of Referral to OT: 23       Admit Date: 2023       ICD-10-CM ICD-9-CM   1. Acute respiratory failure with hypoxia and hypercapnia  J96.01 518.81    J96.02    2. Pneumonia due to infectious organism, unspecified laterality, unspecified part of lung  J18.9 486   3. Impaired functional mobility, balance, gait, and endurance  Z74.09 V49.89   4. Impaired mobility and activities of daily living  Z74.09 V49.89    Z78.9      Patient Active Problem List   Diagnosis   • Physical deconditioning   • Pulmonary nodules   • Leukocytosis   • History of pulmonary embolism   • Stage 4 very severe COPD by GOLD classification   • Chronic respiratory failure with hypoxia, on home O2 therapy   • Hypertension   • CVA (cerebral vascular accident)   • Traumatic hemorrhage of cerebrum   • Iron deficiency anemia secondary to inadequate dietary iron intake   • Acute pain of right shoulder   • Limited range of motion (ROM) of shoulder   • Rotator cuff syndrome of right shoulder   • Impingement syndrome of right shoulder   • Arthrosis of right acromioclavicular joint   • Chronic right shoulder pain   • Nontraumatic incomplete tear of right rotator cuff   • Primary osteoarthritis of right shoulder   • Malignant neoplasm of overlapping sites of right lung   • Lung consolidation   • Primary lung adenocarcinoma   • Encounter for venous access device care   • Iron malabsorption   • Pneumonia   • Acute respiratory failure with hypoxia and hypercapnia     Past Medical History:   Diagnosis Date   • A-fib    • Anesthesia     hip surgery (spinal caused him unpleasant sensations never wants it again)   • Arthritis    • Blood in sputum 2023    bright red blood, teaspoon - tablespoon daily   • COPD (chronic obstructive pulmonary disease)    • CVA (cerebral vascular  accident) 12/24/2022   • Hearing aid worn     bilateral   • History of pulmonary embolism    • History of recurrent pneumonia    • Hypertension    • Lung cancer 03/17/2023   • Oxygen dependent     since @ age 65   • Pulmonary nodules    • Risk for falls     uses walker   • Rotator cuff syndrome of right shoulder    • Tachycardia    • 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 HTN/Eliquis   • Wears glasses     readers   • Wears glasses     reading     Past Surgical History:   Procedure Laterality Date   • HIP ARTHROPLASTY Right    • RIB BIOPSY Left 03/17/2023 11th   • VENOUS ACCESS DEVICE (PORT) INSERTION N/A 3/30/2023    Procedure: MEDIPORT PLACEMENT          (C-ARM);  Surgeon: Lavon Cochran MD;  Location: [facility];  Service: General;  Laterality: N/A;         OT ASSESSMENT FLOWSHEET (last 12 hours)     OT Evaluation and Treatment     Row Name 04/22/23 0949                   OT Time and Intention    Subjective Information no complaints  -RB        Document Type evaluation  -RB        Mode of Treatment co-treatment;physical therapy;occupational therapy  -RB        Patient Effort good  -RB           General Information    Existing Precautions/Restrictions fall;oxygen therapy device and L/min;other (see comments)   Fx risk due to bony mets.  Belt placement due to fx ribs  -RB        Barriers to Rehab previous functional deficit  -RB           Living Environment    Current Living Arrangements home  -RB        People in Home significant other  -RB           Home Use of Assistive/Adaptive Equipment    Equipment Currently Used at Home oxygen;commode;walker, rolling;shower chair;grab bar  -RB           Pain Assessment    Pretreatment Pain Rating 0/10 - no pain  -RB        Posttreatment Pain Rating 0/10 - no pain  -RB           Cognition    Affect/Mental Status (Cognition) WFL  -RB        Orientation Status (Cognition) oriented x 4  -RB           Range of Motion (ROM)    Range of Motion ROM is WNL  -RB            Range of Motion Comprehensive    General Range of Motion no range of motion deficits identified;bilateral upper extremity ROM WNL  -RB           Strength Comprehensive (MMT)    General Manual Muscle Testing (MMT) Assessment other (see comments)  -RB        Comment, General Manual Muscle Testing (MMT) Assessment R shld flex 4/5 and rest of B UE strength was 4+/5 grossly  -RB           Activities of Daily Living    BADL Assessment/Intervention lower body dressing;toileting  -RB           Lower Body Dressing Assessment/Training    [place] Level (Lower Body Dressing) lower body dressing skills;doff;don;socks;minimum assist (75% patient effort)  -RB        Position (Lower Body Dressing) edge of bed sitting;supported sitting  -RB           Toileting Assessment/Training    [place] Level (Toileting) toileting skills;perform perineal hygiene;set up;supervision  -RB        Assistive Devices (Toileting) commode, bedside without drop arms;grab bar/safety frame  -RB        Position (Toileting) supported standing  -RB           Bed Mobility    Bed Mobility bed mobility (all) activities  -RB        All Activities, [place] (Bed Mobility) contact guard  -RB        Supine-Sit [place] (Bed Mobility) --  -RB           Functional Mobility    Functional Mobility- Ind. Level contact guard assist  -RB        Functional Mobility- Device walker, front-wheeled  -RB        Functional Mobility-Distance (Feet) 50  -RB        Functional Mobility- Safety Issues balance decreased during turns;supplemental O2  -RB           Transfer Assessment/Treatment    Transfers sit-stand transfer;stand-sit transfer;toilet transfer  -RB           Sit-Stand Transfer    Sit-Stand [place] (Transfers) contact guard;2 person assist  -RB        Assistive Device (Sit-Stand Transfers) walker, front-wheeled  -RB           Toilet Transfer    Type (Toilet Transfer) sit-stand;stand-sit  -RB        [place] Level (Toilet Transfer) contact  guard;2 person assist  -RB        Assistive Device (Toilet Transfer) walker, front-wheeled;commode, bedside without drop arms  -RB           Safety Issues, Functional Mobility    Safety Issues Affecting Function (Mobility) impulsivity  -RB        Impairments Affecting Function (Mobility) balance;coordination;endurance/activity tolerance;strength  -RB           Plan of Care Review    Plan of Care Reviewed With patient;significant other  -RB           Vital Signs    Pre Systolic BP Rehab 102  -RB        Pre Treatment Diastolic BP 59  -RB        Intra Systolic BP Rehab 124  -RB        Intra Treatment Diastolic BP 54  -RB        Post Systolic BP Rehab 130  -RB        Post Treatment Diastolic BP 58  -RB        Pretreatment Heart Rate (beats/min) 113  -RB        Intratreatment Heart Rate (beats/min) 109  -RB        Posttreatment Heart Rate (beats/min) 103  -RB        Pre SpO2 (%) 96  -RB        O2 Delivery Pre Treatment supplemental O2  3.5  -RB        Intra SpO2 (%) 83  -RB        O2 Delivery Intra Treatment supplemental O2  -RB        Post SpO2 (%) 94  -RB        O2 Delivery Post Treatment supplemental O2  -RB        Pre Patient Position Supine  -RB        Intra Patient Position Standing  -RB        Post Patient Position Supine  -RB           Positioning and Restraints    Pre-Treatment Position in bed  -RB        Post Treatment Position bed  -RB        In Bed supine;patient within staff view  Transport taking pt upstairs.  -RB           Therapy Assessment/Plan (OT)    Date of Referral to OT 04/21/23  -RB        Functional Level at Time of Evaluation (OT) Imp mob and ADLs.  -RB        OT Diagnosis Imp mob and ADLs.  -RB        Rehab Potential (OT) good, to achieve stated therapy goals  -RB        Criteria for Skilled Therapeutic Interventions Met (OT) yes;meets criteria  -RB        Therapy Frequency (OT) other (see comments)  5-7 days/wk  -RB        Problem List (OT) problems related  to;balance;coordination;mobility;strength;inability to direct their own care  -RB           Evaluation Complexity (OT)    Review Occupational Profile/Medical/Therapy History Complexity expanded/moderate complexity  -RB        Assessment, Occupational Performance/Identification of Deficit Complexity 3-5 performance deficits  -RB        Clinical Decision Making Complexity (OT) detailed assessment/moderate complexity  -RB        Overall Complexity of Evaluation (OT) moderate complexity  -RB           Therapy Plan Review/Discharge Plan (OT)    Anticipated Discharge Disposition (OT) home with 24/7 care;home with assist;home with home health  -RB           OT Goals    Transfer Goal Selection (OT) transfer, OT goal 1  -RB        Bathing Goal Selection (OT) bathing, OT goal 1  -RB        Dressing Goal Selection (OT) dressing, OT goal 1  -RB        Toileting Goal Selection (OT) toileting, OT goal 1  -RB           Transfer Goal 1 (OT)    Activity/Assistive Device (Transfer Goal 1, OT) transfers, all  -RB        [place] Level/Cues Needed (Transfer Goal 1, OT) modified independence  -RB        Time Frame (Transfer Goal 1, OT) long term goal (LTG)  -RB        Progress/Outcome (Transfer Goal 1, OT) goal not met  -RB           Bathing Goal 1 (OT)    Activity/Device (Bathing Goal 1, OT) bathing skills, all  -RB        [place] Level/Cues Needed (Bathing Goal 1, OT) modified independence  -RB        Time Frame (Bathing Goal 1, OT) long term goal (LTG)  -RB        Progress/Outcomes (Bathing Goal 1, OT) goal not met  -RB           Dressing Goal 1 (OT)    Activity/Device (Dressing Goal 1, OT) dressing skills, all  -RB        [place]/Cues Needed (Dressing Goal 1, OT) modified independence  -RB        Time Frame (Dressing Goal 1, OT) long term goal (LTG)  -RB        Progress/Outcome (Dressing Goal 1, OT) goal not met  -RB           Toileting Goal 1 (OT)    Activity/Device (Toileting Goal 1, OT) toileting skills, all  -RB         [place] Level/Cues Needed (Toileting Goal 1, OT) modified independence  -RB        Time Frame (Toileting Goal 1, OT) long term goal (LTG)  -RB        Progress/Outcome (Toileting Goal 1, OT) goal not met  -RB              User Key  (r) = Recorded By, (t) = Taken By, (c) = Cosigned By    Initials Name Effective Dates    RB Abhi Chang OT 06/16/21 -                  Occupational Therapy Education     Title: PT OT SLP Therapies (In Progress)     Topic: Occupational Therapy (In Progress)     Point: ADL training (Not Started)     Description:   Instruct learner(s) on proper safety adaptation and remediation techniques during self care or transfers.   Instruct in proper use of assistive devices.              Learner Progress:  Not documented in this visit.          Point: Home exercise program (Not Started)     Description:   Instruct learner(s) on appropriate technique for monitoring, assisting and/or progressing therapeutic exercises/activities.              Learner Progress:  Not documented in this visit.          Point: Precautions (Done)     Description:   Instruct learner(s) on prescribed precautions during self-care and functional transfers.              Learning Progress Summary           Patient Acceptance, E, VU by RB at 4/22/2023 1254    Comment: Edu pt on use of gait belt and non skid socks when OOB and no OOB without assist.                   Point: Body mechanics (Not Started)     Description:   Instruct learner(s) on proper positioning and spine alignment during self-care, functional mobility activities and/or exercises.              Learner Progress:  Not documented in this visit.                      User Key     Initials Effective Dates Name Provider Type Discipline     06/16/21 -  Abhi Chang OT Occupational Therapist OT                  OT Recommendation and Plan  Therapy Frequency (OT): other (see comments) (5-7 days/wk)  Plan of Care Review  Plan of Care Reviewed With: patient,  significant other  Plan of Care Reviewed With: patient, significant other     Outcome Measures     Row Name 04/22/23 0949             How much help from another is currently needed...    Putting on and taking off regular lower body clothing? 3  -RB      Bathing (including washing, rinsing, and drying) 2  -RB      Toileting (which includes using toilet bed pan or urinal) 3  -RB      Putting on and taking off regular upper body clothing 4  -RB      Taking care of personal grooming (such as brushing teeth) 4  -RB      Eating meals 4  -RB      AM-PAC 6 Clicks Score (OT) 20  -RB         Functional Assessment    Outcome Measure Options AM-PAC 6 Clicks Daily Activity (OT)  -RB            User Key  (r) = Recorded By, (t) = Taken By, (c) = Cosigned By    Initials Name Provider Type    Abhi Irwin OT Occupational Therapist                Time Calculation:    Time Calculation- OT     Row Name 04/22/23 1300             Time Calculation- OT    OT Start Time 0949  -RB      OT Stop Time 1041  -RB      OT Time Calculation (min) 52 min  -RB      OT Received On 04/21/23  -RB      OT Goal Re-Cert Due Date 05/04/23  -RB            User Key  (r) = Recorded By, (t) = Taken By, (c) = Cosigned By    Initials Name Provider Type    Abhi Irwin OT Occupational Therapist              Therapy Charges for Today     Code Description Service Date Service Provider Modifiers Qty    [id number]  OT EVAL MOD COMPLEXITY 3 4/22/2023 Abhi Chang OT GO 1               Abhi Chang OT  4/22/2023

## 2023-04-22 NOTE — PROGRESS NOTES
[facility] Medicine Services  INPATIENT PROGRESS NOTE      Length of Stay: 1  Date of Admission: 4/21/2023  Primary Care Physician: Marina Kitchen MD    Subjective   Chief Complaint:   Positive shortness of breath.  Positive cough.  HPI:    The patient he is a 72-year-old male admitted to the hospital via the ER.  The patient comes in on day of admission complaining of severe shortness of breath.  On evaluation in the ER initial ABG shows pH 7.1 PCO2 is 91.  Patient has been having cough shortness of breath hemoptysis fevers chills.  No nausea or vomiting.  Positive for headache and myalgias.  Fatigue and weakness.  Labs have been reviewed the urine shows no signs of infection.  Blood cultures have been obtained.  BUN is 10 creatinine is 0.4 liver function test are within normal limits potassium was 3.4.  White count 5.2 H&H is 9.2 and 30 and platelets are 188.  The patient will be admitted to CCU and transferred to medical floor if remains stable BiPAP.    Daily assessment: 4/22/2023.  On evaluation this morning the patient is currently sitting up in bed.  He is awake and alert.  He is enjoying breakfast at this time.  Has no issues or complaints.  He continues to have some hemoptysis.  He does have a history of lung cancer and oncology is aware.  The patient is doing much better we will transfer to medical floor to cardiac monitoring.  He is not having any fevers or chills.  He continues to have shortness of breath at rest and on exertion however this is significantly improved.  His wife is at bedside questions are answered.  His vital signs are stable he remains afebrile.    Review of Systems   Constitutional: Positive for fatigue. Negative for chills and fever.   HENT: Negative.    Eyes: Negative.    Respiratory: Positive for cough and shortness of breath.    Cardiovascular: Negative.    Gastrointestinal: Negative.    Endocrine: Negative.    Genitourinary:  Negative.    Musculoskeletal: Negative.    Skin: Negative.    Neurological: Positive for weakness.   Hematological: Negative.    Psychiatric/Behavioral: Negative.         All pertinent negatives and positives are as above. All other systems have been reviewed and are negative unless otherwise stated.     Objective    As of today 04/22/23  Temp:  [97.1 °F (36.2 °C)-98 °F (36.7 °C)] 98 °F (36.7 °C)  Heart Rate:  [] 102  Resp:  [18-24] 22  BP: ()/(51-68) 128/60    Physical Exam  Vitals and nursing note reviewed.   Constitutional:       Appearance: He is ill-appearing.   HENT:      Head: Normocephalic and atraumatic.      Right Ear: External ear normal.      Left Ear: External ear normal.      Nose: Nose normal.      Mouth/Throat:      Mouth: Mucous membranes are dry.      Pharynx: Oropharynx is clear.   Eyes:      General: No scleral icterus.     Extraocular Movements: Extraocular movements intact.      Conjunctiva/sclera: Conjunctivae normal.      Pupils: Pupils are equal, round, and reactive to light.   Cardiovascular:      Rate and Rhythm: Normal rate and regular rhythm.      Pulses: Normal pulses.      Heart sounds: Normal heart sounds.   Pulmonary:      Effort: Pulmonary effort is normal.      Breath sounds: Wheezing and rhonchi present.   Abdominal:      General: Bowel sounds are normal. There is no distension.      Palpations: Abdomen is soft.      Tenderness: There is no abdominal tenderness.   Musculoskeletal:         General: Normal range of motion.      Cervical back: Normal range of motion and neck supple.   Skin:     General: Skin is warm and dry.      Capillary Refill: Capillary refill takes less than 2 seconds.      Coloration: Skin is not jaundiced.   Neurological:      General: No focal deficit present.      Mental Status: He is alert and oriented to person, place, and time.      Motor: Weakness present.   Psychiatric:         Mood and Affect: Mood normal.         Behavior: Behavior normal.            dilTIAZem CD, 180 mg, Oral, Q24H  ferrous sulfate, 324 mg, Oral, Daily With Breakfast  folic acid, 1 mg, Oral, Daily  guaiFENesin, 600 mg, Oral, Q12H  heparin (porcine), 5,000 Units, Subcutaneous, Q12H  ipratropium-albuterol, 3 mL, Nebulization, 4x Daily - RT  levoFLOXacin, 750 mg, Intravenous, Q24H  methylPREDNISolone sodium succinate, 80 mg, Intravenous, Q8H  montelukast, 10 mg, Oral, Nightly  pantoprazole, 40 mg, Intravenous, Q AM  sodium chloride, 10 mL, Intravenous, Q12H         Results Review:  I have reviewed the labs, radiology results, and diagnostic studies.    Laboratory Data:   Results from last 7 days   Lab Units 04/22/23  0457 04/21/23  1050   SODIUM mmol/L 138 139   POTASSIUM mmol/L 4.9 3.4*   CHLORIDE mmol/L 101 101   CO2 mmol/L 30.0* 31.0*   BUN mg/dL 12 10   CREATININE mg/dL 0.41* 0.41*   GLUCOSE mg/dL 138* 122*   CALCIUM mg/dL 8.1* 8.5*   BILIRUBIN mg/dL 0.2 0.2   ALK PHOS U/L 81 98   ALT (SGPT) U/L 26 28   AST (SGOT) U/L 18 21   ANION GAP mmol/L 7.0 7.0     Estimated Creatinine Clearance: 136.2 mL/min (A) (by C-G formula based on SCr of 0.41 mg/dL (L)).          Results from last 7 days   Lab Units 04/22/23  0457 04/21/23  1050   WBC 10*3/mm3 5.62 5.20   HEMOGLOBIN g/dL 8.5* 9.2*   HEMATOCRIT % 27.3* 30.0*   PLATELETS 10*3/mm3 171 188           Culture Data:   No results found for: BLOODCX  No results found for: URINECX  No results found for: RESPCX  No results found for: WOUNDCX  No results found for: STOOLCX  No components found for: BODYFLD    Radiology Data:   Imaging Results (Last 24 Hours)     Procedure Component Value Units Date/Time    CT Angiogram Chest [[id number]] Collected: 04/21/23 1339     Updated: 04/21/23 1512    Narrative:      Indication:  Chest pain.    TECHNIQUE:  IV contrast was administered and axial images from the thoracic inlet through  the diaphragms were performed.  3D multiplanar reformats of the thoracic aorta  were completed on a separate workstation under  concurrent supervision.    COMPARISON:  02/13/2023.    FINDINGS:  No pulmonary embolism is seen.  No thoracic aortic dissection or aneurysm is  seen.  A small right pleural effusion is seen and there is extensive right  middle and lower lobe consolidation suspicious for pneumonia.  A right middle  lobe mass seen on the prior CT is difficult to differentiate from the new  surrounding inflammatory changes.  The right hilar and mediastinal  lymphadenopathy is likely secondary to the lung changes.  Severe upper lobe  dominant emphysema is seen.  Visualized upper abdomen shows no significant  abnormality.  On bone windows, there is a lytic expansile abnormality of the  left 11th rib that is stable, but suspicious for metastatic disease.  This large  abnormality measures 5 x 3.3 cm on coronal image 113 of series 7.  The remaining  bones show no significant abnormality.      Impression:      1.  No pulmonary embolism seen.    2.  No thoracic aortic dissection or aneurysm seen.    3.  Worsening airspace disease of the right lung suspicious for pneumonia.  An  underlying right middle lobe lung mass persists but is difficult to clearly  define due to the new changes.  This mass suspicious for malignancy can be  further evaluated with bronchoscopy.    4.  Again seen is a lytic lesion of the left 11th rib measuring 5 x 3.3 cm  associated with a large soft tissue component suspicious for metastatic disease.    5.  Severe upper lobe dominant emphysema.    XR Chest 2 View [[id number]] Collected: 04/21/23 1132     Updated: 04/21/23 1138    Narrative:      FINDINGS:  Right IJ Mediport terminating in the mid superior vena cava. Cardiomediastinal  silhouette is within normal limits.  Emphysema. There is consolidation within  the  middle lobe and right lower lobe consistent with pneumonia. Osseous  structures age-appropriate.      Impression:      1.  Pneumonia in the middle lobe and right lower lobe.    2.  Emphysema.          I have  reviewed the patient's current medications.     Assessment/Plan     Principal Problem:    Pneumonia  Active Problems:    Acute respiratory failure with hypoxia and hypercapnia      Impressions/treatment  Pneumonia right side.  Middle and lower lobe  Hemoptysis.  Patient has history of lung cancer.  Oncology aware.  We will consult for continued management of lung cancer.  Patient has multiple allergies allergic to penicillin we will start the patient on Levaquin.  Continue DuoNebs  Continue Singulair  Continue guaifenesin  Continue Solu-Medrol.  Incentive spirometry.  Monitor for possible intubation.  Follow blood cultures.  Stable.  Doing much better overall.  Awake and alert.  We will transfer to medical floor with cardiac monitoring.     COPD exacerbation  Continue treatments as noted above.    Check echocardiogram.     Hypertension   continue Cardizem     Iron deficiency  Continue ferrous sulfate     GERD  Continue Protonix.     Deconditioning  PT OT     CODE STATUS full code  DVT prophylaxis heparin        Medical Decision Making  Number and Complexity of problems: 3 complex conditions  Differential Diagnosis: Pneumonia versus PE     Conditions and Status:        Condition is unchanged.     [facility] Data  External documents reviewed: Hospital document  My EKG interpretation: 4/21/2023.  Normal sinus rhythm  Incomplete right bundle branch block   My plain film interpretation:   Chest x-ray completed 4/21/2023  IMPRESSION:  1.  Pneumonia in the middle lobe and right lower lobe.   Tests considered but not ordered: None  April 21, 2023  IMPRESSION:  1.  No pulmonary embolism seen.     2.  No thoracic aortic dissection or aneurysm seen.     3.  Worsening airspace disease of the right lung suspicious for pneumonia.  An  underlying right middle lobe lung mass persists but is difficult to clearly  define due to the new changes.  This mass suspicious for malignancy can be  further evaluated with bronchoscopy.     4.  Again  seen is a lytic lesion of the left 11th rib measuring 5 x 3.3 cm  associated with a large soft tissue component suspicious for metastatic disease.     5.  Severe upper lobe dominant emphysema.      Decision rules/scores evaluated (example [id number], Wells, etc): N/A     Discussed with: The ER physician, respiratory therapist, the patient and his significant other.  They agree to proceed with current treatment plan.     Treatment Plan  As above     Care Planning  Shared decision making: Patient updated on current status and informed of proposed care plan; is in agreement with plan  Code status and discussions: Full code     Disposition  Social Determinants of Health that impact treatment or disposition: None  I expect the patient to be discharged to skilled nursing facility     I confirmed that the patient's Advance Care Plan is present, code status is documented, or surrogate decision maker is listed in the patient's medical record.         The patient's surrogate decision maker is the patient's significant other     I discussed my findings and recommendations with the the patient and his significant other.     Estimated length of stay is 3 days  Critical care time 40 minutes.  For admission and patient evaluation.       Eliazar Zhang,

## 2023-04-22 NOTE — PLAN OF CARE
Problem: Noninvasive Ventilation Acute  Goal: Effective Unassisted Ventilation and Oxygenation  Outcome: Ongoing, Progressing   Goal Outcome Evaluation:               Pt tolerating current NPPV settings, no signs of distress noted. Will repeat AM ABG. Pt stable on 4L NC earlier this shift.

## 2023-04-22 NOTE — PLAN OF CARE
Goal Outcome Evaluation:  Plan of Care Reviewed With: patient, spouse           Outcome Evaluation: PT/OT co maya completed. Pt and significant other present & participated. Pt shows upright and mobility tolerance and was able to move sup to sit, sit to stand w/ CGA and transfer w/ CGA x 2. He walked in room 49 ft and 8 w/ CGA and 1 for equipment and sats dropped to about 83% on first gait  but he demonstrated abilty to raise sats to low-mid 90s in 2-3 mi period while talking and sitting EOB. He can benefit from cont therapy to work on better ex [place] as he was compromised yesterday and 02 dependent  per report. Pt reported per chart as having bone mets, Rib 11,  so gait belt placement and fx/fall risk precautions  noted; Good rehab candidate in case rehab to home needed but he appears to be ready to progress further w/ ex [place] w. goal d/c home

## 2023-04-22 NOTE — PAYOR COMM NOTE
"eric barry rn [facility] phone [phone]  Cm fax [phone]  ED VISIT TO INPATIENT ADMISSION        Debbie Velasco (72 y.o. Male)     Date of Birth   1950    Social Security Number       Address   [address] [place] [id number]    Home Phone   [phone]    MRN   [id number]       Confucianism   Voodoo    Marital Status                               Admission Date   4/21/23    Admission Type   Emergency    Admitting Provider   Eliazar Zhang DO    Attending Provider   Eliazar Zhang DO    Department, Room/Bed   [address], 374/1       Discharge Date       Discharge Disposition       Discharge Destination                               Attending Provider: Eliazar Zhang DO    Allergies: Amoxicillin, Albuterol, Asmanex (120 Metered Doses) [Mometasone Furoate], Gabapentin, Lortab [Hydrocodone-acetaminophen], Morphine And Related, Prilosec [Omeprazole], Sodium Clavulanate, Statins, Sulfa Antibiotics, Symbicort [Budesonide-formoterol Fumarate]    Isolation: None   Infection: None   Code Status: CPR    Ht: 165.1 cm (65\")   Wt: 60 kg (132 lb 4.4 oz)    Admission Cmt: None   Principal Problem: Pneumonia [J18.9]                 Active Insurance as of 4/21/2023     Primary Coverage     Payor Plan Insurance Group Employer/Plan Group    [facility] [id number]     Payor Plan Address Payor Plan Phone Number Payor Plan Fax Number Effective Dates    [facility] OFFICE OF [facility] CARE [phone]  2/24/2000 - None Entered    [address] [address]       [facility] [address]       Subscriber Name Subscriber Birth Date Member ID       DEBBIE VELASCO 1950 [id number]           Secondary Coverage     Payor Plan Insurance Group Employer/Plan Group    HUMANA MEDICARE REPLACEMENT HUMANA MEDICARE REPLACEMENT [id number]     Payor Plan Address Payor Plan Phone Number Payor Plan Fax Number Effective Dates    [address] [address] [phone]  " 10/1/2019 - None Entered    [facility] [address]       Subscriber Name Subscriber Birth Date Member ID       DEBBIE VELASCO 1950 [id number]                 Emergency Contacts      (Rel.) Home Phone Work Phone Mobile Phone    Annette Harvey (Significant Other) [phone] -- [phone]               History & Physical      Eliazar Zhang DO at 04/21/23 7605              [facility] Medicine  HISTORY AND PHYSICAL      Date of Admission: 4/21/2023  Primary Care Physician: Marina Kitchen MD    Subjective     Chief Complaint:   Shortness of breath    History of Present Illness  The patient he is a 72-year-old male admitted to the hospital via the ER.  The patient comes in on day of admission complaining of severe shortness of breath.  On evaluation in the ER initial ABG shows pH 7.1 PCO2 is 91.  Patient has been having cough shortness of breath hemoptysis fevers chills.  No nausea or vomiting.  Positive for headache and myalgias.  Fatigue and weakness.  Labs have been reviewed the urine shows no signs of infection.  Blood cultures have been obtained.  BUN is 10 creatinine is 0.4 liver function test are within normal limits potassium was 3.4.  White count 5.2 H&H is 9.2 and 30 and platelets are 188.  The patient will be admitted to CCU and transferred to medical floor if remains stable BiPAP.    Review of Systems   Constitutional: Positive for fatigue. Negative for chills and fever.   HENT: Negative.    Respiratory: Positive for cough and shortness of breath.    Cardiovascular: Negative.    Gastrointestinal: Negative.    Endocrine: Negative.    Genitourinary: Negative.    Musculoskeletal: Negative.    Skin: Negative.    Neurological: Positive for dizziness and weakness.   Hematological: Negative.    Psychiatric/Behavioral: Positive for confusion.        Otherwise complete ROS reviewed and negative except as mentioned in the HPI.    Past  Medical History:   Past Medical History:   Diagnosis Date   • A-fib    • Anesthesia     hip surgery (spinal caused him unpleasant sensations never wants it again)   • Arthritis    • Blood in sputum 02/22/2023    bright red blood, teaspoon - tablespoon daily   • COPD (chronic obstructive pulmonary disease)    • CVA (cerebral vascular accident) 12/24/2022   • Hearing aid worn     bilateral   • History of pulmonary embolism    • History of recurrent pneumonia    • Hypertension    • Lung cancer 03/17/2023   • Oxygen dependent     since @ age 65   • Pulmonary nodules    • Risk for falls     uses walker   • Rotator cuff syndrome of right shoulder    • Tachycardia    • 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 HTN/Eliquis   • Wears glasses     readers   • Wears glasses     reading     Past Surgical History:  Past Surgical History:   Procedure Laterality Date   • HIP ARTHROPLASTY Right    • RIB BIOPSY Left 03/17/2023 11th   • VENOUS ACCESS DEVICE (PORT) INSERTION N/A 3/30/2023    Procedure: MEDIPORT PLACEMENT          (C-ARM);  Surgeon: Lavon Cochran MD;  Location: [facility];  Service: General;  Laterality: N/A;     Social History:  reports that he quit smoking about 14 years ago. His smoking use included cigarettes. He started smoking about 62 years ago. He has a 141.00 pack-year smoking history. He has never used smokeless tobacco. He reports that he does not currently use alcohol. He reports that he does not use drugs.    Family History: family history includes Brain cancer in his brother; Cancer in his brother and sister; No Known Problems in his daughter, daughter, and son.       Allergies:  Allergies   Allergen Reactions   • Amoxicillin Anaphylaxis   • Albuterol Irritability     Facial flushing and palpitations.   • Asmanex (120 Metered Doses) [Mometasone Furoate] Unknown - Low Severity   • Gabapentin Hallucinations   • Lortab [Hydrocodone-Acetaminophen] Other (See Comments)     Can't remember   •  Morphine And Related Hallucinations   • Prilosec [Omeprazole] Other (See Comments)     unknown   • Sodium Clavulanate Unknown - High Severity   • Statins GI Intolerance   • Sulfa Antibiotics Other (See Comments)     Can't remember also more and can't remember   • Symbicort [Budesonide-Formoterol Fumarate] Unknown (See Comments)     Doesn't remember reaction type       Medications:  Prior to Admission medications    Medication Sig Start Date End Date Taking? Authorizing Provider   cholecalciferol (VITAMIN D3) 10 MCG (400 UNIT) tablet Take 1 tablet by mouth Daily. 50 mcg daily   Yes Qamar Javed MD   dexamethasone (DECADRON) 4 MG tablet Take 1 tablet twice daily starting the day before chemo, day of chemo, and day after chemo.  Take with food. 3/27/23  Yes Omega Davis MD   dilTIAZem (TIAZAC) 180 MG 24 hr capsule Take 1 capsule by mouth Daily. 3/1/23  Yes Neville Meeks MD   ferrous sulfate 324 MG tablet delayed-release Take 1 tablet by mouth Daily With Breakfast. 1/13/23  Yes Fan Rob MD   folic acid (FOLVITE) 1 MG tablet Take 1 tablet by mouth Daily. 2/11/23  Yes Omega Davis MD   furosemide (LASIX) 20 MG tablet Take 1 tablet by mouth Daily As Needed (Swelling of lower extremity). 4/17/23  Yes Omega Davis MD   ipratropium (ATROVENT) 0.02 % nebulizer solution Take 2.5 mL by nebulization 3 (Three) Times a Day. Sub amount for how much insurance allows 7/19/21  Yes Anita Toney MD   melatonin 5 MG tablet tablet Take 1 tablet by mouth At Night As Needed.   Yes ProviderQamar MD   montelukast (SINGULAIR) 10 MG tablet Take 1 tablet by mouth Every Night.   Yes Qamar Javed MD   OLANZapine (zyPREXA) 5 MG tablet Take 1 tablet by mouth Every Night. Take on days 2, 3 and 4 after chemotherapy. 3/27/23  Yes Omega Davis MD   ondansetron (ZOFRAN) 8 MG tablet Take 1 tablet by mouth 3 (Three) Times a Day As Needed for Nausea or Vomiting. 3/27/23  Yes Omega Davis MD  "  predniSONE (DELTASONE) 5 MG tablet Take 1 tablet by mouth Every Other Day.   Yes ProviderQamar MD   tiotropium bromide-olodaterol (STIOLTO RESPIMAT) 2.5-2.5 MCG/ACT aerosol solution inhaler Inhale 2 puffs Daily.   Yes ProviderQamar MD   traMADol (ULTRAM) 50 MG tablet Take 1 tablet by mouth Every 6 (Six) Hours As Needed for Moderate Pain for up to 12 doses. 3/30/23  Yes Lavon Cochran MD   vitamin B-12 (CYANOCOBALAMIN) 1000 MCG tablet Take 1 tablet by mouth Daily. 2/11/23  Yes Omega Davis MD   ipratropium (ATROVENT HFA) 17 MCG/ACT inhaler Inhale 2 puffs 4 (Four) Times a Day As Needed for Wheezing.    Provider, MD Qamar   dilTIAZem XR (DILACOR XR) 180 MG 24 hr capsule Take 1 capsule by mouth Daily. 6/30/22 10/7/22  Neville Meeks MD     I have utilized all available immediate resources to obtain, update, and review the patient's current medications.    Objective     Vital Signs: /60   Pulse 78   Temp 98.1 °F (36.7 °C) (Oral)   Resp 18   Ht 165.1 cm (65\")   Wt 58.5 kg (129 lb)   SpO2 100%   BMI 21.47 kg/m²   Physical Exam  Vitals and nursing note reviewed.   Constitutional:       Appearance: He is ill-appearing.      Comments: Cachectic appearing male.   HENT:      Head: Normocephalic and atraumatic.      Right Ear: External ear normal.      Left Ear: External ear normal.      Nose: Nose normal.      Mouth/Throat:      Mouth: Mucous membranes are dry.      Pharynx: Oropharynx is clear.   Eyes:      General: No scleral icterus.     Extraocular Movements: Extraocular movements intact.      Conjunctiva/sclera: Conjunctivae normal.      Pupils: Pupils are equal, round, and reactive to light.   Cardiovascular:      Rate and Rhythm: Normal rate and regular rhythm.      Pulses: Normal pulses.      Heart sounds: Normal heart sounds.   Pulmonary:      Effort: Pulmonary effort is normal.      Breath sounds: Wheezing and rhonchi present.   Abdominal:      General: Bowel sounds are " normal. There is no distension.      Palpations: Abdomen is soft.      Tenderness: There is no abdominal tenderness.   Musculoskeletal:         General: Normal range of motion.      Cervical back: Normal range of motion and neck supple.   Skin:     General: Skin is warm and dry.      Coloration: Skin is not jaundiced.   Neurological:      General: No focal deficit present.      Mental Status: He is alert and oriented to person, place, and time.      Motor: Weakness present.   Psychiatric:         Mood and Affect: Mood normal.         Behavior: Behavior normal.              Results Reviewed:  Lab Results (last 24 hours)     Procedure Component Value Units Date/Time    POC Glucose Once [[id number]]  (Normal) Collected: 04/21/23 1325    Specimen: Blood Updated: 04/21/23 1349     Glucose 122 mg/dL      Comment: Result Not ConfirmedOperator: [id number] BRIANNE MEGANMeter ID: [id number]       Blood Gas, Arterial - [[id number]]  (Abnormal) Collected: 04/21/23 1341    Specimen: Arterial Blood Updated: 04/21/23 1341     Site Left Radial     Anson's Test N/A     pH, Arterial 7.146 pH units      Comment: 85 Value below critical limit        pCO2, Arterial 91.8 mm Hg      Comment: 86 Value above critical limit        pO2, Arterial 126.0 mm Hg      Comment: 83 Value above reference range        HCO3, Arterial 31.7 mmol/L      Comment: 83 Value above reference range        Base Excess, Arterial 1.2 mmol/L      O2 Saturation, Arterial 98.1 %      Barometric Pressure for Blood Gas 746 mmHg      Modality Nasal Cannula     Flow Rate 4.0 lpm      Ventilator Mode NA     Collected by [email or web address]     Comment: Meter: [id number]     :  [id number]       Lactic Acid, Plasma [[id number]]  (Normal) Collected: 04/21/23 1233    Specimen: Blood Updated: 04/21/23 1250     Lactate 0.8 mmol/L     Blood Culture - Blood, Hand, Right [[id number]] Collected: 04/21/23 1233    Specimen: Blood from Hand, Right Updated: 04/21/23 1237    Blood Culture -  Blood, Arm, Left [[id number]] Collected: 04/21/23 1216    Specimen: Blood from Arm, Left Updated: 04/21/23 1216    [facility] Draw [[id number]] Collected: 04/21/23 1050    Specimen: Blood Updated: 04/21/23 1201    Narrative:      The following orders were created for panel order [facility] Draw.  Procedure                               Abnormality         Status                     ---------                               -----------         ------                     Green Top (Gel)[[id number]]                                  Final result               Lavender Top[[id number]]                                     Final result               Gold Top - SST[[id number]]                                   Final result               Light Blue Top[[id number]]                                   Final result                 Please view results for these tests on the individual orders.    Gold Top - SST [[id number]] Collected: 04/21/23 1050    Specimen: Blood Updated: 04/21/23 1201     Extra Tube Hold for add-ons.     Comment: Auto resulted.       Green Top (Gel) [[id number]] Collected: 04/21/23 1050    Specimen: Blood Updated: 04/21/23 1201     Extra Tube Hold for add-ons.     Comment: Auto resulted.       Lavender Top [[id number]] Collected: 04/21/23 1050    Specimen: Blood Updated: 04/21/23 1201     Extra Tube hold for add-on     Comment: Auto resulted       Light Blue Top [[id number]] Collected: 04/21/23 1050    Specimen: Blood Updated: 04/21/23 1201     Extra Tube Hold for add-ons.     Comment: Auto resulted       Manual Differential [[id number]]  (Abnormal) Collected: 04/21/23 1050    Specimen: Blood Updated: 04/21/23 1142     Neutrophil % 51.0 %      Lymphocyte % 19.0 %      Monocyte % 21.0 %      Eosinophil % 6.0 %      Basophil % 1.0 %      Metamyelocyte % 2.0 %      Neutrophils Absolute 2.65 10*3/mm3      Lymphocytes Absolute 0.99 10*3/mm3      Monocytes Absolute 1.09 10*3/mm3      Eosinophils Absolute 0.31 10*3/mm3      Basophils  Absolute 0.05 10*3/mm3      Anisocytosis Slight/1+     Hypochromia --     Comment: Few hypochromic cells observed        WBC Morphology Normal     Platelet Estimate Adequate    Comprehensive Metabolic Panel [[id number]]  (Abnormal) Collected: 04/21/23 1050    Specimen: Blood Updated: 04/21/23 1116     Glucose 122 mg/dL      BUN 10 mg/dL      Creatinine 0.41 mg/dL      Sodium 139 mmol/L      Potassium 3.4 mmol/L      Chloride 101 mmol/L      CO2 31.0 mmol/L      Calcium 8.5 mg/dL      Total Protein 6.2 g/dL      Albumin 3.3 g/dL      ALT (SGPT) 28 U/L      AST (SGOT) 21 U/L      Alkaline Phosphatase 98 U/L      Total Bilirubin 0.2 mg/dL      Globulin 2.9 gm/dL      A/G Ratio 1.1 g/dL      BUN/Creatinine Ratio 24.4     Anion Gap 7.0 mmol/L      eGFR 114.3 mL/min/1.73     Narrative:      GFR Normal >60  Chronic Kidney Disease <60  Kidney Failure <15    The GFR formula is only valid for adults with stable renal function between ages 18 and 70.    Single High Sensitivity Troponin T [[id number]]  (Normal) Collected: 04/21/23 1050    Specimen: Blood Updated: 04/21/23 1115     HS Troponin T 13 ng/L     Narrative:      High Sensitive Troponin T Reference Range:  <10.0 ng/L- Negative Female for AMI  <15.0 ng/L- Negative Male for AMI  >=10 - Abnormal Female indicating possible myocardial injury.  >=15 - Abnormal Male indicating possible myocardial injury.   Clinicians would have to utilize clinical acumen, EKG, Troponin, and serial changes to determine if it is an Acute Myocardial Infarction or myocardial injury due to an underlying chronic condition.         D-dimer, Quantitative [[id number]]  (Abnormal) Collected: 04/21/23 1050    Specimen: Blood Updated: 04/21/23 1114     D-Dimer, Quantitative 1,770 ng/mL (FEU)     Narrative:      According to the assay 's published package insert, a normal (<500 ng/mL (FEU)) D-dimer result in conjunction with a non-high clinical probability assessment, excludes deep vein  "thrombosis (DVT) and pulmonary embolism (PE) with high sensitivity.    D-dimer values increase with age and this can make VTE exclusion of an older population difficult. To address this, the American College of [employer], based on best available evidence and recent guidelines, recommends that clinicians use age-adjusted D-dimer thresholds in patients greater than 50 years of age with: a) a low probability of PE who do not meet all Pulmonary Embolism Rule Out Criteria, or b) in those with intermediate probability of PE.   The formula for an age-adjusted D-dimer cut-off is \"age*10\".  For example, a 60 year old patient would have an age-adjusted cut-off of 600 ng/mL (FEU) and an 80 year old 800 ng/mL (FEU).      BNP [[id number]]  (Normal) Collected: 04/21/23 1050    Specimen: Blood Updated: 04/21/23 1114     proBNP 64.9 pg/mL     Narrative:      Among patients with dyspnea, NT-proBNP is highly sensitive for the detection of acute congestive heart failure. In addition NT-proBNP of <300 pg/ml effectively rules out acute congestive heart failure with 99% negative predictive value.      CBC & Differential [[id number]]  (Abnormal) Collected: 04/21/23 1050    Specimen: Blood Updated: 04/21/23 1103    Narrative:      The following orders were created for panel order CBC & Differential.  Procedure                               Abnormality         Status                     ---------                               -----------         ------                     CBC Auto Differential[[id number]]        Abnormal            Final result               Scan Slide[[id number]]                                                                    Please view results for these tests on the individual orders.    CBC Auto Differential [[id number]]  (Abnormal) Collected: 04/21/23 1050    Specimen: Blood Updated: 04/21/23 1103     WBC 5.20 10*3/mm3      RBC 3.23 10*6/mm3      Hemoglobin 9.2 g/dL      Hematocrit 30.0 %      MCV 92.9 fL      MCH 28.5 " pg      MCHC 30.7 g/dL      RDW 14.8 %      RDW-SD 45.7 fl      MPV 9.4 fL      Platelets 188 10*3/mm3         Imaging Results (Last 24 Hours)     Procedure Component Value Units Date/Time    CT Angiogram Chest [[id number]] Collected: 04/21/23 1339     Updated: 04/21/23 1405    Narrative:      Indication:  Chest pain.    TECHNIQUE:  IV contrast was administered and axial images from the thoracic inlet through  the diaphragms were performed.  3D multiplanar reformats of the thoracic aorta  were completed on a separate workstation under concurrent supervision.    COMPARISON:  02/13/2023.    FINDINGS:  No pulmonary embolism is seen.  No thoracic aortic dissection or aneurysm is  seen.  A small right pleural effusion is seen and there is extensive right  middle and lower lobe consolidation suspicious for pneumonia.  A right middle  lobe mass seen on the prior CT is difficult to differentiate from the new  surrounding inflammatory changes.  The right hilar and mediastinal  lymphadenopathy is likely secondary to the lung changes.  Severe upper lobe  dominant emphysema is seen.  Visualized upper abdomen shows no significant  abnormality.  On bone windows, there is a lytic expansile abnormality of the  left 11th rib that is stable, but suspicious for metastatic disease.  This large  abnormality measures 5 x 3.3 cm on coronal image 113 of series 7.  The remaining  bones show no significant abnormality.      Impression:      1.  No pulmonary embolism seen.    2.  No thoracic aortic dissection or aneurysm seen.    3.  Worsening airspace disease of the right lung suspicious for pneumonia.  An  underlying right middle lobe lung mass persists but is difficult to clearly  define due to the new changes.  This mass suspicious for malignancy can be  further evaluated with bronchoscopy.    4.  Again seen is a lytic lesion of the left 11th rib measuring 5 x 3.3 cm  associated with a large soft tissue component suspicious for metastatic  disease.    5.  Severe upper lobe dominant emphysema.    XR Chest 2 View [[id number]] Collected: 04/21/23 1132     Updated: 04/21/23 1138    Narrative:      FINDINGS:  Right IJ Mediport terminating in the mid superior vena cava. Cardiomediastinal  silhouette is within normal limits.  Emphysema. There is consolidation within  the  middle lobe and right lower lobe consistent with pneumonia. Osseous  structures age-appropriate.      Impression:      1.  Pneumonia in the middle lobe and right lower lobe.    2.  Emphysema.        I have personally reviewed and interpreted the radiology studies and ECG obtained at time of admission.     Assessment / Plan     Assessment:   Active Hospital Problems    Diagnosis    • **Pneumonia      Impressions/treatment  Pneumonia right side.  Middle and lower lobe  Attempt to tolerate BiPAP.  ABG within an hour.  Initial ABG shows a pH 7.1  PCO2 is 91.8, PO2 is 126, bicarb 31.7.  Patient has multiple allergies allergic to penicillin we will start the patient on Levaquin.  Continue DuoNebs  Continue Singulair  Continue guaifenesin  Continue Solu-Medrol.  Incentive spirometry.  Monitor for possible intubation.  Follow blood cultures.    COPD exacerbation  Continue treatments as noted above.    Check echocardiogram.    Hypertension   continue Cardizem    Iron deficiency  Continue ferrous sulfate    GERD  Continue Protonix.    Deconditioning  PT OT    CODE STATUS full code  DVT prophylaxis heparin      Medical Decision Making  Number and Complexity of problems: 3 complex conditions  Differential Diagnosis: Pneumonia versus PE    Conditions and Status:        Condition is unchanged.     [facility] Data  External documents reviewed: Hospital document  My EKG interpretation: 4/21/2023.  Normal sinus rhythm  Incomplete right bundle branch block   My plain film interpretation:   Chest x-ray completed 4/21/2023  IMPRESSION:  1.  Pneumonia in the middle lobe and right lower lobe.   Tests considered but not  ordered: None  April 21, 2023  IMPRESSION:  1.  No pulmonary embolism seen.     2.  No thoracic aortic dissection or aneurysm seen.     3.  Worsening airspace disease of the right lung suspicious for pneumonia.  An  underlying right middle lobe lung mass persists but is difficult to clearly  define due to the new changes.  This mass suspicious for malignancy can be  further evaluated with bronchoscopy.     4.  Again seen is a lytic lesion of the left 11th rib measuring 5 x 3.3 cm  associated with a large soft tissue component suspicious for metastatic disease.     5.  Severe upper lobe dominant emphysema.      Decision rules/scores evaluated (example [id number], Wells, etc): N/A     Discussed with: The ER physician, respiratory therapist, the patient and his significant other.  They agree to proceed with current treatment plan.     Treatment Plan  As above    Care Planning  Shared decision making: Patient updated on current status and informed of proposed care plan; is in agreement with plan  Code status and discussions: Full code    Disposition  Social Determinants of Health that impact treatment or disposition: None  I expect the patient to be discharged to skilled nursing facility    I confirmed that the patient's Advance Care Plan is present, code status is documented, or surrogate decision maker is listed in the patient's medical record.       The patient's surrogate decision maker is the patient's significant other    I discussed my findings and recommendations with the the patient and his significant other.    Estimated length of stay is 3 days  Critical care time 40 minutes.  For admission and patient evaluation.    The patient was seen and examined by me on 4/21/2023 at 2:30 PM    Electronically signed by Eliazar Zhang DO, 04/21/23, 15:05 CDT.              Electronically signed by Eliazar Zhang DO at 04/22/23 1051          Emergency Department Notes      Kaden Burton MD at 04/21/23  1051          Subjective   History of Present Illness  The patient is a 72 years old with past medical history of COPD, hypertension, and lung cancer who presented here today from Dr. Davis's office due to coughing up blood.  Denies any fever chills or sweating.  He said he has been going on for the past 4 to 5 hours.  Denies any chest pain.  Patient uses oxygen at home about 3 L.    History provided by:  Patient   used: No        Review of Systems   Respiratory: Positive for shortness of breath.    All other systems reviewed and are negative.      Past Medical History:   Diagnosis Date   • A-fib    • Anesthesia     hip surgery (spinal caused him unpleasant sensations never wants it again)   • Arthritis    • Blood in sputum 02/22/2023    bright red blood, teaspoon - tablespoon daily   • COPD (chronic obstructive pulmonary disease)    • CVA (cerebral vascular accident) 12/24/2022   • Hearing aid worn     bilateral   • History of pulmonary embolism    • History of recurrent pneumonia    • Hypertension    • Lung cancer 03/17/2023   • Oxygen dependent     since @ age 65   • Pulmonary nodules    • Risk for falls     uses walker   • Rotator cuff syndrome of right shoulder    • Tachycardia    • 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 HTN/Eliquis   • Wears glasses     readers   • Wears glasses     reading       Allergies   Allergen Reactions   • Amoxicillin Anaphylaxis   • Albuterol Irritability     Facial flushing and palpitations.   • Asmanex (120 Metered Doses) [Mometasone Furoate] Unknown - Low Severity   • Gabapentin Hallucinations   • Lortab [Hydrocodone-Acetaminophen] Other (See Comments)     Can't remember   • Morphine And Related Hallucinations   • Prilosec [Omeprazole] Other (See Comments)     unknown   • Sodium Clavulanate Unknown - High Severity   • Statins GI Intolerance   • Sulfa Antibiotics Other (See Comments)     Can't remember also more and can't remember   • Symbicort  [Budesonide-Formoterol Fumarate] Unknown (See Comments)     Doesn't remember reaction type       Past Surgical History:   Procedure Laterality Date   • HIP ARTHROPLASTY Right    • RIB BIOPSY Left 2023   • VENOUS ACCESS DEVICE (PORT) INSERTION N/A 3/30/2023    Procedure: MEDIPORT PLACEMENT          (C-ARM);  Surgeon: Lavon Cochran MD;  Location: [facility];  Service: General;  Laterality: N/A;       Family History   Problem Relation Age of Onset   • Cancer Sister    • Brain cancer Brother    • Cancer Brother    • No Known Problems Daughter    • No Known Problems Daughter    • No Known Problems Son        Social History     Socioeconomic History   • Marital status:    • Number of children: 3   • Highest education level: Associate degree: occupational, technical, or vocational program   Tobacco Use   • Smoking status: Former     Packs/day: 3.00     Years: 47.00     Pack years: 141.00     Types: Cigarettes     Start date:      Quit date: 2008     Years since quittin.9   • Smokeless tobacco: Never   Vaping Use   • Vaping Use: Never used   Substance and Sexual Activity   • Alcohol use: Not Currently     Comment: not last 40 years   • Drug use: Never   • Sexual activity: Not Currently     Partners: Female           Objective   Physical Exam  Vitals and nursing note reviewed.   Constitutional:       Appearance: Normal appearance. He is normal weight.   HENT:      Head: Normocephalic and atraumatic.      Right Ear: Tympanic membrane, ear canal and external ear normal.      Left Ear: Tympanic membrane, ear canal and external ear normal.      Nose: Nose normal.   Eyes:      Extraocular Movements: Extraocular movements intact.      Conjunctiva/sclera: Conjunctivae normal.      Pupils: Pupils are equal, round, and reactive to light.   Cardiovascular:      Rate and Rhythm: Normal rate and regular rhythm.      Pulses: Normal pulses.      Heart sounds: Normal heart sounds.   Pulmonary:       Effort: Pulmonary effort is normal.      Breath sounds: Decreased breath sounds present.   Abdominal:      General: Abdomen is flat. Bowel sounds are normal.      Palpations: Abdomen is soft.   Musculoskeletal:         General: Normal range of motion.      Cervical back: Normal range of motion and neck supple.   Skin:     General: Skin is warm.      Capillary Refill: Capillary refill takes less than 2 seconds.   Neurological:      General: No focal deficit present.      Mental Status: He is alert and oriented to person, place, and time.   Psychiatric:         Mood and Affect: Mood normal.         Behavior: Behavior normal.         Procedures          ED Course  ED Course as of 04/21/23 1553   Fri Apr 21, 2023   1340 After patient came back from the CT he still having more shortness of breath.  ABG was done and also DuoNeb was given to patient. [MO]      ED Course User Index  [MO] Kaden Burton MD           Labs Reviewed   COMPREHENSIVE METABOLIC PANEL - Abnormal; Notable for the following components:       Result Value    Glucose 122 (*)     Creatinine 0.41 (*)     Potassium 3.4 (*)     CO2 31.0 (*)     Calcium 8.5 (*)     Albumin 3.3 (*)     All other components within normal limits    Narrative:     GFR Normal >60  Chronic Kidney Disease <60  Kidney Failure <15    The GFR formula is only valid for adults with stable renal function between ages 18 and 70.   D-DIMER, QUANTITATIVE - Abnormal; Notable for the following components:    D-Dimer, Quantitative 1,770 (*)     All other components within normal limits    Narrative:     According to the assay 's published package insert, a normal (<500 ng/mL (FEU)) D-dimer result in conjunction with a non-high clinical probability assessment, excludes deep vein thrombosis (DVT) and pulmonary embolism (PE) with high sensitivity.    D-dimer values increase with age and this can make VTE exclusion of an older population difficult. To address this, the  "American College [employer]icians, based on best available evidence and recent guidelines, recommends that clinicians use age-adjusted D-dimer thresholds in patients greater than 50 years of age with: a) a low probability of PE who do not meet all Pulmonary Embolism Rule Out Criteria, or b) in those with intermediate probability of PE.   The formula for an age-adjusted D-dimer cut-off is \"age*10\".  For example, a 60 year old patient would have an age-adjusted cut-off of 600 ng/mL (FEU) and an 80 year old 800 ng/mL (FEU).     CBC WITH AUTO DIFFERENTIAL - Abnormal; Notable for the following components:    RBC 3.23 (*)     Hemoglobin 9.2 (*)     Hematocrit 30.0 (*)     MCHC 30.7 (*)     All other components within normal limits   MANUAL DIFFERENTIAL - Abnormal; Notable for the following components:    Lymphocyte % 19.0 (*)     Monocyte % 21.0 (*)     Metamyelocyte % 2.0 (*)     Monocytes Absolute 1.09 (*)     All other components within normal limits   BLOOD GAS, ARTERIAL - Abnormal; Notable for the following components:    pH, Arterial 7.146 (*)     pCO2, Arterial 91.8 (*)     pO2, Arterial 126.0 (*)     HCO3, Arterial 31.7 (*)     All other components within normal limits   BLOOD GAS, ARTERIAL - Abnormal; Notable for the following components:    pH, Arterial 7.313 (*)     pCO2, Arterial 60.5 (*)     pO2, Arterial 131.0 (*)     HCO3, Arterial 30.7 (*)     Base Excess, Arterial 3.6 (*)     O2 Saturation, Arterial 99.4 (*)     All other components within normal limits   BNP (IN-HOUSE) - Normal    Narrative:     Among patients with dyspnea, NT-proBNP is highly sensitive for the detection of acute congestive heart failure. In addition NT-proBNP of <300 pg/ml effectively rules out acute congestive heart failure with 99% negative predictive value.     SINGLE HSTROPONIN T - Normal    Narrative:     High Sensitive Troponin T Reference Range:  <10.0 ng/L- Negative Female for AMI  <15.0 ng/L- Negative Male for AMI  >=10 - Abnormal " Female indicating possible myocardial injury.  >=15 - Abnormal Male indicating possible myocardial injury.   Clinicians would have to utilize clinical acumen, EKG, Troponin, and serial changes to determine if it is an Acute Myocardial Infarction or myocardial injury due to an underlying chronic condition.        LACTIC ACID, PLASMA - Normal   POCT GLUCOSE FINGERSTICK - Normal   BLOOD CULTURE   BLOOD CULTURE   RAINBOW DRAW    Narrative:     The following orders were created for panel order [place] Draw.  Procedure                               Abnormality         Status                     ---------                               -----------         ------                     Green Top (Gel)[[id number]]                                  Final result               Lavender Top[[id number]]                                     Final result               Gold Top - SST[[id number]]                                   Final result               Light Blue Top[[id number]]                                   Final result                 Please view results for these tests on the individual orders.   BLOOD GAS, ARTERIAL   CBC AND DIFFERENTIAL    Narrative:     The following orders were created for panel order CBC & Differential.  Procedure                               Abnormality         Status                     ---------                               -----------         ------                     CBC Auto Differential[[id number]]        Abnormal            Final result               Scan Slide[[id number]]                                                                    Please view results for these tests on the individual orders.   GREEN TOP   LAVENDER TOP   GOLD TOP - SST   LIGHT BLUE TOP       CT Angiogram Chest   Final Result   1.  No pulmonary embolism seen.      2.  No thoracic aortic dissection or aneurysm seen.      3.  Worsening airspace disease of the right lung suspicious for pneumonia.  An   underlying right middle lobe  lung mass persists but is difficult to clearly   define due to the new changes.  This mass suspicious for malignancy can be   further evaluated with bronchoscopy.      4.  Again seen is a lytic lesion of the left 11th rib measuring 5 x 3.3 cm   associated with a large soft tissue component suspicious for metastatic disease.      5.  Severe upper lobe dominant emphysema.      XR Chest 2 View   Final Result   1.  Pneumonia in the middle lobe and right lower lobe.      2.  Emphysema.                                      Medical Decision Making  Patient is 72 years old who present here today because of shortness of breath.  Chest x-ray was done which showed some pneumonia.  CT angiogram was done which shows pneumonia.  Dr. Allred was called who accepted patient.  Patient was put on BiPAP.    Acute respiratory failure with hypoxia and hypercapnia: acute illness or injury  Pneumonia due to infectious organism, unspecified laterality, unspecified part of lung: acute illness or injury  Amount and/or Complexity of Data Reviewed  Labs: ordered.  Radiology: ordered.  ECG/medicine tests: ordered.      Risk  Prescription drug management.  Decision regarding hospitalization.          Final diagnoses:   Acute respiratory failure with hypoxia and hypercapnia   Pneumonia due to infectious organism, unspecified laterality, unspecified part of lung       ED Disposition  ED Disposition     ED Disposition   Decision to Admit    Condition   --    Comment   Level of Care: Stepdown [25]   Diagnosis: Acute respiratory failure with hypoxia and hypercapnia [[id number]]   Admitting Physician: MERCEDES ALLRED [[id number]]   Attending Physician: MERCEDES ALLRED [[id number]]   Certification: I Certify That Inpatient Hospital Services Are Medically Necessary For Greater Than 2 Midnights               No follow-up provider specified.       Medication List      No changes were made to your prescriptions during this visit.          Kaden Burton  MD [place]  04/21/23 1553      Electronically signed by Kaden Burton MD at 04/21/23 1553     Earnestine Modi, RN at 04/21/23 1323        This RN in pt room when O2 dropped to 55% in room. This RN put pt on a nonrebreather after oxygen saturation dropped. Pt now in room 6. Multiple staff at bedside, Dr. Burton at bedside.     Electronically signed by Earnestine Modi, RN at 04/21/23 1325     Earnestine Modi RN at 04/21/23 1407        RT at bedside. Pt is currently on bipap.     Electronically signed by Earnestine Modi, RN at 04/21/23 1407     Earnestine Modi, RN at 04/21/23 1421        Dr. Burton at bedside, states to leave pt on bipap for 1 hour and repeat ABG and see if pt improves before opting for intubation. Pt states he feels better, however RT did express concern for low tidal volumes to Dr. Burton. Dr. Burton still wishes to leave pt on bipap at this time.     Electronically signed by Earnestine Modi, LAUREN at 04/21/23 1422     Earnestine Modi, RN at 04/21/23 1508          Nursing report ED to floor  Brian Pace McKnight  72 y.o.  male    HPI:   Chief Complaint   Patient presents with   • Coughing Up Blood       Admitting doctor:   Eliazar Zhang DO    Consulting provider(s):  Consults       No orders found from 3/23/2023 to 4/22/2023.             Admitting diagnosis:   There were no encounter diagnoses.    Code status:   Current Code Status       Date Active Code Status Order ID Comments User Context       4/21/2023 1449 CPR (Attempt to Resuscitate) [id number]  Eliazar Zhang DO ED        Question Answer    Code Status (Patient has no pulse and is not breathing) CPR (Attempt to Resuscitate)    Medical Interventions (Patient has pulse or is breathing) Full Support    Level Of Support Discussed With Patient                    Allergies:   Amoxicillin, Albuterol, Asmanex (120 metered doses) [mometasone furoate], Gabapentin, Lortab [hydrocodone-acetaminophen], Morphine and related, Prilosec [omeprazole],  Sodium clavulanate, Statins, Sulfa antibiotics, and Symbicort [budesonide-formoterol fumarate]    Intake and Output    Intake/Output Summary (Last 24 hours) at 4/21/2023 1508  Last data filed at 4/21/2023 1507  Gross per 24 hour   Intake 1150 ml   Output --   Net 1150 ml       Weight:       04/21/23  1015   Weight: 58.5 kg (129 lb)       Most recent vitals:   Vitals:    04/21/23 1427 04/21/23 1430 04/21/23 1456 04/21/23 1507   BP:   98/52 101/52   BP Location:       Patient Position:       Pulse: 80 78 74 76   Resp:    21   Temp:       TempSrc:       SpO2: 100% 100% 100% 100%   Weight:       Height:         Oxygen Therapy: bipap    Active LDAs/IV Access:   Lines, Drains & Airways       Active LDAs       Name Placement date Placement time Site Days    Peripheral IV 04/21/23 1051 Anterior;Right Forearm 04/21/23  1051  Forearm  less than 1    Peripheral IV 04/21/23 1325 Posterior;Right Hand 04/21/23  1325  Hand  less than 1    Single Lumen Implantable Port 03/30/23 Right Chest 03/30/23  1203  Chest  22                    Labs (abnormal labs have a star):   Labs Reviewed   COMPREHENSIVE METABOLIC PANEL - Abnormal; Notable for the following components:       Result Value    Glucose 122 (*)     Creatinine 0.41 (*)     Potassium 3.4 (*)     CO2 31.0 (*)     Calcium 8.5 (*)     Albumin 3.3 (*)     All other components within normal limits    Narrative:     GFR Normal >60  Chronic Kidney Disease <60  Kidney Failure <15    The GFR formula is only valid for adults with stable renal function between ages 18 and 70.   D-DIMER, QUANTITATIVE - Abnormal; Notable for the following components:    D-Dimer, Quantitative 1,770 (*)     All other components within normal limits    Narrative:     According to the assay 's published package insert, a normal (<500 ng/mL (FEU)) D-dimer result in conjunction with a non-high clinical probability assessment, excludes deep vein thrombosis (DVT) and pulmonary embolism (PE) with high  "sensitivity.    D-dimer values increase with age and this can make VTE exclusion of an older population difficult. To address this, the [employer], based on best available evidence and recent guidelines, recommends that clinicians use age-adjusted D-dimer thresholds in patients greater than 50 years of age with: a) a low probability of PE who do not meet all Pulmonary Embolism Rule Out Criteria, or b) in those with intermediate probability of PE.   The formula for an age-adjusted D-dimer cut-off is \"age*10\".  For example, a 60 year old patient would have an age-adjusted cut-off of 600 ng/mL (FEU) and an 80 year old 800 ng/mL (FEU).     CBC WITH AUTO DIFFERENTIAL - Abnormal; Notable for the following components:    RBC 3.23 (*)     Hemoglobin 9.2 (*)     Hematocrit 30.0 (*)     MCHC 30.7 (*)     All other components within normal limits   MANUAL DIFFERENTIAL - Abnormal; Notable for the following components:    Lymphocyte % 19.0 (*)     Monocyte % 21.0 (*)     Metamyelocyte % 2.0 (*)     Monocytes Absolute 1.09 (*)     All other components within normal limits   BLOOD GAS, ARTERIAL - Abnormal; Notable for the following components:    pH, Arterial 7.146 (*)     pCO2, Arterial 91.8 (*)     pO2, Arterial 126.0 (*)     HCO3, Arterial 31.7 (*)     All other components within normal limits   BNP (IN-HOUSE) - Normal    Narrative:     Among patients with dyspnea, NT-proBNP is highly sensitive for the detection of acute congestive heart failure. In addition NT-proBNP of <300 pg/ml effectively rules out acute congestive heart failure with 99% negative predictive value.     SINGLE HSTROPONIN T - Normal    Narrative:     High Sensitive Troponin T Reference Range:  <10.0 ng/L- Negative Female for AMI  <15.0 ng/L- Negative Male for AMI  >=10 - Abnormal Female indicating possible myocardial injury.  >=15 - Abnormal Male indicating possible myocardial injury.   Clinicians would have to utilize clinical acumen, " EKG, Troponin, and serial changes to determine if it is an Acute Myocardial Infarction or myocardial injury due to an underlying chronic condition.        LACTIC ACID, PLASMA - Normal   POCT GLUCOSE FINGERSTICK - Normal   BLOOD CULTURE   BLOOD CULTURE   RAINBOW DRAW    Narrative:     The following orders were created for panel order [place] Draw.  Procedure                               Abnormality         Status                     ---------                               -----------         ------                     Green Top (Gel)[[id number]]                                  Final result               Lavender Top[[id number]]                                     Final result               Gold Top - SST[[id number]]                                   Final result               Light Blue Top[[id number]]                                   Final result                 Please view results for these tests on the individual orders.   BLOOD GAS, ARTERIAL   CBC AND DIFFERENTIAL    Narrative:     The following orders were created for panel order CBC & Differential.  Procedure                               Abnormality         Status                     ---------                               -----------         ------                     CBC Auto Differential[[id number]]        Abnormal            Final result               Scan Slide[[id number]]                                                                    Please view results for these tests on the individual orders.   GREEN TOP   LAVENDER TOP   GOLD TOP - SST   LIGHT BLUE TOP       Meds given in ED:   Medications   sodium chloride 0.9 % flush 10 mL (has no administration in time range)   ipratropium-albuterol (DUO-NEB) nebulizer solution 3 mL ( Nebulization Canceled Entry 4/21/23 1630)   sodium chloride 0.9 % bolus 1,000 mL (0 mL Intravenous Stopped 4/21/23 1507)   levoFLOXacin (LEVAQUIN) 750 mg/150 mL D5W (premix) (LEVAQUIN) 750 mg (0 mg Intravenous Stopped 4/21/23 1507)    iopamidol (ISOVUE-370) 76 % injection 100 mL (59 mL Intravenous Given 4/21/23 1255)   ondansetron (ZOFRAN) injection 4 mg (4 mg Intravenous Given 4/21/23 1357)           NIH Stroke Scale:       Isolation/Infection(s):  No active isolations   No active infections     COVID Testing  Collected no  Resulted n/a    Nursing report ED to floor:  Mental status: alert and oriented x4  Ambulatory status: bed bound at this time, but was stand by assist  Precautions: none    ED nurse phone extentsion- [phone]    Electronically signed by Earnestine Modi RN at 04/21/23 6470          Physician Progress Notes (last 24 hours)      Eliazar Zhang DO at 04/22/23 1052              [facility] Medicine Services  INPATIENT PROGRESS NOTE      Length of Stay: 1  Date of Admission: 4/21/2023  Primary Care Physician: Marina Kitchen MD    Subjective   Chief Complaint:   Positive shortness of breath.  Positive cough.  HPI:    The patient he is a 72-year-old male admitted to the hospital via the ER.  The patient comes in on day of admission complaining of severe shortness of breath.  On evaluation in the ER initial ABG shows pH 7.1 PCO2 is 91.  Patient has been having cough shortness of breath hemoptysis fevers chills.  No nausea or vomiting.  Positive for headache and myalgias.  Fatigue and weakness.  Labs have been reviewed the urine shows no signs of infection.  Blood cultures have been obtained.  BUN is 10 creatinine is 0.4 liver function test are within normal limits potassium was 3.4.  White count 5.2 H&H is 9.2 and 30 and platelets are 188.  The patient will be admitted to CCU and transferred to medical floor if remains stable BiPAP.    Daily assessment: 4/22/2023.  On evaluation this morning the patient is currently sitting up in bed.  He is awake and alert.  He is enjoying breakfast at this time.  Has no issues or complaints.  He continues to have some hemoptysis.  He does have a  history of lung cancer and oncology is aware.  The patient is doing much better we will transfer to medical floor to cardiac monitoring.  He is not having any fevers or chills.  He continues to have shortness of breath at rest and on exertion however this is significantly improved.  His wife is at bedside questions are answered.  His vital signs are stable he remains afebrile.    Review of Systems   Constitutional: Positive for fatigue. Negative for chills and fever.   HENT: Negative.    Eyes: Negative.    Respiratory: Positive for cough and shortness of breath.    Cardiovascular: Negative.    Gastrointestinal: Negative.    Endocrine: Negative.    Genitourinary: Negative.    Musculoskeletal: Negative.    Skin: Negative.    Neurological: Positive for weakness.   Hematological: Negative.    Psychiatric/Behavioral: Negative.         All pertinent negatives and positives are as above. All other systems have been reviewed and are negative unless otherwise stated.     Objective    As of today 04/22/23  Temp:  [97.1 °F (36.2 °C)-98 °F (36.7 °C)] 98 °F (36.7 °C)  Heart Rate:  [] 102  Resp:  [18-24] 22  BP: ()/(51-68) 128/60    Physical Exam  Vitals and nursing note reviewed.   Constitutional:       Appearance: He is ill-appearing.   HENT:      Head: Normocephalic and atraumatic.      Right Ear: External ear normal.      Left Ear: External ear normal.      Nose: Nose normal.      Mouth/Throat:      Mouth: Mucous membranes are dry.      Pharynx: Oropharynx is clear.   Eyes:      General: No scleral icterus.     Extraocular Movements: Extraocular movements intact.      Conjunctiva/sclera: Conjunctivae normal.      Pupils: Pupils are equal, round, and reactive to light.   Cardiovascular:      Rate and Rhythm: Normal rate and regular rhythm.      Pulses: Normal pulses.      Heart sounds: Normal heart sounds.   Pulmonary:      Effort: Pulmonary effort is normal.      Breath sounds: Wheezing and rhonchi present.    Abdominal:      General: Bowel sounds are normal. There is no distension.      Palpations: Abdomen is soft.      Tenderness: There is no abdominal tenderness.   Musculoskeletal:         General: Normal range of motion.      Cervical back: Normal range of motion and neck supple.   Skin:     General: Skin is warm and dry.      Capillary Refill: Capillary refill takes less than 2 seconds.      Coloration: Skin is not jaundiced.   Neurological:      General: No focal deficit present.      Mental Status: He is alert and oriented to person, place, and time.      Motor: Weakness present.   Psychiatric:         Mood and Affect: Mood normal.         Behavior: Behavior normal.           dilTIAZem CD, 180 mg, Oral, Q24H  ferrous sulfate, 324 mg, Oral, Daily With Breakfast  folic acid, 1 mg, Oral, Daily  guaiFENesin, 600 mg, Oral, Q12H  heparin (porcine), 5,000 Units, Subcutaneous, Q12H  ipratropium-albuterol, 3 mL, Nebulization, 4x Daily - RT  levoFLOXacin, 750 mg, Intravenous, Q24H  methylPREDNISolone sodium succinate, 80 mg, Intravenous, Q8H  montelukast, 10 mg, Oral, Nightly  pantoprazole, 40 mg, Intravenous, Q AM  sodium chloride, 10 mL, Intravenous, Q12H         Results Review:  I have reviewed the labs, radiology results, and diagnostic studies.    Laboratory Data:   Results from last 7 days   Lab Units 04/22/23  0457 04/21/23  1050   SODIUM mmol/L 138 139   POTASSIUM mmol/L 4.9 3.4*   CHLORIDE mmol/L 101 101   CO2 mmol/L 30.0* 31.0*   BUN mg/dL 12 10   CREATININE mg/dL 0.41* 0.41*   GLUCOSE mg/dL 138* 122*   CALCIUM mg/dL 8.1* 8.5*   BILIRUBIN mg/dL 0.2 0.2   ALK PHOS U/L 81 98   ALT (SGPT) U/L 26 28   AST (SGOT) U/L 18 21   ANION GAP mmol/L 7.0 7.0     Estimated Creatinine Clearance: 136.2 mL/min (A) (by C-G formula based on SCr of 0.41 mg/dL (L)).          Results from last 7 days   Lab Units 04/22/23  0457 04/21/23  1050   WBC 10*3/mm3 5.62 5.20   HEMOGLOBIN g/dL 8.5* 9.2*   HEMATOCRIT % 27.3* 30.0*   PLATELETS  10*3/mm3 171 188           Culture Data:   No results found for: BLOODCX  No results found for: URINECX  No results found for: RESPCX  No results found for: WOUNDCX  No results found for: STOOLCX  No components found for: BODYFLD    Radiology Data:   Imaging Results (Last 24 Hours)     Procedure Component Value Units Date/Time    CT Angiogram Chest [[id number]] Collected: 04/21/23 1339     Updated: 04/21/23 1512    Narrative:      Indication:  Chest pain.    TECHNIQUE:  IV contrast was administered and axial images from the thoracic inlet through  the diaphragms were performed.  3D multiplanar reformats of the thoracic aorta  were completed on a separate workstation under concurrent supervision.    COMPARISON:  02/13/2023.    FINDINGS:  No pulmonary embolism is seen.  No thoracic aortic dissection or aneurysm is  seen.  A small right pleural effusion is seen and there is extensive right  middle and lower lobe consolidation suspicious for pneumonia.  A right middle  lobe mass seen on the prior CT is difficult to differentiate from the new  surrounding inflammatory changes.  The right hilar and mediastinal  lymphadenopathy is likely secondary to the lung changes.  Severe upper lobe  dominant emphysema is seen.  Visualized upper abdomen shows no significant  abnormality.  On bone windows, there is a lytic expansile abnormality of the  left 11th rib that is stable, but suspicious for metastatic disease.  This large  abnormality measures 5 x 3.3 cm on coronal image 113 of series 7.  The remaining  bones show no significant abnormality.      Impression:      1.  No pulmonary embolism seen.    2.  No thoracic aortic dissection or aneurysm seen.    3.  Worsening airspace disease of the right lung suspicious for pneumonia.  An  underlying right middle lobe lung mass persists but is difficult to clearly  define due to the new changes.  This mass suspicious for malignancy can be  further evaluated with bronchoscopy.    4.   Again seen is a lytic lesion of the left 11th rib measuring 5 x 3.3 cm  associated with a large soft tissue component suspicious for metastatic disease.    5.  Severe upper lobe dominant emphysema.    XR Chest 2 View [[id number]] Collected: 04/21/23 1132     Updated: 04/21/23 1138    Narrative:      FINDINGS:  Right IJ Mediport terminating in the mid superior vena cava. Cardiomediastinal  silhouette is within normal limits.  Emphysema. There is consolidation within  the  middle lobe and right lower lobe consistent with pneumonia. Osseous  structures age-appropriate.      Impression:      1.  Pneumonia in the middle lobe and right lower lobe.    2.  Emphysema.          I have reviewed the patient's current medications.     Assessment/Plan     Principal Problem:    Pneumonia  Active Problems:    Acute respiratory failure with hypoxia and hypercapnia      Impressions/treatment  Pneumonia right side.  Middle and lower lobe  Hemoptysis.  Patient has history of lung cancer.  Oncology aware.  We will consult for continued management of lung cancer.  Patient has multiple allergies allergic to penicillin we will start the patient on Levaquin.  Continue DuoNebs  Continue Singulair  Continue guaifenesin  Continue Solu-Medrol.  Incentive spirometry.  Monitor for possible intubation.  Follow blood cultures.  Stable.  Doing much better overall.  Awake and alert.  We will transfer to medical floor with cardiac monitoring.     COPD exacerbation  Continue treatments as noted above.    Check echocardiogram.     Hypertension   continue Cardizem     Iron deficiency  Continue ferrous sulfate     GERD  Continue Protonix.     Deconditioning  PT OT     CODE STATUS full code  DVT prophylaxis heparin        Medical Decision Making  Number and Complexity of problems: 3 complex conditions  Differential Diagnosis: Pneumonia versus PE     Conditions and Status:        Condition is unchanged.     [facility] Data  External documents reviewed: [facility]  document  My EKG interpretation: 4/21/2023.  Normal sinus rhythm  Incomplete right bundle branch block   My plain film interpretation:   Chest x-ray completed 4/21/2023  IMPRESSION:  1.  Pneumonia in the middle lobe and right lower lobe.   Tests considered but not ordered: None  April 21, 2023  IMPRESSION:  1.  No pulmonary embolism seen.     2.  No thoracic aortic dissection or aneurysm seen.     3.  Worsening airspace disease of the right lung suspicious for pneumonia.  An  underlying right middle lobe lung mass persists but is difficult to clearly  define due to the new changes.  This mass suspicious for malignancy can be  further evaluated with bronchoscopy.     4.  Again seen is a lytic lesion of the left 11th rib measuring 5 x 3.3 cm  associated with a large soft tissue component suspicious for metastatic disease.     5.  Severe upper lobe dominant emphysema.      Decision rules/scores evaluated (example [id number], Wells, etc): N/A     Discussed with: The ER physician, respiratory therapist, the patient and his significant other.  They agree to proceed with current treatment plan.     Treatment Plan  As above     Care Planning  Shared decision making: Patient updated on current status and informed of proposed care plan; is in agreement with plan  Code status and discussions: Full code     Disposition  Social Determinants of Health that impact treatment or disposition: None  I expect the patient to be discharged to skilled nursing facility     I confirmed that the patient's Advance Care Plan is present, code status is documented, or surrogate decision maker is listed in the patient's medical record.         The patient's surrogate decision maker is the patient's significant other     I discussed my findings and recommendations with the the patient and his significant other.     Estimated length of stay is 3 days  Critical care time 40 minutes.  For admission and patient evaluation.       Eliazar Zhang,  DO    Electronically signed by Eliazar Zhang DO at 04/22/23 3832

## 2023-04-23 LAB
ALBUMIN SERPL-MCNC: 3.5 G/DL (ref 3.5–5.2)
ALBUMIN/GLOB SERPL: 1.2 G/DL
ALP SERPL-CCNC: 95 U/L (ref 39–117)
ALT SERPL W P-5'-P-CCNC: 33 U/L (ref 1–41)
ANION GAP SERPL CALCULATED.3IONS-SCNC: 7 MMOL/L (ref 5–15)
AST SERPL-CCNC: 19 U/L (ref 1–40)
BASOPHILS # BLD AUTO: 0.02 10*3/MM3 (ref 0–0.2)
BASOPHILS NFR BLD AUTO: 0.1 % (ref 0–1.5)
BILIRUB SERPL-MCNC: 0.2 MG/DL (ref 0–1.2)
BUN SERPL-MCNC: 17 MG/DL (ref 8–23)
BUN/CREAT SERPL: 31.5 (ref 7–25)
CALCIUM SPEC-SCNC: 8.6 MG/DL (ref 8.6–10.5)
CHLORIDE SERPL-SCNC: 100 MMOL/L (ref 98–107)
CO2 SERPL-SCNC: 33 MMOL/L (ref 22–29)
CREAT SERPL-MCNC: 0.54 MG/DL (ref 0.76–1.27)
DEPRECATED RDW RBC AUTO: 47.4 FL (ref 37–54)
EGFRCR SERPLBLD CKD-EPI 2021: 105.2 ML/MIN/1.73
EOSINOPHIL # BLD AUTO: 0 10*3/MM3 (ref 0–0.4)
EOSINOPHIL NFR BLD AUTO: 0 % (ref 0.3–6.2)
ERYTHROCYTE [DISTWIDTH] IN BLOOD BY AUTOMATED COUNT: 15.9 % (ref 12.3–15.4)
GLOBULIN UR ELPH-MCNC: 2.9 GM/DL
GLUCOSE SERPL-MCNC: 140 MG/DL (ref 65–99)
HCT VFR BLD AUTO: 28.9 % (ref 37.5–51)
HGB BLD-MCNC: 8.8 G/DL (ref 13–17.7)
IMM GRANULOCYTES # BLD AUTO: 0.11 10*3/MM3 (ref 0–0.05)
IMM GRANULOCYTES NFR BLD AUTO: 0.8 % (ref 0–0.5)
LYMPHOCYTES # BLD AUTO: 0.37 10*3/MM3 (ref 0.7–3.1)
LYMPHOCYTES NFR BLD AUTO: 2.6 % (ref 19.6–45.3)
MCH RBC QN AUTO: 28.9 PG (ref 26.6–33)
MCHC RBC AUTO-ENTMCNC: 30.4 G/DL (ref 31.5–35.7)
MCV RBC AUTO: 94.8 FL (ref 79–97)
MONOCYTES # BLD AUTO: 0.68 10*3/MM3 (ref 0.1–0.9)
MONOCYTES NFR BLD AUTO: 4.8 % (ref 5–12)
NEUTROPHILS NFR BLD AUTO: 13.01 10*3/MM3 (ref 1.7–7)
NEUTROPHILS NFR BLD AUTO: 91.7 % (ref 42.7–76)
NRBC BLD AUTO-RTO: 0 /100 WBC (ref 0–0.2)
PLATELET # BLD AUTO: 208 10*3/MM3 (ref 140–450)
PMV BLD AUTO: 9.7 FL (ref 6–12)
POTASSIUM SERPL-SCNC: 4.6 MMOL/L (ref 3.5–5.2)
PROT SERPL-MCNC: 6.4 G/DL (ref 6–8.5)
RBC # BLD AUTO: 3.05 10*6/MM3 (ref 4.14–5.8)
SODIUM SERPL-SCNC: 140 MMOL/L (ref 136–145)
WBC NRBC COR # BLD: 14.19 10*3/MM3 (ref 3.4–10.8)

## 2023-04-23 PROCEDURE — 94799 UNLISTED PULMONARY SVC/PX: CPT

## 2023-04-23 PROCEDURE — 99222 1ST HOSP IP/OBS MODERATE 55: CPT | Performed by: INTERNAL MEDICINE

## 2023-04-23 PROCEDURE — 94760 N-INVAS EAR/PLS OXIMETRY 1: CPT

## 2023-04-23 PROCEDURE — 25010000002 LEVOFLOXACIN PER 250 MG: Performed by: HOSPITALIST

## 2023-04-23 PROCEDURE — 93005 ELECTROCARDIOGRAM TRACING: CPT | Performed by: INTERNAL MEDICINE

## 2023-04-23 PROCEDURE — 97535 SELF CARE MNGMENT TRAINING: CPT

## 2023-04-23 PROCEDURE — 94660 CPAP INITIATION&MGMT: CPT

## 2023-04-23 PROCEDURE — 97110 THERAPEUTIC EXERCISES: CPT

## 2023-04-23 PROCEDURE — 25010000002 METHYLPREDNISOLONE PER 125 MG: Performed by: HOSPITALIST

## 2023-04-23 PROCEDURE — 80053 COMPREHEN METABOLIC PANEL: CPT | Performed by: HOSPITALIST

## 2023-04-23 PROCEDURE — 94664 DEMO&/EVAL PT USE INHALER: CPT

## 2023-04-23 PROCEDURE — 85025 COMPLETE CBC W/AUTO DIFF WBC: CPT | Performed by: HOSPITALIST

## 2023-04-23 PROCEDURE — 25010000002 HEPARIN (PORCINE) PER 1000 UNITS: Performed by: HOSPITALIST

## 2023-04-23 RX ORDER — DILTIAZEM HYDROCHLORIDE 120 MG/1
240 CAPSULE, COATED, EXTENDED RELEASE ORAL
Status: DISCONTINUED | OUTPATIENT
Start: 2023-04-24 | End: 2023-04-25 | Stop reason: HOSPADM

## 2023-04-23 RX ADMIN — LEVOFLOXACIN 750 MG: 5 INJECTION, SOLUTION INTRAVENOUS at 14:26

## 2023-04-23 RX ADMIN — Medication 10 ML: at 20:16

## 2023-04-23 RX ADMIN — FERROUS SULFATE TAB EC 324 MG (65 MG FE EQUIVALENT) 324 MG: 324 (65 FE) TABLET DELAYED RESPONSE at 08:36

## 2023-04-23 RX ADMIN — FOLIC ACID 1 MG: 1 TABLET ORAL at 08:36

## 2023-04-23 RX ADMIN — IPRATROPIUM BROMIDE AND ALBUTEROL SULFATE 3 ML: .5; 3 SOLUTION RESPIRATORY (INHALATION) at 14:35

## 2023-04-23 RX ADMIN — HEPARIN SODIUM 5000 UNITS: 5000 INJECTION INTRAVENOUS; SUBCUTANEOUS at 20:16

## 2023-04-23 RX ADMIN — IPRATROPIUM BROMIDE AND ALBUTEROL SULFATE 3 ML: .5; 3 SOLUTION RESPIRATORY (INHALATION) at 07:08

## 2023-04-23 RX ADMIN — GUAIFENESIN 600 MG: 600 TABLET, EXTENDED RELEASE ORAL at 20:16

## 2023-04-23 RX ADMIN — Medication 10 ML: at 08:37

## 2023-04-23 RX ADMIN — DILTIAZEM HYDROCHLORIDE 180 MG: 180 CAPSULE, COATED, EXTENDED RELEASE ORAL at 08:36

## 2023-04-23 RX ADMIN — IPRATROPIUM BROMIDE AND ALBUTEROL SULFATE 3 ML: .5; 3 SOLUTION RESPIRATORY (INHALATION) at 11:20

## 2023-04-23 RX ADMIN — GUAIFENESIN 600 MG: 600 TABLET, EXTENDED RELEASE ORAL at 08:36

## 2023-04-23 RX ADMIN — METHYLPREDNISOLONE SODIUM SUCCINATE 80 MG: 125 INJECTION, POWDER, FOR SOLUTION INTRAMUSCULAR; INTRAVENOUS at 17:55

## 2023-04-23 RX ADMIN — HEPARIN SODIUM 5000 UNITS: 5000 INJECTION INTRAVENOUS; SUBCUTANEOUS at 08:36

## 2023-04-23 RX ADMIN — IPRATROPIUM BROMIDE AND ALBUTEROL SULFATE 3 ML: .5; 3 SOLUTION RESPIRATORY (INHALATION) at 19:05

## 2023-04-23 RX ADMIN — METHYLPREDNISOLONE SODIUM SUCCINATE 80 MG: 125 INJECTION, POWDER, FOR SOLUTION INTRAMUSCULAR; INTRAVENOUS at 00:42

## 2023-04-23 RX ADMIN — MONTELUKAST 10 MG: 10 TABLET, FILM COATED ORAL at 20:16

## 2023-04-23 RX ADMIN — METHYLPREDNISOLONE SODIUM SUCCINATE 80 MG: 125 INJECTION, POWDER, FOR SOLUTION INTRAMUSCULAR; INTRAVENOUS at 08:36

## 2023-04-23 RX ADMIN — PANTOPRAZOLE SODIUM 40 MG: 40 INJECTION, POWDER, FOR SOLUTION INTRAVENOUS at 05:38

## 2023-04-23 NOTE — PLAN OF CARE
Goal Outcome Evaluation:  Plan of Care Reviewed With: patient        Progress: improving  Outcome Evaluation: Nsg and pt agree to OT tx this pm. Pt already had a bath, however agrees to exercises. Pt completed B UE exercises 1 set x15 reps with RBs as needed. Pt is CGA for bed>toilet t/f. Pt states he needs awhile in BR and can reach call light. Significant other in room with pt.

## 2023-04-23 NOTE — PLAN OF CARE
Goal Outcome Evaluation:  Plan of Care Reviewed With: patient, spouse           Outcome Evaluation: VSS No c/o pain. Pt has slept with Bi-Pap on most of shift. No other new events or changes in his condition

## 2023-04-23 NOTE — CONSULTS
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  Patient was admitted to [facility] on April 21, 2023 with complaint of worsening shortness of breath, swelling of lower extremity and hemoptysis.  Patient was found to have pneumonia on CT angiogram for which patient has been started on Solu-Medrol along with antibiotics consisting of Levaquin.  Patient states overall he feels improved since hospital admission.  Shortness of breath is improved.  Continues to have minimal amount of hemoptysis especially in the morning.  Complains of diarrhea.  States swelling of lower extremities improved.  Complains of chronic neuropathy affecting lower extremity.  Complains of chronic hearing loss.  Denies any new lymph node enlargement.        PAST MEDICAL HISTORY:    Past Medical History:   Diagnosis Date   • A-fib    • Anesthesia     hip surgery (spinal caused him unpleasant sensations never wants it again)   • Arthritis    • Blood in sputum 02/22/2023    bright red blood, teaspoon - tablespoon daily   • COPD (chronic obstructive pulmonary disease)    • CVA (cerebral vascular accident) 12/24/2022   • Hearing aid worn     bilateral   • History of pulmonary embolism    • History of recurrent pneumonia    • Hypertension    • Lung cancer 03/17/2023   • Oxygen dependent     since @ age 65   • Pulmonary nodules    • Risk for falls     uses walker   • Rotator  cuff syndrome of right shoulder    • Tachycardia    • Traumatic hemorrhage of cerebrum 2022    stroke ? HTN/Eliquis   • Wears glasses     readers   • Wears glasses     reading       PAST SURGICAL HISTORY:  Past Surgical History:   Procedure Laterality Date   • HIP ARTHROPLASTY Right    • RIB BIOPSY Left 2023    11   • VENOUS ACCESS DEVICE (PORT) INSERTION N/A 3/30/2023    Procedure: MEDIPORT PLACEMENT          (C-ARM);  Surgeon: Lavon Cochran MD;  Location: [facility];  Service: General;  Laterality: N/A;       ALLERGIES:    Allergies   Allergen Reactions   • Amoxicillin Anaphylaxis   • Albuterol Irritability     Facial flushing and palpitations.   • Asmanex (120 Metered Doses) [Mometasone Furoate] Unknown - Low Severity   • Gabapentin Hallucinations   • Lortab [Hydrocodone-Acetaminophen] Other (See Comments)     Can't remember   • Morphine And Related Hallucinations   • Prilosec [Omeprazole] Other (See Comments)     unknown   • Sodium Clavulanate Unknown - High Severity   • Statins GI Intolerance   • Sulfa Antibiotics Other (See Comments)     Can't remember also more and can't remember   • Symbicort [Budesonide-Formoterol Fumarate] Unknown (See Comments)     Doesn't remember reaction type       SOCIAL HISTORY:   Social History     Tobacco Use   • Smoking status: Former     Packs/day: 3.00     Years: 47.00     Pack years: 141.00     Types: Cigarettes     Start date:      Quit date: 2008     Years since quittin.9   • Smokeless tobacco: Never   Vaping Use   • Vaping Use: Never used   Substance Use Topics   • Alcohol use: Not Currently     Comment: not last 40 years   • Drug use: Never       CURRENT MEDICATIONS:    Current Facility-Administered Medications   Medication Dose Route Frequency Provider Last Rate Last Admin   • acetaminophen (TYLENOL) tablet 650 mg  650 mg Oral Q4H PRN Eliazar Zhang, DO       • polyethylene glycol (MIRALAX) packet 17 g  17 g Oral Daily PRN Vicente  Eliazar R, DO        And   • bisacodyl (DULCOLAX) EC tablet 5 mg  5 mg Oral Daily PRN Eliazar Zhang R, DO        And   • bisacodyl (DULCOLAX) suppository 10 mg  10 mg Rectal Daily PRN Eliazar Zhang R, DO       • calcium carbonate (TUMS) chewable tablet 500 mg (200 mg elemental)  2 tablet Oral BID PRN Eliazar Zhang R, DO       • dilTIAZem CD (CARDIZEM CD) 24 hr capsule 180 mg  180 mg Oral Q24H Eliazar Zhang R, DO   180 mg at 04/23/23 0836   • ferrous sulfate EC tablet 324 mg  324 mg Oral Daily With Breakfast Eliazar Zhang DO   324 mg at 04/23/23 0836   • folic acid (FOLVITE) tablet 1 mg  1 mg Oral Daily Eliazar Zhang, DO   1 mg at 04/23/23 0836   • furosemide (LASIX) tablet 20 mg  20 mg Oral Daily PRN Eliazra Zhang R, DO       • guaiFENesin (MUCINEX) 12 hr tablet 600 mg  600 mg Oral Q12H Eliazar Zhang, DO   600 mg at 04/23/23 0836   • heparin (porcine) 5000 UNIT/ML injection 5,000 Units  5,000 Units Subcutaneous Q12H Eliazar Zhang R, DO   5,000 Units at 04/23/23 0836   • ipratropium-albuterol (DUO-NEB) nebulizer solution 3 mL  3 mL Nebulization 4x Daily - RT Eliazar Zhang R DO   3 mL at 04/23/23 0708   • levoFLOXacin (LEVAQUIN) 750 mg/150 mL D5W (premix) (LEVAQUIN) 750 mg  750 mg Intravenous Q24H Eliazar Zhang DO   750 mg at 04/22/23 1549   • LORazepam (ATIVAN) tablet 0.5 mg  0.5 mg Oral Q8H PRN Eliazar Zhang R, DO       • melatonin tablet 5 mg  5 mg Oral Nightly PRN Eliazar Zhang R, DO       • methylPREDNISolone sodium succinate (SOLU-Medrol) injection 80 mg  80 mg Intravenous Q8H VicenteEliazar rosa DO   80 mg at 04/23/23 0836   • montelukast (SINGULAIR) tablet 10 mg  10 mg Oral Nightly Eliazar Zhang DO   10 mg at 04/22/23 2029   • ondansetron (ZOFRAN) tablet 4 mg  4 mg Oral Q6H PRN Eliazar Zhang DO        Or   • ondansetron (ZOFRAN) injection 4 mg  4 mg Intravenous Q6H PRN Eliazar Zhang DO       • pantoprazole (PROTONIX) injection  40 mg  40 mg Intravenous Q AM Vicente, Eliazar R, DO   40 mg at 04/23/23 0538   • potassium chloride (MICRO-K) CR capsule 40 mEq  40 mEq Oral PRN Vicente, Eliazar R, DO   40 mEq at 04/21/23 2153    Or   • potassium chloride (KLOR-CON) packet 40 mEq  40 mEq Oral PRN Vicente, Eliazar R, DO        Or   • potassium chloride 10 mEq in 100 mL IVPB  10 mEq Intravenous Q1H PRN Vicente, Eliazar R, DO       • sodium chloride 0.9 % flush 10 mL  10 mL Intravenous PRN Vicente, Eliazar R, DO       • sodium chloride 0.9 % flush 10 mL  10 mL Intravenous Q12H Vicente, Eliazar R, DO   10 mL at 04/23/23 0837   • sodium chloride 0.9 % flush 10 mL  10 mL Intravenous PRN Vicente, Eliazar R, DO       • sodium chloride 0.9 % infusion 40 mL  40 mL Intravenous PRN Vicente, Eliazar R, DO       • traMADol (ULTRAM) tablet 50 mg  50 mg Oral Q6H PRN Vicente, Eliazar R, DO            HOME MEDICATIONS:   No current facility-administered medications on file prior to encounter.     Current Outpatient Medications on File Prior to Encounter   Medication Sig Dispense Refill   • cholecalciferol (VITAMIN D3) 10 MCG (400 UNIT) tablet Take 1 tablet by mouth Daily. 50 mcg daily     • dexamethasone (DECADRON) 4 MG tablet Take 1 tablet twice daily starting the day before chemo, day of chemo, and day after chemo.  Take with food. 6 tablet 3   • dilTIAZem (TIAZAC) 180 MG 24 hr capsule Take 1 capsule by mouth Daily. 90 capsule 1   • ferrous sulfate 324 MG tablet delayed-release Take 1 tablet by mouth Daily With Breakfast. 90 tablet 0   • folic acid (FOLVITE) 1 MG tablet Take 1 tablet by mouth Daily. 90 tablet 1   • furosemide (LASIX) 20 MG tablet Take 1 tablet by mouth Daily As Needed (Swelling of lower extremity). 7 tablet 0   • ipratropium (ATROVENT) 0.02 % nebulizer solution Take 2.5 mL by nebulization 3 (Three) Times a Day. Sub amount for how much insurance allows 670 mL 0   • melatonin 5 MG tablet tablet Take 1 tablet by mouth At Night As Needed.      • montelukast (SINGULAIR) 10 MG tablet Take 1 tablet by mouth Every Night.     • OLANZapine (zyPREXA) 5 MG tablet Take 1 tablet by mouth Every Night. Take on days 2, 3 and 4 after chemotherapy. 3 tablet 3   • ondansetron (ZOFRAN) 8 MG tablet Take 1 tablet by mouth 3 (Three) Times a Day As Needed for Nausea or Vomiting. 30 tablet 3   • predniSONE (DELTASONE) 5 MG tablet Take 1 tablet by mouth Every Other Day.     • tiotropium bromide-olodaterol (STIOLTO RESPIMAT) 2.5-2.5 MCG/ACT aerosol solution inhaler Inhale 2 puffs Daily.     • traMADol (ULTRAM) 50 MG tablet Take 1 tablet by mouth Every 6 (Six) Hours As Needed for Moderate Pain for up to 12 doses. 12 tablet 0   • vitamin B-12 (CYANOCOBALAMIN) 1000 MCG tablet Take 1 tablet by mouth Daily. 90 tablet 1   • ipratropium (ATROVENT HFA) 17 MCG/ACT inhaler Inhale 2 puffs 4 (Four) Times a Day As Needed for Wheezing.     • [DISCONTINUED] dilTIAZem XR (DILACOR XR) 180 MG 24 hr capsule Take 1 capsule by mouth Daily. 60 capsule 0       FAMILY HISTORY:    Family History   Problem Relation Age of Onset   • Cancer Sister    • Brain cancer Brother    • Cancer Brother    • No Known Problems Daughter    • No Known Problems Daughter    • No Known Problems Son        REVIEW OF SYSTEMS:        CONSTITUTIONAL:  Complains of fatigue.  Positive for recent weight loss.  Denies any fever, chills .     EYES: No visual disturbances. No discharge. No new lesion.    ENMT:  No epistaxis, mouth sores or difficulty swallowing.    RESPIRATORY: Positive for shortness of breath.  Positive for productive cough.  Positive for hemoptysis    CARDIOVASCULAR:  No chest pain or palpitations.    GASTROINTESTINAL: Positive for diarrhea.  No abdominal pain nausea, vomiting or blood in the stool.    GENITOURINARY: No dysuria or hematuria.    MUSCULOSKELETAL: Positive for arthralgia.  States swelling of lower extremities improved.    LYMPHATICS:  Denies any abnormal swollen glands anywhere in the  "body.    NEUROLOGICAL : Positive for chronic neuropathy affecting lower extremity. No headache or dizziness. No seizures or balance problems.    SKIN: No new skin lesion.    ENDOCRINE : No new hot or cold intolerance. No new polyuria. No polydipsia.        PHYSICAL EXAMINATION:      VITAL SIGNS:  /59 (BP Location: Left arm, Patient Position: Sitting)   Pulse 102   Temp 98 °F (36.7 °C) (Tympanic)   Resp 18   Ht 165.1 cm (65\")   Wt 59.9 kg (132 lb)   SpO2 95%   BMI 21.97 kg/m²       04/21/23  1600 04/22/23  0500 04/23/23  0500   Weight: 58.2 kg (128 lb 4.9 oz) 60 kg (132 lb 4.4 oz) 59.9 kg (132 lb)       ECOG performance status: 2    CONSTITUTIONAL:  Not in any distress.    EYES: Mild conjunctival pallor. No Icterus. No pterygium. Extraocular movements intact. No ptosis.    ENMT:  Normocephalic, atraumatic. No facial asymmetry noted.    NECK:  No adenopathy. Trachea midline. No JVD.    RESPIRATORY: Decreased air entry in right lower lung field.  Wheezing present.  Rhonchi present on right lung field.    CARDIOVASCULAR:  S1, S2. Regular rate and rhythm. No murmur or gallop appreciated.  Port-A-Cath present on right chest wall    ABDOMEN:  Soft,  nontender. Bowel sounds present in all four quadrants.  No hepatosplenomegaly appreciated.    MUSCULOSKELETAL: Trace right lower extremity edema. No calf tenderness.  Decreased range of motion.    NEUROLOGIC:    No motor  deficit appreciated.     SKIN : No new skin lesion identified. Skin is warm and dry to touch.    LYMPHATICS: No new enlarged lymph nodes in neck or supraclavicular area.    PSYCHIATRY: Alert, awake and oriented ×3.               DIAGNOSTIC DATA:    WBC   Date Value Ref Range Status   04/23/2023 14.19 (H) 3.40 - 10.80 10*3/mm3 Final     RBC   Date Value Ref Range Status   04/23/2023 3.05 (L) 4.14 - 5.80 10*6/mm3 Final     Hemoglobin   Date Value Ref Range Status   04/23/2023 8.8 (L) 13.0 - 17.7 g/dL Final     Hematocrit   Date Value Ref Range " Status   04/23/2023 28.9 (L) 37.5 - 51.0 % Final     MCV   Date Value Ref Range Status   04/23/2023 94.8 79.0 - 97.0 fL Final     MCH   Date Value Ref Range Status   04/23/2023 28.9 26.6 - 33.0 pg Final     MCHC   Date Value Ref Range Status   04/23/2023 30.4 (L) 31.5 - 35.7 g/dL Final     RDW   Date Value Ref Range Status   04/23/2023 15.9 (H) 12.3 - 15.4 % Final     RDW-SD   Date Value Ref Range Status   04/23/2023 47.4 37.0 - 54.0 fl Final     MPV   Date Value Ref Range Status   04/23/2023 9.7 6.0 - 12.0 fL Final     Platelets   Date Value Ref Range Status   04/23/2023 208 140 - 450 10*3/mm3 Final     Neutrophil %   Date Value Ref Range Status   04/23/2023 91.7 (H) 42.7 - 76.0 % Final     Lymphocyte %   Date Value Ref Range Status   04/23/2023 2.6 (L) 19.6 - 45.3 % Final     Monocyte %   Date Value Ref Range Status   04/23/2023 4.8 (L) 5.0 - 12.0 % Final     Eosinophil %   Date Value Ref Range Status   04/23/2023 0.0 (L) 0.3 - 6.2 % Final     Basophil %   Date Value Ref Range Status   04/23/2023 0.1 0.0 - 1.5 % Final     Immature Grans %   Date Value Ref Range Status   04/23/2023 0.8 (H) 0.0 - 0.5 % Final     Neutrophils, Absolute   Date Value Ref Range Status   04/23/2023 13.01 (H) 1.70 - 7.00 10*3/mm3 Final     Lymphocytes, Absolute   Date Value Ref Range Status   04/23/2023 0.37 (L) 0.70 - 3.10 10*3/mm3 Final     Monocytes, Absolute   Date Value Ref Range Status   04/23/2023 0.68 0.10 - 0.90 10*3/mm3 Final     Eosinophils, Absolute   Date Value Ref Range Status   04/23/2023 0.00 0.00 - 0.40 10*3/mm3 Final     Basophils, Absolute   Date Value Ref Range Status   04/23/2023 0.02 0.00 - 0.20 10*3/mm3 Final     Immature Grans, Absolute   Date Value Ref Range Status   04/23/2023 0.11 (H) 0.00 - 0.05 10*3/mm3 Final     nRBC   Date Value Ref Range Status   04/23/2023 0.0 0.0 - 0.2 /100 WBC Final     Glucose   Date Value Ref Range Status   04/23/2023 140 (H) 65 - 99 mg/dL Final     Sodium   Date Value Ref Range Status    04/23/2023 140 136 - 145 mmol/L Final     Potassium   Date Value Ref Range Status   04/23/2023 4.6 3.5 - 5.2 mmol/L Final     CO2   Date Value Ref Range Status   04/23/2023 33.0 (H) 22.0 - 29.0 mmol/L Final     Chloride   Date Value Ref Range Status   04/23/2023 100 98 - 107 mmol/L Final     Anion Gap   Date Value Ref Range Status   04/23/2023 7.0 5.0 - 15.0 mmol/L Final     Creatinine   Date Value Ref Range Status   04/23/2023 0.54 (L) 0.76 - 1.27 mg/dL Final     BUN   Date Value Ref Range Status   04/23/2023 17 8 - 23 mg/dL Final     BUN/Creatinine Ratio   Date Value Ref Range Status   04/23/2023 31.5 (H) 7.0 - 25.0 Final     Calcium   Date Value Ref Range Status   04/23/2023 8.6 8.6 - 10.5 mg/dL Final     Alkaline Phosphatase   Date Value Ref Range Status   04/23/2023 95 39 - 117 U/L Final     Total Protein   Date Value Ref Range Status   04/23/2023 6.4 6.0 - 8.5 g/dL Final     ALT (SGPT)   Date Value Ref Range Status   04/23/2023 33 1 - 41 U/L Final     AST (SGOT)   Date Value Ref Range Status   04/23/2023 19 1 - 40 U/L Final     Total Bilirubin   Date Value Ref Range Status   04/23/2023 0.2 0.0 - 1.2 mg/dL Final     Albumin   Date Value Ref Range Status   04/23/2023 3.5 3.5 - 5.2 g/dL Final     Globulin   Date Value Ref Range Status   04/23/2023 2.9 gm/dL Final     Lab Results   Component Value Date    IRON 36 (L) 04/10/2023    TIBC 346 04/10/2023    LABIRON 10 (L) 04/10/2023    FERRITIN 235.30 04/10/2023    XOYXZCIN03 1,010 (H) 04/10/2023    FOLATE >20.00 04/10/2023     No results found for: , LABCA2, AFPTM, HCGQUANT, , CHROMGRNA, [id number], CEA, REFLABREPO]    Radiology Data :  XR Chest 2 View    Result Date: 4/21/2023  1.  Pneumonia in the middle lobe and right lower lobe. 2.  Emphysema.    CT Angiogram Chest    Result Date: 4/21/2023  1.  No pulmonary embolism seen. 2.  No thoracic aortic dissection or aneurysm seen. 3.  Worsening airspace disease of the right lung suspicious for pneumonia.  An  underlying right middle lobe lung mass persists but is difficult to clearly define due to the new changes.  This mass suspicious for malignancy can be further evaluated with bronchoscopy. 4.  Again seen is a lytic lesion of the left 11th rib measuring 5 x 3.3 cm associated with a large soft tissue component suspicious for metastatic disease. 5.  Severe upper lobe dominant emphysema.      Pathology :  * Cannot find OR log *    Assessment and plan:    1.  Hemoptysis:  - Multifactorial secondary to pneumonia plus lung malignancy.  - Patient states his hemoptysis is improved since antibiotic for pneumonia.  - Recommend continue with clinical monitoring for now  - Discussed with patient and his wife that if he starts having any worsening hemoptysis, he will benefit from radiation treatment at that point.    2.  Iron deficiency anemia:  - Patient received 1 dose of Injectafer as outpatient.  Patient has iron malabsorption we will discontinue ferrous sulfate by mouth.  - Patient will receive second dose of Injectafer as outpatient after hospital discharge  - Recommend transfusing as needed if hemoglobin is less than 7    3.  Adenocarcinoma of right lung with rib metastasis stage IV  - Patient received cycle 1 of carboplatin, Alimta and Keytruda on April 11, 2023.  We will continue with treatment as outpatient.    4.  Leukocytosis:  - Reactive due to steroid plus pneumonia plus malignancy  - White blood cell count is 14,000.  Will monitor with CBC for now    5.  History of pulmonary embolism:  - Recent CT angiogram does not show any evidence of pulmonary embolism.            Thank you for this consultation.    Omega Davis MD  4/23/2023  09:56 CDT        Part of this note may be an electronic transcription/translation of spoken language to printed text using the Dragon Dictation System.

## 2023-04-23 NOTE — PROGRESS NOTES
[facility] Medicine Services  INPATIENT PROGRESS NOTE    Length of Stay: 2  Date of Admission: 4/21/2023  Primary Care Physician: Marina Kitchen MD    Subjective   (S) Admitted for PNA in the setting of lung adenocarcinoma with bone metastasis and has started on chemotherapy  Today, he stated that he is feeling better; no nausea, no vomiting; still with some hemoptysis    Review of Systems   All other systems reviewed and are negative.  All pertinent negatives and positives are as above. All other systems have been reviewed and are negative unless otherwise stated.     Prior to Admission medications    Medication Sig Start Date End Date Taking? Authorizing Provider   cholecalciferol (VITAMIN D3) 10 MCG (400 UNIT) tablet Take 1 tablet by mouth Daily. 50 mcg daily   Yes ProviderQamar MD   dexamethasone (DECADRON) 4 MG tablet Take 1 tablet twice daily starting the day before chemo, day of chemo, and day after chemo.  Take with food. 3/27/23  Yes Omega Davis MD   dilTIAZem (TIAZAC) 180 MG 24 hr capsule Take 1 capsule by mouth Daily. 3/1/23  Yes Neville Meeks MD   ferrous sulfate 324 MG tablet delayed-release Take 1 tablet by mouth Daily With Breakfast. 1/13/23  Yes Fan Rob MD   folic acid (FOLVITE) 1 MG tablet Take 1 tablet by mouth Daily. 2/11/23  Yes Omega Davis MD   furosemide (LASIX) 20 MG tablet Take 1 tablet by mouth Daily As Needed (Swelling of lower extremity). 4/17/23  Yes Omega Davis MD   ipratropium (ATROVENT) 0.02 % nebulizer solution Take 2.5 mL by nebulization 3 (Three) Times a Day. Sub amount for how much insurance allows 7/19/21  Yes Anita Toney MD   melatonin 5 MG tablet tablet Take 1 tablet by mouth At Night As Needed.   Yes ProviderQamar MD   montelukast (SINGULAIR) 10 MG tablet Take 1 tablet by mouth Every Night.   Yes Qamar Javed MD   OLANZapine (zyPREXA) 5 MG tablet Take 1 tablet by mouth Every  Night. Take on days 2, 3 and 4 after chemotherapy. 3/27/23  Yes Omega Davis MD   ondansetron (ZOFRAN) 8 MG tablet Take 1 tablet by mouth 3 (Three) Times a Day As Needed for Nausea or Vomiting. 3/27/23  Yes Omega Davis MD   predniSONE (DELTASONE) 5 MG tablet Take 1 tablet by mouth Every Other Day.   Yes Provider, MD Qamar   tiotropium bromide-olodaterol (STIOLTO RESPIMAT) 2.5-2.5 MCG/ACT aerosol solution inhaler Inhale 2 puffs Daily.   Yes Provider, MD Qamar   traMADol (ULTRAM) 50 MG tablet Take 1 tablet by mouth Every 6 (Six) Hours As Needed for Moderate Pain for up to 12 doses. 3/30/23  Yes Lavon Cochran MD   vitamin B-12 (CYANOCOBALAMIN) 1000 MCG tablet Take 1 tablet by mouth Daily. 2/11/23  Yes Omega Davis MD   ipratropium (ATROVENT HFA) 17 MCG/ACT inhaler Inhale 2 puffs 4 (Four) Times a Day As Needed for Wheezing.    Provider, MD Qamar   dilTIAZem XR (DILACOR XR) 180 MG 24 hr capsule Take 1 capsule by mouth Daily. 6/30/22 10/7/22  Neville Meeks MD       dilTIAZem CD, 180 mg, Oral, Q24H  folic acid, 1 mg, Oral, Daily  guaiFENesin, 600 mg, Oral, Q12H  heparin (porcine), 5,000 Units, Subcutaneous, Q12H  ipratropium-albuterol, 3 mL, Nebulization, 4x Daily - RT  levoFLOXacin, 750 mg, Intravenous, Q24H  methylPREDNISolone sodium succinate, 80 mg, Intravenous, Q8H  montelukast, 10 mg, Oral, Nightly  pantoprazole, 40 mg, Intravenous, Q AM  sodium chloride, 10 mL, Intravenous, Q12H           Objective    Temp:  [96.1 °F (35.6 °C)-98.5 °F (36.9 °C)] 96.6 °F (35.9 °C)  Heart Rate:  [] 106  Resp:  [16-20] 18  BP: (111-150)/(52-68) 111/52    Physical Exam  Vitals reviewed.   HENT:      Head: Normocephalic.      Nose: Nose normal.      Mouth/Throat:      Mouth: Mucous membranes are moist.   Eyes:      Extraocular Movements: Extraocular movements intact.   Cardiovascular:      Rate and Rhythm: Normal rate and regular rhythm.      Heart sounds: Normal heart sounds.   Pulmonary:       Breath sounds: Rales present.   Abdominal:      General: Bowel sounds are normal.      Palpations: Abdomen is soft.   Musculoskeletal:         General: Normal range of motion.      Cervical back: Normal range of motion and neck supple.   Skin:     General: Skin is warm.   Neurological:      General: No focal deficit present.      Mental Status: He is alert. Mental status is at baseline.   Psychiatric:         Behavior: Behavior normal.         Results Review:  I have reviewed the labs, radiology results, and diagnostic studies.    Laboratory Data:   Results from last 7 days   Lab Units 04/23/23  0629 04/22/23  0457 04/21/23  1050   SODIUM mmol/L 140 138 139   POTASSIUM mmol/L 4.6 4.9 3.4*   CHLORIDE mmol/L 100 101 101   CO2 mmol/L 33.0* 30.0* 31.0*   BUN mg/dL 17 12 10   CREATININE mg/dL 0.54* 0.41* 0.41*   GLUCOSE mg/dL 140* 138* 122*   CALCIUM mg/dL 8.6 8.1* 8.5*   BILIRUBIN mg/dL 0.2 0.2 0.2   ALK PHOS U/L 95 81 98   ALT (SGPT) U/L 33 26 28   AST (SGOT) U/L 19 18 21   ANION GAP mmol/L 7.0 7.0 7.0     Estimated Creatinine Clearance: 102 mL/min (A) (by C-G formula based on SCr of 0.54 mg/dL (L)).          Results from last 7 days   Lab Units 04/23/23  0629 04/22/23  0457 04/21/23  1050   WBC 10*3/mm3 14.19* 5.62 5.20   HEMOGLOBIN g/dL 8.8* 8.5* 9.2*   HEMATOCRIT % 28.9* 27.3* 30.0*   PLATELETS 10*3/mm3 208 171 188           Culture Data:   Blood Culture   Date Value Ref Range Status   04/21/2023 No growth at 2 days  Preliminary   04/21/2023 No growth at 2 days  Preliminary     No results found for: URINECX  No results found for: RESPCX  No results found for: WOUNDCX  No results found for: STOOLCX  No components found for: BODYFLD    Radiology Data:   Imaging Results (Last 24 Hours)     ** No results found for the last 24 hours. **          I have reviewed the patient's current medications.     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     MDM Data  External documents reviewed: previous medical records  My EKG interpretation: n/a  My plain film interpretation: n/a  Tests considered but not ordered: none     Decision rules/scores evaluated (example [id number], Wells, etc): n/a     Discussed with: patient and his wife     Treatment Plan  Iv antibiotics and supportive therapy; follow up chest ray am    Care Planning  Shared decision making: his wife  Code status and discussions: full code    Disposition  Social Determinants of Health that impact treatment or disposition: n/a  I expect the patient to be discharged home in 2 to 3 days      I confirmed that the patient's Advance Care Plan is present, code status is documented, or surrogate decision maker is listed in the patient's medical record.     Darin Perera MD

## 2023-04-23 NOTE — THERAPY TREATMENT NOTE
Patient Name: Brian Garcia  : 1950    MRN: [id number]                              Today's Date: 2023       Admit Date: 2023    Visit Dx:     ICD-10-CM ICD-9-CM   1. Acute respiratory failure with hypoxia and hypercapnia  J96.01 518.81    J96.02    2. Pneumonia due to infectious organism, unspecified laterality, unspecified part of lung  J18.9 486   3. Impaired functional mobility, balance, gait, and endurance  Z74.09 V49.89   4. Impaired mobility and activities of daily living  Z74.09 V49.89    Z78.9      Patient Active Problem List   Diagnosis   • Physical deconditioning   • Pulmonary nodules   • Leukocytosis   • History of pulmonary embolism   • Stage 4 very severe COPD by GOLD classification   • Chronic respiratory failure with hypoxia, on home O2 therapy   • Hypertension   • CVA (cerebral vascular accident)   • Traumatic hemorrhage of cerebrum   • Iron deficiency anemia secondary to inadequate dietary iron intake   • Acute pain of right shoulder   • Limited range of motion (ROM) of shoulder   • Rotator cuff syndrome of right shoulder   • Impingement syndrome of right shoulder   • Arthrosis of right acromioclavicular joint   • Chronic right shoulder pain   • Nontraumatic incomplete tear of right rotator cuff   • Primary osteoarthritis of right shoulder   • Malignant neoplasm of overlapping sites of right lung   • Lung consolidation   • Primary lung adenocarcinoma   • Encounter for venous access device care   • Iron malabsorption   • Pneumonia   • Acute respiratory failure with hypoxia and hypercapnia     Past Medical History:   Diagnosis Date   • A-fib    • Anesthesia     hip surgery (spinal caused him unpleasant sensations never wants it again)   • Arthritis    • Blood in sputum 2023    bright red blood, teaspoon - tablespoon daily   • COPD (chronic obstructive pulmonary disease)    • CVA (cerebral vascular accident) 2022   • Hearing aid worn     bilateral   • History  of pulmonary embolism    • History of recurrent pneumonia    • Hypertension    • Lung cancer 03/17/2023   • Oxygen dependent     since @ age 65   • Pulmonary nodules    • Risk for falls     uses walker   • Rotator cuff syndrome of right shoulder    • Tachycardia    • 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 HTN/Eliquis   • Wears glasses     readers   • Wears glasses     reading     Past Surgical History:   Procedure Laterality Date   • HIP ARTHROPLASTY Right    • RIB BIOPSY Left 03/17/2023 11th   • VENOUS ACCESS DEVICE (PORT) INSERTION N/A 3/30/2023    Procedure: MEDIPORT PLACEMENT          (C-ARM);  Surgeon: Lavon Cochran MD;  Location: [facility];  Service: General;  Laterality: N/A;      General Information     Row Name 04/23/23 1240          OT Time and Intention    Document Type therapy note (daily note)  -BB     Mode of Treatment individual therapy;occupational therapy  -BB     Row Name 04/23/23 1240          General Information    Patient Profile Reviewed yes  -BB     Existing Precautions/Restrictions fall;oxygen therapy device and L/min  fracture risk w/ bone mets per chart, 11 rib w/ met; watch gait belt placement and beware of fall/LE collapse  -BB     Row Name 04/23/23 1240          Cognition    Orientation Status (Cognition) oriented x 4  -BB     Row Name 04/23/23 1240          Safety Issues, Functional Mobility    Impairments Affecting Function (Mobility) balance;coordination;endurance/activity tolerance;strength  -BB           User Key  (r) = Recorded By, (t) = Taken By, (c) = Cosigned By    Initials Name Provider Type    BB Lisa José COTA Occupational Therapist Assistant                 Mobility/ADL's     Row Name 04/23/23 1240          Bed Mobility    Bed Mobility bed mobility (all) activities  -BB     All Activities, [place] (Bed Mobility) contact guard  -BB     Supine-Sit [place] (Bed Mobility) contact guard  -BB     Assistive Device (Bed Mobility) bed  rails;head of bed elevated  -BB     Row Name 04/23/23 1240          Transfers    Transfers sit-stand transfer;stand-sit transfer;toilet transfer  -BB     Row Name 04/23/23 1240          Bed-Chair Transfer    Assistive Device (Bed-Chair Transfers) walker, front-wheeled  -BB     Row Name 04/23/23 1240          Sit-Stand Transfer    Sit-Stand [place] (Transfers) contact guard  -BB     Assistive Device (Sit-Stand Transfers) walker, front-wheeled  -BB     Row Name 04/23/23 1240          Stand-Sit Transfer    Stand-Sit [place] (Transfers) contact guard  -BB     Assistive Device (Stand-Sit Transfers) walker, front-wheeled  -BB     Row Name 04/23/23 1240          Toilet Transfer    Type (Toilet Transfer) sit-stand;stand-sit  -BB     [place] Level (Toilet Transfer) contact guard  -BB     Assistive Device (Toilet Transfer) walker, front-wheeled;commode, bedside without drop arms  -BB     Row Name 04/23/23 1240          Functional Mobility    Functional Mobility- Ind. Level contact guard assist  -BB     Functional Mobility- Device walker, front-wheeled  -BB     Row Name 04/23/23 1240          Activities of Daily Living    BADL Assessment/Intervention grooming  -BB     [facility] Name 04/23/23 1240          Grooming Assessment/Training    [place] Level (Grooming) hair care, combing/brushing;wash face, hands;modified independence  -BB     Position (Grooming) sitting up in bed  -BB           User Key  (r) = Recorded By, (t) = Taken By, (c) = Cosigned By    Initials Name Provider Type    BB Lisa José COTA Occupational Therapist Assistant               Obj/Interventions     Row Name 04/23/23 1240          Range of Motion Comprehensive    General Range of Motion bilateral lower extremity ROM WFL  -BB     Row Name 04/23/23 1240          Strength Comprehensive (MMT)    General Manual Muscle Testing (MMT) Assessment other (see comments)  -BB     Row Name 04/23/23 1240          Shoulder (Therapeutic Exercise)     Shoulder (Therapeutic Exercise) AROM (active range of motion)  -BB     Shoulder AROM (Therapeutic Exercise) bilateral;flexion;extension;external rotation;internal rotation;15 repititions  chest press  -BB     Row Name 04/23/23 1240          Elbow/Forearm (Therapeutic Exercise)    Elbow/Forearm (Therapeutic Exercise) AROM (active range of motion)  -BB     Elbow/Forearm AROM (Therapeutic Exercise) bilateral;flexion;extension;supination;pronation;15 repititions  -BB     Row Name 04/23/23 1240          Hand (Therapeutic Exercise)    Hand (Therapeutic Exercise) AROM (active range of motion)  -BB     Hand AROM/AAROM (Therapeutic Exercise) bilateral;finger flexion;finger extension;15 repititions  -BB     Row Name 04/23/23 1240          Motor Skills    Therapeutic Exercise shoulder;elbow/forearm;hand  -BB           User Key  (r) = Recorded By, (t) = Taken By, (c) = Cosigned By    Initials Name Provider Type    BB Lisa José COTA Occupational Therapist Assistant               Goals/Plan     Row Name 04/23/23 1240          Transfer Goal 1 (OT)    Activity/Assistive Device (Transfer Goal 1, OT) transfers, all  -BB     [place] Level/Cues Needed (Transfer Goal 1, OT) modified independence  -BB     Time Frame (Transfer Goal 1, OT) long term goal (LTG)  -BB     Progress/Outcome (Transfer Goal 1, OT) goal not met  -BB     Row Name 04/23/23 1240          Bathing Goal 1 (OT)    Activity/Device (Bathing Goal 1, OT) bathing skills, all  -BB     [place] Level/Cues Needed (Bathing Goal 1, OT) modified independence  -BB     Time Frame (Bathing Goal 1, OT) long term goal (LTG)  -BB     Progress/Outcomes (Bathing Goal 1, OT) goal not met  -BB     Row Name 04/23/23 1240          Dressing Goal 1 (OT)    Activity/Device (Dressing Goal 1, OT) dressing skills, all  -BB     [place]/Cues Needed (Dressing Goal 1, OT) modified independence  -BB     Time Frame (Dressing Goal 1, OT) long term goal (LTG)  -BB      Progress/Outcome (Dressing Goal 1, OT) goal not met  -BB     Row Name 04/23/23 1240          Toileting Goal 1 (OT)    Activity/Device (Toileting Goal 1, OT) toileting skills, all  -BB     [place] Level/Cues Needed (Toileting Goal 1, OT) modified independence  -BB     Time Frame (Toileting Goal 1, OT) long term goal (LTG)  -BB     Progress/Outcome (Toileting Goal 1, OT) goal not met  -BB           User Key  (r) = Recorded By, (t) = Taken By, (c) = Cosigned By    Initials Name Provider Type    BB Lisa José COTA Occupational Therapist Assistant               Clinical Impression     Row Name 04/23/23 1240          Pain Assessment    Pretreatment Pain Rating 0/10 - no pain  -BB     Posttreatment Pain Rating 0/10 - no pain  -BB     Row Name 04/23/23 1240          Plan of Care Review    Plan of Care Reviewed With patient  -BB     Progress improving  -BB     Outcome Evaluation Nsg and pt agree to OT tx this pm. Pt already had a bath, however agrees to exercises. Pt completed B UE exercises 1 set x15 reps with RBs as needed. Pt is CGA for bed>toilet t/f. Pt states he needs awhile in BR and can reach call light. Significant other in room with pt.  -BB     Row Name 04/23/23 1240          Therapy Assessment/Plan (OT)    Rehab Potential (OT) good, to achieve stated therapy goals  -BB     Criteria for Skilled Therapeutic Interventions Met (OT) yes;meets criteria  -BB     Therapy Frequency (OT) other (see comments)  5-7 days/wk  -BB     Row Name 04/23/23 1240          Therapy Plan Review/Discharge Plan (OT)    Anticipated Discharge Disposition (OT) home with 24/7 care;home with assist;home with home health  -BB     Row Name 04/23/23 1240          Vital Signs    Pretreatment Heart Rate (beats/min) 112  -BB     Pre SpO2 (%) 98  -BB     O2 Delivery Pre Treatment supplemental O2  -BB     Post SpO2 (%) 96  -BB     O2 Delivery Post Treatment supplemental O2  -BB     Pre Patient Position Sitting  -BB     Post Patient  Position Sitting  -BB     Row Name 04/23/23 1240          Positioning and Restraints    Pre-Treatment Position in bed  -BB     Post Treatment Position bathroom  -BB     Bathroom sitting;call light within reach;with family/caregiver  -BB           User Key  (r) = Recorded By, (t) = Taken By, (c) = Cosigned By    Initials Name Provider Type    Lisa Ricardo COTA Occupational Therapist Assistant               Outcome Measures     Row Name 04/23/23 1240          How much help from another is currently needed...    Putting on and taking off regular lower body clothing? 3  -BB     Bathing (including washing, rinsing, and drying) 2  -BB     Toileting (which includes using toilet bed pan or urinal) 3  -BB     Putting on and taking off regular upper body clothing 4  -BB     Taking care of personal grooming (such as brushing teeth) 4  -BB     Eating meals 4  -BB     AM-PAC 6 Clicks Score (OT) 20  -BB     Row Name 04/23/23 0839          How much help from another person do you currently need...    Turning from your back to your side while in flat bed without using bedrails? 4  -ML     Moving from lying on back to sitting on the side of a flat bed without bedrails? 4  -ML     Moving to and from a bed to a chair (including a wheelchair)? 3  -ML     Standing up from a chair using your arms (e.g., wheelchair, bedside chair)? 3  -ML     Climbing 3-5 steps with a railing? 2  -ML     To walk in hospital room? 3  -ML     AM-PAC 6 Clicks Score (PT) 19  -ML     Highest level of mobility 6 --> Walked 10 steps or more  -ML           User Key  (r) = Recorded By, (t) = Taken By, (c) = Cosigned By    Initials Name Provider Type    Lisa Ricardo COTA Occupational Therapist Assistant    Arron Martinez LPN Licensed Nurse                Occupational Therapy Education     Title: PT OT SLP Therapies (In Progress)     Topic: Occupational Therapy (In Progress)     Point: ADL training (Done)     Description:   Instruct  learner(s) on proper safety adaptation and remediation techniques during self care or transfers.   Instruct in proper use of assistive devices.              Learning Progress Summary           Patient Acceptance, E,TB, VU by BB at 4/23/2023 1454                   Point: Home exercise program (Not Started)     Description:   Instruct learner(s) on appropriate technique for monitoring, assisting and/or progressing therapeutic exercises/activities.              Learner Progress:  Not documented in this visit.          Point: Precautions (Done)     Description:   Instruct learner(s) on prescribed precautions during self-care and functional transfers.              Learning Progress Summary           Patient Acceptance, E, VU by RB at 4/22/2023 1254    Comment: Edu pt on use of gait belt and non skid socks when OOB and no OOB without assist.                   Point: Body mechanics (Done)     Description:   Instruct learner(s) on proper positioning and spine alignment during self-care, functional mobility activities and/or exercises.              Learning Progress Summary           Patient Acceptance, E,TB, VU by BB at 4/23/2023 1454                               User Key     Initials Effective Dates Name Provider Type Discipline     06/16/21 -  Abhi Chang, OT Occupational Therapist OT    MARCELL 06/16/21 -  Lisa José COTA Occupational Therapist Assistant OT              OT Recommendation and Plan  Therapy Frequency (OT): other (see comments) (5-7 days/wk)  Plan of Care Review  Plan of Care Reviewed With: patient  Progress: improving  Outcome Evaluation: Nsg and pt agree to OT tx this pm. Pt already had a bath, however agrees to exercises. Pt completed B UE exercises 1 set x15 reps with RBs as needed. Pt is CGA for bed>toilet t/f. Pt states he needs awhile in BR and can reach call light. Significant other in room with pt.     Time Calculation:    Time Calculation- OT     Row Name 04/23/23 1205             Time  Calculation- OT    OT Start Time 1240  -BB      OT Stop Time 1319  -BB      OT Time Calculation (min) 39 min  -BB      Total Timed Code Minutes- OT 39 minute(s)  -BB      OT Received On 04/23/23  -BB         Timed Charges    14267 - OT Therapeutic Exercise Minutes 24  -BB      98783 - OT Self Care/Mgmt Minutes 15  -BB         Total Minutes    Timed Charges Total Minutes 39  -BB       Total Minutes 39  -BB            User Key  (r) = Recorded By, (t) = Taken By, (c) = Cosigned By    Initials Name Provider Type    Lisa Ricardo COTA Occupational Therapist Assistant              Therapy Charges for Today     Code Description Service Date Service Provider Modifiers Qty    95624468822 HC OT THER PROC EA 15 MIN 4/23/2023 Lisa José COTA GO 2    16250903212 HC OT SELF CARE/MGMT/TRAIN EA 15 MIN 4/23/2023 Lisa José COTA GO 1               ALEXANDER Dasilva  4/23/2023

## 2023-04-24 ENCOUNTER — APPOINTMENT (OUTPATIENT)
Dept: GENERAL RADIOLOGY | Facility: HOSPITAL | Age: 73
End: 2023-04-24
Payer: OTHER GOVERNMENT

## 2023-04-24 LAB
ALBUMIN SERPL-MCNC: 3 G/DL (ref 3.5–5.2)
ALBUMIN/GLOB SERPL: 1.3 G/DL
ALP SERPL-CCNC: 87 U/L (ref 39–117)
ALT SERPL W P-5'-P-CCNC: 25 U/L (ref 1–41)
ANION GAP SERPL CALCULATED.3IONS-SCNC: 3 MMOL/L (ref 5–15)
AST SERPL-CCNC: 15 U/L (ref 1–40)
BASOPHILS # BLD AUTO: 0.01 10*3/MM3 (ref 0–0.2)
BASOPHILS NFR BLD AUTO: 0.1 % (ref 0–1.5)
BILIRUB SERPL-MCNC: 0.2 MG/DL (ref 0–1.2)
BUN SERPL-MCNC: 17 MG/DL (ref 8–23)
BUN/CREAT SERPL: 34.7 (ref 7–25)
CALCIUM SPEC-SCNC: 8 MG/DL (ref 8.6–10.5)
CHLORIDE SERPL-SCNC: 101 MMOL/L (ref 98–107)
CO2 SERPL-SCNC: 35 MMOL/L (ref 22–29)
CREAT SERPL-MCNC: 0.49 MG/DL (ref 0.76–1.27)
DEPRECATED RDW RBC AUTO: 48.3 FL (ref 37–54)
EGFRCR SERPLBLD CKD-EPI 2021: 108.4 ML/MIN/1.73
EOSINOPHIL # BLD AUTO: 0 10*3/MM3 (ref 0–0.4)
EOSINOPHIL NFR BLD AUTO: 0 % (ref 0.3–6.2)
ERYTHROCYTE [DISTWIDTH] IN BLOOD BY AUTOMATED COUNT: 16.4 % (ref 12.3–15.4)
GLOBULIN UR ELPH-MCNC: 2.3 GM/DL
GLUCOSE SERPL-MCNC: 154 MG/DL (ref 65–99)
HCT VFR BLD AUTO: 24.7 % (ref 37.5–51)
HGB BLD-MCNC: 7.6 G/DL (ref 13–17.7)
IMM GRANULOCYTES # BLD AUTO: 0.11 10*3/MM3 (ref 0–0.05)
IMM GRANULOCYTES NFR BLD AUTO: 0.8 % (ref 0–0.5)
LYMPHOCYTES # BLD AUTO: 0.28 10*3/MM3 (ref 0.7–3.1)
LYMPHOCYTES NFR BLD AUTO: 2 % (ref 19.6–45.3)
MAGNESIUM SERPL-MCNC: 2.4 MG/DL (ref 1.6–2.4)
MCH RBC QN AUTO: 29.2 PG (ref 26.6–33)
MCHC RBC AUTO-ENTMCNC: 30.8 G/DL (ref 31.5–35.7)
MCV RBC AUTO: 95 FL (ref 79–97)
MONOCYTES # BLD AUTO: 0.53 10*3/MM3 (ref 0.1–0.9)
MONOCYTES NFR BLD AUTO: 3.7 % (ref 5–12)
NEUTROPHILS NFR BLD AUTO: 13.41 10*3/MM3 (ref 1.7–7)
NEUTROPHILS NFR BLD AUTO: 93.4 % (ref 42.7–76)
NRBC BLD AUTO-RTO: 0 /100 WBC (ref 0–0.2)
PLATELET # BLD AUTO: 176 10*3/MM3 (ref 140–450)
PMV BLD AUTO: 9.7 FL (ref 6–12)
POTASSIUM SERPL-SCNC: 4.2 MMOL/L (ref 3.5–5.2)
PROT SERPL-MCNC: 5.3 G/DL (ref 6–8.5)
QT INTERVAL: 310 MS
QTC INTERVAL: 452 MS
RBC # BLD AUTO: 2.6 10*6/MM3 (ref 4.14–5.8)
SODIUM SERPL-SCNC: 139 MMOL/L (ref 136–145)
WBC NRBC COR # BLD: 14.34 10*3/MM3 (ref 3.4–10.8)

## 2023-04-24 PROCEDURE — 99232 SBSQ HOSP IP/OBS MODERATE 35: CPT | Performed by: INTERNAL MEDICINE

## 2023-04-24 PROCEDURE — 25010000002 LEVOFLOXACIN PER 250 MG: Performed by: HOSPITALIST

## 2023-04-24 PROCEDURE — 97535 SELF CARE MNGMENT TRAINING: CPT

## 2023-04-24 PROCEDURE — 94799 UNLISTED PULMONARY SVC/PX: CPT

## 2023-04-24 PROCEDURE — 25010000002 METHYLPREDNISOLONE PER 125 MG: Performed by: HOSPITALIST

## 2023-04-24 PROCEDURE — 94760 N-INVAS EAR/PLS OXIMETRY 1: CPT

## 2023-04-24 PROCEDURE — 93005 ELECTROCARDIOGRAM TRACING: CPT | Performed by: INTERNAL MEDICINE

## 2023-04-24 PROCEDURE — 25010000002 HEPARIN (PORCINE) PER 1000 UNITS: Performed by: HOSPITALIST

## 2023-04-24 PROCEDURE — 93010 ELECTROCARDIOGRAM REPORT: CPT | Performed by: INTERNAL MEDICINE

## 2023-04-24 PROCEDURE — 85025 COMPLETE CBC W/AUTO DIFF WBC: CPT | Performed by: HOSPITALIST

## 2023-04-24 PROCEDURE — 80053 COMPREHEN METABOLIC PANEL: CPT | Performed by: HOSPITALIST

## 2023-04-24 PROCEDURE — 71046 X-RAY EXAM CHEST 2 VIEWS: CPT

## 2023-04-24 PROCEDURE — 97110 THERAPEUTIC EXERCISES: CPT

## 2023-04-24 PROCEDURE — 83735 ASSAY OF MAGNESIUM: CPT | Performed by: INTERNAL MEDICINE

## 2023-04-24 PROCEDURE — 94664 DEMO&/EVAL PT USE INHALER: CPT

## 2023-04-24 RX ADMIN — FOLIC ACID 1 MG: 1 TABLET ORAL at 08:21

## 2023-04-24 RX ADMIN — GUAIFENESIN 600 MG: 600 TABLET, EXTENDED RELEASE ORAL at 08:21

## 2023-04-24 RX ADMIN — IPRATROPIUM BROMIDE AND ALBUTEROL SULFATE 3 ML: .5; 3 SOLUTION RESPIRATORY (INHALATION) at 11:10

## 2023-04-24 RX ADMIN — IPRATROPIUM BROMIDE AND ALBUTEROL SULFATE 3 ML: .5; 3 SOLUTION RESPIRATORY (INHALATION) at 14:42

## 2023-04-24 RX ADMIN — METHYLPREDNISOLONE SODIUM SUCCINATE 80 MG: 125 INJECTION, POWDER, FOR SOLUTION INTRAMUSCULAR; INTRAVENOUS at 01:30

## 2023-04-24 RX ADMIN — LEVOFLOXACIN 750 MG: 5 INJECTION, SOLUTION INTRAVENOUS at 14:01

## 2023-04-24 RX ADMIN — Medication 10 ML: at 08:21

## 2023-04-24 RX ADMIN — IPRATROPIUM BROMIDE AND ALBUTEROL SULFATE 3 ML: .5; 3 SOLUTION RESPIRATORY (INHALATION) at 20:34

## 2023-04-24 RX ADMIN — MONTELUKAST 10 MG: 10 TABLET, FILM COATED ORAL at 20:09

## 2023-04-24 RX ADMIN — HEPARIN SODIUM 5000 UNITS: 5000 INJECTION INTRAVENOUS; SUBCUTANEOUS at 20:09

## 2023-04-24 RX ADMIN — Medication 10 ML: at 20:09

## 2023-04-24 RX ADMIN — METHYLPREDNISOLONE SODIUM SUCCINATE 80 MG: 125 INJECTION, POWDER, FOR SOLUTION INTRAMUSCULAR; INTRAVENOUS at 17:05

## 2023-04-24 RX ADMIN — METHYLPREDNISOLONE SODIUM SUCCINATE 80 MG: 125 INJECTION, POWDER, FOR SOLUTION INTRAMUSCULAR; INTRAVENOUS at 08:21

## 2023-04-24 RX ADMIN — DILTIAZEM HYDROCHLORIDE 240 MG: 120 CAPSULE, COATED, EXTENDED RELEASE ORAL at 08:21

## 2023-04-24 RX ADMIN — GUAIFENESIN 600 MG: 600 TABLET, EXTENDED RELEASE ORAL at 20:09

## 2023-04-24 NOTE — ACP (ADVANCE CARE PLANNING)
Saw Alcides Radha regarding ACP consult.  Discussed dvance care planning and advance directives with him and significant other.  He completed a living will directive.  Copy sent to HIM to scan into his record.

## 2023-04-24 NOTE — THERAPY TREATMENT NOTE
Patient Name: Brian Garcia  : 1950    MRN: [id number]                              Today's Date: 2023       Admit Date: 2023    Visit Dx:     ICD-10-CM ICD-9-CM   1. Acute respiratory failure with hypoxia and hypercapnia  J96.01 518.81    J96.02    2. Pneumonia due to infectious organism, unspecified laterality, unspecified part of lung  J18.9 486   3. Impaired functional mobility, balance, gait, and endurance  Z74.09 V49.89   4. Impaired mobility and activities of daily living  Z74.09 V49.89    Z78.9      Patient Active Problem List   Diagnosis   • Physical deconditioning   • Pulmonary nodules   • Leukocytosis   • History of pulmonary embolism   • Stage 4 very severe COPD by GOLD classification   • Chronic respiratory failure with hypoxia, on home O2 therapy   • Hypertension   • CVA (cerebral vascular accident)   • Traumatic hemorrhage of cerebrum   • Iron deficiency anemia secondary to inadequate dietary iron intake   • Acute pain of right shoulder   • Limited range of motion (ROM) of shoulder   • Rotator cuff syndrome of right shoulder   • Impingement syndrome of right shoulder   • Arthrosis of right acromioclavicular joint   • Chronic right shoulder pain   • Nontraumatic incomplete tear of right rotator cuff   • Primary osteoarthritis of right shoulder   • Malignant neoplasm of overlapping sites of right lung   • Lung consolidation   • Primary lung adenocarcinoma   • Encounter for venous access device care   • Iron malabsorption   • Pneumonia   • Acute respiratory failure with hypoxia and hypercapnia     Past Medical History:   Diagnosis Date   • A-fib    • Anesthesia     hip surgery (spinal caused him unpleasant sensations never wants it again)   • Arthritis    • Blood in sputum 2023    bright red blood, teaspoon - tablespoon daily   • COPD (chronic obstructive pulmonary disease)    • CVA (cerebral vascular accident) 2022   • Hearing aid worn     bilateral   • History  of pulmonary embolism    • History of recurrent pneumonia    • Hypertension    • Lung cancer 03/17/2023   • Oxygen dependent     since @ age 65   • Pulmonary nodules    • Risk for falls     uses walker   • Rotator cuff syndrome of right shoulder    • Tachycardia    • 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 HTN/Eliquis   • Wears glasses     readers   • Wears glasses     reading     Past Surgical History:   Procedure Laterality Date   • HIP ARTHROPLASTY Right    • RIB BIOPSY Left 03/17/2023 11th   • VENOUS ACCESS DEVICE (PORT) INSERTION N/A 3/30/2023    Procedure: MEDIPORT PLACEMENT          (C-ARM);  Surgeon: Lavon Cochran MD;  Location: [facility];  Service: General;  Laterality: N/A;      General Information     Row Name 04/24/23 1400 04/24/23 0815       OT Time and Intention    Document Type therapy note (daily note)  -KD therapy note (daily note)  -KD    Mode of Treatment occupational therapy  -KD occupational therapy  -KD    Row Name 04/24/23 1400 04/24/23 0815       General Information    Patient Profile Reviewed -- yes  -KD    Existing Precautions/Restrictions fall;oxygen therapy device and L/min  -KD fall;oxygen therapy device and L/min  -KD    Row Name 04/24/23 1400 04/24/23 0815       Cognition    Orientation Status (Cognition) oriented x 4  -KD oriented x 4  -KD    Row Name 04/24/23 1400 04/24/23 0815       Safety Issues, Functional Mobility    Impairments Affecting Function (Mobility) balance;coordination;endurance/activity tolerance;strength  -KD balance;coordination;endurance/activity tolerance;strength  -KD          User Key  (r) = Recorded By, (t) = Taken By, (c) = Cosigned By    Initials Name Provider Type    KD Faby Orourke COTA Occupational Therapist Assistant                 Mobility/ADL's     Row Name 04/24/23 0815          Bed Mobility    Comment, (Bed Mobility) Pt sitting EOB upon arrival.  -KD     Row Name 04/24/23 0815          Bed-Chair Transfer    Bed-Chair  [place] (Transfers) standby assist  -KD     Assistive Device (Bed-Chair Transfers) walker, front-wheeled  -KD     Row Name 04/24/23 0815          Sit-Stand Transfer    Sit-Stand [place] (Transfers) supervision  -     Assistive Device (Sit-Stand Transfers) walker, front-wheeled  -KANDIS     Comment, (Sit-Stand Transfer) Pt performed 3 sit<>stands w/ RW and good tolerance.  -KD     Row Name 04/24/23 0815          Stand-Sit Transfer    Stand-Sit [place] (Transfers) standby assist  -KD     Assistive Device (Stand-Sit Transfers) walker, front-wheeled  -KD     Row Name 04/24/23 0815          Toilet Transfer    [place] Level (Toilet Transfer) standby assist  -KD     Assistive Device (Toilet Transfer) commode;grab bars/safety frame;walker, front-wheeled  -KANDIS     Row Name 04/24/23 0815          Functional Mobility    Functional Mobility- Ind. Level supervision required  -     Functional Mobility- Device walker, front-wheeled  -KD     Functional Mobility-Distance (Feet) 340  -           User Key  (r) = Recorded By, (t) = Taken By, (c) = Cosigned By    Initials Name Provider Type     Faby Orourke COTA Occupational Therapist Assistant               Obj/Interventions     Row Name 04/24/23 1400 04/24/23 0815       Shoulder (Therapeutic Exercise)    Shoulder (Therapeutic Exercise) -- AROM (active range of motion)  -    Shoulder AROM (Therapeutic Exercise) bilateral;flexion;extension;aBduction;aDduction;external rotation;internal rotation  - bilateral;flexion;extension;aBduction;aDduction;external rotation;internal rotation;horizontal aBduction/aDduction  -    Row Name 04/24/23 1400 04/24/23 0815       Elbow/Forearm (Therapeutic Exercise)    Elbow/Forearm (Therapeutic Exercise) AROM (active range of motion)  - AROM (active range of motion)  -    Elbow/Forearm AROM (Therapeutic Exercise) bilateral;flexion;extension;supination;pronation  -KD bilateral;flexion;extension;supination;pronation  -KD     Row Name 04/24/23 1400 04/24/23 0815       Wrist (Therapeutic Exercise)    Wrist (Therapeutic Exercise) AROM (active range of motion)  -KD AROM (active range of motion)  -KD    Wrist AROM (Therapeutic Exercise) bilateral;flexion;extension  -KD bilateral;flexion;extension  -KD    Row Name 04/24/23 1400 04/24/23 0815       Hand (Therapeutic Exercise)    Hand (Therapeutic Exercise) AROM (active range of motion)  -KD AROM (active range of motion)  -KD    Hand AROM/AAROM (Therapeutic Exercise) bilateral;finger flexion;finger extension  -KD bilateral;finger flexion;finger extension  -KD    Row Name 04/24/23 1400 04/24/23 0815       Motor Skills    Therapeutic Exercise shoulder;elbow/forearm;wrist;hand  -KD shoulder;elbow/forearm;wrist  -KD          User Key  (r) = Recorded By, (t) = Taken By, (c) = Cosigned By    Initials Name Provider Type    Faby Judd COTA Occupational Therapist Assistant               Goals/Plan     Row Name 04/24/23 1400 04/24/23 0815       Transfer Goal 1 (OT)    Activity/Assistive Device (Transfer Goal 1, OT) transfers, all  -KD transfers, all  -KD    [place] Level/Cues Needed (Transfer Goal 1, OT) modified independence  -KD modified independence  -KD    Time Frame (Transfer Goal 1, OT) long term goal (LTG)  -KD long term goal (LTG)  -KD    Progress/Outcome (Transfer Goal 1, OT) goal not met  -KD goal not met  -KD    Row Name 04/24/23 1400 04/24/23 0815       Bathing Goal 1 (OT)    Activity/Device (Bathing Goal 1, OT) bathing skills, all  -KD bathing skills, all  -KD    [place] Level/Cues Needed (Bathing Goal 1, OT) modified independence  -KD modified independence  -KD    Time Frame (Bathing Goal 1, OT) long term goal (LTG)  -KD long term goal (LTG)  -KD    Progress/Outcomes (Bathing Goal 1, OT) goal not met  -KD goal not met  -KD    Row Name 04/24/23 1400 04/24/23 0815       Dressing Goal 1 (OT)    Activity/Device (Dressing Goal 1, OT) dressing skills, all  -KD dressing  skills, all  -KD    [place]/Cues Needed (Dressing Goal 1, OT) modified independence  -KD modified independence  -KD    Time Frame (Dressing Goal 1, OT) long term goal (LTG)  -KD long term goal (LTG)  -KD    Progress/Outcome (Dressing Goal 1, OT) goal not met  -KD goal not met  -KD    Row Name 04/24/23 1400 04/24/23 0815       Toileting Goal 1 (OT)    Activity/Device (Toileting Goal 1, OT) toileting skills, all  -KD toileting skills, all  -KD    [place] Level/Cues Needed (Toileting Goal 1, OT) modified independence  -KD modified independence  -KD    Time Frame (Toileting Goal 1, OT) long term goal (LTG)  -KD long term goal (LTG)  -KD    Progress/Outcome (Toileting Goal 1, OT) goal not met  -KD goal not met  -KD          User Key  (r) = Recorded By, (t) = Taken By, (c) = Cosigned By    Initials Name Provider Type    Faby Judd COTA Occupational Therapist Assistant               Clinical Impression     Row Name 04/24/23 1400 04/24/23 0815       Pain Assessment    Pretreatment Pain Rating 0/10 - no pain  -KD 0/10 - no pain  -KD    Posttreatment Pain Rating 0/10 - no pain  -KD 0/10 - no pain  -KD    Row Name 04/24/23 0815          Plan of Care Review    Plan of Care Reviewed With patient  -KD     Progress improving  -KD     Outcome Evaluation Pt S/O in room upon entry along w/ nsg. Signee assisted Nsg w/ t/f of S/O from floor to EOB. Pt sitting EOB upon entry. Sit>stand- SBA. Toilet t/f- SBA. [employer]-Ind. Fxl mobility ~ 340' SBA w/ RW. Pt required 4 standing RB's 2' 02. Sats remained 89% and greater w/ mobility. Pt performed 3 sit<>stands w/ RW and good tolerance. Pt completed BUE ther ex in all planes w/ 2lb HW. Discussed w/ pt AE and DME. Pt in recliner upon exit w/ all needs in reach. Cont OT POC.  -KD     Row Name 04/24/23 1400 04/24/23 0815       Therapy Assessment/Plan (OT)    Therapy Frequency (OT) other (see comments)  5-7 days a week  -KD other (see comments)  5-7 days a week  -KD    Row Name  04/24/23 1400 04/24/23 0815       Therapy Plan Review/Discharge Plan (OT)    Anticipated Discharge Disposition (OT) home with 24/7 care;home with assist;home with home health  -KD home with 24/7 care;home with assist;home with home health  -KD    Row Name 04/24/23 1400 04/24/23 0815       Vital Signs    Pre Systolic BP Rehab 1400  -  -KD    Pre Treatment Diastolic BP 1423  -KD 63  -KD    Post Systolic BP Rehab -- 132  -KD    Post Treatment Diastolic BP -- 60  -KD    Pretreatment Heart Rate (beats/min) 98  -  -KD    Posttreatment Heart Rate (beats/min) -- 94  -KD    Pre SpO2 (%) 96  -KD 96  -KD    O2 Delivery Pre Treatment supplemental O2  -KD supplemental O2  -KD    Post SpO2 (%) -- 97  -KD    O2 Delivery Post Treatment -- supplemental O2  -KD    Pre Patient Position Supine  -KD Sitting  -KD    Intra Patient Position Sitting  -KD Standing  -KD    Post Patient Position Sitting  -KD Sitting  -KD    Row Name 04/24/23 1400 04/24/23 0815       Positioning and Restraints    Pre-Treatment Position in bed  -KD in bed  -KD    Post Treatment Position bed  -KD chair  -KD    In Bed fowlers;call light within reach;encouraged to call for assist;exit alarm on  -KD --    In Chair -- sitting;call light within reach;encouraged to call for assist;exit alarm on  -KD          User Key  (r) = Recorded By, (t) = Taken By, (c) = Cosigned By    Initials Name Provider Type    Faby Judd COTA Occupational Therapist Assistant               Outcome Measures     Row Name 04/24/23 0815          How much help from another is currently needed...    Putting on and taking off regular lower body clothing? 3  -KD     Bathing (including washing, rinsing, and drying) 2  -KD     Toileting (which includes using toilet bed pan or urinal) 3  -KD     Putting on and taking off regular upper body clothing 4  -KD     Taking care of personal grooming (such as brushing teeth) 4  -KD     Eating meals 4  -KD     AM-PAC 6 Clicks Score (OT) 20   -KANDIS     Row Name 04/24/23 0754          How much help from another person do you currently need...    Turning from your back to your side while in flat bed without using bedrails? 4  -AE     Moving from lying on back to sitting on the side of a flat bed without bedrails? 4  -AE     Moving to and from a bed to a chair (including a wheelchair)? 3  -AE     Standing up from a chair using your arms (e.g., wheelchair, bedside chair)? 3  -AE     Climbing 3-5 steps with a railing? 2  -AE     To walk in hospital room? 3  -AE     AM-PAC 6 Clicks Score (PT) 19  -AE     Highest level of mobility 6 --> Walked 10 steps or more  -AE           User Key  (r) = Recorded By, (t) = Taken By, (c) = Cosigned By    Initials Name Provider Type    Faby Judd COTA Occupational Therapist Assistant    Agatha Carlos RN Registered Nurse                Occupational Therapy Education     Title: PT OT SLP Therapies (In Progress)     Topic: Occupational Therapy (In Progress)     Point: ADL training (Done)     Description:   Instruct learner(s) on proper safety adaptation and remediation techniques during self care or transfers.   Instruct in proper use of assistive devices.              Learning Progress Summary           Patient Acceptance, E,TB, VU by BB at 4/23/2023 1454                   Point: Home exercise program (Not Started)     Description:   Instruct learner(s) on appropriate technique for monitoring, assisting and/or progressing therapeutic exercises/activities.              Learner Progress:  Not documented in this visit.          Point: Precautions (Done)     Description:   Instruct learner(s) on prescribed precautions during self-care and functional transfers.              Learning Progress Summary           Patient Acceptance, E, VU by RB at 4/22/2023 1254    Comment: Edu pt on use of gait belt and non skid socks when OOB and no OOB without assist.                   Point: Body mechanics (Done)     Description:    Instruct learner(s) on proper positioning and spine alignment during self-care, functional mobility activities and/or exercises.              Learning Progress Summary           Patient Acceptance, E,TB, VU by BB at 4/23/2023 1454                               User Key     Initials Effective Dates Name Provider Type Discipline    RB 06/16/21 -  Abhi Chang OT Occupational Therapist OT    MARCELL 06/16/21 -  Lisa José COTA Occupational Therapist Assistant OT              OT Recommendation and Plan  Therapy Frequency (OT): other (see comments) (5-7 days a week)  Plan of Care Review  Plan of Care Reviewed With: patient  Progress: improving  Outcome Evaluation: Pt S/O in room upon entry along w/ nsg. Signee assisted Nsg w/ t/f of S/O from floor to EOB. Pt sitting EOB upon entry. Sit>stand- SBA. Toilet t/f- SBA. [employer]. Fxl mobility ~ 340' SBA w/ RW. Pt required 4 standing RB's 2' 02. Sats remained 89% and greater w/ mobility. Pt performed 3 sit<>stands w/ RW and good tolerance. Pt completed BUE ther ex in all planes w/ 2lb HW. Discussed w/ pt AE and DME. Pt in recliner upon exit w/ all needs in reach. Cont OT POC.     Time Calculation:    Time Calculation- OT     Row Name 04/24/23 1400 04/24/23 0815          Time Calculation- OT    OT Start Time 1400  -KD 0815  -KD     OT Stop Time 1423  -KD 0908  -KD     OT Time Calculation (min) 23 min  -KD 53 min  -KD     Total Timed Code Minutes- OT 23 minute(s)  -KD 53 minute(s)  -KD     OT Received On 04/24/23  -KD 04/24/23  -KD        Timed Charges    06335 - OT Therapeutic Exercise Minutes 23  -KD 24  -KD     96075 - OT Self Care/Mgmt Minutes -- 29  -KD        Total Minutes    Timed Charges Total Minutes 23  -KD 53  -KD      Total Minutes 23  -KD 53  -KD           User Key  (r) = Recorded By, (t) = Taken By, (c) = Cosigned By    Initials Name Provider Type    KD Faby Orourke COTA Occupational Therapist Assistant              Therapy Charges for Today      Code Description Service Date Service Provider Modifiers Qty    85246728693 HC OT THER PROC EA 15 MIN 4/24/2023 Faby Orourke COTA GO 2    96627889973 HC OT SELF CARE/MGMT/TRAIN EA 15 MIN 4/24/2023 Faby Orourke COTA GO 2    78327713886 HC OT THER PROC EA 15 MIN 4/24/2023 Faby Orourke, ALEXANDER GO 2               ALEXANDER Valles  4/24/2023

## 2023-04-24 NOTE — PLAN OF CARE
Goal Outcome Evaluation:  Plan of Care Reviewed With: patient        Progress: improving  Outcome Evaluation: Pt S/O in room upon entry along w/ nsg. Signee assisted Nsg w/ t/f of S/O from floor to EOB. Pt sitting EOB upon entry. Sit>stand- SBA. Toilet t/f- SBA. [employer]. Fxl mobility ~ 340' SBA w/ RW. Pt required 4 standing RB's 2' 02. Sats remained 89% and greater w/ mobility. Pt performed 3 sit<>stands w/ RW and good tolerance. Pt completed BUE ther ex in all planes w/ 2lb HW. Discussed w/ pt AE and DME. Pt in recliner upon exit w/ all needs in reach. Cont OT POC.

## 2023-04-24 NOTE — NURSING NOTE
15:53- Pt converted to A. Fib with RVR per tele with HR in the 130s.  16:02- EKG obtained. A. Fib with RVR .  16:05- Pt converted back to NSR with HR in the 90s.  Dr. Perera made aware. Will continue to monitor.

## 2023-04-24 NOTE — PROGRESS NOTES
[facility] Medicine Services  INPATIENT PROGRESS NOTE    Length of Stay: 3  Date of Admission: 4/21/2023  Primary Care Physician: Marina Kitchen MD    Subjective   (S) Admitted for PNA in the setting of lung adenocarcinoma with bone metastasis and has started on chemotherapy  Today, his breathing hasn't changed and he still has some hemoptysis; no chest pain reported and no fever     Review of Systems   All other systems reviewed and are negative.  All pertinent negatives and positives are as above. All other systems have been reviewed and are negative unless otherwise stated.     Prior to Admission medications    Medication Sig Start Date End Date Taking? Authorizing Provider   cholecalciferol (VITAMIN D3) 10 MCG (400 UNIT) tablet Take 1 tablet by mouth Daily. 50 mcg daily   Yes Qamar Javed MD   dexamethasone (DECADRON) 4 MG tablet Take 1 tablet twice daily starting the day before chemo, day of chemo, and day after chemo.  Take with food. 3/27/23  Yes Omega Davis MD   dilTIAZem (TIAZAC) 180 MG 24 hr capsule Take 1 capsule by mouth Daily. 3/1/23  Yes Neville Meeks MD   ferrous sulfate 324 MG tablet delayed-release Take 1 tablet by mouth Daily With Breakfast. 1/13/23  Yes Fan Rob MD   folic acid (FOLVITE) 1 MG tablet Take 1 tablet by mouth Daily. 2/11/23  Yes Omega Davis MD   furosemide (LASIX) 20 MG tablet Take 1 tablet by mouth Daily As Needed (Swelling of lower extremity). 4/17/23  Yes Omega Davis MD   ipratropium (ATROVENT) 0.02 % nebulizer solution Take 2.5 mL by nebulization 3 (Three) Times a Day. Sub amount for how much insurance allows 7/19/21  Yes Anita Toney MD   melatonin 5 MG tablet tablet Take 1 tablet by mouth At Night As Needed.   Yes ProviderQamar MD   montelukast (SINGULAIR) 10 MG tablet Take 1 tablet by mouth Every Night.   Yes Qamar Javed MD   OLANZapine (zyPREXA) 5 MG tablet Take 1 tablet by  mouth Every Night. Take on days 2, 3 and 4 after chemotherapy. 3/27/23  Yes Omega Davis MD   ondansetron (ZOFRAN) 8 MG tablet Take 1 tablet by mouth 3 (Three) Times a Day As Needed for Nausea or Vomiting. 3/27/23  Yes Omega Davis MD   predniSONE (DELTASONE) 5 MG tablet Take 1 tablet by mouth Every Other Day.   Yes Provider, MD Qamar   tiotropium bromide-olodaterol (STIOLTO RESPIMAT) 2.5-2.5 MCG/ACT aerosol solution inhaler Inhale 2 puffs Daily.   Yes Provider, MD Qamar   traMADol (ULTRAM) 50 MG tablet Take 1 tablet by mouth Every 6 (Six) Hours As Needed for Moderate Pain for up to 12 doses. 3/30/23  Yes Lavon Cochran MD   vitamin B-12 (CYANOCOBALAMIN) 1000 MCG tablet Take 1 tablet by mouth Daily. 2/11/23  Yes Omega Davis MD   ipratropium (ATROVENT HFA) 17 MCG/ACT inhaler Inhale 2 puffs 4 (Four) Times a Day As Needed for Wheezing.    Provider, MD Qamar   dilTIAZem XR (DILACOR XR) 180 MG 24 hr capsule Take 1 capsule by mouth Daily. 6/30/22 10/7/22  Neville Meeks MD       dilTIAZem CD, 240 mg, Oral, Q24H  folic acid, 1 mg, Oral, Daily  guaiFENesin, 600 mg, Oral, Q12H  heparin (porcine), 5,000 Units, Subcutaneous, Q12H  ipratropium-albuterol, 3 mL, Nebulization, 4x Daily - RT  levoFLOXacin, 750 mg, Intravenous, Q24H  methylPREDNISolone sodium succinate, 80 mg, Intravenous, Q8H  montelukast, 10 mg, Oral, Nightly  sodium chloride, 10 mL, Intravenous, Q12H           Objective    Temp:  [97.2 °F (36.2 °C)-98.4 °F (36.9 °C)] 98.4 °F (36.9 °C)  Heart Rate:  [] (P) 100  Resp:  [16-20] (P) 18  BP: (107-140)/(55-73) 133/60    Physical Exam  Vitals reviewed.   HENT:      Head: Normocephalic.      Nose: Nose normal.      Mouth/Throat:      Mouth: Mucous membranes are moist.   Eyes:      Extraocular Movements: Extraocular movements intact.   Cardiovascular:      Rate and Rhythm: Normal rate and regular rhythm.      Heart sounds: Normal heart sounds.   Pulmonary:      Breath sounds: Rales  present.   Abdominal:      General: Bowel sounds are normal.      Palpations: Abdomen is soft.   Musculoskeletal:         General: Normal range of motion.      Cervical back: Normal range of motion and neck supple.   Skin:     General: Skin is warm.   Neurological:      General: No focal deficit present.      Mental Status: He is alert. Mental status is at baseline.   Psychiatric:         Behavior: Behavior normal.         Results Review:  I have reviewed the labs, radiology results, and diagnostic studies.    Laboratory Data:   Results from last 7 days   Lab Units 04/24/23  0551 04/23/23  0629 04/22/23  0457   SODIUM mmol/L 139 140 138   POTASSIUM mmol/L 4.2 4.6 4.9   CHLORIDE mmol/L 101 100 101   CO2 mmol/L 35.0* 33.0* 30.0*   BUN mg/dL 17 17 12   CREATININE mg/dL 0.49* 0.54* 0.41*   GLUCOSE mg/dL 154* 140* 138*   CALCIUM mg/dL 8.0* 8.6 8.1*   BILIRUBIN mg/dL 0.2 0.2 0.2   ALK PHOS U/L 87 95 81   ALT (SGPT) U/L 25 33 26   AST (SGOT) U/L 15 19 18   ANION GAP mmol/L 3.0* 7.0 7.0     Estimated Creatinine Clearance: 114.7 mL/min (A) (by C-G formula based on SCr of 0.49 mg/dL (L)).          Results from last 7 days   Lab Units 04/24/23  0551 04/23/23  0629 04/22/23  0457 04/21/23  1050   WBC 10*3/mm3 14.34* 14.19* 5.62 5.20   HEMOGLOBIN g/dL 7.6* 8.8* 8.5* 9.2*   HEMATOCRIT % 24.7* 28.9* 27.3* 30.0*   PLATELETS 10*3/mm3 176 208 171 188           Culture Data:   Blood Culture   Date Value Ref Range Status   04/21/2023 No growth at 2 days  Preliminary   04/21/2023 No growth at 2 days  Preliminary     No results found for: URINECX  No results found for: RESPCX  No results found for: WOUNDCX  No results found for: STOOLCX  No components found for: BODYFLD    Radiology Data:   Imaging Results (Last 24 Hours)     Procedure Component Value Units Date/Time    XR Chest PA & Lateral [[id number]] Collected: 04/24/23 0711     Updated: 04/24/23 0730    Narrative:      FINDINGS:  Bibasilar consolidation.  The heart size is normal.   There is evidence of  emphysema.  Right-sided Port-A-Cath is in place.      Impression:      Right basilar consolidation.  Underlying pulmonary nodule cannot be excluded.            I have reviewed the patient's current medications.     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     Has received recent chemotherapy on 4/11/23; plan is to continue chemo as an outpatient and possible radiation     Appreciated oncologist assistance     Anemia of chronic disease     H&H trending down; might need PRBCs transfusion if it keeps trending down before discharge     Paroxysmal atrial fibrillation     On and off; no indicated for OACs now due to severe anemia; monitorp on CCBx    Chronic medical problems     Hx of PE       Medical Decision Making  Number and Complexity of problems: one major complex  Differential Diagnosis: PE    Conditions and Status:        Condition is improving.     [facility] Data  External documents reviewed: previous medical records  My EKG interpretation: n/a  My plain film interpretation: n/a  Tests considered but not ordered: none     Decision rules/scores evaluated (example [id number], Wells, etc): n/a     Discussed with: patient and his wife     Treatment Plan  Iv antibiotics and supportive therapy    Care Planning  Shared decision making: his wife  Code status and discussions: full code    Disposition  Social Determinants of Health that impact treatment or disposition: n/a  I expect the patient to be discharged home in 2 to  3 days      I confirmed that the patient's Advance Care Plan is present, code status is documented, or surrogate decision maker is listed in the patient's medical record.     Darin Perera MD

## 2023-04-24 NOTE — PLAN OF CARE
Problem: Adult Inpatient Plan of Care  Goal: Plan of Care Review  Outcome: Ongoing, Progressing  Flowsheets  Taken 4/24/2023 0258 by Hali Johnson RN  Progress: no change  Outcome Evaluation: pt vs stable, pt did go into Afib for a time but converted back to sinus without intervention, discharge planning in progess.  Taken 4/23/2023 1240 by Lisa José COTA  Plan of Care Reviewed With: patient  Goal: Patient-Specific Goal (Individualized)  Outcome: Ongoing, Progressing  Goal: Absence of Hospital-Acquired Illness or Injury  Outcome: Ongoing, Progressing  Intervention: Identify and Manage Fall Risk  Recent Flowsheet Documentation  Taken 4/24/2023 0200 by Hali Johnson RN  Safety Promotion/Fall Prevention:   safety round/check completed   nonskid shoes/slippers when out of bed  Taken 4/24/2023 0007 by Hali Johnson RN  Safety Promotion/Fall Prevention:   safety round/check completed   nonskid shoes/slippers when out of bed  Taken 4/23/2023 2232 by Hali Johnson RN  Safety Promotion/Fall Prevention:   safety round/check completed   nonskid shoes/slippers when out of bed  Taken 4/23/2023 2115 by Hali Johnson RN  Safety Promotion/Fall Prevention:   safety round/check completed   nonskid shoes/slippers when out of bed  Taken 4/23/2023 2017 by Hali Johnson RN  Safety Promotion/Fall Prevention:   safety round/check completed   nonskid shoes/slippers when out of bed  Taken 4/23/2023 1928 by Hali Johnson RN  Safety Promotion/Fall Prevention:   safety round/check completed   nonskid shoes/slippers when out of bed  Intervention: Prevent Skin Injury  Recent Flowsheet Documentation  Taken 4/24/2023 0200 by Hali Johnson RN  Body Position:   position changed independently   supine  Taken 4/24/2023 0007 by Hali Johnson RN  Body Position:   position changed independently   left   side-lying  Taken 4/23/2023 2232 by Hali Johnson RN  Body Position:   position  changed independently   sitting up in bed  Taken 4/23/2023 2017 by Hali Johnson RN  Body Position:   position changed independently   sitting up in bed  Intervention: Prevent and Manage VTE (Venous Thromboembolism) Risk  Recent Flowsheet Documentation  Taken 4/23/2023 2115 by Hali Johnson RN  Activity Management: activity encouraged  VTE Prevention/Management: (heparin) other (see comments)  Goal: Optimal Comfort and Wellbeing  Outcome: Ongoing, Progressing  Intervention: Provide Person-Centered Care  Recent Flowsheet Documentation  Taken 4/23/2023 2115 by Hali Johnson RN  Trust Relationship/Rapport:   care explained   questions answered   thoughts/feelings acknowledged  Goal: Readiness for Transition of Care  Outcome: Ongoing, Progressing   Goal Outcome Evaluation:           Progress: no change  Outcome Evaluation: pt vs stable, pt did go into Afib for a time but converted back to sinus without intervention, discharge planning in progess.

## 2023-04-24 NOTE — PROGRESS NOTES
HISTORY OF PRESENT ILLNESS:   Had episode of atrial fibrillation overnight.  Denies any worsening shortness of breath.  Had episode of hemoptysis earlier today.  Complains of minimal swelling of left lower extremity.  Complains of fatigue.      Past Medical History, Past Surgical History, Social History, Family History have been reviewed and are without significant changes except as mentioned.    Review of Systems   Constitutional: Positive for appetite change and fatigue.   HENT: Positive for hearing loss.    Respiratory: Positive for cough and shortness of breath.    Cardiovascular: Positive for leg swelling.   Musculoskeletal: Positive for arthralgias and back pain.   Allergic/Immunologic: Positive for immunocompromised state.   Neurological: Positive for numbness.      A comprehensive 14 point review of systems was performed and was negative except as mentioned.    Medications:  The current medication list was reviewed in the EMR    ALLERGIES:    Allergies   Allergen Reactions   • Amoxicillin Anaphylaxis   • Albuterol Irritability     Facial flushing and palpitations.   • Asmanex (120 Metered Doses) [Mometasone Furoate] Unknown - Low Severity   • Gabapentin Hallucinations   • Lortab [Hydrocodone-Acetaminophen] Other (See Comments)     Can't remember   • Morphine And Related Hallucinations   • Prilosec [Omeprazole] Other (See Comments)     unknown   • Sodium Clavulanate Unknown - High Severity   • Statins GI Intolerance   • Sulfa Antibiotics Other (See Comments)     Can't remember also more and can't remember   • Symbicort [Budesonide-Formoterol Fumarate] Unknown (See Comments)     Doesn't remember reaction type       Objective      Vitals:    04/24/23 0748 04/24/23 1019 04/24/23 1110 04/24/23 1123   BP: 135/62 133/60     BP Location: Right arm Right arm     Patient Position: Sitting Sitting     Pulse: 96 93 (P) 82 (P) 100   Resp: 18 18 (P) 18 (P) 18   Temp: 98.4 °F (36.9 °C)      TempSrc: Temporal       SpO2: 93% 93% (P) 95%    Weight:       Height:             4/11/2023    10:12 AM   Current Status   ECOG score 3       Physical Exam  Pulmonary:      Comments: Decreased air entry in right lower lobe lung field.  Rhonchi present.  No wheezing.  Musculoskeletal:      Comments: Trace left lower extremity edema present.  No edema of right lower extremity.   Neurological:      Mental Status: He is alert.           RECENT LABS:  Glucose   Date Value Ref Range Status   04/24/2023 154 (H) 65 - 99 mg/dL Final     Sodium   Date Value Ref Range Status   04/24/2023 139 136 - 145 mmol/L Final     Potassium   Date Value Ref Range Status   04/24/2023 4.2 3.5 - 5.2 mmol/L Final     CO2   Date Value Ref Range Status   04/24/2023 35.0 (H) 22.0 - 29.0 mmol/L Final     Chloride   Date Value Ref Range Status   04/24/2023 101 98 - 107 mmol/L Final     Anion Gap   Date Value Ref Range Status   04/24/2023 3.0 (L) 5.0 - 15.0 mmol/L Final     Creatinine   Date Value Ref Range Status   04/24/2023 0.49 (L) 0.76 - 1.27 mg/dL Final     BUN   Date Value Ref Range Status   04/24/2023 17 8 - 23 mg/dL Final     BUN/Creatinine Ratio   Date Value Ref Range Status   04/24/2023 34.7 (H) 7.0 - 25.0 Final     Calcium   Date Value Ref Range Status   04/24/2023 8.0 (L) 8.6 - 10.5 mg/dL Final     Alkaline Phosphatase   Date Value Ref Range Status   04/24/2023 87 39 - 117 U/L Final     Total Protein   Date Value Ref Range Status   04/24/2023 5.3 (L) 6.0 - 8.5 g/dL Final     ALT (SGPT)   Date Value Ref Range Status   04/24/2023 25 1 - 41 U/L Final     AST (SGOT)   Date Value Ref Range Status   04/24/2023 15 1 - 40 U/L Final     Total Bilirubin   Date Value Ref Range Status   04/24/2023 0.2 0.0 - 1.2 mg/dL Final     Albumin   Date Value Ref Range Status   04/24/2023 3.0 (L) 3.5 - 5.2 g/dL Final     Globulin   Date Value Ref Range Status   04/24/2023 2.3 gm/dL Final     Lab Results   Component Value Date    WBC 14.34 (H) 04/24/2023    HGB 7.6 (L) 04/24/2023     HCT 24.7 (L) 04/24/2023    MCV 95.0 04/24/2023     04/24/2023     Lab Results   Component Value Date    NEUTROABS 13.41 (H) 04/24/2023    IRON 36 (L) 04/10/2023    IRON 39 (L) 02/10/2023    IRON 37 (L) 01/02/2023    TIBC 346 04/10/2023    TIBC 331 02/10/2023    TIBC 286 (L) 01/02/2023    LABIRON 10 (L) 04/10/2023    LABIRON 12 (L) 02/10/2023    LABIRON 13 (L) 01/02/2023    FERRITIN 235.30 04/10/2023    FERRITIN 266.00 02/10/2023    ULIBHUYV29 1,010 (H) 04/10/2023    HUDYVMDQ20 370 02/10/2023    FOLATE >20.00 04/10/2023    FOLATE 9.81 02/10/2023     No results found for: , LABCA2, AFPTM, HCGQUANT, , CHROMGRNA, [id number], CEA, REFLABREPO      PATHOLOGY:  * Cannot find OR log *         RADIOLOGY DATA :  XR Chest 2 View    Result Date: 4/21/2023  1.  Pneumonia in the middle lobe and right lower lobe. 2.  Emphysema.    CT Angiogram Chest    Result Date: 4/21/2023  1.  No pulmonary embolism seen. 2.  No thoracic aortic dissection or aneurysm seen. 3.  Worsening airspace disease of the right lung suspicious for pneumonia.  An underlying right middle lobe lung mass persists but is difficult to clearly define due to the new changes.  This mass suspicious for malignancy can be further evaluated with bronchoscopy. 4.  Again seen is a lytic lesion of the left 11th rib measuring 5 x 3.3 cm associated with a large soft tissue component suspicious for metastatic disease. 5.  Severe upper lobe dominant emphysema.    XR Chest PA & Lateral    Result Date: 4/24/2023  Right basilar consolidation.  Underlying pulmonary nodule cannot be excluded.           Assessment & Plan     1.  Hemoptysis  - Secondary to pneumonia plus malignancy  - Patient's hemoglobin is decreased to 7.6 today.  - Discussed with patient if hemoptysis gets any worse may need radiation treatment to lung tumor.  - For now we will continue with clinical monitoring    2.  Iron deficiency anemia  - Patient received 1 dose of Injectafer as outpatient.   Plan is to proceed with second dose of Injectafer after hospital discharge  - Hemoglobin is 7.6.  - Recommend transfusing as needed if hemoglobin is less than 7.    3.  Adenocarcinoma of right lung with rib metastasis stage IV  - Patient received cycle 1 of carboplatin, Alimta and Keytruda on April 11, 2023.  We will continue with chemotherapy as outpatient    4.  Leukocytosis:  - Reactive due to steroid plus pneumonia plus malignancy  - White blood cell count is 14,000.  Will monitor with CBC    5.  History of pulmonary embolism  - CT angiogram done at the time of hospital admission is negative for any pulmonary embolism.  We will continue with clinical surveillance                   Omega Davis MD  4/24/2023  13:18 CDT        Part of this note may be an electronic transcription/translation of spoken language to printed text using the Dragon Dictation System.          CC:

## 2023-04-24 NOTE — THERAPY TREATMENT NOTE
Patient Name: Brian Garcia  : 1950    MRN: [id number]                              Today's Date: 2023       Admit Date: 2023    Visit Dx:     ICD-10-CM ICD-9-CM   1. Acute respiratory failure with hypoxia and hypercapnia  J96.01 518.81    J96.02    2. Pneumonia due to infectious organism, unspecified laterality, unspecified part of lung  J18.9 486   3. Impaired functional mobility, balance, gait, and endurance  Z74.09 V49.89   4. Impaired mobility and activities of daily living  Z74.09 V49.89    Z78.9      Patient Active Problem List   Diagnosis   • Physical deconditioning   • Pulmonary nodules   • Leukocytosis   • History of pulmonary embolism   • Stage 4 very severe COPD by GOLD classification   • Chronic respiratory failure with hypoxia, on home O2 therapy   • Hypertension   • CVA (cerebral vascular accident)   • Traumatic hemorrhage of cerebrum   • Iron deficiency anemia secondary to inadequate dietary iron intake   • Acute pain of right shoulder   • Limited range of motion (ROM) of shoulder   • Rotator cuff syndrome of right shoulder   • Impingement syndrome of right shoulder   • Arthrosis of right acromioclavicular joint   • Chronic right shoulder pain   • Nontraumatic incomplete tear of right rotator cuff   • Primary osteoarthritis of right shoulder   • Malignant neoplasm of overlapping sites of right lung   • Lung consolidation   • Primary lung adenocarcinoma   • Encounter for venous access device care   • Iron malabsorption   • Pneumonia   • Acute respiratory failure with hypoxia and hypercapnia     Past Medical History:   Diagnosis Date   • A-fib    • Anesthesia     hip surgery (spinal caused him unpleasant sensations never wants it again)   • Arthritis    • Blood in sputum 2023    bright red blood, teaspoon - tablespoon daily   • COPD (chronic obstructive pulmonary disease)    • CVA (cerebral vascular accident) 2022   • Hearing aid worn     bilateral   • History  of pulmonary embolism    • History of recurrent pneumonia    • Hypertension    • Lung cancer 03/17/2023   • Oxygen dependent     since @ age 65   • Pulmonary nodules    • Risk for falls     uses walker   • Rotator cuff syndrome of right shoulder    • Tachycardia    • 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 HTN/Eliquis   • Wears glasses     readers   • Wears glasses     reading     Past Surgical History:   Procedure Laterality Date   • HIP ARTHROPLASTY Right    • RIB BIOPSY Left 03/17/2023 11th   • VENOUS ACCESS DEVICE (PORT) INSERTION N/A 3/30/2023    Procedure: MEDIPORT PLACEMENT          (C-ARM);  Surgeon: Lavon Cochran MD;  Location: [facility];  Service: General;  Laterality: N/A;      General Information     Row Name 04/24/23 0815          OT Time and Intention    Document Type therapy note (daily note)  -KD     Mode of Treatment occupational therapy  -KD     Row Name 04/24/23 0815          General Information    Patient Profile Reviewed yes  -KD     Existing Precautions/Restrictions fall;oxygen therapy device and L/min  -KD     Row Name 04/24/23 0815          Cognition    Orientation Status (Cognition) oriented x 4  -KD     Row Name 04/24/23 0815          Safety Issues, Functional Mobility    Impairments Affecting Function (Mobility) balance;coordination;endurance/activity tolerance;strength  -KD           User Key  (r) = Recorded By, (t) = Taken By, (c) = Cosigned By    Initials Name Provider Type    KD Faby Orourke COTA Occupational Therapist Assistant                 Mobility/ADL's     Row Name 04/24/23 0815          Bed Mobility    Comment, (Bed Mobility) Pt sitting EOB upon arrival.  -KD     Row Name 04/24/23 0815          Bed-Chair Transfer    Bed-Chair [place] (Transfers) standby assist  -KD     Assistive Device (Bed-Chair Transfers) walker, front-wheeled  -KD     Row Name 04/24/23 0815          Sit-Stand Transfer    Sit-Stand [place] (Transfers) supervision  -KD      Assistive Device (Sit-Stand Transfers) walker, front-wheeled  -     Comment, (Sit-Stand Transfer) Pt performed 3 sit<>stands w/ RW and good tolerance.  -     Row Name 04/24/23 0815          Stand-Sit Transfer    Stand-Sit [place] (Transfers) standby assist  -     Assistive Device (Stand-Sit Transfers) walker, front-wheeled  -KD     Row Name 04/24/23 0815          Toilet Transfer    [place] Level (Toilet Transfer) standby assist  -     Assistive Device (Toilet Transfer) commode;grab bars/safety frame;walker, front-wheeled  -KD     Row Name 04/24/23 0815          Functional Mobility    Functional Mobility- Ind. Level supervision required  -     Functional Mobility- Device walker, front-wheeled  -     Functional Mobility-Distance (Feet) 340  -           User Key  (r) = Recorded By, (t) = Taken By, (c) = Cosigned By    Initials Name Provider Type     Faby Orourke COTA Occupational Therapist Assistant               Obj/Interventions     Row Name 04/24/23 0815          Shoulder (Therapeutic Exercise)    Shoulder (Therapeutic Exercise) AROM (active range of motion)  -     Shoulder AROM (Therapeutic Exercise) bilateral;flexion;extension;aBduction;aDduction;external rotation;internal rotation;horizontal aBduction/aDduction  -     Row Name 04/24/23 0815          Elbow/Forearm (Therapeutic Exercise)    Elbow/Forearm (Therapeutic Exercise) AROM (active range of motion)  -     Elbow/Forearm AROM (Therapeutic Exercise) bilateral;flexion;extension;supination;pronation  -     Row Name 04/24/23 0815          Wrist (Therapeutic Exercise)    Wrist (Therapeutic Exercise) AROM (active range of motion)  -     Wrist AROM (Therapeutic Exercise) bilateral;flexion;extension  -     Row Name 04/24/23 0815          Hand (Therapeutic Exercise)    Hand (Therapeutic Exercise) AROM (active range of motion)  -     Hand AROM/AAROM (Therapeutic Exercise) bilateral;finger flexion;finger extension  -[facility]  Name 04/24/23 0815          Motor Skills    Therapeutic Exercise shoulder;elbow/forearm;wrist  -KD           User Key  (r) = Recorded By, (t) = Taken By, (c) = Cosigned By    Initials Name Provider Type    Faby Judd COTA Occupational Therapist Assistant               Goals/Plan     Row Name 04/24/23 0815          Transfer Goal 1 (OT)    Activity/Assistive Device (Transfer Goal 1, OT) transfers, all  -KD     [place] Level/Cues Needed (Transfer Goal 1, OT) modified independence  -KD     Time Frame (Transfer Goal 1, OT) long term goal (LTG)  -KD     Progress/Outcome (Transfer Goal 1, OT) goal not met  -KD     Row Name 04/24/23 0815          Bathing Goal 1 (OT)    Activity/Device (Bathing Goal 1, OT) bathing skills, all  -KD     [place] Level/Cues Needed (Bathing Goal 1, OT) modified independence  -KD     Time Frame (Bathing Goal 1, OT) long term goal (LTG)  -KD     Progress/Outcomes (Bathing Goal 1, OT) goal not met  -KD     Row Name 04/24/23 0815          Dressing Goal 1 (OT)    Activity/Device (Dressing Goal 1, OT) dressing skills, all  -KD     [place]/Cues Needed (Dressing Goal 1, OT) modified independence  -KD     Time Frame (Dressing Goal 1, OT) long term goal (LTG)  -KD     Progress/Outcome (Dressing Goal 1, OT) goal not met  -KD     Row Name 04/24/23 0815          Toileting Goal 1 (OT)    Activity/Device (Toileting Goal 1, OT) toileting skills, all  -KD     [place] Level/Cues Needed (Toileting Goal 1, OT) modified independence  -KD     Time Frame (Toileting Goal 1, OT) long term goal (LTG)  -KD     Progress/Outcome (Toileting Goal 1, OT) goal not met  -KD           User Key  (r) = Recorded By, (t) = Taken By, (c) = Cosigned By    Initials Name Provider Type    Faby Judd COTA Occupational Therapist Assistant               Clinical Impression     Row Name 04/24/23 0815          Pain Assessment    Pretreatment Pain Rating 0/10 - no pain  -KD     Posttreatment Pain Rating 0/10  - no pain  -KD     Row Name 04/24/23 0815          Plan of Care Review    Plan of Care Reviewed With patient  -KD     Progress improving  -KD     Outcome Evaluation Pt S/O in room upon entry along w/ nsg. Signee assisted Nsg w/ t/f of S/O from floor to EOB. Pt sitting EOB upon entry. Sit>stand- SBA. Toilet t/f- SBA. [employer]-Ind. Fxl mobility ~ 340' SBA w/ RW. Pt required 4 standing RB's 2' 02. Sats remained 89% and greater w/ mobility. Pt performed 3 sit<>stands w/ RW and good tolerance. Pt completed BUE ther ex in all planes w/ 2lb HW. Discussed w/ pt AE and DME. Pt in recliner upon exit w/ all needs in reach. Cont OT POC.  -KD     Row Name 04/24/23 0815          Therapy Assessment/Plan (OT)    Therapy Frequency (OT) other (see comments)  5-7 days a week  -KD     Row Name 04/24/23 0815          Therapy Plan Review/Discharge Plan (OT)    Anticipated Discharge Disposition (OT) home with 24/7 care;home with assist;home with home health  -KD     Row Name 04/24/23 0815          Vital Signs    Pre Systolic BP Rehab 133  -KD     Pre Treatment Diastolic BP 63  -KD     Post Systolic BP Rehab 132  -KD     Post Treatment Diastolic BP 60  -KD     Pretreatment Heart Rate (beats/min) 100  -KD     Posttreatment Heart Rate (beats/min) 94  -KD     Pre SpO2 (%) 96  -KD     O2 Delivery Pre Treatment supplemental O2  -KD     Post SpO2 (%) 97  -KD     O2 Delivery Post Treatment supplemental O2  -KD     Pre Patient Position Sitting  -KD     Intra Patient Position Standing  -KD     Post Patient Position Sitting  -KD     Row Name 04/24/23 0815          Positioning and Restraints    Pre-Treatment Position in bed  -KD     Post Treatment Position chair  -KD     In Chair sitting;call light within reach;encouraged to call for assist;exit alarm on  -KD           User Key  (r) = Recorded By, (t) = Taken By, (c) = Cosigned By    Initials Name Provider Type    KD Faby Orourke COTA Occupational Therapist Assistant               Outcome  Measures     Row Name 04/24/23 0815          How much help from another is currently needed...    Putting on and taking off regular lower body clothing? 3  -KD     Bathing (including washing, rinsing, and drying) 2  -KD     Toileting (which includes using toilet bed pan or urinal) 3  -KD     Putting on and taking off regular upper body clothing 4  -KD     Taking care of personal grooming (such as brushing teeth) 4  -KD     Eating meals 4  -KD     AM-PAC 6 Clicks Score (OT) 20  -KD     Row Name 04/24/23 0754          How much help from another person do you currently need...    Turning from your back to your side while in flat bed without using bedrails? 4  -AE     Moving from lying on back to sitting on the side of a flat bed without bedrails? 4  -AE     Moving to and from a bed to a chair (including a wheelchair)? 3  -AE     Standing up from a chair using your arms (e.g., wheelchair, bedside chair)? 3  -AE     Climbing 3-5 steps with a railing? 2  -AE     To walk in hospital room? 3  -AE     AM-PAC 6 Clicks Score (PT) 19  -AE     Highest level of mobility 6 --> Walked 10 steps or more  -AE           User Key  (r) = Recorded By, (t) = Taken By, (c) = Cosigned By    Initials Name Provider Type    Faby Judd COTA Occupational Therapist Assistant    Agatha Carlos, RN Registered Nurse                Occupational Therapy Education     Title: PT OT SLP Therapies (In Progress)     Topic: Occupational Therapy (In Progress)     Point: ADL training (Done)     Description:   Instruct learner(s) on proper safety adaptation and remediation techniques during self care or transfers.   Instruct in proper use of assistive devices.              Learning Progress Summary           Patient Acceptance, E,TB, VU by BB at 4/23/2023 6200                   Point: Home exercise program (Not Started)     Description:   Instruct learner(s) on appropriate technique for monitoring, assisting and/or progressing therapeutic  exercises/activities.              Learner Progress:  Not documented in this visit.          Point: Precautions (Done)     Description:   Instruct learner(s) on prescribed precautions during self-care and functional transfers.              Learning Progress Summary           Patient Acceptance, E, VU by RB at 4/22/2023 1254    Comment: Edu pt on use of gait belt and non skid socks when OOB and no OOB without assist.                   Point: Body mechanics (Done)     Description:   Instruct learner(s) on proper positioning and spine alignment during self-care, functional mobility activities and/or exercises.              Learning Progress Summary           Patient Acceptance, E,TB, VU by BB at 4/23/2023 1454                               User Key     Initials Effective Dates Name Provider Type Discipline    RB 06/16/21 -  Abhi Chang, YOVANA Occupational Therapist OT    MARCELL 06/16/21 -  Lisa José COTA Occupational Therapist Assistant OT              OT Recommendation and Plan  Therapy Frequency (OT): other (see comments) (5-7 days a week)  Plan of Care Review  Plan of Care Reviewed With: patient  Progress: improving  Outcome Evaluation: Pt S/O in room upon entry along w/ nsg. Signee assisted Nsg w/ t/f of S/O from floor to EOB. Pt sitting EOB upon entry. Sit>stand- SBA. Toilet t/f- SBA. [employer]-Ind. Fxl mobility ~ 340' SBA w/ RW. Pt required 4 standing RB's 2' 02. Sats remained 89% and greater w/ mobility. Pt performed 3 sit<>stands w/ RW and good tolerance. Pt completed BUE ther ex in all planes w/ 2lb HW. Discussed w/ pt AE and DME. Pt in recliner upon exit w/ all needs in reach. Cont OT POC.     Time Calculation:    Time Calculation- OT     Row Name 04/24/23 0815             Time Calculation- OT    OT Start Time 0815  -KD      OT Stop Time 0908  -KD      OT Time Calculation (min) 53 min  -KD      Total Timed Code Minutes- OT 53 minute(s)  -KD      OT Received On 04/24/23  -KD         Timed Charges    01932  - OT Therapeutic Exercise Minutes 24  -KD      18302 - OT Self Care/Mgmt Minutes 29  -KD         Total Minutes    Timed Charges Total Minutes 53  -KD       Total Minutes 53  -KD            User Key  (r) = Recorded By, (t) = Taken By, (c) = Cosigned By    Initials Name Provider Type    Faby Judd COTA Occupational Therapist Assistant              Therapy Charges for Today     Code Description Service Date Service Provider Modifiers Qty    94681156453 HC OT THER PROC EA 15 MIN 4/24/2023 Faby Orourke COTA GO 2    86212565710 HC OT SELF CARE/MGMT/TRAIN EA 15 MIN 4/24/2023 Faby Orourke COTA GO 2               ALEXANDER Valles  4/24/2023

## 2023-04-24 NOTE — PLAN OF CARE
Goal Outcome Evaluation:              Outcome Evaluation: Pt had a BM today. Hgb 7.6, MD aware. K+ 4.2. Pt reports no pain at this time. Will continue to monitor.

## 2023-04-24 NOTE — PROGRESS NOTES
"Pt with Afib and Hr now in 130's. Will give cardizem early and perform EKG.  Patient Vitals for the past 24 hrs:   BP Temp Temp src Pulse Resp SpO2 Height Weight   04/23/23 2218 129/73 -- -- (!) 134 20 95 % -- --   04/23/23 1917 140/63 98.1 °F (36.7 °C) Temporal 105 16 95 % -- --   04/23/23 1905 -- -- -- 103 16 98 % -- --   04/23/23 1650 -- -- -- 109 -- -- -- --   04/23/23 1645 -- -- -- (!) 148 -- -- -- --   04/23/23 1631 -- -- -- 100 -- -- -- --   04/23/23 1537 131/60 97.2 °F (36.2 °C) -- 106 18 96 % -- --   04/23/23 1446 -- -- -- 94 18 -- -- --   04/23/23 1435 -- -- -- 97 18 98 % -- --   04/23/23 1146 111/52 96.6 °F (35.9 °C) Temporal 106 18 98 % 165.1 cm (65\") 59.2 kg (130 lb 9.6 oz)   04/23/23 1120 -- -- -- 103 18 97 % -- --   04/23/23 1110 -- -- -- 107 -- -- -- --   04/23/23 1108 120/56 96.1 °F (35.6 °C) Oral (!) 126 18 98 % -- --   04/23/23 0836 140/59 98 °F (36.7 °C) Tympanic 102 18 95 % -- --   04/23/23 0718 -- -- -- 96 16 -- -- --   04/23/23 0708 -- -- -- 97 16 95 % -- --   04/23/23 0707 -- -- -- 90 -- -- -- --   04/23/23 0500 -- -- -- -- -- -- -- 59.9 kg (132 lb)   04/23/23 0345 150/68 98.5 °F (36.9 °C) Temporal 98 16 96 % -- --   04/23/23 0339 -- -- -- 107 -- 94 % -- --   04/23/23 0049 -- -- -- 96 -- -- -- --   04/23/23 0047 -- -- -- 97 -- 99 % -- --   04/22/23 2240 -- -- -- 111 -- 97 % -- --        Addendum 4/23/2023 10:43 PM  EKG shows atrial fibrillation with RVR.  We will give patient Lopressor 5 mg IV x1 then start Lopressor 25 mg p.o. every 12 first dose now.  If this is unsuccessful, may have to transfer patient to stepdown unit for Cardizem gtt.  "

## 2023-04-25 ENCOUNTER — READMISSION MANAGEMENT (OUTPATIENT)
Dept: CALL CENTER | Facility: HOSPITAL | Age: 73
End: 2023-04-25
Payer: OTHER GOVERNMENT

## 2023-04-25 VITALS
TEMPERATURE: 97.6 F | WEIGHT: 134.8 LBS | HEART RATE: 94 BPM | DIASTOLIC BLOOD PRESSURE: 56 MMHG | RESPIRATION RATE: 18 BRPM | SYSTOLIC BLOOD PRESSURE: 120 MMHG | OXYGEN SATURATION: 95 % | HEIGHT: 65 IN | BODY MASS INDEX: 22.46 KG/M2

## 2023-04-25 LAB
ALBUMIN SERPL-MCNC: 3 G/DL (ref 3.5–5.2)
ALBUMIN/GLOB SERPL: 1.4 G/DL
ALP SERPL-CCNC: 74 U/L (ref 39–117)
ALT SERPL W P-5'-P-CCNC: 38 U/L (ref 1–41)
ANION GAP SERPL CALCULATED.3IONS-SCNC: 6 MMOL/L (ref 5–15)
AST SERPL-CCNC: 19 U/L (ref 1–40)
BASOPHILS # BLD AUTO: 0.01 10*3/MM3 (ref 0–0.2)
BASOPHILS NFR BLD AUTO: 0.1 % (ref 0–1.5)
BILIRUB SERPL-MCNC: 0.2 MG/DL (ref 0–1.2)
BUN SERPL-MCNC: 20 MG/DL (ref 8–23)
BUN/CREAT SERPL: 47.6 (ref 7–25)
CALCIUM SPEC-SCNC: 8.1 MG/DL (ref 8.6–10.5)
CHLORIDE SERPL-SCNC: 102 MMOL/L (ref 98–107)
CO2 SERPL-SCNC: 33 MMOL/L (ref 22–29)
CREAT SERPL-MCNC: 0.42 MG/DL (ref 0.76–1.27)
DEPRECATED RDW RBC AUTO: 52.2 FL (ref 37–54)
EGFRCR SERPLBLD CKD-EPI 2021: 113.5 ML/MIN/1.73
EOSINOPHIL # BLD AUTO: 0 10*3/MM3 (ref 0–0.4)
EOSINOPHIL NFR BLD AUTO: 0 % (ref 0.3–6.2)
ERYTHROCYTE [DISTWIDTH] IN BLOOD BY AUTOMATED COUNT: 17 % (ref 12.3–15.4)
GLOBULIN UR ELPH-MCNC: 2.2 GM/DL
GLUCOSE SERPL-MCNC: 156 MG/DL (ref 65–99)
HCT VFR BLD AUTO: 25.6 % (ref 37.5–51)
HGB BLD-MCNC: 7.8 G/DL (ref 13–17.7)
IMM GRANULOCYTES # BLD AUTO: 0.11 10*3/MM3 (ref 0–0.05)
IMM GRANULOCYTES NFR BLD AUTO: 0.9 % (ref 0–0.5)
LYMPHOCYTES # BLD AUTO: 0.28 10*3/MM3 (ref 0.7–3.1)
LYMPHOCYTES NFR BLD AUTO: 2.3 % (ref 19.6–45.3)
MCH RBC QN AUTO: 29.2 PG (ref 26.6–33)
MCHC RBC AUTO-ENTMCNC: 30.5 G/DL (ref 31.5–35.7)
MCV RBC AUTO: 95.9 FL (ref 79–97)
MONOCYTES # BLD AUTO: 0.41 10*3/MM3 (ref 0.1–0.9)
MONOCYTES NFR BLD AUTO: 3.4 % (ref 5–12)
NEUTROPHILS NFR BLD AUTO: 11.23 10*3/MM3 (ref 1.7–7)
NEUTROPHILS NFR BLD AUTO: 93.3 % (ref 42.7–76)
NRBC BLD AUTO-RTO: 0.2 /100 WBC (ref 0–0.2)
PLATELET # BLD AUTO: 184 10*3/MM3 (ref 140–450)
PMV BLD AUTO: 9.9 FL (ref 6–12)
POTASSIUM SERPL-SCNC: 4.1 MMOL/L (ref 3.5–5.2)
PROT SERPL-MCNC: 5.2 G/DL (ref 6–8.5)
QT INTERVAL: 304 MS
QTC INTERVAL: 443 MS
RBC # BLD AUTO: 2.67 10*6/MM3 (ref 4.14–5.8)
SODIUM SERPL-SCNC: 141 MMOL/L (ref 136–145)
WBC NRBC COR # BLD: 12.04 10*3/MM3 (ref 3.4–10.8)

## 2023-04-25 PROCEDURE — 97110 THERAPEUTIC EXERCISES: CPT

## 2023-04-25 PROCEDURE — 94760 N-INVAS EAR/PLS OXIMETRY 1: CPT

## 2023-04-25 PROCEDURE — 80053 COMPREHEN METABOLIC PANEL: CPT | Performed by: HOSPITALIST

## 2023-04-25 PROCEDURE — 94799 UNLISTED PULMONARY SVC/PX: CPT

## 2023-04-25 PROCEDURE — 97535 SELF CARE MNGMENT TRAINING: CPT

## 2023-04-25 PROCEDURE — 85025 COMPLETE CBC W/AUTO DIFF WBC: CPT | Performed by: HOSPITALIST

## 2023-04-25 PROCEDURE — 25010000002 METHYLPREDNISOLONE PER 125 MG: Performed by: HOSPITALIST

## 2023-04-25 PROCEDURE — 94660 CPAP INITIATION&MGMT: CPT

## 2023-04-25 RX ORDER — LEVOFLOXACIN 750 MG/1
750 TABLET ORAL DAILY
Qty: 3 TABLET | Refills: 0 | Status: SHIPPED | OUTPATIENT
Start: 2023-04-25

## 2023-04-25 RX ORDER — DILTIAZEM HYDROCHLORIDE 240 MG/1
240 CAPSULE, COATED, EXTENDED RELEASE ORAL
Qty: 5 CAPSULE | Refills: 0 | Status: SHIPPED | OUTPATIENT
Start: 2023-04-25

## 2023-04-25 RX ORDER — DILTIAZEM HYDROCHLORIDE 240 MG/1
240 CAPSULE, COATED, EXTENDED RELEASE ORAL
Qty: 30 CAPSULE | Refills: 0 | Status: SHIPPED | OUTPATIENT
Start: 2023-04-25 | End: 2023-04-25 | Stop reason: SDUPTHER

## 2023-04-25 RX ORDER — GUAIFENESIN 600 MG/1
600 TABLET, EXTENDED RELEASE ORAL EVERY 12 HOURS SCHEDULED
Qty: 30 TABLET | Refills: 0 | Status: SHIPPED | OUTPATIENT
Start: 2023-04-25

## 2023-04-25 RX ADMIN — DILTIAZEM HYDROCHLORIDE 240 MG: 120 CAPSULE, COATED, EXTENDED RELEASE ORAL at 08:51

## 2023-04-25 RX ADMIN — GUAIFENESIN 600 MG: 600 TABLET, EXTENDED RELEASE ORAL at 08:51

## 2023-04-25 RX ADMIN — FOLIC ACID 1 MG: 1 TABLET ORAL at 08:51

## 2023-04-25 RX ADMIN — IPRATROPIUM BROMIDE AND ALBUTEROL SULFATE 3 ML: .5; 3 SOLUTION RESPIRATORY (INHALATION) at 07:13

## 2023-04-25 RX ADMIN — Medication 10 ML: at 08:51

## 2023-04-25 RX ADMIN — METHYLPREDNISOLONE SODIUM SUCCINATE 80 MG: 125 INJECTION, POWDER, FOR SOLUTION INTRAMUSCULAR; INTRAVENOUS at 08:50

## 2023-04-25 RX ADMIN — METHYLPREDNISOLONE SODIUM SUCCINATE 80 MG: 125 INJECTION, POWDER, FOR SOLUTION INTRAMUSCULAR; INTRAVENOUS at 00:17

## 2023-04-25 NOTE — THERAPY TREATMENT NOTE
Patient Name: Brian Garcia  : 1950    MRN: [id number]                              Today's Date: 2023       Admit Date: 2023    Visit Dx:     ICD-10-CM ICD-9-CM   1. Acute respiratory failure with hypoxia and hypercapnia  J96.01 518.81    J96.02    2. Pneumonia due to infectious organism, unspecified laterality, unspecified part of lung  J18.9 486   3. Impaired functional mobility, balance, gait, and endurance  Z74.09 V49.89   4. Impaired mobility and activities of daily living  Z74.09 V49.89    Z78.9      Patient Active Problem List   Diagnosis   • Physical deconditioning   • Pulmonary nodules   • Leukocytosis   • History of pulmonary embolism   • Stage 4 very severe COPD by GOLD classification   • Chronic respiratory failure with hypoxia, on home O2 therapy   • Hypertension   • CVA (cerebral vascular accident)   • Traumatic hemorrhage of cerebrum   • Iron deficiency anemia secondary to inadequate dietary iron intake   • Acute pain of right shoulder   • Limited range of motion (ROM) of shoulder   • Rotator cuff syndrome of right shoulder   • Impingement syndrome of right shoulder   • Arthrosis of right acromioclavicular joint   • Chronic right shoulder pain   • Nontraumatic incomplete tear of right rotator cuff   • Primary osteoarthritis of right shoulder   • Malignant neoplasm of overlapping sites of right lung   • Lung consolidation   • Primary lung adenocarcinoma   • Encounter for venous access device care   • Iron malabsorption   • Pneumonia   • Acute respiratory failure with hypoxia and hypercapnia     Past Medical History:   Diagnosis Date   • A-fib    • Anesthesia     hip surgery (spinal caused him unpleasant sensations never wants it again)   • Arthritis    • Blood in sputum 2023    bright red blood, teaspoon - tablespoon daily   • COPD (chronic obstructive pulmonary disease)    • CVA (cerebral vascular accident) 2022   • Hearing aid worn     bilateral   • History  of pulmonary embolism    • History of recurrent pneumonia    • Hypertension    • Lung cancer 03/17/2023   • Oxygen dependent     since @ age 65   • Pulmonary nodules    • Risk for falls     uses walker   • Rotator cuff syndrome of right shoulder    • Tachycardia    • 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 HTN/Eliquis   • Wears glasses     readers   • Wears glasses     reading     Past Surgical History:   Procedure Laterality Date   • HIP ARTHROPLASTY Right    • RIB BIOPSY Left 03/17/2023 11th   • VENOUS ACCESS DEVICE (PORT) INSERTION N/A 3/30/2023    Procedure: MEDIPORT PLACEMENT          (C-ARM);  Surgeon: Lavon Cochran MD;  Location: [facility];  Service: General;  Laterality: N/A;      General Information     Row Name 04/25/23 0850          OT Time and Intention    Document Type therapy note (daily note)  -KD     Mode of Treatment occupational therapy  -KD     Row Name 04/25/23 0850          General Information    Patient Profile Reviewed yes  -KD     Existing Precautions/Restrictions fall;oxygen therapy device and L/min  -KD     Row Name 04/25/23 0850          Cognition    Orientation Status (Cognition) oriented x 4  -KD     Row Name 04/25/23 0850          Safety Issues, Functional Mobility    Impairments Affecting Function (Mobility) balance;coordination;endurance/activity tolerance;strength  -KD           User Key  (r) = Recorded By, (t) = Taken By, (c) = Cosigned By    Initials Name Provider Type    KD Faby Orourke COTA Occupational Therapist Assistant                 Mobility/ADL's     Row Name 04/25/23 0850          Bed Mobility    Supine-Sit [place] (Bed Mobility) standby assist  -KD     Assistive Device (Bed Mobility) bed rails;head of bed elevated  -KD     Row Name 04/25/23 0850          Bed-Chair Transfer    Bed-Chair [place] (Transfers) standby assist  -KD     Assistive Device (Bed-Chair Transfers) walker, front-wheeled  -KD     Row Name 04/25/23 0850           Sit-Stand Transfer    Sit-Stand [place] (Transfers) supervision  -     Assistive Device (Sit-Stand Transfers) walker, front-wheeled  -KD     Row Name 04/25/23 0850          Stand-Sit Transfer    Stand-Sit [place] (Transfers) standby assist  -KD     Assistive Device (Stand-Sit Transfers) walker, front-wheeled  -KD     Row Name 04/25/23 0850          Functional Mobility    Functional Mobility- Ind. Level supervision required  -KD     Functional Mobility- Device walker, front-wheeled  -KD     Functional Mobility-Distance (Feet) 415  -KD     Row Name 04/25/23 0850          Activities of Daily Living    BADL Assessment/Intervention upper body dressing;lower body dressing  -KD     Row Name 04/25/23 0850          Grooming Assessment/Training    [place] Level (Grooming) grooming skills;wash face, hands;set up  -KD     Position (Grooming) sink side  -KD     Row Name 04/25/23 0850          Upper Body Dressing Assessment/Training    [place] Level (Upper Body Dressing) upper body dressing skills;doff;don;pull-over garment;set up  -KD     Position (Upper Body Dressing) edge of bed sitting  -KD     Row Name 04/25/23 0850          Lower Body Dressing Assessment/Training    [place] Level (Lower Body Dressing) lower body dressing skills;doff;don;socks;set up  -KD     Position (Lower Body Dressing) edge of bed sitting  -KD           User Key  (r) = Recorded By, (t) = Taken By, (c) = Cosigned By    Initials Name Provider Type     Faby Orourke COTA Occupational Therapist Assistant               Obj/Interventions    No documentation.                Goals/Plan     Row Name 04/25/23 0850          Transfer Goal 1 (OT)    Activity/Assistive Device (Transfer Goal 1, OT) transfers, all  -KD     [place] Level/Cues Needed (Transfer Goal 1, OT) modified independence  -KD     Time Frame (Transfer Goal 1, OT) long term goal (LTG)  -KD     Progress/Outcome (Transfer Goal 1, OT) goal not met  -KD     Row Name  04/25/23 0850          Bathing Goal 1 (OT)    Activity/Device (Bathing Goal 1, OT) bathing skills, all  -KD     [place] Level/Cues Needed (Bathing Goal 1, OT) modified independence  -KD     Time Frame (Bathing Goal 1, OT) long term goal (LTG)  -KD     Progress/Outcomes (Bathing Goal 1, OT) goal not met  -KD     Row Name 04/25/23 0850          Dressing Goal 1 (OT)    Activity/Device (Dressing Goal 1, OT) dressing skills, all  -KD     [place]/Cues Needed (Dressing Goal 1, OT) modified independence  -KD     Time Frame (Dressing Goal 1, OT) long term goal (LTG)  -KD     Progress/Outcome (Dressing Goal 1, OT) goal met  -KD     Row Name 04/25/23 0850          Toileting Goal 1 (OT)    Activity/Device (Toileting Goal 1, OT) toileting skills, all  -KD     [place] Level/Cues Needed (Toileting Goal 1, OT) modified independence  -KD     Time Frame (Toileting Goal 1, OT) long term goal (LTG)  -KD     Progress/Outcome (Toileting Goal 1, OT) goal not met  -KD           User Key  (r) = Recorded By, (t) = Taken By, (c) = Cosigned By    Initials Name Provider Type    KD Faby Orourke COTA Occupational Therapist Assistant               Clinical Impression     Row Name 04/25/23 0850          Pain Assessment    Pretreatment Pain Rating 0/10 - no pain  -KD     Posttreatment Pain Rating 0/10 - no pain  -KD     Row Name 04/25/23 0850          Therapy Assessment/Plan (OT)    Therapy Frequency (OT) other (see comments)  5-7 d/wk  -KD     Row Name 04/25/23 0850          Therapy Plan Review/Discharge Plan (OT)    Anticipated Discharge Disposition (OT) home with 24/7 care;home with assist;home with home health  -KD     Row Name 04/25/23 0850          Vital Signs    Pre Systolic BP Rehab 106  -KD     Pre Treatment Diastolic BP 56  -KD     Pretreatment Heart Rate (beats/min) 88  -KD     Pre SpO2 (%) 98  -KD     O2 Delivery Pre Treatment supplemental O2  -KD     Pre Patient Position Supine  -KD     Intra Patient Position Standing   -KD     Post Patient Position Sitting  -KD     Row Name 04/25/23 0850          Positioning and Restraints    Pre-Treatment Position in bed  -KD     Post Treatment Position bed  -KD     In Chair sitting;call light within reach;encouraged to call for assist;exit alarm on;with family/caregiver  -KD           User Key  (r) = Recorded By, (t) = Taken By, (c) = Cosigned By    Initials Name Provider Type    Faby Judd COTA Occupational Therapist Assistant               Outcome Measures     Row Name 04/25/23 0850          How much help from another is currently needed...    Putting on and taking off regular lower body clothing? 4  -KD     Bathing (including washing, rinsing, and drying) 4  -KD     Toileting (which includes using toilet bed pan or urinal) 4  -KD     Putting on and taking off regular upper body clothing 4  -KD     Taking care of personal grooming (such as brushing teeth) 4  -KD     Eating meals 4  -KD     AM-PAC 6 Clicks Score (OT) 24  -KD     Row Name 04/25/23 0731          How much help from another person do you currently need...    Turning from your back to your side while in flat bed without using bedrails? 4  -LB     Moving from lying on back to sitting on the side of a flat bed without bedrails? 4  -LB     Moving to and from a bed to a chair (including a wheelchair)? 3  -LB     Standing up from a chair using your arms (e.g., wheelchair, bedside chair)? 3  -LB     Climbing 3-5 steps with a railing? 2  -LB     To walk in hospital room? 3  -LB     AM-PAC 6 Clicks Score (PT) 19  -LB     Highest level of mobility 6 --> Walked 10 steps or more  -LB           User Key  (r) = Recorded By, (t) = Taken By, (c) = Cosigned By    Initials Name Provider Type    Faby Judd COTA Occupational Therapist Assistant    Rowena Angel, RN Registered Nurse                Occupational Therapy Education     Title: PT OT SLP Therapies (Resolved)     Topic: Occupational Therapy (Resolved)     Point: ADL  training (Resolved)     Description:   Instruct learner(s) on proper safety adaptation and remediation techniques during self care or transfers.   Instruct in proper use of assistive devices.              Learning Progress Summary           Patient Acceptance, TB, VU by LB at 4/25/2023 0949    Acceptance, E,TB, VU by BB at 4/23/2023 1454                   Point: Home exercise program (Resolved)     Description:   Instruct learner(s) on appropriate technique for monitoring, assisting and/or progressing therapeutic exercises/activities.              Learning Progress Summary           Patient Acceptance, TB, VU by LB at 4/25/2023 0949                   Point: Precautions (Resolved)     Description:   Instruct learner(s) on prescribed precautions during self-care and functional transfers.              Learning Progress Summary           Patient Acceptance, TB, VU by LB at 4/25/2023 0949    Acceptance, E, VU by RB at 4/22/2023 1254    Comment: Edu pt on use of gait belt and non skid socks when OOB and no OOB without assist.                   Point: Body mechanics (Resolved)     Description:   Instruct learner(s) on proper positioning and spine alignment during self-care, functional mobility activities and/or exercises.              Learning Progress Summary           Patient Acceptance, TB, VU by LB at 4/25/2023 0949    Acceptance, E,TB, VU by BB at 4/23/2023 1454                               User Key     Initials Effective Dates Name Provider Type Discipline     06/16/21 -  Abhi Chang OT Occupational Therapist OT    BB 06/16/21 -  Lisa José COTA Occupational Therapist Assistant OT    LB 06/16/21 -  Rowena Mann, RN Registered Nurse Nurse              OT Recommendation and Plan  Therapy Frequency (OT): other (see comments) (5-7 d/wk)  Plan of Care Review  Plan of Care Reviewed With: patient  Progress: improving  Outcome Evaluation: Pt S/O in room upon entry along w/ nsg. Signee assisted Nsg w/ t/f of  S/O from floor to EOB. Pt sitting EOB upon entry. Sit>stand- SBA. Toilet t/f- SBA. [employer]. Fxl mobility ~ 340' SBA w/ RW. Pt required 4 standing RB's 2' 02. Sats remained 89% and greater w/ mobility. Pt performed 3 sit<>stands w/ RW and good tolerance. Pt completed BUE ther ex in all planes w/ 2lb HW. Discussed w/ pt AE and DME. Pt in recliner upon exit w/ all needs in reach. Cont OT POC.     Time Calculation:    Time Calculation- OT     Row Name 04/25/23 0850             Time Calculation- OT    OT Start Time 0850  -KD      OT Stop Time 0928  -KD      OT Time Calculation (min) 38 min  -KD      Total Timed Code Minutes- OT 38 minute(s)  -KD      OT Received On 04/25/23  -KD         Timed Charges    57432 - OT Therapeutic Exercise Minutes 15  -KD      27608 - OT Self Care/Mgmt Minutes 13  -KD         Total Minutes    Timed Charges Total Minutes 28  -KD       Total Minutes 28  -KD            User Key  (r) = Recorded By, (t) = Taken By, (c) = Cosigned By    Initials Name Provider Type    Faby Judd COTA Occupational Therapist Assistant              Therapy Charges for Today     Code Description Service Date Service Provider Modifiers Qty    92583331866 HC OT THER PROC EA 15 MIN 4/24/2023 Faby Orourke COTA GO 2    42196777790 HC OT SELF CARE/MGMT/TRAIN EA 15 MIN 4/24/2023 Faby Orourke COTA GO 2    74556863135 HC OT THER PROC EA 15 MIN 4/24/2023 Faby Orourke COTA GO 2    02460566623 HC OT THER PROC EA 15 MIN 4/25/2023 Faby Orourke COTA GO 1    46365207591 HC OT SELF CARE/MGMT/TRAIN EA 15 MIN 4/25/2023 Faby Orourke COTA GO 2               ALEXANDER Valles  4/25/2023

## 2023-04-25 NOTE — DISCHARGE SUMMARY
[facility] Medicine Services    DISCHARGE SUMMARY       Date of Admission: 4/21/2023  Date of Discharge:  4/25/2023  Primary Care Physician: Marina Kitchen MD    Presenting Problem/History of Present Illness:  Pneumonia [J18.9]  Acute respiratory failure with hypoxia and hypercapnia [J96.01, J96.02]  Pneumonia due to infectious organism, unspecified laterality, unspecified part of lung [J18.9]       Final Discharge Diagnoses:  Active Hospital Problems    Diagnosis    • **Pneumonia    • Acute respiratory failure with hypoxia and hypercapnia        Consults:   Consults     Date and Time Order Name Status Description    4/22/2023 11:00 AM Hematology & Oncology Inpatient Consult Completed           Procedures Performed:                 Pertinent Test Results:   Imaging Results (All)     Procedure Component Value Units Date/Time    XR Chest PA & Lateral [[id number]] Collected: 04/24/23 0711     Updated: 04/24/23 0730    Narrative:      FINDINGS:  Bibasilar consolidation.  The heart size is normal.  There is evidence of  emphysema.  Right-sided Port-A-Cath is in place.      Impression:      Right basilar consolidation.  Underlying pulmonary nodule cannot be excluded.      CT Angiogram Chest [[id number]] Collected: 04/21/23 1339     Updated: 04/21/23 1512    Narrative:      Indication:  Chest pain.    TECHNIQUE:  IV contrast was administered and axial images from the thoracic inlet through  the diaphragms were performed.  3D multiplanar reformats of the thoracic aorta  were completed on a separate workstation under concurrent supervision.    COMPARISON:  02/13/2023.    FINDINGS:  No pulmonary embolism is seen.  No thoracic aortic dissection or aneurysm is  seen.  A small right pleural effusion is seen and there is extensive right  middle and lower lobe consolidation suspicious for pneumonia.  A right middle  lobe mass seen on the prior CT is difficult to differentiate  from the new  surrounding inflammatory changes.  The right hilar and mediastinal  lymphadenopathy is likely secondary to the lung changes.  Severe upper lobe  dominant emphysema is seen.  Visualized upper abdomen shows no significant  abnormality.  On bone windows, there is a lytic expansile abnormality of the  left 11th rib that is stable, but suspicious for metastatic disease.  This large  abnormality measures 5 x 3.3 cm on coronal image 113 of series 7.  The remaining  bones show no significant abnormality.      Impression:      1.  No pulmonary embolism seen.    2.  No thoracic aortic dissection or aneurysm seen.    3.  Worsening airspace disease of the right lung suspicious for pneumonia.  An  underlying right middle lobe lung mass persists but is difficult to clearly  define due to the new changes.  This mass suspicious for malignancy can be  further evaluated with bronchoscopy.    4.  Again seen is a lytic lesion of the left 11th rib measuring 5 x 3.3 cm  associated with a large soft tissue component suspicious for metastatic disease.    5.  Severe upper lobe dominant emphysema.    XR Chest 2 View [[id number]] Collected: 04/21/23 1132     Updated: 04/21/23 1138    Narrative:      FINDINGS:  Right IJ Mediport terminating in the mid superior vena cava. Cardiomediastinal  silhouette is within normal limits.  Emphysema. There is consolidation within  the  middle lobe and right lower lobe consistent with pneumonia. Osseous  structures age-appropriate.      Impression:      1.  Pneumonia in the middle lobe and right lower lobe.    2.  Emphysema.         Lab Results (last 48 hours)     Procedure Component Value Units Date/Time    Comprehensive Metabolic Panel [[id number]]  (Abnormal) Collected: 04/25/23 0539    Specimen: Blood Updated: 04/25/23 0706     Glucose 156 mg/dL      BUN 20 mg/dL      Creatinine 0.42 mg/dL      Sodium 141 mmol/L      Potassium 4.1 mmol/L      Chloride 102 mmol/L      CO2 33.0 mmol/L       Calcium 8.1 mg/dL      Total Protein 5.2 g/dL      Albumin 3.0 g/dL      ALT (SGPT) 38 U/L      AST (SGOT) 19 U/L      Alkaline Phosphatase 74 U/L      Total Bilirubin 0.2 mg/dL      Globulin 2.2 gm/dL      A/G Ratio 1.4 g/dL      BUN/Creatinine Ratio 47.6     Anion Gap 6.0 mmol/L      eGFR 113.5 mL/min/1.73     Narrative:      GFR Normal >60  Chronic Kidney Disease <60  Kidney Failure <15    The GFR formula is only valid for adults with stable renal function between ages 18 and 70.    CBC Auto Differential [[id number]]  (Abnormal) Collected: 04/25/23 0539    Specimen: Blood Updated: 04/25/23 0643     WBC 12.04 10*3/mm3      RBC 2.67 10*6/mm3      Hemoglobin 7.8 g/dL      Hematocrit 25.6 %      MCV 95.9 fL      MCH 29.2 pg      MCHC 30.5 g/dL      RDW 17.0 %      RDW-SD 52.2 fl      MPV 9.9 fL      Platelets 184 10*3/mm3      Neutrophil % 93.3 %      Lymphocyte % 2.3 %      Monocyte % 3.4 %      Eosinophil % 0.0 %      Basophil % 0.1 %      Immature Grans % 0.9 %      Neutrophils, Absolute 11.23 10*3/mm3      Lymphocytes, Absolute 0.28 10*3/mm3      Monocytes, Absolute 0.41 10*3/mm3      Eosinophils, Absolute 0.00 10*3/mm3      Basophils, Absolute 0.01 10*3/mm3      Immature Grans, Absolute 0.11 10*3/mm3      nRBC 0.2 /100 WBC     Magnesium [[id number]]  (Normal) Collected: 04/24/23 0551    Specimen: Blood Updated: 04/24/23 1322     Magnesium 2.4 mg/dL     Blood Culture - Blood, Hand, Right [[id number]]  (Normal) Collected: 04/21/23 1233    Specimen: Blood from Hand, Right Updated: 04/24/23 1245     Blood Culture No growth at 3 days    Blood Culture - Blood, Arm, Left [[id number]]  (Normal) Collected: 04/21/23 1216    Specimen: Blood from Arm, Left Updated: 04/24/23 1230     Blood Culture No growth at 3 days    CBC Auto Differential [[id number]]  (Abnormal) Collected: 04/24/23 0551    Specimen: Blood Updated: 04/24/23 0645     WBC 14.34 10*3/mm3      RBC 2.60 10*6/mm3      Hemoglobin 7.6 g/dL      Hematocrit 24.7 %       MCV 95.0 fL      MCH 29.2 pg      MCHC 30.8 g/dL      RDW 16.4 %      RDW-SD 48.3 fl      MPV 9.7 fL      Platelets 176 10*3/mm3      Neutrophil % 93.4 %      Lymphocyte % 2.0 %      Monocyte % 3.7 %      Eosinophil % 0.0 %      Basophil % 0.1 %      Immature Grans % 0.8 %      Neutrophils, Absolute 13.41 10*3/mm3      Lymphocytes, Absolute 0.28 10*3/mm3      Monocytes, Absolute 0.53 10*3/mm3      Eosinophils, Absolute 0.00 10*3/mm3      Basophils, Absolute 0.01 10*3/mm3      Immature Grans, Absolute 0.11 10*3/mm3      nRBC 0.0 /100 WBC     Comprehensive Metabolic Panel [[id number]]  (Abnormal) Collected: 04/24/23 0551    Specimen: Blood Updated: 04/24/23 0639     Glucose 154 mg/dL      BUN 17 mg/dL      Creatinine 0.49 mg/dL      Sodium 139 mmol/L      Potassium 4.2 mmol/L      Chloride 101 mmol/L      CO2 35.0 mmol/L      Calcium 8.0 mg/dL      Total Protein 5.3 g/dL      Albumin 3.0 g/dL      ALT (SGPT) 25 U/L      AST (SGOT) 15 U/L      Alkaline Phosphatase 87 U/L      Total Bilirubin 0.2 mg/dL      Globulin 2.3 gm/dL      A/G Ratio 1.3 g/dL      BUN/Creatinine Ratio 34.7     Anion Gap 3.0 mmol/L      eGFR 108.4 mL/min/1.73     Narrative:      GFR Normal >60  Chronic Kidney Disease <60  Kidney Failure <15    The GFR formula is only valid for adults with stable renal function between ages 18 and 70.            Chief Complaint on Day of Discharge: shortness of breath     Hospital Course:    Please see admission history and physical for patient's initial presentation. During this hospitalization, the following problems were addressed...     RML and RLL Pneumonia     Continue with Levaquin, will plan for a 7 day course and transition to oral at discharge.   Case has been previously discussed with Dr. Davis who was ok with discharge home with close follow up   He will need to follow up with PCP in 2-3 days      Acute respiratory failure with hypoxemia     Due to above; back to his baseline     COPD AE     And  "respiratory acidosis; improving;back to baseline home oxygen 4 L   He has been on IV steroids here, I suggested that he take his prednisone at home daily for a few days and then wean to every other day as he has been previously taking.      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     Has received recent chemotherapy on 4/11/23; plan is to continue chemo as an outpatient and possible radiation     Appreciated oncologist assistance      Anemia of chronic disease  -Hb stable this AM     Paroxysmal atrial fibrillation     On and off; no indicated for OACs now due to severe anemia  Dose of Cardizem has been increased, HR has improved     Chronic medical problems     Hx of PE      Condition on Discharge:  Stable       Physical Exam on Discharge:  /56 (BP Location: Left arm, Patient Position: Lying)   Pulse 94   Temp 97.6 °F (36.4 °C) (Temporal)   Resp 18   Ht 165.1 cm (65\")   Wt 61.1 kg (134 lb 12.8 oz)   SpO2 95%   BMI 22.43 kg/m²   Physical Exam  Vitals reviewed.   Constitutional:       General: He is not in acute distress.     Appearance: He is well-developed.   HENT:      Head: Normocephalic and atraumatic.      Nose: Nose normal.   Eyes:      Conjunctiva/sclera: Conjunctivae normal.   Cardiovascular:      Rate and Rhythm: Normal rate and regular rhythm.   Pulmonary:      Effort: Pulmonary effort is normal.   Musculoskeletal:         General: Normal range of motion.      Cervical back: Normal range of motion and neck supple.   Skin:     General: Skin is warm and dry.   Neurological:      Mental Status: He is alert and oriented to person, place, and time.   Psychiatric:         Behavior: Behavior normal.           Discharge Disposition:  Home or Self Care    Discharge Medications:     Discharge Medications      New Medications      Instructions Start Date   dilTIAZem  MG 24 hr capsule  Commonly known as: CARDIZEM CD  Replaces: dilTIAZem 180 MG 24 hr capsule   240 mg, Oral, Every 24 " Hours Scheduled      guaiFENesin 600 MG 12 hr tablet  Commonly known as: MUCINEX   600 mg, Oral, Every 12 Hours Scheduled      levoFLOXacin 750 MG tablet  Commonly known as: Levaquin   750 mg, Oral, Daily         Continue These Medications      Instructions Start Date   cholecalciferol 10 MCG (400 UNIT) tablet  Commonly known as: VITAMIN D3   400 Units, Oral, Daily, 50 mcg daily      dexamethasone 4 MG tablet  Commonly known as: DECADRON   Take 1 tablet twice daily starting the day before chemo, day of chemo, and day after chemo.  Take with food.      ferrous sulfate 324 MG tablet delayed-release   324 mg, Oral, Daily With Breakfast      folic acid 1 MG tablet  Commonly known as: FOLVITE   1 mg, Oral, Daily      furosemide 20 MG tablet  Commonly known as: LASIX   20 mg, Oral, Daily PRN      ipratropium 0.02 % nebulizer solution  Commonly known as: ATROVENT   0.5 mg, Nebulization, 3 Times Daily - RT, Sub amount for how much insurance allows      ipratropium 17 MCG/ACT inhaler  Commonly known as: ATROVENT HFA   2 puffs, Inhalation, 4 Times Daily PRN      melatonin 5 MG tablet tablet   5 mg, Oral, Nightly PRN      montelukast 10 MG tablet  Commonly known as: SINGULAIR   10 mg, Oral, Nightly      OLANZapine 5 MG tablet  Commonly known as: zyPREXA   5 mg, Oral, Nightly, Take on days 2, 3 and 4 after chemotherapy.      ondansetron 8 MG tablet  Commonly known as: ZOFRAN   8 mg, Oral, 3 Times Daily PRN      predniSONE 5 MG tablet  Commonly known as: DELTASONE   5 mg, Oral, Every Other Day      tiotropium bromide-olodaterol 2.5-2.5 MCG/ACT aerosol solution inhaler  Commonly known as: STIOLTO RESPIMAT   2 puffs, Inhalation, Daily - RT      traMADol 50 MG tablet  Commonly known as: ULTRAM   50 mg, Oral, Every 6 Hours PRN      vitamin B-12 1000 MCG tablet  Commonly known as: CYANOCOBALAMIN   1,000 mcg, Oral, Daily         Stop These Medications    dilTIAZem 180 MG 24 hr capsule  Commonly known as: TIAZAC  Replaced by: dilTIAZem   MG 24 hr capsule            I have utilized all available immediate resources to obtain, update, or review the patient's current medications (including all prescriptions, over-the-counter products, herbals, cannabis/cannabidiol products, and vitamin/mineral/dietary (nutritional) supplements).       Discharge Diet:   Diet Instructions     Advance Diet As Tolerated -Target Diet: regular diet      Target Diet: regular diet          Activity at Discharge:   Activity Instructions     Activity as Tolerated            Follow-up Appointments:   Future Appointments   Date Time Provider Department Center   4/26/2023  1:30 PM  [facility] INFU BED 3  [facility] OPI [facility]   5/2/2023  8:15 AM  [facility] INFU CHAIR 04  [facility] OPI [facility]   5/2/2023  9:15 AM Omega Davis MD [facility] ONC [facility]   5/2/2023 10:15 AM  [facility] OP INFU CHAIR 04  [facility] OPI [facility]   6/12/2023  1:15 PM Neville Meeks MD [facility] CD [facility] None       Test Results Pending at Discharge:   Pending Labs     Order Current Status    Blood Culture - Blood, Arm, Left Preliminary result    Blood Culture - Blood, Hand, Right Preliminary result          Time: >30 minutes          This document has been electronically signed by Anita Toney MD on April 25, 2023 10:31 CDT

## 2023-04-25 NOTE — PLAN OF CARE
Problem: Adult Inpatient Plan of Care  Goal: Plan of Care Review  Outcome: Ongoing, Progressing  Goal: Patient-Specific Goal (Individualized)  Outcome: Ongoing, Progressing  Goal: Absence of Hospital-Acquired Illness or Injury  Outcome: Ongoing, Progressing  Intervention: Identify and Manage Fall Risk  Recent Flowsheet Documentation  Taken 4/25/2023 0000 by Hemalatha Borja RN  Safety Promotion/Fall Prevention: safety round/check completed  Taken 4/24/2023 2200 by Hemalatha Borja RN  Safety Promotion/Fall Prevention: safety round/check completed  Taken 4/24/2023 2100 by Hemalatha Borja RN  Safety Promotion/Fall Prevention: safety round/check completed  Taken 4/24/2023 2010 by Hemalatha Borja RN  Safety Promotion/Fall Prevention: safety round/check completed  Taken 4/24/2023 2000 by Hemalatha Borja RN  Safety Promotion/Fall Prevention: safety round/check completed  Taken 4/24/2023 1900 by Hemalatha Borja RN  Safety Promotion/Fall Prevention: safety round/check completed  Intervention: Prevent Skin Injury  Recent Flowsheet Documentation  Taken 4/25/2023 0000 by Hemalatha Borja RN  Body Position: position changed independently  Taken 4/24/2023 2200 by Hemalatha Borja RN  Body Position: position changed independently  Taken 4/24/2023 2100 by Hemalatha Borja RN  Body Position: position changed independently  Taken 4/24/2023 2000 by Hemalatha Borja RN  Body Position: position changed independently  Taken 4/24/2023 1900 by Hemalatha Borja RN  Body Position: position changed independently  Intervention: Prevent and Manage VTE (Venous Thromboembolism) Risk  Recent Flowsheet Documentation  Taken 4/24/2023 2010 by Hemalatha Borja RN  VTE Prevention/Management: (heparin) --  Goal: Optimal Comfort and Wellbeing  Outcome: Ongoing, Progressing  Goal: Readiness for Transition of Care  Outcome: Ongoing, Progressing     Problem: Fall Injury Risk  Goal: Absence of Fall and Fall-Related Injury  Outcome: Ongoing,  Progressing  Intervention: Promote Injury-Free Environment  Recent Flowsheet Documentation  Taken 4/25/2023 0000 by Hemalatha Borja RN  Safety Promotion/Fall Prevention: safety round/check completed  Taken 4/24/2023 2200 by Hemalatha Borja RN  Safety Promotion/Fall Prevention: safety round/check completed  Taken 4/24/2023 2100 by Hemalatha Borja RN  Safety Promotion/Fall Prevention: safety round/check completed  Taken 4/24/2023 2010 by Hemalatha Borja RN  Safety Promotion/Fall Prevention: safety round/check completed  Taken 4/24/2023 2000 by Hemalatha Borja RN  Safety Promotion/Fall Prevention: safety round/check completed  Taken 4/24/2023 1900 by Hemalatha Borja RN  Safety Promotion/Fall Prevention: safety round/check completed     Problem: Gas Exchange Impaired  Goal: Optimal Gas Exchange  Outcome: Ongoing, Progressing     Problem: Coping Ineffective (Oncology Care)  Goal: Effective Coping  Outcome: Ongoing, Progressing     Problem: Fatigue (Oncology Care)  Goal: Improved Activity Tolerance  Outcome: Ongoing, Progressing     Problem: Oral Intake Altered (Oncology Care)  Goal: Optimal Oral Intake  Outcome: Ongoing, Progressing     Problem: Oral Mucositis (Oncology Care)  Goal: Improved Oral Mucous Membrane Integrity  Outcome: Ongoing, Progressing     Problem: Pain Acute (Oncology Care)  Goal: Optimal Pain Control  Outcome: Ongoing, Progressing     Problem: Electrolyte Imbalance  Goal: Electrolyte Balance  Outcome: Ongoing, Progressing     Problem: COPD (Chronic Obstructive Pulmonary Disease) Comorbidity  Goal: Maintenance of COPD Symptom Control  Outcome: Ongoing, Progressing     Problem: Hypertension Comorbidity  Goal: Blood Pressure in Desired Range  Outcome: Ongoing, Progressing     Problem: Noninvasive Ventilation Acute  Goal: Effective Unassisted Ventilation and Oxygenation  Outcome: Ongoing, Progressing   Goal Outcome Evaluation: no change.

## 2023-04-25 NOTE — PLAN OF CARE
Goal Outcome Evaluation:            Pt says he is feeling better. Vital signs stable. Pt is being discharged today.

## 2023-04-26 ENCOUNTER — INFUSION (OUTPATIENT)
Dept: ONCOLOGY | Facility: HOSPITAL | Age: 73
End: 2023-04-26
Payer: OTHER GOVERNMENT

## 2023-04-26 VITALS
SYSTOLIC BLOOD PRESSURE: 137 MMHG | DIASTOLIC BLOOD PRESSURE: 63 MMHG | RESPIRATION RATE: 18 BRPM | HEART RATE: 70 BPM | OXYGEN SATURATION: 94 % | TEMPERATURE: 97.7 F

## 2023-04-26 DIAGNOSIS — K90.9 IRON MALABSORPTION: ICD-10-CM

## 2023-04-26 DIAGNOSIS — Z45.2 ENCOUNTER FOR VENOUS ACCESS DEVICE CARE: Primary | ICD-10-CM

## 2023-04-26 DIAGNOSIS — D50.8 IRON DEFICIENCY ANEMIA SECONDARY TO INADEQUATE DIETARY IRON INTAKE: ICD-10-CM

## 2023-04-26 LAB
BACTERIA SPEC AEROBE CULT: NORMAL
BACTERIA SPEC AEROBE CULT: NORMAL

## 2023-04-26 PROCEDURE — 25010000002 FERRIC CARBOXYMALTOSE 750 MG/15ML SOLUTION 15 ML VIAL: Performed by: INTERNAL MEDICINE

## 2023-04-26 PROCEDURE — 63710000001 DIPHENHYDRAMINE PER 50 MG: Performed by: INTERNAL MEDICINE

## 2023-04-26 PROCEDURE — 25010000002 HEPARIN LOCK FLUSH PER 10 UNITS: Performed by: INTERNAL MEDICINE

## 2023-04-26 PROCEDURE — 63710000001 PROCHLORPERAZINE MALEATE PER 10 MG: Performed by: INTERNAL MEDICINE

## 2023-04-26 RX ORDER — HEPARIN SODIUM (PORCINE) LOCK FLUSH IV SOLN 100 UNIT/ML 100 UNIT/ML
500 SOLUTION INTRAVENOUS AS NEEDED
Status: DISCONTINUED | OUTPATIENT
Start: 2023-04-26 | End: 2023-04-26 | Stop reason: HOSPADM

## 2023-04-26 RX ORDER — DIPHENHYDRAMINE HYDROCHLORIDE 50 MG/ML
50 INJECTION INTRAMUSCULAR; INTRAVENOUS AS NEEDED
Status: CANCELLED | OUTPATIENT
Start: 2023-04-26

## 2023-04-26 RX ORDER — DIPHENHYDRAMINE HYDROCHLORIDE 50 MG/ML
50 INJECTION INTRAMUSCULAR; INTRAVENOUS AS NEEDED
Status: DISCONTINUED | OUTPATIENT
Start: 2023-04-26 | End: 2023-04-26 | Stop reason: HOSPADM

## 2023-04-26 RX ORDER — SODIUM CHLORIDE 0.9 % (FLUSH) 0.9 %
10 SYRINGE (ML) INJECTION AS NEEDED
OUTPATIENT
Start: 2023-04-26

## 2023-04-26 RX ORDER — SODIUM CHLORIDE 9 MG/ML
250 INJECTION, SOLUTION INTRAVENOUS ONCE
Status: CANCELLED | OUTPATIENT
Start: 2023-04-26 | End: 2023-04-26

## 2023-04-26 RX ORDER — CETIRIZINE HYDROCHLORIDE 10 MG/1
10 TABLET ORAL ONCE
Status: COMPLETED | OUTPATIENT
Start: 2023-04-26 | End: 2023-04-26

## 2023-04-26 RX ORDER — DIPHENHYDRAMINE HCL 25 MG
25 CAPSULE ORAL ONCE
Status: COMPLETED | OUTPATIENT
Start: 2023-04-26 | End: 2023-04-26

## 2023-04-26 RX ORDER — PROCHLORPERAZINE MALEATE 10 MG
10 TABLET ORAL ONCE
Status: COMPLETED | OUTPATIENT
Start: 2023-04-26 | End: 2023-04-26

## 2023-04-26 RX ORDER — PROCHLORPERAZINE MALEATE 10 MG
10 TABLET ORAL ONCE
Status: CANCELLED | OUTPATIENT
Start: 2023-04-26 | End: 2023-04-26

## 2023-04-26 RX ORDER — DIPHENHYDRAMINE HCL 25 MG
25 CAPSULE ORAL ONCE
Status: CANCELLED | OUTPATIENT
Start: 2023-04-26

## 2023-04-26 RX ORDER — HEPARIN SODIUM (PORCINE) LOCK FLUSH IV SOLN 100 UNIT/ML 100 UNIT/ML
500 SOLUTION INTRAVENOUS AS NEEDED
OUTPATIENT
Start: 2023-04-26

## 2023-04-26 RX ORDER — SODIUM CHLORIDE 0.9 % (FLUSH) 0.9 %
10 SYRINGE (ML) INJECTION AS NEEDED
Status: DISCONTINUED | OUTPATIENT
Start: 2023-04-26 | End: 2023-04-26 | Stop reason: HOSPADM

## 2023-04-26 RX ORDER — CETIRIZINE HYDROCHLORIDE 10 MG/1
10 TABLET ORAL ONCE
Status: CANCELLED | OUTPATIENT
Start: 2023-04-26 | End: 2023-04-26

## 2023-04-26 RX ORDER — SODIUM CHLORIDE 9 MG/ML
250 INJECTION, SOLUTION INTRAVENOUS ONCE
Status: COMPLETED | OUTPATIENT
Start: 2023-04-26 | End: 2023-04-26

## 2023-04-26 RX ADMIN — PROCHLORPERAZINE MALEATE 10 MG: 10 TABLET ORAL at 13:57

## 2023-04-26 RX ADMIN — HEPARIN 500 UNITS: 100 SYRINGE at 15:23

## 2023-04-26 RX ADMIN — CETIRIZINE HYDROCHLORIDE 10 MG: 10 TABLET, FILM COATED ORAL at 13:57

## 2023-04-26 RX ADMIN — FERRIC CARBOXYMALTOSE INJECTION 750 MG: 50 INJECTION, SOLUTION INTRAVENOUS at 14:31

## 2023-04-26 RX ADMIN — SODIUM CHLORIDE 250 ML: 9 INJECTION, SOLUTION INTRAVENOUS at 13:55

## 2023-04-26 RX ADMIN — DIPHENHYDRAMINE HYDROCHLORIDE 25 MG: 25 CAPSULE ORAL at 13:57

## 2023-04-26 RX ADMIN — Medication 10 ML: at 15:24

## 2023-04-26 NOTE — OUTREACH NOTE
Prep Survey    Flowsheet Row Responses   [facility] patient discharged from? [facility]   Is LACE score < 7 ? No   Eligibility Readm Mgmt   Discharge diagnosis **Pneumonia   Does the patient have one of the following disease processes/diagnoses(primary or secondary)? Pneumonia   Does the patient have Home health ordered? Yes   [facility]   Is there a DME ordered? No   Prep survey completed? Yes          Susanne CHURCH - Registered Nurse

## 2023-05-01 ENCOUNTER — TELEPHONE (OUTPATIENT)
Dept: ONCOLOGY | Facility: CLINIC | Age: 73
End: 2023-05-01

## 2023-05-02 ENCOUNTER — INFUSION (OUTPATIENT)
Dept: ONCOLOGY | Facility: HOSPITAL | Age: 73
End: 2023-05-02
Payer: OTHER GOVERNMENT

## 2023-05-02 ENCOUNTER — OFFICE VISIT (OUTPATIENT)
Dept: ONCOLOGY | Facility: CLINIC | Age: 73
End: 2023-05-02
Payer: OTHER GOVERNMENT

## 2023-05-02 ENCOUNTER — READMISSION MANAGEMENT (OUTPATIENT)
Dept: CALL CENTER | Facility: HOSPITAL | Age: 73
End: 2023-05-02
Payer: OTHER GOVERNMENT

## 2023-05-02 VITALS
OXYGEN SATURATION: 95 % | DIASTOLIC BLOOD PRESSURE: 59 MMHG | SYSTOLIC BLOOD PRESSURE: 109 MMHG | HEART RATE: 111 BPM | TEMPERATURE: 98 F

## 2023-05-02 DIAGNOSIS — C34.81 MALIGNANT NEOPLASM OF OVERLAPPING SITES OF RIGHT LUNG: Primary | ICD-10-CM

## 2023-05-02 DIAGNOSIS — D50.8 IRON DEFICIENCY ANEMIA SECONDARY TO INADEQUATE DIETARY IRON INTAKE: Chronic | ICD-10-CM

## 2023-05-02 DIAGNOSIS — Z45.2 ENCOUNTER FOR VENOUS ACCESS DEVICE CARE: ICD-10-CM

## 2023-05-02 DIAGNOSIS — Z86.711 HISTORY OF PULMONARY EMBOLISM: Chronic | ICD-10-CM

## 2023-05-02 DIAGNOSIS — E87.6 HYPOKALEMIA: ICD-10-CM

## 2023-05-02 DIAGNOSIS — K90.9 IRON MALABSORPTION: Chronic | ICD-10-CM

## 2023-05-02 DIAGNOSIS — C34.81 MALIGNANT NEOPLASM OF OVERLAPPING SITES OF RIGHT LUNG: Primary | Chronic | ICD-10-CM

## 2023-05-02 LAB
ALBUMIN SERPL-MCNC: 3.3 G/DL (ref 3.5–5.2)
ALBUMIN/GLOB SERPL: 1.2 G/DL
ALP SERPL-CCNC: 101 U/L (ref 39–117)
ALT SERPL W P-5'-P-CCNC: 28 U/L (ref 1–41)
ANION GAP SERPL CALCULATED.3IONS-SCNC: 7 MMOL/L (ref 5–15)
ANISOCYTOSIS BLD QL: ABNORMAL
ANISOCYTOSIS BLD QL: ABNORMAL
AST SERPL-CCNC: 16 U/L (ref 1–40)
BILIRUB SERPL-MCNC: 0.2 MG/DL (ref 0–1.2)
BUN SERPL-MCNC: 14 MG/DL (ref 8–23)
BUN/CREAT SERPL: 24.6 (ref 7–25)
CALCIUM SPEC-SCNC: 8.4 MG/DL (ref 8.6–10.5)
CHLORIDE SERPL-SCNC: 97 MMOL/L (ref 98–107)
CO2 SERPL-SCNC: 33 MMOL/L (ref 22–29)
CREAT SERPL-MCNC: 0.57 MG/DL (ref 0.76–1.27)
DEPRECATED RDW RBC AUTO: 64.6 FL (ref 37–54)
DEPRECATED RDW RBC AUTO: 65.1 FL (ref 37–54)
EGFRCR SERPLBLD CKD-EPI 2021: 103.5 ML/MIN/1.73
EOSINOPHIL # BLD MANUAL: 0.09 10*3/MM3 (ref 0–0.4)
EOSINOPHIL NFR BLD MANUAL: 1 % (ref 0.3–6.2)
ERYTHROCYTE [DISTWIDTH] IN BLOOD BY AUTOMATED COUNT: 18.7 % (ref 12.3–15.4)
ERYTHROCYTE [DISTWIDTH] IN BLOOD BY AUTOMATED COUNT: 18.8 % (ref 12.3–15.4)
GLOBULIN UR ELPH-MCNC: 2.8 GM/DL
GLUCOSE SERPL-MCNC: 139 MG/DL (ref 65–99)
HCT VFR BLD AUTO: 37.7 % (ref 37.5–51)
HCT VFR BLD AUTO: 39.1 % (ref 37.5–51)
HGB BLD-MCNC: 11.9 G/DL (ref 13–17.7)
HGB BLD-MCNC: 12.1 G/DL (ref 13–17.7)
HOLD SPECIMEN: NORMAL
LYMPHOCYTES # BLD MANUAL: 0.74 10*3/MM3 (ref 0.7–3.1)
LYMPHOCYTES # BLD MANUAL: 1.64 10*3/MM3 (ref 0.7–3.1)
LYMPHOCYTES NFR BLD MANUAL: 18 % (ref 5–12)
LYMPHOCYTES NFR BLD MANUAL: 21 % (ref 5–12)
MCH RBC QN AUTO: 29.4 PG (ref 26.6–33)
MCH RBC QN AUTO: 30.4 PG (ref 26.6–33)
MCHC RBC AUTO-ENTMCNC: 30.9 G/DL (ref 31.5–35.7)
MCHC RBC AUTO-ENTMCNC: 31.6 G/DL (ref 31.5–35.7)
MCV RBC AUTO: 94.9 FL (ref 79–97)
MCV RBC AUTO: 96.2 FL (ref 79–97)
METAMYELOCYTES NFR BLD MANUAL: 2 % (ref 0–0)
MONOCYTES # BLD: 1.66 10*3/MM3 (ref 0.1–0.9)
MONOCYTES # BLD: 2.15 10*3/MM3 (ref 0.1–0.9)
NEUTROPHILS # BLD AUTO: 6.46 10*3/MM3 (ref 1.7–7)
NEUTROPHILS # BLD AUTO: 6.55 10*3/MM3 (ref 1.7–7)
NEUTROPHILS NFR BLD MANUAL: 61 % (ref 42.7–76)
NEUTROPHILS NFR BLD MANUAL: 63 % (ref 42.7–76)
NEUTS BAND NFR BLD MANUAL: 2 % (ref 0–5)
NEUTS BAND NFR BLD MANUAL: 8 % (ref 0–5)
PLATELET # BLD AUTO: 351 10*3/MM3 (ref 140–450)
PLATELET # BLD AUTO: 359 10*3/MM3 (ref 140–450)
PMV BLD AUTO: 9.6 FL (ref 6–12)
PMV BLD AUTO: 9.7 FL (ref 6–12)
POTASSIUM SERPL-SCNC: 3.2 MMOL/L (ref 3.5–5.2)
PROT SERPL-MCNC: 6.1 G/DL (ref 6–8.5)
RBC # BLD AUTO: 3.92 10*6/MM3 (ref 4.14–5.8)
RBC # BLD AUTO: 4.12 10*6/MM3 (ref 4.14–5.8)
SMALL PLATELETS BLD QL SMEAR: ADEQUATE
SMALL PLATELETS BLD QL SMEAR: ADEQUATE
SODIUM SERPL-SCNC: 137 MMOL/L (ref 136–145)
VARIANT LYMPHS NFR BLD MANUAL: 16 % (ref 19.6–45.3)
VARIANT LYMPHS NFR BLD MANUAL: 8 % (ref 19.6–45.3)
WBC MORPH BLD: NORMAL
WBC MORPH BLD: NORMAL
WBC NRBC COR # BLD: 10.25 10*3/MM3 (ref 3.4–10.8)
WBC NRBC COR # BLD: 9.23 10*3/MM3 (ref 3.4–10.8)

## 2023-05-02 PROCEDURE — 25010000002 PEMBROLIZUMAB 100 MG/4ML SOLUTION 4 ML VIAL: Performed by: INTERNAL MEDICINE

## 2023-05-02 PROCEDURE — 36415 COLL VENOUS BLD VENIPUNCTURE: CPT

## 2023-05-02 PROCEDURE — 85007 BL SMEAR W/DIFF WBC COUNT: CPT

## 2023-05-02 PROCEDURE — 85025 COMPLETE CBC W/AUTO DIFF WBC: CPT

## 2023-05-02 PROCEDURE — 80053 COMPREHEN METABOLIC PANEL: CPT

## 2023-05-02 PROCEDURE — 25010000002 HEPARIN LOCK FLUSH PER 10 UNITS: Performed by: INTERNAL MEDICINE

## 2023-05-02 RX ORDER — FAMOTIDINE 10 MG/ML
20 INJECTION, SOLUTION INTRAVENOUS AS NEEDED
Status: DISCONTINUED | OUTPATIENT
Start: 2023-05-02 | End: 2023-05-02 | Stop reason: HOSPADM

## 2023-05-02 RX ORDER — POTASSIUM CHLORIDE 750 MG/1
40 CAPSULE, EXTENDED RELEASE ORAL ONCE
Status: CANCELLED
Start: 2023-05-02 | End: 2023-05-02

## 2023-05-02 RX ORDER — POTASSIUM CHLORIDE 750 MG/1
10 CAPSULE, EXTENDED RELEASE ORAL DAILY
Qty: 14 CAPSULE | Refills: 0 | Status: SHIPPED | OUTPATIENT
Start: 2023-05-02 | End: 2023-05-16

## 2023-05-02 RX ORDER — HEPARIN SODIUM (PORCINE) LOCK FLUSH IV SOLN 100 UNIT/ML 100 UNIT/ML
500 SOLUTION INTRAVENOUS AS NEEDED
OUTPATIENT
Start: 2023-05-02

## 2023-05-02 RX ORDER — DIPHENHYDRAMINE HYDROCHLORIDE 50 MG/ML
50 INJECTION INTRAMUSCULAR; INTRAVENOUS AS NEEDED
Status: DISCONTINUED | OUTPATIENT
Start: 2023-05-02 | End: 2023-05-02 | Stop reason: HOSPADM

## 2023-05-02 RX ORDER — POTASSIUM CHLORIDE 750 MG/1
40 CAPSULE, EXTENDED RELEASE ORAL ONCE
Status: COMPLETED | OUTPATIENT
Start: 2023-05-02 | End: 2023-05-02

## 2023-05-02 RX ORDER — SODIUM CHLORIDE 9 MG/ML
250 INJECTION, SOLUTION INTRAVENOUS ONCE
Status: COMPLETED | OUTPATIENT
Start: 2023-05-02 | End: 2023-05-02

## 2023-05-02 RX ORDER — SODIUM CHLORIDE 0.9 % (FLUSH) 0.9 %
10 SYRINGE (ML) INJECTION AS NEEDED
Status: DISCONTINUED | OUTPATIENT
Start: 2023-05-02 | End: 2023-05-02 | Stop reason: HOSPADM

## 2023-05-02 RX ORDER — HEPARIN SODIUM (PORCINE) LOCK FLUSH IV SOLN 100 UNIT/ML 100 UNIT/ML
500 SOLUTION INTRAVENOUS AS NEEDED
Status: DISCONTINUED | OUTPATIENT
Start: 2023-05-02 | End: 2023-05-02 | Stop reason: HOSPADM

## 2023-05-02 RX ORDER — SODIUM CHLORIDE 9 MG/ML
250 INJECTION, SOLUTION INTRAVENOUS ONCE
Status: CANCELLED | OUTPATIENT
Start: 2023-05-02

## 2023-05-02 RX ORDER — FAMOTIDINE 10 MG/ML
20 INJECTION, SOLUTION INTRAVENOUS AS NEEDED
Status: CANCELLED | OUTPATIENT
Start: 2023-05-02

## 2023-05-02 RX ORDER — DIPHENHYDRAMINE HYDROCHLORIDE 50 MG/ML
50 INJECTION INTRAMUSCULAR; INTRAVENOUS AS NEEDED
Status: CANCELLED | OUTPATIENT
Start: 2023-05-02

## 2023-05-02 RX ORDER — SODIUM CHLORIDE 0.9 % (FLUSH) 0.9 %
10 SYRINGE (ML) INJECTION AS NEEDED
OUTPATIENT
Start: 2023-05-02

## 2023-05-02 RX ADMIN — SODIUM CHLORIDE 250 ML: 9 INJECTION, SOLUTION INTRAVENOUS at 10:34

## 2023-05-02 RX ADMIN — SODIUM CHLORIDE 200 MG: 9 INJECTION, SOLUTION INTRAVENOUS at 11:16

## 2023-05-02 RX ADMIN — POTASSIUM CHLORIDE 40 MEQ: 10 CAPSULE, COATED, EXTENDED RELEASE ORAL at 10:55

## 2023-05-02 RX ADMIN — Medication 10 ML: at 12:15

## 2023-05-02 RX ADMIN — HEPARIN 500 UNITS: 100 SYRINGE at 12:15

## 2023-05-02 NOTE — OUTREACH NOTE
COPD/PN Week 1 Survey    Flowsheet Row Responses   [facility] patient discharged from? [place]   Does the patient have one of the following disease processes/diagnoses(primary or secondary)? Pneumonia   Week 1 attempt successful? Yes   Call start time 1612   Call end time 1613   Discharge diagnosis **Pneumonia   Meds reviewed with patient/caregiver? Yes   Is the patient having any side effects they believe may be caused by any medication additions or changes? No   Does the patient have all medications ordered at discharge? Yes   Is the patient taking all medications as directed (includes completed medication regime)? Yes   Comments regarding appointments Had infusion today   Does the patient have a primary care provider?  Yes   Does the patient have an appointment with their PCP or specialist within 7 days of discharge? Yes   Has the patient kept scheduled appointments due by today? Yes   What [facility]   Psychosocial issues? No   Did the patient receive a copy of their discharge instructions? Yes   Nursing interventions Reviewed instructions with patient   What is the patient's perception of their health status since discharge? Improving   Nursing Interventions Nurse provided patient education   Are the patient's immunizations up to date?  Yes   If the patient is a current smoker, are they able to teach back resources for cessation? Not a smoker   Is the patient/caregiver able to teach back the hierarchy of who to call/visit for symptoms/problems? PCP, Specialist, Home health nurse, Urgent Care, ED, 911 Yes   Is the patient/caregiver able to teach back signs and symptoms of worsening condition: Fever/chills, Shortness of breath, Chest pain   Is the patient/caregiver able to teach back importance of completing antibiotic course of treatment? Yes   Week 1 call completed? Yes   Is the patient interested in additional calls from an ambulatory ?  NOTE:  applies to high  risk patients requiring additional follow-up. No          Enrrique W - Registered Nurse

## 2023-05-02 NOTE — PROGRESS NOTES
DATE OF VISIT: 5/2/2023      REASON FOR VISIT: Metastatic lung adenocarcinoma, history of pulmonary embolism, leukocytosis, iron deficiency anemia, iron malabsorption, hypokalemia, swelling of lower extremity      HISTORY OF PRESENT ILLNESS:   72-year-old male with medical problems significant for hypertension, history of pulmonary embolism diagnosed in 2015 for which patient took Coumadin for 1 year, chronic obstructive pulmonary disease, history of nicotine addiction that he quit in 2008, metastatic lung adenocarcinoma with bone metastasis for which patient is started on cycle 1 of carboplatin, Alimta and Keytruda on April 11, 2023.  Patient was recently admitted to hospital with hemoptysis, shortness of breath and swelling of lower extremity.  Patient's hemoptysis improved with antibiotic use.  Patient is here to get cycle 2 of treatment today.  Complains of shortness of breath which is chronic for him states his hemoptysis is improved.  States swelling of lower extremity is improving.  Complains of chronic neuropathy affecting lower extremity without any recent worsening.                  Past Medical History, Past Surgical History, Social History, Family History have been reviewed and are without significant changes except as mentioned.    Review of Systems   A comprehensive 14 point review of systems was performed and was negative except as mentioned in HPI.    Medications:  The current medication list was reviewed in the EMR    ALLERGIES:    Allergies   Allergen Reactions   • Amoxicillin Anaphylaxis   • Albuterol Irritability     Facial flushing and palpitations.   • Asmanex (120 Metered Doses) [Mometasone Furoate] Unknown - Low Severity   • Gabapentin Hallucinations   • Lortab [Hydrocodone-Acetaminophen] Other (See Comments)     Can't remember   • Morphine And Related Hallucinations   • Prilosec [Omeprazole] Other (See Comments)     unknown   • Sodium Clavulanate Unknown - High Severity   • Statins GI  Intolerance   • Sulfa Antibiotics Other (See Comments)     Can't remember also more and can't remember   • Symbicort [Budesonide-Formoterol Fumarate] Unknown (See Comments)     Doesn't remember reaction type       Objective      There were no vitals filed for this visit.      4/11/2023    10:12 AM   Current Status   ECOG score 3       Physical Exam  Pulmonary:      Comments: Decreased air entry in right lower lobe lung field.  No rhonchi or wheezing  Neurological:      Mental Status: He is alert and oriented to person, place, and time.           RECENT LABS:  Glucose   Date Value Ref Range Status   05/02/2023 139 (H) 65 - 99 mg/dL Final     Glucose, Arterial   Date Value Ref Range Status   02/06/2018 106 mmol/L Final     Sodium   Date Value Ref Range Status   05/02/2023 137 136 - 145 mmol/L Final     Potassium   Date Value Ref Range Status   05/02/2023 3.2 (L) 3.5 - 5.2 mmol/L Final     CO2   Date Value Ref Range Status   05/02/2023 33.0 (H) 22.0 - 29.0 mmol/L Final     Chloride   Date Value Ref Range Status   05/02/2023 97 (L) 98 - 107 mmol/L Final     Anion Gap   Date Value Ref Range Status   05/02/2023 7.0 5.0 - 15.0 mmol/L Final     Creatinine   Date Value Ref Range Status   05/02/2023 0.57 (L) 0.76 - 1.27 mg/dL Final     BUN   Date Value Ref Range Status   05/02/2023 14 8 - 23 mg/dL Final     BUN/Creatinine Ratio   Date Value Ref Range Status   05/02/2023 24.6 7.0 - 25.0 Final     Calcium   Date Value Ref Range Status   05/02/2023 8.4 (L) 8.6 - 10.5 mg/dL Final     eGFR Non  Amer   Date Value Ref Range Status   11/08/2021 81 >60 mL/min/1.73 Final     Alkaline Phosphatase   Date Value Ref Range Status   05/02/2023 101 39 - 117 U/L Final     Total Protein   Date Value Ref Range Status   05/02/2023 6.1 6.0 - 8.5 g/dL Final     ALT (SGPT)   Date Value Ref Range Status   05/02/2023 28 1 - 41 U/L Final     AST (SGOT)   Date Value Ref Range Status   05/02/2023 16 1 - 40 U/L Final     Total Bilirubin   Date  Value Ref Range Status   05/02/2023 0.2 0.0 - 1.2 mg/dL Final     Albumin   Date Value Ref Range Status   05/02/2023 3.3 (L) 3.5 - 5.2 g/dL Final     Globulin   Date Value Ref Range Status   05/02/2023 2.8 gm/dL Final     Lab Results   Component Value Date    WBC 9.23 05/02/2023    HGB 11.9 (L) 05/02/2023    HCT 37.7 05/02/2023    MCV 96.2 05/02/2023     05/02/2023     Lab Results   Component Value Date    NEUTROABS 6.46 05/02/2023    IRON 36 (L) 04/10/2023    IRON 39 (L) 02/10/2023    IRON 37 (L) 01/02/2023    TIBC 346 04/10/2023    TIBC 331 02/10/2023    TIBC 286 (L) 01/02/2023    LABIRON 10 (L) 04/10/2023    LABIRON 12 (L) 02/10/2023    LABIRON 13 (L) 01/02/2023    FERRITIN 235.30 04/10/2023    FERRITIN 266.00 02/10/2023    PIASUOHR98 1,010 (H) 04/10/2023    KDQXKLRH82 370 02/10/2023    FOLATE >20.00 04/10/2023    FOLATE 9.81 02/10/2023     No results found for: , LABCA2, AFPTM, HCGQUANT, , CHROMGRNA, 4LYZV49UME, CEA, REFLABREPO      PATHOLOGY:  * Cannot find OR log *         RADIOLOGY DATA :  No radiology results for the last 7 days        Assessment & Plan     1.  Metastatic lung adenocarcinoma with bone metastasis stage IV, tumor mutation burden high, PD-L1 60%, MSI stable, EGFR negative pulmonary negative, MET negative, ROS negative  - Patient has been started on carboplatin, Alimta and Keytruda since April 2023  - Patient was admitted to hospital after cycle 1 with suspected pneumonia, hemoptysis and swelling of lower extremity.  - Since PD-L1 combined positive score is 60% option of discontinuing carboplatin and Alimta and discontinuing with Keytruda by itself was discussed with patient and family today.  - Due to his overall performance status as well as multiple comorbidities recommend to schedule a treatment going forward for next 3 cycles and do restaging PET/CT after that  -We will proceed with cycle 2 of consisting of Keytruda only today.  - Patient will return to clinic in 3 weeks  with next treatment.    2.  Iron deficiency anemia:  - Due to iron malabsorption patient has received 2 doses of Injectafer so far  - Hemoglobin today is improved to 11.9  - Remains on monthly B12 injection with folic acid p.o. daily  - We will repeat anemia work-up in June 2023    3.  History of pulmonary embolism  - Patient was diagnosed with pulmonary embolism in 2015 for which patient took Coumadin for 1 year.  - We will continue with clinical surveillance    4.  Hypokalemia  - Potassium is 3.2.  Will provide with potassium 40 mg p.o. x1 here today.  - We will also send prescription for potassium 10 mill equivalent p.o. daily for next 2 weeks to his pharmacy.    5.  Health maintenance: Patient does not smoke    6.  Advance care planning:  - Patient remains full code.  Has a living will on chart.                                 PHQ-9 Total Score:       Brian Garcia reports a pain score of 0.  Given his pain assessment as noted, treatment options were discussed and the following options were decided upon as a follow-up plan to address the patient's pain: continuation of current treatment plan for pain.         Omega Davis MD  5/2/2023  09:39 CDT        Part of this note may be an electronic transcription/translation of spoken language to printed text using the Dragon Dictation System.          CC:

## 2023-05-05 LAB
QT INTERVAL: 310 MS
QT INTERVAL: 356 MS
QTC INTERVAL: 430 MS
QTC INTERVAL: 452 MS

## 2023-05-12 ENCOUNTER — HOSPITAL ENCOUNTER (EMERGENCY)
Facility: HOSPITAL | Age: 73
Discharge: HOME | End: 2023-05-12
Attending: STUDENT IN AN ORGANIZED HEALTH CARE EDUCATION/TRAINING PROGRAM
Payer: OTHER GOVERNMENT

## 2023-05-12 VITALS
BODY MASS INDEX: 20.99 KG/M2 | WEIGHT: 126 LBS | TEMPERATURE: 98.3 F | HEIGHT: 65 IN | RESPIRATION RATE: 20 BRPM | OXYGEN SATURATION: 98 % | DIASTOLIC BLOOD PRESSURE: 60 MMHG | HEART RATE: 85 BPM | SYSTOLIC BLOOD PRESSURE: 132 MMHG

## 2023-05-12 DIAGNOSIS — A04.72 C. DIFFICILE DIARRHEA: Primary | ICD-10-CM

## 2023-05-12 DIAGNOSIS — E87.6 HYPOKALEMIA: ICD-10-CM

## 2023-05-12 LAB
ALBUMIN SERPL-MCNC: 3.3 G/DL (ref 3.5–5.2)
ALBUMIN/GLOB SERPL: 1.1 G/DL
ALP SERPL-CCNC: 116 U/L (ref 39–117)
ALT SERPL W P-5'-P-CCNC: 12 U/L (ref 1–41)
ANION GAP SERPL CALCULATED.3IONS-SCNC: 7 MMOL/L (ref 5–15)
AST SERPL-CCNC: 13 U/L (ref 1–40)
BASOPHILS # BLD AUTO: 0.08 10*3/MM3 (ref 0–0.2)
BASOPHILS NFR BLD AUTO: 0.9 % (ref 0–1.5)
BILIRUB SERPL-MCNC: <0.2 MG/DL (ref 0–1.2)
BILIRUB UR QL STRIP: NEGATIVE
BUN SERPL-MCNC: 16 MG/DL (ref 8–23)
BUN/CREAT SERPL: 38.1 (ref 7–25)
CALCIUM SPEC-SCNC: 8.7 MG/DL (ref 8.6–10.5)
CHLORIDE SERPL-SCNC: 98 MMOL/L (ref 98–107)
CLARITY UR: CLEAR
CO2 SERPL-SCNC: 34 MMOL/L (ref 22–29)
COLOR UR: YELLOW
CREAT SERPL-MCNC: 0.42 MG/DL (ref 0.76–1.27)
DEPRECATED RDW RBC AUTO: 63.2 FL (ref 37–54)
EGFRCR SERPLBLD CKD-EPI 2021: 113.5 ML/MIN/1.73
EOSINOPHIL # BLD AUTO: 0.17 10*3/MM3 (ref 0–0.4)
EOSINOPHIL NFR BLD AUTO: 1.9 % (ref 0.3–6.2)
ERYTHROCYTE [DISTWIDTH] IN BLOOD BY AUTOMATED COUNT: 18.1 % (ref 12.3–15.4)
GLOBULIN UR ELPH-MCNC: 3 GM/DL
GLUCOSE SERPL-MCNC: 101 MG/DL (ref 65–99)
GLUCOSE UR STRIP-MCNC: NEGATIVE MG/DL
HCT VFR BLD AUTO: 35.9 % (ref 37.5–51)
HGB BLD-MCNC: 11.5 G/DL (ref 13–17.7)
HGB UR QL STRIP.AUTO: NEGATIVE
HOLD SPECIMEN: NORMAL
IMM GRANULOCYTES # BLD AUTO: 0.06 10*3/MM3 (ref 0–0.05)
IMM GRANULOCYTES NFR BLD AUTO: 0.7 % (ref 0–0.5)
KETONES UR QL STRIP: NEGATIVE
LEUKOCYTE ESTERASE UR QL STRIP.AUTO: NEGATIVE
LYMPHOCYTES # BLD AUTO: 1.4 10*3/MM3 (ref 0.7–3.1)
LYMPHOCYTES NFR BLD AUTO: 15.9 % (ref 19.6–45.3)
MAGNESIUM SERPL-MCNC: 2 MG/DL (ref 1.6–2.4)
MCH RBC QN AUTO: 30.3 PG (ref 26.6–33)
MCHC RBC AUTO-ENTMCNC: 32 G/DL (ref 31.5–35.7)
MCV RBC AUTO: 94.7 FL (ref 79–97)
MONOCYTES # BLD AUTO: 1.3 10*3/MM3 (ref 0.1–0.9)
MONOCYTES NFR BLD AUTO: 14.7 % (ref 5–12)
NEUTROPHILS NFR BLD AUTO: 5.82 10*3/MM3 (ref 1.7–7)
NEUTROPHILS NFR BLD AUTO: 65.9 % (ref 42.7–76)
NITRITE UR QL STRIP: NEGATIVE
NRBC BLD AUTO-RTO: 0 /100 WBC (ref 0–0.2)
PH UR STRIP.AUTO: 6 [PH] (ref 5–9)
PLATELET # BLD AUTO: 242 10*3/MM3 (ref 140–450)
PMV BLD AUTO: 9.1 FL (ref 6–12)
POTASSIUM SERPL-SCNC: 3.1 MMOL/L (ref 3.5–5.2)
PROT SERPL-MCNC: 6.3 G/DL (ref 6–8.5)
PROT UR QL STRIP: ABNORMAL
RBC # BLD AUTO: 3.79 10*6/MM3 (ref 4.14–5.8)
SODIUM SERPL-SCNC: 139 MMOL/L (ref 136–145)
SP GR UR STRIP: 1.02 (ref 1–1.03)
UROBILINOGEN UR QL STRIP: ABNORMAL
WBC NRBC COR # BLD: 8.83 10*3/MM3 (ref 3.4–10.8)
WHOLE BLOOD HOLD COAG: NORMAL

## 2023-05-12 PROCEDURE — 99283 EMERGENCY DEPT VISIT LOW MDM: CPT

## 2023-05-12 PROCEDURE — 85025 COMPLETE CBC W/AUTO DIFF WBC: CPT | Performed by: STUDENT IN AN ORGANIZED HEALTH CARE EDUCATION/TRAINING PROGRAM

## 2023-05-12 PROCEDURE — 83735 ASSAY OF MAGNESIUM: CPT | Performed by: STUDENT IN AN ORGANIZED HEALTH CARE EDUCATION/TRAINING PROGRAM

## 2023-05-12 PROCEDURE — 80053 COMPREHEN METABOLIC PANEL: CPT | Performed by: STUDENT IN AN ORGANIZED HEALTH CARE EDUCATION/TRAINING PROGRAM

## 2023-05-12 PROCEDURE — 99284 EMERGENCY DEPT VISIT MOD MDM: CPT

## 2023-05-12 PROCEDURE — 81003 URINALYSIS AUTO W/O SCOPE: CPT | Performed by: STUDENT IN AN ORGANIZED HEALTH CARE EDUCATION/TRAINING PROGRAM

## 2023-05-12 RX ORDER — DILTIAZEM HYDROCHLORIDE 240 MG/1
240 CAPSULE, COATED, EXTENDED RELEASE ORAL
Qty: 5 CAPSULE | Refills: 0 | Status: SHIPPED | OUTPATIENT
Start: 2023-05-12

## 2023-05-12 RX ORDER — VANCOMYCIN HYDROCHLORIDE 125 MG/1
125 CAPSULE ORAL 4 TIMES DAILY
Qty: 40 CAPSULE | Refills: 0 | Status: SHIPPED | OUTPATIENT
Start: 2023-05-12 | End: 2023-05-12 | Stop reason: SDUPTHER

## 2023-05-12 RX ORDER — POTASSIUM CHLORIDE 1500 MG/1
40 TABLET, FILM COATED, EXTENDED RELEASE ORAL 2 TIMES DAILY
Qty: 8 TABLET | Refills: 0 | Status: SHIPPED | OUTPATIENT
Start: 2023-05-12 | End: 2023-05-14

## 2023-05-12 RX ORDER — VANCOMYCIN HYDROCHLORIDE 125 MG/1
CAPSULE ORAL
Qty: 21 CAPSULE | Refills: 0 | Status: SHIPPED | OUTPATIENT
Start: 2023-05-29 | End: 2023-06-12

## 2023-05-12 RX ORDER — VANCOMYCIN HYDROCHLORIDE 125 MG/1
125 CAPSULE ORAL 4 TIMES DAILY
Qty: 40 CAPSULE | Refills: 0 | Status: SHIPPED | OUTPATIENT
Start: 2023-05-12 | End: 2023-05-22

## 2023-05-12 RX ORDER — SODIUM CHLORIDE 0.9 % (FLUSH) 0.9 %
10 SYRINGE (ML) INJECTION AS NEEDED
Status: DISCONTINUED | OUTPATIENT
Start: 2023-05-12 | End: 2023-05-13 | Stop reason: HOSPADM

## 2023-05-12 RX ORDER — POTASSIUM CHLORIDE 750 MG/1
40 CAPSULE, EXTENDED RELEASE ORAL ONCE
Status: COMPLETED | OUTPATIENT
Start: 2023-05-12 | End: 2023-05-12

## 2023-05-12 RX ADMIN — POTASSIUM CHLORIDE 40 MEQ: 10 CAPSULE, COATED, EXTENDED RELEASE ORAL at 23:20

## 2023-05-12 RX ADMIN — VANCOMYCIN HYDROCHLORIDE 125 MG: KIT at 22:37

## 2023-05-13 LAB — HOLD SPECIMEN: NORMAL

## 2023-05-13 NOTE — DISCHARGE INSTRUCTIONS
Follow the instructions on washing hands with soap and water and using bleach on areas that were touched or soiled with stool or body contaminants.  I have sent the first 10 days of your vancomycin to an outside pharmacy [facility]  You will need to give vancomycin with the following dosing instructions:  Vancomycin 125 mg every 6 hours for 10 days followed by vancomycin 125 mg every 12 hours for 7 days followed by vancomycin 125 mg every 24 hours for 7 days.  Should have prior authorization be required, make sure you start the vancomycin as directed or as directed by your primary care.  Take potassium as directed for the next couple of days.  Return to ED as needed.

## 2023-05-13 NOTE — ED PROVIDER NOTES
Subjective   History of Present Illness  [facility] C. [facility].  Patient has had loose stools for the last 2 months and endorses a 25 pound weight loss during that time.  This morning, wife took in a stool sample and he was called by Lisa ARREOLA to come to the ER to start his antibiotic treatment.  Review of his faxed chart shows a potassium level of 3.0 on 5/12/2023.  He has been taking oral potassium at home.  He denies increasing abdominal pain, fever or chills, nausea or vomiting.  He is stable on 4 L of supplemental oxygen via nasal cannula.        Review of Systems   Constitutional: Negative for activity change and appetite change.   HENT: Negative for congestion and ear pain.    Eyes: Negative for pain and discharge.   Respiratory: Negative for chest tightness and shortness of breath.    Cardiovascular: Negative for chest pain and palpitations.   Gastrointestinal: Positive for abdominal pain (mild) and diarrhea. Negative for abdominal distention.   Endocrine: Negative for cold intolerance and heat intolerance.   Genitourinary: Negative for difficulty urinating and dysuria.   Musculoskeletal: Negative for arthralgias and back pain.   Skin: Negative for color change and rash.   Allergic/Immunologic: Negative for environmental allergies and food allergies.   Neurological: Negative for dizziness and headaches.   Hematological: Negative for adenopathy. Does not bruise/bleed easily.   Psychiatric/Behavioral: Negative for agitation and confusion.       Past Medical History:   Diagnosis Date   • A-fib    • Anesthesia     hip surgery (spinal caused him unpleasant sensations never wants it again)   • Arthritis    • Blood in sputum 02/22/2023    bright red blood, teaspoon - tablespoon daily   • COPD (chronic obstructive pulmonary disease)    • CVA (cerebral vascular accident) 12/24/2022   • Hearing aid worn     bilateral   • History of pulmonary embolism    • History of recurrent  pneumonia    • Hypertension    • Lung cancer 03/17/2023   • Oxygen dependent     since @ age 65   • Pulmonary nodules    • Risk for falls     uses walker   • Rotator cuff syndrome of right shoulder    • Tachycardia    • 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 HTN/Eliquis   • Wears glasses     readers   • Wears glasses     reading       Allergies   Allergen Reactions   • Amoxicillin Anaphylaxis   • Albuterol Irritability     Facial flushing and palpitations.   • Asmanex (120 Metered Doses) [Mometasone Furoate] Unknown - Low Severity   • Gabapentin Hallucinations   • Lortab [Hydrocodone-Acetaminophen] Other (See Comments)     Can't remember   • Morphine And Related Hallucinations   • Prilosec [Omeprazole] Other (See Comments)     unknown   • Sodium Clavulanate Unknown - High Severity   • Statins GI Intolerance   • Sulfa Antibiotics Other (See Comments)     Can't remember also more and can't remember   • Symbicort [Budesonide-Formoterol Fumarate] Unknown (See Comments)     Doesn't remember reaction type       Past Surgical History:   Procedure Laterality Date   • HIP ARTHROPLASTY Right    • RIB BIOPSY Left 03/17/2023 11th   • VENOUS ACCESS DEVICE (PORT) INSERTION N/A 3/30/2023    Procedure: MEDIPORT PLACEMENT          (C-ARM);  Surgeon: Lavon Cochran MD;  Location: [facility];  Service: General;  Laterality: N/A;       Family History   Problem Relation Age of Onset   • Cancer Sister    • Brain cancer Brother    • Cancer Brother    • No Known Problems Daughter    • No Known Problems Daughter    • No Known Problems Son        Social History     Socioeconomic History   • Marital status:    • Number of children: 3   • Highest education level: Associate degree: occupational, technical, or vocational program   Tobacco Use   • Smoking status: Former     Packs/day: 3.00     Years: 47.00     Pack years: 141.00     Types: Cigarettes     Start date: 1961     Quit date: 5/1/2008     Years since  "quitting: 15.0   • Smokeless tobacco: Never   Vaping Use   • Vaping Use: Never used   Substance and Sexual Activity   • Alcohol use: Not Currently     Comment: not last 40 years   • Drug use: Never   • Sexual activity: Not Currently     Partners: Female           Objective   Physical Exam  Vitals and nursing note reviewed.   Constitutional:       Appearance: He is well-developed.   HENT:      Head: Normocephalic and atraumatic.   Eyes:      Pupils: Pupils are equal, round, and reactive to light.   Neck:      Thyroid: No thyromegaly.      Trachea: No tracheal deviation.   Cardiovascular:      Rate and Rhythm: Normal rate.      Pulses:           Radial pulses are 2+ on the left side.        Dorsalis pedis pulses are 2+ on the right side and 2+ on the left side.      Heart sounds: Normal heart sounds, S1 normal and S2 normal.   Pulmonary:      Effort: Pulmonary effort is normal.      Breath sounds: Normal breath sounds.      Comments: On 4 L supplemental oxygen via nasal cannula  Abdominal:      Palpations: Abdomen is soft.      Tenderness: There is abdominal tenderness (mild diffuse).   Musculoskeletal:         General: Normal range of motion.      Cervical back: Neck supple.   Skin:     General: Skin is warm and dry.      Capillary Refill: Capillary refill takes 2 to 3 seconds.   Neurological:      Mental Status: He is alert and oriented to person, place, and time.      GCS: GCS eye subscore is 4. GCS verbal subscore is 5. GCS motor subscore is 6.   Psychiatric:         Speech: Speech normal.         Behavior: Behavior normal.         Thought Content: Thought content normal.         Procedures           ED Course           Vitals:    05/12/23 2143   BP: 115/69   BP Location: Left arm   Patient Position: Sitting   Pulse: 82   Resp: 18   Temp: 98.3 °F (36.8 °C)   TempSrc: Oral   SpO2: 94%   Weight: 57.2 kg (126 lb)   Height: 165.1 cm (65\")      Lab Results (last 24 hours)     Procedure Component Value Units Date/Time "    Comprehensive Metabolic Panel [[id number]]  (Abnormal) Collected: 05/12/23 2226    Specimen: Blood Updated: 05/12/23 2302     Glucose 101 mg/dL      BUN 16 mg/dL      Creatinine 0.42 mg/dL      Sodium 139 mmol/L      Potassium 3.1 mmol/L      Chloride 98 mmol/L      CO2 34.0 mmol/L      Calcium 8.7 mg/dL      Total Protein 6.3 g/dL      Albumin 3.3 g/dL      ALT (SGPT) 12 U/L      AST (SGOT) 13 U/L      Alkaline Phosphatase 116 U/L      Total Bilirubin <0.2 mg/dL      Globulin 3.0 gm/dL      A/G Ratio 1.1 g/dL      BUN/Creatinine Ratio 38.1     Anion Gap 7.0 mmol/L      eGFR 113.5 mL/min/1.73     Narrative:      GFR Normal >60  Chronic Kidney Disease <60  Kidney Failure <15    The GFR formula is only valid for adults with stable renal function between ages 18 and 70.    Magnesium [[id number]]  (Normal) Collected: 05/12/23 2226    Specimen: Blood Updated: 05/12/23 2302     Magnesium 2.0 mg/dL     Urinalysis With Microscopic If Indicated (No Culture) - Urine, Clean Catch [[id number]]  (Abnormal) Collected: 05/12/23 2226    Specimen: Urine, Clean Catch Updated: 05/12/23 2239     Color, UA Yellow     Appearance, UA Clear     pH, UA 6.0     Specific Gravity, UA 1.021     Glucose, UA Negative     Ketones, UA Negative     Bilirubin, UA Negative     Blood, UA Negative     Protein, UA Trace     Leuk Esterase, UA Negative     Nitrite, UA Negative     Urobilinogen, UA 0.2 E.U./dL    Narrative:      Urine microscopic not indicated.    CBC & Differential [[id number]]  (Abnormal) Collected: 05/12/23 2226    Specimen: Blood Updated: 05/12/23 2238    Narrative:      The following orders were created for panel order CBC & Differential.  Procedure                               Abnormality         Status                     ---------                               -----------         ------                     CBC Auto Differential[[id number]]        Abnormal            Final result                 Please view results for these tests on  the individual orders.    CBC Auto Differential [[id number]]  (Abnormal) Collected: 05/12/23 2226    Specimen: Blood Updated: 05/12/23 2238     WBC 8.83 10*3/mm3      RBC 3.79 10*6/mm3      Hemoglobin 11.5 g/dL      Hematocrit 35.9 %      MCV 94.7 fL      MCH 30.3 pg      MCHC 32.0 g/dL      RDW 18.1 %      RDW-SD 63.2 fl      MPV 9.1 fL      Platelets 242 10*3/mm3      Neutrophil % 65.9 %      Lymphocyte % 15.9 %      Monocyte % 14.7 %      Eosinophil % 1.9 %      Basophil % 0.9 %      Immature Grans % 0.7 %      Neutrophils, Absolute 5.82 10*3/mm3      Lymphocytes, Absolute 1.40 10*3/mm3      Monocytes, Absolute 1.30 10*3/mm3      Eosinophils, Absolute 0.17 10*3/mm3      Basophils, Absolute 0.08 10*3/mm3      Immature Grans, Absolute 0.06 10*3/mm3      nRBC 0.0 /100 WBC     Extra Tubes [[id number]] Collected: 05/12/23 2233    Specimen: Blood, Venous Line Updated: 05/12/23 2233    Narrative:      The following orders were created for panel order Extra Tubes.  Procedure                               Abnormality         Status                     ---------                               -----------         ------                     Gold Top - SST[[id number]]                                   In process                 Quinones Top[[id number]]                                         In process                 Light Blue Top[[id number]]                                   In process                   Please view results for these tests on the individual orders.    Gold Top - SST [[id number]] Collected: 05/12/23 2233    Specimen: Blood Updated: 05/12/23 2233    Gray Top [[id number]] Collected: 05/12/23 2233    Specimen: Blood Updated: 05/12/23 2233    Light Blue Top [[id number]] Collected: 05/12/23 2233    Specimen: Blood Updated: 05/12/23 2233         No radiology results for the last day                                   Medical Decision Making  [facility]  [facility]  show [facility] C. difficile and hypokalemia at 3.0.  Repeat labs here show potassium level of 3.1.  Patient was given first dose of vancomycin and dose of potassium with prescription for same.  [facility] but will not be able to get the first week of his treatment so sent prescriptions to both [facility] and [facility] per patient's request.  Instructed patient's wife a prior Auth warning showed up when sending vancomycin prescription so they are aware.  Patient encouraged to follow-up with his PCP.    C. difficile diarrhea: acute illness or injury  Hypokalemia: acute illness or injury  Amount and/or Complexity of Data Reviewed  Labs: ordered.      Risk  Prescription drug management.          Final diagnoses:   C. difficile diarrhea   Hypokalemia       ED Disposition  ED Disposition     ED Disposition   Discharge    Condition   Stable    Comment   --             No follow-up provider specified.       Medication List      New Prescriptions    * vancomycin 125 MG capsule  Commonly known as: VANCOCIN  Take 1 capsule by mouth 4 (Four) Times a Day for 10 days.     * vancomycin 125 MG capsule  Commonly known as: Vancocin  Take 1 capsule by mouth 2 (Two) Times a Day for 7 days, THEN 1 capsule Daily for 7 days.  Start taking on: May 29, 2023         * This list has 2 medication(s) that are the same as other medications prescribed for you. Read the directions carefully, and ask your doctor or other care provider to review them with you.            Changed    * potassium chloride 10 MEQ CR capsule  Commonly known as: MICRO-K  Take 1 capsule by mouth Daily for 14 days.  What changed: Another medication with the same name was added. Make sure you understand how and when to take each.     * potassium chloride ER 20 MEQ tablet controlled-release ER tablet  Commonly known as: K-TAB  Take 2 tablets by mouth 2 (Two) Times a Day for 2 days.  What changed: You were already taking a medication with the same name, and this  prescription was added. Make sure you understand how and when to take each.         * This list has 2 medication(s) that are the same as other medications prescribed for you. Read the directions carefully, and ask your doctor or other care provider to review them with you.               Where to Get Your Medications      These medications were sent to [facility] - [place], [place] - [address] - [phone] PH - [phone] FX  [address] [address]    Phone: [phone]   · vancomycin 125 MG capsule     These medications were sent to [id number] - [place], [place] - [address] [facility] [address] - [phone] PH - [phone]   [address] [facility] [place] [address]    Phone: [phone]   · potassium chloride ER 20 MEQ tablet controlled-release ER tablet     These medications were sent to [facility] [address] [place], [place] - [address] [facility] - [phone]  - [phone] FX  [address] [facility] [address]    Phone: [phone]   · vancomycin 125 MG capsule       This document has been electronically signed by Lui Palomares MD on May 12, 2023 23:19 CDT    Lui Palomares MD   Part of this note may be an electronic transcription/translation of spoken language to printed text using the Dragon Dictation System.        Lui Palomares MD  05/12/23 [id number]

## 2023-05-16 ENCOUNTER — TELEPHONE (OUTPATIENT)
Dept: CARDIOLOGY | Facility: CLINIC | Age: 73
End: 2023-05-16
Payer: OTHER GOVERNMENT

## 2023-05-16 DIAGNOSIS — I48.0 PAROXYSMAL ATRIAL FIBRILLATION: Primary | ICD-10-CM

## 2023-05-16 RX ORDER — DILTIAZEM HYDROCHLORIDE 240 MG/1
240 CAPSULE, COATED, EXTENDED RELEASE ORAL
Qty: 5 CAPSULE | Refills: 0 | Status: SHIPPED | OUTPATIENT
Start: 2023-05-16 | End: 2023-05-18 | Stop reason: SDUPTHER

## 2023-05-16 NOTE — TELEPHONE ENCOUNTER
Contracted the pt regarding the meds that [facility]. The mg is 240 mg 24 hr tablet of diltiazem. Our part was right. Pt will calling the [facility] to see what is going on.       Pt. Voiced understanding.        ----- Message from Joanne Gerard sent at 5/16/2023 11:06 AM CDT -----  Contact: [phone]  They got a box of Diltiazem with 5 pills in it.  Can you check what mg he is supposed to be taking and send a [facility] rx to [facility]

## 2023-05-18 ENCOUNTER — TELEPHONE (OUTPATIENT)
Dept: ADMINISTRATIVE | Facility: HOSPITAL | Age: 73
End: 2023-05-18
Payer: OTHER GOVERNMENT

## 2023-05-18 DIAGNOSIS — I48.0 PAROXYSMAL ATRIAL FIBRILLATION: ICD-10-CM

## 2023-05-18 RX ORDER — DILTIAZEM HYDROCHLORIDE 240 MG/1
240 CAPSULE, COATED, EXTENDED RELEASE ORAL
Qty: 30 CAPSULE | Refills: 11 | Status: SHIPPED | OUTPATIENT
Start: 2023-05-18

## 2023-05-30 ENCOUNTER — INFUSION (OUTPATIENT)
Dept: ONCOLOGY | Facility: HOSPITAL | Age: 73
End: 2023-05-30

## 2023-05-30 ENCOUNTER — OFFICE VISIT (OUTPATIENT)
Dept: ONCOLOGY | Facility: CLINIC | Age: 73
End: 2023-05-30

## 2023-05-30 VITALS
HEART RATE: 105 BPM | SYSTOLIC BLOOD PRESSURE: 109 MMHG | BODY MASS INDEX: 20.97 KG/M2 | TEMPERATURE: 97.6 F | DIASTOLIC BLOOD PRESSURE: 61 MMHG | OXYGEN SATURATION: 93 % | WEIGHT: 126 LBS | RESPIRATION RATE: 18 BRPM

## 2023-05-30 DIAGNOSIS — C34.81 MALIGNANT NEOPLASM OF OVERLAPPING SITES OF RIGHT LUNG: ICD-10-CM

## 2023-05-30 DIAGNOSIS — Z45.2 ENCOUNTER FOR VENOUS ACCESS DEVICE CARE: ICD-10-CM

## 2023-05-30 DIAGNOSIS — D64.9 ANEMIA, UNSPECIFIED TYPE: ICD-10-CM

## 2023-05-30 DIAGNOSIS — C34.81 MALIGNANT NEOPLASM OF OVERLAPPING SITES OF RIGHT LUNG: Primary | ICD-10-CM

## 2023-05-30 DIAGNOSIS — D72.828 OTHER ELEVATED WHITE BLOOD CELL (WBC) COUNT: ICD-10-CM

## 2023-05-30 DIAGNOSIS — R91.8 PULMONARY NODULES: Primary | ICD-10-CM

## 2023-05-30 DIAGNOSIS — Z86.711 HISTORY OF PULMONARY EMBOLISM: ICD-10-CM

## 2023-05-30 LAB
ALBUMIN SERPL-MCNC: 3.8 G/DL (ref 3.5–5.2)
ALBUMIN SERPL-MCNC: 3.9 G/DL (ref 3.5–5.2)
ALBUMIN/GLOB SERPL: 1.3 G/DL
ALBUMIN/GLOB SERPL: 1.5 G/DL
ALP SERPL-CCNC: 104 U/L (ref 39–117)
ALP SERPL-CCNC: 106 U/L (ref 39–117)
ALT SERPL W P-5'-P-CCNC: 14 U/L (ref 1–41)
ALT SERPL W P-5'-P-CCNC: 14 U/L (ref 1–41)
ANION GAP SERPL CALCULATED.3IONS-SCNC: 5 MMOL/L (ref 5–15)
ANION GAP SERPL CALCULATED.3IONS-SCNC: 6 MMOL/L (ref 5–15)
AST SERPL-CCNC: 13 U/L (ref 1–40)
AST SERPL-CCNC: 13 U/L (ref 1–40)
BASOPHILS # BLD AUTO: 0.13 10*3/MM3 (ref 0–0.2)
BASOPHILS NFR BLD AUTO: 1.5 % (ref 0–1.5)
BILIRUB SERPL-MCNC: <0.2 MG/DL (ref 0–1.2)
BILIRUB SERPL-MCNC: <0.2 MG/DL (ref 0–1.2)
BUN SERPL-MCNC: 13 MG/DL (ref 8–23)
BUN SERPL-MCNC: 13 MG/DL (ref 8–23)
BUN/CREAT SERPL: 27.1 (ref 7–25)
BUN/CREAT SERPL: 27.7 (ref 7–25)
CALCIUM SPEC-SCNC: 9.1 MG/DL (ref 8.6–10.5)
CALCIUM SPEC-SCNC: 9.1 MG/DL (ref 8.6–10.5)
CHLORIDE SERPL-SCNC: 100 MMOL/L (ref 98–107)
CHLORIDE SERPL-SCNC: 100 MMOL/L (ref 98–107)
CO2 SERPL-SCNC: 34 MMOL/L (ref 22–29)
CO2 SERPL-SCNC: 34 MMOL/L (ref 22–29)
CREAT SERPL-MCNC: 0.47 MG/DL (ref 0.76–1.27)
CREAT SERPL-MCNC: 0.48 MG/DL (ref 0.76–1.27)
DEPRECATED RDW RBC AUTO: 58.3 FL (ref 37–54)
EGFRCR SERPLBLD CKD-EPI 2021: 109 ML/MIN/1.73
EGFRCR SERPLBLD CKD-EPI 2021: 109.7 ML/MIN/1.73
EOSINOPHIL # BLD AUTO: 0.27 10*3/MM3 (ref 0–0.4)
EOSINOPHIL NFR BLD AUTO: 3.2 % (ref 0.3–6.2)
ERYTHROCYTE [DISTWIDTH] IN BLOOD BY AUTOMATED COUNT: 16.5 % (ref 12.3–15.4)
FERRITIN SERPL-MCNC: 799.3 NG/ML (ref 30–400)
FOLATE SERPL-MCNC: >20 NG/ML (ref 4.78–24.2)
GLOBULIN UR ELPH-MCNC: 2.6 GM/DL
GLOBULIN UR ELPH-MCNC: 2.9 GM/DL
GLUCOSE SERPL-MCNC: 157 MG/DL (ref 65–99)
GLUCOSE SERPL-MCNC: 159 MG/DL (ref 65–99)
HCT VFR BLD AUTO: 39.3 % (ref 37.5–51)
HGB BLD-MCNC: 12.5 G/DL (ref 13–17.7)
IMM GRANULOCYTES # BLD AUTO: 0.04 10*3/MM3 (ref 0–0.05)
IMM GRANULOCYTES NFR BLD AUTO: 0.5 % (ref 0–0.5)
IRON 24H UR-MRATE: 85 MCG/DL (ref 59–158)
IRON SATN MFR SERPL: 27 % (ref 20–50)
LYMPHOCYTES # BLD AUTO: 1.06 10*3/MM3 (ref 0.7–3.1)
LYMPHOCYTES NFR BLD AUTO: 12.5 % (ref 19.6–45.3)
MCH RBC QN AUTO: 30.1 PG (ref 26.6–33)
MCHC RBC AUTO-ENTMCNC: 31.8 G/DL (ref 31.5–35.7)
MCV RBC AUTO: 94.7 FL (ref 79–97)
MONOCYTES # BLD AUTO: 0.96 10*3/MM3 (ref 0.1–0.9)
MONOCYTES NFR BLD AUTO: 11.3 % (ref 5–12)
NEUTROPHILS NFR BLD AUTO: 6.02 10*3/MM3 (ref 1.7–7)
NEUTROPHILS NFR BLD AUTO: 71 % (ref 42.7–76)
NRBC BLD AUTO-RTO: 0 /100 WBC (ref 0–0.2)
PLATELET # BLD AUTO: 229 10*3/MM3 (ref 140–450)
PMV BLD AUTO: 10.4 FL (ref 6–12)
POTASSIUM SERPL-SCNC: 3.7 MMOL/L (ref 3.5–5.2)
POTASSIUM SERPL-SCNC: 3.8 MMOL/L (ref 3.5–5.2)
PROT SERPL-MCNC: 6.5 G/DL (ref 6–8.5)
PROT SERPL-MCNC: 6.7 G/DL (ref 6–8.5)
RBC # BLD AUTO: 4.15 10*6/MM3 (ref 4.14–5.8)
SODIUM SERPL-SCNC: 139 MMOL/L (ref 136–145)
SODIUM SERPL-SCNC: 140 MMOL/L (ref 136–145)
T4 FREE SERPL-MCNC: 1.22 NG/DL (ref 0.93–1.7)
TIBC SERPL-MCNC: 316 MCG/DL (ref 298–536)
TRANSFERRIN SERPL-MCNC: 212 MG/DL (ref 200–360)
TSH SERPL DL<=0.05 MIU/L-ACNC: 0.85 UIU/ML (ref 0.27–4.2)
VIT B12 BLD-MCNC: 1247 PG/ML (ref 211–946)
WBC NRBC COR # BLD: 8.48 10*3/MM3 (ref 3.4–10.8)

## 2023-05-30 PROCEDURE — 25010000002 PEMBROLIZUMAB 100 MG/4ML SOLUTION 4 ML VIAL: Performed by: INTERNAL MEDICINE

## 2023-05-30 PROCEDURE — 85025 COMPLETE CBC W/AUTO DIFF WBC: CPT

## 2023-05-30 PROCEDURE — 84466 ASSAY OF TRANSFERRIN: CPT | Performed by: INTERNAL MEDICINE

## 2023-05-30 PROCEDURE — 82746 ASSAY OF FOLIC ACID SERUM: CPT | Performed by: INTERNAL MEDICINE

## 2023-05-30 PROCEDURE — 84443 ASSAY THYROID STIM HORMONE: CPT

## 2023-05-30 PROCEDURE — 82607 VITAMIN B-12: CPT | Performed by: INTERNAL MEDICINE

## 2023-05-30 PROCEDURE — 80053 COMPREHEN METABOLIC PANEL: CPT

## 2023-05-30 PROCEDURE — 84439 ASSAY OF FREE THYROXINE: CPT

## 2023-05-30 PROCEDURE — 83540 ASSAY OF IRON: CPT | Performed by: INTERNAL MEDICINE

## 2023-05-30 PROCEDURE — 80053 COMPREHEN METABOLIC PANEL: CPT | Performed by: INTERNAL MEDICINE

## 2023-05-30 PROCEDURE — 82728 ASSAY OF FERRITIN: CPT | Performed by: INTERNAL MEDICINE

## 2023-05-30 RX ORDER — FAMOTIDINE 10 MG/ML
20 INJECTION, SOLUTION INTRAVENOUS AS NEEDED
Status: DISCONTINUED | OUTPATIENT
Start: 2023-05-30 | End: 2023-05-30 | Stop reason: HOSPADM

## 2023-05-30 RX ORDER — DIPHENHYDRAMINE HYDROCHLORIDE 50 MG/ML
50 INJECTION INTRAMUSCULAR; INTRAVENOUS AS NEEDED
Status: DISCONTINUED | OUTPATIENT
Start: 2023-05-30 | End: 2023-05-30 | Stop reason: HOSPADM

## 2023-05-30 RX ORDER — HEPARIN SODIUM (PORCINE) LOCK FLUSH IV SOLN 100 UNIT/ML 100 UNIT/ML
500 SOLUTION INTRAVENOUS AS NEEDED
OUTPATIENT
Start: 2023-05-30

## 2023-05-30 RX ORDER — FAMOTIDINE 10 MG/ML
20 INJECTION, SOLUTION INTRAVENOUS AS NEEDED
Status: CANCELLED | OUTPATIENT
Start: 2023-05-30

## 2023-05-30 RX ORDER — SODIUM CHLORIDE 9 MG/ML
250 INJECTION, SOLUTION INTRAVENOUS ONCE
Status: CANCELLED | OUTPATIENT
Start: 2023-05-30

## 2023-05-30 RX ORDER — POTASSIUM CHLORIDE 1500 MG/1
TABLET, EXTENDED RELEASE ORAL
COMMUNITY
Start: 2023-05-13

## 2023-05-30 RX ORDER — CYANOCOBALAMIN 1000 UG/ML
1000 INJECTION, SOLUTION INTRAMUSCULAR; SUBCUTANEOUS ONCE
Status: COMPLETED | OUTPATIENT
Start: 2023-05-30 | End: 2023-05-30

## 2023-05-30 RX ORDER — DIPHENHYDRAMINE HYDROCHLORIDE 50 MG/ML
50 INJECTION INTRAMUSCULAR; INTRAVENOUS AS NEEDED
Status: CANCELLED | OUTPATIENT
Start: 2023-05-30

## 2023-05-30 RX ORDER — SODIUM CHLORIDE 0.9 % (FLUSH) 0.9 %
10 SYRINGE (ML) INJECTION AS NEEDED
OUTPATIENT
Start: 2023-05-30

## 2023-05-30 RX ORDER — HEPARIN SODIUM (PORCINE) LOCK FLUSH IV SOLN 100 UNIT/ML 100 UNIT/ML
500 SOLUTION INTRAVENOUS AS NEEDED
Status: DISCONTINUED | OUTPATIENT
Start: 2023-05-30 | End: 2023-05-30 | Stop reason: HOSPADM

## 2023-05-30 RX ORDER — SODIUM CHLORIDE 9 MG/ML
250 INJECTION, SOLUTION INTRAVENOUS ONCE
Status: COMPLETED | OUTPATIENT
Start: 2023-05-30 | End: 2023-05-30

## 2023-05-30 RX ORDER — SODIUM CHLORIDE 0.9 % (FLUSH) 0.9 %
10 SYRINGE (ML) INJECTION AS NEEDED
Status: DISCONTINUED | OUTPATIENT
Start: 2023-05-30 | End: 2023-05-30 | Stop reason: HOSPADM

## 2023-05-30 RX ORDER — CYANOCOBALAMIN 1000 UG/ML
1000 INJECTION, SOLUTION INTRAMUSCULAR; SUBCUTANEOUS ONCE
Status: CANCELLED | OUTPATIENT
Start: 2023-05-30 | End: 2023-05-30

## 2023-05-30 RX ADMIN — CYANOCOBALAMIN 1000 MCG: 1000 INJECTION, SOLUTION INTRAMUSCULAR; SUBCUTANEOUS at 11:18

## 2023-05-30 RX ADMIN — HEPARIN SODIUM (PORCINE) LOCK FLUSH IV SOLN 100 UNIT/ML 500 UNITS: 100 SOLUTION at 12:14

## 2023-05-30 RX ADMIN — SODIUM CHLORIDE 200 MG: 9 INJECTION, SOLUTION INTRAVENOUS at 11:17

## 2023-05-30 RX ADMIN — Medication 10 ML: at 12:14

## 2023-05-30 RX ADMIN — SODIUM CHLORIDE 250 ML: 9 INJECTION, SOLUTION INTRAVENOUS at 11:17

## 2023-05-30 NOTE — PROGRESS NOTES
DATE OF VISIT: 5/30/2023      REASON FOR VISIT: Metastatic lung adenocarcinoma, history of pulmonary embolism, leukocytosis, iron deficiency anemia, iron malabsorption, hypokalemia, swelling of lower extremity,      HISTORY OF PRESENT ILLNESS:   72-year-old male with medical problem consisting of hypertension, history of pulmonary embolism diagnosed in 2015 for which patient took Coumadin for 1 year, chronic obstructive pulmonary disease, history of nicotine addiction that he quit in 2008, metastatic lung adenocarcinoma with bone metastasis for which patient was started on treatment with carboplatin, Alimta, Keytruda on April 11, 2023.  Patient is here to get cycle 3 of treatment consisting of Keytruda today.  Recently was diagnosed with C. difficile infection for which she completed antibiotic.  Complains of chronic shortness of breath and swelling of lower extremity.  Denies any worsening hemoptysis.  Complains of chronic neuropathy affecting lower extremity without any recent worsening.  Complains of fatigue.  Denies any fevers.  Denies any new lymph node enlargement.                  Past Medical History, Past Surgical History, Social History, Family History have been reviewed and are without significant changes except as mentioned.    Review of Systems   A comprehensive 14 point review of systems was performed and was negative except as mentioned in HPI.    Medications:  The current medication list was reviewed in the EMR    ALLERGIES:    Allergies   Allergen Reactions   • Amoxicillin Anaphylaxis   • Albuterol Irritability     Facial flushing and palpitations.   • Asmanex (120 Metered Doses) [Mometasone Furoate] Unknown - Low Severity   • Gabapentin Hallucinations   • Lortab [Hydrocodone-Acetaminophen] Other (See Comments)     Can't remember   • Morphine And Related Hallucinations   • Prilosec [Omeprazole] Other (See Comments)     unknown   • Sodium Clavulanate Unknown - High Severity   • Statins GI Intolerance    • Sulfa Antibiotics Other (See Comments)     Can't remember also more and can't remember   • Symbicort [Budesonide-Formoterol Fumarate] Unknown (See Comments)     Doesn't remember reaction type       Objective      Vitals:    05/30/23 0816   BP: 109/61   Pulse: 105   Resp: 18   Temp: 97.6 °F (36.4 °C)   SpO2: 93%   Weight: 57.2 kg (126 lb)   PainSc: 0-No pain         5/2/2023    11:26 AM   Current Status   ECOG score 3       Physical Exam  Cardiovascular:      Comments: Port-A-Cath present  Neurological:      Mental Status: He is alert and oriented to person, place, and time.           RECENT LABS:  Glucose   Date Value Ref Range Status   05/30/2023 157 (H) 65 - 99 mg/dL Final     Glucose, Arterial   Date Value Ref Range Status   02/06/2018 106 mmol/L Final     Sodium   Date Value Ref Range Status   05/30/2023 139 136 - 145 mmol/L Final     Potassium   Date Value Ref Range Status   05/30/2023 3.7 3.5 - 5.2 mmol/L Final     CO2   Date Value Ref Range Status   05/30/2023 34.0 (H) 22.0 - 29.0 mmol/L Final     Chloride   Date Value Ref Range Status   05/30/2023 100 98 - 107 mmol/L Final     Anion Gap   Date Value Ref Range Status   05/30/2023 5.0 5.0 - 15.0 mmol/L Final     Creatinine   Date Value Ref Range Status   05/30/2023 0.48 (L) 0.76 - 1.27 mg/dL Final     BUN   Date Value Ref Range Status   05/30/2023 13 8 - 23 mg/dL Final     BUN/Creatinine Ratio   Date Value Ref Range Status   05/30/2023 27.1 (H) 7.0 - 25.0 Final     Calcium   Date Value Ref Range Status   05/30/2023 9.1 8.6 - 10.5 mg/dL Final     eGFR Non  Amer   Date Value Ref Range Status   11/08/2021 81 >60 mL/min/1.73 Final     Alkaline Phosphatase   Date Value Ref Range Status   05/30/2023 104 39 - 117 U/L Final     Total Protein   Date Value Ref Range Status   05/30/2023 6.5 6.0 - 8.5 g/dL Final     ALT (SGPT)   Date Value Ref Range Status   05/30/2023 14 1 - 41 U/L Final     AST (SGOT)   Date Value Ref Range Status   05/30/2023 13 1 - 40 U/L  Final     Total Bilirubin   Date Value Ref Range Status   05/30/2023 <0.2 0.0 - 1.2 mg/dL Final     Albumin   Date Value Ref Range Status   05/30/2023 3.9 3.5 - 5.2 g/dL Final     Globulin   Date Value Ref Range Status   05/30/2023 2.6 gm/dL Final     Lab Results   Component Value Date    WBC 8.48 05/30/2023    HGB 12.5 (L) 05/30/2023    HCT 39.3 05/30/2023    MCV 94.7 05/30/2023     05/30/2023     Lab Results   Component Value Date    NEUTROABS 6.02 05/30/2023    IRON 85 05/30/2023    IRON 36 (L) 04/10/2023    IRON 39 (L) 02/10/2023    TIBC 316 05/30/2023    TIBC 346 04/10/2023    TIBC 331 02/10/2023    LABIRON 27 05/30/2023    LABIRON 10 (L) 04/10/2023    LABIRON 12 (L) 02/10/2023    FERRITIN 799.30 (H) 05/30/2023    FERRITIN 235.30 04/10/2023    FERRITIN 266.00 02/10/2023    VMAKOIXX91 1,010 (H) 04/10/2023    QXABOFNM25 370 02/10/2023    FOLATE >20.00 04/10/2023    FOLATE 9.81 02/10/2023     No results found for: , LABCA2, AFPTM, HCGQUANT, , CHROMGRNA, [id number], CEA, REFLABREPO      PATHOLOGY:  * Cannot find OR log *         RADIOLOGY DATA :  No radiology results for the last 7 days        Assessment & Plan     1.  Metastatic lung adenocarcinoma with bone metastasis stage IV, tumor mutation burden high, PD-L1 60%, MSI stable, EGFR negative, MET, ROS negative,  - Patient is currently on treatment with carboplatin Alimta and Keytruda since April 2013  - Since PD-L1 1 is positive at 60% treatment has been changed to single agent Keytruda with cycle 2.  - We will proceed with cycle 3 consisting of Keytruda today  - Patient will return to clinic in 3 weeks with next treatment  - We will continue with close monitoring for immune mediated side effect  - Plan is to repeat PET/CT after cycle 4 of treatment which has been discussed with patient    2.  Iron deficiency anemia:  - Due to iron malabsorption patient has received 2 doses of Injectafer.  - Hemoglobin is 12.5  - Remains on monthly B12 injection  with folic acid p.o. daily  - We will repeat anemia work-up in June 2023    3.  History of pulmonary embolism  - Patient was diagnosed with pulmonary embolism in 2015 for which patient took Coumadin for 1 year  - We will continue with clinical surveillance    4.  Hypokalemia  - Potassium is 3.7    5.  Health maintenance: Patient does not smoke    6.  Advance care planning:  - Patient remains full code.  Has a living will on chart.                     PHQ-9 Total Score: 0   -Patient is not homicidal or suicidal.  No acute intervention required.    Brian Garcia reports a pain score of 0.  Given his pain assessment as noted, treatment options were discussed and the following options were decided upon as a follow-up plan to address the patient's pain: continuation of current treatment plan for pain.         Omega Davis MD  5/30/2023  09:34 CDT        Part of this note may be an electronic transcription/translation of spoken language to printed text using the Dragon Dictation System.          CC:

## 2023-05-31 ENCOUNTER — TELEPHONE (OUTPATIENT)
Dept: ONCOLOGY | Facility: HOSPITAL | Age: 73
End: 2023-05-31

## 2023-05-31 ENCOUNTER — DOCUMENTATION (OUTPATIENT)
Dept: NUTRITION | Facility: HOSPITAL | Age: 73
End: 2023-05-31

## 2023-05-31 NOTE — TELEPHONE ENCOUNTER
Family called and states pt has dental appt on 6/6 for routine cleaning. Just wanted to make sure it was ok.

## 2023-05-31 NOTE — PROGRESS NOTES
Adult Outpatient Nutrition  Assessment    Patient Name:  Brian Garcia  YOB: 1950  MRN: [id number]    Assessment Date:  Entry from 5/30/2023    Comments: Follow-up to check nutrition. Wt 126 lb (down). Alb 3.9. Receives chemo due to lung cancer. Wife stated pt's intake better since recovered from cdiff. Reinforced importance of nutrition/hydration.                     Electronically signed by:  Antonia Kapoor RD  05/31/23 10:17 CDT

## 2023-06-01 NOTE — TELEPHONE ENCOUNTER
Contacted Annette and advised per Dr Davis. Annette verbalizes understanding and denies any further questions.

## 2023-06-13 ENCOUNTER — OFFICE VISIT (OUTPATIENT)
Dept: ORTHOPEDIC SURGERY | Facility: CLINIC | Age: 73
End: 2023-06-13
Payer: OTHER GOVERNMENT

## 2023-06-13 VITALS — WEIGHT: 126.5 LBS | HEIGHT: 65 IN | BODY MASS INDEX: 21.08 KG/M2

## 2023-06-13 DIAGNOSIS — M25.511 CHRONIC RIGHT SHOULDER PAIN: Primary | ICD-10-CM

## 2023-06-13 DIAGNOSIS — M75.111 NONTRAUMATIC INCOMPLETE TEAR OF RIGHT ROTATOR CUFF: ICD-10-CM

## 2023-06-13 DIAGNOSIS — G89.29 CHRONIC RIGHT SHOULDER PAIN: Primary | ICD-10-CM

## 2023-06-13 DIAGNOSIS — M19.011 PRIMARY OSTEOARTHRITIS OF RIGHT SHOULDER: ICD-10-CM

## 2023-06-13 DIAGNOSIS — M75.101 ROTATOR CUFF SYNDROME OF RIGHT SHOULDER: ICD-10-CM

## 2023-06-13 DIAGNOSIS — M19.011 ARTHROSIS OF RIGHT ACROMIOCLAVICULAR JOINT: ICD-10-CM

## 2023-06-13 DIAGNOSIS — M25.619 LIMITED RANGE OF MOTION (ROM) OF SHOULDER: ICD-10-CM

## 2023-06-13 DIAGNOSIS — M75.41 IMPINGEMENT SYNDROME OF RIGHT SHOULDER: ICD-10-CM

## 2023-06-13 RX ADMIN — LIDOCAINE HYDROCHLORIDE 2 ML: 10 INJECTION, SOLUTION INFILTRATION; PERINEURAL at 10:51

## 2023-06-13 RX ADMIN — TRIAMCINOLONE ACETONIDE 40 MG: 40 INJECTION, SUSPENSION INTRA-ARTICULAR; INTRAMUSCULAR at 10:51

## 2023-06-13 NOTE — PROGRESS NOTES
"Brian Garcia is a 72 y.o. male returns for     Chief Complaint   Patient presents with   • Right Shoulder - Follow-up, Pain     HISTORY OF PRESENT ILLNESS: Patient presents to office accompanied by his spouse for follow-up of chronic right shoulder pain.  Initial onset of pain occurred approximately 9 months ago with no known injury or incident.  Patient established care with orthopedics on 2/14/2023 and was given a subacromial injection of steroid, which offered good pain relief.  Prior to that, he had tried rest/activity modification, OTC anti-inflammatories, Tylenol and muscle relaxants with minimal improvement.  Previous MRI imaging of the right shoulder performed in February 2023 showed evidence of a partial, but full-thickness tear involving the anterior fibers of the supraspinatus tendon as well as evidence of partial tearing of the remaining portion of the intact supraspinatus tendon.  There is also evidence of moderate osteoarthritic changes of both the glenohumeral and AC joints.  Patient previously declined a referral to physical therapy.  Patient reports that his right shoulder pain has been gradually worsening/returning in the past 1 to 2 weeks.  He denies any falls or injuries.  No new complaints or concerns noted since last office visit.  Patient indicates he would like to proceed with a repeat injection today in an effort to improve his pain again.  Current pain scale is 0/10 (while at rest).    CONCURRENT MEDICAL HISTORY:    The following portions of the patient's history were reviewed and updated as appropriate: allergies, current medications, past family history, past medical history, past social history, past surgical history and problem list.     ROS  No fevers or chills.  No chest pain or shortness of air.  No GI or  disturbances.  Right shoulder pain.    PHYSICAL EXAMINATION:       Ht 165.1 cm (65\")   Wt 57.4 kg (126 lb 8 oz)   BMI 21.05 kg/m²     Physical Exam    GAIT:     []  " Normal  []  Antalgic    Assistive device: []  None  []  Walker     []  Crutches  []  Cane     [x]  Wheelchair  []  Stretcher      Physical Exam:   General Appearance:    Alert, cooperative, in no acute distress.                        Patient is alert and oriented ×3. No acute distress.  Affect is normal. Respiratory rate is normal and unlabored.  Subjective complaints and physical exam of the right shoulder unchanged from prior office visits.  No signs of infection noted.    MRI Shoulder Right Without Contrast     Result Date: 2/21/2023  Narrative: EXAM: MRI SHOULDER RIGHT WO CONTRAST CLINICAL INDICATION: Right shoulder pain COMPARISON: There is no previous study for comparison. TECHNIQUE: The MRI was done using coronal T1, PD and T2 fat sat, sagittal T1 and T2 fat sat and axial T1 and T2 fat sat images. FINDINGS: The subscapularis is intact. There is a full-thickness tear involving anterior fibers of the supraspinatus at the insertion which measures 8.9 x 14.3 mm, with partial tear of the remainder of the tendon. The infraspinatus and teres minor are intact. There are moderate degenerative changes of the glenohumeral and AC joints. The visualized osseous structures otherwise have normal morphology and signal characteristics with no evidence of bone marrow edema, occult fractures, or marrow-replacing bone lesions. The glenoid labrum is grossly unremarkable.      Impression: 1. Focal full-thickness tear involving anterior fibers of the supraspinatus at the insertion, with partial tear of the remainder of the tendon. 2. Moderate degenerative joint disease of the glenohumeral and AC joints.  Electronically signed by:  Emmanuel Esparza MD  2/21/2023 5:15 PM [facility] Workstation: [id number]     RADIOLOGY     Result Date: 2/8/2023  Narrative: This result has an attachment that is not available.     PROCEDURE:  Right  Shoulder 3 views     REASON FOR EXAM:  pain, decreased range of motion, M25.511 Pain  in right shoulder M25.60  Stiffness of unspecified joint, not  elsewhere classified     FINDINGS: Bony structures of the right shoulder normal. There is  no evidence of fracture or dislocation. Soft tissue structures  are normal.        IMPRESSION:  Negative shoulder.     Electronically signed by:  Guero Davey MD  12/12/2022 1:47 PM CST  Workstation: [id number]      ASSESSMENT:    Diagnoses and all orders for this visit:    Chronic right shoulder pain  -     Large Joint Arthrocentesis: R subacromial bursa    Arthrosis of right acromioclavicular joint  -     Large Joint Arthrocentesis: R subacromial bursa    Impingement syndrome of right shoulder  -     Large Joint Arthrocentesis: R subacromial bursa    Rotator cuff syndrome of right shoulder  -     Large Joint Arthrocentesis: R subacromial bursa    Limited range of motion (ROM) of shoulder  -     Large Joint Arthrocentesis: R subacromial bursa    Nontraumatic incomplete tear of right rotator cuff  -     Large Joint Arthrocentesis: R subacromial bursa    Primary osteoarthritis of right shoulder  -     Large Joint Arthrocentesis: R subacromial bursa    PLAN    Large Joint Arthrocentesis: R subacromial bursa  Date/Time: 6/13/2023 10:51 AM  Consent given by: patient  Timeout: Immediately prior to procedure a time out was called to verify the correct patient, procedure, equipment, support staff and site/side marked as required   Supporting Documentation  Indications: pain and diagnostic evaluation   Procedure Details  Location: shoulder - R subacromial bursa  Preparation: Patient was prepped and draped in the usual sterile fashion  Needle size: 22 G  Approach: posterior  Medications administered: 2 mL lidocaine 1 %; 40 mg triamcinolone acetonide 40 MG/ML  Patient tolerance: patient tolerated the procedure well with no immediate complications      Patient complains of gradually worsening/returning chronic right shoulder pain in the past 1 to 2 weeks with no known injury or incident.  Patient has  "known moderate osteoarthritic changes as well as partial full-thickness tearing of the supraspinatus tendon based on previous MRI and x-ray imaging.  The patient had excellent pain relief with a previous subacromial injection of steroid given 4 months ago.  We again discussed continued conservative management versus surgical consult.  Patient states he is not interested in surgical intervention unless it is a \"life or death\" situation.  As previously noted, the patient is not a good surgical candidate based on his complex comorbid medical conditions, primarily issues with advanced COPD and oxygen dependency.  He also tells me today that he has recently been diagnosed with stage IV lung cancer.  I have recommended that we continue with conservative management of his chronic right shoulder pain.  Recommend proceeding today with a repeat subacromial injection of steroid to the right shoulder for management of joint pain/inflammation.    We had previously discussed and I had recommended a referral to physical therapy with a focus on conditioning and strengthening exercises but the patient declined this option.  I would continue to recommend physical therapy as an adjunctive treatment for his chronic right shoulder pain and if the patient ever changes his mind and would like to proceed with physical therapy I will be happy to place that order at any time.    Recommend the following:      -Rest and activity modification with avoidance of heavy lifting, pulling, tugging and repetitive motions for now.  -Gentle, progressive range of motion exercises with the affected shoulder intermittently several times daily as pain allows.  -Ice therapy to the affected shoulder intermittently 3-4 times daily for 15 minutes at a time to minimize pain/inflammation.  -Tylenol, Aleve or Ibuprofen as needed for pain/discomfort.  -Gradual progression of activity and use of the affected arm/shoulder as tolerated and based on " pain/symptoms.     Follow-up as needed for any new, worsening or persistent symptoms.    EMR Dragon/Transciption Disclaimer: Some of this note may be an electronic transcription/translation of spoken language to printed text using the Dragon Dictation System.     Return if symptoms worsen or fail to improve.        This document has been electronically signed by GUIDO Perez on June 14, 2023 14:58 CDT      GUIDO Perez

## 2023-06-14 RX ORDER — LIDOCAINE HYDROCHLORIDE 10 MG/ML
2 INJECTION, SOLUTION INFILTRATION; PERINEURAL
Status: COMPLETED | OUTPATIENT
Start: 2023-06-13 | End: 2023-06-13

## 2023-06-14 RX ORDER — TRIAMCINOLONE ACETONIDE 40 MG/ML
40 INJECTION, SUSPENSION INTRA-ARTICULAR; INTRAMUSCULAR
Status: COMPLETED | OUTPATIENT
Start: 2023-06-13 | End: 2023-06-13

## 2023-07-19 PROBLEM — J18.9 PNEUMONIA: Status: RESOLVED | Noted: 2023-04-21 | Resolved: 2023-07-19

## 2023-07-19 PROBLEM — R91.8 PULMONARY NODULES: Chronic | Status: RESOLVED | Noted: 2021-08-02 | Resolved: 2023-07-19

## 2023-07-19 PROBLEM — J18.1 LUNG CONSOLIDATION: Status: RESOLVED | Noted: 2023-03-06 | Resolved: 2023-07-19

## 2023-07-19 PROBLEM — J96.02 ACUTE RESPIRATORY FAILURE WITH HYPOXIA AND HYPERCAPNIA: Status: RESOLVED | Noted: 2023-04-21 | Resolved: 2023-07-19

## 2023-07-19 PROBLEM — J96.01 ACUTE RESPIRATORY FAILURE WITH HYPOXIA AND HYPERCAPNIA: Status: RESOLVED | Noted: 2023-04-21 | Resolved: 2023-07-19

## 2023-07-28 ENCOUNTER — OFFICE VISIT (OUTPATIENT)
Dept: CARDIOLOGY | Facility: CLINIC | Age: 73
End: 2023-07-28
Payer: OTHER GOVERNMENT

## 2023-07-28 VITALS
HEIGHT: 65 IN | WEIGHT: 127.4 LBS | BODY MASS INDEX: 21.23 KG/M2 | DIASTOLIC BLOOD PRESSURE: 66 MMHG | SYSTOLIC BLOOD PRESSURE: 120 MMHG | OXYGEN SATURATION: 94 % | HEART RATE: 82 BPM

## 2023-07-28 DIAGNOSIS — R42 DIZZINESS: ICD-10-CM

## 2023-07-28 DIAGNOSIS — R00.2 PALPITATIONS: ICD-10-CM

## 2023-07-28 DIAGNOSIS — I48.0 PAROXYSMAL ATRIAL FIBRILLATION: Primary | ICD-10-CM

## 2023-07-28 LAB
QT INTERVAL: 354 MS
QTC INTERVAL: 413 MS

## 2023-07-28 PROCEDURE — 93000 ELECTROCARDIOGRAM COMPLETE: CPT | Performed by: INTERNAL MEDICINE

## 2023-07-28 PROCEDURE — 99214 OFFICE O/P EST MOD 30 MIN: CPT | Performed by: INTERNAL MEDICINE

## 2023-07-28 NOTE — PROGRESS NOTES
[facility]  OFFICE NOTE    Cardiovascular Medicine  Neville Meeks M.D., [facility]         No referring provider defined for this encounter.    History of Present Illness    Brian Garcia is a 72 y.o. male who presents for a follow up visit today.  He is transitioning care from Dr. Rose to me.    According to Dr. Rose's prior notes, he has a known history of paroxysmal atrial fibrillation.  He is on a regimen of Eliquis 5 mg oral twice daily and diltiazem 180 mg orally daily.  He was diagnosed with A. fib on MCT study in August 2021.   He is also taking baby aspirin.  He denies having any prior history of heart attacks, stroke or peripheral vascular disease.  He has advanced COPD and is dependent on 4 L/min nasal cannula oxygen at all times.  He denies having any chest discomfort, palpitations, lightheadedness, dizziness, presyncope or syncope.  Has not any PND, orthopnea or leg swelling.    3/1/2023:  He presented today for a follow-up visit.  In December 2022, he had a hemorrhagic stroke.  According to him, this was thought to be related to trauma and/or hypertension.  His Eliquis was stopped.  Fortunately, he has recovered reasonably well.  He is currently using a walker to walk and has been largely able to do everything by himself.  He reports weakness all over his body.  His dyspnea remains stable.  He has not had any chest discomfort, palpitations, dizziness, presyncope or syncope since hospital discharge.    7/28/2023:  He presented today for a follow-up visit accompanied by his wife.  He continues to stay off Eliquis.  He is taking diltiazem.  He is undergoing chemotherapy for metastatic lung adenocarcinoma.  He has not had any significant palpitations or chest discomfort.  He does have significant dyspnea related to his lung comorbidities.  He has not any PND, orthopnea or leg swelling.  He does report feeling dizzy frequently, has not had any presyncope or  syncope.  His pulse has come down to 60s beats per minute many times recently; this is very unusual for him.    Review of Systems - ROS  Constitution: Negative for weight gain.   HENT: Negative for congestion.    Eyes: Negative for blurred vision.   Cardiovascular: As mentioned above  Respiratory: Negative for hemoptysis.    Endocrine: Negative for polydipsia and polyuria.   Hematologic/Lymphatic: Negative for active bleeding problem.   Skin: Negative for flushing.   Musculoskeletal: Negative for neck pain and stiffness.   Gastrointestinal: Negative for abdominal pain, nausea and vomiting.   Genitourinary: Negative for dysuria and hematuria.   Neurological: Positive for diffuse weakness.  Negative for speech difficulties.  Psychiatric/Behavioral: Negative for altered mental status and depression.      All other systems were reviewed and were negative.    Past Medical History:   Diagnosis Date    A-fib     Anesthesia     hip surgery (spinal caused him unpleasant sensations never wants it again)    Arthritis     Blood in sputum 02/22/2023    bright red blood, teaspoon - tablespoon daily    COPD (chronic obstructive pulmonary disease)     CVA (cerebral vascular accident) 12/24/2022    Hearing aid worn     bilateral    History of pulmonary embolism     History of recurrent pneumonia     Hypertension     Lung cancer 03/17/2023    Oxygen dependent     since @ age 65    Pulmonary nodules     Risk for falls     uses walker    Rotator cuff syndrome of right shoulder     Tachycardia     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 HTN/Eliquis    Wears glasses     readers    Wears glasses     reading       Family History:  family history includes Brain cancer in his brother; Cancer in his brother and sister; No Known Problems in his daughter, daughter, and son.    Social History: He quit smoking in 2008.  Denies current alcohol or illicit drug abuse.   reports that he quit smoking about 15 years ago. His smoking use  included cigarettes. He started smoking about 62 years ago. He has a 141.00 pack-year smoking history. He has never used smokeless tobacco. He reports that he does not currently use alcohol. He reports that he does not use drugs.    Allergies:  Allergies   Allergen Reactions    Amoxicillin Anaphylaxis    Albuterol Irritability     Facial flushing and palpitations.    Asmanex (120 Metered Doses) [Mometasone Furoate] Unknown - Low Severity    Gabapentin Hallucinations    Lortab [Hydrocodone-Acetaminophen] Other (See Comments)     Can't remember    Morphine And Related Hallucinations    Prilosec [Omeprazole] Other (See Comments)     unknown    Sodium Clavulanate Unknown - High Severity    Statins GI Intolerance    Sulfa Antibiotics Other (See Comments)     Can't remember also more and can't remember    Symbicort [Budesonide-Formoterol Fumarate] Unknown (See Comments)     Doesn't remember reaction type         Current Outpatient Medications:     cholecalciferol (VITAMIN D3) 10 MCG (400 UNIT) tablet, Take 1 tablet by mouth Daily. 50 mcg daily, Disp: , Rfl:     dilTIAZem CD (CARDIZEM CD) 240 MG 24 hr capsule, Take 1 capsule by mouth Daily., Disp: 30 capsule, Rfl: 11    ferrous sulfate 324 MG tablet delayed-release, Take 1 tablet by mouth Daily With Breakfast., Disp: 90 tablet, Rfl: 0    folic acid (FOLVITE) 1 MG tablet, Take 1 tablet by mouth Daily., Disp: 90 tablet, Rfl: 1    furosemide (LASIX) 20 MG tablet, Take 1 tablet by mouth Daily As Needed (Swelling of lower extremity)., Disp: 7 tablet, Rfl: 0    ipratropium (ATROVENT HFA) 17 MCG/ACT inhaler, Inhale 2 puffs 4 (Four) Times a Day As Needed for Wheezing., Disp: , Rfl:     KLOR-CON 20 MEQ CR tablet, TAKE 2 TABLETS BY MOUTH TWICE A DAY FOR 2 DAYS, Disp: , Rfl:     melatonin 5 MG tablet tablet, Take 1 tablet by mouth At Night As Needed., Disp: , Rfl:     montelukast (SINGULAIR) 10 MG tablet, Take 1 tablet by mouth Every Night., Disp: , Rfl:     ondansetron (ZOFRAN) 8 MG  "tablet, Take 1 tablet by mouth 3 (Three) Times a Day As Needed for Nausea or Vomiting., Disp: 30 tablet, Rfl: 3    predniSONE (DELTASONE) 5 MG tablet, Take 1 tablet by mouth Every Other Day., Disp: , Rfl:     tiotropium bromide-olodaterol (Stiolto Respimat) 2.5-2.5 MCG/ACT aerosol solution inhaler, Inhale 2 puffs Daily., Disp: 3 each, Rfl: 3    TiZANidine (ZANAFLEX) 2 MG capsule, Take 1 capsule by mouth At Night As Needed for Muscle Spasms., Disp: , Rfl:     traMADol (ULTRAM) 50 MG tablet, Take 1 tablet by mouth Every 6 (Six) Hours As Needed for Moderate Pain for up to 12 doses., Disp: 12 tablet, Rfl: 0    vitamin B-12 (CYANOCOBALAMIN) 1000 MCG tablet, Take 1 tablet by mouth Daily., Disp: 90 tablet, Rfl: 1    dexamethasone (DECADRON) 4 MG tablet, Take 1 tablet twice daily starting the day before chemo, day of chemo, and day after chemo.  Take with food. (Patient not taking: Reported on 7/19/2023), Disp: 6 tablet, Rfl: 3    guaiFENesin (MUCINEX) 600 MG 12 hr tablet, Take 1 tablet by mouth Every 12 (Twelve) Hours. (Patient not taking: Reported on 7/19/2023), Disp: 30 tablet, Rfl: 0    ipratropium (ATROVENT) 0.02 % nebulizer solution, Take 2.5 mL by nebulization 3 (Three) Times a Day. Sub amount for how much insurance allows (Patient not taking: Reported on 7/19/2023), Disp: 670 mL, Rfl: 0    OLANZapine (zyPREXA) 5 MG tablet, Take 1 tablet by mouth Every Night. Take on days 2, 3 and 4 after chemotherapy. (Patient not taking: Reported on 7/19/2023), Disp: 3 tablet, Rfl: 3    Physical Exam:  Vitals:    07/28/23 1106   BP: 120/66   BP Location: Left arm   Patient Position: Sitting   Cuff Size: Adult   Pulse: 82   SpO2: 94%   Weight: 57.8 kg (127 lb 6.4 oz)   Height: 165.1 cm (65\")   PainSc: 0-No pain     Current Pain Level: none  Pulse Ox: Normal on nasal cannula oxygen  General: alert, appears stated age and cooperative, seated in wheelchair  Body Habitus: Well-nourished  HEENT: Head: Normocephalic, no lesions, without " obvious abnormality.     Neuro: alert, oriented x3  JVP: Volume/Pulsation: Normal.  Normal waveforms.   Carotid Exam: no bruit normal pulsation bilaterally     Respirations: no increased work of breathing    Pulmonary: Diminished breath sounds, no crackles or wheezes   Heart rate: normal    Heart Rhythm: regular     Heart Sounds: S1: normal  S2: normal  S3: absent      Abdomen:   Appearance: normal .  Palpation: Soft, non-tender to palpation, bowel sounds positive in all four quadrants  Extremity: no significant pitting lower extremity edema.     DATA REVIEWED:     EKG. I personally reviewed and interpreted the EKG.  normal sinus rhythm, incomplete RBBB, nonspecific T wave abnormality on 9/14/2022    ECG/EMG Results (all)       Procedure Component Value Units Date/Time    ECG 12 Lead [[id number]] Collected: 09/14/22 1328     Updated: 09/14/22 1335     QT Interval 364 ms      QTC Interval 419 ms     Narrative:      Test Reason : a fib  Blood Pressure :   */*   mmHG  Vent. Rate :  80 BPM     Atrial Rate :  80 BPM     P-R Int : 138 ms          QRS Dur : 102 ms      QT Int : 364 ms       P-R-T Axes :  92  81  81 degrees     QTc Int : 419 ms    Normal sinus rhythm  Incomplete right bundle branch block  Borderline ECG  When compared with ECG of 12-JUL-2021 16:32,  No significant change was found    Referred By: WYATT           Confirmed By:           ---------------------------------------------------  TTE/LUCILLE:  Results for orders placed during the hospital encounter of 04/21/23    Adult Transthoracic Echo Complete w/ Color, Spectral and Contrast if necessary per protocol    Interpretation Summary    Left ventricular ejection fraction appears to be 51 - 55%.    Left ventricular diastolic function is consistent with age.    Estimated right ventricular systolic pressure from tricuspid regurgitation is normal (<35 mmHg).      -----------------------------------------------------  CXR/Imaging:   Imaging Results (Most Recent)        None            -----------------------------------------------------  CT:   NM PET/CT Skull Base to Mid Thigh    Result Date: 7/17/2023  Essentially completely resolved previously visualized radiotracer activity associated with the right lung and the right hilum.  Minimal inflammatory uptake is seen within the right lung.  Continued surveillance is recommended. The CT, PET and fused PET/CT data were reviewed in sagittal, coronal and axial planes.        ----------------------------------------------------      --------------------------------------------------------------------------------------------------  LABS:     The CVD Risk score (Perez, et al., 2008) failed to calculate for the following reasons:    Cannot find a previous HDL lab    Cannot find a previous total cholesterol lab         Lab Results   Component Value Date    GLUCOSE 107 (H) 07/19/2023    BUN 13 07/19/2023    CREATININE 0.65 (L) 07/19/2023    EGFRIFNONA 81 11/08/2021    BCR 20.0 07/19/2023    K 4.0 07/19/2023    CO2 33.0 (H) 07/19/2023    CALCIUM 9.4 07/19/2023    ALBUMIN 4.1 07/19/2023    AST 15 07/19/2023    ALT 26 07/19/2023     Lab Results   Component Value Date    WBC 8.40 07/19/2023    HGB 13.6 07/19/2023    HCT 41.7 07/19/2023    MCV 92.5 07/19/2023     07/19/2023     Lab Results   Component Value Date    CHLPL 179 12/24/2022    TRIG 167 (H) 12/24/2022    HDL 49 12/24/2022    LDL 97 12/24/2022     Lab Results   Component Value Date    TSH 0.762 06/28/2023     Lab Results   Component Value Date    CKTOTAL 34 12/24/2022    CKMB 0.8 03/14/2016    TROPONINI <0.012 11/06/2018    TROPONINT 13 04/21/2023     Lab Results   Component Value Date    HGBA1C 5.50 01/02/2023     Lab Results   Component Value Date    DDIMER 2,063 (H) 11/29/2015     Lab Results   Component Value Date    ALT 26 07/19/2023     Lab Results   Component Value Date    HGBA1C 5.50 01/02/2023    HGBA1C 5.6 12/24/2022     Lab Results   Component Value Date     CREATININE 0.65 (L) 07/19/2023     Lab Results   Component Value Date    IRON 85 05/30/2023    TIBC 316 05/30/2023    FERRITIN 799.30 (H) 05/30/2023     Lab Results   Component Value Date    INR 1.07 12/24/2022    INR 0.93 07/12/2021    INR 1.02 11/06/2018    PROTIME 13.8 12/24/2022    PROTIME 12.4 07/12/2021    PROTIME 13.2 11/06/2018       [unfilled]    1. Paroxysmal atrial fibrillation (HCC)  2. Palpitations  3. Dizziness    The patient is a 72-year-old male who transitioned care from Dr. Rose to me in September 2022.  He has a known history of paroxysmal atrial fibrillation which was diagnosed by Dr. Rose on MCT study in August 2021.  He also has a history of PE several years ago. Serum TSH and free T4 levels were normal in June 2023.  He was previously on Eliquis and diltiazem therapy.  In December 2022, he had a hemorrhagic stroke.  According to him, this was thought to be related to trauma and/or hypertension.  His Eliquis was stopped.  He has declined resuming Eliquis therapy since then.  He continues to deny any symptoms that could be attributable to atrial fibrillation at this point.  His [id number] score appears to be 4 based on currently available data.  He is currently undergoing treatment for metastatic lung cancer.  At this point, he is not interested in referral for LAAO occlusion devices.  He has noticed frequent episodes of dizziness and slow pulse rate (60s bpm, this is unusual for him) at home lately.  Will continue diltiazem therapy for now.  Obtain a 2-week live cardiac monitor for further evaluation.    Prevention:  Body mass index is 21.2 kg/m².      Brian Garcia  reports that he quit smoking about 15 years ago. His smoking use included cigarettes. He started smoking about 62 years ago. He has a 141.00 pack-year smoking history. He has never used smokeless tobacco.. I have educated him on continued tobacco cessation.      Return in about 3 months (around  10/28/2023).            Electronically signed by Neville Meeks MD on 07/28/23 at 13:50 CDT

## 2023-08-02 ENCOUNTER — APPOINTMENT (OUTPATIENT)
Dept: GENERAL RADIOLOGY | Facility: HOSPITAL | Age: 73
End: 2023-08-02
Payer: OTHER GOVERNMENT

## 2023-08-02 ENCOUNTER — HOSPITAL ENCOUNTER (EMERGENCY)
Facility: HOSPITAL | Age: 73
Discharge: HOME | End: 2023-08-02
Attending: STUDENT IN AN ORGANIZED HEALTH CARE EDUCATION/TRAINING PROGRAM | Admitting: STUDENT IN AN ORGANIZED HEALTH CARE EDUCATION/TRAINING PROGRAM
Payer: OTHER GOVERNMENT

## 2023-08-02 ENCOUNTER — TELEPHONE (OUTPATIENT)
Dept: ONCOLOGY | Facility: CLINIC | Age: 73
End: 2023-08-02
Payer: MEDICARE

## 2023-08-02 VITALS
BODY MASS INDEX: 21.16 KG/M2 | SYSTOLIC BLOOD PRESSURE: 118 MMHG | WEIGHT: 127 LBS | HEIGHT: 65 IN | RESPIRATION RATE: 20 BRPM | DIASTOLIC BLOOD PRESSURE: 57 MMHG | TEMPERATURE: 97.9 F | HEART RATE: 63 BPM | OXYGEN SATURATION: 98 %

## 2023-08-02 DIAGNOSIS — R42 DIZZINESS: Primary | ICD-10-CM

## 2023-08-02 LAB
ALBUMIN SERPL-MCNC: 3.7 G/DL (ref 3.5–5.2)
ALBUMIN/GLOB SERPL: 1.1 G/DL
ALP SERPL-CCNC: 78 U/L (ref 39–117)
ALT SERPL W P-5'-P-CCNC: 22 U/L (ref 1–41)
ANION GAP SERPL CALCULATED.3IONS-SCNC: 6 MMOL/L (ref 5–15)
AST SERPL-CCNC: 14 U/L (ref 1–40)
BASOPHILS # BLD AUTO: 0.07 10*3/MM3 (ref 0–0.2)
BASOPHILS NFR BLD AUTO: 1.3 % (ref 0–1.5)
BILIRUB SERPL-MCNC: 0.2 MG/DL (ref 0–1.2)
BILIRUB UR QL STRIP: NEGATIVE
BUN SERPL-MCNC: 15 MG/DL (ref 8–23)
BUN/CREAT SERPL: 25.9 (ref 7–25)
CALCIUM SPEC-SCNC: 9.2 MG/DL (ref 8.6–10.5)
CHLORIDE SERPL-SCNC: 100 MMOL/L (ref 98–107)
CLARITY UR: CLEAR
CO2 SERPL-SCNC: 32 MMOL/L (ref 22–29)
COLOR UR: YELLOW
CREAT SERPL-MCNC: 0.58 MG/DL (ref 0.76–1.27)
DEPRECATED RDW RBC AUTO: 49.7 FL (ref 37–54)
EGFRCR SERPLBLD CKD-EPI 2021: 103 ML/MIN/1.73
EOSINOPHIL # BLD AUTO: 0.05 10*3/MM3 (ref 0–0.4)
EOSINOPHIL NFR BLD AUTO: 0.9 % (ref 0.3–6.2)
ERYTHROCYTE [DISTWIDTH] IN BLOOD BY AUTOMATED COUNT: 14.6 % (ref 12.3–15.4)
GLOBULIN UR ELPH-MCNC: 3.4 GM/DL
GLUCOSE SERPL-MCNC: 104 MG/DL (ref 65–99)
GLUCOSE UR STRIP-MCNC: NEGATIVE MG/DL
HCT VFR BLD AUTO: 39.3 % (ref 37.5–51)
HGB BLD-MCNC: 13 G/DL (ref 13–17.7)
HGB UR QL STRIP.AUTO: NEGATIVE
HOLD SPECIMEN: NORMAL
IMM GRANULOCYTES # BLD AUTO: 0.04 10*3/MM3 (ref 0–0.05)
IMM GRANULOCYTES NFR BLD AUTO: 0.7 % (ref 0–0.5)
KETONES UR QL STRIP: NEGATIVE
LEUKOCYTE ESTERASE UR QL STRIP.AUTO: NEGATIVE
LYMPHOCYTES # BLD AUTO: 0.81 10*3/MM3 (ref 0.7–3.1)
LYMPHOCYTES NFR BLD AUTO: 15.1 % (ref 19.6–45.3)
MAGNESIUM SERPL-MCNC: 2 MG/DL (ref 1.6–2.4)
MCH RBC QN AUTO: 30.7 PG (ref 26.6–33)
MCHC RBC AUTO-ENTMCNC: 33.1 G/DL (ref 31.5–35.7)
MCV RBC AUTO: 92.9 FL (ref 79–97)
MONOCYTES # BLD AUTO: 1.01 10*3/MM3 (ref 0.1–0.9)
MONOCYTES NFR BLD AUTO: 18.8 % (ref 5–12)
NEUTROPHILS NFR BLD AUTO: 3.4 10*3/MM3 (ref 1.7–7)
NEUTROPHILS NFR BLD AUTO: 63.2 % (ref 42.7–76)
NITRITE UR QL STRIP: NEGATIVE
NRBC BLD AUTO-RTO: 0 /100 WBC (ref 0–0.2)
PH UR STRIP.AUTO: 5.5 [PH] (ref 5–9)
PLATELET # BLD AUTO: 188 10*3/MM3 (ref 140–450)
PMV BLD AUTO: 9.9 FL (ref 6–12)
POTASSIUM SERPL-SCNC: 4.5 MMOL/L (ref 3.5–5.2)
PROT SERPL-MCNC: 7.1 G/DL (ref 6–8.5)
PROT UR QL STRIP: NEGATIVE
QT INTERVAL: 402 MS
QTC INTERVAL: 414 MS
RBC # BLD AUTO: 4.23 10*6/MM3 (ref 4.14–5.8)
SODIUM SERPL-SCNC: 138 MMOL/L (ref 136–145)
SP GR UR STRIP: 1.01 (ref 1–1.03)
UROBILINOGEN UR QL STRIP: NORMAL
WBC NRBC COR # BLD: 5.38 10*3/MM3 (ref 3.4–10.8)
WHOLE BLOOD HOLD COAG: NORMAL

## 2023-08-02 PROCEDURE — 83735 ASSAY OF MAGNESIUM: CPT | Performed by: STUDENT IN AN ORGANIZED HEALTH CARE EDUCATION/TRAINING PROGRAM

## 2023-08-02 PROCEDURE — 93005 ELECTROCARDIOGRAM TRACING: CPT | Performed by: STUDENT IN AN ORGANIZED HEALTH CARE EDUCATION/TRAINING PROGRAM

## 2023-08-02 PROCEDURE — 87427 SHIGA-LIKE TOXIN AG IA: CPT | Performed by: STUDENT IN AN ORGANIZED HEALTH CARE EDUCATION/TRAINING PROGRAM

## 2023-08-02 PROCEDURE — 80053 COMPREHEN METABOLIC PANEL: CPT | Performed by: STUDENT IN AN ORGANIZED HEALTH CARE EDUCATION/TRAINING PROGRAM

## 2023-08-02 PROCEDURE — 36415 COLL VENOUS BLD VENIPUNCTURE: CPT | Performed by: STUDENT IN AN ORGANIZED HEALTH CARE EDUCATION/TRAINING PROGRAM

## 2023-08-02 PROCEDURE — 71045 X-RAY EXAM CHEST 1 VIEW: CPT

## 2023-08-02 PROCEDURE — 87045 FECES CULTURE AEROBIC BACT: CPT | Performed by: STUDENT IN AN ORGANIZED HEALTH CARE EDUCATION/TRAINING PROGRAM

## 2023-08-02 PROCEDURE — 85025 COMPLETE CBC W/AUTO DIFF WBC: CPT | Performed by: STUDENT IN AN ORGANIZED HEALTH CARE EDUCATION/TRAINING PROGRAM

## 2023-08-02 PROCEDURE — 99284 EMERGENCY DEPT VISIT MOD MDM: CPT

## 2023-08-02 PROCEDURE — 25010000002 HEPARIN LOCK FLUSH PER 10 UNITS: Performed by: STUDENT IN AN ORGANIZED HEALTH CARE EDUCATION/TRAINING PROGRAM

## 2023-08-02 PROCEDURE — 81003 URINALYSIS AUTO W/O SCOPE: CPT | Performed by: STUDENT IN AN ORGANIZED HEALTH CARE EDUCATION/TRAINING PROGRAM

## 2023-08-02 PROCEDURE — 87046 STOOL CULTR AEROBIC BACT EA: CPT | Performed by: STUDENT IN AN ORGANIZED HEALTH CARE EDUCATION/TRAINING PROGRAM

## 2023-08-02 RX ORDER — TIZANIDINE 4 MG/1
2 TABLET ORAL ONCE
Status: COMPLETED | OUTPATIENT
Start: 2023-08-02 | End: 2023-08-02

## 2023-08-02 RX ORDER — HEPARIN SODIUM (PORCINE) LOCK FLUSH IV SOLN 100 UNIT/ML 100 UNIT/ML
500 SOLUTION INTRAVENOUS ONCE
Status: COMPLETED | OUTPATIENT
Start: 2023-08-02 | End: 2023-08-02

## 2023-08-02 RX ADMIN — HEPARIN 500 UNITS: 100 SYRINGE at 21:20

## 2023-08-02 RX ADMIN — TIZANIDINE 2 MG: 4 TABLET ORAL at 19:37

## 2023-08-02 NOTE — ED NOTES
Patient presents today with concerns of C-Diff. He states that he just had a negative test, but having diarrhea. He states that [facility] visited him today and stated that his blood pressure was low and his temperature was 99.5. He states that he is on 4L nasal cannula at home. His blood pressure is currently 134 59 heart rate is 63 and oxygen is 99% on 4L nasal cannula oxygen.

## 2023-08-02 NOTE — ED PROVIDER NOTES
Subjective   History of Present Illness  Patient presents with reports of blood pressure low and stomach problems.  Patient has recovered from C. difficile and had a negative stool test yesterday.  Patient is currently on Keytruda for lung cancer.  He is currently wearing a Holter monitor because he has become dizzy over the last couple days.  Last week, he has had intermittent low-grade temps.  He denies cough or increased shortness of breath.  He is on 4 L of supplemental oxygen at home.    Review of Systems   Constitutional:  Negative for activity change and appetite change.   HENT:  Negative for congestion and ear pain.    Eyes:  Negative for pain and discharge.   Respiratory:  Negative for chest tightness and shortness of breath.    Cardiovascular:  Negative for chest pain and palpitations.   Gastrointestinal:  Positive for abdominal pain. Negative for abdominal distention.   Endocrine: Negative for cold intolerance and heat intolerance.   Genitourinary:  Negative for difficulty urinating and dysuria.   Musculoskeletal:  Negative for arthralgias and back pain.   Skin:  Negative for color change and rash.   Allergic/Immunologic: Negative for environmental allergies and food allergies.   Neurological:  Positive for dizziness and light-headedness. Negative for headaches.   Hematological:  Negative for adenopathy. Does not bruise/bleed easily.   Psychiatric/Behavioral:  Negative for agitation and confusion.      Past Medical History:   Diagnosis Date    A-fib     Anesthesia     hip surgery (spinal caused him unpleasant sensations never wants it again)    Arthritis     Blood in sputum 02/22/2023    bright red blood, teaspoon - tablespoon daily    COPD (chronic obstructive pulmonary disease)     CVA (cerebral vascular accident) 12/24/2022    Hearing aid worn     bilateral    History of pulmonary embolism     History of recurrent pneumonia     Hypertension     Lung cancer 03/17/2023    Oxygen dependent     since @ age  65    Pulmonary nodules     Risk for falls     uses walker    Rotator cuff syndrome of right shoulder     Tachycardia     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 HTN/Eliquis    Wears glasses     readers    Wears glasses     reading       Allergies   Allergen Reactions    Amoxicillin Anaphylaxis    Albuterol Irritability     Facial flushing and palpitations.    Asmanex (120 Metered Doses) [Mometasone Furoate] Unknown - Low Severity    Gabapentin Hallucinations    Lortab [Hydrocodone-Acetaminophen] Other (See Comments)     Can't remember    Morphine And Related Hallucinations    Prilosec [Omeprazole] Other (See Comments)     unknown    Sodium Clavulanate Unknown - High Severity    Statins GI Intolerance    Sulfa Antibiotics Other (See Comments)     Can't remember also more and can't remember    Symbicort [Budesonide-Formoterol Fumarate] Unknown (See Comments)     Doesn't remember reaction type       Past Surgical History:   Procedure Laterality Date    HIP ARTHROPLASTY Right     RIB BIOPSY Left 03/17/2023 11th    VENOUS ACCESS DEVICE (PORT) INSERTION N/A 3/30/2023    Procedure: MEDIPORT PLACEMENT          (C-ARM);  Surgeon: Lavon Cochran MD;  Location: [facility];  Service: General;  Laterality: N/A;       Family History   Problem Relation Age of Onset    Cancer Sister     Brain cancer Brother     Cancer Brother     No Known Problems Daughter     No Known Problems Daughter     No Known Problems Son        Social History     Socioeconomic History    Marital status:     Number of children: 3    Highest education level: Associate degree: occupational, technical, or vocational program   Tobacco Use    Smoking status: Former     Packs/day: 3.00     Years: 47.00     Pack years: 141.00     Types: Cigarettes     Start date: 1961     Quit date: 5/1/2008     Years since quitting: 15.2    Smokeless tobacco: Never   Vaping Use    Vaping Use: Never used   Substance and Sexual Activity    Alcohol use:  Not Currently     Comment: not last 40 years    Drug use: Never    Sexual activity: Not Currently     Partners: Female           Objective   Physical Exam  Vitals and nursing note reviewed.   Constitutional:       Appearance: He is well-developed.   HENT:      Head: Normocephalic and atraumatic.   Eyes:      Pupils: Pupils are equal, round, and reactive to light.   Neck:      Thyroid: No thyromegaly.      Trachea: No tracheal deviation.   Cardiovascular:      Rate and Rhythm: Normal rate.      Pulses:           Radial pulses are 2+ on the left side.        Dorsalis pedis pulses are 2+ on the right side and 2+ on the left side.      Heart sounds: Normal heart sounds, S1 normal and S2 normal.   Pulmonary:      Effort: Pulmonary effort is normal.      Breath sounds: Normal breath sounds.   Abdominal:      Palpations: Abdomen is soft.   Musculoskeletal:         General: Normal range of motion.      Cervical back: Neck supple.   Skin:     General: Skin is warm and dry.      Capillary Refill: Capillary refill takes 2 to 3 seconds.   Neurological:      Mental Status: He is alert and oriented to person, place, and time.      GCS: GCS eye subscore is 4. GCS verbal subscore is 5. GCS motor subscore is 6.   Psychiatric:         Speech: Speech normal.         Behavior: Behavior normal.         Thought Content: Thought content normal.       ECG 12 Lead      Date/Time: 8/2/2023 7:11 PM  Performed by: Lui Palomares MD  Authorized by: Lui Palomares MD   Interpreted by physician  Comparison: compared with previous ECG from 7/28/2023  Similar to previous ECG  Rhythm: sinus rhythm  Comments: Normal sinus rhythm with incomplete right bundle branch block ventricular rate 64 bpm,  ms, QTc 414 ms, P and T wave inversion in lead V1.  No significant change from prior EKG.  No acute STEMI on this EKG.  This is my independent interpretation.             ED Course           Vitals:    08/02/23 1938 08/02/23 2017 08/02/23  2117 08/02/23 2123   BP:  105/55 118/57 118/57   BP Location:    Left arm   Patient Position:    Lying   Pulse: 62  62 63   Resp:    20   Temp:       TempSrc:       SpO2: 99%  99% 98%   Weight:       Height:          Lab Results (last 24 hours)       Procedure Component Value Units Date/Time    Extra Tubes [[id number]] Collected: 08/02/23 1901    Specimen: Blood, Venous Line Updated: 08/02/23 2015    Narrative:      The following orders were created for panel order Extra Tubes.  Procedure                               Abnormality         Status                     ---------                               -----------         ------                     Gold Top - SST[[id number]]                                   Final result               Light Blue Top[[id number]]                                   Final result                 Please view results for these tests on the individual orders.    Gold Top - SST [[id number]] Collected: 08/02/23 1901    Specimen: Blood Updated: 08/02/23 2015     Extra Tube Hold for add-ons.     Comment: Auto resulted.       Light Blue Top [[id number]] Collected: 08/02/23 1901    Specimen: Blood Updated: 08/02/23 2015     Extra Tube Hold for add-ons.     Comment: Auto resulted       Comprehensive Metabolic Panel [[id number]]  (Abnormal) Collected: 08/02/23 1856    Specimen: Blood Updated: 08/02/23 1923     Glucose 104 mg/dL      BUN 15 mg/dL      Creatinine 0.58 mg/dL      Sodium 138 mmol/L      Potassium 4.5 mmol/L      Chloride 100 mmol/L      CO2 32.0 mmol/L      Calcium 9.2 mg/dL      Total Protein 7.1 g/dL      Albumin 3.7 g/dL      ALT (SGPT) 22 U/L      AST (SGOT) 14 U/L      Alkaline Phosphatase 78 U/L      Total Bilirubin 0.2 mg/dL      Globulin 3.4 gm/dL      A/G Ratio 1.1 g/dL      BUN/Creatinine Ratio 25.9     Anion Gap 6.0 mmol/L      eGFR 103.0 mL/min/1.73     Narrative:      GFR Normal >60  Chronic Kidney Disease <60  Kidney Failure <15    The GFR formula is only valid for adults with  stable renal function between ages 18 and 70.    Magnesium [[id number]]  (Normal) Collected: 08/02/23 1856    Specimen: Blood Updated: 08/02/23 1923     Magnesium 2.0 mg/dL     CBC & Differential [[id number]]  (Abnormal) Collected: 08/02/23 1856    Specimen: Blood Updated: 08/02/23 1905    Narrative:      The following orders were created for panel order CBC & Differential.  Procedure                               Abnormality         Status                     ---------                               -----------         ------                     CBC Auto Differential[[id number]]        Abnormal            Final result                 Please view results for these tests on the individual orders.    CBC Auto Differential [[id number]]  (Abnormal) Collected: 08/02/23 1856    Specimen: Blood Updated: 08/02/23 1905     WBC 5.38 10*3/mm3      RBC 4.23 10*6/mm3      Hemoglobin 13.0 g/dL      Hematocrit 39.3 %      MCV 92.9 fL      MCH 30.7 pg      MCHC 33.1 g/dL      RDW 14.6 %      RDW-SD 49.7 fl      MPV 9.9 fL      Platelets 188 10*3/mm3      Neutrophil % 63.2 %      Lymphocyte % 15.1 %      Monocyte % 18.8 %      Eosinophil % 0.9 %      Basophil % 1.3 %      Immature Grans % 0.7 %      Neutrophils, Absolute 3.40 10*3/mm3      Lymphocytes, Absolute 0.81 10*3/mm3      Monocytes, Absolute 1.01 10*3/mm3      Eosinophils, Absolute 0.05 10*3/mm3      Basophils, Absolute 0.07 10*3/mm3      Immature Grans, Absolute 0.04 10*3/mm3      nRBC 0.0 /100 WBC     Urinalysis With Microscopic If Indicated (No Culture) - Urine, Clean Catch [[id number]]  (Normal) Collected: 08/02/23 1856    Specimen: Urine, Clean Catch Updated: 08/02/23 1904     Color, UA Yellow     Appearance, UA Clear     pH, UA 5.5     Specific Gravity, UA 1.010     Glucose, UA Negative     Ketones, UA Negative     Bilirubin, UA Negative     Blood, UA Negative     Protein, UA Negative     Leuk Esterase, UA Negative     Nitrite, UA Negative     Urobilinogen, UA 0.2 E.U./dL     Narrative:      Urine microscopic not indicated.           No radiology results for the last day                                   Medical Decision Making  Patient with resolved weakness and dizziness during today's stay.  Labs, EKG, chest x-ray unremarkable.  Patient encouraged close follow-up with primary care and to return should symptoms arise.  Patient was able to ambulate with his home 4 L and maintain his sats greater than 96%, tolerate oral intake prior to discharge.    Problems Addressed:  Dizziness: complicated acute illness or injury    Amount and/or Complexity of Data Reviewed  Labs: ordered.  Radiology: ordered.  ECG/medicine tests: ordered and independent interpretation performed.    Risk  Prescription drug management.        Final diagnoses:   Dizziness       ED Disposition  ED Disposition       ED Disposition   Discharge    Condition   Stable    Comment   --               Marina Kitchen MD  [address] [place] [address] [phone]    Call in 1 day  As needed         Medication List      No changes were made to your prescriptions during this visit.         This document has been electronically signed by Lui Palomares MD on August 2, 2023 23:39 CDT  .  Signatureline  Part of this note may be an electronic transcription/translation of spoken language to printed text using the Dragon Dictation System.        Lui Palomares MD  08/02/23 1544

## 2023-08-05 ENCOUNTER — NURSE TRIAGE (OUTPATIENT)
Dept: CALL CENTER | Facility: HOSPITAL | Age: 73
End: 2023-08-05
Payer: MEDICARE

## 2023-08-06 LAB
QT INTERVAL: 354 MS
QTC INTERVAL: 413 MS

## 2023-08-07 DIAGNOSIS — C34.81 MALIGNANT NEOPLASM OF OVERLAPPING SITES OF RIGHT LUNG: Primary | ICD-10-CM

## 2023-08-07 LAB
BACTERIA SPEC CULT: NORMAL
BACTERIA SPEC CULT: NORMAL
CAMPYLOBACTER STL CULT: NORMAL
E COLI SXT STL QL IA: NEGATIVE
SALM + SHIG STL CULT: NORMAL

## 2023-08-09 ENCOUNTER — INFUSION (OUTPATIENT)
Dept: ONCOLOGY | Facility: HOSPITAL | Age: 73
End: 2023-08-09
Payer: OTHER GOVERNMENT

## 2023-08-09 ENCOUNTER — LAB (OUTPATIENT)
Dept: ONCOLOGY | Facility: HOSPITAL | Age: 73
End: 2023-08-09
Payer: OTHER GOVERNMENT

## 2023-08-09 ENCOUNTER — OFFICE VISIT (OUTPATIENT)
Dept: ONCOLOGY | Facility: CLINIC | Age: 73
End: 2023-08-09
Payer: OTHER GOVERNMENT

## 2023-08-09 VITALS
OXYGEN SATURATION: 95 % | HEIGHT: 65 IN | DIASTOLIC BLOOD PRESSURE: 52 MMHG | WEIGHT: 126 LBS | BODY MASS INDEX: 20.99 KG/M2 | HEART RATE: 77 BPM | SYSTOLIC BLOOD PRESSURE: 117 MMHG

## 2023-08-09 DIAGNOSIS — K90.9 IRON MALABSORPTION: Chronic | ICD-10-CM

## 2023-08-09 DIAGNOSIS — C34.81 MALIGNANT NEOPLASM OF OVERLAPPING SITES OF RIGHT LUNG: Primary | Chronic | ICD-10-CM

## 2023-08-09 DIAGNOSIS — C34.81 MALIGNANT NEOPLASM OF OVERLAPPING SITES OF RIGHT LUNG: Primary | ICD-10-CM

## 2023-08-09 DIAGNOSIS — E87.6 HYPOKALEMIA: ICD-10-CM

## 2023-08-09 DIAGNOSIS — D50.8 IRON DEFICIENCY ANEMIA SECONDARY TO INADEQUATE DIETARY IRON INTAKE: Chronic | ICD-10-CM

## 2023-08-09 DIAGNOSIS — D72.828 OTHER ELEVATED WHITE BLOOD CELL (WBC) COUNT: Chronic | ICD-10-CM

## 2023-08-09 DIAGNOSIS — Z45.2 ENCOUNTER FOR VENOUS ACCESS DEVICE CARE: ICD-10-CM

## 2023-08-09 LAB
ALBUMIN SERPL-MCNC: 3.6 G/DL (ref 3.5–5.2)
ALBUMIN/GLOB SERPL: 1.2 G/DL
ALP SERPL-CCNC: 72 U/L (ref 39–117)
ALT SERPL W P-5'-P-CCNC: 13 U/L (ref 1–41)
ANION GAP SERPL CALCULATED.3IONS-SCNC: 8 MMOL/L (ref 5–15)
AST SERPL-CCNC: 13 U/L (ref 1–40)
BILIRUB SERPL-MCNC: 0.2 MG/DL (ref 0–1.2)
BUN SERPL-MCNC: 9 MG/DL (ref 8–23)
BUN/CREAT SERPL: 17 (ref 7–25)
CALCIUM SPEC-SCNC: 8.7 MG/DL (ref 8.6–10.5)
CHLORIDE SERPL-SCNC: 100 MMOL/L (ref 98–107)
CO2 SERPL-SCNC: 31 MMOL/L (ref 22–29)
CREAT SERPL-MCNC: 0.53 MG/DL (ref 0.76–1.27)
DACRYOCYTES BLD QL SMEAR: ABNORMAL
DEPRECATED RDW RBC AUTO: 46.2 FL (ref 37–54)
EGFRCR SERPLBLD CKD-EPI 2021: 105.8 ML/MIN/1.73
EOSINOPHIL # BLD MANUAL: 0.07 10*3/MM3 (ref 0–0.4)
EOSINOPHIL NFR BLD MANUAL: 1 % (ref 0.3–6.2)
ERYTHROCYTE [DISTWIDTH] IN BLOOD BY AUTOMATED COUNT: 13.8 % (ref 12.3–15.4)
FERRITIN SERPL-MCNC: 771.3 NG/ML (ref 30–400)
GLOBULIN UR ELPH-MCNC: 3.1 GM/DL
GLUCOSE SERPL-MCNC: 93 MG/DL (ref 65–99)
HCT VFR BLD AUTO: 36 % (ref 37.5–51)
HGB BLD-MCNC: 12.5 G/DL (ref 13–17.7)
HOLD SPECIMEN: NORMAL
IRON 24H UR-MRATE: 84 MCG/DL (ref 59–158)
IRON SATN MFR SERPL: 34 % (ref 20–50)
LYMPHOCYTES # BLD MANUAL: 1.71 10*3/MM3 (ref 0.7–3.1)
LYMPHOCYTES NFR BLD MANUAL: 18 % (ref 5–12)
MCH RBC QN AUTO: 32 PG (ref 26.6–33)
MCHC RBC AUTO-ENTMCNC: 34.7 G/DL (ref 31.5–35.7)
MCV RBC AUTO: 92.1 FL (ref 79–97)
MONOCYTES # BLD: 1.23 10*3/MM3 (ref 0.1–0.9)
NEUTROPHILS # BLD AUTO: 3.84 10*3/MM3 (ref 1.7–7)
NEUTROPHILS NFR BLD MANUAL: 56 % (ref 42.7–76)
OVALOCYTES BLD QL SMEAR: ABNORMAL
PLATELET # BLD AUTO: 234 10*3/MM3 (ref 140–450)
PMV BLD AUTO: 9.9 FL (ref 6–12)
POTASSIUM SERPL-SCNC: 3.2 MMOL/L (ref 3.5–5.2)
PROT SERPL-MCNC: 6.7 G/DL (ref 6–8.5)
RBC # BLD AUTO: 3.91 10*6/MM3 (ref 4.14–5.8)
SMALL PLATELETS BLD QL SMEAR: ADEQUATE
SODIUM SERPL-SCNC: 139 MMOL/L (ref 136–145)
TIBC SERPL-MCNC: 249 MCG/DL (ref 298–536)
TRANSFERRIN SERPL-MCNC: 167 MG/DL (ref 200–360)
VARIANT LYMPHS NFR BLD MANUAL: 2 % (ref 0–5)
VARIANT LYMPHS NFR BLD MANUAL: 23 % (ref 19.6–45.3)
WBC MORPH BLD: NORMAL
WBC NRBC COR # BLD: 6.85 10*3/MM3 (ref 3.4–10.8)

## 2023-08-09 PROCEDURE — 83540 ASSAY OF IRON: CPT | Performed by: INTERNAL MEDICINE

## 2023-08-09 PROCEDURE — 82607 VITAMIN B-12: CPT | Performed by: INTERNAL MEDICINE

## 2023-08-09 PROCEDURE — 84466 ASSAY OF TRANSFERRIN: CPT | Performed by: INTERNAL MEDICINE

## 2023-08-09 PROCEDURE — 25010000002 HEPARIN LOCK FLUSH PER 10 UNITS: Performed by: INTERNAL MEDICINE

## 2023-08-09 PROCEDURE — 82746 ASSAY OF FOLIC ACID SERUM: CPT | Performed by: INTERNAL MEDICINE

## 2023-08-09 PROCEDURE — 82728 ASSAY OF FERRITIN: CPT | Performed by: INTERNAL MEDICINE

## 2023-08-09 PROCEDURE — 25010000002 PEMBROLIZUMAB 100 MG/4ML SOLUTION 4 ML VIAL: Performed by: INTERNAL MEDICINE

## 2023-08-09 RX ORDER — POTASSIUM CHLORIDE 750 MG/1
40 CAPSULE, EXTENDED RELEASE ORAL ONCE
Status: COMPLETED | OUTPATIENT
Start: 2023-08-09 | End: 2023-08-09

## 2023-08-09 RX ORDER — POTASSIUM CHLORIDE 750 MG/1
40 CAPSULE, EXTENDED RELEASE ORAL ONCE
Status: CANCELLED
Start: 2023-08-09 | End: 2023-08-09

## 2023-08-09 RX ORDER — SODIUM CHLORIDE 9 MG/ML
250 INJECTION, SOLUTION INTRAVENOUS ONCE
Status: CANCELLED | OUTPATIENT
Start: 2023-08-09

## 2023-08-09 RX ORDER — HEPARIN SODIUM (PORCINE) LOCK FLUSH IV SOLN 100 UNIT/ML 100 UNIT/ML
500 SOLUTION INTRAVENOUS AS NEEDED
Status: DISCONTINUED | OUTPATIENT
Start: 2023-08-09 | End: 2023-08-09 | Stop reason: HOSPADM

## 2023-08-09 RX ORDER — FAMOTIDINE 10 MG/ML
20 INJECTION, SOLUTION INTRAVENOUS AS NEEDED
Status: CANCELLED | OUTPATIENT
Start: 2023-08-09

## 2023-08-09 RX ORDER — DIPHENHYDRAMINE HYDROCHLORIDE 50 MG/ML
50 INJECTION INTRAMUSCULAR; INTRAVENOUS AS NEEDED
Status: CANCELLED | OUTPATIENT
Start: 2023-08-09

## 2023-08-09 RX ORDER — HEPARIN SODIUM (PORCINE) LOCK FLUSH IV SOLN 100 UNIT/ML 100 UNIT/ML
500 SOLUTION INTRAVENOUS AS NEEDED
OUTPATIENT
Start: 2023-08-09

## 2023-08-09 RX ORDER — SODIUM CHLORIDE 0.9 % (FLUSH) 0.9 %
10 SYRINGE (ML) INJECTION AS NEEDED
OUTPATIENT
Start: 2023-08-09

## 2023-08-09 RX ORDER — FAMOTIDINE 10 MG/ML
20 INJECTION, SOLUTION INTRAVENOUS AS NEEDED
Status: DISCONTINUED | OUTPATIENT
Start: 2023-08-09 | End: 2023-08-09 | Stop reason: HOSPADM

## 2023-08-09 RX ORDER — SODIUM CHLORIDE 0.9 % (FLUSH) 0.9 %
10 SYRINGE (ML) INJECTION AS NEEDED
Status: DISCONTINUED | OUTPATIENT
Start: 2023-08-09 | End: 2023-08-09 | Stop reason: HOSPADM

## 2023-08-09 RX ORDER — SODIUM CHLORIDE 9 MG/ML
250 INJECTION, SOLUTION INTRAVENOUS ONCE
Status: COMPLETED | OUTPATIENT
Start: 2023-08-09 | End: 2023-08-09

## 2023-08-09 RX ORDER — DIPHENHYDRAMINE HYDROCHLORIDE 50 MG/ML
50 INJECTION INTRAMUSCULAR; INTRAVENOUS AS NEEDED
Status: DISCONTINUED | OUTPATIENT
Start: 2023-08-09 | End: 2023-08-09 | Stop reason: HOSPADM

## 2023-08-09 RX ADMIN — Medication 10 ML: at 10:37

## 2023-08-09 RX ADMIN — SODIUM CHLORIDE 200 MG: 9 INJECTION, SOLUTION INTRAVENOUS at 09:53

## 2023-08-09 RX ADMIN — SODIUM CHLORIDE 250 ML: 9 INJECTION, SOLUTION INTRAVENOUS at 09:53

## 2023-08-09 RX ADMIN — POTASSIUM CHLORIDE 40 MEQ: 750 CAPSULE, EXTENDED RELEASE ORAL at 09:18

## 2023-08-09 RX ADMIN — HEPARIN 500 UNITS: 100 SYRINGE at 10:37

## 2023-08-09 NOTE — PROGRESS NOTES
DATE OF VISIT: 8/9/2023      REASON FOR VISIT: Metastatic lung adenocarcinoma with bone metastasis, history of pulmonary embolism, leukocytosis, iron deficiency anemia, iron malabsorption, hypokalemia      HISTORY OF PRESENT ILLNESS:   72-year-old male with medical problems significant for hypertension, history of pulmonary embolism diagnosed in 2015 for which patient was on Coumadin for 1 year, chronic obstructive pulmonary disease, history of nicotine addiction that he quit in 2008, metastatic lung adenocarcinoma with pulm metastasis for which patient is currently on treatment with Keytruda.  Patient is scheduled to get cycle 6 of Keytruda today.  Complains of episode of diarrhea since last clinic visit.  Complains of chronic shortness of breath.  Denies any hemoptysis.  Denies any new lymph node enlargement.  Complains of chronic neuropathy affecting lower extremity.  Complains of fatigue.                  Past Medical History, Past Surgical History, Social History, Family History have been reviewed and are without significant changes except as mentioned.    Review of Systems   A comprehensive 14 point review of systems was performed and was negative except as mentioned in HPI.    Medications:  The current medication list was reviewed in the EMR    ALLERGIES:    Allergies   Allergen Reactions    Amoxicillin Anaphylaxis    Albuterol Irritability     Facial flushing and palpitations.    Asmanex (120 Metered Doses) [Mometasone Furoate] Unknown - Low Severity    Gabapentin Hallucinations    Lortab [Hydrocodone-Acetaminophen] Other (See Comments)     Can't remember    Morphine And Related Hallucinations    Prilosec [Omeprazole] Other (See Comments)     unknown    Sodium Clavulanate Unknown - High Severity    Statins GI Intolerance    Sulfa Antibiotics Other (See Comments)     Can't remember also more and can't remember    Symbicort [Budesonide-Formoterol Fumarate] Unknown (See Comments)     Doesn't remember reaction  "type       Objective      Vitals:    08/09/23 0815   BP: 117/52   Pulse: 77   SpO2: 95%   Weight: 57.2 kg (126 lb)   Height: 165.1 cm (65\")   PainSc: 0-No pain         8/9/2023     8:54 AM   Current Status   ECOG score 3       Physical Exam  Pulmonary:      Breath sounds: Normal breath sounds.   Neurological:      Mental Status: He is alert and oriented to person, place, and time.         RECENT LABS:  Glucose   Date Value Ref Range Status   08/09/2023 93 65 - 99 mg/dL Final     Glucose, Arterial   Date Value Ref Range Status   02/06/2018 106 mmol/L Final     Sodium   Date Value Ref Range Status   08/09/2023 139 136 - 145 mmol/L Final     Potassium   Date Value Ref Range Status   08/09/2023 3.2 (L) 3.5 - 5.2 mmol/L Final     CO2   Date Value Ref Range Status   08/09/2023 31.0 (H) 22.0 - 29.0 mmol/L Final     Chloride   Date Value Ref Range Status   08/09/2023 100 98 - 107 mmol/L Final     Anion Gap   Date Value Ref Range Status   08/09/2023 8.0 5.0 - 15.0 mmol/L Final     Creatinine   Date Value Ref Range Status   08/09/2023 0.53 (L) 0.76 - 1.27 mg/dL Final     BUN   Date Value Ref Range Status   08/09/2023 9 8 - 23 mg/dL Final     BUN/Creatinine Ratio   Date Value Ref Range Status   08/09/2023 17.0 7.0 - 25.0 Final     Calcium   Date Value Ref Range Status   08/09/2023 8.7 8.6 - 10.5 mg/dL Final     eGFR Non  Amer   Date Value Ref Range Status   11/08/2021 81 >60 mL/min/1.73 Final     Alkaline Phosphatase   Date Value Ref Range Status   08/09/2023 72 39 - 117 U/L Final     Total Protein   Date Value Ref Range Status   08/09/2023 6.7 6.0 - 8.5 g/dL Final     ALT (SGPT)   Date Value Ref Range Status   08/09/2023 13 1 - 41 U/L Final     AST (SGOT)   Date Value Ref Range Status   08/09/2023 13 1 - 40 U/L Final     Total Bilirubin   Date Value Ref Range Status   08/09/2023 0.2 0.0 - 1.2 mg/dL Final     Albumin   Date Value Ref Range Status   08/09/2023 3.6 3.5 - 5.2 g/dL Final     Globulin   Date Value Ref Range " Status   08/09/2023 3.1 gm/dL Final     Lab Results   Component Value Date    WBC 6.85 08/09/2023    HGB 12.5 (L) 08/09/2023    HCT 36.0 (L) 08/09/2023    MCV 92.1 08/09/2023     08/09/2023     Lab Results   Component Value Date    NEUTROABS 3.84 08/09/2023    IRON 84 08/09/2023    IRON 85 05/30/2023    IRON 36 (L) 04/10/2023    TIBC 249 (L) 08/09/2023    TIBC 316 05/30/2023    TIBC 346 04/10/2023    LABIRON 34 08/09/2023    LABIRON 27 05/30/2023    LABIRON 10 (L) 04/10/2023    FERRITIN 771.30 (H) 08/09/2023    FERRITIN 799.30 (H) 05/30/2023    FERRITIN 235.30 04/10/2023    JAMWMJBU80 1,247 (H) 05/30/2023    FIWSVKAQ24 1,010 (H) 04/10/2023    CPBVSOYA04 370 02/10/2023    FOLATE >20.00 05/30/2023    FOLATE >20.00 04/10/2023    FOLATE 9.81 02/10/2023     No results found for: , LABCA2, AFPTM, HCGQUANT, , CHROMGRNA, [id number], CEA, REFLABREPO      PATHOLOGY:  * Cannot find OR log *         RADIOLOGY DATA :  No radiology results for the last 7 days        Assessment & Plan     1.  Metastatic lung adenocarcinoma with bone metastasis stage IV, tumor mutation burden high, PD-L1 60%, MSI stable, EGFR negative, MET negative, ROS negative  - Patient was started on cycle 1 of carboplatin, Alimta and Keytruda in April 2023  - Due to poor tolerance to chemotherapy treatment was changed to single agent Keytruda with cycle 2.  - PET/CT done after cycle 4 of Keytruda showed good response without any evidence of progression  - We will proceed with cycle 6 of Keytruda today  - Will continue with close monitoring for immune mediated side effect  - Plan is to repeat CT of chest, abdomen and pelvis with contrast after cycle 8 of Keytruda which was discussed with patient and family    2.  Iron deficiency anemia:  - Due to iron malabsorption patient received 2 doses of Injectafer.  - Hemoglobin today is 12.5.  Iron studies done earlier today shows iron saturation is normal at 34% no need for any intravenous iron  replacement at present  - Remains on folic acid p.o. daily    3.  History of pulmonary embolism  - Patient was diagnosed in 2015 for which she took Coumadin for 1 year    4.  History of recurrent C. difficile  - S/p oral vancomycin regimen by Dr. Goldsmith    5.  Health maintenance: Patient does not smoke    6.  Advance care planning:  - Patient remains full code.  Has living will on chart.      7.  Hypokalemia:  - Potassium is 3.2.  Will provide patient with potassium 40 mill equivalent p.o. x 1 here today.               PHQ-9 Total Score: 0   -Patient is not homicidal or suicidal.  No acute intervention required.     Brian Garcia reports a pain score of 0.  Given his pain assessment as noted, treatment options were discussed and the following options were decided upon as a follow-up plan to address the patient's pain: continuation of current treatment plan for pain.         Omega Davis MD  8/9/2023  17:02 CDT        Part of this note may be an electronic transcription/translation of spoken language to printed text using the Dragon Dictation System.          CC:

## 2023-08-10 ENCOUNTER — TELEPHONE (OUTPATIENT)
Dept: ONCOLOGY | Facility: CLINIC | Age: 73
End: 2023-08-10
Payer: MEDICARE

## 2023-08-10 LAB
FOLATE SERPL-MCNC: >20 NG/ML (ref 4.78–24.2)
VIT B12 BLD-MCNC: >2000 PG/ML (ref 211–946)

## 2023-08-10 NOTE — TELEPHONE ENCOUNTER
"Spoke to Annette this am and she said pt woke up and had a bowel movement and stated is was \"really dark like almost black color this morning and hit hasn't been like that.\" She wanted to notify you and see if anything else needed to happen. Pt denied any new onset pain or any other symptoms. Please advise.   "

## 2023-08-10 NOTE — TELEPHONE ENCOUNTER
Spoke to pt caregiver Annette and provided Dr. Davis's recommendations. Annette stated understanding and denied further concerns or questions.

## 2023-08-11 ENCOUNTER — TELEPHONE (OUTPATIENT)
Dept: ONCOLOGY | Facility: CLINIC | Age: 73
End: 2023-08-11
Payer: MEDICARE

## 2023-08-11 NOTE — TELEPHONE ENCOUNTER
----- Message from Omega Davis MD sent at 8/11/2023  6:45 AM CDT -----  Please let patient know, iron and B12 level is adequate.  No need for any iron or B12 replacement at present.  Recommend continue with folic acid p.o. daily.  Thank you

## 2023-08-11 NOTE — TELEPHONE ENCOUNTER
I called the pt to find out if [facility].  Pt stated that he did   Please review patient message. Thank you.  (CT scan report printed and sent to HIM)

## 2023-08-15 LAB
QT INTERVAL: 402 MS
QTC INTERVAL: 414 MS

## 2023-08-21 ENCOUNTER — TELEPHONE (OUTPATIENT)
Dept: ORTHOPEDIC SURGERY | Facility: CLINIC | Age: 73
End: 2023-08-21
Payer: MEDICARE

## 2023-08-21 NOTE — TELEPHONE ENCOUNTER
GUESS    Patient's significant other called and stated that the Tizanidine (ZANAFLEX) tablet 2mg isn't doing any good. She stated that he is unable to  anything with his right arm. She asked if there was something else you could give him instead?

## 2023-08-21 NOTE — TELEPHONE ENCOUNTER
I would not expect Zanaflex to help with his right shoulder issues (I did not prescribe that, it was prescribed by different provider).  Zanaflex is a muscle relaxant so it would only help with muscle spasms.  The patient has a rotator cuff tear, which is why he has weakness in his arm.  I tried to put him in physical therapy previously but he declined this.  I do not know of any medication that would make his arm work better.  He needs physical therapy.  If he wants to go to physical therapy, I think he would be pleased with his improvement and functional use of his arm.  If he does not want to do outpatient PT, we can always order home health PT.  I also previously offered to have him follow-up with one of the surgeons to discuss surgical options, but he declined this and was not interested in surgery (which is reasonable as he has fairly extensive comorbid medical conditions and is oxygen dependent). Thanks.

## 2023-08-21 NOTE — TELEPHONE ENCOUNTER
Annette was notified. She states she will discuss this further with Brian and call us back when they have made a decision.

## 2023-08-24 ENCOUNTER — DOCUMENTATION (OUTPATIENT)
Dept: PULMONOLOGY | Facility: HOSPITAL | Age: 73
End: 2023-08-24
Payer: MEDICARE

## 2023-08-26 NOTE — PROGRESS NOTES
DATE OF VISIT: 8/30/2023      REASON FOR VISIT: Metastatic lung adenocarcinoma with bone metastasis, history of pulmonary embolism, leukocytosis, iron deficiency anemia, iron malabsorption, hypokalemia      HISTORY OF PRESENT ILLNESS:   73-year-old male with medical problems significant for hypertension, history of pulmonary embolism diagnosed in 2015 for which patient was on Coumadin for 1 year, chronic obstructive pulmonary disease, history of nicotine addiction that he quit in 2008, metastatic lung adenocarcinoma with pulmonary metastasis for which patient is currently on treatment with Keytruda.  Patient is here to get cycle 7 of Keytruda today.  Complains of chronic shortness of breath.  Denies any hemoptysis.  Denies any new lymph node enlargement.  Complains of chronic neuropathy affecting lower extremity.  Complains of fatigue.  Complains of loss of appetite.  Complains of diarrhea every day that ranging between 2-4 episodes per day.  Denies any blood in stool.          Past Medical History, Past Surgical History, Social History, Family History have been reviewed and are without significant changes except as mentioned.    Review of Systems   A comprehensive 14 point review of systems was performed and was negative except as mentioned in HPI.    Medications:  The current medication list was reviewed in the EMR    ALLERGIES:    Allergies   Allergen Reactions    Amoxicillin Anaphylaxis    Albuterol Irritability     Facial flushing and palpitations.    Asmanex (120 Metered Doses) [Mometasone Furoate] Unknown - Low Severity    Gabapentin Hallucinations    Lortab [Hydrocodone-Acetaminophen] Other (See Comments)     Can't remember    Morphine And Related Hallucinations    Prilosec [Omeprazole] Other (See Comments)     unknown    Sodium Clavulanate Unknown - High Severity    Statins GI Intolerance    Sulfa Antibiotics Other (See Comments)     Can't remember also more and can't remember    Symbicort  "[Budesonide-Formoterol Fumarate] Unknown (See Comments)     Doesn't remember reaction type       Objective      Vitals:    08/30/23 0836   BP: 95/53   Pulse: 103   Temp: 96.3 øF (35.7 øC)   TempSrc: Temporal   SpO2: 94%   Weight: 56.2 kg (124 lb)   Height: 165.1 cm (65\")   PainSc: 0-No pain         8/9/2023     8:54 AM   Current Status   ECOG score 3       Physical Exam  Pulmonary:      Breath sounds: Normal breath sounds.   Neurological:      Mental Status: He is alert and oriented to person, place, and time.         RECENT LABS:  Glucose   Date Value Ref Range Status   08/09/2023 93 65 - 99 mg/dL Final     Glucose, Arterial   Date Value Ref Range Status   02/06/2018 106 mmol/L Final     Sodium   Date Value Ref Range Status   08/09/2023 139 136 - 145 mmol/L Final     Potassium   Date Value Ref Range Status   08/09/2023 3.2 (L) 3.5 - 5.2 mmol/L Final     CO2   Date Value Ref Range Status   08/09/2023 31.0 (H) 22.0 - 29.0 mmol/L Final     Chloride   Date Value Ref Range Status   08/09/2023 100 98 - 107 mmol/L Final     Anion Gap   Date Value Ref Range Status   08/09/2023 8.0 5.0 - 15.0 mmol/L Final     Creatinine   Date Value Ref Range Status   08/09/2023 0.53 (L) 0.76 - 1.27 mg/dL Final     BUN   Date Value Ref Range Status   08/09/2023 9 8 - 23 mg/dL Final     BUN/Creatinine Ratio   Date Value Ref Range Status   08/09/2023 17.0 7.0 - 25.0 Final     Calcium   Date Value Ref Range Status   08/09/2023 8.7 8.6 - 10.5 mg/dL Final     eGFR Non  Amer   Date Value Ref Range Status   11/08/2021 81 >60 mL/min/1.73 Final     Alkaline Phosphatase   Date Value Ref Range Status   08/09/2023 72 39 - 117 U/L Final     Total Protein   Date Value Ref Range Status   08/09/2023 6.7 6.0 - 8.5 g/dL Final     ALT (SGPT)   Date Value Ref Range Status   08/09/2023 13 1 - 41 U/L Final     AST (SGOT)   Date Value Ref Range Status   08/09/2023 13 1 - 40 U/L Final     Total Bilirubin   Date Value Ref Range Status   08/09/2023 0.2 0.0 - " 1.2 mg/dL Final     Albumin   Date Value Ref Range Status   08/09/2023 3.6 3.5 - 5.2 g/dL Final     Globulin   Date Value Ref Range Status   08/09/2023 3.1 gm/dL Final     Lab Results   Component Value Date    WBC 15.31 (H) 08/30/2023    HGB 11.9 (L) 08/30/2023    HCT 37.4 (L) 08/30/2023    MCV 96.1 08/30/2023     08/30/2023     Lab Results   Component Value Date    NEUTROABS 12.01 (H) 08/30/2023    IRON 84 08/09/2023    IRON 85 05/30/2023    IRON 36 (L) 04/10/2023    TIBC 249 (L) 08/09/2023    TIBC 316 05/30/2023    TIBC 346 04/10/2023    LABIRON 34 08/09/2023    LABIRON 27 05/30/2023    LABIRON 10 (L) 04/10/2023    FERRITIN 771.30 (H) 08/09/2023    FERRITIN 799.30 (H) 05/30/2023    FERRITIN 235.30 04/10/2023    OMCACWEB44 >2,000 (H) 08/09/2023    SNQXZWCE60 1,247 (H) 05/30/2023    SJWLVRDN04 1,010 (H) 04/10/2023    FOLATE >20.00 08/09/2023    FOLATE >20.00 05/30/2023    FOLATE >20.00 04/10/2023     No results found for: , LABCA2, AFPTM, HCGQUANT, , CHROMGRNA, [id number], CEA, REFLABREPO      PATHOLOGY:  * Cannot find OR log *         RADIOLOGY DATA :  No radiology results for the last 7 days        Assessment & Plan     1.  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  - We will proceed with cycle 7 of Keytruda today  - We will continue with close monitoring for immune mediated side effect.  - Plan is to repeat CT of chest, abdomen and pelvis with contrast after cycle 8 of Keytruda which has been discussed with patient.    2.  Iron deficiency anemia:  - Due to iron malabsorption patient has received intravenous Injectafer in the past.  - Hemoglobin today 11.9.  - Remains on folic acid p.o. daily  -  We will repeat anemia work-up in October 2023    3.  History of pulmonary embolism  - S/p Coumadin for 1 year in 2015    4.  History of recurrent C. difficile  - Patient recently received oral vancomycin by Dr. Goldsmith.    5.  Health maintenance: Patient does not smoke    6.  Advance care planning:  - Patient remains full code.  Has living will on chart.    7.  Hypokalemia  -Potassium is 3.6    8.  Leukocytosis:  - White blood cell count is 15,000 which is predominantly increasing neutrophil.  Will monitor with CBC for now.    9.  Weight loss and loss of appetite  - Prescription for Remeron 15 mg p.o. nightly has been sent to patient's pharmacy today.                 PHQ-9 Total Score: 0   -Patient is not homicidal or suicidal.  No acute intervention required.     Brian Garcia reports a pain score of 0.  Given his pain assessment as noted, treatment options were discussed and the following options were decided upon as a follow-up plan to address the patient's pain: continuation of current treatment plan for pain.         Omega Davis MD  8/30/2023  08:54 CDT        Part of this note may be an electronic transcription/translation of spoken language to printed text using the Dragon Dictation System.          CC:

## 2023-08-30 ENCOUNTER — OFFICE VISIT (OUTPATIENT)
Dept: ONCOLOGY | Facility: CLINIC | Age: 73
End: 2023-08-30
Payer: OTHER GOVERNMENT

## 2023-08-30 ENCOUNTER — INFUSION (OUTPATIENT)
Dept: ONCOLOGY | Facility: HOSPITAL | Age: 73
End: 2023-08-30
Payer: OTHER GOVERNMENT

## 2023-08-30 VITALS
WEIGHT: 124 LBS | BODY MASS INDEX: 20.66 KG/M2 | HEIGHT: 65 IN | TEMPERATURE: 96.3 F | DIASTOLIC BLOOD PRESSURE: 53 MMHG | OXYGEN SATURATION: 94 % | SYSTOLIC BLOOD PRESSURE: 95 MMHG | HEART RATE: 103 BPM

## 2023-08-30 DIAGNOSIS — C34.81 MALIGNANT NEOPLASM OF OVERLAPPING SITES OF RIGHT LUNG: ICD-10-CM

## 2023-08-30 DIAGNOSIS — C34.81 MALIGNANT NEOPLASM OF OVERLAPPING SITES OF RIGHT LUNG: Chronic | ICD-10-CM

## 2023-08-30 DIAGNOSIS — D50.8 IRON DEFICIENCY ANEMIA SECONDARY TO INADEQUATE DIETARY IRON INTAKE: Chronic | ICD-10-CM

## 2023-08-30 DIAGNOSIS — Z45.2 ENCOUNTER FOR VENOUS ACCESS DEVICE CARE: ICD-10-CM

## 2023-08-30 DIAGNOSIS — C34.81 MALIGNANT NEOPLASM OF OVERLAPPING SITES OF RIGHT LUNG: Primary | ICD-10-CM

## 2023-08-30 DIAGNOSIS — Z86.711 HISTORY OF PULMONARY EMBOLISM: Chronic | ICD-10-CM

## 2023-08-30 DIAGNOSIS — D72.828 OTHER ELEVATED WHITE BLOOD CELL (WBC) COUNT: Primary | Chronic | ICD-10-CM

## 2023-08-30 DIAGNOSIS — K90.9 IRON MALABSORPTION: Chronic | ICD-10-CM

## 2023-08-30 LAB
ALBUMIN SERPL-MCNC: 3.2 G/DL (ref 3.5–5.2)
ALBUMIN/GLOB SERPL: 0.9 G/DL
ALP SERPL-CCNC: 69 U/L (ref 39–117)
ALT SERPL W P-5'-P-CCNC: 12 U/L (ref 1–41)
ANION GAP SERPL CALCULATED.3IONS-SCNC: 8 MMOL/L (ref 5–15)
AST SERPL-CCNC: 15 U/L (ref 1–40)
BASOPHILS # BLD AUTO: 0.11 10*3/MM3 (ref 0–0.2)
BASOPHILS NFR BLD AUTO: 0.7 % (ref 0–1.5)
BILIRUB SERPL-MCNC: 0.2 MG/DL (ref 0–1.2)
BUN SERPL-MCNC: 9 MG/DL (ref 8–23)
BUN/CREAT SERPL: 16.1 (ref 7–25)
CALCIUM SPEC-SCNC: 8.8 MG/DL (ref 8.6–10.5)
CHLORIDE SERPL-SCNC: 96 MMOL/L (ref 98–107)
CO2 SERPL-SCNC: 32 MMOL/L (ref 22–29)
CREAT SERPL-MCNC: 0.56 MG/DL (ref 0.76–1.27)
DEPRECATED RDW RBC AUTO: 47.2 FL (ref 37–54)
EGFRCR SERPLBLD CKD-EPI 2021: 103.4 ML/MIN/1.73
EOSINOPHIL # BLD AUTO: 0.24 10*3/MM3 (ref 0–0.4)
EOSINOPHIL NFR BLD AUTO: 1.6 % (ref 0.3–6.2)
ERYTHROCYTE [DISTWIDTH] IN BLOOD BY AUTOMATED COUNT: 13.3 % (ref 12.3–15.4)
GLOBULIN UR ELPH-MCNC: 3.7 GM/DL
GLUCOSE SERPL-MCNC: 131 MG/DL (ref 65–99)
HCT VFR BLD AUTO: 37.4 % (ref 37.5–51)
HGB BLD-MCNC: 11.9 G/DL (ref 13–17.7)
IMM GRANULOCYTES # BLD AUTO: 0.22 10*3/MM3 (ref 0–0.05)
IMM GRANULOCYTES NFR BLD AUTO: 1.4 % (ref 0–0.5)
LYMPHOCYTES # BLD AUTO: 1.16 10*3/MM3 (ref 0.7–3.1)
LYMPHOCYTES NFR BLD AUTO: 7.6 % (ref 19.6–45.3)
MCH RBC QN AUTO: 30.6 PG (ref 26.6–33)
MCHC RBC AUTO-ENTMCNC: 31.8 G/DL (ref 31.5–35.7)
MCV RBC AUTO: 96.1 FL (ref 79–97)
MONOCYTES # BLD AUTO: 1.57 10*3/MM3 (ref 0.1–0.9)
MONOCYTES NFR BLD AUTO: 10.3 % (ref 5–12)
NEUTROPHILS NFR BLD AUTO: 12.01 10*3/MM3 (ref 1.7–7)
NEUTROPHILS NFR BLD AUTO: 78.4 % (ref 42.7–76)
NRBC BLD AUTO-RTO: 0 /100 WBC (ref 0–0.2)
PLATELET # BLD AUTO: 342 10*3/MM3 (ref 140–450)
PMV BLD AUTO: 8.8 FL (ref 6–12)
POTASSIUM SERPL-SCNC: 3.6 MMOL/L (ref 3.5–5.2)
PROT SERPL-MCNC: 6.9 G/DL (ref 6–8.5)
RBC # BLD AUTO: 3.89 10*6/MM3 (ref 4.14–5.8)
SODIUM SERPL-SCNC: 136 MMOL/L (ref 136–145)
WBC NRBC COR # BLD: 15.31 10*3/MM3 (ref 3.4–10.8)

## 2023-08-30 PROCEDURE — 25010000002 PEMBROLIZUMAB 100 MG/4ML SOLUTION 4 ML VIAL: Performed by: INTERNAL MEDICINE

## 2023-08-30 PROCEDURE — 25010000002 HEPARIN LOCK FLUSH PER 10 UNITS: Performed by: INTERNAL MEDICINE

## 2023-08-30 PROCEDURE — 80053 COMPREHEN METABOLIC PANEL: CPT

## 2023-08-30 PROCEDURE — 85025 COMPLETE CBC W/AUTO DIFF WBC: CPT

## 2023-08-30 RX ORDER — DIPHENHYDRAMINE HYDROCHLORIDE 50 MG/ML
50 INJECTION INTRAMUSCULAR; INTRAVENOUS AS NEEDED
Status: CANCELLED | OUTPATIENT
Start: 2023-08-30

## 2023-08-30 RX ORDER — FAMOTIDINE 10 MG/ML
20 INJECTION, SOLUTION INTRAVENOUS AS NEEDED
Status: DISCONTINUED | OUTPATIENT
Start: 2023-08-30 | End: 2023-08-30 | Stop reason: HOSPADM

## 2023-08-30 RX ORDER — SODIUM CHLORIDE 0.9 % (FLUSH) 0.9 %
10 SYRINGE (ML) INJECTION AS NEEDED
Status: DISCONTINUED | OUTPATIENT
Start: 2023-08-30 | End: 2023-08-30 | Stop reason: HOSPADM

## 2023-08-30 RX ORDER — HEPARIN SODIUM (PORCINE) LOCK FLUSH IV SOLN 100 UNIT/ML 100 UNIT/ML
500 SOLUTION INTRAVENOUS AS NEEDED
OUTPATIENT
Start: 2023-08-30

## 2023-08-30 RX ORDER — SODIUM CHLORIDE 9 MG/ML
250 INJECTION, SOLUTION INTRAVENOUS ONCE
Status: COMPLETED | OUTPATIENT
Start: 2023-08-30 | End: 2023-08-30

## 2023-08-30 RX ORDER — FAMOTIDINE 10 MG/ML
20 INJECTION, SOLUTION INTRAVENOUS AS NEEDED
Status: CANCELLED | OUTPATIENT
Start: 2023-08-30

## 2023-08-30 RX ORDER — MIRTAZAPINE 15 MG/1
15 TABLET, FILM COATED ORAL NIGHTLY
Qty: 30 TABLET | Refills: 2 | Status: SHIPPED | OUTPATIENT
Start: 2023-08-30

## 2023-08-30 RX ORDER — SODIUM CHLORIDE 0.9 % (FLUSH) 0.9 %
10 SYRINGE (ML) INJECTION AS NEEDED
OUTPATIENT
Start: 2023-08-30

## 2023-08-30 RX ORDER — SODIUM CHLORIDE 9 MG/ML
250 INJECTION, SOLUTION INTRAVENOUS ONCE
Status: CANCELLED | OUTPATIENT
Start: 2023-08-30

## 2023-08-30 RX ORDER — HEPARIN SODIUM (PORCINE) LOCK FLUSH IV SOLN 100 UNIT/ML 100 UNIT/ML
500 SOLUTION INTRAVENOUS AS NEEDED
Status: DISCONTINUED | OUTPATIENT
Start: 2023-08-30 | End: 2023-08-30 | Stop reason: HOSPADM

## 2023-08-30 RX ORDER — DIPHENHYDRAMINE HYDROCHLORIDE 50 MG/ML
50 INJECTION INTRAMUSCULAR; INTRAVENOUS AS NEEDED
Status: DISCONTINUED | OUTPATIENT
Start: 2023-08-30 | End: 2023-08-30 | Stop reason: HOSPADM

## 2023-08-30 RX ADMIN — HEPARIN 500 UNITS: 100 SYRINGE at 11:10

## 2023-08-30 RX ADMIN — SODIUM CHLORIDE 250 ML: 9 INJECTION, SOLUTION INTRAVENOUS at 10:20

## 2023-08-30 RX ADMIN — SODIUM CHLORIDE 200 MG: 9 INJECTION, SOLUTION INTRAVENOUS at 10:22

## 2023-08-30 RX ADMIN — Medication 10 ML: at 11:10

## 2023-09-06 ENCOUNTER — APPOINTMENT (OUTPATIENT)
Dept: GENERAL RADIOLOGY | Facility: HOSPITAL | Age: 73
End: 2023-09-06
Payer: OTHER GOVERNMENT

## 2023-09-06 ENCOUNTER — HOSPITAL ENCOUNTER (OUTPATIENT)
Facility: HOSPITAL | Age: 73
Setting detail: OBSERVATION
Discharge: HOME | End: 2023-09-08
Attending: EMERGENCY MEDICINE | Admitting: STUDENT IN AN ORGANIZED HEALTH CARE EDUCATION/TRAINING PROGRAM
Payer: OTHER GOVERNMENT

## 2023-09-06 ENCOUNTER — APPOINTMENT (OUTPATIENT)
Dept: CT IMAGING | Facility: HOSPITAL | Age: 73
End: 2023-09-06
Payer: OTHER GOVERNMENT

## 2023-09-06 DIAGNOSIS — Z78.9 IMPAIRED MOBILITY AND ADLS: ICD-10-CM

## 2023-09-06 DIAGNOSIS — Z74.09 IMPAIRED MOBILITY AND ADLS: ICD-10-CM

## 2023-09-06 DIAGNOSIS — Z74.09 IMPAIRED FUNCTIONAL MOBILITY, BALANCE, GAIT, AND ENDURANCE: ICD-10-CM

## 2023-09-06 DIAGNOSIS — A04.72 C. DIFFICILE COLITIS: Primary | ICD-10-CM

## 2023-09-06 LAB
ALBUMIN SERPL-MCNC: 3.2 G/DL (ref 3.5–5.2)
ALBUMIN/GLOB SERPL: 0.8 G/DL
ALP SERPL-CCNC: 70 U/L (ref 39–117)
ALT SERPL W P-5'-P-CCNC: 12 U/L (ref 1–41)
ANION GAP SERPL CALCULATED.3IONS-SCNC: 7 MMOL/L (ref 5–15)
AST SERPL-CCNC: 12 U/L (ref 1–40)
BASOPHILS # BLD AUTO: 0.12 10*3/MM3 (ref 0–0.2)
BASOPHILS NFR BLD AUTO: 0.7 % (ref 0–1.5)
BILIRUB SERPL-MCNC: 0.3 MG/DL (ref 0–1.2)
BILIRUB UR QL STRIP: NEGATIVE
BUN SERPL-MCNC: 10 MG/DL (ref 8–23)
BUN/CREAT SERPL: 16.7 (ref 7–25)
CALCIUM SPEC-SCNC: 8.5 MG/DL (ref 8.6–10.5)
CHLORIDE SERPL-SCNC: 97 MMOL/L (ref 98–107)
CLARITY UR: CLEAR
CO2 SERPL-SCNC: 31 MMOL/L (ref 22–29)
COLOR UR: ABNORMAL
CREAT SERPL-MCNC: 0.6 MG/DL (ref 0.76–1.27)
D-LACTATE SERPL-SCNC: 1.2 MMOL/L (ref 0.5–2)
DEPRECATED RDW RBC AUTO: 46.6 FL (ref 37–54)
EGFRCR SERPLBLD CKD-EPI 2021: 101.3 ML/MIN/1.73
EOSINOPHIL # BLD AUTO: 0.32 10*3/MM3 (ref 0–0.4)
EOSINOPHIL NFR BLD AUTO: 1.9 % (ref 0.3–6.2)
ERYTHROCYTE [DISTWIDTH] IN BLOOD BY AUTOMATED COUNT: 13.1 % (ref 12.3–15.4)
FLUAV RNA RESP QL NAA+PROBE: NOT DETECTED
FLUBV RNA RESP QL NAA+PROBE: NOT DETECTED
GLOBULIN UR ELPH-MCNC: 4 GM/DL
GLUCOSE SERPL-MCNC: 101 MG/DL (ref 65–99)
GLUCOSE UR STRIP-MCNC: NEGATIVE MG/DL
HCT VFR BLD AUTO: 34.6 % (ref 37.5–51)
HGB BLD-MCNC: 11.2 G/DL (ref 13–17.7)
HGB UR QL STRIP.AUTO: NEGATIVE
IMM GRANULOCYTES # BLD AUTO: 0.12 10*3/MM3 (ref 0–0.05)
IMM GRANULOCYTES NFR BLD AUTO: 0.7 % (ref 0–0.5)
KETONES UR QL STRIP: NEGATIVE
LEUKOCYTE ESTERASE UR QL STRIP.AUTO: NEGATIVE
LIPASE SERPL-CCNC: 14 U/L (ref 13–60)
LYMPHOCYTES # BLD AUTO: 1.64 10*3/MM3 (ref 0.7–3.1)
LYMPHOCYTES NFR BLD AUTO: 9.7 % (ref 19.6–45.3)
MCH RBC QN AUTO: 31.2 PG (ref 26.6–33)
MCHC RBC AUTO-ENTMCNC: 32.4 G/DL (ref 31.5–35.7)
MCV RBC AUTO: 96.4 FL (ref 79–97)
MONOCYTES # BLD AUTO: 2.55 10*3/MM3 (ref 0.1–0.9)
MONOCYTES NFR BLD AUTO: 15.1 % (ref 5–12)
NEUTROPHILS NFR BLD AUTO: 12.16 10*3/MM3 (ref 1.7–7)
NEUTROPHILS NFR BLD AUTO: 71.9 % (ref 42.7–76)
NITRITE UR QL STRIP: NEGATIVE
NRBC BLD AUTO-RTO: 0 /100 WBC (ref 0–0.2)
PH UR STRIP.AUTO: <=5 [PH] (ref 5–9)
PLATELET # BLD AUTO: 342 10*3/MM3 (ref 140–450)
PMV BLD AUTO: 8.6 FL (ref 6–12)
POTASSIUM SERPL-SCNC: 3.5 MMOL/L (ref 3.5–5.2)
PROT SERPL-MCNC: 7.2 G/DL (ref 6–8.5)
PROT UR QL STRIP: NEGATIVE
RBC # BLD AUTO: 3.59 10*6/MM3 (ref 4.14–5.8)
SARS-COV-2 RNA RESP QL NAA+PROBE: NOT DETECTED
SODIUM SERPL-SCNC: 135 MMOL/L (ref 136–145)
SP GR UR STRIP: 1.02 (ref 1–1.03)
UROBILINOGEN UR QL STRIP: ABNORMAL
WBC NRBC COR # BLD: 16.91 10*3/MM3 (ref 3.4–10.8)

## 2023-09-06 PROCEDURE — 83605 ASSAY OF LACTIC ACID: CPT | Performed by: EMERGENCY MEDICINE

## 2023-09-06 PROCEDURE — 74177 CT ABD & PELVIS W/CONTRAST: CPT

## 2023-09-06 PROCEDURE — 96361 HYDRATE IV INFUSION ADD-ON: CPT

## 2023-09-06 PROCEDURE — 80053 COMPREHEN METABOLIC PANEL: CPT | Performed by: EMERGENCY MEDICINE

## 2023-09-06 PROCEDURE — 85025 COMPLETE CBC W/AUTO DIFF WBC: CPT | Performed by: EMERGENCY MEDICINE

## 2023-09-06 PROCEDURE — 96372 THER/PROPH/DIAG INJ SC/IM: CPT

## 2023-09-06 PROCEDURE — 96360 HYDRATION IV INFUSION INIT: CPT

## 2023-09-06 PROCEDURE — 71046 X-RAY EXAM CHEST 2 VIEWS: CPT

## 2023-09-06 PROCEDURE — G0378 HOSPITAL OBSERVATION PER HR: HCPCS

## 2023-09-06 PROCEDURE — 83690 ASSAY OF LIPASE: CPT | Performed by: EMERGENCY MEDICINE

## 2023-09-06 PROCEDURE — 99285 EMERGENCY DEPT VISIT HI MDM: CPT

## 2023-09-06 PROCEDURE — 25510000001 IOPAMIDOL 61 % SOLUTION: Performed by: EMERGENCY MEDICINE

## 2023-09-06 PROCEDURE — 87636 SARSCOV2 & INF A&B AMP PRB: CPT | Performed by: EMERGENCY MEDICINE

## 2023-09-06 PROCEDURE — 25010000002 ENOXAPARIN PER 10 MG: Performed by: PHYSICIAN ASSISTANT

## 2023-09-06 PROCEDURE — 81003 URINALYSIS AUTO W/O SCOPE: CPT | Performed by: EMERGENCY MEDICINE

## 2023-09-06 PROCEDURE — 87040 BLOOD CULTURE FOR BACTERIA: CPT | Performed by: EMERGENCY MEDICINE

## 2023-09-06 RX ORDER — CHOLECALCIFEROL (VITAMIN D3) 125 MCG
5 CAPSULE ORAL NIGHTLY PRN
Status: DISCONTINUED | OUTPATIENT
Start: 2023-09-06 | End: 2023-09-08 | Stop reason: HOSPADM

## 2023-09-06 RX ORDER — ONDANSETRON 2 MG/ML
4 INJECTION INTRAMUSCULAR; INTRAVENOUS EVERY 6 HOURS PRN
Status: DISCONTINUED | OUTPATIENT
Start: 2023-09-06 | End: 2023-09-08 | Stop reason: HOSPADM

## 2023-09-06 RX ORDER — SODIUM CHLORIDE 9 MG/ML
40 INJECTION, SOLUTION INTRAVENOUS AS NEEDED
Status: DISCONTINUED | OUTPATIENT
Start: 2023-09-06 | End: 2023-09-08 | Stop reason: HOSPADM

## 2023-09-06 RX ORDER — SODIUM CHLORIDE 9 MG/ML
75 INJECTION, SOLUTION INTRAVENOUS CONTINUOUS
Status: DISCONTINUED | OUTPATIENT
Start: 2023-09-06 | End: 2023-09-08 | Stop reason: HOSPADM

## 2023-09-06 RX ORDER — DILTIAZEM HYDROCHLORIDE 120 MG/1
240 CAPSULE, COATED, EXTENDED RELEASE ORAL
Status: DISCONTINUED | OUTPATIENT
Start: 2023-09-07 | End: 2023-09-08 | Stop reason: HOSPADM

## 2023-09-06 RX ORDER — ONDANSETRON 4 MG/1
4 TABLET, FILM COATED ORAL EVERY 6 HOURS PRN
Status: DISCONTINUED | OUTPATIENT
Start: 2023-09-06 | End: 2023-09-08 | Stop reason: HOSPADM

## 2023-09-06 RX ORDER — FOLIC ACID 1 MG/1
1 TABLET ORAL DAILY
Status: DISCONTINUED | OUTPATIENT
Start: 2023-09-07 | End: 2023-09-08 | Stop reason: HOSPADM

## 2023-09-06 RX ORDER — SODIUM CHLORIDE 0.9 % (FLUSH) 0.9 %
10 SYRINGE (ML) INJECTION AS NEEDED
Status: DISCONTINUED | OUTPATIENT
Start: 2023-09-06 | End: 2023-09-08 | Stop reason: HOSPADM

## 2023-09-06 RX ORDER — ACETAMINOPHEN 325 MG/1
650 TABLET ORAL EVERY 4 HOURS PRN
Status: DISCONTINUED | OUTPATIENT
Start: 2023-09-06 | End: 2023-09-08 | Stop reason: HOSPADM

## 2023-09-06 RX ORDER — MIRTAZAPINE 15 MG/1
15 TABLET, FILM COATED ORAL NIGHTLY
Status: CANCELLED | OUTPATIENT
Start: 2023-09-06

## 2023-09-06 RX ORDER — ENOXAPARIN SODIUM 100 MG/ML
40 INJECTION SUBCUTANEOUS DAILY
Status: DISCONTINUED | OUTPATIENT
Start: 2023-09-06 | End: 2023-09-08 | Stop reason: HOSPADM

## 2023-09-06 RX ORDER — MONTELUKAST SODIUM 10 MG/1
10 TABLET ORAL NIGHTLY
Status: DISCONTINUED | OUTPATIENT
Start: 2023-09-06 | End: 2023-09-08 | Stop reason: HOSPADM

## 2023-09-06 RX ORDER — SODIUM CHLORIDE 0.9 % (FLUSH) 0.9 %
10 SYRINGE (ML) INJECTION EVERY 12 HOURS SCHEDULED
Status: DISCONTINUED | OUTPATIENT
Start: 2023-09-06 | End: 2023-09-08 | Stop reason: HOSPADM

## 2023-09-06 RX ORDER — FERROUS SULFATE TAB EC 324 MG (65 MG FE EQUIVALENT) 324 (65 FE) MG
324 TABLET DELAYED RESPONSE ORAL
Status: CANCELLED | OUTPATIENT
Start: 2023-09-07

## 2023-09-06 RX ORDER — OLANZAPINE 5 MG/1
5 TABLET ORAL NIGHTLY
Status: DISCONTINUED | OUTPATIENT
Start: 2023-09-06 | End: 2023-09-06

## 2023-09-06 RX ADMIN — SODIUM CHLORIDE 75 ML/HR: 9 INJECTION, SOLUTION INTRAVENOUS at 20:54

## 2023-09-06 RX ADMIN — Medication 10 ML: at 20:54

## 2023-09-06 RX ADMIN — ENOXAPARIN SODIUM 40 MG: 40 INJECTION SUBCUTANEOUS at 22:56

## 2023-09-06 RX ADMIN — MONTELUKAST 10 MG: 10 TABLET, FILM COATED ORAL at 22:56

## 2023-09-06 RX ADMIN — VANCOMYCIN HYDROCHLORIDE 125 MG: KIT at 17:03

## 2023-09-06 RX ADMIN — SODIUM CHLORIDE 75 ML/HR: 9 INJECTION, SOLUTION INTRAVENOUS at 14:37

## 2023-09-06 RX ADMIN — IOPAMIDOL 90 ML: 612 INJECTION, SOLUTION INTRAVENOUS at 17:17

## 2023-09-06 RX ADMIN — VANCOMYCIN HYDROCHLORIDE 125 MG: KIT at 23:58

## 2023-09-06 NOTE — Clinical Note
Level of Care: Med/Surg [1]   Diagnosis: C. difficile colitis [147900]   Admitting Physician: JOVANNY STANLEY [[id number]]   Attending Physician: JOVANNY STANLEY [[id number]]

## 2023-09-06 NOTE — ED PROVIDER NOTES
Subjective   History of Present Illness  74yo male pmh significant htn/PE/copd/chronic respiratory failure/stage IV lung adenocarcinoma/c difficile colitis, presents ED referred by oncology navigator secondary to reported fever this morning (Tmax 101F).  Pt denies treatment for fever and presents ED in afebrile condition.  ROS neg chills/sore throat/cough/soa/chest pain/abd pain/nausea/vomiting/dysuria.  ROS (+) nonbloody diarrhea.    History provided by:  Patient  Fever  Temp source:  Tactile  Associated symptoms: diarrhea    Associated symptoms: no nausea and no vomiting      Review of Systems   Constitutional:  Positive for fever.   HENT: Negative.     Eyes: Negative.    Respiratory: Negative.     Cardiovascular: Negative.    Gastrointestinal:  Positive for diarrhea. Negative for abdominal pain, nausea and vomiting.   Genitourinary: Negative.    Musculoskeletal: Negative.    Skin: Negative.    Allergic/Immunologic: Positive for immunocompromised state.   All other systems reviewed and are negative.    Past Medical History:   Diagnosis Date    A-fib     Anesthesia     hip surgery (spinal caused him unpleasant sensations never wants it again)    Arthritis     Blood in sputum 02/22/2023    bright red blood, teaspoon - tablespoon daily    COPD (chronic obstructive pulmonary disease)     CVA (cerebral vascular accident) 12/24/2022    Hearing aid worn     bilateral    History of pulmonary embolism     History of recurrent pneumonia     Hypertension     Lung cancer 03/17/2023    Oxygen dependent     since @ age 65    Pulmonary nodules     Risk for falls     uses walker    Rotator cuff syndrome of right shoulder     Tachycardia     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 HTN/Eliquis    Wears glasses     readers    Wears glasses     reading       Allergies   Allergen Reactions    Amoxicillin Anaphylaxis    Albuterol Irritability     Facial flushing and palpitations.    Asmanex (120 Metered Doses)  [Mometasone Furoate] Unknown - Low Severity    Gabapentin Hallucinations    Lortab [Hydrocodone-Acetaminophen] Other (See Comments)     Can't remember    Morphine And Related Hallucinations    Prilosec [Omeprazole] Other (See Comments)     unknown    Sodium Clavulanate Unknown - High Severity    Statins GI Intolerance    Sulfa Antibiotics Other (See Comments)     Can't remember also more and can't remember    Symbicort [Budesonide-Formoterol Fumarate] Unknown (See Comments)     Doesn't remember reaction type       Past Surgical History:   Procedure Laterality Date    HIP ARTHROPLASTY Right     RIB BIOPSY Left 03/17/2023 11th    VENOUS ACCESS DEVICE (PORT) INSERTION N/A 3/30/2023    Procedure: MEDIPORT PLACEMENT          (C-ARM);  Surgeon: Lavon Cochran MD;  Location: [facility];  Service: General;  Laterality: N/A;       Family History   Problem Relation Age of Onset    Cancer Sister     Brain cancer Brother     Cancer Brother     No Known Problems Daughter     No Known Problems Daughter     No Known Problems Son        Social History     Socioeconomic History    Marital status:     Number of children: 3    Highest education level: Associate degree: occupational, technical, or vocational program   Tobacco Use    Smoking status: Former     Packs/day: 3.00     Years: 47.00     Pack years: 141.00     Types: Cigarettes     Start date: 1961     Quit date: 5/1/2008     Years since quitting: 15.3    Smokeless tobacco: Never   Vaping Use    Vaping Use: Never used   Substance and Sexual Activity    Alcohol use: Not Currently     Comment: not last 40 years    Drug use: Never    Sexual activity: Not Currently     Partners: Female           Objective   Physical Exam  Vitals and nursing note reviewed.   Constitutional:       Appearance: Normal appearance.   HENT:      Head: Normocephalic and atraumatic.      Right Ear: External ear normal.      Left Ear: External ear normal.      Nose: Nose normal.       Mouth/Throat:      Mouth: Mucous membranes are moist.      Pharynx: Oropharynx is clear. No oropharyngeal exudate or posterior oropharyngeal erythema.   Eyes:      Pupils: Pupils are equal, round, and reactive to light.   Cardiovascular:      Rate and Rhythm: Normal rate and regular rhythm.      Pulses: Normal pulses.      Heart sounds: Normal heart sounds. No murmur heard.    No friction rub. No gallop.   Pulmonary:      Effort: Pulmonary effort is normal. No respiratory distress.      Breath sounds: Normal breath sounds. No wheezing, rhonchi or rales.   Abdominal:      General: Abdomen is flat. Bowel sounds are normal. There is no distension.      Palpations: Abdomen is soft.      Tenderness: There is no abdominal tenderness. There is no guarding or rebound.   Musculoskeletal:         General: No swelling or deformity.      Cervical back: Normal range of motion and neck supple. No rigidity.      Right lower leg: No edema.      Left lower leg: No edema.   Lymphadenopathy:      Cervical: No cervical adenopathy.   Skin:     General: Skin is warm and dry.   Neurological:      General: No focal deficit present.      Mental Status: He is alert and oriented to person, place, and time.      GCS: GCS eye subscore is 4. GCS verbal subscore is 5. GCS motor subscore is 6.      Sensory: Sensation is intact.      Motor: Motor function is intact.       Procedures           ED Course      Labs Reviewed   COMPREHENSIVE METABOLIC PANEL - Abnormal; Notable for the following components:       Result Value    Glucose 101 (*)     Creatinine 0.60 (*)     Sodium 135 (*)     Chloride 97 (*)     CO2 31.0 (*)     Calcium 8.5 (*)     Albumin 3.2 (*)     All other components within normal limits    Narrative:     GFR Normal >60  Chronic Kidney Disease <60  Kidney Failure <15    The GFR formula is only valid for adults with stable renal function between ages 18 and 70.   URINALYSIS W/ MICROSCOPIC IF INDICATED (NO CULTURE) - Abnormal; Notable  for the following components:    Color, UA Orange (*)     All other components within normal limits    Narrative:     Urine microscopic not indicated.   CBC WITH AUTO DIFFERENTIAL - Abnormal; Notable for the following components:    WBC 16.91 (*)     RBC 3.59 (*)     Hemoglobin 11.2 (*)     Hematocrit 34.6 (*)     Lymphocyte % 9.7 (*)     Monocyte % 15.1 (*)     Immature Grans % 0.7 (*)     Neutrophils, Absolute 12.16 (*)     Monocytes, Absolute 2.55 (*)     Immature Grans, Absolute 0.12 (*)     All other components within normal limits   COVID-19 AND FLU A/B, NP SWAB IN TRANSPORT MEDIA 8-12 HR TAT - Normal    Narrative:     Fact sheet for providers: https://www.fda.gov/media/432674/download    Fact sheet for patients: https://www.fda.gov/media/075707/download    Test performed by PCR.   LIPASE - Normal   LACTIC ACID, PLASMA - Normal   BLOOD CULTURE   BLOOD CULTURE   CBC AND DIFFERENTIAL    Narrative:     The following orders were created for panel order CBC & Differential.  Procedure                               Abnormality         Status                     ---------                               -----------         ------                     CBC Auto Differential[[id number]]        Abnormal            Final result                 Please view results for these tests on the individual orders.     XR Chest 2 View    Result Date: 9/6/2023  Narrative: History: fever COMPARISON: 8/2/2023 Findings: PA and lateral views of the chest obtained.Heart size is normal. There is severe emphysema. There are no osseous lesions.     Impression: Severe emphysema.    CT Abdomen Pelvis With Contrast    Result Date: 9/6/2023  Narrative: Indication: C. Difficile colitis TECHNIQUE: Intravenous contrast was administered and axial images from the level of the diaphragms through the pelvis were performed followed by 2-D multiplanar reformats. Comparison: None FINDINGS: Liver shows small hypodense lesion that may represent cysts, but are  too small to accurately characterize.  Other abdominal viscera are unremarkable.  Moderate to marked atherosclerotic calcification is seen in the abdominal aorta and its branches.  Colon diffusely shows wall thickening and enhancement that may be from mild acute colitis, but fecal material is still seen in the colon diffusely.  Visualized lung bases show emphysema.  On bone windows diffuse osteopenia is seen with prominent degenerative changes in the spine and the left hip.     Impression: Probable mild acute diffuse colitis                                        Medical Decision Making  Problems Addressed:  C. difficile colitis: complicated acute illness or injury    Amount and/or Complexity of Data Reviewed  Labs: ordered.  Radiology: ordered.    Risk  OTC drugs.  Prescription drug management.  Decision regarding hospitalization.        Final diagnoses:   C. difficile colitis       ED Disposition  ED Disposition       ED Disposition   Decision to Admit    Condition   --    Comment   Level of Care: Med/Surg [1]   Admitting Physician: JOVANNY STANLEY [[id number]]   Attending Physician: JOVANNY STANLEY [[id number]]   Patient Class: Observation [104]                 No follow-up provider specified.       Medication List      No changes were made to your prescriptions during this visit.            Issac Campbell MD  09/06/23 8661

## 2023-09-07 LAB
ANION GAP SERPL CALCULATED.3IONS-SCNC: 6 MMOL/L (ref 5–15)
BUN SERPL-MCNC: 8 MG/DL (ref 8–23)
BUN/CREAT SERPL: 13.8 (ref 7–25)
CALCIUM SPEC-SCNC: 7.8 MG/DL (ref 8.6–10.5)
CHLORIDE SERPL-SCNC: 99 MMOL/L (ref 98–107)
CO2 SERPL-SCNC: 31 MMOL/L (ref 22–29)
CREAT SERPL-MCNC: 0.58 MG/DL (ref 0.76–1.27)
EGFRCR SERPLBLD CKD-EPI 2021: 102.3 ML/MIN/1.73
GLUCOSE SERPL-MCNC: 83 MG/DL (ref 65–99)
POTASSIUM SERPL-SCNC: 3.5 MMOL/L (ref 3.5–5.2)
SODIUM SERPL-SCNC: 136 MMOL/L (ref 136–145)
WHOLE BLOOD HOLD SPECIMEN: NORMAL

## 2023-09-07 PROCEDURE — 96361 HYDRATE IV INFUSION ADD-ON: CPT

## 2023-09-07 PROCEDURE — 96372 THER/PROPH/DIAG INJ SC/IM: CPT

## 2023-09-07 PROCEDURE — 25010000002 ENOXAPARIN PER 10 MG: Performed by: PHYSICIAN ASSISTANT

## 2023-09-07 PROCEDURE — 97162 PT EVAL MOD COMPLEX 30 MIN: CPT

## 2023-09-07 PROCEDURE — 80048 BASIC METABOLIC PNL TOTAL CA: CPT | Performed by: PHYSICIAN ASSISTANT

## 2023-09-07 PROCEDURE — 36415 COLL VENOUS BLD VENIPUNCTURE: CPT | Performed by: PHYSICIAN ASSISTANT

## 2023-09-07 PROCEDURE — G0378 HOSPITAL OBSERVATION PER HR: HCPCS

## 2023-09-07 RX ORDER — TIZANIDINE 2 MG/1
2 TABLET ORAL NIGHTLY PRN
Status: DISCONTINUED | OUTPATIENT
Start: 2023-09-07 | End: 2023-09-08 | Stop reason: HOSPADM

## 2023-09-07 RX ADMIN — VANCOMYCIN HYDROCHLORIDE 125 MG: KIT at 11:40

## 2023-09-07 RX ADMIN — Medication 10 ML: at 20:27

## 2023-09-07 RX ADMIN — VANCOMYCIN HYDROCHLORIDE 125 MG: KIT at 17:01

## 2023-09-07 RX ADMIN — MONTELUKAST 10 MG: 10 TABLET, FILM COATED ORAL at 20:26

## 2023-09-07 RX ADMIN — VANCOMYCIN HYDROCHLORIDE 125 MG: KIT at 05:53

## 2023-09-07 RX ADMIN — PHENOL 1 SPRAY: 1.5 LIQUID ORAL at 16:05

## 2023-09-07 RX ADMIN — FOLIC ACID 1 MG: 1 TABLET ORAL at 09:10

## 2023-09-07 RX ADMIN — Medication 5 MG: at 20:26

## 2023-09-07 RX ADMIN — DILTIAZEM HYDROCHLORIDE 240 MG: 120 CAPSULE, COATED, EXTENDED RELEASE ORAL at 09:10

## 2023-09-07 RX ADMIN — Medication 10 ML: at 09:11

## 2023-09-07 RX ADMIN — ENOXAPARIN SODIUM 40 MG: 40 INJECTION SUBCUTANEOUS at 20:27

## 2023-09-07 NOTE — PLAN OF CARE
Problem: Adult Inpatient Plan of Care  Goal: Plan of Care Review  Flowsheets (Taken 9/7/2023 1240)  Plan of Care Reviewed With: patient   Goal Outcome Evaluation:  Plan of Care Reviewed With: patient            RD staff consulted for wt loss, MST, and decreased PO intake.  Pt reports appetite has not been good for 3-4 months.  No chewing/swallowing difficulty but stated he had a mild sore throat.  Denies food allergies or n/v.  Is having diarrhea at this time and in isolation for cdiff.  Pt reports -160# and a wt loss of 50# in the last 6 months.  RD assessed wt hx in epic this year and could not find where pt was 155#.  Highest wt found was Feb 2023 @ 134#.  Most of the wts in wt hx are ~125#.  NFPE was performed this date but did not find any significant muscle or fat loss.  Pt agreed to boost but would like it mixed 50/50 with water.  Pt has increased nutrient needs at this time r/t lung CA.

## 2023-09-07 NOTE — PROGRESS NOTES
[facility] Medicine   INPATIENT PROGRESS NOTE      Patient Name: Brian Garcia  Date of Admission: 9/6/2023  Today's Date: 09/07/23  Length of Stay: 0  Primary Care Physician: Marina Kitchen MD    Subjective     HPI   73-year-old male with COPD, history of stroke, stage IV lung cancer on Keytruda, recurrent C. difficile infection, presented to the hospital after developing fever and increased diarrhea in setting of recurrent C. difficile colitis.  Recently tested positive for C. difficile again on 8/31, was prescribed prolonged course of oral vancomycin by his gastroenterologist (Dr. Goldsmith), although [facility].  Admitted and started on oral vancomycin every 6 hours, IV fluids for dehydration related to GI losses.      Continues with frequent diarrhea without any interval improvement today.  Has had mild intermittent abdominal discomfort.  No further fevers overnight.  No chills.        Review of Systems   Comprehensive ROS completed  All pertinent negatives and positives are as above. All other systems have been reviewed and are negative unless otherwise stated.     Objective    Temp:  [97.4 °F (36.3 °C)-99 °F (37.2 °C)] 97.4 °F (36.3 °C)  Heart Rate:  [] 95  Resp:  [16-18] 18  BP: (100-132)/(50-79) 113/56  Physical Exam  General: No acute distress  Cardiac: Normal rate, regular rhythm  Respiratory: No wheezes or rhonchi  Abdomen: Nontender, nondistended  Extremities: No edema        Results Review:  I have reviewed the labs, radiology results, and diagnostic studies.    Laboratory Data:   Results from last 7 days   Lab Units 09/06/23  1427   WBC 10*3/mm3 16.91*   HEMOGLOBIN g/dL 11.2*   HEMATOCRIT % 34.6*   PLATELETS 10*3/mm3 342        Results from last 7 days   Lab Units 09/07/23  0617 09/06/23  1427   SODIUM mmol/L 136 135*   POTASSIUM mmol/L 3.5 3.5   CHLORIDE mmol/L 99 97*    CO2 mmol/L 31.0* 31.0*   BUN mg/dL 8 10   CREATININE mg/dL 0.58* 0.60*   CALCIUM mg/dL 7.8* 8.5*   BILIRUBIN mg/dL  --  0.3   ALK PHOS U/L  --  70   ALT (SGPT) U/L  --  12   AST (SGOT) U/L  --  12   GLUCOSE mg/dL 83 101*       Culture Data:   Blood Culture   Date Value Ref Range Status   09/06/2023 No growth at 24 hours  Preliminary     No results found for: URINECX  No results found for: RESPCX  No results found for: WOUNDCX  No results found for: STOOLCX  No components found for: BODYFLD    Radiology Data:   Imaging Results (Last 24 Hours)       Procedure Component Value Units Date/Time    CT Abdomen Pelvis With Contrast [[id number]] Collected: 09/06/23 1747     Updated: 09/06/23 1756    Narrative:      Indication:  C. Difficile colitis    TECHNIQUE:  Intravenous contrast was administered and axial images from the level of the  diaphragms through the pelvis were performed followed by 2-D multiplanar  reformats.    Comparison:  None    FINDINGS:  Liver shows small hypodense lesion that may represent cysts, but are too small  to accurately characterize.  Other abdominal viscera are unremarkable.  Moderate  to marked atherosclerotic calcification is seen in the abdominal aorta and its  branches.  Colon diffusely shows wall thickening and enhancement that may be  from mild acute colitis, but fecal material is still seen in the colon  diffusely.  Visualized lung bases show emphysema.  On bone windows diffuse  osteopenia is seen with prominent degenerative changes in the spine and the left  hip.      Impression:      Probable mild acute diffuse colitis            I have reviewed the patient's current medications.     Assessment/Plan     Active Hospital Problems    Diagnosis     **C. difficile colitis        Recurrent C. difficile colitis  - Continue oral vancomycin 125 mg every 6 hours  - Follow clinically, monitor stools    Dehydration related to GI losses  - Continue maintenance fluid NS 75 cc/hour    Stage IV lung  cancer  On Keytruda outpatient  Outpatient follow-up with oncology      DVT prophylaxis Lovenox    Discharge Planning: Possible discharge home in next 2 to 3 days pending clinical course.  Status and plan of care discussed with nursing staff and case management    Electronically signed by Quinn Richardson MD, 09/07/23, 15:17 CDT.

## 2023-09-07 NOTE — CONSULTS
Adult Nutrition  Assessment/PES    Patient Name:  Brian Garcia  YOB: 1950  MRN: [id number]  Admit Date:  9/6/2023    Assessment Date:  9/7/2023    Comments:  RD staff consulted for wt loss, MST, and decreased PO intake.  Pt reports appetite has not been good for 3-4 months.  No chewing/swallowing difficulty but stated he had a mild sore throat.  Denies food allergies or n/v.  Is having diarrhea at this time and in isolation for cdiff.  Pt reports -160# and a wt loss of 50# in the last 6 months.  RD assessed wt hx in epic this year and could not find where pt was 155#.  Highest wt found was Feb 2023 @ 134#.  Most of the wts in wt hx are ~125#.  NFPE was performed this date but did not find any significant muscle or fat loss.  Pt agreed to boost but would like it mixed 50/50 with water.  Pt has increased nutrient needs at this time r/t lung CA.       Active Hospital Problems:  Active Hospital Problems    Diagnosis  POA    **C. difficile colitis [A04.72]  Yes      Resolved Hospital Problems   No resolved problems to display.     Past Medical History:   Diagnosis Date    A-fib     Anesthesia     hip surgery (spinal caused him unpleasant sensations never wants it again)    Arthritis     Blood in sputum 02/22/2023    bright red blood, teaspoon - tablespoon daily    COPD (chronic obstructive pulmonary disease)     CVA (cerebral vascular accident) 12/24/2022    Hearing aid worn     bilateral    History of pulmonary embolism     History of recurrent pneumonia     Hypertension     Lung cancer 03/17/2023    Oxygen dependent     since @ age 65    Pulmonary nodules     Risk for falls     uses walker    Rotator cuff syndrome of right shoulder     Tachycardia     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 HTN/Eliquis    Wears glasses     readers    Wears glasses     reading        Reason for Assessment       Row Name 09/07/23 1234          Reason for Assessment    Reason For  Assessment nurse/nurse practitioner consult     Identified At Risk by Screening Criteria reduced oral intake over the last month;unintentional loss of 10 lbs or more in the past 2 mos;MST SCORE 2+                    Nutrition/Diet History       Row Name 09/07/23 1234          Nutrition/Diet History    Typical Intake (Food/Fluid/EN/PN) pt reports appetite has been low for 3-4 months     Factors Affecting Nutritional Intake appetite                    Labs/Tests/Procedures/Meds       Row Name 09/07/23 1234          Labs/Procedures/Meds    Lab Results Reviewed reviewed     Lab Results Comments Cr 0.58, Alb 3.2        Diagnostic Tests/Procedures    Diagnostic Test/Procedure Reviewed reviewed        Medications    Pertinent Medications Reviewed reviewed, pertinent     Pertinent Medications Comments folic acid, vanc, IVF @ 75 ml/hr                      Estimated/Assessed Needs - Anthropometrics       Row Name 09/07/23 1235 09/07/23 0553       Anthropometrics    Weight -- 54.6 kg (120 lb 6.4 oz)    Weight for Calculation 54.4 kg (120 lb) --       Estimated/Assessed Needs    Additional Documentation KCAL/KG (Group);Protein Requirements (Group);Fluid Requirements (Group) --       KCAL/KG    KCAL/KG 25 Kcal/Kg (kcal);30 Kcal/Kg (kcal) --    25 Kcal/Kg (kcal) 1360.8 --    30 Kcal/Kg (kcal) 1632.96 --       Protein Requirements    Weight Used For Protein Calculations 54.4 kg (120 lb) --    Est Protein Requirement Amount (gms/kg) 1.2 gm protein --    Estimated Protein Requirements (gms/day) 65.32 --       Fluid Requirements    Fluid Requirements (mL/day) 1650 --    RDA Method (mL) 1650 --                   Nutrition Prescription Ordered       Row Name 09/07/23 1235          Nutrition Prescription PO    Current PO Diet Regular                         Problem/Interventions:   Problem 1       Row Name 09/07/23 1236          Nutrition Diagnoses Problem 1    Problem 1 Predicted Suboptimal Intake     Etiology (related to) Factors  Affecting Nutrition     Appetite Poor     Signs/Symptoms (evidenced by) Report of Mnimal PO Intake                          Intervention Goal       Row Name 09/07/23 1236          Intervention Goal    General Maintain nutrition;Meet nutritional needs for age/condition     PO Meet estimated needs     Weight No significant weight loss                    Nutrition Intervention       Row Name 09/07/23 1236          Nutrition Intervention    RD/Tech Action Follow Tx progress;Care plan reviewd;Encourage intake;Recommend/ordered     Recommended/Ordered Supplement                    Nutrition Prescription       Row Name 09/07/23 1236          Nutrition Prescription PO    PO Prescription Begin/change supplement     Supplement Boost Plus     Supplement Frequency 3 times a day                    Education/Evaluation       Row Name 09/07/23 1236          Education    Education Education topics;Advised regarding habits/behavior     Education Topics Basic nutrition     Advised Regarding Habits/Behavior Use supplement        Monitor/Evaluation    Monitor Per protocol;PO intake;Supplement intake;Pertinent labs;Weight     Education Follow-up Reinforce PRN                     Electronically signed by:  Cami Callejas RD  09/07/23 12:37 CDT

## 2023-09-07 NOTE — PLAN OF CARE
Goal Outcome Evaluation:  Pt admitted to [facility]

## 2023-09-07 NOTE — ED NOTES
Nursing report ED to floor  Brian Garcia  73 y.o.  male    HPI:   Chief Complaint   Patient presents with    Fever    Weakness - Generalized       Admitting doctor:   Quinn Richardson MD    Consulting provider(s):  Consults       Date and Time Order Name Status Description    9/6/2023  6:08 PM Hospitalist (on-call MD unless specified)               Admitting diagnosis:   The encounter diagnosis was C. difficile colitis.    Code status:   Current Code Status       Date Active Code Status Order ID Comments User Context       9/6/2023 1902 CPR (Attempt to Resuscitate) [id number]  Jessica Poon PA-C ED        Question Answer    Code Status (Patient has no pulse and is not breathing) CPR (Attempt to Resuscitate)    Medical Interventions (Patient has pulse or is breathing) Full Support    Level Of Support Discussed With Patient                    Allergies:   Amoxicillin, Albuterol, Asmanex (120 metered doses) [mometasone furoate], Gabapentin, Lortab [hydrocodone-acetaminophen], Morphine and related, Prilosec [omeprazole], Sodium clavulanate, Statins, Sulfa antibiotics, and Symbicort [budesonide-formoterol fumarate]    Intake and Output  No intake or output data in the 24 hours ending 09/06/23 1902    Weight:       09/06/23  1338   Weight: 56.7 kg (125 lb)       Most recent vitals:   Vitals:    09/06/23 1518 09/06/23 1617 09/06/23 1657 09/06/23 1813   BP:  112/55 111/53 117/79   BP Location:       Patient Position:       Pulse: 72 74 86 78   Resp:  18  18   Temp:       TempSrc:       SpO2: 99% 96%  99%   Weight:       Height:         Oxygen Therapy: Nasal cannula 4L    Active LDAs/IV Access:   Lines, Drains & Airways       Active LDAs       Name Placement date Placement time Site Days    Peripheral IV 09/06/23 1420 Left Forearm 09/06/23  1420  Forearm  less than 1    Single Lumen Implantable Port 03/30/23 Right Chest 03/30/23  1203  Chest  160                    Labs (abnormal labs have a star):   Labs  Reviewed   COMPREHENSIVE METABOLIC PANEL - Abnormal; Notable for the following components:       Result Value    Glucose 101 (*)     Creatinine 0.60 (*)     Sodium 135 (*)     Chloride 97 (*)     CO2 31.0 (*)     Calcium 8.5 (*)     Albumin 3.2 (*)     All other components within normal limits    Narrative:     GFR Normal >60  Chronic Kidney Disease <60  Kidney Failure <15    The GFR formula is only valid for adults with stable renal function between ages 18 and 70.   URINALYSIS W/ MICROSCOPIC IF INDICATED (NO CULTURE) - Abnormal; Notable for the following components:    Color, UA Orange (*)     All other components within normal limits    Narrative:     Urine microscopic not indicated.   CBC WITH AUTO DIFFERENTIAL - Abnormal; Notable for the following components:    WBC 16.91 (*)     RBC 3.59 (*)     Hemoglobin 11.2 (*)     Hematocrit 34.6 (*)     Lymphocyte % 9.7 (*)     Monocyte % 15.1 (*)     Immature Grans % 0.7 (*)     Neutrophils, Absolute 12.16 (*)     Monocytes, Absolute 2.55 (*)     Immature Grans, Absolute 0.12 (*)     All other components within normal limits   COVID-19 AND FLU A/B, NP SWAB IN TRANSPORT MEDIA 8-12 HR TAT - Normal    Narrative:     Fact sheet for providers: https://www.fda.gov/media/795454/download    Fact sheet for patients: https://www.fda.gov/media/380447/download    Test performed by PCR.   LIPASE - Normal   LACTIC ACID, PLASMA - Normal   BLOOD CULTURE   BLOOD CULTURE   CBC AND DIFFERENTIAL    Narrative:     The following orders were created for panel order CBC & Differential.  Procedure                               Abnormality         Status                     ---------                               -----------         ------                     CBC Auto Differential[[id number]]        Abnormal            Final result                 Please view results for these tests on the individual orders.       Meds given in ED:   Medications   sodium chloride 0.9 % flush 10 mL (has no  administration in time range)   sodium chloride 0.9 % infusion (75 mL/hr Intravenous Currently Infusing 9/6/23 1719)   Pharmacy Consult (has no administration in time range)   vancomycin oral solution reconstituted 125 mg (125 mg Oral Given 9/6/23 1703)   iopamidol (ISOVUE-300) 61 % injection 100 mL (90 mL Intravenous Given 9/6/23 1717)     Pharmacy Consult,   sodium chloride, 75 mL/hr, Last Rate: 75 mL/hr (09/06/23 1719)         NIH Stroke Scale:       Isolation/Infection(s):  Contact Spore   No active infections     COVID Testing  Collected No  Resulted N/A    Nursing report ED to floor:  Mental status: AOx4  Ambulatory status: Standby  Precautions: Fall-- [facility]    ED nurse phone [phone]

## 2023-09-07 NOTE — THERAPY EVALUATION
Patient Name: Brian Garcia  : 1950    MRN: [id number]                              Today's Date: 2023       Admit Date: 2023    Visit Dx:     ICD-10-CM ICD-9-CM   1. C. difficile colitis  A04.72 008.45   2. Impaired functional mobility, balance, gait, and endurance [Z74.09 (ICD-10-CM)]  Z74.09 V49.89     Patient Active Problem List   Diagnosis    Physical deconditioning    Leukocytosis    History of pulmonary embolism    Stage 4 very severe COPD by GOLD classification    Chronic respiratory failure with hypoxia, on home O2 therapy    Hypertension    CVA (cerebral vascular accident)    Traumatic hemorrhage of cerebrum    Iron deficiency anemia secondary to inadequate dietary iron intake    Acute pain of right shoulder    Limited range of motion (ROM) of shoulder    Rotator cuff syndrome of right shoulder    Impingement syndrome of right shoulder    Arthrosis of right acromioclavicular joint    Chronic right shoulder pain    Nontraumatic incomplete tear of right rotator cuff    Primary osteoarthritis of right shoulder    Malignant neoplasm of overlapping sites of right lung    Primary lung adenocarcinoma    Encounter for venous access device care    Iron malabsorption    Hypokalemia    C. difficile colitis     Past Medical History:   Diagnosis Date    A-fib     Anesthesia     hip surgery (spinal caused him unpleasant sensations never wants it again)    Arthritis     Blood in sputum 2023    bright red blood, teaspoon - tablespoon daily    COPD (chronic obstructive pulmonary disease)     CVA (cerebral vascular accident) 2022    Hearing aid worn     bilateral    History of pulmonary embolism     History of recurrent pneumonia     Hypertension     Lung cancer 2023    Oxygen dependent     since @ age 65    Pulmonary nodules     Risk for falls     uses walker    Rotator cuff syndrome of right shoulder     Tachycardia     Traumatic hemorrhage of cerebrum 2022    stroke ?  HTN/Eliquis    Wears glasses     readers    Wears glasses     reading     Past Surgical History:   Procedure Laterality Date    HIP ARTHROPLASTY Right     RIB BIOPSY Left 03/17/2023 11th    VENOUS ACCESS DEVICE (PORT) INSERTION N/A 3/30/2023    Procedure: MEDIPORT PLACEMENT          (C-ARM);  Surgeon: Lavon Cochran MD;  Location: [facility];  Service: General;  Laterality: N/A;      General Information       Row Name 09/07/23 1535          Physical Therapy Time and Intention    Document Type evaluation  -     Mode of Treatment individual therapy;physical therapy  -       Row Name 09/07/23 1535          General Information    Patient Profile Reviewed yes  -EUGENE     Prior Level of Function independent:;all household mobility;community mobility;gait;transfer;bed mobility;driving;feeding;grooming;min assist:;dressing;bathing;max assist:;home management;cooking;cleaning  -       Row Name 09/07/23 1535          Living Environment    People in Home significant other  -       Row Name 09/07/23 1535          Home Main Entrance    Number of Stairs, Main Entrance other (see comments);none  has ramp  -EUGENE     Stair Railings, Main Entrance railings on both sides of stairs  -EUGENE       Row Name 09/07/23 1535          Stairs Within Home, Primary    Number of Stairs, Within Home, Primary none  -     Stairs Comment, Within Home, Primary independent inside without device; used RW sometimes out of house;s/o has been doing most of housework and assists patient a little with lower body bathing and dressing  -       Row Name 09/07/23 1535          Cognition    Orientation Status (Cognition) oriented x 4  -EUGENE       Row Name 09/07/23 1535          Safety Issues, Functional Mobility    Safety Issues Affecting Function (Mobility) insight into deficits/self-awareness;safety precaution awareness  -     Impairments Affecting Function (Mobility) balance;endurance/activity tolerance;shortness of breath;strength  -                "User Key  (r) = Recorded By, (t) = Taken By, (c) = Cosigned By      Initials Name Provider Type    Nydia Alvarez, PT Physical Therapist                   Mobility       Row Name 09/07/23 1535          Bed Mobility    Bed Mobility supine-sit;sit-supine  -     Supine-Sit [place] (Bed Mobility) modified independence  -     Sit-Supine [place] (Bed Mobility) modified independence  -     Assistive Device (Bed Mobility) head of bed elevated;bed rails  -       Row Name 09/07/23 1535          Bed-Chair Transfer    Bed-Chair [place] (Transfers) standby assist  bed to bathroom  -       Row Name 09/07/23 1535          Sit-Stand Transfer    Sit-Stand [place] (Transfers) standby assist  -       Row Name 09/07/23 1535          Gait/Stairs (Locomotion)    [place] Level (Gait) standby assist;1 person to manage equipment  -     Distance in Feet (Gait) 35ft  -     Deviations/Abnormal Patterns (Gait) base of support, narrow;gait speed decreased;kimber decreased;stride length decreased  -               User Key  (r) = Recorded By, (t) = Taken By, (c) = Cosigned By      Initials Name Provider Type    Nydia Alvarez, PT Physical Therapist                   Obj/Interventions       Row Name 09/07/23 1535          Range of Motion Comprehensive    Comment, General Range of Motion BUE: RUE: AROM WFL hand, wrist, elbow; shoulder flexion limited to 100-110 degrees 2/2 \"rotator cuff\" injury LUE: AROM WFL BLE: AROM WFL  -       Row Name 09/07/23 1535          Strength Comprehensive (MMT)    Comment, General Manual Muscle Testing (MMT) Assessment BUE: RUE: could not give maximum resistance 2/2 shoulder pain; observed at least 3+/5 grossly; LUE: grossly 4-/5 BLE: grossly 4-/5  -       Row Name 09/07/23 1535          Balance    Balance Assessment sitting static balance;sitting dynamic balance;sit to stand dynamic balance;standing static balance;standing dynamic balance  -     Static Sitting " Balance independent  -EUGENE     Dynamic Sitting Balance standby assist  -EUGENE     Position, Sitting Balance sitting edge of bed  -EUGENE     Sit to Stand Dynamic Balance standby assist  -EUGENE     Static Standing Balance standby assist  -EUGENE     Dynamic Standing Balance standby assist  -EUGENE               User Key  (r) = Recorded By, (t) = Taken By, (c) = Cosigned By      Initials Name Provider Type    Nydia Alvarez, PT Physical Therapist                   Goals/Plan       Row Name 09/07/23 1535          Bed Mobility Goal 1 (PT)    Activity/Assistive Device (Bed Mobility Goal 1, PT) bed mobility activities, all  -EUGENE     [place] Level/Cues Needed (Bed Mobility Goal 1, PT) independent  -EUGENE     Time Frame (Bed Mobility Goal 1, PT) by discharge  -EUGENE     Progress/Outcomes (Bed Mobility Goal 1, PT) goal not met  -EUGENE       Row Name 09/07/23 1535          Transfer Goal 1 (PT)    Activity/Assistive Device (Transfer Goal 1, PT) sit-to-stand/stand-to-sit;bed-to-chair/chair-to-bed  -EUGENE     [place] Level/Cues Needed (Transfer Goal 1, PT) modified independence  -EUGENE     Time Frame (Transfer Goal 1, PT) by discharge  -EUGENE     Progress/Outcome (Transfer Goal 1, PT) goal not met  -EUGENE       Row Name 09/07/23 1535          Gait Training Goal 1 (PT)    Activity/Assistive Device (Gait Training Goal 1, PT) gait (walking locomotion);decrease fall risk;increase endurance/gait distance;increase energy conservation;walker, rolling  -EUGENE     Distance (Gait Training Goal 1, PT) 50ft or more each trip  O2 sats will not drop below 90%  -EUGENE     Strategies/Barriers (Gait Training Goal 1, PT) co-morbidities  -EUGENE     Progress/Outcome (Gait Training Goal 1, PT) goal not met  -EUGENE       Row Name 09/07/23 1535          ROM Goal 1 (PT)    ROM Goal 1 (PT) Patient will tolerate LE exercises OOB in chair with VSS  -EUGENE     Time Frame (ROM Goal 1, PT) by discharge  -EUGENE     Progress/Outcome (ROM Goal 1, PT) goal not met  -EUGENE       Row Name 09/07/23 1535           Therapy Assessment/Plan (PT)    Planned Therapy Interventions (PT) balance training;bed mobility training;gait training;home exercise program;patient/family education;strengthening;transfer training  -               User Key  (r) = Recorded By, (t) = Taken By, (c) = Cosigned By      Initials Name Provider Type    Nydia Alvarez, PT Physical Therapist                   Clinical Impression       Row Name 09/07/23 1535          Pain    Pretreatment Pain Rating 0/10 - no pain  -EUGENE     Posttreatment Pain Rating 0/10 - no pain  -EUGENE     Pre/Posttreatment Pain Comment Patient denied pain before and after session; did complain of R shoulder pain with range and strength assessment  -EUGENE     Pain Intervention(s) Repositioned;Rest  -     Additional Documentation Pain Scale: Numbers Pre/Post-Treatment (Group)  -EUGENE       Row Name 09/07/23 4691          Plan of Care Review    Plan of Care Reviewed With patient  -EUGENE     Outcome Evaluation PT evaluation completed. Patient in fowlers in bed upon arrival, alert, oriented x4 and agreeable to therapy. Patient repors he does not use device inside to walk but sometimes uses RW when going out of house. S/O helps some with bathing and dressing and does most of housekeeping tasks. Patient performed supine to sit to supine with modified independence  and sit to stand to sit without device and SBA Patient ambulated without device 35 ft including to from bathroom with SBA. Noted slow kimber and gait speed with decreased stride length with knees flexed. Patient SOA with ambulation but denied lightheadedness or dizziness. O2 sats did drop to 90% on 4lpm and recovered to 93% in less than 1 minute. Function limited by decreased strength, balance, and tolerance for functional mobility and activities. Patient will benefit from PT to gradually improve strength, balance, and tolerance to improve transfer and gait ability and to decrease fall risk. Anticipate home with assist at discharge with HH  therapy if patient discharged prior to goals being met  -[facility] Name 09/07/23 1535          Therapy Assessment/Plan (PT)    Patient/Family Therapy Goals Statement (PT) be stronger; improve tolerance with walking  -     Rehab Potential (PT) good, to achieve stated therapy goals  -     Criteria for Skilled Interventions Met (PT) yes;meets criteria;skilled treatment is necessary  -     Therapy Frequency (PT) other (see comments)  5-7 days/wk  -     Predicted Duration of Therapy Intervention (PT) until discharge or goals met  -[facility] Name 09/07/23 1535          Vital Signs    Pre Systolic BP Rehab 113  -EUGENE     Pre Treatment Diastolic BP 57  -EUGENE     Pretreatment Heart Rate (beats/min) 83  -EUGENE     Intratreatment Heart Rate (beats/min) 97  -EUGENE     Posttreatment Heart Rate (beats/min) 97  -EUGENE     Pre SpO2 (%) 96  -EUGENE     O2 Delivery Pre Treatment nasal cannula  4lpm  -EUGENE     Intra SpO2 (%) 90  -EUGENE     O2 Delivery Intra Treatment nasal cannula  -EUGENE     Post SpO2 (%) 95  -EUGENE     O2 Delivery Post Treatment nasal cannula  -EUGENE     Pre Patient Position Supine  -EUGENE     Intra Patient Position Sitting  -EUGENE     Post Patient Position Supine  -[facility] Name 09/07/23 1535          Positioning and Restraints    Pre-Treatment Position in bed  -     Post Treatment Position bed  -EUGENE               User Key  (r) = Recorded By, (t) = Taken By, (c) = Cosigned By      Initials Name Provider Type    Nydia Alvarez, PT Physical Therapist                   Outcome Measures       Row Name 09/07/23 1535 09/07/23 0910       How much help from another person do you currently need...    Turning from your back to your side while in flat bed without using bedrails? 3  -EUGENE 3  -CW    Moving from lying on back to sitting on the side of a flat bed without bedrails? 3  -EUGENE 3  -CW    Moving to and from a bed to a chair (including a wheelchair)? 3  -EUGENE 3  -CW    Standing up from a chair using your arms (e.g., wheelchair, bedside  chair)? 3  -EUGENE 3  -CW    Climbing 3-5 steps with a railing? 2  -EUGENE 2  -CW    To walk in hospital room? 3  -EUGENE 3  -CW    AM-PAC 6 Clicks Score (PT) 17  - 17  -CW    Highest level of mobility 5 --> Static standing  - 5 --> Static standing  -CW      Row Name 09/07/23 1535          Functional Assessment    Outcome Measure Options AM-PAC 6 Clicks Basic Mobility (PT)  -               User Key  (r) = Recorded By, (t) = Taken By, (c) = Cosigned By      Initials Name Provider Type    EUG[place] Nydia Muñoz, PT Physical Therapist    Deanna Choi, RN Registered Nurse                                 Physical Therapy Education       Title: PT OT SLP Therapies (In Progress)       Topic: Physical Therapy (In Progress)       Point: Mobility training (In Progress)       Learning Progress Summary             Patient Acceptance, E, NR by  at 9/7/2023 1651                         Point: Home exercise program (Not Started)       Learner Progress:  Not documented in this visit.              Point: Body mechanics (In Progress)       Learning Progress Summary             Patient Acceptance, E, NR by  at 9/7/2023 1651                         Point: Precautions (In Progress)       Learning Progress Summary             Patient Acceptance, E, NR by  at 9/7/2023 1651                                         User Key       Initials Effective Dates Name Provider Type Discipline     06/16/21 -  Nydia Muñoz PT Physical Therapist PT                  PT Recommendation and Plan  Planned Therapy Interventions (PT): balance training, bed mobility training, gait training, home exercise program, patient/family education, strengthening, transfer training  Plan of Care Reviewed With: patient  Outcome Evaluation: PT evaluation completed. Patient in fowlers in bed upon arrival, alert, oriented x4 and agreeable to therapy. Patient repors he does not use device inside to walk but sometimes uses RW when going out of house. S/O helps some with  bathing and dressing and does most of housekeeping tasks. Patient performed supine to sit to supine with modified independence  and sit to stand to sit without device and SBA Patient ambulated without device 35 ft including to from bathroom with SBA. Noted slow kimber and gait speed with decreased stride length with knees flexed. Patient SOA with ambulation but denied lightheadedness or dizziness. O2 sats did drop to 90% on 4lpm and recovered to 93% in less than 1 minute. Function limited by decreased strength, balance, and tolerance for functional mobility and activities. Patient will benefit from PT to gradually improve strength, balance, and tolerance to improve transfer and gait ability and to decrease fall risk. Anticipate home with assist at discharge with HH therapy if patient discharged prior to goals being met     Time Calculation:   PT Evaluation Complexity  History, PT Evaluation Complexity: 3 or more personal factors and/or comorbidities  Examination of Body Systems (PT Eval Complexity): total of 3 or more elements  Clinical Presentation (PT Evaluation Complexity): evolving  Clinical Decision Making (PT Evaluation Complexity): moderate complexity  Overall Complexity (PT Evaluation Complexity): moderate complexity     PT Charges       Row Name 09/07/23 1652             Time Calculation    Start Time 1535  -EUGENE      Stop Time 1628  -EUGENE      Time Calculation (min) 53 min  -EUGENE      PT Received On 09/07/23  -EUGENE      PT Goal Re-Cert Due Date 09/20/23  -EUGENE         Time Calculation- PT    Total Timed Code Minutes- PT 0 minute(s)  -EUGENE         Untimed Charges    PT Eval/Re-eval Minutes 53  -EUGENE         Total Minutes    Untimed Charges Total Minutes 53  -EUGENE       Total Minutes 53  -EUGENE                User Key  (r) = Recorded By, (t) = Taken By, (c) = Cosigned By      Initials Name Provider Type    Nydia Alvarez, PT Physical Therapist                  Therapy Charges for Today       Code Description Service Date  Service Provider Modifiers Qty    [id number] HC PT EVAL MOD COMPLEXITY 4 9/7/2023 Nydia Muñoz, PT GP 1            PT G-Codes  Outcome Measure Options: AM-PAC 6 Clicks Basic Mobility (PT)  AM-PAC 6 Clicks Score (PT): 17  PT Discharge Summary  Anticipated Discharge Disposition (PT): home with assist, home with home health    Nydia Muñoz, PT  9/7/2023

## 2023-09-07 NOTE — PLAN OF CARE
Problem: Adult Inpatient Plan of Care  Goal: Plan of Care Review  Recent Flowsheet Documentation  Taken 9/7/2023 1955 by Nydia Muñoz, PT  Plan of Care Reviewed With: patient  Outcome Evaluation: PT evaluation completed. Patient in fowlers in bed upon arrival, alert, oriented x4 and agreeable to therapy. Patient repors he does not use device inside to walk but sometimes uses RW when going out of house. S/O helps some with bathing and dressing and does most of housekeeping tasks. Patient performed supine to sit to supine with modified independence  and sit to stand to sit without device and SBA Patient ambulated without device 35 ft including to from bathroom with SBA. Noted slow kimber and gait speed with decreased stride length with knees flexed. Patient SOA with ambulation but denied lightheadedness or dizziness. O2 sats did drop to 90% on 4lpm and recovered to 93% in less than 1 minute. Function limited by decreased strength, balance, and tolerance for functional mobility and activities. Patient will benefit from PT to gradually improve strength, balance, and tolerance to improve transfer and gait ability and to decrease fall risk. Anticipate home with assist at discharge with  therapy if patient discharged prior to goals being met   Goal Outcome Evaluation:  Plan of Care Reviewed With: patient           Outcome Evaluation: PT evaluation completed. Patient in fowlers in bed upon arrival, alert, oriented x4 and agreeable to therapy. Patient repors he does not use device inside to walk but sometimes uses RW when going out of house. S/O helps some with bathing and dressing and does most of housekeeping tasks. Patient performed supine to sit to supine with modified independence  and sit to stand to sit without device and SBA Patient ambulated without device 35 ft including to from bathroom with SBA. Noted slow kimber and gait speed with decreased stride length with knees flexed. Patient SOA with ambulation  but denied lightheadedness or dizziness. O2 sats did drop to 90% on 4lpm and recovered to 93% in less than 1 minute. Function limited by decreased strength, balance, and tolerance for functional mobility and activities. Patient will benefit from PT to gradually improve strength, balance, and tolerance to improve transfer and gait ability and to decrease fall risk. Anticipate home with assist at discharge with  therapy if patient discharged prior to goals being met      Anticipated Discharge Disposition (PT): home with assist, home with home [facility]

## 2023-09-07 NOTE — H&P
"    [facility] Medicine  HISTORY AND PHYSICAL      Date of Admission: 9/6/2023  Primary Care Physician: Marina Kitchen MD    Subjective     Chief Complaint: fever, diarrhea    History of Present Illness  Patient is a pleasant 73 year old male (PMHx COPD, CVA, Lung CA on Keytruda, HTN, A Fib) who was sent to [facility] ED by oncologist for further evaluation of fever and diarrhea. Patient has history of recurrent c. Difficile colitis. He reports that he has \"never really gotten over it\", but that he was last treated in February. He has had increased diarrhea for more than the last week. He was prescribed oral Vancomycin, but has been [facility]. Today, he had a fever and oncology recommended that he come to the ED. Patient does report some occasional pain in the right lower abdomen, but otherwise denies pain. He is not having any new shortness of breath. Stools do not have any blood noted.     ED findings notable for CT imaging demonstrating acute diffuse colitis. Leukocytosis of 16,000 is noted; otherwise CBC stable. CMP stable. Patient was given oral vancomycin in ED. Dr. Richardson admitted patient to hospitalist service on observation.      Review of Systems   Constitutional:  Positive for fever. Negative for appetite change, chills, diaphoresis and fatigue.   HENT:  Negative for congestion, ear pain, postnasal drip, rhinorrhea, sinus pressure, sinus pain, sneezing, sore throat and trouble swallowing.    Eyes:  Negative for photophobia, pain and discharge.   Respiratory:  Negative for cough, chest tightness, shortness of breath and wheezing.    Cardiovascular:  Negative for chest pain, palpitations and leg swelling.   Gastrointestinal:  Positive for abdominal pain and diarrhea. Negative for blood in stool, constipation, nausea and vomiting.   Endocrine: Negative for polydipsia, polyphagia and polyuria.   Genitourinary:  " Negative for difficulty urinating, dysuria, flank pain, frequency, hematuria and urgency.   Musculoskeletal:  Negative for arthralgias, back pain, myalgias and neck pain.   Skin:  Negative for color change, rash and wound.   Neurological:  Negative for dizziness, seizures, syncope, weakness and headaches.   Hematological:  Does not bruise/bleed easily.   Psychiatric/Behavioral:  Negative for behavioral problems, confusion, hallucinations, sleep disturbance and suicidal ideas.    All other systems reviewed and are negative.     Otherwise complete ROS reviewed and negative except as mentioned in the HPI.    Past Medical History:   Past Medical History:   Diagnosis Date    A-fib     Anesthesia     hip surgery (spinal caused him unpleasant sensations never wants it again)    Arthritis     Blood in sputum 02/22/2023    bright red blood, teaspoon - tablespoon daily    COPD (chronic obstructive pulmonary disease)     CVA (cerebral vascular accident) 12/24/2022    Hearing aid worn     bilateral    History of pulmonary embolism     History of recurrent pneumonia     Hypertension     Lung cancer 03/17/2023    Oxygen dependent     since @ age 65    Pulmonary nodules     Risk for falls     uses walker    Rotator cuff syndrome of right shoulder     Tachycardia     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 HTN/Eliquis    Wears glasses     readers    Wears glasses     reading     Past Surgical History:  Past Surgical History:   Procedure Laterality Date    HIP ARTHROPLASTY Right     RIB BIOPSY Left 03/17/2023 11th    VENOUS ACCESS DEVICE (PORT) INSERTION N/A 3/30/2023    Procedure: MEDIPORT PLACEMENT          (C-ARM);  Surgeon: Lavon Cochran MD;  Location: [facility];  Service: General;  Laterality: N/A;     Social History:  reports that he quit smoking about 15 years ago. His smoking use included cigarettes. He started smoking about 62 years ago. He has a 141.00 pack-year smoking history. He has never used  smokeless tobacco. He reports that he does not currently use alcohol. He reports that he does not use drugs.    Family History: family history includes Brain cancer in his brother; Cancer in his brother and sister; No Known Problems in his daughter, daughter, and son.       Allergies:  Allergies   Allergen Reactions    Amoxicillin Anaphylaxis    Albuterol Irritability     Facial flushing and palpitations.    Asmanex (120 Metered Doses) [Mometasone Furoate] Unknown - Low Severity    Gabapentin Hallucinations    Lortab [Hydrocodone-Acetaminophen] Other (See Comments)     Can't remember    Morphine And Related Hallucinations    Prilosec [Omeprazole] Other (See Comments)     unknown    Sodium Clavulanate Unknown - High Severity    Statins GI Intolerance    Sulfa Antibiotics Other (See Comments)     Can't remember also more and can't remember    Symbicort [Budesonide-Formoterol Fumarate] Unknown (See Comments)     Doesn't remember reaction type       Medications:  Prior to Admission medications    Medication Sig Start Date End Date Taking? Authorizing Provider   cholecalciferol (VITAMIN D3) 10 MCG (400 UNIT) tablet Take 1 tablet by mouth Daily. 50 mcg daily    Provider, MD Qamar   dexamethasone (DECADRON) 4 MG tablet Take 1 tablet twice daily starting the day before chemo, day of chemo, and day after chemo.  Take with food.  Patient not taking: Reported on 7/19/2023 3/27/23   Omega Davis MD   dilTIAZem CD (CARDIZEM CD) 240 MG 24 hr capsule Take 1 capsule by mouth Daily. 5/18/23   Neville Meeks MD   ferrous sulfate 324 MG tablet delayed-release Take 1 tablet by mouth Daily With Breakfast. 1/13/23   Fan Rob MD   folic acid (FOLVITE) 1 MG tablet Take 1 tablet by mouth Daily. 2/11/23   Omega Davis MD   furosemide (LASIX) 20 MG tablet Take 1 tablet by mouth Daily As Needed (Swelling of lower extremity). 4/17/23   Omega Davis MD   guaiFENesin (MUCINEX) 600 MG 12 hr tablet Take 1 tablet by mouth  "Every 12 (Twelve) Hours. 4/25/23   Anita Toney MD   ipratropium (ATROVENT HFA) 17 MCG/ACT inhaler Inhale 2 puffs 4 (Four) Times a Day As Needed for Wheezing.    Qamar Javed MD   ipratropium (ATROVENT) 0.02 % nebulizer solution Take 2.5 mL by nebulization 3 (Three) Times a Day. Sub amount for how much insurance allows  Patient not taking: Reported on 7/19/2023 7/19/21   Anita Toney MD   KLOR-CON 20 MEQ CR tablet 1 tablet Daily. 5/13/23   Qamar Javed MD   melatonin 5 MG tablet tablet Take 1 tablet by mouth At Night As Needed.    Qamar Javed MD   mirtazapine (REMERON) 15 MG tablet Take 1 tablet by mouth Every Night. 8/30/23   Omega Davis MD   montelukast (SINGULAIR) 10 MG tablet Take 1 tablet by mouth Every Night.    ProviderQamar MD   OLANZapine (zyPREXA) 5 MG tablet Take 1 tablet by mouth Every Night. Take on days 2, 3 and 4 after chemotherapy. 3/27/23   Omega Davis MD   ondansetron (ZOFRAN) 8 MG tablet Take 1 tablet by mouth 3 (Three) Times a Day As Needed for Nausea or Vomiting. 3/27/23   Omega Davis MD   predniSONE (DELTASONE) 5 MG tablet Take 1 tablet by mouth Every Other Day.    Qamar Javed MD   tiotropium bromide-olodaterol (Stiolto Respimat) 2.5-2.5 MCG/ACT aerosol solution inhaler Inhale 2 puffs Daily. 7/19/23   Clotilde Washington DO   TiZANidine (ZANAFLEX) 2 MG capsule Take 1 capsule by mouth At Night As Needed for Muscle Spasms.    Qamar Javed MD   vitamin B-12 (CYANOCOBALAMIN) 1000 MCG tablet Take 1 tablet by mouth Daily. 2/11/23   Omega Davis MD   dilTIAZem XR (DILACOR XR) 180 MG 24 hr capsule Take 1 capsule by mouth Daily. 6/30/22 10/7/22  Neville Meeks MD     I have utilized all available immediate resources to obtain, update, and review the patient's current medications.    Objective     Vital Signs: /79   Pulse 78   Temp 99.8 °F (37.7 °C) (Rectal)   Resp 18   Ht 165.1 cm (65\")   Wt 56.7 kg (125 " lb)   SpO2 99%   BMI 20.80 kg/m²   Physical Exam  Vitals and nursing note reviewed.   Constitutional:       Appearance: Normal appearance.   HENT:      Head: Normocephalic and atraumatic.      Right Ear: External ear normal.      Left Ear: External ear normal.      Nose: Nose normal. No congestion or rhinorrhea.      Mouth/Throat:      Mouth: Mucous membranes are moist.      Pharynx: Oropharynx is clear.   Eyes:      General: No scleral icterus.     Extraocular Movements: Extraocular movements intact.      Conjunctiva/sclera: Conjunctivae normal.   Neck:      Vascular: No carotid bruit.   Cardiovascular:      Rate and Rhythm: Normal rate and regular rhythm.      Pulses: Normal pulses.      Heart sounds: Normal heart sounds. No murmur heard.  Pulmonary:      Effort: Pulmonary effort is normal.      Breath sounds: Normal breath sounds. No wheezing, rhonchi or rales.   Chest:      Comments: Port noted to right upper chest  Abdominal:      General: Abdomen is flat. Bowel sounds are normal.      Palpations: Abdomen is soft.      Tenderness: There is no abdominal tenderness. There is no guarding.   Musculoskeletal:         General: No swelling or deformity. Normal range of motion.      Cervical back: Normal range of motion.   Skin:     General: Skin is warm and dry.      Capillary Refill: Capillary refill takes less than 2 seconds.      Findings: No rash.   Neurological:      General: No focal deficit present.      Mental Status: He is alert and oriented to person, place, and time.      Motor: No weakness.   Psychiatric:         Mood and Affect: Mood normal.         Behavior: Behavior normal.         Thought Content: Thought content normal.         Judgment: Judgment normal.            Results Reviewed:  Lab Results (last 24 hours)       Procedure Component Value Units Date/Time    Urinalysis With Microscopic If Indicated (No Culture) - Urine, Clean Catch [[id number]]  (Abnormal) Collected: 09/06/23 1652    Specimen:  Urine, Clean Catch Updated: 09/06/23 1707     Color, UA [place]     Appearance, UA Clear     pH, UA <=5.0     Specific Gravity, UA 1.017     Glucose, UA Negative     Ketones, UA Negative     Bilirubin, UA Negative     Blood, UA Negative     Protein, UA Negative     Leuk Esterase, UA Negative     Nitrite, UA Negative     Urobilinogen, UA 0.2 E.U./dL    Narrative:      Urine microscopic not indicated.    Blood Culture - Blood, Arm, Right [[id number]] Collected: 09/06/23 1513    Specimen: Blood from Arm, Right Updated: 09/06/23 1520    COVID-19 and FLU A/B PCR - Swab, Nasopharynx [[id number]]  (Normal) Collected: 09/06/23 1440    Specimen: Swab from Nasopharynx Updated: 09/06/23 1508     COVID19 Not Detected     Influenza A PCR Not Detected     Influenza B PCR Not Detected    Narrative:      Fact sheet for providers: https://www.fda.gov/media/313090/download    Fact sheet for patients: https://www.fda.gov/media/456975/download    Test performed by PCR.    Comprehensive Metabolic Panel [[id number]]  (Abnormal) Collected: 09/06/23 1427    Specimen: Blood Updated: 09/06/23 1456     Glucose 101 mg/dL      BUN 10 mg/dL      Creatinine 0.60 mg/dL      Sodium 135 mmol/L      Potassium 3.5 mmol/L      Chloride 97 mmol/L      CO2 31.0 mmol/L      Calcium 8.5 mg/dL      Total Protein 7.2 g/dL      Albumin 3.2 g/dL      ALT (SGPT) 12 U/L      AST (SGOT) 12 U/L      Alkaline Phosphatase 70 U/L      Total Bilirubin 0.3 mg/dL      Globulin 4.0 gm/dL      A/G Ratio 0.8 g/dL      BUN/Creatinine Ratio 16.7     Anion Gap 7.0 mmol/L      eGFR 101.3 mL/min/1.73     Narrative:      GFR Normal >60  Chronic Kidney Disease <60  Kidney Failure <15    The GFR formula is only valid for adults with stable renal function between ages 18 and 70.    Lipase [[id number]]  (Normal) Collected: 09/06/23 1427    Specimen: Blood Updated: 09/06/23 1456     Lipase 14 U/L     Lactic Acid, Plasma [[id number]]  (Normal) Collected: 09/06/23 1427    Specimen: Blood  Updated: 09/06/23 1443     Lactate 1.2 mmol/L     CBC & Differential [[id number]]  (Abnormal) Collected: 09/06/23 1427    Specimen: Blood Updated: 09/06/23 1430    Narrative:      The following orders were created for panel order CBC & Differential.  Procedure                               Abnormality         Status                     ---------                               -----------         ------                     CBC Auto Differential[[id number]]        Abnormal            Final result                 Please view results for these tests on the individual orders.    CBC Auto Differential [[id number]]  (Abnormal) Collected: 09/06/23 1427    Specimen: Blood Updated: 09/06/23 1430     WBC 16.91 10*3/mm3      RBC 3.59 10*6/mm3      Hemoglobin 11.2 g/dL      Hematocrit 34.6 %      MCV 96.4 fL      MCH 31.2 pg      MCHC 32.4 g/dL      RDW 13.1 %      RDW-SD 46.6 fl      MPV 8.6 fL      Platelets 342 10*3/mm3      Neutrophil % 71.9 %      Lymphocyte % 9.7 %      Monocyte % 15.1 %      Eosinophil % 1.9 %      Basophil % 0.7 %      Immature Grans % 0.7 %      Neutrophils, Absolute 12.16 10*3/mm3      Lymphocytes, Absolute 1.64 10*3/mm3      Monocytes, Absolute 2.55 10*3/mm3      Eosinophils, Absolute 0.32 10*3/mm3      Basophils, Absolute 0.12 10*3/mm3      Immature Grans, Absolute 0.12 10*3/mm3      nRBC 0.0 /100 WBC     Blood Culture - Blood, Arm, Left [[id number]] Collected: 09/06/23 1425    Specimen: Blood from Arm, Left Updated: 09/06/23 1425          Imaging Results (Last 24 Hours)       Procedure Component Value Units Date/Time    CT Abdomen Pelvis With Contrast [[id number]] Collected: 09/06/23 1747     Updated: 09/06/23 1756    Narrative:      Indication:  C. Difficile colitis    TECHNIQUE:  Intravenous contrast was administered and axial images from the level of the  diaphragms through the pelvis were performed followed by 2-D multiplanar  reformats.    Comparison:  None    FINDINGS:  Liver shows small hypodense  "lesion that may represent cysts, but are too small  to accurately characterize.  Other abdominal viscera are unremarkable.  Moderate  to marked atherosclerotic calcification is seen in the abdominal aorta and its  branches.  Colon diffusely shows wall thickening and enhancement that may be  from mild acute colitis, but fecal material is still seen in the colon  diffusely.  Visualized lung bases show emphysema.  On bone windows diffuse  osteopenia is seen with prominent degenerative changes in the spine and the left  hip.      Impression:      Probable mild acute diffuse colitis    XR Chest 2 View [[id number]] Collected: 09/06/23 1413     Updated: 09/06/23 1417    Narrative:      History:  fever    COMPARISON:  8/2/2023    Findings:  PA and lateral views of the chest obtained.Heart size is normal.    There is severe emphysema.    There are no osseous lesions.      Impression:      Severe emphysema.          I have personally reviewed and interpreted the radiology studies and ECG obtained at time of admission.       Assessment / Plan   Treatment Plan:  1.  C. difficile colitis   -Oral vancomycin initiated at 125mg Q 6   -once home medications are delivered and he continues to prove stable, may be discharged home    2. PAF   Continue cardizem    3. COPD   -no exacerbation; continue atrovent    4. Depression  -Continue home Remeron, Zyprexa    Medical Decision Making  Number and Complexity of problems: 4 moderate    Conditions and Status:        Condition is unchanged.       [facility] Data  External documents reviewed: The patient's recent past medical charts for this facility as well as outside facilities via the \"Care Everywhere\" application of Epic reviewed.     Discussed with: admitting physician     I have utilized all available immediate resources to obtain, update, or review the patient's current medications (including all prescriptions, over-the-counter products, herbals, cannabis/cannabidiol products, and " vitamin/mineral/dietary (nutritional) supplements).     Care Planning  Shared decision making: Patient updated on current status and informed of proposed care plan; is in agreement with plan  Code status and discussions: Full code, Healthcare surrogate is significant other  I confirmed that the patient's Advance Care Plan is present, code status is documented, or surrogate decision maker is listed in the patient's medical record.       Disposition  Social Determinants of Health that impact treatment or disposition: n/a  I expect the patient to be discharged to home in 1-2 days.     The patient was seen and examined by me on 09/06/23.        Electronically signed by Jessica Poon PA-C, 09/06/23, 19:23 CDT.

## 2023-09-08 ENCOUNTER — READMISSION MANAGEMENT (OUTPATIENT)
Dept: CALL CENTER | Facility: HOSPITAL | Age: 73
End: 2023-09-08
Payer: MEDICARE

## 2023-09-08 VITALS
HEART RATE: 86 BPM | SYSTOLIC BLOOD PRESSURE: 87 MMHG | HEIGHT: 65 IN | OXYGEN SATURATION: 96 % | DIASTOLIC BLOOD PRESSURE: 47 MMHG | TEMPERATURE: 98.9 F | RESPIRATION RATE: 16 BRPM | BODY MASS INDEX: 20.23 KG/M2 | WEIGHT: 121.4 LBS

## 2023-09-08 LAB
ANION GAP SERPL CALCULATED.3IONS-SCNC: 8 MMOL/L (ref 5–15)
BUN SERPL-MCNC: 6 MG/DL (ref 8–23)
BUN/CREAT SERPL: 13.6 (ref 7–25)
CALCIUM SPEC-SCNC: 7.6 MG/DL (ref 8.6–10.5)
CHLORIDE SERPL-SCNC: 99 MMOL/L (ref 98–107)
CO2 SERPL-SCNC: 28 MMOL/L (ref 22–29)
CREAT SERPL-MCNC: 0.44 MG/DL (ref 0.76–1.27)
EGFRCR SERPLBLD CKD-EPI 2021: 111.2 ML/MIN/1.73
GLUCOSE SERPL-MCNC: 68 MG/DL (ref 65–99)
POTASSIUM SERPL-SCNC: 3.5 MMOL/L (ref 3.5–5.2)
SODIUM SERPL-SCNC: 135 MMOL/L (ref 136–145)
WHOLE BLOOD HOLD SPECIMEN: NORMAL

## 2023-09-08 PROCEDURE — 97166 OT EVAL MOD COMPLEX 45 MIN: CPT

## 2023-09-08 PROCEDURE — G0378 HOSPITAL OBSERVATION PER HR: HCPCS

## 2023-09-08 PROCEDURE — 97110 THERAPEUTIC EXERCISES: CPT

## 2023-09-08 PROCEDURE — 96361 HYDRATE IV INFUSION ADD-ON: CPT

## 2023-09-08 PROCEDURE — 80048 BASIC METABOLIC PNL TOTAL CA: CPT | Performed by: PHYSICIAN ASSISTANT

## 2023-09-08 PROCEDURE — 97116 GAIT TRAINING THERAPY: CPT

## 2023-09-08 RX ORDER — VANCOMYCIN HYDROCHLORIDE 125 MG/1
125 CAPSULE ORAL 4 TIMES DAILY
Qty: 360 CAPSULE | Refills: 0 | Status: ON HOLD
Start: 2023-09-08 | End: 2023-12-07

## 2023-09-08 RX ADMIN — VANCOMYCIN HYDROCHLORIDE 125 MG: KIT at 11:32

## 2023-09-08 RX ADMIN — VANCOMYCIN HYDROCHLORIDE 125 MG: KIT at 00:11

## 2023-09-08 RX ADMIN — TIZANIDINE 2 MG: 2 TABLET ORAL at 00:11

## 2023-09-08 RX ADMIN — SODIUM CHLORIDE 75 ML/HR: 9 INJECTION, SOLUTION INTRAVENOUS at 12:58

## 2023-09-08 RX ADMIN — VANCOMYCIN HYDROCHLORIDE 125 MG: KIT at 06:16

## 2023-09-08 RX ADMIN — SODIUM CHLORIDE 75 ML/HR: 9 INJECTION, SOLUTION INTRAVENOUS at 00:14

## 2023-09-08 RX ADMIN — FOLIC ACID 1 MG: 1 TABLET ORAL at 08:37

## 2023-09-08 NOTE — DISCHARGE SUMMARY
[facility] Medicine Services  DISCHARGE SUMMARY       Date of Admission: 9/6/2023  Date of Discharge:  9/8/2023  Primary Care Physician: Marina Kitchen MD    Presenting Problem/History of Present Illness:  C. difficile colitis [A04.72]   Fever  Diarrhea      Final Discharge Diagnoses:  Active Hospital Problems    Diagnosis     **C. difficile colitis    COPD without acute exacerbation  Stage IV lung cancer      Consults:   Consults       Date and Time Order Name Status Description    9/6/2023  6:08 PM Hospitalist (on-call MD unless specified)              Procedures Performed: None                Pertinent Test Results:   Lab Results (most recent)       Procedure Component Value Units Date/Time    Extra Tubes [[id number]] Collected: 09/08/23 0558    Specimen: Blood, Venous Line Updated: 09/08/23 0701    Narrative:      The following orders were created for panel order Extra Tubes.  Procedure                               Abnormality         Status                     ---------                               -----------         ------                     Lavender Top[[id number]]                                     Final result                 Please view results for these tests on the individual orders.    Lavender Top [[id number]] Collected: 09/08/23 0558    Specimen: Blood Updated: 09/08/23 0701     Extra Tube hold for add-on     Comment: Auto resulted       Basic Metabolic Panel [[id number]]  (Abnormal) Collected: 09/08/23 0554    Specimen: Blood Updated: 09/08/23 0635     Glucose 68 mg/dL      BUN 6 mg/dL      Creatinine 0.44 mg/dL      Sodium 135 mmol/L      Potassium 3.5 mmol/L      Chloride 99 mmol/L      CO2 28.0 mmol/L      Calcium 7.6 mg/dL      BUN/Creatinine Ratio 13.6     Anion Gap 8.0 mmol/L      eGFR 111.2 mL/min/1.73     Narrative:      GFR Normal >60  Chronic Kidney Disease <60  Kidney Failure <15    The GFR formula is only valid for adults with stable  renal function between ages 18 and 70.    Blood Culture - Blood, Arm, Right [[id number]]  (Normal) Collected: 09/06/23 1513    Specimen: Blood from Arm, Right Updated: 09/07/23 1531     Blood Culture No growth at 24 hours    Blood Culture - Blood, Arm, Left [[id number]]  (Normal) Collected: 09/06/23 1425    Specimen: Blood from Arm, Left Updated: 09/07/23 1431     Blood Culture No growth at 24 hours    Extra Tubes [[id number]] Collected: 09/07/23 0644    Specimen: Blood, Venous Line Updated: 09/07/23 0746    Narrative:      The following orders were created for panel order Extra Tubes.  Procedure                               Abnormality         Status                     ---------                               -----------         ------                     Lavender Top[[id number]]                                     Final result                 Please view results for these tests on the individual orders.    Lavender Top [[id number]] Collected: 09/07/23 0644    Specimen: Blood Updated: 09/07/23 0746     Extra Tube hold for add-on     Comment: Auto resulted       Basic Metabolic Panel [[id number]]  (Abnormal) Collected: 09/07/23 0617    Specimen: Blood Updated: 09/07/23 0715     Glucose 83 mg/dL      BUN 8 mg/dL      Creatinine 0.58 mg/dL      Sodium 136 mmol/L      Potassium 3.5 mmol/L      Chloride 99 mmol/L      CO2 31.0 mmol/L      Calcium 7.8 mg/dL      BUN/Creatinine Ratio 13.8     Anion Gap 6.0 mmol/L      eGFR 102.3 mL/min/1.73     Narrative:      GFR Normal >60  Chronic Kidney Disease <60  Kidney Failure <15    The GFR formula is only valid for adults with stable renal function between ages 18 and 70.    Urinalysis With Microscopic If Indicated (No Culture) - Urine, Clean Catch [[id number]]  (Abnormal) Collected: 09/06/23 1652    Specimen: Urine, Clean Catch Updated: 09/06/23 1707     Color, UA [place]     Appearance, UA Clear     pH, UA <=5.0     Specific Gravity, UA 1.017     Glucose, UA Negative     Ketones, UA  Negative     Bilirubin, UA Negative     Blood, UA Negative     Protein, UA Negative     Leuk Esterase, UA Negative     Nitrite, UA Negative     Urobilinogen, UA 0.2 E.U./dL    Narrative:      Urine microscopic not indicated.    COVID-19 and FLU A/B PCR - Swab, Nasopharynx [[id number]]  (Normal) Collected: 09/06/23 1440    Specimen: Swab from Nasopharynx Updated: 09/06/23 1508     COVID19 Not Detected     Influenza A PCR Not Detected     Influenza B PCR Not Detected    Narrative:      Fact sheet for providers: https://www.fda.gov/media/815939/download    Fact sheet for patients: https://www.fda.gov/media/720114/download    Test performed by PCR.    Comprehensive Metabolic Panel [[id number]]  (Abnormal) Collected: 09/06/23 1427    Specimen: Blood Updated: 09/06/23 1456     Glucose 101 mg/dL      BUN 10 mg/dL      Creatinine 0.60 mg/dL      Sodium 135 mmol/L      Potassium 3.5 mmol/L      Chloride 97 mmol/L      CO2 31.0 mmol/L      Calcium 8.5 mg/dL      Total Protein 7.2 g/dL      Albumin 3.2 g/dL      ALT (SGPT) 12 U/L      AST (SGOT) 12 U/L      Alkaline Phosphatase 70 U/L      Total Bilirubin 0.3 mg/dL      Globulin 4.0 gm/dL      A/G Ratio 0.8 g/dL      BUN/Creatinine Ratio 16.7     Anion Gap 7.0 mmol/L      eGFR 101.3 mL/min/1.73     Narrative:      GFR Normal >60  Chronic Kidney Disease <60  Kidney Failure <15    The GFR formula is only valid for adults with stable renal function between ages 18 and 70.    Lipase [[id number]]  (Normal) Collected: 09/06/23 1427    Specimen: Blood Updated: 09/06/23 1456     Lipase 14 U/L     Lactic Acid, Plasma [[id number]]  (Normal) Collected: 09/06/23 1427    Specimen: Blood Updated: 09/06/23 1443     Lactate 1.2 mmol/L     CBC & Differential [[id number]]  (Abnormal) Collected: 09/06/23 1427    Specimen: Blood Updated: 09/06/23 1430    Narrative:      The following orders were created for panel order CBC & Differential.  Procedure                               Abnormality          Status                     ---------                               -----------         ------                     CBC Auto Differential[[id number]]        Abnormal            Final result                 Please view results for these tests on the individual orders.    CBC Auto Differential [[id number]]  (Abnormal) Collected: 09/06/23 1427    Specimen: Blood Updated: 09/06/23 1430     WBC 16.91 10*3/mm3      RBC 3.59 10*6/mm3      Hemoglobin 11.2 g/dL      Hematocrit 34.6 %      MCV 96.4 fL      MCH 31.2 pg      MCHC 32.4 g/dL      RDW 13.1 %      RDW-SD 46.6 fl      MPV 8.6 fL      Platelets 342 10*3/mm3      Neutrophil % 71.9 %      Lymphocyte % 9.7 %      Monocyte % 15.1 %      Eosinophil % 1.9 %      Basophil % 0.7 %      Immature Grans % 0.7 %      Neutrophils, Absolute 12.16 10*3/mm3      Lymphocytes, Absolute 1.64 10*3/mm3      Monocytes, Absolute 2.55 10*3/mm3      Eosinophils, Absolute 0.32 10*3/mm3      Basophils, Absolute 0.12 10*3/mm3      Immature Grans, Absolute 0.12 10*3/mm3      nRBC 0.0 /100 WBC           Imaging Results (Most Recent)       Procedure Component Value Units Date/Time    CT Abdomen Pelvis With Contrast [[id number]] Collected: 09/06/23 1747     Updated: 09/06/23 1756    Narrative:      Indication:  C. Difficile colitis    TECHNIQUE:  Intravenous contrast was administered and axial images from the level of the  diaphragms through the pelvis were performed followed by 2-D multiplanar  reformats.    Comparison:  None    FINDINGS:  Liver shows small hypodense lesion that may represent cysts, but are too small  to accurately characterize.  Other abdominal viscera are unremarkable.  Moderate  to marked atherosclerotic calcification is seen in the abdominal aorta and its  branches.  Colon diffusely shows wall thickening and enhancement that may be  from mild acute colitis, but fecal material is still seen in the colon  diffusely.  Visualized lung bases show emphysema.  On bone windows  "diffuse  osteopenia is seen with prominent degenerative changes in the spine and the left  hip.      Impression:      Probable mild acute diffuse colitis    XR Chest 2 View [[id number]] Collected: 09/06/23 1413     Updated: 09/06/23 1417    Narrative:      History:  fever    COMPARISON:  8/2/2023    Findings:  PA and lateral views of the chest obtained.Heart size is normal.    There is severe emphysema.    There are no osseous lesions.      Impression:      Severe emphysema.            Hospital Course:   73-year-old male with COPD, history of stroke, stage IV lung cancer on Keytruda, recurrent C. difficile infection, presented to the hospital after developing fever and increased diarrhea in setting of recurrent C. difficile colitis.  Recently tested positive for C. difficile again on 8/31, was prescribed prolonged course of oral vancomycin by his gastroenterologist (Dr. Goldsmith), although [facility].  Admitted and started on oral vancomycin every 6 hours, IV fluids for dehydration related to GI losses.   He did improve with no further abdominal discomfort, less frequent stools and no further fevers noted while inpatient.     Confirmed availability of his oral vancomycin prescription on 9/8.  Will have 90-day supply given his multiple C. difficile recurrences.  Medically stable for discharge home.        Condition on Discharge: Stable      Physical Exam on Discharge:  BP 95/51 (BP Location: Left arm, Patient Position: Lying)   Pulse 85   Temp 99.8 °F (37.7 °C) (Infrared)   Resp 16   Ht 165.1 cm (65\")   Wt 55.1 kg (121 lb 6.4 oz)   SpO2 94%   BMI 20.20 kg/m²   Physical Exam  General: No acute distress  Cardiac: Normal rate, regular rhythm  Respiratory: No wheezes rales or rhonchi  Abdomen: Nontender, nondistended  Extremities: No edema      Discharge Disposition:  Home or Self Care    Discharge Medications:     Discharge Medications        New " Medications        Instructions Start Date   vancomycin 125 MG capsule  Commonly known as: VANCOCIN   125 mg, Oral, 4 Times Daily             Continue These Medications        Instructions Start Date   cholecalciferol 10 MCG (400 UNIT) tablet  Commonly known as: VITAMIN D3   400 Units, Oral, Daily, 50 mcg daily      dilTIAZem  MG 24 hr capsule  Commonly known as: CARDIZEM CD   240 mg, Oral, Every 24 Hours Scheduled      folic acid 1 MG tablet  Commonly known as: FOLVITE   1 mg, Oral, Daily      furosemide 20 MG tablet  Commonly known as: LASIX   20 mg, Oral, Daily PRN      ipratropium 17 MCG/ACT inhaler  Commonly known as: ATROVENT HFA   2 puffs, Inhalation, 4 Times Daily PRN      KLOR-CON 20 MEQ CR tablet  Generic drug: potassium chloride   1 tablet Daily.      melatonin 5 MG tablet tablet   5 mg, Oral, Nightly PRN      montelukast 10 MG tablet  Commonly known as: SINGULAIR   10 mg, Oral, Nightly      ondansetron 8 MG tablet  Commonly known as: ZOFRAN   8 mg, Oral, 3 Times Daily PRN      predniSONE 5 MG tablet  Commonly known as: DELTASONE   5 mg, Oral, Every Other Day      Stiolto Respimat 2.5-2.5 MCG/ACT aerosol solution inhaler  Generic drug: tiotropium bromide-olodaterol   2 puffs, Inhalation, Daily      TiZANidine 2 MG capsule  Commonly known as: ZANAFLEX   2 mg, Oral, Nightly PRN             Stop These Medications      dexAMETHasone 4 MG tablet  Commonly known as: DECADRON     ferrous sulfate 324 MG tablet delayed-release     ipratropium 0.02 % nebulizer solution  Commonly known as: ATROVENT     mirtazapine 15 MG tablet  Commonly known as: REMERON     vitamin B-12 1000 MCG tablet  Commonly known as: CYANOCOBALAMIN              Discharge Diet: Regular    Activity at Discharge: As tolerated    Discharge Care Plan/Instructions: Follow-up with PCP within 1 week.  Follow-up with gastroenterology as scheduled.  Follow-up with hematology/oncology as scheduled.  Take medications as directed.      Follow-up  Appointments:   Future Appointments   Date Time Provider Department Center   9/20/2023  8:00 AM  [facility] OP INFU CHAIR [address] [facility] OPI [facility]   9/20/2023  9:00 AM Omega Davis MD [facility] ONC [facility]   9/20/2023 10:00 AM  [facility] OP INFU CHAIR 05  [facility] OPI [facility]   11/3/2023 11:30 AM Neville Meeks MD [facility] CD [facility] None   12/18/2023  1:00 PM [facility]       Test Results Pending at Discharge:   Pending Labs       Order Current Status    Extra Tubes Collected (09/08/23 0554)    Green Top (Gel) Collected (09/08/23 0554)    Blood Culture - Blood, Arm, Left Preliminary result    Blood Culture - Blood, Arm, Right Preliminary result                  Time: 32 minutes

## 2023-09-08 NOTE — PLAN OF CARE
Goal Outcome Evaluation:  Plan of Care Reviewed With: patient           Outcome Evaluation: OT eval completed. pt supine. pt spouse present for session. Pt [place]. Pt Mod I sup<>sit. SBA required for sit<>stand and func mob bed<>BR. Pt required SBA/setup to complete LB dressing and toileting this date. Pt would benefit from continued skilled IP/OT to address decreased strength, ROM, ax tolerance, and independence with ADLs, IADLs, and transfers. Recommended d/c home with HH.      Anticipated Discharge Disposition (OT): home with assist, home with home health

## 2023-09-08 NOTE — PLAN OF CARE
Goal Outcome Evaluation:  Plan of Care Reviewed With: patient, spouse        Progress: no change  Outcome Evaluation: Pt sitting EOB and agreeable to therapy. Pt stood with SBA and amb 26ft with no AD in room and SBA. Pt performed B LE ther ex in sitting with no c/o voiced. Pt would cont to benefit from therapy at this time.      Anticipated Discharge Disposition (PT): home with assist, home with home health

## 2023-09-08 NOTE — PROGRESS NOTES
[facility] Medicine   INPATIENT PROGRESS NOTE      Patient Name: Brian Garcia  Date of Admission: 9/6/2023  Today's Date: 09/08/23  Length of Stay: 0  Primary Care Physician: Marina Kitchen MD    Subjective     HPI   73-year-old male with COPD, history of stroke, stage IV lung cancer on Keytruda, recurrent C. difficile infection, presented to the hospital after developing fever and increased diarrhea in setting of recurrent C. difficile colitis.  Recently tested positive for C. difficile again on 8/31, was prescribed prolonged course of oral vancomycin by his gastroenterologist (Dr. Goldsmith), although [facility].  Admitted and started on oral vancomycin every 6 hours, IV fluids for dehydration related to GI losses.      Improving some, diarrhea now less frequent.  No further fevers or abdominal pain.        Review of Systems   Comprehensive ROS completed  All pertinent negatives and positives are as above. All other systems have been reviewed and are negative unless otherwise stated.     Objective    Temp:  [97.1 °F (36.2 °C)-99.8 °F (37.7 °C)] 98.9 °F (37.2 °C)  Heart Rate:  [78-86] 86  Resp:  [16] 16  BP: ()/(47-55) 87/47  Physical Exam  General: No acute distress  Cardiac: Normal rate, regular rhythm  Respiratory: No wheezes or rhonchi  Abdomen: Nontender, nondistended  Extremities: No edema        Results Review:  I have reviewed the labs, radiology results, and diagnostic studies.    Laboratory Data:   Results from last 7 days   Lab Units 09/06/23  1427   WBC 10*3/mm3 16.91*   HEMOGLOBIN g/dL 11.2*   HEMATOCRIT % 34.6*   PLATELETS 10*3/mm3 342          Results from last 7 days   Lab Units 09/08/23  0554 09/07/23  0617 09/06/23  1427   SODIUM mmol/L 135* 136 135*   POTASSIUM mmol/L 3.5 3.5 3.5   CHLORIDE mmol/L 99 99 97*   CO2 mmol/L 28.0 31.0* 31.0*   BUN mg/dL 6* 8 10    CREATININE mg/dL 0.44* 0.58* 0.60*   CALCIUM mg/dL 7.6* 7.8* 8.5*   BILIRUBIN mg/dL  --   --  0.3   ALK PHOS U/L  --   --  70   ALT (SGPT) U/L  --   --  12   AST (SGOT) U/L  --   --  12   GLUCOSE mg/dL 68 83 101*         Culture Data:   Blood Culture   Date Value Ref Range Status   09/06/2023 No growth at 2 days  Preliminary   09/06/2023 No growth at 2 days  Preliminary     No results found for: URINECX  No results found for: RESPCX  No results found for: WOUNDCX  No results found for: STOOLCX  No components found for: BODYFLD    Radiology Data:   Imaging Results (Last 24 Hours)       ** No results found for the last 24 hours. **            I have reviewed the patient's current medications.     Assessment/Plan     Active Hospital Problems    Diagnosis     **C. difficile colitis        Recurrent C. difficile colitis  - Continue oral vancomycin every 6 hours  - Follow clinically, monitor stools, seems to be improving    Dehydration related to GI losses  - Continue maintenance fluid    Stage IV lung cancer  On Keytruda outpatient  Outpatient follow-up with oncology      DVT prophylaxis Lovenox    Discharge Planning: likely discharge home tomorrow when oral Vancomycin prescription arrives tomorrow (90 day supply prescribed by Dr Goldsmith with GI due to multiple C diff recurrences)    Electronically signed by Quinn Richardson MD, 09/08/23, 16:35 CDT.

## 2023-09-08 NOTE — PLAN OF CARE
Goal Outcome Evaluation:  Pt still having watery bowel movements. No other overnight complaints.

## 2023-09-08 NOTE — DISCHARGE SUMMARY
[facility]  DISCHARGE SUMMARY       Date of Admission: 9/6/2023  Date of Discharge:  9/8/2023  Primary Care Physician: Marina Kitchen MD    Presenting Problem/History of Present Illness:  C. difficile colitis [A04.72]       Final Discharge Diagnoses:  Active Hospital Problems    Diagnosis     **C. difficile colitis    Diarrhea  Fever  Dehydration  COPD without exacerbation  Stage IV lung cancer    Consults:   Consults       Date and Time Order Name Status Description    9/6/2023  6:08 PM Hospitalist (on-call MD unless specified)              Procedures Performed: None                Pertinent Test Results:   Lab Results (most recent)       Procedure Component Value Units Date/Time    Blood Culture - Blood, Arm, Right [[id number]]  (Normal) Collected: 09/06/23 1513    Specimen: Blood from Arm, Right Updated: 09/08/23 1531     Blood Culture No growth at 2 days    Blood Culture - Blood, Arm, Left [[id number]]  (Normal) Collected: 09/06/23 1425    Specimen: Blood from Arm, Left Updated: 09/08/23 1431     Blood Culture No growth at 2 days    Extra Tubes [[id number]] Collected: 09/08/23 0558    Specimen: Blood, Venous Line Updated: 09/08/23 0701    Narrative:      The following orders were created for panel order Extra Tubes.  Procedure                               Abnormality         Status                     ---------                               -----------         ------                     Lavender Top[[id number]]                                     Final result                 Please view results for these tests on the individual orders.    Lavender Top [[id number]] Collected: 09/08/23 0558    Specimen: Blood Updated: 09/08/23 0701     Extra Tube hold for add-on     Comment: Auto resulted       Basic Metabolic Panel [[id number]]  (Abnormal) Collected: 09/08/23 0554    Specimen: Blood Updated: 09/08/23 0635     Glucose 68 mg/dL      BUN 6 mg/dL       Creatinine 0.44 mg/dL      Sodium 135 mmol/L      Potassium 3.5 mmol/L      Chloride 99 mmol/L      CO2 28.0 mmol/L      Calcium 7.6 mg/dL      BUN/Creatinine Ratio 13.6     Anion Gap 8.0 mmol/L      eGFR 111.2 mL/min/1.73     Narrative:      GFR Normal >60  Chronic Kidney Disease <60  Kidney Failure <15    The GFR formula is only valid for adults with stable renal function between ages 18 and 70.    Extra Tubes [[id number]] Collected: 09/07/23 0644    Specimen: Blood, Venous Line Updated: 09/07/23 0746    Narrative:      The following orders were created for panel order Extra Tubes.  Procedure                               Abnormality         Status                     ---------                               -----------         ------                     Lavender Top[[id number]]                                     Final result                 Please view results for these tests on the individual orders.    Lavender Top [[id number]] Collected: 09/07/23 0644    Specimen: Blood Updated: 09/07/23 0746     Extra Tube hold for add-on     Comment: Auto resulted       Basic Metabolic Panel [[id number]]  (Abnormal) Collected: 09/07/23 0617    Specimen: Blood Updated: 09/07/23 0715     Glucose 83 mg/dL      BUN 8 mg/dL      Creatinine 0.58 mg/dL      Sodium 136 mmol/L      Potassium 3.5 mmol/L      Chloride 99 mmol/L      CO2 31.0 mmol/L      Calcium 7.8 mg/dL      BUN/Creatinine Ratio 13.8     Anion Gap 6.0 mmol/L      eGFR 102.3 mL/min/1.73     Narrative:      GFR Normal >60  Chronic Kidney Disease <60  Kidney Failure <15    The GFR formula is only valid for adults with stable renal function between ages 18 and 70.    Urinalysis With Microscopic If Indicated (No Culture) - Urine, Clean Catch [[id number]]  (Abnormal) Collected: 09/06/23 1652    Specimen: Urine, Clean Catch Updated: 09/06/23 1707     Color, UA [place]     Appearance, UA Clear     pH, UA <=5.0     Specific Gravity, UA 1.017     Glucose, UA Negative     Ketones, UA  Negative     Bilirubin, UA Negative     Blood, UA Negative     Protein, UA Negative     Leuk Esterase, UA Negative     Nitrite, UA Negative     Urobilinogen, UA 0.2 E.U./dL    Narrative:      Urine microscopic not indicated.    COVID-19 and FLU A/B PCR - Swab, Nasopharynx [[id number]]  (Normal) Collected: 09/06/23 1440    Specimen: Swab from Nasopharynx Updated: 09/06/23 1508     COVID19 Not Detected     Influenza A PCR Not Detected     Influenza B PCR Not Detected    Narrative:      Fact sheet for providers: https://www.fda.gov/media/773797/download    Fact sheet for patients: https://www.fda.gov/media/970450/download    Test performed by PCR.    Comprehensive Metabolic Panel [[id number]]  (Abnormal) Collected: 09/06/23 1427    Specimen: Blood Updated: 09/06/23 1456     Glucose 101 mg/dL      BUN 10 mg/dL      Creatinine 0.60 mg/dL      Sodium 135 mmol/L      Potassium 3.5 mmol/L      Chloride 97 mmol/L      CO2 31.0 mmol/L      Calcium 8.5 mg/dL      Total Protein 7.2 g/dL      Albumin 3.2 g/dL      ALT (SGPT) 12 U/L      AST (SGOT) 12 U/L      Alkaline Phosphatase 70 U/L      Total Bilirubin 0.3 mg/dL      Globulin 4.0 gm/dL      A/G Ratio 0.8 g/dL      BUN/Creatinine Ratio 16.7     Anion Gap 7.0 mmol/L      eGFR 101.3 mL/min/1.73     Narrative:      GFR Normal >60  Chronic Kidney Disease <60  Kidney Failure <15    The GFR formula is only valid for adults with stable renal function between ages 18 and 70.    Lipase [[id number]]  (Normal) Collected: 09/06/23 1427    Specimen: Blood Updated: 09/06/23 1456     Lipase 14 U/L     Lactic Acid, Plasma [[id number]]  (Normal) Collected: 09/06/23 1427    Specimen: Blood Updated: 09/06/23 1443     Lactate 1.2 mmol/L     CBC & Differential [[id number]]  (Abnormal) Collected: 09/06/23 1427    Specimen: Blood Updated: 09/06/23 1430    Narrative:      The following orders were created for panel order CBC & Differential.  Procedure                               Abnormality          Status                     ---------                               -----------         ------                     CBC Auto Differential[[id number]]        Abnormal            Final result                 Please view results for these tests on the individual orders.    CBC Auto Differential [[id number]]  (Abnormal) Collected: 09/06/23 1427    Specimen: Blood Updated: 09/06/23 1430     WBC 16.91 10*3/mm3      RBC 3.59 10*6/mm3      Hemoglobin 11.2 g/dL      Hematocrit 34.6 %      MCV 96.4 fL      MCH 31.2 pg      MCHC 32.4 g/dL      RDW 13.1 %      RDW-SD 46.6 fl      MPV 8.6 fL      Platelets 342 10*3/mm3      Neutrophil % 71.9 %      Lymphocyte % 9.7 %      Monocyte % 15.1 %      Eosinophil % 1.9 %      Basophil % 0.7 %      Immature Grans % 0.7 %      Neutrophils, Absolute 12.16 10*3/mm3      Lymphocytes, Absolute 1.64 10*3/mm3      Monocytes, Absolute 2.55 10*3/mm3      Eosinophils, Absolute 0.32 10*3/mm3      Basophils, Absolute 0.12 10*3/mm3      Immature Grans, Absolute 0.12 10*3/mm3      nRBC 0.0 /100 WBC           Imaging Results (Most Recent)       Procedure Component Value Units Date/Time    CT Abdomen Pelvis With Contrast [[id number]] Collected: 09/06/23 1747     Updated: 09/06/23 1756    Narrative:      Indication:  C. Difficile colitis    TECHNIQUE:  Intravenous contrast was administered and axial images from the level of the  diaphragms through the pelvis were performed followed by 2-D multiplanar  reformats.    Comparison:  None    FINDINGS:  Liver shows small hypodense lesion that may represent cysts, but are too small  to accurately characterize.  Other abdominal viscera are unremarkable.  Moderate  to marked atherosclerotic calcification is seen in the abdominal aorta and its  branches.  Colon diffusely shows wall thickening and enhancement that may be  from mild acute colitis, but fecal material is still seen in the colon  diffusely.  Visualized lung bases show emphysema.  On bone windows  "diffuse  osteopenia is seen with prominent degenerative changes in the spine and the left  hip.      Impression:      Probable mild acute diffuse colitis    XR Chest 2 View [[id number]] Collected: 09/06/23 1413     Updated: 09/06/23 1417    Narrative:      History:  fever    COMPARISON:  8/2/2023    Findings:  PA and lateral views of the chest obtained.Heart size is normal.    There is severe emphysema.    There are no osseous lesions.      Impression:      Severe emphysema.                Hospital Course:  73-year-old male with COPD, history of stroke, stage IV lung cancer on Keytruda, recurrent C. difficile infection, presented after developing fever and increased diarrhea in setting of recurrent C. difficile colitis.  Recently tested positive for C. difficile again on 8/31, was prescribed prolonged course of oral vancomycin by a gastroenterologist (Dr. Goldsmith), although delay in receiving prescription vancomycin as [facility].  He was admitted and started on oral vancomycin every 6 hours, IV fluids for dehydration related to GI losses.  Improved over hospital course with less frequent diarrhea.  Patient medically stable for discharge home and will continue on oral vancomycin as prescribed.  Follow-up with PCP within 1 week.  Follow-up with GI as scheduled.      Condition on Discharge: Stable    Physical Exam on Discharge:  BP (!) 87/47 (BP Location: Left arm, Patient Position: Lying)   Pulse 86   Temp 98.9 °F (37.2 °C) (Infrared)   Resp 16   Ht 165.1 cm (65\")   Wt 55.1 kg (121 lb 6.4 oz)   SpO2 96%   BMI 20.20 kg/m²   Physical Exam  General: No acute distress  Cardiac: Normal rate, regular rhythm  Respiratory: No wheezes rales or rhonchi  Abdomen: Nondistended  Extremities: No edema      Discharge Disposition:  Home or Self Care    Discharge Medications:     Discharge Medications        New Medications        Instructions Start Date   vancomycin 125 MG " capsule  Commonly known as: VANCOCIN   125 mg, Oral, 4 Times Daily             Continue These Medications        Instructions Start Date   cholecalciferol 10 MCG (400 UNIT) tablet  Commonly known as: VITAMIN D3   400 Units, Oral, Daily, 50 mcg daily      dilTIAZem  MG 24 hr capsule  Commonly known as: CARDIZEM CD   240 mg, Oral, Every 24 Hours Scheduled      folic acid 1 MG tablet  Commonly known as: FOLVITE   1 mg, Oral, Daily      furosemide 20 MG tablet  Commonly known as: LASIX   20 mg, Oral, Daily PRN      ipratropium 17 MCG/ACT inhaler  Commonly known as: ATROVENT HFA   2 puffs, Inhalation, 4 Times Daily PRN      KLOR-CON 20 MEQ CR tablet  Generic drug: potassium chloride   1 tablet Daily.      melatonin 5 MG tablet tablet   5 mg, Oral, Nightly PRN      montelukast 10 MG tablet  Commonly known as: SINGULAIR   10 mg, Oral, Nightly      ondansetron 8 MG tablet  Commonly known as: ZOFRAN   8 mg, Oral, 3 Times Daily PRN      predniSONE 5 MG tablet  Commonly known as: DELTASONE   5 mg, Oral, Every Other Day      Stiolto Respimat 2.5-2.5 MCG/ACT aerosol solution inhaler  Generic drug: tiotropium bromide-olodaterol   2 puffs, Inhalation, Daily      TiZANidine 2 MG capsule  Commonly known as: ZANAFLEX   2 mg, Oral, Nightly PRN             Stop These Medications      dexAMETHasone 4 MG tablet  Commonly known as: DECADRON     ferrous sulfate 324 MG tablet delayed-release     ipratropium 0.02 % nebulizer solution  Commonly known as: ATROVENT     mirtazapine 15 MG tablet  Commonly known as: REMERON     vitamin B-12 1000 MCG tablet  Commonly known as: CYANOCOBALAMIN              Discharge Diet: Regular    Activity at Discharge: As tolerated    Discharge Care Plan/Instructions: Follow-up with PCP within 1 week.  Take medications as directed.    Follow-up Appointments:   Future Appointments   Date Time Provider Department Center   9/20/2023  8:00 AM [facility]FU CHAIR 05 [facility]   9/20/2023  9:00 AM Susan  Omega NOVAK MD [facility] ONC MAD MAD   9/20/2023 10:00 AM [facility] OP INFU CHAIR 05 [facility] OPI MAD   11/3/2023 11:30 AM Neville Meeks MD [facility] CD MAD None   12/18/2023  1:00 PM Clotilde Washington DO [facility] PULM [facility]       Test Results Pending at Discharge:   Pending Labs       Order Current Status    Blood Culture - Blood, Arm, Left Preliminary result    Blood Culture - Blood, Arm, Right Preliminary result                Time: 32 minutes

## 2023-09-08 NOTE — THERAPY TREATMENT NOTE
Acute Care - Physical Therapy Treatment Note  [facility]     Patient Name: Brian Garcia  : 1950  MRN: [id number]  Today's Date: 2023      Visit Dx:     ICD-10-CM ICD-9-CM   1. C. difficile colitis  A04.72 008.45   2. Impaired functional mobility, balance, gait, and endurance [Z74.09 (ICD-10-CM)]  Z74.09 V49.89   3. Impaired mobility and ADLs [Z74.09, Z78.9 (ICD-10-CM)]  Z74.09 V49.89    Z78.9      Patient Active Problem List   Diagnosis    Physical deconditioning    Leukocytosis    History of pulmonary embolism    Stage 4 very severe COPD by GOLD classification    Chronic respiratory failure with hypoxia, on home O2 therapy    Hypertension    CVA (cerebral vascular accident)    Traumatic hemorrhage of cerebrum    Iron deficiency anemia secondary to inadequate dietary iron intake    Acute pain of right shoulder    Limited range of motion (ROM) of shoulder    Rotator cuff syndrome of right shoulder    Impingement syndrome of right shoulder    Arthrosis of right acromioclavicular joint    Chronic right shoulder pain    Nontraumatic incomplete tear of right rotator cuff    Primary osteoarthritis of right shoulder    Malignant neoplasm of overlapping sites of right lung    Primary lung adenocarcinoma    Encounter for venous access device care    Iron malabsorption    Hypokalemia    C. difficile colitis     Past Medical History:   Diagnosis Date    A-fib     Anesthesia     hip surgery (spinal caused him unpleasant sensations never wants it again)    Arthritis     Blood in sputum 2023    bright red blood, teaspoon - tablespoon daily    COPD (chronic obstructive pulmonary disease)     CVA (cerebral vascular accident) 2022    Hearing aid worn     bilateral    History of pulmonary embolism     History of recurrent pneumonia     Hypertension     Lung cancer 2023    Oxygen dependent     since @ age 65    Pulmonary nodules     Risk for falls     uses walker    Rotator cuff syndrome  of right shoulder     Tachycardia     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 HTN/Eliquis    Wears glasses     readers    Wears glasses     reading     Past Surgical History:   Procedure Laterality Date    HIP ARTHROPLASTY Right     RIB BIOPSY Left 03/17/2023 11th    VENOUS ACCESS DEVICE (PORT) INSERTION N/A 3/30/2023    Procedure: MEDIPORT PLACEMENT          (C-ARM);  Surgeon: Lavon Cochran MD;  Location: [facility];  Service: General;  Laterality: N/A;     PT Assessment (last 12 hours)       PT Evaluation and Treatment       Row Name 09/08/23 0850          Physical Therapy Time and Intention    Subjective Information complains of;weakness;dyspnea  -TW     Document Type therapy note (daily note)  -TW     Mode of Treatment physical therapy;individual therapy  -TW     Patient Effort adequate  -TW       Row Name 09/08/23 0850          General Information    Patient Profile Reviewed yes  -TW     Patient Observations alert;cooperative;agree to therapy  -TW     Patient/Family/Caregiver Comments/Observations wife present.  -TW     General Observations of Patient Pt sitting EOB eating breakfast.  -TW     Existing Precautions/Restrictions fall;oxygen therapy device and L/min  -TW       Row Name 09/08/23 0850          Pain    Pretreatment Pain Rating 0/10 - no pain  -TW     Posttreatment Pain Rating 0/10 - no pain  -TW       Row Name 09/08/23 0850          Cognition    Affect/Mental Status (Cognition) WFL  -TW     Orientation Status (Cognition) oriented x 4  -TW     Follows Commands (Cognition) WFL  -TW     Personal Safety Interventions fall prevention program maintained;muscle strengthening facilitated;gait belt;nonskid shoes/slippers when out of bed  -TW       Row Name 09/08/23 0850          Bed Mobility    Comment, (Bed Mobility) Not tested.  -TW       Row Name 09/08/23 0850          Sit-Stand Transfer    Sit-Stand [place] (Transfers) standby assist  -TW       Row Name 09/08/23 0850           Stand-Sit Transfer    Stand-Sit [place] (Transfers) standby assist  -TW       Row Name 09/08/23 0850          Gait/Stairs (Locomotion)    [place] Level (Gait) standby assist  -TW     Distance in Feet (Gait) 26ft  -TW     Deviations/Abnormal Patterns (Gait) base of support, narrow;gait speed decreased;stride length decreased  -TW       Row Name 09/08/23 0850          Motor Skills    Therapeutic Exercise hip;knee;ankle  -TW       Row Name 09/08/23 0850          Hip (Therapeutic Exercise)    Hip (Therapeutic Exercise) AROM (active range of motion)  -TW     Hip AROM (Therapeutic Exercise) bilateral;sitting  -TW       Row Name 09/08/23 0850          Knee (Therapeutic Exercise)    Knee (Therapeutic Exercise) AROM (active range of motion)  -TW     Knee AROM (Therapeutic Exercise) bilateral;sitting  -TW       Row Name 09/08/23 0850          Ankle (Therapeutic Exercise)    Ankle (Therapeutic Exercise) AROM (active range of motion)  -TW     Ankle AROM (Therapeutic Exercise) bilateral;sitting  -TW       Row Name             [REMOVED] Wound 03/30/23 1202 Right anterior chest Incision    Wound - Properties Group Placement Date: 03/30/23  -LS Placement Time: 1202  -LS Side: Right  -LS Orientation: anterior  -LS Location: chest  -LS Primary Wound Type: Incision  -LS, exofin  Removal Date: 09/08/23  -KH Removal Time: 1102 -KH    Retired Wound - Properties Group Placement Date: 03/30/23  -LS Placement Time: 1202  -LS Side: Right  -LS Orientation: anterior  -LS Location: chest  -LS Primary Wound Type: Incision  -LS, exofin  Removal Date: 09/08/23  -KH Removal Time: 1102 -KH    Retired Wound - Properties Group Date first assessed: 03/30/23  -LS Time first assessed: 1202  -LS Side: Right  -LS Location: chest  -LS Primary Wound Type: Incision  -LS, exofin  Resolution Date: 09/08/23  -KH Resolution Time: 1102 -KH      Row Name 09/08/23 0850          Plan of Care Review    Plan of Care Reviewed With patient;spouse  -TW      Progress no change  -TW     Outcome Evaluation Pt sitting EOB and agreeable to therapy. Pt stood with SBA and amb 26ft with no AD in room and SBA. Pt performed B LE ther ex in sitting with no c/o voiced. Pt would cont to benefit from therapy at this time.  -TW       Row Name 09/08/23 0850          Vital Signs    Pretreatment Heart Rate (beats/min) 90  -TW     Intratreatment Heart Rate (beats/min) 102  -TW     Posttreatment Heart Rate (beats/min) 94  -TW     Pre SpO2 (%) 96  -TW     O2 Delivery Pre Treatment supplemental O2  -TW     Intra SpO2 (%) 89  -TW     O2 Delivery Intra Treatment supplemental O2  -TW     Post SpO2 (%) 94  -TW     O2 Delivery Post Treatment supplemental O2  -TW     Pre Patient Position Sitting  -TW     Intra Patient Position Standing  -TW     Post Patient Position Sitting  -TW       Row Name 09/08/23 0850          Positioning and Restraints    Pre-Treatment Position in bed  -TW     Post Treatment Position bed  -TW     In Bed sitting EOB;encouraged to call for assist;call light within reach;with family/caregiver  -TW       Row Name 09/08/23 0850          Therapy Assessment/Plan (PT)    Rehab Potential (PT) good, to achieve stated therapy goals  -TW       Row Name 09/08/23 0850          Bed Mobility Goal 1 (PT)    Activity/Assistive Device (Bed Mobility Goal 1, PT) bed mobility activities, all  -TW     [place] Level/Cues Needed (Bed Mobility Goal 1, PT) independent  -TW     Time Frame (Bed Mobility Goal 1, PT) by discharge  -TW     Progress/Outcomes (Bed Mobility Goal 1, PT) goal not met  -       Row Name 09/08/23 0850          Transfer Goal 1 (PT)    Activity/Assistive Device (Transfer Goal 1, PT) sit-to-stand/stand-to-sit;bed-to-chair/chair-to-bed  -TW     [place] Level/Cues Needed (Transfer Goal 1, PT) modified independence  -TW     Time Frame (Transfer Goal 1, PT) by discharge  -TW     Progress/Outcome (Transfer Goal 1, PT) goal not met  -TW       Row Name 09/08/23 0850           Gait Training Goal 1 (PT)    Activity/Assistive Device (Gait Training Goal 1, PT) gait (walking locomotion);decrease fall risk;increase endurance/gait distance;increase energy conservation;walker, rolling  -TW     Distance (Gait Training Goal 1, PT) 50ft or more each trip  O2 sats will not drop below 90%  -TW     Strategies/Barriers (Gait Training Goal 1, PT) co-morbidities  -TW     Progress/Outcome (Gait Training Goal 1, PT) goal not met  -TW       Row Name 09/08/23 0850          ROM Goal 1 (PT)    ROM Goal 1 (PT) Patient will tolerate LE exercises OOB in chair with VSS  -TW     Time Frame (ROM Goal 1, PT) by discharge  -TW     Progress/Outcome (ROM Goal 1, PT) goal not met  -TW               User Key  (r) = Recorded By, (t) = Taken By, (c) = Cosigned By      Initials Name Provider Type    TW Daryl Joe, PTA Physical Therapist Assistant    Trinidad Saucedo, RN Registered Nurse    Gabby Mcdonald RN Registered Nurse                    Physical Therapy Education       Title: PT OT SLP Therapies (Done)       Topic: Physical Therapy (Resolved)       Point: Mobility training (Resolved)       Learning Progress Summary             Patient Acceptance, E, NR by  at 9/7/2023 1651                         Point: Home exercise program (Resolved)       Learner Progress:  Not documented in this visit.              Point: Body mechanics (Resolved)       Learning Progress Summary             Patient Acceptance, E, NR by  at 9/7/2023 1651                         Point: Precautions (Resolved)       Learning Progress Summary             Patient Acceptance, E, NR by  at 9/7/2023 1651                                         User Key       Initials Effective Dates Name Provider Type [facility] 06/16/21 -  Nydia Muñoz PT Physical Therapist PT                  PT Recommendation and Plan  Anticipated Discharge Disposition (PT): home with assist, home with home health  Plan of Care Reviewed With: patient,  spouse  Progress: no change  Outcome Evaluation: Pt sitting EOB and agreeable to therapy. Pt stood with SBA and amb 26ft with no AD in room and SBA. Pt performed B LE ther ex in sitting with no c/o voiced. Pt would cont to benefit from therapy at this time.       Time Calculation:    PT Charges       Row Name 09/08/23 1140             Time Calculation    Start Time 0850  -TW      Stop Time 0917  -TW      Time Calculation (min) 27 min  -TW         Time Calculation- PT    Total Timed Code Minutes- PT 27 minute(s)  -TW                User Key  (r) = Recorded By, (t) = Taken By, (c) = Cosigned By      Initials Name Provider Type    TW Daryl Joe PTA Physical Therapist Assistant                  Therapy Charges for Today       Code Description Service Date Service Provider Modifiers Qty    88409202187 HC GAIT TRAINING EA 15 MIN 9/8/2023 Daryl Joe PTA GP 1    [id number] HC PT THER PROC EA 15 MIN 9/8/2023 Daryl Joe PTA GP 1            PT G-Codes  Outcome Measure Options: AM-PAC 6 Clicks Daily Activity (OT)  AM-PAC 6 Clicks Score (PT): 17  AM-PAC 6 Clicks Score (OT): 20    Daryl Joe PTA  9/8/2023

## 2023-09-08 NOTE — THERAPY EVALUATION
Patient Name: Brian Garcia  : 1950    MRN: [id number]                              Today's Date: 2023       Admit Date: 2023    Visit Dx:     ICD-10-CM ICD-9-CM   1. C. difficile colitis  A04.72 008.45   2. Impaired functional mobility, balance, gait, and endurance [Z74.09 (ICD-10-CM)]  Z74.09 V49.89   3. Impaired mobility and ADLs [Z74.09, Z78.9 (ICD-10-CM)]  Z74.09 V49.89    Z78.9      Patient Active Problem List   Diagnosis    Physical deconditioning    Leukocytosis    History of pulmonary embolism    Stage 4 very severe COPD by GOLD classification    Chronic respiratory failure with hypoxia, on home O2 therapy    Hypertension    CVA (cerebral vascular accident)    Traumatic hemorrhage of cerebrum    Iron deficiency anemia secondary to inadequate dietary iron intake    Acute pain of right shoulder    Limited range of motion (ROM) of shoulder    Rotator cuff syndrome of right shoulder    Impingement syndrome of right shoulder    Arthrosis of right acromioclavicular joint    Chronic right shoulder pain    Nontraumatic incomplete tear of right rotator cuff    Primary osteoarthritis of right shoulder    Malignant neoplasm of overlapping sites of right lung    Primary lung adenocarcinoma    Encounter for venous access device care    Iron malabsorption    Hypokalemia    C. difficile colitis     Past Medical History:   Diagnosis Date    A-fib     Anesthesia     hip surgery (spinal caused him unpleasant sensations never wants it again)    Arthritis     Blood in sputum 2023    bright red blood, teaspoon - tablespoon daily    COPD (chronic obstructive pulmonary disease)     CVA (cerebral vascular accident) 2022    Hearing aid worn     bilateral    History of pulmonary embolism     History of recurrent pneumonia     Hypertension     Lung cancer 2023    Oxygen dependent     since @ age 65    Pulmonary nodules     Risk for falls     uses walker    Rotator cuff syndrome of right  shoulder     Tachycardia     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 HTN/Eliquis    Wears glasses     readers    Wears glasses     reading     Past Surgical History:   Procedure Laterality Date    HIP ARTHROPLASTY Right     RIB BIOPSY Left 03/17/2023 11th    VENOUS ACCESS DEVICE (PORT) INSERTION N/A 3/30/2023    Procedure: MEDIPORT PLACEMENT          (C-ARM);  Surgeon: Lavon Cochran MD;  Location: [facility];  Service: General;  Laterality: N/A;      General Information       Row Name 09/08/23 1058          OT Time and Intention    Document Type evaluation  -SA     Mode of Treatment individual therapy;occupational therapy  -       Row Name 09/08/23 1058          General Information    Patient Profile Reviewed yes  -SA     Prior Level of Function independent:;all household mobility;community mobility;gait;transfer;bed mobility;driving;feeding;grooming;min assist:;dressing;bathing;max assist:;home management;cooking;cleaning  -     Existing Precautions/Restrictions fall;oxygen therapy device and L/min  -     Barriers to Rehab medically complex;previous functional deficit;hearing deficit  -       Row Name 09/08/23 1058          Living Environment    People in Home significant other  -       Row Name 09/08/23 1058          Home Main Entrance    Number of Stairs, Main Entrance other (see comments)  -       Row Name 09/08/23 1058          Stairs Within Home, Primary    Stairs, Within Home, Primary Ramp to enter. Pt reports he has used a walker PRN. Has a tub shower with stool. s/o has been doing most of housework and assists patient a little with lower body bathing and dressing  -     Number of Stairs, Within Home, Primary none  -       Row Name 09/08/23 1058          Cognition    Orientation Status (Cognition) oriented x 4  -       Row Name 09/08/23 1058          Safety Issues, Functional Mobility    Safety Issues Affecting Function (Mobility) ability to follow commands;at risk  behavior observed;awareness of need for assistance;insight into deficits/self-awareness  -     Impairments Affecting Function (Mobility) balance;endurance/activity tolerance;shortness of breath;strength  -               User Key  (r) = Recorded By, (t) = Taken By, (c) = Cosigned By      Initials Name Provider Type    Kirstie Sanders OT Occupational Therapist                     Mobility/ADL's       Row Name 09/08/23 1058          Bed Mobility    Bed Mobility supine-sit;sit-supine  -     Supine-Sit [place] (Bed Mobility) modified independence  -SA     Sit-Supine [place] (Bed Mobility) modified independence  -     Assistive Device (Bed Mobility) head of bed elevated;bed rails  -[facility] Name 09/08/23 1058          Transfers    Transfers sit-stand transfer;stand-sit transfer;toilet transfer  -SA       Row Name 09/08/23 1058          Sit-Stand Transfer    Sit-Stand [place] (Transfers) standby assist  -SA       Row Name 09/08/23 1058          Stand-Sit Transfer    Stand-Sit [place] (Transfers) stand assist  [facility] Name 09/08/23 1058          Toilet Transfer    [place] Level (Toilet Transfer) standby assist  -SA       Row Name 09/08/23 1058          Functional Mobility    Functional Mobility- Ind. Level standby assist  -[facility] Name 09/08/23 1058          Activities of Daily Living    BADL Assessment/Intervention lower body dressing;toileting  -SA       Row Name 09/08/23 1058          Lower Body Dressing Assessment/Training    [place] Level (Lower Body Dressing) doff;don;socks;standby assist;pants/bottoms  -     Position (Lower Body Dressing) edge of bed sitting  -[facility] Name 09/08/23 1058          Toileting Assessment/Training    [place] Level (Toileting) adjust/manage clothing;perform perineal hygiene;standby assist  -     Assistive Devices (Toileting) commode  -     Position (Toileting) supported sitting  -               User Key  (r) =  Recorded By, (t) = Taken By, (c) = Cosigned By      Initials Name Provider Type    Kirstie Sanders OT Occupational Therapist                   Obj/Interventions       [facility] Name 09/08/23 1058          Sensory Assessment (Somatosensory)    Sensory Assessment (Somatosensory) UE sensation intact  -SA       Row Name 09/08/23 1058          Range of Motion Comprehensive    Comment, General Range of Motion B shoulder flexion limited to ~100 degrees 2/2 rotator cuff injury. All other joints WFL  -[facility] Name 09/08/23 1058          Strength Comprehensive (MMT)    General Manual Muscle Testing (MMT) Assessment other (see comments)  -     Comment, General Manual Muscle Testing (MMT) Assessment BUE 3+/5 grossly  -               User Key  (r) = Recorded By, (t) = Taken By, (c) = Cosigned By      Initials Name Provider Type    Kirstie Sanders OT Occupational Therapist                   Goals/Plan       Row Name 09/08/23 1058          Transfer Goal 1 (OT)    Activity/Assistive Device (Transfer Goal 1, OT) toilet  -SA     [place] Level/Cues Needed (Transfer Goal 1, OT) independent  -SA     Time Frame (Transfer Goal 1, OT) long term goal (LTG);by discharge  -SA     Progress/Outcome (Transfer Goal 1, OT) goal not met  -SA       Row Name 09/08/23 1058          Bathing Goal 1 (OT)    Activity/Device (Bathing Goal 1, OT) lower body bathing  -SA     [place] Level/Cues Needed (Bathing Goal 1, OT) independent  -SA     Time Frame (Bathing Goal 1, OT) long term goal (LTG);by discharge  -     Progress/Outcomes (Bathing Goal 1, OT) goal not met  -SA       Row Name 09/08/23 1058          Dressing Goal 1 (OT)    Activity/Device (Dressing Goal 1, OT) lower body dressing  -SA     [place]/Cues Needed (Dressing Goal 1, OT) independent  -SA     Time Frame (Dressing Goal 1, OT) long term goal (LTG);by discharge  -SA     Progress/Outcome (Dressing Goal 1, OT) goal not met  -SA       Row Name 09/08/23 1058           Toileting Goal 1 (OT)    Activity/Device (Toileting Goal 1, OT) toileting skills, all  -SA     [place] Level/Cues Needed (Toileting Goal 1, OT) independent  -SA     Time Frame (Toileting Goal 1, OT) long term goal (LTG);by discharge  -     Progress/Outcome (Toileting Goal 1, OT) goal not met  -       Row Name 09/08/23 1058          Therapy Assessment/Plan (OT)    Planned Therapy Interventions (OT) activity tolerance training;manual therapy/joint mobilization;patient/caregiver education/training;adaptive equipment training;neuromuscular control/coordination retraining;BADL retraining;cognitive/visual perception retraining;occupation/activity based interventions;ROM/therapeutic exercise;strengthening exercise;edema control/reduction;functional balance retraining;IADL retraining;passive ROM/stretching;transfer/mobility retraining  -               User Key  (r) = Recorded By, (t) = Taken By, (c) = Cosigned By      Initials Name Provider Type     Kirstie Segal OT Occupational Therapist                   Clinical Impression       Row Name 09/08/23 1058          Pain Assessment    Pretreatment Pain Rating 0/10 - no pain  -     Posttreatment Pain Rating 0/10 - no pain  -       Row Name 09/08/23 1058          Plan of Care Review    Plan of Care Reviewed With patient  -     Outcome Evaluation OT eval completed. pt supine. pt spouse present for session. Pt [place]. Pt Mod I sup<>sit. SBA required for sit<>stand and func mob bed<>BR. Pt required SBA/setup to complete LB dressing and toileting this date. Pt would benefit from continued skilled IP/OT to address decreased strength, ROM, ax tolerance, and independence with ADLs, IADLs, and transfers. Recommended d/c home with .  -       Row Name 09/08/23 1051          Therapy Assessment/Plan (OT)    Patient/Family Therapy Goal Statement (OT) return home  -     Rehab Potential (OT) good, to achieve stated therapy goals  -     Criteria for Skilled Therapeutic  Interventions Met (OT) yes;meets criteria;skilled treatment is necessary  -     Therapy Frequency (OT) other (see comments)  5-7 d/wk  -SA     Predicted Duration of Therapy Intervention (OT) until all goals met or d/c  -SA       Row Name 09/08/23 1058          Therapy Plan Review/Discharge Plan (OT)    Anticipated Discharge Disposition (OT) home with assist;home with home health  -       Row Name 09/08/23 1058          Vital Signs    Pre Systolic BP Rehab 114  -SA     Pre Treatment Diastolic BP 55  -SA     Pretreatment Heart Rate (beats/min) 90  -SA     Pre SpO2 (%) 96  -SA     O2 Delivery Pre Treatment nasal cannula  4 LPM  -SA     Pre Patient Position Supine  -       Row Name 09/08/23 1058          Positioning and Restraints    Pre-Treatment Position in bed  -SA     Post Treatment Position bed  -SA     In Bed supine;call light within reach;encouraged to call for assist;exit alarm on;with family/caregiver  -               User Key  (r) = Recorded By, (t) = Taken By, (c) = Cosigned By      Initials Name Provider Type    SA Kirstie Segal OT Occupational Therapist                   Outcome Measures       Row Name 09/08/23 1058          How much help from another is currently needed...    Putting on and taking off regular lower body clothing? 3  -SA     Bathing (including washing, rinsing, and drying) 3  -SA     Toileting (which includes using toilet bed pan or urinal) 3  -SA     Putting on and taking off regular upper body clothing 3  -SA     Taking care of personal grooming (such as brushing teeth) 4  -SA     Eating meals 4  -SA     AM-PAC 6 Clicks Score (OT) 20  -SA       Row Name 09/08/23 0808          How much help from another person do you currently need...    Turning from your back to your side while in flat bed without using bedrails? 3  -KH     Moving from lying on back to sitting on the side of a flat bed without bedrails? 3  -KH     Moving to and from a bed to a chair (including a wheelchair)? 3   -KH     Standing up from a chair using your arms (e.g., wheelchair, bedside chair)? 3  -KH     Climbing 3-5 steps with a railing? 2  -KH     To walk in hospital room? 3  -KH     AM-PAC 6 Clicks Score (PT) 17  -KH     Highest level of mobility 5 --> Static standing  -MARIO       Row Name 09/08/23 1058          Functional Assessment    Outcome Measure Options AM-PAC 6 Clicks Daily Activity (OT)  -               User Key  (r) = Recorded By, (t) = Taken By, (c) = Cosigned By      Initials Name Provider Type    Trinidad Saucedo, RN Registered Nurse    Kirstie Sanders OT Occupational Therapist                    Occupational Therapy Education       Title: PT OT SLP Therapies (Done)       Topic: Occupational Therapy (Done)       Point: ADL training (Done)       Description:   Instruct learner(s) on proper safety adaptation and remediation techniques during self care or transfers.   Instruct in proper use of assistive devices.                  Learning Progress Summary             Patient Acceptance, E,TB, VU by  at 9/8/2023 1136    Comment: OT POC, role of OT, transfer training                         Point: Home exercise program (Done)       Description:   Instruct learner(s) on appropriate technique for monitoring, assisting and/or progressing therapeutic exercises/activities.                  Learning Progress Summary             Patient Acceptance, E,TB, VU by  at 9/8/2023 1136    Comment: OT POC, role of OT, transfer training                         Point: Precautions (Done)       Description:   Instruct learner(s) on prescribed precautions during self-care and functional transfers.                  Learning Progress Summary             Patient Acceptance, E,TB, VU by  at 9/8/2023 1136    Comment: OT POC, role of OT, transfer training                         Point: Body mechanics (Done)       Description:   Instruct learner(s) on proper positioning and spine alignment during self-care, functional mobility  activities and/or exercises.                  Learning Progress Summary             Patient Acceptance, E,TB, VU by  at 9/8/2023 0826    Comment: OT POC, role of OT, transfer training                                         User Key       Initials Effective Dates Name Provider Type Discipline     08/11/22 -  Kirstie Segal OT Occupational Therapist OT                  OT Recommendation and Plan  Planned Therapy Interventions (OT): activity tolerance training, manual therapy/joint mobilization, patient/caregiver education/training, adaptive equipment training, neuromuscular control/coordination retraining, BADL retraining, cognitive/visual perception retraining, occupation/activity based interventions, ROM/therapeutic exercise, strengthening exercise, edema control/reduction, functional balance retraining, IADL retraining, passive ROM/stretching, transfer/mobility retraining  Therapy Frequency (OT): other (see comments) (5-7 d/wk)  Plan of Care Review  Plan of Care Reviewed With: patient  Outcome Evaluation: OT eval completed. pt supine. pt spouse present for session. Pt [place]. Pt Mod I sup<>sit. SBA required for sit<>stand and func mob bed<>BR. Pt required SBA/setup to complete LB dressing and toileting this date. Pt would benefit from continued skilled IP/OT to address decreased strength, ROM, ax tolerance, and independence with ADLs, IADLs, and transfers. Recommended d/c home with HH.     Time Calculation:   Evaluation Complexity (OT)  Review Occupational Profile/Medical/Therapy History Complexity: expanded/moderate complexity  Assessment, Occupational Performance/Identification of Deficit Complexity: 3-5 performance deficits  Clinical Decision Making Complexity (OT): detailed assessment/moderate complexity  Overall Complexity of Evaluation (OT): moderate complexity     Time Calculation- OT       Row Name 09/08/23 1058             Time Calculation- OT    OT Start Time 1058  -      OT Stop Time 1137  -       OT Time Calculation (min) 39 min  -SA      OT Received On 09/08/23  -SA      OT Goal Re-Cert Due Date 09/21/23  -SA         Untimed Charges    OT Eval/Re-eval Minutes 39  -SA         Total Minutes    Untimed Charges Total Minutes 39  -SA       Total Minutes 39  -SA                User Key  (r) = Recorded By, (t) = Taken By, (c) = Cosigned By      Initials Name Provider Type     Kirstie Segal OT Occupational Therapist                  Therapy Charges for Today       Code Description Service Date Service Provider Modifiers Qty    [id number] HC OT EVAL MOD COMPLEXITY 3 9/8/2023 Kirstie Segal OT GO 1                 Kirstie Segal OT  9/8/2023

## 2023-09-11 ENCOUNTER — HOSPITAL ENCOUNTER (OUTPATIENT)
Facility: HOSPITAL | Age: 73
Setting detail: OBSERVATION
Discharge: HOME HEALTH | End: 2023-09-21
Attending: STUDENT IN AN ORGANIZED HEALTH CARE EDUCATION/TRAINING PROGRAM
Payer: OTHER GOVERNMENT

## 2023-09-11 ENCOUNTER — READMISSION MANAGEMENT (OUTPATIENT)
Dept: CALL CENTER | Facility: HOSPITAL | Age: 73
End: 2023-09-11
Payer: MEDICARE

## 2023-09-11 ENCOUNTER — APPOINTMENT (OUTPATIENT)
Dept: CT IMAGING | Facility: HOSPITAL | Age: 73
End: 2023-09-11
Payer: OTHER GOVERNMENT

## 2023-09-11 ENCOUNTER — APPOINTMENT (OUTPATIENT)
Dept: GENERAL RADIOLOGY | Facility: HOSPITAL | Age: 73
End: 2023-09-11
Payer: OTHER GOVERNMENT

## 2023-09-11 DIAGNOSIS — C34.81 MALIGNANT NEOPLASM OF OVERLAPPING SITES OF RIGHT LUNG: Chronic | ICD-10-CM

## 2023-09-11 DIAGNOSIS — Z78.9 IMPAIRED MOBILITY AND ADLS: ICD-10-CM

## 2023-09-11 DIAGNOSIS — Z74.09 IMPAIRED FUNCTIONAL MOBILITY, BALANCE, GAIT, AND ENDURANCE: ICD-10-CM

## 2023-09-11 DIAGNOSIS — Z74.09 IMPAIRED MOBILITY AND ADLS: ICD-10-CM

## 2023-09-11 DIAGNOSIS — R44.3 HALLUCINATIONS: Primary | ICD-10-CM

## 2023-09-11 DIAGNOSIS — R53.83 FATIGUE, UNSPECIFIED TYPE: ICD-10-CM

## 2023-09-11 LAB
ALBUMIN SERPL-MCNC: 3.1 G/DL (ref 3.5–5.2)
ALBUMIN/GLOB SERPL: 0.7 G/DL
ALP SERPL-CCNC: 73 U/L (ref 39–117)
ALT SERPL W P-5'-P-CCNC: 17 U/L (ref 1–41)
ANION GAP SERPL CALCULATED.3IONS-SCNC: 20 MMOL/L (ref 5–15)
AST SERPL-CCNC: 19 U/L (ref 1–40)
BACTERIA SPEC AEROBE CULT: NORMAL
BACTERIA SPEC AEROBE CULT: NORMAL
BACTERIA UR QL AUTO: ABNORMAL /HPF
BASOPHILS # BLD AUTO: 0.17 10*3/MM3 (ref 0–0.2)
BASOPHILS NFR BLD AUTO: 1.3 % (ref 0–1.5)
BILIRUB SERPL-MCNC: 0.2 MG/DL (ref 0–1.2)
BILIRUB UR QL STRIP: NEGATIVE
BUN SERPL-MCNC: 4 MG/DL (ref 8–23)
BUN/CREAT SERPL: 7.5 (ref 7–25)
CALCIUM SPEC-SCNC: 8.9 MG/DL (ref 8.6–10.5)
CHLORIDE SERPL-SCNC: 97 MMOL/L (ref 98–107)
CLARITY UR: CLEAR
CO2 SERPL-SCNC: 15 MMOL/L (ref 22–29)
COLOR UR: YELLOW
CREAT SERPL-MCNC: 0.53 MG/DL (ref 0.76–1.27)
DEPRECATED RDW RBC AUTO: 43.3 FL (ref 37–54)
EGFRCR SERPLBLD CKD-EPI 2021: 105.2 ML/MIN/1.73
EOSINOPHIL # BLD AUTO: 0.87 10*3/MM3 (ref 0–0.4)
EOSINOPHIL NFR BLD AUTO: 6.7 % (ref 0.3–6.2)
ERYTHROCYTE [DISTWIDTH] IN BLOOD BY AUTOMATED COUNT: 12.6 % (ref 12.3–15.4)
FLUAV SUBTYP SPEC NAA+PROBE: NOT DETECTED
FLUBV RNA ISLT QL NAA+PROBE: NOT DETECTED
GLOBULIN UR ELPH-MCNC: 4.5 GM/DL
GLUCOSE BLDC GLUCOMTR-MCNC: 178 MG/DL (ref 70–130)
GLUCOSE SERPL-MCNC: 63 MG/DL (ref 65–99)
GLUCOSE UR STRIP-MCNC: NEGATIVE MG/DL
GRAN CASTS URNS QL MICRO: ABNORMAL /LPF
HCT VFR BLD AUTO: 36.9 % (ref 37.5–51)
HGB BLD-MCNC: 12.3 G/DL (ref 13–17.7)
HGB UR QL STRIP.AUTO: NEGATIVE
HOLD SPECIMEN: NORMAL
HOLD SPECIMEN: NORMAL
HYALINE CASTS UR QL AUTO: ABNORMAL /LPF
IMM GRANULOCYTES # BLD AUTO: 0.32 10*3/MM3 (ref 0–0.05)
IMM GRANULOCYTES NFR BLD AUTO: 2.5 % (ref 0–0.5)
KETONES UR QL STRIP: ABNORMAL
LEUKOCYTE ESTERASE UR QL STRIP.AUTO: NEGATIVE
LYMPHOCYTES # BLD AUTO: 1.68 10*3/MM3 (ref 0.7–3.1)
LYMPHOCYTES NFR BLD AUTO: 12.9 % (ref 19.6–45.3)
MAGNESIUM SERPL-MCNC: 1.9 MG/DL (ref 1.6–2.4)
MCH RBC QN AUTO: 31.1 PG (ref 26.6–33)
MCHC RBC AUTO-ENTMCNC: 33.3 G/DL (ref 31.5–35.7)
MCV RBC AUTO: 93.2 FL (ref 79–97)
MONOCYTES # BLD AUTO: 2.03 10*3/MM3 (ref 0.1–0.9)
MONOCYTES NFR BLD AUTO: 15.6 % (ref 5–12)
NEUTROPHILS NFR BLD AUTO: 61 % (ref 42.7–76)
NEUTROPHILS NFR BLD AUTO: 7.92 10*3/MM3 (ref 1.7–7)
NITRITE UR QL STRIP: NEGATIVE
NRBC BLD AUTO-RTO: 0 /100 WBC (ref 0–0.2)
PH UR STRIP.AUTO: 5.5 [PH] (ref 5–9)
PLATELET # BLD AUTO: 342 10*3/MM3 (ref 140–450)
PMV BLD AUTO: 9 FL (ref 6–12)
POTASSIUM SERPL-SCNC: 4.1 MMOL/L (ref 3.5–5.2)
PROT SERPL-MCNC: 7.6 G/DL (ref 6–8.5)
PROT UR QL STRIP: ABNORMAL
RBC # BLD AUTO: 3.96 10*6/MM3 (ref 4.14–5.8)
RBC # UR STRIP: ABNORMAL /HPF
REF LAB TEST METHOD: ABNORMAL
SARS-COV-2 RNA RESP QL NAA+PROBE: NOT DETECTED
SODIUM SERPL-SCNC: 132 MMOL/L (ref 136–145)
SP GR UR STRIP: 1.02 (ref 1–1.03)
SQUAMOUS #/AREA URNS HPF: ABNORMAL /HPF
T4 FREE SERPL-MCNC: 1.2 NG/DL (ref 0.93–1.7)
TSH SERPL DL<=0.05 MIU/L-ACNC: 1.83 UIU/ML (ref 0.27–4.2)
UROBILINOGEN UR QL STRIP: ABNORMAL
WBC # UR STRIP: ABNORMAL /HPF
WBC NRBC COR # BLD: 12.99 10*3/MM3 (ref 3.4–10.8)
WHOLE BLOOD HOLD COAG: NORMAL
WHOLE BLOOD HOLD SPECIMEN: NORMAL

## 2023-09-11 PROCEDURE — G0378 HOSPITAL OBSERVATION PER HR: HCPCS

## 2023-09-11 PROCEDURE — 84443 ASSAY THYROID STIM HORMONE: CPT | Performed by: STUDENT IN AN ORGANIZED HEALTH CARE EDUCATION/TRAINING PROGRAM

## 2023-09-11 PROCEDURE — 80053 COMPREHEN METABOLIC PANEL: CPT | Performed by: STUDENT IN AN ORGANIZED HEALTH CARE EDUCATION/TRAINING PROGRAM

## 2023-09-11 PROCEDURE — 71250 CT THORAX DX C-: CPT

## 2023-09-11 PROCEDURE — 63710000001 PREDNISONE PER 5 MG

## 2023-09-11 PROCEDURE — 36415 COLL VENOUS BLD VENIPUNCTURE: CPT

## 2023-09-11 PROCEDURE — 81001 URINALYSIS AUTO W/SCOPE: CPT | Performed by: STUDENT IN AN ORGANIZED HEALTH CARE EDUCATION/TRAINING PROGRAM

## 2023-09-11 PROCEDURE — 74176 CT ABD & PELVIS W/O CONTRAST: CPT

## 2023-09-11 PROCEDURE — 85025 COMPLETE CBC W/AUTO DIFF WBC: CPT | Performed by: STUDENT IN AN ORGANIZED HEALTH CARE EDUCATION/TRAINING PROGRAM

## 2023-09-11 PROCEDURE — 96361 HYDRATE IV INFUSION ADD-ON: CPT

## 2023-09-11 PROCEDURE — 96372 THER/PROPH/DIAG INJ SC/IM: CPT

## 2023-09-11 PROCEDURE — 83735 ASSAY OF MAGNESIUM: CPT | Performed by: STUDENT IN AN ORGANIZED HEALTH CARE EDUCATION/TRAINING PROGRAM

## 2023-09-11 PROCEDURE — 74150 CT ABDOMEN W/O CONTRAST: CPT

## 2023-09-11 PROCEDURE — 87040 BLOOD CULTURE FOR BACTERIA: CPT

## 2023-09-11 PROCEDURE — 82948 REAGENT STRIP/BLOOD GLUCOSE: CPT

## 2023-09-11 PROCEDURE — 99284 EMERGENCY DEPT VISIT MOD MDM: CPT

## 2023-09-11 PROCEDURE — 96374 THER/PROPH/DIAG INJ IV PUSH: CPT

## 2023-09-11 PROCEDURE — 25010000002 HEPARIN (PORCINE) PER 1000 UNITS

## 2023-09-11 PROCEDURE — 70450 CT HEAD/BRAIN W/O DYE: CPT

## 2023-09-11 PROCEDURE — 87636 SARSCOV2 & INF A&B AMP PRB: CPT | Performed by: STUDENT IN AN ORGANIZED HEALTH CARE EDUCATION/TRAINING PROGRAM

## 2023-09-11 PROCEDURE — 71045 X-RAY EXAM CHEST 1 VIEW: CPT

## 2023-09-11 PROCEDURE — 84439 ASSAY OF FREE THYROXINE: CPT | Performed by: STUDENT IN AN ORGANIZED HEALTH CARE EDUCATION/TRAINING PROGRAM

## 2023-09-11 RX ORDER — BISACODYL 5 MG/1
5 TABLET, DELAYED RELEASE ORAL DAILY PRN
Status: DISCONTINUED | OUTPATIENT
Start: 2023-09-11 | End: 2023-09-21 | Stop reason: HOSPADM

## 2023-09-11 RX ORDER — MIRTAZAPINE 15 MG/1
15 TABLET, FILM COATED ORAL NIGHTLY
COMMUNITY

## 2023-09-11 RX ORDER — SODIUM CHLORIDE 0.9 % (FLUSH) 0.9 %
10 SYRINGE (ML) INJECTION EVERY 12 HOURS SCHEDULED
Status: DISCONTINUED | OUTPATIENT
Start: 2023-09-11 | End: 2023-09-21 | Stop reason: HOSPADM

## 2023-09-11 RX ORDER — SODIUM CHLORIDE 0.9 % (FLUSH) 0.9 %
10 SYRINGE (ML) INJECTION AS NEEDED
Status: DISCONTINUED | OUTPATIENT
Start: 2023-09-11 | End: 2023-09-21 | Stop reason: HOSPADM

## 2023-09-11 RX ORDER — ONDANSETRON 2 MG/ML
4 INJECTION INTRAMUSCULAR; INTRAVENOUS EVERY 6 HOURS PRN
Status: DISCONTINUED | OUTPATIENT
Start: 2023-09-11 | End: 2023-09-21 | Stop reason: HOSPADM

## 2023-09-11 RX ORDER — POLYETHYLENE GLYCOL 3350 17 G/17G
17 POWDER, FOR SOLUTION ORAL DAILY PRN
Status: DISCONTINUED | OUTPATIENT
Start: 2023-09-11 | End: 2023-09-21 | Stop reason: HOSPADM

## 2023-09-11 RX ORDER — MIRTAZAPINE 15 MG/1
15 TABLET, FILM COATED ORAL NIGHTLY
Status: DISCONTINUED | OUTPATIENT
Start: 2023-09-11 | End: 2023-09-21 | Stop reason: HOSPADM

## 2023-09-11 RX ORDER — HEPARIN SODIUM 5000 [USP'U]/ML
5000 INJECTION, SOLUTION INTRAVENOUS; SUBCUTANEOUS EVERY 12 HOURS SCHEDULED
Status: DISCONTINUED | OUTPATIENT
Start: 2023-09-11 | End: 2023-09-21 | Stop reason: HOSPADM

## 2023-09-11 RX ORDER — DILTIAZEM HYDROCHLORIDE 240 MG/1
240 CAPSULE, COATED, EXTENDED RELEASE ORAL
Status: DISCONTINUED | OUTPATIENT
Start: 2023-09-11 | End: 2023-09-21 | Stop reason: HOSPADM

## 2023-09-11 RX ORDER — FOLIC ACID 1 MG/1
1 TABLET ORAL DAILY
Status: DISCONTINUED | OUTPATIENT
Start: 2023-09-11 | End: 2023-09-21 | Stop reason: HOSPADM

## 2023-09-11 RX ORDER — PREDNISONE 5 MG/1
5 TABLET ORAL EVERY OTHER DAY
Status: DISCONTINUED | OUTPATIENT
Start: 2023-09-11 | End: 2023-09-21 | Stop reason: HOSPADM

## 2023-09-11 RX ORDER — SODIUM CHLORIDE 9 MG/ML
40 INJECTION, SOLUTION INTRAVENOUS AS NEEDED
Status: DISCONTINUED | OUTPATIENT
Start: 2023-09-11 | End: 2023-09-21 | Stop reason: HOSPADM

## 2023-09-11 RX ORDER — BISACODYL 10 MG
10 SUPPOSITORY, RECTAL RECTAL DAILY PRN
Status: DISCONTINUED | OUTPATIENT
Start: 2023-09-11 | End: 2023-09-21 | Stop reason: HOSPADM

## 2023-09-11 RX ORDER — ONDANSETRON 4 MG/1
4 TABLET, FILM COATED ORAL EVERY 6 HOURS PRN
Status: DISCONTINUED | OUTPATIENT
Start: 2023-09-11 | End: 2023-09-21 | Stop reason: HOSPADM

## 2023-09-11 RX ORDER — DEXTROSE MONOHYDRATE 25 G/50ML
50 INJECTION, SOLUTION INTRAVENOUS
Status: DISCONTINUED | OUTPATIENT
Start: 2023-09-11 | End: 2023-09-21 | Stop reason: HOSPADM

## 2023-09-11 RX ORDER — NITROGLYCERIN 0.4 MG/1
0.4 TABLET SUBLINGUAL
Status: DISCONTINUED | OUTPATIENT
Start: 2023-09-11 | End: 2023-09-21 | Stop reason: HOSPADM

## 2023-09-11 RX ORDER — AMOXICILLIN 250 MG
2 CAPSULE ORAL 2 TIMES DAILY
Status: DISCONTINUED | OUTPATIENT
Start: 2023-09-12 | End: 2023-09-21 | Stop reason: HOSPADM

## 2023-09-11 RX ORDER — SODIUM CHLORIDE 9 MG/ML
100 INJECTION, SOLUTION INTRAVENOUS CONTINUOUS
Status: DISCONTINUED | OUTPATIENT
Start: 2023-09-11 | End: 2023-09-17

## 2023-09-11 RX ADMIN — SODIUM CHLORIDE 100 ML/HR: 9 INJECTION, SOLUTION INTRAVENOUS at 22:03

## 2023-09-11 RX ADMIN — DEXTROSE MONOHYDRATE 50 ML: 25 INJECTION, SOLUTION INTRAVENOUS at 18:24

## 2023-09-11 RX ADMIN — Medication 10 ML: at 21:00

## 2023-09-11 RX ADMIN — VANCOMYCIN HYDROCHLORIDE 125 MG: KIT at 22:14

## 2023-09-11 RX ADMIN — PREDNISONE 5 MG: 5 TABLET ORAL at 22:04

## 2023-09-11 RX ADMIN — SODIUM CHLORIDE 1000 ML: 9 INJECTION, SOLUTION INTRAVENOUS at 14:46

## 2023-09-11 RX ADMIN — HEPARIN SODIUM 5000 UNITS: 5000 INJECTION INTRAVENOUS; SUBCUTANEOUS at 22:05

## 2023-09-11 RX ADMIN — MIRTAZAPINE 15 MG: 15 TABLET, FILM COATED ORAL at 22:04

## 2023-09-11 RX ADMIN — DILTIAZEM HYDROCHLORIDE 240 MG: 240 CAPSULE, COATED, EXTENDED RELEASE ORAL at 22:04

## 2023-09-11 RX ADMIN — FOLIC ACID 1 MG: 1 TABLET ORAL at 22:04

## 2023-09-11 NOTE — ED PROVIDER NOTES
Subjective   History of Present Illness  73-year-old male comes to the ER chief complaint fatigue, no appetite.  Patient was discharged from the hospital few days ago after being treated for C. difficile.  Wife states patient has been hallucinating and seeing people dancing on the ceiling and walls.  No diarrhea, vomiting for the last few days.  No belly pain.    History provided by:  Patient and spouse  History limited by: Poor historian.   used: No      Review of Systems   Constitutional:  Positive for activity change, appetite change and fatigue. Negative for chills and fever.   HENT:  Negative for drooling.    Eyes:  Negative for redness.   Respiratory:  Negative for cough, chest tightness, shortness of breath and wheezing.    Cardiovascular:  Negative for chest pain.   Gastrointestinal:  Negative for abdominal pain, constipation, diarrhea, nausea and vomiting.   Genitourinary:  Negative for flank pain.   Skin:  Negative for color change.   Neurological:  Negative for seizures.   Psychiatric/Behavioral:  Positive for confusion.      Past Medical History:   Diagnosis Date    A-fib     Anesthesia     hip surgery (spinal caused him unpleasant sensations never wants it again)    Arthritis     Blood in sputum 02/22/2023    bright red blood, teaspoon - tablespoon daily    COPD (chronic obstructive pulmonary disease)     CVA (cerebral vascular accident) 12/24/2022    Hearing aid worn     bilateral    History of pulmonary embolism     History of recurrent pneumonia     Hypertension     Lung cancer 03/17/2023    Oxygen dependent     since @ age 65    Pulmonary nodules     Risk for falls     uses walker    Rotator cuff syndrome of right shoulder     Tachycardia     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 HTN/Eliquis    Wears glasses     readers    Wears glasses     reading       Allergies   Allergen Reactions    Amoxicillin Anaphylaxis    Albuterol Irritability     Facial flushing and  palpitations.    Asmanex (120 Metered Doses) [Mometasone Furoate] Unknown - Low Severity    Gabapentin Hallucinations    Lortab [Hydrocodone-Acetaminophen] Other (See Comments)     Can't remember    Morphine And Related Hallucinations    Prilosec [Omeprazole] Other (See Comments)     unknown    Sodium Clavulanate Unknown - High Severity    Statins GI Intolerance    Sulfa Antibiotics Other (See Comments)     Can't remember also more and can't remember    Symbicort [Budesonide-Formoterol Fumarate] Unknown (See Comments)     Doesn't remember reaction type       Past Surgical History:   Procedure Laterality Date    HIP ARTHROPLASTY Right     RIB BIOPSY Left 03/17/2023 11th    VENOUS ACCESS DEVICE (PORT) INSERTION N/A 3/30/2023    Procedure: MEDIPORT PLACEMENT          (C-ARM);  Surgeon: Lavon Cochran MD;  Location: [facility];  Service: General;  Laterality: N/A;       Family History   Problem Relation Age of Onset    Cancer Sister     Brain cancer Brother     Cancer Brother     No Known Problems Daughter     No Known Problems Daughter     No Known Problems Son        Social History     Socioeconomic History    Marital status:     Number of children: 3    Highest education level: Associate degree: occupational, technical, or vocational program   Tobacco Use    Smoking status: Former     Packs/day: 3.00     Years: 47.00     Pack years: 141.00     Types: Cigarettes     Start date: 1961     Quit date: 5/1/2008     Years since quitting: 15.3    Smokeless tobacco: Never   Vaping Use    Vaping Use: Never used   Substance and Sexual Activity    Alcohol use: Not Currently     Comment: not last 40 years    Drug use: Never    Sexual activity: Not Currently     Partners: Female           Objective   Vitals:    09/11/23 1157 09/11/23 1300 09/11/23 1634   BP: 112/58 115/57 132/59   BP Location: Left arm  Left arm   Patient Position: Lying  Lying   Pulse: 98 88 80   Resp: 20  20   Temp: 98.1 °F (36.7 °C)     TempSrc:  "Oral     SpO2: 98% 97% 94%   Weight: 56.2 kg (124 lb)     Height: 165.1 cm (65\")         Physical Exam  Vitals and nursing note reviewed.   Constitutional:       General: He is not in acute distress.     Appearance: He is well-developed. He is ill-appearing. He is not toxic-appearing or diaphoretic.   Eyes:      General: No scleral icterus.     Conjunctiva/sclera: Conjunctivae normal.   Pulmonary:      Effort: Pulmonary effort is normal. No accessory muscle usage or respiratory distress.   Chest:      Chest wall: No tenderness.   Abdominal:      Palpations: Abdomen is soft.      Tenderness: There is no abdominal tenderness (deep palpation). There is no guarding or rebound.   Skin:     General: Skin is warm and dry.      Capillary Refill: Capillary refill takes less than 2 seconds.   Neurological:      Mental Status: He is alert and oriented to person, place, and time.   Psychiatric:         Attention and Perception: He perceives visual hallucinations.         Behavior: Behavior is cooperative.       Procedures           ED Course      Results for orders placed or performed during the hospital encounter of 09/11/23   COVID-19 and FLU A/B PCR - Swab, Nasopharynx    Specimen: Nasopharynx; Swab   Result Value Ref Range    COVID19 Not Detected Not Detected - Ref. Range    Influenza A PCR Not Detected Not Detected    Influenza B PCR Not Detected Not Detected   Comprehensive Metabolic Panel    Specimen: Blood   Result Value Ref Range    Glucose 63 (L) 65 - 99 mg/dL    BUN 4 (L) 8 - 23 mg/dL    Creatinine 0.53 (L) 0.76 - 1.27 mg/dL    Sodium 132 (L) 136 - 145 mmol/L    Potassium 4.1 3.5 - 5.2 mmol/L    Chloride 97 (L) 98 - 107 mmol/L    CO2 15.0 (L) 22.0 - 29.0 mmol/L    Calcium 8.9 8.6 - 10.5 mg/dL    Total Protein 7.6 6.0 - 8.5 g/dL    Albumin 3.1 (L) 3.5 - 5.2 g/dL    ALT (SGPT) 17 1 - 41 U/L    AST (SGOT) 19 1 - 40 U/L    Alkaline Phosphatase 73 39 - 117 U/L    Total Bilirubin 0.2 0.0 - 1.2 mg/dL    Globulin 4.5 gm/dL "    A/G Ratio 0.7 g/dL    BUN/Creatinine Ratio 7.5 7.0 - 25.0    Anion Gap 20.0 (H) 5.0 - 15.0 mmol/L    eGFR 105.2 >60.0 mL/min/1.73   Urinalysis With Microscopic If Indicated (No Culture) - Urine, Clean Catch    Specimen: Urine, Clean Catch   Result Value Ref Range    Color, UA Yellow Yellow, Straw, Dark Yellow, Ana Laura    Appearance, UA Clear Clear    pH, UA 5.5 5.0 - 9.0    Specific Gravity, UA 1.016 1.003 - 1.030    Glucose, UA Negative Negative    Ketones, UA >=160 mg/dL (4+) (A) Negative    Bilirubin, UA Negative Negative    Blood, UA Negative Negative    Protein, UA 30 mg/dL (1+) (A) Negative    Leuk Esterase, UA Negative Negative    Nitrite, UA Negative Negative    Urobilinogen, UA 0.2 E.U./dL 0.2 - 1.0 E.U./dL   Magnesium    Specimen: Blood   Result Value Ref Range    Magnesium 1.9 1.6 - 2.4 mg/dL   CBC Auto Differential    Specimen: Blood   Result Value Ref Range    WBC 12.99 (H) 3.40 - 10.80 10*3/mm3    RBC 3.96 (L) 4.14 - 5.80 10*6/mm3    Hemoglobin 12.3 (L) 13.0 - 17.7 g/dL    Hematocrit 36.9 (L) 37.5 - 51.0 %    MCV 93.2 79.0 - 97.0 fL    MCH 31.1 26.6 - 33.0 pg    MCHC 33.3 31.5 - 35.7 g/dL    RDW 12.6 12.3 - 15.4 %    RDW-SD 43.3 37.0 - 54.0 fl    MPV 9.0 6.0 - 12.0 fL    Platelets 342 140 - 450 10*3/mm3    Neutrophil % 61.0 42.7 - 76.0 %    Lymphocyte % 12.9 (L) 19.6 - 45.3 %    Monocyte % 15.6 (H) 5.0 - 12.0 %    Eosinophil % 6.7 (H) 0.3 - 6.2 %    Basophil % 1.3 0.0 - 1.5 %    Immature Grans % 2.5 (H) 0.0 - 0.5 %    Neutrophils, Absolute 7.92 (H) 1.70 - 7.00 10*3/mm3    Lymphocytes, Absolute 1.68 0.70 - 3.10 10*3/mm3    Monocytes, Absolute 2.03 (H) 0.10 - 0.90 10*3/mm3    Eosinophils, Absolute 0.87 (H) 0.00 - 0.40 10*3/mm3    Basophils, Absolute 0.17 0.00 - 0.20 10*3/mm3    Immature Grans, Absolute 0.32 (H) 0.00 - 0.05 10*3/mm3    nRBC 0.0 0.0 - 0.2 /100 WBC   Urinalysis, Microscopic Only - Urine, Clean Catch    Specimen: Urine, Clean Catch   Result Value Ref Range    RBC, UA 0-2 (A) None Seen /HPF     WBC, UA 0-2 None Seen, 0-2, 3-5 /HPF    Bacteria, UA None Seen None Seen /HPF    Squamous Epithelial Cells, UA 0-2 None Seen, 0-2 /HPF    Hyaline Casts, UA 3-6 None Seen /LPF    Granular Casts, UA 3-6 None Seen /LPF    Methodology Manual Light Microscopy    Lavender Top   Result Value Ref Range    Extra Tube hold for add-on    Gold Top - SST   Result Value Ref Range    Extra Tube Hold for add-ons.    Light Blue Top   Result Value Ref Range    Extra Tube Hold for add-ons.      CT Head Without Contrast   Final Result      XR Chest 1 View   Final Result      CT Chest Without Contrast Diagnostic    (Results Pending)   CT Abdomen Without Contrast    (Results Pending)             Medical Decision Making  Vital signs are stable, afebrile.  Laboratory work-up significant for a white count of 13, UA is leukocyte nitrite negative.  COVID and flu are negative.  Chest x-ray shows no acute cardiopulmonary processes.  CT of the head negative for acute findings.  Patient satting well on his home oxygen.  Denies having any abdominal pain, diarrhea.  Discussed with the on-call hospitalist who agrees to admit    Amount and/or Complexity of Data Reviewed  Labs: ordered.  Radiology: ordered.    Risk  Prescription drug management.  Decision regarding hospitalization.        Final diagnoses:   Hallucinations   Fatigue, unspecified type       ED Disposition  ED Disposition       ED Disposition   Decision to Admit    Condition   --    Comment   Level of Care: Telemetry [5]   Diagnosis: Hallucinations [780.1.ICD-9-CM]   Admitting Physician: MARY KIM [[id number]]   Attending Physician: MARY KIM [[id number]]                 No follow-up provider specified.       Medication List      No changes were made to your prescriptions during this visit.            Tom Stubbs MD  09/11/23 1822

## 2023-09-11 NOTE — H&P
[facility] Medicine Admission      Date of Admission: 9/11/2023      Primary Care Physician: Marina Kitchen MD      Chief Complaint: change in mental status    HPI:73-year-old male with COPD, history of stroke, stage IV lung cancer on Keytruda, recurrent C. difficile infection, presented after developing fever and increased diarrhea in setting of recurrent C. difficile colitis. Pulmonary embolism diagnosed in 2015. Hypertension. Metastatic lung adenocarcinoma with pulmonary metastasis for which patient is currently on treatment with Keytruda. Iron deficiency anemia. History of recurrent C. Difficile Was discharged 4 days ago from [facility] after Clostridium difficile colitis, on vanco PO, no diarrhea, his friend Annette Hui refers staying in bed, not eating, and confused, now oriented.  Past Medical History:   Diagnosis Date    A-fib     Anesthesia     hip surgery (spinal caused him unpleasant sensations never wants it again)    Arthritis     Blood in sputum 02/22/2023    bright red blood, teaspoon - tablespoon daily    COPD (chronic obstructive pulmonary disease)     CVA (cerebral vascular accident) 12/24/2022    Hearing aid worn     bilateral    History of pulmonary embolism     History of recurrent pneumonia     Hypertension     Lung cancer 03/17/2023    Oxygen dependent     since @ age 65    Pulmonary nodules     Risk for falls     uses walker    Rotator cuff syndrome of right shoulder     Tachycardia     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 HTN/Eliquis    Wears glasses     readers    Wears glasses     reading      Past Surgical History:   Procedure Laterality Date    HIP ARTHROPLASTY Right     RIB BIOPSY Left 03/17/2023 11th    VENOUS ACCESS DEVICE (PORT) INSERTION N/A 3/30/2023    Procedure: MEDIPORT PLACEMENT          (C-ARM);  Surgeon: Lavon Cochran MD;  Location: [facility] OR;  Service: General;  Laterality: N/A;      Allergies    Allergen Reactions    Amoxicillin Anaphylaxis    Albuterol Irritability     Facial flushing and palpitations.    Asmanex (120 Metered Doses) [Mometasone Furoate] Unknown - Low Severity    Gabapentin Hallucinations    Lortab [Hydrocodone-Acetaminophen] Other (See Comments)     Can't remember    Morphine And Related Hallucinations    Prilosec [Omeprazole] Other (See Comments)     unknown    Sodium Clavulanate Unknown - High Severity    Statins GI Intolerance    Sulfa Antibiotics Other (See Comments)     Can't remember also more and can't remember    Symbicort [Budesonide-Formoterol Fumarate] Unknown (See Comments)     Doesn't remember reaction type      Social History     Socioeconomic History    Marital status:     Number of children: 3    Highest education level: Associate degree: occupational, technical, or vocational program   Tobacco Use    Smoking status: Former     Packs/day: 3.00     Years: 47.00     Pack years: 141.00     Types: Cigarettes     Start date: 1961     Quit date: 5/1/2008     Years since quitting: 15.3    Smokeless tobacco: Never   Vaping Use    Vaping Use: Never used   Substance and Sexual Activity    Alcohol use: Not Currently     Comment: not last 40 years    Drug use: Never    Sexual activity: Not Currently     Partners: Female      Family History   Problem Relation Age of Onset    Cancer Sister     Brain cancer Brother     Cancer Brother     No Known Problems Daughter     No Known Problems Daughter     No Known Problems Son         Concurrent Medical History:  has a past medical history of A-fib, Anesthesia, Arthritis, Blood in sputum (02/22/2023), COPD (chronic obstructive pulmonary disease), CVA (cerebral vascular accident) (12/24/2022), Hearing aid worn, History of pulmonary embolism, History of recurrent pneumonia, Hypertension, Lung cancer (03/17/2023), Oxygen dependent, Pulmonary nodules, Risk for falls, Rotator cuff syndrome of right shoulder, Tachycardia, Traumatic  hemorrhage of cerebrum (12/24/2022), Wears glasses, and Wears glasses.    Past Surgical History:  has a past surgical history that includes Hip Arthroplasty (Right); Rib biopsy (Left, 03/17/2023); and Venous Access Device (Port) (N/A, 3/30/2023).    Family History: family history includes Brain cancer in his brother; Cancer in his brother and sister; No Known Problems in his daughter, daughter, and son.     Social History:  reports that he quit smoking about 15 years ago. His smoking use included cigarettes. He started smoking about 62 years ago. He has a 141.00 pack-year smoking history. He has never used smokeless tobacco. He reports that he does not currently use alcohol. He reports that he does not use drugs.    Allergies:   Allergies   Allergen Reactions    Amoxicillin Anaphylaxis    Albuterol Irritability     Facial flushing and palpitations.    Asmanex (120 Metered Doses) [Mometasone Furoate] Unknown - Low Severity    Gabapentin Hallucinations    Lortab [Hydrocodone-Acetaminophen] Other (See Comments)     Can't remember    Morphine And Related Hallucinations    Prilosec [Omeprazole] Other (See Comments)     unknown    Sodium Clavulanate Unknown - High Severity    Statins GI Intolerance    Sulfa Antibiotics Other (See Comments)     Can't remember also more and can't remember    Symbicort [Budesonide-Formoterol Fumarate] Unknown (See Comments)     Doesn't remember reaction type       Medications:   Prior to Admission medications    Medication Sig Start Date End Date Taking? Authorizing Provider   cholecalciferol (VITAMIN D3) 10 MCG (400 UNIT) tablet Take 1 tablet by mouth Daily. 50 mcg daily   Yes ProviderQamar MD   dilTIAZem CD (CARDIZEM CD) 240 MG 24 hr capsule Take 1 capsule by mouth Daily. 5/18/23  Yes Neville Meeks MD   folic acid (FOLVITE) 1 MG tablet Take 1 tablet by mouth Daily. 2/11/23  Yes Omega Davis MD   furosemide (LASIX) 20 MG tablet Take 1 tablet by mouth Daily As Needed  (Swelling of lower extremity). 4/17/23  Yes Omega Davis MD   ipratropium (ATROVENT HFA) 17 MCG/ACT inhaler Inhale 2 puffs 4 (Four) Times a Day As Needed for Wheezing.   Yes Provider, Historical, MD   KLOR-CON 20 MEQ CR tablet 1 tablet Daily. 5/13/23  Yes Qamar Javed MD   melatonin 5 MG tablet tablet Take 1 tablet by mouth At Night As Needed.   Yes ProviderQamar MD   mirtazapine (REMERON) 15 MG tablet Take 1 tablet by mouth Every Night.   Yes Provider, MD Qamar   montelukast (SINGULAIR) 10 MG tablet Take 1 tablet by mouth Every Night.   Yes ProviderQamar MD   ondansetron (ZOFRAN) 8 MG tablet Take 1 tablet by mouth 3 (Three) Times a Day As Needed for Nausea or Vomiting. 3/27/23  Yes Omega Davis MD   predniSONE (DELTASONE) 5 MG tablet Take 1 tablet by mouth Every Other Day.   Yes ProviderQamar MD   tiotropium bromide-olodaterol (Stiolto Respimat) 2.5-2.5 MCG/ACT aerosol solution inhaler Inhale 2 puffs Daily. 7/19/23  Yes Clotilde Washington DO   TiZANidine (ZANAFLEX) 2 MG capsule Take 1 capsule by mouth At Night As Needed for Muscle Spasms.   Yes ProviderQamar MD   vancomycin (VANCOCIN) 125 MG capsule Take 1 capsule by mouth 4 (Four) Times a Day for 90 days. 9/8/23 12/7/23 Yes Quinn Richardson MD   dilTIAZem XR (DILACOR XR) 180 MG 24 hr capsule Take 1 capsule by mouth Daily. 6/30/22 10/7/22  Neville Meeks MD     I have utilized all available immediate resources to obtain, update, or review the patient's current medications (including all prescriptions, over-the-counter products, herbals, cannabis/cannabidiol products, and vitamin/mineral/dietary (nutritional) supplements).      Review of Systems:  Review of Systems   Constitutional:  Negative for activity change, appetite change, chills, diaphoresis and fatigue.   HENT:  Negative for congestion, ear discharge, hearing loss, rhinorrhea, sinus pain, sneezing and sore throat.    Eyes:  Negative for photophobia,  discharge and visual disturbance.   Respiratory:  Negative for cough, chest tightness, shortness of breath and wheezing.    Cardiovascular:  Negative for chest pain and palpitations.   Gastrointestinal:  Negative for abdominal distention, abdominal pain, blood in stool, diarrhea, nausea and vomiting.   Endocrine: Negative for cold intolerance, heat intolerance, polydipsia, polyphagia and polyuria.   Genitourinary:  Negative for dysuria, flank pain, hematuria and urgency.   Musculoskeletal:  Negative for arthralgias, joint swelling and myalgias.   Skin:  Negative for color change.   Allergic/Immunologic: Negative for food allergies.   Neurological:  Negative for dizziness, seizures, syncope, speech difficulty, weakness and headaches.   Hematological:  Negative for adenopathy. Does not bruise/bleed easily.   Psychiatric/Behavioral:  Negative for confusion, hallucinations, self-injury and suicidal ideas. The patient is not nervous/anxious.     Otherwise complete ROS is negative except as mentioned above.    Physical Exam:   Temp:  [98.1 °F (36.7 °C)] 98.1 °F (36.7 °C)  Heart Rate:  [80-98] 80  Resp:  [20] 20  BP: (112-132)/(57-59) 132/59  Physical Exam  Constitutional:       Appearance: He is ill-appearing.   HENT:      Head: Normocephalic and atraumatic.      Right Ear: Tympanic membrane normal.      Left Ear: Tympanic membrane normal.      Nose: Nose normal.      Mouth/Throat:      Mouth: Mucous membranes are dry.   Eyes:      Pupils: Pupils are equal, round, and reactive to light.   Cardiovascular:      Rate and Rhythm: Normal rate and regular rhythm.      Pulses: Normal pulses.      Heart sounds: Normal heart sounds.   Pulmonary:      Effort: Pulmonary effort is normal.      Breath sounds: Normal breath sounds.   Abdominal:      General: Abdomen is flat. Bowel sounds are normal.      Palpations: Abdomen is soft.   Musculoskeletal:         General: Normal range of motion.      Cervical back: Normal range of motion  and neck supple.      Comments: sarcopenia   Skin:     General: Skin is warm and dry.      Capillary Refill: Capillary refill takes less than 2 seconds.   Neurological:      General: No focal deficit present.      Mental Status: He is alert and oriented to person, place, and time.   Psychiatric:         Mood and Affect: Mood normal.         Behavior: Behavior normal.         Thought Content: Thought content normal.         Judgment: Judgment normal.         Results Reviewed:  I have personally reviewed current lab, radiology, and data and agree with results.  Lab Results (last 24 hours)       Procedure Component Value Units Date/Time    Urinalysis, Microscopic Only - Urine, Clean Catch [[id number]]  (Abnormal) Collected: 09/11/23 1610    Specimen: Urine, Clean Catch Updated: 09/11/23 1805     RBC, UA 0-2 /HPF      WBC, UA 0-2 /HPF      Bacteria, UA None Seen /HPF      Squamous Epithelial Cells, UA 0-2 /HPF      Hyaline Casts, UA 3-6 /LPF      Granular Casts, UA 3-6 /LPF      Methodology Manual Light Microscopy    Urinalysis With Microscopic If Indicated (No Culture) - Urine, Clean Catch [[id number]]  (Abnormal) Collected: 09/11/23 1610    Specimen: Urine, Clean Catch Updated: 09/11/23 1656     Color, UA Yellow     Appearance, UA Clear     pH, UA 5.5     Specific Gravity, UA 1.016     Glucose, UA Negative     Ketones, UA >=160 mg/dL (4+)     Bilirubin, UA Negative     Blood, UA Negative     Protein, UA 30 mg/dL (1+)     Leuk Esterase, UA Negative     Nitrite, UA Negative     Urobilinogen, UA 0.2 E.U./dL    Extra Tubes [[id number]] Collected: 09/11/23 1445    Specimen: Blood, Venous Line Updated: 09/11/23 1545    Narrative:      The following orders were created for panel order Extra Tubes.  Procedure                               Abnormality         Status                     ---------                               -----------         ------                     Lavender Top[[id number]]                                      Final result               Gold Top - SST[[id number]]                                   Final result               Quinones Top[[id number]]                                         In process                 Light Blue Top[[id number]]                                   Final result                 Please view results for these tests on the individual orders.    Lavender Top [[id number]] Collected: 09/11/23 1445    Specimen: Blood Updated: 09/11/23 1545     Extra Tube hold for add-on     Comment: Auto resulted       Gold Top - SST [[id number]] Collected: 09/11/23 1445    Specimen: Blood Updated: 09/11/23 1545     Extra Tube Hold for add-ons.     Comment: Auto resulted.       Light Blue Top [[id number]] Collected: 09/11/23 1445    Specimen: Blood Updated: 09/11/23 1545     Extra Tube Hold for add-ons.     Comment: Auto resulted       Comprehensive Metabolic Panel [[id number]]  (Abnormal) Collected: 09/11/23 1445    Specimen: Blood Updated: 09/11/23 1529     Glucose 63 mg/dL      BUN 4 mg/dL      Creatinine 0.53 mg/dL      Sodium 132 mmol/L      Potassium 4.1 mmol/L      Chloride 97 mmol/L      CO2 15.0 mmol/L      Calcium 8.9 mg/dL      Total Protein 7.6 g/dL      Albumin 3.1 g/dL      ALT (SGPT) 17 U/L      AST (SGOT) 19 U/L      Alkaline Phosphatase 73 U/L      Total Bilirubin 0.2 mg/dL      Globulin 4.5 gm/dL      A/G Ratio 0.7 g/dL      BUN/Creatinine Ratio 7.5     Anion Gap 20.0 mmol/L      eGFR 105.2 mL/min/1.73     Narrative:      GFR Normal >60  Chronic Kidney Disease <60  Kidney Failure <15    The GFR formula is only valid for adults with stable renal function between ages 18 and 70.    Magnesium [[id number]]  (Normal) Collected: 09/11/23 1445    Specimen: Blood Updated: 09/11/23 1529     Magnesium 1.9 mg/dL     CBC & Differential [[id number]]  (Abnormal) Collected: 09/11/23 1445    Specimen: Blood Updated: 09/11/23 1502    Narrative:      The following orders were created for panel order CBC & Differential.  Procedure                                Abnormality         Status                     ---------                               -----------         ------                     CBC Auto Differential[[id number]]        Abnormal            Final result                 Please view results for these tests on the individual orders.    CBC Auto Differential [[id number]]  (Abnormal) Collected: 09/11/23 1445    Specimen: Blood Updated: 09/11/23 1502     WBC 12.99 10*3/mm3      RBC 3.96 10*6/mm3      Hemoglobin 12.3 g/dL      Hematocrit 36.9 %      MCV 93.2 fL      MCH 31.1 pg      MCHC 33.3 g/dL      RDW 12.6 %      RDW-SD 43.3 fl      MPV 9.0 fL      Platelets 342 10*3/mm3      Neutrophil % 61.0 %      Lymphocyte % 12.9 %      Monocyte % 15.6 %      Eosinophil % 6.7 %      Basophil % 1.3 %      Immature Grans % 2.5 %      Neutrophils, Absolute 7.92 10*3/mm3      Lymphocytes, Absolute 1.68 10*3/mm3      Monocytes, Absolute 2.03 10*3/mm3      Eosinophils, Absolute 0.87 10*3/mm3      Basophils, Absolute 0.17 10*3/mm3      Immature Grans, Absolute 0.32 10*3/mm3      nRBC 0.0 /100 WBC     Quinones Top [[id number]] Collected: 09/11/23 1445    Specimen: Blood Updated: 09/11/23 1454    COVID-19 and FLU A/B PCR - Swab, Nasopharynx [[id number]]  (Normal) Collected: 09/11/23 1350    Specimen: Swab from Nasopharynx Updated: 09/11/23 1420     COVID19 Not Detected     Influenza A PCR Not Detected     Influenza B PCR Not Detected    Narrative:      Fact sheet for providers: https://www.fda.gov/media/589960/download    Fact sheet for patients: https://www.fda.gov/media/347585/download    Test performed by PCR.          Imaging Results (Last 24 Hours)       Procedure Component Value Units Date/Time    CT Chest Without Contrast Diagnostic [[id number]] Resulted: 09/11/23 1829     Updated: 09/11/23 1830    CT Abdomen Without Contrast [[id number]] Resulted: 09/11/23 1829     Updated: 09/11/23 1830    XR Chest 1 View [[id number]] Collected: 09/11/23 1411     Updated:  09/11/23 1633    Narrative:      INDICATION:  Fatigue.    FINDINGS:  Single view of the chest demonstrates advanced features of interstitial lung  disease.  Port-A-Cath is seen on the right.  No acute findings are suspected.      CT Head Without Contrast [[id number]] Collected: 09/11/23 1410     Updated: 09/11/23 1633    Narrative:      INDICATION:  Altered mental status.    FINDINGS:  Patient is slanted within the gantry. No definite focal abnormal densities are  seen. Age-related changes present.    SUMMARY:  No acute findings.              Assessment:    Active Hospital Problems    Diagnosis     **Hallucinations              Plan:73 years old male patient with known history COPD, history of stroke, stage IV lung cancer on Keytruda, recurrent C. difficile infection, presented after developing fever and increased diarrhea in setting of recurrent C. difficile colitis. Pulmonary embolism diagnosed in 2015. Hypertension. Metastatic lung adenocarcinoma with pulmonary metastasis for which patient is currently on treatment with Keytruda. Iron deficiency anemia. Came in with change in mental status, dry mucosa, high WBC, will rule out infection, IVF, observation status. CT chest, abdomen and pelvis, blood cultures.  1.change in mental status    Medical Decision Making  Number and Complexity of problems: 1  Differential Diagnosis: sepsis vs dehydration    Conditions and Status:        Condition is unchanged.     [facility] Data  External documents reviewed: previous medical records  My EKG interpretation: none  My plain film interpretation: chest interstitial lung disease  Tests considered but not ordered: none     Decision rules/scores evaluated (example [id number], Wells, etc): n/a     Discussed with: patient and other significant Annette Hui     Treatment Plan  See above    Care Planning  Shared decision making: the patient  Code status and discussions: full code    Disposition  Social Determinants of Health that impact  treatment or disposition: none  I expect the patient to be discharged to home in 2 days.      I confirmed that the patient's Advance Care Plan is present, code status is documented, or surrogate decision maker is listed in the patient's medical record.    I discussed the patient's findings and my recommendations with: the patient      This document has been electronically signed by Fredrick Ni MD on September 11, 2023 18:35 CDT

## 2023-09-11 NOTE — ED NOTES
Patient unable to urinate at this time. Patient provided with urinal and informed on need of sample.

## 2023-09-11 NOTE — ED NOTES
"Nursing report ED to floor  Brian Garcia  73 y.o.  male    HPI:   Chief Complaint   Patient presents with    Weakness - Generalized    Fatigue    Diarrhea       Admitting doctor:   Fredrick Ni MD    Consulting provider(s):  Consults       No orders found from 8/13/2023 to 9/12/2023.             Admitting diagnosis:   The primary encounter diagnosis was Hallucinations. A diagnosis of Fatigue, unspecified type was also pertinent to this visit.    Code status:   Current Code Status       Date Active Code Status Order ID Comments User Context       Prior            Allergies:   Amoxicillin, Albuterol, Asmanex (120 metered doses) [mometasone furoate], Gabapentin, Lortab [hydrocodone-acetaminophen], Morphine and related, Prilosec [omeprazole], Sodium clavulanate, Statins, Sulfa antibiotics, and Symbicort [budesonide-formoterol fumarate]    Intake and Output    Intake/Output Summary (Last 24 hours) at 9/11/2023 1841  Last data filed at 9/11/2023 1546  Gross per 24 hour   Intake 1000 ml   Output --   Net 1000 ml       Weight:       09/11/23  1157   Weight: 56.2 kg (124 lb)       Most recent vitals:   Vitals:    09/11/23 1157 09/11/23 1300 09/11/23 1634   BP: 112/58 115/57 132/59   BP Location: Left arm  Left arm   Patient Position: Lying  Lying   Pulse: 98 88 80   Resp: 20  20   Temp: 98.1 °F (36.7 °C)     TempSrc: Oral     SpO2: 98% 97% 94%   Weight: 56.2 kg (124 lb)     Height: 165.1 cm (65\")       Oxygen Therapy: 2l/min via nc    Active LDAs/IV Access:   Lines, Drains & Airways       Active LDAs       Name Placement date Placement time Site Days    Peripheral IV 09/11/23 1446 Anterior;Left Forearm 09/11/23  1446  Forearm  less than 1    Single Lumen Implantable Port 03/30/23 Right Chest 03/30/23  1203  Chest  165                    Labs (abnormal labs have a star):   Labs Reviewed   COMPREHENSIVE METABOLIC PANEL - Abnormal; Notable for the following components:       Result Value    Glucose 63 (*)     BUN " 4 (*)     Creatinine 0.53 (*)     Sodium 132 (*)     Chloride 97 (*)     CO2 15.0 (*)     Albumin 3.1 (*)     Anion Gap 20.0 (*)     All other components within normal limits    Narrative:     GFR Normal >60  Chronic Kidney Disease <60  Kidney Failure <15    The GFR formula is only valid for adults with stable renal function between ages 18 and 70.   URINALYSIS W/ MICROSCOPIC IF INDICATED (NO CULTURE) - Abnormal; Notable for the following components:    Ketones, UA >=160 mg/dL (4+) (*)     Protein, UA 30 mg/dL (1+) (*)     All other components within normal limits   CBC WITH AUTO DIFFERENTIAL - Abnormal; Notable for the following components:    WBC 12.99 (*)     RBC 3.96 (*)     Hemoglobin 12.3 (*)     Hematocrit 36.9 (*)     Lymphocyte % 12.9 (*)     Monocyte % 15.6 (*)     Eosinophil % 6.7 (*)     Immature Grans % 2.5 (*)     Neutrophils, Absolute 7.92 (*)     Monocytes, Absolute 2.03 (*)     Eosinophils, Absolute 0.87 (*)     Immature Grans, Absolute 0.32 (*)     All other components within normal limits   URINALYSIS, MICROSCOPIC ONLY - Abnormal; Notable for the following components:    RBC, UA 0-2 (*)     All other components within normal limits   COVID-19 AND FLU A/B, NP SWAB IN TRANSPORT MEDIA 8-12 HR TAT - Normal    Narrative:     Fact sheet for providers: https://www.fda.gov/media/928447/download    Fact sheet for patients: https://www.fda.gov/media/065131/download    Test performed by PCR.   MAGNESIUM - Normal   BLOOD CULTURE   BLOOD CULTURE   CBC AND DIFFERENTIAL    Narrative:     The following orders were created for panel order CBC & Differential.  Procedure                               Abnormality         Status                     ---------                               -----------         ------                     CBC Auto Differential[[id number]]        Abnormal            Final result                 Please view results for these tests on the individual orders.   [facility]    LIGHT BLUE TOP   EXTRA TUBES    Narrative:     The following orders were created for panel order Extra Tubes.  Procedure                               Abnormality         Status                     ---------                               -----------         ------                     Lavender Top[[id number]]                                     Final result               Gold Top - SST[[id number]]                                   Final result               Quinones Top[[id number]]                                         In process                 Light Blue Top[[id number]]                                   Final result                 Please view results for these tests on the individual orders.   GRAY TOP       Meds given in ED:   Medications   dextrose (D50W) (25 g/50 mL) IV injection 50 mL (50 mL Intravenous Given 9/11/23 4239)   sodium chloride 0.9 % bolus 1,000 mL (0 mL Intravenous Stopped 9/11/23 1546)           NIH Stroke Scale:       Isolation/Infection(s):  No active isolations   C.difficile     COVID Testing  Collected yes  Resulted yes    Nursing report ED to floor:  Mental status: A&O x3  Ambulatory status: 1 person assist  Precautions: no    ED nurse phone [phone]- [phone]

## 2023-09-12 LAB
ALBUMIN SERPL-MCNC: 3.5 G/DL (ref 3.5–5.2)
ALBUMIN/GLOB SERPL: 0.8 G/DL
ALP SERPL-CCNC: 82 U/L (ref 39–117)
ALT SERPL W P-5'-P-CCNC: 22 U/L (ref 1–41)
ANION GAP SERPL CALCULATED.3IONS-SCNC: 14 MMOL/L (ref 5–15)
AST SERPL-CCNC: 24 U/L (ref 1–40)
BASOPHILS # BLD MANUAL: 0.2 10*3/MM3 (ref 0–0.2)
BASOPHILS NFR BLD MANUAL: 2 % (ref 0–1.5)
BILIRUB SERPL-MCNC: 0.3 MG/DL (ref 0–1.2)
BUN SERPL-MCNC: 4 MG/DL (ref 8–23)
BUN/CREAT SERPL: 7.5 (ref 7–25)
CALCIUM SPEC-SCNC: 9.3 MG/DL (ref 8.6–10.5)
CHLORIDE SERPL-SCNC: 99 MMOL/L (ref 98–107)
CHOLEST SERPL-MCNC: 173 MG/DL (ref 0–200)
CO2 SERPL-SCNC: 23 MMOL/L (ref 22–29)
CREAT SERPL-MCNC: 0.53 MG/DL (ref 0.76–1.27)
D-LACTATE SERPL-SCNC: 1.8 MMOL/L (ref 0.5–2)
DEPRECATED RDW RBC AUTO: 44.5 FL (ref 37–54)
EGFRCR SERPLBLD CKD-EPI 2021: 105.2 ML/MIN/1.73
EOSINOPHIL # BLD MANUAL: 0.1 10*3/MM3 (ref 0–0.4)
EOSINOPHIL NFR BLD MANUAL: 1 % (ref 0.3–6.2)
ERYTHROCYTE [DISTWIDTH] IN BLOOD BY AUTOMATED COUNT: 12.8 % (ref 12.3–15.4)
FERRITIN SERPL-MCNC: 1801 NG/ML (ref 30–400)
GLOBULIN UR ELPH-MCNC: 4.5 GM/DL
GLUCOSE SERPL-MCNC: 107 MG/DL (ref 65–99)
HBA1C MFR BLD: 5.8 % (ref 4.8–5.6)
HCT VFR BLD AUTO: 36.8 % (ref 37.5–51)
HDLC SERPL-MCNC: 40 MG/DL (ref 40–60)
HGB BLD-MCNC: 12.3 G/DL (ref 13–17.7)
INR PPP: 0.98 (ref 0.8–1.2)
IRON 24H UR-MRATE: 92 MCG/DL (ref 59–158)
IRON SATN MFR SERPL: 43 % (ref 20–50)
LDLC SERPL CALC-MCNC: 109 MG/DL (ref 0–100)
LDLC/HDLC SERPL: 2.64 {RATIO}
LIPASE SERPL-CCNC: 31 U/L (ref 13–60)
LYMPHOCYTES # BLD MANUAL: 0.81 10*3/MM3 (ref 0.7–3.1)
LYMPHOCYTES NFR BLD MANUAL: 4 % (ref 5–12)
MCH RBC QN AUTO: 31.5 PG (ref 26.6–33)
MCHC RBC AUTO-ENTMCNC: 33.4 G/DL (ref 31.5–35.7)
MCV RBC AUTO: 94.4 FL (ref 79–97)
MONOCYTES # BLD: 0.4 10*3/MM3 (ref 0.1–0.9)
NEUTROPHILS # BLD AUTO: 8.59 10*3/MM3 (ref 1.7–7)
NEUTROPHILS NFR BLD MANUAL: 82 % (ref 42.7–76)
NEUTS BAND NFR BLD MANUAL: 3 % (ref 0–5)
PLATELET # BLD AUTO: 394 10*3/MM3 (ref 140–450)
PMV BLD AUTO: 9.2 FL (ref 6–12)
POTASSIUM SERPL-SCNC: 3.8 MMOL/L (ref 3.5–5.2)
PROCALCITONIN SERPL-MCNC: 0.09 NG/ML (ref 0–0.25)
PROT SERPL-MCNC: 8 G/DL (ref 6–8.5)
PROTHROMBIN TIME: 13 SECONDS (ref 11.1–15.3)
RBC # BLD AUTO: 3.9 10*6/MM3 (ref 4.14–5.8)
RBC MORPH BLD: NORMAL
SMALL PLATELETS BLD QL SMEAR: ADEQUATE
SODIUM SERPL-SCNC: 136 MMOL/L (ref 136–145)
TIBC SERPL-MCNC: 215 MCG/DL (ref 298–536)
TRANSFERRIN SERPL-MCNC: 144 MG/DL (ref 200–360)
TRIGL SERPL-MCNC: 137 MG/DL (ref 0–150)
VARIANT LYMPHS NFR BLD MANUAL: 8 % (ref 19.6–45.3)
VLDLC SERPL-MCNC: 24 MG/DL (ref 5–40)
WBC MORPH BLD: NORMAL
WBC NRBC COR # BLD: 10.11 10*3/MM3 (ref 3.4–10.8)

## 2023-09-12 PROCEDURE — 97162 PT EVAL MOD COMPLEX 30 MIN: CPT

## 2023-09-12 PROCEDURE — 85027 COMPLETE CBC AUTOMATED: CPT

## 2023-09-12 PROCEDURE — G0378 HOSPITAL OBSERVATION PER HR: HCPCS

## 2023-09-12 PROCEDURE — 83605 ASSAY OF LACTIC ACID: CPT

## 2023-09-12 PROCEDURE — 80061 LIPID PANEL: CPT

## 2023-09-12 PROCEDURE — 96372 THER/PROPH/DIAG INJ SC/IM: CPT

## 2023-09-12 PROCEDURE — 80053 COMPREHEN METABOLIC PANEL: CPT

## 2023-09-12 PROCEDURE — 83540 ASSAY OF IRON: CPT

## 2023-09-12 PROCEDURE — 83036 HEMOGLOBIN GLYCOSYLATED A1C: CPT

## 2023-09-12 PROCEDURE — 97166 OT EVAL MOD COMPLEX 45 MIN: CPT

## 2023-09-12 PROCEDURE — 84466 ASSAY OF TRANSFERRIN: CPT

## 2023-09-12 PROCEDURE — 85610 PROTHROMBIN TIME: CPT

## 2023-09-12 PROCEDURE — 85007 BL SMEAR W/DIFF WBC COUNT: CPT

## 2023-09-12 PROCEDURE — 96361 HYDRATE IV INFUSION ADD-ON: CPT

## 2023-09-12 PROCEDURE — 82728 ASSAY OF FERRITIN: CPT

## 2023-09-12 PROCEDURE — 25010000002 HEPARIN (PORCINE) PER 1000 UNITS

## 2023-09-12 PROCEDURE — 84145 PROCALCITONIN (PCT): CPT

## 2023-09-12 PROCEDURE — 83690 ASSAY OF LIPASE: CPT

## 2023-09-12 RX ADMIN — VANCOMYCIN HYDROCHLORIDE 125 MG: KIT at 05:53

## 2023-09-12 RX ADMIN — HEPARIN SODIUM 5000 UNITS: 5000 INJECTION INTRAVENOUS; SUBCUTANEOUS at 20:34

## 2023-09-12 RX ADMIN — MIRTAZAPINE 15 MG: 15 TABLET, FILM COATED ORAL at 20:34

## 2023-09-12 RX ADMIN — Medication 10 ML: at 20:07

## 2023-09-12 RX ADMIN — VANCOMYCIN HYDROCHLORIDE 125 MG: KIT at 19:37

## 2023-09-12 RX ADMIN — VANCOMYCIN HYDROCHLORIDE 125 MG: KIT at 12:00

## 2023-09-12 RX ADMIN — DOCUSATE SODIUM 50 MG AND SENNOSIDES 8.6 MG 2 TABLET: 8.6; 5 TABLET, FILM COATED ORAL at 08:46

## 2023-09-12 RX ADMIN — Medication 10 ML: at 08:47

## 2023-09-12 RX ADMIN — HEPARIN SODIUM 5000 UNITS: 5000 INJECTION INTRAVENOUS; SUBCUTANEOUS at 08:47

## 2023-09-12 RX ADMIN — Medication 10 ML: at 08:48

## 2023-09-12 RX ADMIN — SODIUM CHLORIDE 100 ML/HR: 9 INJECTION, SOLUTION INTRAVENOUS at 09:33

## 2023-09-12 NOTE — THERAPY EVALUATION
Patient Name: Brian Garcia  : 1950    MRN: [id number]                              Today's Date: 2023       Admit Date: 2023    Visit Dx:     ICD-10-CM ICD-9-CM   1. Hallucinations  R44.3 780.1   2. Fatigue, unspecified type  R53.83 780.79   3. Impaired functional mobility, balance, gait, and endurance [Z74.09 (ICD-10-CM)]  Z74.09 V49.89   4. Impaired mobility and ADLs [Z74.09, Z78.9 (ICD-10-CM)]  Z74.09 V49.89    Z78.9      Patient Active Problem List   Diagnosis    Physical deconditioning    Leukocytosis    History of pulmonary embolism    Stage 4 very severe COPD by GOLD classification    Chronic respiratory failure with hypoxia, on home O2 therapy    Hypertension    CVA (cerebral vascular accident)    Traumatic hemorrhage of cerebrum    Iron deficiency anemia secondary to inadequate dietary iron intake    Acute pain of right shoulder    Limited range of motion (ROM) of shoulder    Rotator cuff syndrome of right shoulder    Impingement syndrome of right shoulder    Arthrosis of right acromioclavicular joint    Chronic right shoulder pain    Nontraumatic incomplete tear of right rotator cuff    Primary osteoarthritis of right shoulder    Malignant neoplasm of overlapping sites of right lung    Primary lung adenocarcinoma    Encounter for venous access device care    Iron malabsorption    Hypokalemia    C. difficile colitis    Hallucinations     Past Medical History:   Diagnosis Date    A-fib     Anesthesia     hip surgery (spinal caused him unpleasant sensations never wants it again)    Arthritis     Blood in sputum 2023    bright red blood, teaspoon - tablespoon daily    COPD (chronic obstructive pulmonary disease)     CVA (cerebral vascular accident) 2022    Hearing aid worn     bilateral    History of pulmonary embolism     History of recurrent pneumonia     Hypertension     Lung cancer 2023    Oxygen dependent     since @ age 65    Pulmonary nodules     Risk for  falls     uses walker    Rotator cuff syndrome of right shoulder     Tachycardia     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 HTN/Eliquis    Wears glasses     readers    Wears glasses     reading     Past Surgical History:   Procedure Laterality Date    HIP ARTHROPLASTY Right     RIB BIOPSY Left 03/17/2023 11th    VENOUS ACCESS DEVICE (PORT) INSERTION N/A 3/30/2023    Procedure: MEDIPORT PLACEMENT          (C-ARM);  Surgeon: Lavon Cochran MD;  Location: [facility];  Service: General;  Laterality: N/A;      General Information       Row Name 09/12/23 [facility]          OT Time and Intention    Document Type evaluation  -RW     Mode of Treatment co-treatment;physical therapy;occupational therapy  -RW       Row Name 09/12/23 [facility]          General Information    Patient Profile Reviewed yes  -RW     Prior Level of Function independent:;feeding;grooming;all household mobility;min assist:;dressing;bathing  -RW     Existing Precautions/Restrictions fall  -RW     Barriers to Rehab medically complex;previous functional deficit  -RW       Row Name 09/12/23 [facility]          Living Environment    People in Home significant other  -RW       Row Name 09/12/23 [facility]          Home Main Entrance    Number of Stairs, Main Entrance one  -RW       Row Name 09/12/23 [facility]          Stairs Within Home, Primary    Stairs, Within Home, Primary ramp to enter with a small step into the house; FWW if needed; tub shower, grab bars, shower chair, tall toilet;  -RW     Number of Stairs, Within Home, Primary none  -RW       Row Name 09/12/23 [facility]          Cognition    Orientation Status (Cognition) oriented x 4  -RW       Row Name 09/12/23 [facility]          Safety Issues, Functional Mobility    Safety Issues Affecting Function (Mobility) awareness of need for assistance;insight into deficits/self-awareness;impulsivity;safety precaution awareness;at risk behavior observed;ability to follow commands  -RW     Impairments Affecting Function  (Mobility) balance;endurance/activity tolerance;shortness of breath;strength  -RW               User Key  (r) = Recorded By, (t) = Taken By, (c) = Cosigned By      Initials Name Provider Type    Mildred Benz OT Occupational Therapist                     Mobility/ADL's       Row Name 09/12/23 135          Bed Mobility    Bed Mobility supine-sit  -RW     Scooting/Bridging [place] (Bed Mobility) supervision  -RW     Supine-Sit [place] (Bed Mobility) supervision  -RW     Sit-Supine [place] (Bed Mobility) contact guard  -RW     Assistive Device (Bed Mobility) bed rails;head of bed elevated  -       Row Name 09/12/23 1354          Transfers    Transfers sit-stand transfer  -       Row Name 09/12/23 1354          Sit-Stand Transfer    Sit-Stand [place] (Transfers) contact guard  -RW               User Key  (r) = Recorded By, (t) = Taken By, (c) = Cosigned By      Initials Name Provider Type    Mildred Benz OT Occupational Therapist                   Obj/Interventions       Row Name 09/12/23 [facility]          Sensory Assessment (Somatosensory)    Sensory Assessment (Somatosensory) UE sensation intact  -       Row Name 09/12/23 1354          Range of Motion Comprehensive    General Range of Motion bilateral upper extremity ROM WFL  -       Row Name 09/12/23 1354          Strength Comprehensive (MMT)    General Manual Muscle Testing (MMT) Assessment upper extremity strength deficits identified  -RW     Comment, General Manual Muscle Testing (MMT) Assessment BUE 4-/5 grossly, except B shld flex 3+/5 grossly.  -RW               User Key  (r) = Recorded By, (t) = Taken By, (c) = Cosigned By      Initials Name Provider Type    Mildred Benz OT Occupational Therapist                   Goals/Plan       Row Name 09/12/23 [facility]          Transfer Goal 1 (OT)    Activity/Assistive Device (Transfer Goal 1, OT) transfers, all  -RW     [place] Level/Cues Needed (Transfer Goal 1, OT)  modified independence  -RW     Time Frame (Transfer Goal 1, OT) long term goal (LTG);by discharge  -RW     Progress/Outcome (Transfer Goal 1, OT) goal not met  -RW       Row Name 09/12/23 [facility]          Bathing Goal 1 (OT)    Activity/Device (Bathing Goal 1, OT) lower body bathing  -RW     [place] Level/Cues Needed (Bathing Goal 1, OT) modified independence  -RW     Time Frame (Bathing Goal 1, OT) long term goal (LTG);by discharge  -RW     Progress/Outcomes (Bathing Goal 1, OT) goal not met  -RW       [facility] Name 09/12/23 [facility]          Dressing Goal 1 (OT)    Activity/Device (Dressing Goal 1, OT) lower body dressing  -RW     [place]/Cues Needed (Dressing Goal 1, OT) modified independence  -RW     Time Frame (Dressing Goal 1, OT) long term goal (LTG);by discharge  -RW     Progress/Outcome (Dressing Goal 1, OT) goal not met  -RW       Row Name 09/12/23 [facility]          Therapy Assessment/Plan (OT)    Planned Therapy Interventions (OT) activity tolerance training;manual therapy/joint mobilization;patient/caregiver education/training;adaptive equipment training;neuromuscular control/coordination retraining;BADL retraining;ROM/therapeutic exercise;cognitive/visual perception retraining;occupation/activity based interventions;strengthening exercise;edema control/reduction;functional balance retraining;IADL retraining;passive ROM/stretching;transfer/mobility retraining  -RW               User Key  (r) = Recorded By, (t) = Taken By, (c) = Cosigned By      Initials Name Provider Type    RW Mildred Joe OT Occupational Therapist                   Clinical Impression       Row Name 09/12/23 [facility]          Pain Assessment    Pretreatment Pain Rating 0/10 - no pain  -RW     Posttreatment Pain Rating 0/10 - no pain  -RW       Row Name 09/12/23 [facility]          Plan of Care Review    Plan of Care Reviewed With patient;significant other  -RW     Outcome Evaluation OT eval complete, co-eval with PT. Pt supine upon arrival and  agreeable to therapy. Pt perf sup>sit with supervision. Pts BUE ROM was WFL. Pts BUE 4-/5 grossly, except B shld flex 3+/5 grossly. Pt perf sit<>stand with CGA. Pt perf 4 stepsx2 with CGA and HHA. Pt perf sit>sup with CGA. Pt demonstrated decreased ADLs, strength, and transfer safety. Cont inpatient OT. Pt would benefit from conitued rehab at d/c SNF vs home with assist and home health OT.  -RW       Row Name 09/12/23 1496          Therapy Assessment/Plan (OT)    Patient/Family Therapy Goal Statement (OT) to return home  -RW     Rehab Potential (OT) good, to achieve stated therapy goals  -RW     Criteria for Skilled Therapeutic Interventions Met (OT) yes;skilled treatment is necessary  -RW     Therapy Frequency (OT) other (see comments)  5-7 d/wk  -RW     Predicted Duration of Therapy Intervention (OT) until all goals met or d/c  -RW       Row Name 09/12/23 1798          Vital Signs    Pre Systolic BP Rehab 130  -RW     Pre Treatment Diastolic BP 71  -RW     Pretreatment Heart Rate (beats/min) 96  -RW     Pre SpO2 (%) 97  -RW     O2 Delivery Pre Treatment nasal cannula  1.5 LPM  -RW     Pre Patient Position Supine  -RW     Post Patient Position Supine  -RW       Row Name 09/12/23 2704          Positioning and Restraints    Pre-Treatment Position in bed  -RW     Post Treatment Position bed  -RW     In Bed notified nsg;supine;call light within reach;encouraged to call for assist;with family/caregiver  -RW               User Key  (r) = Recorded By, (t) = Taken By, (c) = Cosigned By      Initials Name Provider Type    RW Mildred Joe, OT Occupational Therapist                   Outcome Measures       Row Name 09/12/23 9232          How much help from another is currently needed...    Putting on and taking off regular lower body clothing? 2  -RW     Bathing (including washing, rinsing, and drying) 2  -RW     Toileting (which includes using toilet bed pan or urinal) 2  -RW     Putting on and taking off regular upper  body clothing 3  -RW     Taking care of personal grooming (such as brushing teeth) 3  -RW     Eating meals 4  -RW     AM-PAC 6 Clicks Score (OT) 16  -RW       Row Name 09/12/23 1355          How much help from another person do you currently need...    Turning from your back to your side while in flat bed without using bedrails? 3  -MM     Moving from lying on back to sitting on the side of a flat bed without bedrails? 3  -MM     Moving to and from a bed to a chair (including a wheelchair)? 3  -MM     Standing up from a chair using your arms (e.g., wheelchair, bedside chair)? 3  -MM     Climbing 3-5 steps with a railing? 2  -MM     To walk in hospital room? 2  -MM     AM-PAC 6 Clicks Score (PT) 16  -MM     Highest level of mobility 5 --> Static standing  -MM       Row Name 09/12/23 1355 09/12/23 1354       Functional Assessment    Outcome Measure Options AM-PAC 6 Clicks Basic Mobility (PT)  -MM AM-PAC 6 Clicks Daily Activity (OT)  -RW              User Key  (r) = Recorded By, (t) = Taken By, (c) = Cosigned By      Initials Name Provider Type    RW Mildred Joe, OT Occupational Therapist    MM Britney Espinosa, PT Physical Therapist                    Occupational Therapy Education       Title: PT OT SLP Therapies (In Progress)       Topic: Occupational Therapy (In Progress)       Point: ADL training (In Progress)       Description:   Instruct learner(s) on proper safety adaptation and remediation techniques during self care or transfers.   Instruct in proper use of assistive devices.                  Learning Progress Summary             Patient Acceptance, E,TB, NR by  at 9/12/2023 [id number]    Comment: POC, Role of OT, transfer training                         Point: Home exercise program (Not Started)       Description:   Instruct learner(s) on appropriate technique for monitoring, assisting and/or progressing therapeutic exercises/activities.                  Learner Progress:  Not documented in this visit.               Point: Precautions (In Progress)       Description:   Instruct learner(s) on prescribed precautions during self-care and functional transfers.                  Learning Progress Summary             Patient Acceptance, E,TB, NR by RW at 9/12/2023 1555    Comment: POC, Role of OT, transfer training                         Point: Body mechanics (In Progress)       Description:   Instruct learner(s) on proper positioning and spine alignment during self-care, functional mobility activities and/or exercises.                  Learning Progress Summary             Patient Acceptance, E,TB, NR by RW at 9/12/2023 1555    Comment: POC, Role of OT, transfer training                                         User Key       Initials Effective Dates Name Provider Type Discipline     09/22/22 -  Mildred Joe OT Occupational Therapist OT                  OT Recommendation and Plan  Planned Therapy Interventions (OT): activity tolerance training, manual therapy/joint mobilization, patient/caregiver education/training, adaptive equipment training, neuromuscular control/coordination retraining, BADL retraining, ROM/therapeutic exercise, cognitive/visual perception retraining, occupation/activity based interventions, strengthening exercise, edema control/reduction, functional balance retraining, IADL retraining, passive ROM/stretching, transfer/mobility retraining  Therapy Frequency (OT): other (see comments) (5-7 d/wk)  Plan of Care Review  Plan of Care Reviewed With: patient, significant other  Outcome Evaluation: OT eval complete, co-eval with PT. Pt supine upon arrival and agreeable to therapy. Pt perf sup>sit with supervision. Pts BUE ROM was WFL. Pts BUE 4-/5 grossly, except B shld flex 3+/5 grossly. Pt perf sit<>stand with CGA. Pt perf 4 stepsx2 with CGA and HHA. Pt perf sit>sup with CGA. Pt demonstrated decreased ADLs, strength, and transfer safety. Cont inpatient OT. Pt would benefit from conitued rehab at d/c  SNF vs home with assist and home health OT.     Time Calculation:   Evaluation Complexity (OT)  Review Occupational Profile/Medical/Therapy History Complexity: expanded/moderate complexity  Assessment, Occupational Performance/Identification of Deficit Complexity: 3-5 performance deficits  Clinical Decision Making Complexity (OT): detailed assessment/moderate complexity  Overall Complexity of Evaluation (OT): moderate complexity     Time Calculation- OT       Row Name 09/12/23 1557             Time Calculation- OT    OT Start Time 1354  -RW      OT Stop Time 1435  -RW      OT Time Calculation (min) 41 min  -RW      OT Received On 09/12/23  -RW      OT Goal Re-Cert Due Date 09/25/23  -RW         Untimed Charges    OT Eval/Re-eval Minutes 41  -RW         Total Minutes    Untimed Charges Total Minutes 41  -RW       Total Minutes 41  -RW                User Key  (r) = Recorded By, (t) = Taken By, (c) = Cosigned By      Initials Name Provider Type    RW Mildred Joe OT Occupational Therapist                  Therapy Charges for Today       Code Description Service Date Service Provider Modifiers Qty    [id number] HC OT EVAL MOD COMPLEXITY 3 9/12/2023 Mildred Joe OT GO 1                 Mildred Joe OT  9/12/2023

## 2023-09-12 NOTE — THERAPY EVALUATION
Patient Name: Brian Garcia  : 1950    MRN: [id number]                              Today's Date: 2023       Admit Date: 2023    Visit Dx:     ICD-10-CM ICD-9-CM   1. Hallucinations  R44.3 780.1   2. Fatigue, unspecified type  R53.83 780.79   3. Impaired functional mobility, balance, gait, and endurance [Z74.09 (ICD-10-CM)]  Z74.09 V49.89     Patient Active Problem List   Diagnosis    Physical deconditioning    Leukocytosis    History of pulmonary embolism    Stage 4 very severe COPD by GOLD classification    Chronic respiratory failure with hypoxia, on home O2 therapy    Hypertension    CVA (cerebral vascular accident)    Traumatic hemorrhage of cerebrum    Iron deficiency anemia secondary to inadequate dietary iron intake    Acute pain of right shoulder    Limited range of motion (ROM) of shoulder    Rotator cuff syndrome of right shoulder    Impingement syndrome of right shoulder    Arthrosis of right acromioclavicular joint    Chronic right shoulder pain    Nontraumatic incomplete tear of right rotator cuff    Primary osteoarthritis of right shoulder    Malignant neoplasm of overlapping sites of right lung    Primary lung adenocarcinoma    Encounter for venous access device care    Iron malabsorption    Hypokalemia    C. difficile colitis    Hallucinations     Past Medical History:   Diagnosis Date    A-fib     Anesthesia     hip surgery (spinal caused him unpleasant sensations never wants it again)    Arthritis     Blood in sputum 2023    bright red blood, teaspoon - tablespoon daily    COPD (chronic obstructive pulmonary disease)     CVA (cerebral vascular accident) 2022    Hearing aid worn     bilateral    History of pulmonary embolism     History of recurrent pneumonia     Hypertension     Lung cancer 2023    Oxygen dependent     since @ age 65    Pulmonary nodules     Risk for falls     uses walker    Rotator cuff syndrome of right shoulder     Tachycardia      "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 HTN/Eliquis    Wears glasses     readers    Wears glasses     reading     Past Surgical History:   Procedure Laterality Date    HIP ARTHROPLASTY Right     RIB BIOPSY Left 03/17/2023 11th    VENOUS ACCESS DEVICE (PORT) INSERTION N/A 3/30/2023    Procedure: MEDIPORT PLACEMENT          (C-ARM);  Surgeon: Lavon Cochran MD;  Location: [facility];  Service: General;  Laterality: N/A;      General Information       Row Name 09/12/23 [facility]          Physical Therapy Time and Intention    Document Type evaluation  -MM     Mode of Treatment physical therapy;occupational therapy  -MM       Row Name 09/12/23 [facility]5          General Information    Patient Profile Reviewed yes  -MM     Prior Level of Function independent:;all household mobility  -MM     Existing Precautions/Restrictions fall;oxygen therapy device and L/min  2 LPM  -MM     Barriers to Rehab medically complex;previous functional deficit  -MM       Row Name 09/12/23 [facility]5          Living Environment    People in Home significant other  -MM       Row Name 09/12/23 [facility]          Home Main Entrance    Number of Stairs, Main Entrance one;other (see comments)  Ramp to enter  -MM       Row Name 09/12/23 [facility]          Stairs Within Home, Primary    Stairs, Within Home, Primary Patient \"sometimes\" ambulates with FWW. Ramp to enter with one small step. Tub/showe with shower chair and grab bars. Elevated toilet seat.  -MM     Number of Stairs, Within Home, Primary none  -MM       Row Name 09/12/23 [facility]5          Cognition    Orientation Status (Cognition) oriented x 4  -MM       Row Name 09/12/23 [facility]          Safety Issues, Functional Mobility    Safety Issues Affecting Function (Mobility) awareness of need for assistance  -MM     Impairments Affecting Function (Mobility) balance;endurance/activity tolerance;shortness of breath;strength  -MM               User Key  (r) = Recorded By, (t) = Taken By, (c) = Cosigned By "      Initials Name Provider Type    Britney Xie, PT Physical Therapist                   Mobility       Row Name 09/12/23 1355          Bed Mobility    Bed Mobility supine-sit;sit-supine;scooting/bridging  -MM     Scooting/Bridging [place] (Bed Mobility) supervision  -MM     Supine-Sit [place] (Bed Mobility) supervision  -MM     Sit-Supine [place] (Bed Mobility) contact guard  -MM     Assistive Device (Bed Mobility) bed rails;head of bed elevated  -MM       Row Name 09/12/23 1355          Sit-Stand Transfer    Sit-Stand [place] (Transfers) contact guard  -MM       Row Name 09/12/23 1355          Gait/Stairs (Locomotion)    [place] Level (Gait) contact guard  -MM     Assistive Device (Gait) walker, front-wheeled  -MM     Distance in Feet (Gait) 4 steps X 2  -MM     Deviations/Abnormal Patterns (Gait) gait speed decreased;festinating/shuffling  -MM               User Key  (r) = Recorded By, (t) = Taken By, (c) = Cosigned By      Initials Name Provider Type    Britney Xie PT Physical Therapist                   Obj/Interventions       Row Name 09/12/23 1355          Range of Motion Comprehensive    General Range of Motion bilateral lower extremity ROM WFL  -MM       Row Name 09/12/23 1355          Strength Comprehensive (MMT)    General Manual Muscle Testing (MMT) Assessment other (see comments)  -MM     Comment, General Manual Muscle Testing (MMT) Assessment BLE 3+/5 grossly.  -MM       Row Name 09/12/23 1355          Sensory Assessment (Somatosensory)    Sensory Assessment (Somatosensory) LE sensation intact  -MM               User Key  (r) = Recorded By, (t) = Taken By, (c) = Cosigned By      Initials Name Provider Type    Britney Xie PT Physical Therapist                   Goals/Plan       Row Name 09/12/23 1355          Bed Mobility Goal 1 (PT)    Activity/Assistive Device (Bed Mobility Goal 1, PT) sit to supine/supine to sit  -MM     [place]  Level/Cues Needed (Bed Mobility Goal 1, PT) independent  -MM     Time Frame (Bed Mobility Goal 1, PT) by discharge  -MM     Strategies/Barriers (Bed Mobility Goal 1, PT) HOB flat, no bed rails.  -MM     Progress/Outcomes (Bed Mobility Goal 1, PT) goal not met  -MM       Row Name 09/12/23 8997          Transfer Goal 1 (PT)    Activity/Assistive Device (Transfer Goal 1, PT) sit-to-stand/stand-to-sit;bed-to-chair/chair-to-bed;walker, rolling  -MM     Topmost Level/Cues Needed (Transfer Goal 1, PT) modified independence  -MM     Time Frame (Transfer Goal 1, PT) by discharge  -MM     Progress/Outcome (Transfer Goal 1, PT) goal not met  -MM       Row Name 09/12/23 2548          Gait Training Goal 1 (PT)    Activity/Assistive Device (Gait Training Goal 1, PT) increase endurance/gait distance;walker, rolling  -MM     Topmost Level (Gait Training Goal 1, PT) modified independence  -MM     Distance (Gait Training Goal 1, PT) 150' X 1  -MM     Time Frame (Gait Training Goal 1, PT) by discharge  -MM     Progress/Outcome (Gait Training Goal 1, PT) goal not met  -MM       Row Name 09/12/23 0342          Problem Specific Goal 1 (PT)    Problem Specific Goal 1 (PT) Score 24/28 on Tinetti fall risk assessment to decrease fall risk.  -MM     Time Frame (Problem Specific Goal 1, PT) by discharge  -MM     Progress/Outcome (Problem Specific Goal 1, PT) goal not met  -MM       Row Name 09/12/23 5709          Therapy Assessment/Plan (PT)    Planned Therapy Interventions (PT) balance training;bed mobility training;gait training;home exercise program;joint mobilization;lumbar stabilization;manual therapy techniques;motor coordination training;stretching;strengthening;stair training;ROM (range of motion);postural re-education;patient/family education;neuromuscular re-education;transfer training  -MM               User Key  (r) = Recorded By, (t) = Taken By, (c) = Cosigned By      Initials Name Provider Type    Britney Xie  A, PT Physical Therapist                   Clinical Impression       Row Name 09/12/23 [facility]          Pain    Pretreatment Pain Rating 0/10 - no pain  -MM     Posttreatment Pain Rating 0/10 - no pain  -MM       Row Name 09/12/23 [facility]8          Plan of Care Review    Plan of Care Reviewed With patient;spouse  -MM     Outcome Evaluation PT evaluation completed, co-eval with OT. Patient AO X 4 and agreeable to therapy. Patient able to supine>sit and scoot with SPV, sit>supine with CGA, and sit>stand with CGA. Patient was able to take 4 steps X 2 with HHA. Patient is ambulating with shuffling gait pattern and significant other reports that is not usual. Recommend patient ambulate with FWW. Goals created, continue skilled IP PT. Recommend patient d/c home with assist and HHPT pending progress with goals.  -MM       Row Name 09/12/23 [facility]9          Therapy Assessment/Plan (PT)    Rehab Potential (PT) fair, will monitor progress closely  -MM     Criteria for Skilled Interventions Met (PT) yes;skilled treatment is necessary  -MM     Therapy Frequency (PT) 5 times/wk  -MM       Row Name 09/12/23 [facility]          Vital Signs    Pre Systolic BP Rehab 130  -MM     Pre Treatment Diastolic BP 71  -MM     Pretreatment Heart Rate (beats/min) 96  -MM     Pre SpO2 (%) 97  -MM     O2 Delivery Pre Treatment nasal cannula  1.5 LPM  -MM     Pre Patient Position Supine  -MM       Row Name 09/12/23 6714          Positioning and Restraints    Pre-Treatment Position in bed  -MM     Post Treatment Position bed  -MM     In Bed encouraged to call for assist;call light within reach;with family/caregiver  -MM               User Key  (r) = Recorded By, (t) = Taken By, (c) = Cosigned By      Initials Name Provider Type    Britney Xie A, PT Physical Therapist                   Outcome Measures       Row Name 09/12/23 2338          How much help from another person do you currently need...    Turning from your back to your side while in flat bed  without using bedrails? 3  -MM     Moving from lying on back to sitting on the side of a flat bed without bedrails? 3  -MM     Moving to and from a bed to a chair (including a wheelchair)? 3  -MM     Standing up from a chair using your arms (e.g., wheelchair, bedside chair)? 3  -MM     Climbing 3-5 steps with a railing? 2  -MM     To walk in hospital room? 2  -MM     AM-PAC 6 Clicks Score (PT) 16  -MM     Highest level of mobility 5 --> Static standing  -MM       Row Name 09/12/23 1355          Functional Assessment    Outcome Measure Options AM-PAC 6 Clicks Basic Mobility (PT)  -MM               User Key  (r) = Recorded By, (t) = Taken By, (c) = Cosigned By      Initials Name Provider Type    Britney Xie PT Physical Therapist                                 Physical Therapy Education       Title: PT OT SLP Therapies (In Progress)       Topic: Physical Therapy (In Progress)       Point: Mobility training (In Progress)       Learning Progress Summary             Patient Acceptance, E, NR by HA at 9/12/2023 1528    Comment: PT POC and goals, proper hand placement to facilitate transfers, use of FWW to increase safety with ambulation.                         Point: Home exercise program (Not Started)       Learner Progress:  Not documented in this visit.              Point: Body mechanics (Not Started)       Learner Progress:  Not documented in this visit.              Point: Precautions (Not Started)       Learner Progress:  Not documented in this visit.                              User Key       Initials Effective Dates Name Provider Type Discipline     06/26/23 -  Britney Espinosa PT Physical Therapist PT                  PT Recommendation and Plan  Planned Therapy Interventions (PT): balance training, bed mobility training, gait training, home exercise program, joint mobilization, lumbar stabilization, manual therapy techniques, motor coordination training, stretching, strengthening, stair  training, ROM (range of motion), postural re-education, patient/family education, neuromuscular re-education, transfer training  Plan of Care Reviewed With: patient, spouse  Outcome Evaluation: PT evaluation completed, co-eval with OT. Patient AO X 4 and agreeable to therapy. Patient able to supine>sit and scoot with SPV, sit>supine with CGA, and sit>stand with CGA. Patient was able to take 4 steps X 2 with HHA. Patient is ambulating with shuffling gait pattern and significant other reports that is not usual. Recommend patient ambulate with FWW. Goals created, continue skilled IP PT. Recommend patient d/c home with assist and HHPT pending progress with goals.     Time Calculation:   PT Evaluation Complexity  History, PT Evaluation Complexity: 1-2 personal factors and/or comorbidities  Examination of Body Systems (PT Eval Complexity): total of 3 or more elements  Clinical Presentation (PT Evaluation Complexity): evolving  Clinical Decision Making (PT Evaluation Complexity): moderate complexity  Overall Complexity (PT Evaluation Complexity): moderate complexity     PT Charges       Row Name 09/12/23 1530             Time Calculation    Start Time 1354  -MM      Stop Time 1435  -MM      Time Calculation (min) 41 min  -MM      PT Received On 09/12/23  -MM      PT Goal Re-Cert Due Date 09/25/23  -MM         Untimed Charges    PT Eval/Re-eval Minutes 41  -MM         Total Minutes    Untimed Charges Total Minutes 41  -MM       Total Minutes 41  -MM                User Key  (r) = Recorded By, (t) = Taken By, (c) = Cosigned By      Initials Name Provider Type    MM Britney Espinosa, PT Physical Therapist                  Therapy Charges for Today       Code Description Service Date Service Provider Modifiers Qty    [id number]  PT EVAL MOD COMPLEXITY 3 9/12/2023 Britney Espinosa, PT GP 1            PT G-Codes  Outcome Measure Options: AM-PAC 6 Clicks Basic Mobility (PT)  AM-PAC 6 Clicks Score (PT): 16  PT Discharge  Summary  Anticipated Discharge Disposition (PT): home with assist, home with home health    Britney Espinosa, PT  9/12/2023

## 2023-09-12 NOTE — PLAN OF CARE
Goal Outcome Evaluation:  Plan of Care Reviewed With: patient, spouse           Outcome Evaluation: PT evaluation completed, co-eval with OT. Patient AO X 4 and agreeable to therapy. Patient able to supine>sit and scoot with SPV, sit>supine with CGA, and sit>stand with CGA. Patient was able to take 4 steps X 2 with HHA. Patient is ambulating with shuffling gait pattern and significant other reports that is not usual. Recommend patient ambulate with FWW. Goals created, continue skilled IP PT. Recommend patient d/c home with assist and HHPT pending progress with goals.      Anticipated Discharge Disposition (PT): home with assist, home with home health

## 2023-09-12 NOTE — PROGRESS NOTES
[facility] Medicine Services  INPATIENT PROGRESS NOTE      Length of Stay: 0  Date of Admission: 9/11/2023  Primary Care Physician: Marina Kitchen MD    Subjective   Chief Complaint:  Remains pleasantly confused  HPI:  ED evaluation  73-year-old male with COPD, history of stroke, stage IV lung cancer on Keytruda, recurrent C. difficile infection, presented after developing fever and increased diarrhea in setting of recurrent C. difficile colitis. Pulmonary embolism diagnosed in 2015. Hypertension. Metastatic lung adenocarcinoma with pulmonary metastasis for which patient is currently on treatment with Keytruda. Iron deficiency anemia. History of recurrent C. Difficile Was discharged 4 days ago from [facility] after Clostridium difficile colitis, on vanco PO, no diarrhea, his friend Annette Hui refers staying in bed, not eating, and confused, now oriented.     Daily assessment September 12, 2023  On evaluation today the patient is currently sitting up in the gurney in the ER.  He is awake.  Remains very confused.  His significant other is at bedside.  Questions are answered.  She states that this is a sudden occurrence.  Had been doing well prior to yesterday.  He has not been eating or drinking at home.  Labs reviewed appear stable on metabolic profile.  Hemoglobin A1c is 5.8 and TSH is 1.8.  White count is 10.11 H&H 12.3 and 36.8 and platelets are 394.  Ferritin is noted to be extremely elevated at 1801.      Review of Systems   Unable to perform ROS: Mental status change      All pertinent negatives and positives are as above. All other systems have been reviewed and are negative unless otherwise stated.     Objective    As of today 09/12/23  Temp:  [97.4 °F (36.3 °C)-98.1 °F (36.7 °C)] 97.8 °F (36.6 °C)  Heart Rate:  [] 111  Resp:  [18-20] 18  BP: (106-151)/(57-92) 106/60    Physical Exam  Vitals and nursing note reviewed.   HENT:      Head: Normocephalic and  atraumatic.      Right Ear: External ear normal.      Left Ear: External ear normal.      Nose: Nose normal.      Mouth/Throat:      Mouth: Mucous membranes are dry.      Pharynx: Oropharynx is clear.   Eyes:      General: No scleral icterus.     Extraocular Movements: Extraocular movements intact.      Conjunctiva/sclera: Conjunctivae normal.      Pupils: Pupils are equal, round, and reactive to light.   Cardiovascular:      Rate and Rhythm: Normal rate and regular rhythm.      Pulses: Normal pulses.      Heart sounds: Normal heart sounds.   Pulmonary:      Effort: Pulmonary effort is normal.      Breath sounds: Normal breath sounds.   Abdominal:      General: Bowel sounds are normal. There is no distension.      Palpations: Abdomen is soft.      Tenderness: There is no abdominal tenderness.   Musculoskeletal:         General: Normal range of motion.      Cervical back: Normal range of motion and neck supple.   Skin:     General: Skin is warm and dry.      Coloration: Skin is not jaundiced.   Neurological:      General: No focal deficit present.      Mental Status: He is alert and oriented to person, place, and time.      Motor: Weakness present.   Psychiatric:         Mood and Affect: Mood normal.         Behavior: Behavior normal.         dilTIAZem CD, 240 mg, Oral, Q24H  folic acid, 1 mg, Oral, Daily  heparin (porcine), 5,000 Units, Subcutaneous, Q12H  mirtazapine, 15 mg, Oral, Nightly  predniSONE, 5 mg, Oral, Every Other Day  senna-docusate sodium, 2 tablet, Oral, BID  sodium chloride, 10 mL, Intravenous, Q12H  vancomycin, 125 mg, Oral, Q6H         Results Review:  I have reviewed the labs, radiology results, and diagnostic studies.    Laboratory Data:   Results from last 7 days   Lab Units 09/12/23  0657 09/11/23  1445 09/08/23  0554 09/07/23  0617 09/06/23  1427   SODIUM mmol/L 136 132* 135*   < > 135*   POTASSIUM mmol/L 3.8 4.1 3.5   < > 3.5   CHLORIDE mmol/L 99 97* 99   < > 97*   CO2 mmol/L 23.0 15.0* 28.0    < > 31.0*   BUN mg/dL 4* 4* 6*   < > 10   CREATININE mg/dL 0.53* 0.53* 0.44*   < > 0.60*   GLUCOSE mg/dL 107* 63* 68   < > 101*   CALCIUM mg/dL 9.3 8.9 7.6*   < > 8.5*   BILIRUBIN mg/dL 0.3 0.2  --   --  0.3   ALK PHOS U/L 82 73  --   --  70   ALT (SGPT) U/L 22 17  --   --  12   AST (SGOT) U/L 24 19  --   --  12   ANION GAP mmol/L 14.0 20.0* 8.0   < > 7.0    < > = values in this interval not displayed.     Estimated Creatinine Clearance: 97.2 mL/min (A) (by C-G formula based on SCr of 0.53 mg/dL (L)).  Results from last 7 days   Lab Units 09/11/23  1445   MAGNESIUM mg/dL 1.9         Results from last 7 days   Lab Units 09/12/23  0657 09/11/23  1445 09/06/23  1427   WBC 10*3/mm3 10.11 12.99* 16.91*   HEMOGLOBIN g/dL 12.3* 12.3* 11.2*   HEMATOCRIT % 36.8* 36.9* 34.6*   PLATELETS 10*3/mm3 394 342 342     Results from last 7 days   Lab Units 09/12/23  0742   INR  0.98       Culture Data:   No results found for: BLOODCX  No results found for: URINECX  No results found for: RESPCX  No results found for: WOUNDCX  No results found for: STOOLCX  No components found for: BODYFLD    Radiology Data:   Imaging Results (Last 24 Hours)       Procedure Component Value Units Date/Time    CT Abdomen Pelvis Without Contrast [[id number]] Collected: 09/11/23 1905     Updated: 09/11/23 1910    Narrative:      CT ABDOMEN PELVIS WO CONTRAST    HISTORY: Abdominal pain, acute, nonlocalized    COMPARISON: None    Automated exposure control was also utilized to decrease patient radiation dose.    TECHNIQUE: Unenhanced axial images through the abdomen and pelvis were completed  without oral contrast. Multiplanar reformatted images were provided for review.    FINDINGS:  LOWER CHEST: The lung bases and base of the heart are unremarkable.    LIVER: No focal liver lesion.    BILIARY SYSTEM: The gallbladder is unremarkable. No intrahepatic or extrahepatic  ductal dilatation.    PANCREAS: No focal pancreatic lesion.    SPLEEN:  Unremarkable.    KIDNEYS: Bilateral kidneys are unremarkable. The ureters are decompressed and  normal in appearance.    ADRENALS: Unremarkable.    RETROPERITONEUM: No mass, lymphadenopathy or hemorrhage.    GI TRACT: No evidence of obstruction or bowel wall thickening. The appendix is  not definitively visualized, but there is no evidence of acute appendicitis.    OTHER: There is no mesenteric mass, lymphadenopathy or fluid collection. The  osseous structures and soft tissues demonstrate no worrisome lesions.    PELVIS: No mass lesion, fluid collection or significant lymphadenopathy is seen  in the pelvis. The urinary bladder is within normal limits.        Impression:      1. No evidence of acute abdominopelvic process.      CT Chest Without Contrast Diagnostic [[id number]] Collected: 09/11/23 1854     Updated: 09/11/23 1859    Narrative:      CT CHEST WO CONTRAST DIAGNOSTIC    HISTORY: Pneumonia suspected, uncomplicated, no prior imaging (Ped >= 3mo)    COMPARISON: 4/21/2023    Automated exposure control was also utilized to decrease patient radiation dose.    TECHNIQUE: Axial images through the chest were completed without intravenous  contrast. Multiplanar reformatted images were provided for review.    FINDINGS:  Neck base: The imaged portion of the neck and thyroid gland is unremarkable.    Lungs: Severe emphysema with mild parenchymal scarring in the right middle lobe  this is significantly improved from prior study. No pleural effusion is present.  The trachea and bronchial tree are patent.    Heart and mediastinum: The heart is normal in size. The great vessels are normal  in caliber and appearance. There is no pericardial effusion. No enlarged  axillary or mediastinal lymph nodes are present.    Bones and soft tissues: No acute findings are seen in the bones or surrounding  soft tissues.    Upper abdomen: No acute findings are seen in the visualized portion of the upper  abdomen.          Impression:       1. Severe emphysema with mild parenchymal scarring in the right middle lobe.  This is significantly improved from 4/21/2023.  No definite acute infiltrate or  consolidation.  No pleural effusion.        XR Chest 1 View [[id number]] Collected: 09/11/23 1411     Updated: 09/11/23 1633    Narrative:      INDICATION:  Fatigue.    FINDINGS:  Single view of the chest demonstrates advanced features of interstitial lung  disease.  Port-A-Cath is seen on the right.  No acute findings are suspected.      CT Head Without Contrast [[id number]] Collected: 09/11/23 1410     Updated: 09/11/23 1633    Narrative:      INDICATION:  Altered mental status.    FINDINGS:  Patient is slanted within the gantry. No definite focal abnormal densities are  seen. Age-related changes present.    SUMMARY:  No acute findings.            I have reviewed the patient's current medications.     Assessment/Plan     Principal Problem:    Hallucinations      Impression/plan  Altered mental status  Etiology unclear  Patient has history of adenocarcinoma with metastasis  Currently on chemotherapy Keytruda.  Blood cultures pending  UA negative  CT scan abdomen pelvis negative  CT chest improved from previous exam no acute process  CT head no acute intercranial process.    Adenocarcinoma  Keytruda  Oncology consult      Hypertension  Continue diltiazem    History of COPD no exacerbation currently  Continue as needed nebulizer treatments    History of C. difficile  Currently on oral vancomycin    History of some mental confusion  Continue Remeron for now    CODE STATUS full code  DVT prophylaxis is heparin      Medical Decision Making  Number and Complexity of problems: 1  Differential Diagnosis: sepsis vs dehydration     Conditions and Status:        Condition is unchanged.     [place] Data  External documents reviewed: previous medical records  My EKG interpretation: none  My plain film interpretation: chest interstitial lung disease  Tests considered but not  ordered: none     Decision rules/scores evaluated (example [id number], [place], etc): n/a     Discussed with: patient and other significant Annette Hui     Treatment Plan  See above     Care Planning  Shared decision making: the patient  Code status and discussions: full code     Disposition  Social Determinants of Health that impact treatment or disposition: none  I expect the patient to be discharged to home in 2 days.       I confirmed that the patient's Advance Care Plan is present, code status is documented, or surrogate decision maker is listed in the patient's medical record.     I discussed the patient's findings and my recommendations with: the patient    Eliazar Zhang DO

## 2023-09-12 NOTE — OUTREACH NOTE
Medical Week 1 Survey      Flowsheet Row Responses   [facility] patient discharged from? [facility]   Does the patient have one of the following disease processes/diagnoses(primary or secondary)? Other   Week 1 attempt successful? No   Unsuccessful attempts Attempt 1   Revoke Readmitted            MARLO TEIXEIRA - Registered Nurse

## 2023-09-12 NOTE — PLAN OF CARE
Goal Outcome Evaluation:  Plan of Care Reviewed With: patient, significant other           Outcome Evaluation: OT eval complete, co-eval with PT. Pt supine upon arrival and agreeable to therapy. Pt perf sup>sit with supervision. Pts BUE ROM was WFL. Pts BUE 4-/5 grossly, except B shld flex 3+/5 grossly. Pt perf sit<>stand with CGA. Pt perf 4 stepsx2 with CGA and HHA. Pt perf sit>sup with CGA. Pt demonstrated decreased ADLs, strength, and transfer safety. Cont inpatient OT. Pt would benefit from conitued rehab at d/c SNF vs home with assist and home health OT.

## 2023-09-12 NOTE — ED NOTES
Nursing report ED to floor  Brian Garcia  73 y.o.  male    HPI:   Chief Complaint   Patient presents with    Weakness - Generalized    Fatigue    Diarrhea       Admitting doctor:   Fredrick Ni MD    Consulting provider(s):  Consults       No orders found for last 30 day(s).             Admitting diagnosis:   The primary encounter diagnosis was Hallucinations. Diagnoses of Fatigue, unspecified type and Impaired functional mobility, balance, gait, and endurance [Z74.09 (ICD-10-CM)] were also pertinent to this visit.    Code status:   Current Code Status       Date Active Code Status Order ID Comments User Context       9/11/2023 1843 CPR (Attempt to Resuscitate) [id number]  Fredrick Ni MD ED        Question Answer    Code Status (Patient has no pulse and is not breathing) CPR (Attempt to Resuscitate)    Medical Interventions (Patient has pulse or is breathing) Full Support    Level Of Support Discussed With Patient                    Allergies:   Amoxicillin, Albuterol, Asmanex (120 metered doses) [mometasone furoate], Gabapentin, Lortab [hydrocodone-acetaminophen], Morphine and related, Prilosec [omeprazole], Sodium clavulanate, Statins, Sulfa antibiotics, and Symbicort [budesonide-formoterol fumarate]    Intake and Output    Intake/Output Summary (Last 24 hours) at 9/12/2023 1547  Last data filed at 9/12/2023 0800  Gross per 24 hour   Intake 1021.67 ml   Output 300 ml   Net 721.67 ml       Weight:       09/11/23  1157   Weight: 56.2 kg (124 lb)       Most recent vitals:   Vitals:    09/12/23 0603 09/12/23 0633 09/12/23 0800 09/12/23 1230   BP: 144/64 151/92 106/60    BP Location:   Left arm    Patient Position:   Sitting    Pulse: 106 98 111 90   Resp:   18    Temp:   97.8 °F (36.6 °C)    TempSrc:   Temporal    SpO2:   99% 97%   Weight:       Height:         Oxygen Therapy: 3L NC     Active LDAs/IV Access:   Lines, Drains & Airways       Active LDAs       Name Placement date Placement time Site  Days    Peripheral IV 09/11/23 1446 Anterior;Left Forearm 09/11/23  1446  Forearm  1    Single Lumen Implantable Port 03/30/23 Right Chest 03/30/23  1203  Chest  166                    Labs (abnormal labs have a star):   Labs Reviewed   COMPREHENSIVE METABOLIC PANEL - Abnormal; Notable for the following components:       Result Value    Glucose 63 (*)     BUN 4 (*)     Creatinine 0.53 (*)     Sodium 132 (*)     Chloride 97 (*)     CO2 15.0 (*)     Albumin 3.1 (*)     Anion Gap 20.0 (*)     All other components within normal limits    Narrative:     GFR Normal >60  Chronic Kidney Disease <60  Kidney Failure <15    The GFR formula is only valid for adults with stable renal function between ages 18 and 70.   URINALYSIS W/ MICROSCOPIC IF INDICATED (NO CULTURE) - Abnormal; Notable for the following components:    Ketones, UA >=160 mg/dL (4+) (*)     Protein, UA 30 mg/dL (1+) (*)     All other components within normal limits   CBC WITH AUTO DIFFERENTIAL - Abnormal; Notable for the following components:    WBC 12.99 (*)     RBC 3.96 (*)     Hemoglobin 12.3 (*)     Hematocrit 36.9 (*)     Lymphocyte % 12.9 (*)     Monocyte % 15.6 (*)     Eosinophil % 6.7 (*)     Immature Grans % 2.5 (*)     Neutrophils, Absolute 7.92 (*)     Monocytes, Absolute 2.03 (*)     Eosinophils, Absolute 0.87 (*)     Immature Grans, Absolute 0.32 (*)     All other components within normal limits   URINALYSIS, MICROSCOPIC ONLY - Abnormal; Notable for the following components:    RBC, UA 0-2 (*)     All other components within normal limits   COMPREHENSIVE METABOLIC PANEL - Abnormal; Notable for the following components:    Glucose 107 (*)     BUN 4 (*)     Creatinine 0.53 (*)     All other components within normal limits    Narrative:     GFR Normal >60  Chronic Kidney Disease <60  Kidney Failure <15    The GFR formula is only valid for adults with stable renal function between ages 18 and 70.   FERRITIN - Abnormal; Notable for the following  components:    Ferritin 1,801.00 (*)     All other components within normal limits    Narrative:     Results may be falsely decreased if patient taking Biotin.     HEMOGLOBIN A1C - Abnormal; Notable for the following components:    Hemoglobin A1C 5.80 (*)     All other components within normal limits    Narrative:     Hemoglobin A1C Ranges:    Increased Risk for Diabetes  5.7% to 6.4%  Diabetes                     >= 6.5%  Diabetic Goal                < 7.0%   IRON PROFILE - Abnormal; Notable for the following components:    Transferrin 144 (*)     TIBC 215 (*)     All other components within normal limits   LIPID PANEL - Abnormal; Notable for the following components:    LDL Cholesterol  109 (*)     All other components within normal limits    Narrative:     Cholesterol Reference Ranges  (U.S. Department of Health and Human Services ATP III Classifications)    Desirable          <200 mg/dL  Borderline High    200-239 mg/dL  High Risk          >240 mg/dL      Triglyceride Reference Ranges  (U.S. Department of Health and Human Services ATP III Classifications)    Normal           <150 mg/dL  Borderline High  150-199 mg/dL  High             200-499 mg/dL  Very High        >500 mg/dL    HDL Reference Ranges  (U.S. Department of Health and Human Services ATP III Classifications)    Low     <40 mg/dl (major risk factor for CHD)  High    >60 mg/dl ('negative' risk factor for CHD)        LDL Reference Ranges  (U.S. Department of Health and Human Services ATP III Classifications)    Optimal          <100 mg/dL  Near Optimal     100-129 mg/dL  Borderline High  130-159 mg/dL  High             160-189 mg/dL  Very High        >189 mg/dL   MANUAL DIFFERENTIAL - Abnormal; Notable for the following components:    Neutrophil % 82.0 (*)     Lymphocyte % 8.0 (*)     Monocyte % 4.0 (*)     Basophil % 2.0 (*)     Neutrophils Absolute 8.59 (*)     All other components within normal limits   CBC WITH AUTO DIFFERENTIAL - Abnormal; Notable  "for the following components:    RBC 3.90 (*)     Hemoglobin 12.3 (*)     Hematocrit 36.8 (*)     All other components within normal limits   POCT GLUCOSE FINGERSTICK - Abnormal; Notable for the following components:    Glucose 178 (*)     All other components within normal limits   COVID-19 AND FLU A/B, NP SWAB IN TRANSPORT MEDIA 8-12 HR TAT - Normal    Narrative:     Fact sheet for providers: https://www.fda.gov/media/143630/download    Fact sheet for patients: https://www.fda.gov/media/690696/download    Test performed by PCR.   MAGNESIUM - Normal   TSH+FREE T4 - Normal   LACTIC ACID, PLASMA - Normal   PROTIME-INR - Normal    Narrative:     Therapeutic range for most indications is 2.0-3.0 INR,  or 2.5-3.5 for mechanical heart valves.   LIPASE - Normal   PROCALCITONIN - Normal    Narrative:     As a Marker for Sepsis (Non-Neonates):    1. <0.5 ng/mL represents a low risk of severe sepsis and/or septic shock.  2. >2 ng/mL represents a high risk of severe sepsis and/or septic shock.    As a Marker for Lower Respiratory Tract Infections that require antibiotic therapy:    PCT on Admission    Antibiotic Therapy       6-12 Hrs later    >0.5                Strongly Recommended  >0.25 - <0.5        Recommended   0.1 - 0.25          Discouraged              Remeasure/reassess PCT  <0.1                Strongly Discouraged     Remeasure/reassess PCT    As 28 day mortality risk marker: \"Change in Procalcitonin Result\" (>80% or <=80%) if Day 0 (or Day 1) and Day 4 values are available. Refer to http://www.[employer]-pct-calculator.com    Change in PCT <=80%  A decrease of PCT levels below or equal to 80% defines a positive change in PCT test result representing a higher risk for 28-day all-cause mortality of patients diagnosed with severe sepsis for septic shock.    Change in PCT >80%  A decrease of PCT levels of more than 80% defines a negative change in PCT result representing a lower risk for 28-day all-cause mortality of " patients diagnosed with severe sepsis or septic shock.      BLOOD CULTURE   BLOOD CULTURE   URINALYSIS W/ MICROSCOPIC IF INDICATED (NO CULTURE)   CBC AND DIFFERENTIAL    Narrative:     The following orders were created for panel order CBC & Differential.  Procedure                               Abnormality         Status                     ---------                               -----------         ------                     CBC Auto Differential[[id number]]        Abnormal            Final result                 Please view results for these tests on the individual orders.   EXTRA TUBES    Narrative:     The following orders were created for panel order Extra Tubes.  Procedure                               Abnormality         Status                     ---------                               -----------         ------                     Lavender Top[[id number]]                                     Final result               Gold Top - SST[[id number]]                                   Final result               Quinones Top[[id number]]                                         Final result               Light Blue Top[[id number]]                                   Final result                 Please view results for these tests on the individual orders.   LAVENDER TOP   GOLD TOP - SST   GRAY TOP   LIGHT BLUE TOP   CBC AND DIFFERENTIAL    Narrative:     The following orders were created for panel order CBC & Differential.  Procedure                               Abnormality         Status                     ---------                               -----------         ------                     Manual Differential[[id number]]          Abnormal            Final result               CBC Auto Differential[[id number]]        Abnormal            Final result                 Please view results for these tests on the individual orders.       Meds given in ED:   Medications   dextrose (D50W) (25 g/50 mL) IV injection 50 mL (50 mL  Intravenous Given 9/11/23 1824)   dilTIAZem CD (CARDIZEM CD) 24 hr capsule 240 mg (240 mg Oral Given 9/11/23 2204)   folic acid (FOLVITE) tablet 1 mg (1 mg Oral Given 9/11/23 2204)   ipratropium (ATROVENT) nebulizer solution 0.5 mg (has no administration in time range)   mirtazapine (REMERON) tablet 15 mg (15 mg Oral Given 9/11/23 2204)   predniSONE (DELTASONE) tablet 5 mg (5 mg Oral Given 9/11/23 2204)   vancomycin oral solution reconstituted 125 mg (125 mg Oral Given 9/12/23 1200)   sodium chloride 0.9 % flush 10 mL (10 mL Intravenous Given 9/12/23 0848)   sodium chloride 0.9 % flush 10 mL (10 mL Intravenous Given 9/12/23 0847)   sodium chloride 0.9 % infusion 40 mL (has no administration in time range)   sennosides-docusate (PERICOLACE) 8.6-50 MG per tablet 2 tablet (2 tablets Oral Given 9/12/23 0846)     And   polyethylene glycol (MIRALAX) packet 17 g (has no administration in time range)     And   bisacodyl (DULCOLAX) EC tablet 5 mg (has no administration in time range)     And   bisacodyl (DULCOLAX) suppository 10 mg (has no administration in time range)   heparin (porcine) 5000 UNIT/ML injection 5,000 Units (5,000 Units Subcutaneous Given 9/12/23 0847)   nitroglycerin (NITROSTAT) SL tablet 0.4 mg (has no administration in time range)   sodium chloride 0.9 % infusion (100 mL/hr Intravenous New Bag 9/12/23 0933)   Potassium Replacement - Follow Nurse / BPA Driven Protocol (has no administration in time range)   Magnesium Standard Dose Replacement - Follow Nurse / BPA Driven Protocol (has no administration in time range)   Phosphorus Replacement - Follow Nurse / BPA Driven Protocol (has no administration in time range)   Calcium Replacement - Follow Nurse / BPA Driven Protocol (has no administration in time range)   ondansetron (ZOFRAN) tablet 4 mg (has no administration in time range)     Or   ondansetron (ZOFRAN) injection 4 mg (has no administration in time range)   sodium chloride 0.9 % bolus 1,000 mL (0 mL  Intravenous Stopped 9/11/23 1546)     sodium chloride, 100 mL/hr, Last Rate: 100 mL/hr (09/12/23 0925)         NIH Stroke Scale:       Isolation/Infection(s):  Contact Spore   C.difficile     COVID Testing  Collected yes  Resulted yes- negative    Nursing report ED to floor:  Mental status: A/Ox3  Ambulatory status: Ax1  Precautions: contact spore (c. Diff history)    ED nurse phone extentsion- [phone]

## 2023-09-12 NOTE — ED NOTES
Nursing report ED to floor  Brian Garcia  73 y.o.  male    HPI:   Chief Complaint   Patient presents with    Weakness - Generalized    Fatigue    Diarrhea       Admitting doctor:   Fredrick Ni MD    Consulting provider(s):  Consults       No orders found for last 30 day(s).             Admitting diagnosis:   The primary encounter diagnosis was Hallucinations. Diagnoses of Fatigue, unspecified type and Impaired functional mobility, balance, gait, and endurance [Z74.09 (ICD-10-CM)] were also pertinent to this visit.    Code status:   Current Code Status       Date Active Code Status Order ID Comments User Context       9/11/2023 1843 CPR (Attempt to Resuscitate) [id number]  Fredrick Ni MD ED        Question Answer    Code Status (Patient has no pulse and is not breathing) CPR (Attempt to Resuscitate)    Medical Interventions (Patient has pulse or is breathing) Full Support    Level Of Support Discussed With Patient                    Allergies:   Amoxicillin, Albuterol, Asmanex (120 metered doses) [mometasone furoate], Gabapentin, Lortab [hydrocodone-acetaminophen], Morphine and related, Prilosec [omeprazole], Sodium clavulanate, Statins, Sulfa antibiotics, and Symbicort [budesonide-formoterol fumarate]    Intake and Output    Intake/Output Summary (Last 24 hours) at 9/12/2023 1553  Last data filed at 9/12/2023 0800  Gross per 24 hour   Intake 1021.67 ml   Output 300 ml   Net 721.67 ml       Weight:       09/11/23  1157   Weight: 56.2 kg (124 lb)       Most recent vitals:   Vitals:    09/12/23 0603 09/12/23 0633 09/12/23 0800 09/12/23 1230   BP: 144/64 151/92 106/60    BP Location:   Left arm    Patient Position:   Sitting    Pulse: 106 98 111 90   Resp:   18    Temp:   97.8 °F (36.6 °C)    TempSrc:   Temporal    SpO2:   99% 97%   Weight:       Height:         Oxygen Therapy: no    Active LDAs/IV Access:   Lines, Drains & Airways       Active LDAs       Name Placement date Placement time Site Days     Peripheral IV 09/11/23 1446 Anterior;Left Forearm 09/11/23  1446  Forearm  1    Single Lumen Implantable Port 03/30/23 Right Chest 03/30/23  1203  Chest  166                    Labs (abnormal labs have a star):   Labs Reviewed   COMPREHENSIVE METABOLIC PANEL - Abnormal; Notable for the following components:       Result Value    Glucose 63 (*)     BUN 4 (*)     Creatinine 0.53 (*)     Sodium 132 (*)     Chloride 97 (*)     CO2 15.0 (*)     Albumin 3.1 (*)     Anion Gap 20.0 (*)     All other components within normal limits    Narrative:     GFR Normal >60  Chronic Kidney Disease <60  Kidney Failure <15    The GFR formula is only valid for adults with stable renal function between ages 18 and 70.   URINALYSIS W/ MICROSCOPIC IF INDICATED (NO CULTURE) - Abnormal; Notable for the following components:    Ketones, UA >=160 mg/dL (4+) (*)     Protein, UA 30 mg/dL (1+) (*)     All other components within normal limits   CBC WITH AUTO DIFFERENTIAL - Abnormal; Notable for the following components:    WBC 12.99 (*)     RBC 3.96 (*)     Hemoglobin 12.3 (*)     Hematocrit 36.9 (*)     Lymphocyte % 12.9 (*)     Monocyte % 15.6 (*)     Eosinophil % 6.7 (*)     Immature Grans % 2.5 (*)     Neutrophils, Absolute 7.92 (*)     Monocytes, Absolute 2.03 (*)     Eosinophils, Absolute 0.87 (*)     Immature Grans, Absolute 0.32 (*)     All other components within normal limits   URINALYSIS, MICROSCOPIC ONLY - Abnormal; Notable for the following components:    RBC, UA 0-2 (*)     All other components within normal limits   COMPREHENSIVE METABOLIC PANEL - Abnormal; Notable for the following components:    Glucose 107 (*)     BUN 4 (*)     Creatinine 0.53 (*)     All other components within normal limits    Narrative:     GFR Normal >60  Chronic Kidney Disease <60  Kidney Failure <15    The GFR formula is only valid for adults with stable renal function between ages 18 and 70.   FERRITIN - Abnormal; Notable for the following components:     Ferritin 1,801.00 (*)     All other components within normal limits    Narrative:     Results may be falsely decreased if patient taking Biotin.     HEMOGLOBIN A1C - Abnormal; Notable for the following components:    Hemoglobin A1C 5.80 (*)     All other components within normal limits    Narrative:     Hemoglobin A1C Ranges:    Increased Risk for Diabetes  5.7% to 6.4%  Diabetes                     >= 6.5%  Diabetic Goal                < 7.0%   IRON PROFILE - Abnormal; Notable for the following components:    Transferrin 144 (*)     TIBC 215 (*)     All other components within normal limits   LIPID PANEL - Abnormal; Notable for the following components:    LDL Cholesterol  109 (*)     All other components within normal limits    Narrative:     Cholesterol Reference Ranges  (U.S. Department of Health and Human Services ATP III Classifications)    Desirable          <200 mg/dL  Borderline High    200-239 mg/dL  High Risk          >240 mg/dL      Triglyceride Reference Ranges  (U.S. Department of Health and Human Services ATP III Classifications)    Normal           <150 mg/dL  Borderline High  150-199 mg/dL  High             200-499 mg/dL  Very High        >500 mg/dL    HDL Reference Ranges  (U.S. Department of Health and Human Services ATP III Classifications)    Low     <40 mg/dl (major risk factor for CHD)  High    >60 mg/dl ('negative' risk factor for CHD)        LDL Reference Ranges  (U.S. Department of Health and Human Services ATP III Classifications)    Optimal          <100 mg/dL  Near Optimal     100-129 mg/dL  Borderline High  130-159 mg/dL  High             160-189 mg/dL  Very High        >189 mg/dL   MANUAL DIFFERENTIAL - Abnormal; Notable for the following components:    Neutrophil % 82.0 (*)     Lymphocyte % 8.0 (*)     Monocyte % 4.0 (*)     Basophil % 2.0 (*)     Neutrophils Absolute 8.59 (*)     All other components within normal limits   CBC WITH AUTO DIFFERENTIAL - Abnormal; Notable for the  "following components:    RBC 3.90 (*)     Hemoglobin 12.3 (*)     Hematocrit 36.8 (*)     All other components within normal limits   POCT GLUCOSE FINGERSTICK - Abnormal; Notable for the following components:    Glucose 178 (*)     All other components within normal limits   COVID-19 AND FLU A/B, NP SWAB IN TRANSPORT MEDIA 8-12 HR TAT - Normal    Narrative:     Fact sheet for providers: https://www.fda.gov/media/853189/download    Fact sheet for patients: https://www.fda.gov/media/343936/download    Test performed by PCR.   MAGNESIUM - Normal   TSH+FREE T4 - Normal   LACTIC ACID, PLASMA - Normal   PROTIME-INR - Normal    Narrative:     Therapeutic range for most indications is 2.0-3.0 INR,  or 2.5-3.5 for mechanical heart valves.   LIPASE - Normal   PROCALCITONIN - Normal    Narrative:     As a Marker for Sepsis (Non-Neonates):    1. <0.5 ng/mL represents a low risk of severe sepsis and/or septic shock.  2. >2 ng/mL represents a high risk of severe sepsis and/or septic shock.    As a Marker for Lower Respiratory Tract Infections that require antibiotic therapy:    PCT on Admission    Antibiotic Therapy       6-12 Hrs later    >0.5                Strongly Recommended  >0.25 - <0.5        Recommended   0.1 - 0.25          Discouraged              Remeasure/reassess PCT  <0.1                Strongly Discouraged     Remeasure/reassess PCT    As 28 day mortality risk marker: \"Change in Procalcitonin Result\" (>80% or <=80%) if Day 0 (or Day 1) and Day 4 values are available. Refer to http://www.[employer]-pct-calculator.com    Change in PCT <=80%  A decrease of PCT levels below or equal to 80% defines a positive change in PCT test result representing a higher risk for 28-day all-cause mortality of patients diagnosed with severe sepsis for septic shock.    Change in PCT >80%  A decrease of PCT levels of more than 80% defines a negative change in PCT result representing a lower risk for 28-day all-cause mortality of patients " diagnosed with severe sepsis or septic shock.      BLOOD CULTURE   BLOOD CULTURE   URINALYSIS W/ MICROSCOPIC IF INDICATED (NO CULTURE)   CBC AND DIFFERENTIAL    Narrative:     The following orders were created for panel order CBC & Differential.  Procedure                               Abnormality         Status                     ---------                               -----------         ------                     CBC Auto Differential[[id number]]        Abnormal            Final result                 Please view results for these tests on the individual orders.   EXTRA TUBES    Narrative:     The following orders were created for panel order Extra Tubes.  Procedure                               Abnormality         Status                     ---------                               -----------         ------                     Lavender Top[[id number]]                                     Final result               Gold Top - SST[[id number]]                                   Final result               Quinones Top[[id number]]                                         Final result               Light Blue Top[[id number]]                                   Final result                 Please view results for these tests on the individual orders.   LAVENDER TOP   GOLD TOP - SST   GRAY TOP   LIGHT BLUE TOP   CBC AND DIFFERENTIAL    Narrative:     The following orders were created for panel order CBC & Differential.  Procedure                               Abnormality         Status                     ---------                               -----------         ------                     Manual Differential[[id number]]          Abnormal            Final result               CBC Auto Differential[[id number]]        Abnormal            Final result                 Please view results for these tests on the individual orders.       Meds given in ED:   Medications   dextrose (D50W) (25 g/50 mL) IV injection 50 mL (50 mL Intravenous  Given 9/11/23 1824)   dilTIAZem CD (CARDIZEM CD) 24 hr capsule 240 mg (240 mg Oral Given 9/11/23 2204)   folic acid (FOLVITE) tablet 1 mg (1 mg Oral Given 9/11/23 2204)   ipratropium (ATROVENT) nebulizer solution 0.5 mg (has no administration in time range)   mirtazapine (REMERON) tablet 15 mg (15 mg Oral Given 9/11/23 2204)   predniSONE (DELTASONE) tablet 5 mg (5 mg Oral Given 9/11/23 2204)   vancomycin oral solution reconstituted 125 mg (125 mg Oral Given 9/12/23 1200)   sodium chloride 0.9 % flush 10 mL (10 mL Intravenous Given 9/12/23 0848)   sodium chloride 0.9 % flush 10 mL (10 mL Intravenous Given 9/12/23 0847)   sodium chloride 0.9 % infusion 40 mL (has no administration in time range)   sennosides-docusate (PERICOLACE) 8.6-50 MG per tablet 2 tablet (2 tablets Oral Given 9/12/23 0846)     And   polyethylene glycol (MIRALAX) packet 17 g (has no administration in time range)     And   bisacodyl (DULCOLAX) EC tablet 5 mg (has no administration in time range)     And   bisacodyl (DULCOLAX) suppository 10 mg (has no administration in time range)   heparin (porcine) 5000 UNIT/ML injection 5,000 Units (5,000 Units Subcutaneous Given 9/12/23 0847)   nitroglycerin (NITROSTAT) SL tablet 0.4 mg (has no administration in time range)   sodium chloride 0.9 % infusion (100 mL/hr Intravenous New Bag 9/12/23 0933)   Potassium Replacement - Follow Nurse / BPA Driven Protocol (has no administration in time range)   Magnesium Standard Dose Replacement - Follow Nurse / BPA Driven Protocol (has no administration in time range)   Phosphorus Replacement - Follow Nurse / BPA Driven Protocol (has no administration in time range)   Calcium Replacement - Follow Nurse / BPA Driven Protocol (has no administration in time range)   ondansetron (ZOFRAN) tablet 4 mg (has no administration in time range)     Or   ondansetron (ZOFRAN) injection 4 mg (has no administration in time range)   sodium chloride 0.9 % bolus 1,000 mL (0 mL Intravenous  Stopped 9/11/23 1546)     sodium chloride, 100 mL/hr, Last Rate: 100 mL/hr (09/12/23 0933)         NIH Stroke Scale:       Isolation/Infection(s):  Contact Spore   C.difficile     COVID Testing  Collected no  Resulted n/a    Nursing report ED to floor:  Mental status: a&o x4  Ambulatory status: by self  Precautions:  CONTACT     ED nurse phone extentsion- [phone]

## 2023-09-12 NOTE — PLAN OF CARE
Problem: Adult Inpatient Plan of Care  Goal: Plan of Care Review  Outcome: Ongoing, Progressing  Goal: Patient-Specific Goal (Individualized)  Outcome: Ongoing, Progressing  Goal: Absence of Hospital-Acquired Illness or Injury  Outcome: Ongoing, Progressing  Goal: Optimal Comfort and Wellbeing  Outcome: Ongoing, Progressing  Goal: Readiness for Transition of Care  Outcome: Ongoing, Progressing     Problem: Fall Injury Risk  Goal: Absence of Fall and Fall-Related Injury  Outcome: Ongoing, Progressing   Goal Outcome Evaluation:

## 2023-09-12 NOTE — ED NOTES
Pt ambulated to chair at bedside without difficulty. This RN assisted pt and wife with sitting up per pt's request to get out of bed and stretch. Cardiac electrodes replaced due to skin irritation per pt at this time. Complete bed change at this time. Pt states no further needs. Call light in reach and pt and wife instructed to call out for help when ready to get back in bed. Breakfast tray set up for pt.

## 2023-09-13 LAB
CALCIUM SPEC-SCNC: 8.5 MG/DL (ref 8.6–10.5)
MAGNESIUM SERPL-MCNC: 1.7 MG/DL (ref 1.6–2.4)
PHOSPHATE SERPL-MCNC: 2.3 MG/DL (ref 2.5–4.5)
POTASSIUM SERPL-SCNC: 3.3 MMOL/L (ref 3.5–5.2)

## 2023-09-13 PROCEDURE — 25010000002 HEPARIN (PORCINE) PER 1000 UNITS

## 2023-09-13 PROCEDURE — 84100 ASSAY OF PHOSPHORUS: CPT | Performed by: HOSPITALIST

## 2023-09-13 PROCEDURE — 82310 ASSAY OF CALCIUM: CPT | Performed by: HOSPITALIST

## 2023-09-13 PROCEDURE — 84132 ASSAY OF SERUM POTASSIUM: CPT | Performed by: HOSPITALIST

## 2023-09-13 PROCEDURE — 83735 ASSAY OF MAGNESIUM: CPT | Performed by: HOSPITALIST

## 2023-09-13 PROCEDURE — 97110 THERAPEUTIC EXERCISES: CPT

## 2023-09-13 PROCEDURE — 63710000001 PREDNISONE PER 5 MG

## 2023-09-13 PROCEDURE — 97530 THERAPEUTIC ACTIVITIES: CPT

## 2023-09-13 PROCEDURE — 96372 THER/PROPH/DIAG INJ SC/IM: CPT

## 2023-09-13 PROCEDURE — G0378 HOSPITAL OBSERVATION PER HR: HCPCS

## 2023-09-13 PROCEDURE — 96361 HYDRATE IV INFUSION ADD-ON: CPT

## 2023-09-13 PROCEDURE — 94799 UNLISTED PULMONARY SVC/PX: CPT

## 2023-09-13 RX ORDER — POTASSIUM CHLORIDE 20 MEQ/1
40 TABLET, EXTENDED RELEASE ORAL EVERY 4 HOURS
Status: COMPLETED | OUTPATIENT
Start: 2023-09-13 | End: 2023-09-13

## 2023-09-13 RX ADMIN — PREDNISONE 5 MG: 5 TABLET ORAL at 08:15

## 2023-09-13 RX ADMIN — VANCOMYCIN HYDROCHLORIDE 125 MG: KIT at 06:26

## 2023-09-13 RX ADMIN — FOLIC ACID 1 MG: 1 TABLET ORAL at 08:15

## 2023-09-13 RX ADMIN — SODIUM CHLORIDE 100 ML/HR: 9 INJECTION, SOLUTION INTRAVENOUS at 15:59

## 2023-09-13 RX ADMIN — MIRTAZAPINE 15 MG: 15 TABLET, FILM COATED ORAL at 20:48

## 2023-09-13 RX ADMIN — SODIUM CHLORIDE 100 ML/HR: 9 INJECTION, SOLUTION INTRAVENOUS at 06:26

## 2023-09-13 RX ADMIN — VANCOMYCIN HYDROCHLORIDE 125 MG: KIT at 17:21

## 2023-09-13 RX ADMIN — POTASSIUM CHLORIDE 40 MEQ: 20 TABLET, EXTENDED RELEASE ORAL at 15:59

## 2023-09-13 RX ADMIN — POTASSIUM CHLORIDE 40 MEQ: 20 TABLET, EXTENDED RELEASE ORAL at 20:48

## 2023-09-13 RX ADMIN — VANCOMYCIN HYDROCHLORIDE 125 MG: KIT at 12:30

## 2023-09-13 RX ADMIN — DILTIAZEM HYDROCHLORIDE 240 MG: 240 CAPSULE, COATED, EXTENDED RELEASE ORAL at 08:15

## 2023-09-13 RX ADMIN — Medication 10 ML: at 08:16

## 2023-09-13 RX ADMIN — VANCOMYCIN HYDROCHLORIDE 125 MG: KIT at 00:46

## 2023-09-13 RX ADMIN — HEPARIN SODIUM 5000 UNITS: 5000 INJECTION INTRAVENOUS; SUBCUTANEOUS at 20:48

## 2023-09-13 RX ADMIN — HEPARIN SODIUM 5000 UNITS: 5000 INJECTION INTRAVENOUS; SUBCUTANEOUS at 08:15

## 2023-09-13 NOTE — PLAN OF CARE
Problem: Adult Inpatient Plan of Care  Goal: Plan of Care Review  Flowsheets (Taken 9/13/2023 1412)  Plan of Care Reviewed With: patient   Goal Outcome Evaluation:  Plan of Care Reviewed With: patient            RD staff seeing for low BMI, MST, and wt loss.  Pt reports appetite has been low for several months now.  Denies n/v - visitor (presume wife reports sometimes has an upset stomach).  Reports still having some diarrhea and is in cdiff isolation.  Pt has had some wt loss - unsure how much at this time.  NFPE was performed finding moderate muscle and fat loss.  Pt looks to have lost weight since last admit a couple of weeks ago, but will monitor wt trend during admit.   Pt meets ASPEN criteria for severe acute malnutrition r/t physical findings.  Pt agreed to boost, but likes it 50/50 with water.  Did also agree to ice cream - will add magic cup for extra protein content.  Pt agreed to chopped meats to help with meal preparation.

## 2023-09-13 NOTE — PLAN OF CARE
Goal Outcome Evaluation:              Outcome Evaluation: Pt reports no pain at this time. Contact precautions maintained. K + 3.3, replacement ordered, Phos 2.3, Mag 1.7, Ca 8.5. Will continue to monitor.

## 2023-09-13 NOTE — THERAPY TREATMENT NOTE
Acute Care - Physical Therapy Treatment Note  [facility]     Patient Name: Brian Garcia  : 1950  MRN: [id number]  Today's Date: 2023      Visit Dx:     ICD-10-CM ICD-9-CM   1. Hallucinations  R44.3 780.1   2. Fatigue, unspecified type  R53.83 780.79   3. Impaired functional mobility, balance, gait, and endurance [Z74.09 (ICD-10-CM)]  Z74.09 V49.89   4. Impaired mobility and ADLs [Z74.09, Z78.9 (ICD-10-CM)]  Z74.09 V49.89    Z78.9      Patient Active Problem List   Diagnosis    Physical deconditioning    Leukocytosis    History of pulmonary embolism    Stage 4 very severe COPD by GOLD classification    Chronic respiratory failure with hypoxia, on home O2 therapy    Hypertension    CVA (cerebral vascular accident)    Traumatic hemorrhage of cerebrum    Iron deficiency anemia secondary to inadequate dietary iron intake    Acute pain of right shoulder    Limited range of motion (ROM) of shoulder    Rotator cuff syndrome of right shoulder    Impingement syndrome of right shoulder    Arthrosis of right acromioclavicular joint    Chronic right shoulder pain    Nontraumatic incomplete tear of right rotator cuff    Primary osteoarthritis of right shoulder    Malignant neoplasm of overlapping sites of right lung    Primary lung adenocarcinoma    Encounter for venous access device care    Iron malabsorption    Hypokalemia    C. difficile colitis    Hallucinations     Past Medical History:   Diagnosis Date    A-fib     Anesthesia     hip surgery (spinal caused him unpleasant sensations never wants it again)    Arthritis     Blood in sputum 2023    bright red blood, teaspoon - tablespoon daily    COPD (chronic obstructive pulmonary disease)     CVA (cerebral vascular accident) 2022    Hearing aid worn     bilateral    History of pulmonary embolism     History of recurrent pneumonia     Hypertension     Lung cancer 2023    Oxygen dependent     since @ age 65    Pulmonary nodules      Risk for falls     uses walker    Rotator cuff syndrome of right shoulder     Tachycardia     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 HTN/Eliquis    Wears glasses     readers    Wears glasses     reading     Past Surgical History:   Procedure Laterality Date    HIP ARTHROPLASTY Right     RIB BIOPSY Left 03/17/2023 11th    VENOUS ACCESS DEVICE (PORT) INSERTION N/A 3/30/2023    Procedure: MEDIPORT PLACEMENT          (C-ARM);  Surgeon: Lavon Cochran MD;  Location: [facility];  Service: General;  Laterality: N/A;     PT Assessment (last 12 hours)       PT Evaluation and Treatment       Row Name 09/13/23 1055          Physical Therapy Time and Intention    Subjective Information no complaints  -CA     Document Type therapy note (daily note)  -CA     Mode of Treatment individual therapy;physical therapy  -CA       Row Name 09/13/23 1055          General Information    Existing Precautions/Restrictions fall;oxygen therapy device and L/min  -CA       Row Name 09/13/23 1055          Pain    Pretreatment Pain Rating 0/10 - no pain  -CA     Posttreatment Pain Rating 0/10 - no pain  -CA       Row Name 09/13/23 1055          Cognition    Orientation Status (Cognition) oriented x 4  -CA       Row Name 09/13/23 1055          Bed Mobility    Bed Mobility supine-sit;sit-supine  -CA     Scooting/Bridging [place] (Bed Mobility) supervision  -CA     Supine-Sit [place] (Bed Mobility) supervision  -CA     Sit-Supine [place] (Bed Mobility) supervision  -CA     Assistive Device (Bed Mobility) bed rails;head of bed elevated  -CA       Row Name 09/13/23 1055          Transfers    Transfers sit-stand transfer;stand-sit transfer  -CA     Comment, (Transfers) Pt worked on multiple sit tostands for strengthning and performed 10x 1 and then 8x1  -CA       Row Name 09/13/23 1055          Sit-Stand Transfer    Sit-Stand [place] (Transfers) contact guard  -CA       Row Name 09/13/23 1055          Stand-Sit  Transfer    Stand-Sit Portal (Transfers) contact guard  -CA       Row Name 09/13/23 1055          Motor Skills    Therapeutic Exercise hip;knee;ankle  -CA       Row Name 09/13/23 1055          Hip (Therapeutic Exercise)    Hip (Therapeutic Exercise) AROM (active range of motion)  -CA     Hip AROM (Therapeutic Exercise) bilateral;flexion;extension  -CA       Row Name 09/13/23 1055          Knee (Therapeutic Exercise)    Knee (Therapeutic Exercise) AROM (active range of motion)  -CA     Knee AROM (Therapeutic Exercise) bilateral;LAQ (long arc quad)  -[facility] Name 09/13/23 1055          Ankle (Therapeutic Exercise)    Ankle (Therapeutic Exercise) AROM (active range of motion)  -CA     Ankle AROM (Therapeutic Exercise) bilateral;dorsiflexion;plantarflexion  -[facility] Name 09/13/23 1055          Vital Signs    Pre Systolic BP Rehab 133  -CA     Pre Treatment Diastolic BP 58  -CA     Pretreatment Heart Rate (beats/min) 97  -CA     Pre SpO2 (%) 97  -CA     O2 Delivery Pre Treatment nasal cannula  -CA     Intra SpO2 (%) 93  -CA     O2 Delivery Intra Treatment nasal cannula  -CA     Post SpO2 (%) 96  -CA     O2 Delivery Post Treatment nasal cannula  -CA     Pre Patient Position Supine  -CA     Intra Patient Position Sitting  -CA     Post Patient Position Supine  -[facility] Name 09/13/23 1055          Positioning and Restraints    Pre-Treatment Position in bed  -CA     Post Treatment Position bed  -CA     In Bed supine;call light within reach;encouraged to call for assist;exit alarm on;with family/caregiver  -CA               User Key  (r) = Recorded By, (t) = Taken By, (c) = Cosigned By      Initials Name Provider Type    Andrea Sarabia, PTA Physical Therapist Assistant                    Physical Therapy Education       Title: PT OT SLP Therapies (In Progress)       Topic: Physical Therapy (In Progress)       Point: Mobility training (In Progress)       Learning Progress Summary             Patient  Acceptance, E, NR by  at 9/12/2023 1528    Comment: PT POC and goals, proper hand placement to facilitate transfers, use of FWW to increase safety with ambulation.                         Point: Home exercise program (Not Started)       Learner Progress:  Not documented in this visit.              Point: Body mechanics (Not Started)       Learner Progress:  Not documented in this visit.              Point: Precautions (Not Started)       Learner Progress:  Not documented in this visit.                              User Key       Initials Effective Dates Name Provider Type Discipline     06/26/23 -  Britney Espinosa, PT Physical Therapist PT                  PT Recommendation and Plan     Plan of Care Reviewed With: patient  Progress: improving  Outcome Evaluation: Pt. was spv. for bed mobility and was cga for transfers.  Pt. performed multiple sit to stands for strengthning and stood 10 x1 and then 8x1 this tx.  Pt. also perfromed seated LE therex eob 20 x 1.  Pt. sat up eob for 16' with spv.  No new goals met and pt. improving well and would benefit from continued skilled PT.       Time Calculation:    PT Charges       Row Name 09/13/23 1459             Time Calculation    Start Time 1055  -CA      Stop Time 1120  -CA      Time Calculation (min) 25 min  -CA      PT Received On 09/13/23  -CA         Time Calculation- PT    Total Timed Code Minutes- PT 25 minute(s)  -CA                User Key  (r) = Recorded By, (t) = Taken By, (c) = Cosigned By      Initials Name Provider Type    CA Andrea Romano PTA Physical Therapist Assistant                  Therapy Charges for Today       Code Description Service Date Service Provider Modifiers Qty    88095689868 HC PT THERAPEUTIC ACT EA 15 MIN 9/13/2023 Andrea Romano, PTA GP 1    00360437516 HC PT THER PROC EA 15 MIN 9/13/2023 Andrea Romano, PTA GP 1            PT G-Codes  Outcome Measure Options: AM-PAC 6 Clicks Basic Mobility (PT)  AM-PAC 6 Clicks Score (PT):  16  AM-PAC 6 Clicks Score (OT): 16    Andrea Romano, PTA  9/13/2023

## 2023-09-13 NOTE — CONSULTS
Adult Nutrition  Assessment/PES    Patient Name:  Brian Garcia  YOB: 1950  MRN: [id number]  Admit Date:  9/11/2023    Assessment Date:  9/13/2023    Comments:  RD staff seeing for low BMI, MST, and wt loss.  Pt reports appetite has been low for several months now.  Denies n/v - visitor (presume wife reports sometimes has an upset stomach).  Reports still having some diarrhea and is in cdiff isolation.  Pt has had some wt loss - unsure how much at this time.  NFPE was performed finding moderate muscle and fat loss.  Pt looks to have lost weight since last admit a couple of weeks ago, but will monitor wt trend during admit.   Pt meets ASPEN criteria for severe acute malnutrition r/t physical findings.  Pt agreed to boost, but likes it 50/50 with water.  Did also agree to ice cream - will add magic cup for extra protein content.  Pt agreed to chopped meats to help with meal preparation.        Active Hospital Problems:  Active Hospital Problems    Diagnosis  POA    **Hallucinations [R44.3]  Yes      Resolved Hospital Problems   No resolved problems to display.     Past Medical History:   Diagnosis Date    A-fib     Anesthesia     hip surgery (spinal caused him unpleasant sensations never wants it again)    Arthritis     Blood in sputum 02/22/2023    bright red blood, teaspoon - tablespoon daily    COPD (chronic obstructive pulmonary disease)     CVA (cerebral vascular accident) 12/24/2022    Hearing aid worn     bilateral    History of pulmonary embolism     History of recurrent pneumonia     Hypertension     Lung cancer 03/17/2023    Oxygen dependent     since @ age 65    Pulmonary nodules     Risk for falls     uses walker    Rotator cuff syndrome of right shoulder     Tachycardia     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 HTN/Eliquis    Wears glasses     readers    Wears glasses     reading        Reason for Assessment       Row Name 09/13/23 2323          Reason for  Assessment    Reason For Assessment identified at risk by screening criteria     Identified At Risk by Screening Criteria BMI;MST SCORE 2+;unintentional loss of 10 lbs or more in the past 2 mos                    Nutrition/Diet History       Row Name 09/13/23 1352          Nutrition/Diet History    Typical Intake (Food/Fluid/EN/PN) pt reports appetite has been lower for awhile     Factors Affecting Nutritional Intake appetite;other (see comments)  GI                    Labs/Tests/Procedures/Meds       Row Name 09/13/23 1400          Labs/Procedures/Meds    Lab Results Reviewed reviewed, pertinent     Lab Results Comments K 3.3, Glu 107, BUN 4, Cr 0.53, A1c 5.8, Phos 2.3        Diagnostic Tests/Procedures    Diagnostic Test/Procedure Reviewed reviewed        Medications    Pertinent Medications Reviewed reviewed, pertinent     Pertinent Medications Comments folic acid, remeron, prednisone, pericolace, vanc, IVF @ 100 ml/hr                    Physical Findings       Row Name 09/13/23 1401          Physical Findings    Exam Agreement consented                    Estimated/Assessed Needs - Anthropometrics       Row Name 09/13/23 1401          Anthropometrics    Weight for Calculation 50.8 kg (112 lb)        Estimated/Assessed Needs    Additional Documentation KCAL/KG (Group);Protein Requirements (Group);Fluid Requirements (Group)        KCAL/KG    KCAL/KG 30 Kcal/Kg (kcal);35 Kcal/Kg (kcal)     30 Kcal/Kg (kcal) 1524.09     35 Kcal/Kg (kcal) 1778.105        Protein Requirements    Weight Used For Protein Calculations 50.8 kg (112 lb)     Est Protein Requirement Amount (gms/kg) 1.2 gm protein     Estimated Protein Requirements (gms/day) 60.96        Fluid Requirements    Fluid Requirements (mL/day) 1750     RDA Method (mL) 1750                    Nutrition Prescription Ordered       Row Name 09/13/23 1402          Nutrition Prescription PO    Current PO Diet Regular     Common Modifiers Cardiac                       Malnutrition Severity Assessment       Row Name 09/13/23 1406          Malnutrition Severity Assessment    Malnutrition Type Acute Disease or Injury - Related Malnutrition        Muscle Loss    Loss of Muscle Mass Findings Moderate     [place] Moderate - slight depression     Clavicle Bone Region Moderate - some protrusion in females, visible in males     Acromion Bone Region Moderate - acromion may slightly protrude     Dorsal Hand Region Moderate - slight depression     Patellar Region Moderate - patella more prominent, less muscle definition around patella     Anterior Thigh Region Moderate - mild depression on inner thigh     Posterior Calf Region Moderate - some roundness, slight firmness        Fat Loss    Subcutaneous Fat Loss Findings Moderate     Orbital Region  Moderate -  somewhat hollowness, slightly dark circles     Upper Arm Region Moderate - some fat tissue, not ample        Criteria Met (Must meet criteria for severity in at least 2 of these categories: M Wasting, Fat Loss, Fluid, Secondary Signs, Wt. Status, Intake)    Patient meets criteria for  Severe Malnutrition                     Problem/Interventions:   Problem 1       Row Name 09/13/23 1407          Nutrition Diagnoses Problem 1    Problem 1 Predicted Suboptimal Intake     Etiology (related to) Factors Affecting Nutrition     Appetite Poor     Signs/Symptoms (evidenced by) Report of Mnimal PO Intake                          Intervention Goal       Row Name 09/13/23 1404          Intervention Goal    General Maintain nutrition;Meet nutritional needs for age/condition     PO Meet estimated needs;Increase intake     Weight No significant weight loss                    Nutrition Intervention       Row Name 09/13/23 1401          Nutrition Intervention    RD/Tech Action Follow Tx progress;Care plan reviewd;Encourage intake;Recommend/ordered     Recommended/Ordered Supplement                    Nutrition Prescription       Row Name 09/13/23  1407          Nutrition Prescription PO    PO Prescription Begin/change supplement     Supplement Boost Plus;Magic Cup     Supplement Frequency 2 times a day;3 times a day                    Education/Evaluation       Row Name 09/13/23 1401          Education    Education Education topics;Advised regarding habits/behavior     Education Topics Basic nutrition     Advised Regarding Habits/Behavior Use supplement        Monitor/Evaluation    Monitor Per protocol;PO intake;Supplement intake;Pertinent labs;Weight;Symptoms     Education Follow-up Reinforce PRN                     Electronically signed by:  Cami Callejas RD  09/13/23 14:08 CDT

## 2023-09-13 NOTE — PLAN OF CARE
Goal Outcome Evaluation:  Plan of Care Reviewed With: patient        Progress: improving  Outcome Evaluation: Pt. was spv. for bed mobility and was cga for transfers.  Pt. performed multiple sit to stands for strengthning and stood 10 x1 and then 8x1 this tx.  Pt. also perfromed seated LE therex eob 20 x 1.  Pt. sat up eob for 16' with spv.  No new goals met and pt. improving well and would benefit from continued skilled PT.

## 2023-09-13 NOTE — PROGRESS NOTES
[facility] Medicine Services  INPATIENT PROGRESS NOTE      Length of Stay: 0  Date of Admission: 9/11/2023  Primary Care Physician: Marina Kitchen MD    Subjective   Chief Complaint:  Remains pleasantly confused  HPI:  ED evaluation  73-year-old male with COPD, history of stroke, stage IV lung cancer on Keytruda, recurrent C. difficile infection, presented after developing fever and increased diarrhea in setting of recurrent C. difficile colitis. Pulmonary embolism diagnosed in 2015. Hypertension. Metastatic lung adenocarcinoma with pulmonary metastasis for which patient is currently on treatment with Keytruda. Iron deficiency anemia. History of recurrent C. Difficile Was discharged 4 days ago from [facility] after Clostridium difficile colitis, on vanco PO, no diarrhea, his friend Annette Hui refers staying in bed, not eating, and confused, now oriented.     Daily assessment September 13, 2023  Seen and examined.  Stable on evaluation.  Remains pleasantly confused.  Continues to benefit from physical therapy.  Wife is at bedside questions are answered.  The patient's mental confusion could be possibly related to recent infection of C. difficile.  He is currently stable.  No behavioral disturbance noted.  Vital signs are stable patient is afebrile      Review of Systems   Unable to perform ROS: Mental status change      All pertinent negatives and positives are as above. All other systems have been reviewed and are negative unless otherwise stated.     Objective    As of today 09/13/23  Temp:  [97.6 °F (36.4 °C)-98.3 °F (36.8 °C)] 97.8 °F (36.6 °C)  Heart Rate:  [] 90  Resp:  [16-18] 18  BP: (104-143)/(51-65) 117/58    Physical Exam  Vitals and nursing note reviewed.   HENT:      Head: Normocephalic and atraumatic.      Right Ear: External ear normal.      Left Ear: External ear normal.      Nose: Nose normal.      Mouth/Throat:      Mouth: Mucous membranes are  dry.      Pharynx: Oropharynx is clear.   Eyes:      General: No scleral icterus.     Extraocular Movements: Extraocular movements intact.      Conjunctiva/sclera: Conjunctivae normal.      Pupils: Pupils are equal, round, and reactive to light.   Cardiovascular:      Rate and Rhythm: Normal rate and regular rhythm.      Pulses: Normal pulses.      Heart sounds: Normal heart sounds.   Pulmonary:      Effort: Pulmonary effort is normal.      Breath sounds: Normal breath sounds.   Abdominal:      General: Bowel sounds are normal. There is no distension.      Palpations: Abdomen is soft.      Tenderness: There is no abdominal tenderness.   Musculoskeletal:         General: Normal range of motion.      Cervical back: Normal range of motion and neck supple.   Skin:     General: Skin is warm and dry.      Coloration: Skin is not jaundiced.   Neurological:      General: No focal deficit present.      Mental Status: He is alert and oriented to person, place, and time.      Motor: Weakness present.   Psychiatric:         Mood and Affect: Mood normal.         Behavior: Behavior normal.         dilTIAZem CD, 240 mg, Oral, Q24H  folic acid, 1 mg, Oral, Daily  heparin (porcine), 5,000 Units, Subcutaneous, Q12H  mirtazapine, 15 mg, Oral, Nightly  potassium chloride ER, 40 mEq, Oral, Q4H  predniSONE, 5 mg, Oral, Every Other Day  senna-docusate sodium, 2 tablet, Oral, BID  sodium chloride, 10 mL, Intravenous, Q12H  vancomycin, 125 mg, Oral, Q6H         Results Review:  I have reviewed the labs, radiology results, and diagnostic studies.    Laboratory Data:   Results from last 7 days   Lab Units 09/13/23  1210 09/12/23  0657 09/11/23  1445 09/08/23  0554   SODIUM mmol/L  --  136 132* 135*   POTASSIUM mmol/L 3.3* 3.8 4.1 3.5   CHLORIDE mmol/L  --  99 97* 99   CO2 mmol/L  --  23.0 15.0* 28.0   BUN mg/dL  --  4* 4* 6*   CREATININE mg/dL  --  0.53* 0.53* 0.44*   GLUCOSE mg/dL  --  107* 63* 68   CALCIUM mg/dL 8.5* 9.3 8.9 7.6*    BILIRUBIN mg/dL  --  0.3 0.2  --    ALK PHOS U/L  --  82 73  --    ALT (SGPT) U/L  --  22 17  --    AST (SGOT) U/L  --  24 19  --    ANION GAP mmol/L  --  14.0 20.0* 8.0       Estimated Creatinine Clearance: 88.2 mL/min (A) (by C-G formula based on SCr of 0.53 mg/dL (L)).  Results from last 7 days   Lab Units 09/13/23  1210 09/11/23  1445   MAGNESIUM mg/dL 1.7 1.9   PHOSPHORUS mg/dL 2.3*  --            Results from last 7 days   Lab Units 09/12/23  0657 09/11/23  1445   WBC 10*3/mm3 10.11 12.99*   HEMOGLOBIN g/dL 12.3* 12.3*   HEMATOCRIT % 36.8* 36.9*   PLATELETS 10*3/mm3 394 342       Results from last 7 days   Lab Units 09/12/23  0742   INR  0.98         Culture Data:   Blood Culture   Date Value Ref Range Status   09/11/2023 No growth at 24 hours  Preliminary   09/11/2023 No growth at 24 hours  Preliminary     No results found for: URINECX  No results found for: RESPCX  No results found for: WOUNDCX  No results found for: STOOLCX  No components found for: BODYFLD    Radiology Data:   Imaging Results (Last 24 Hours)       ** No results found for the last 24 hours. **            I have reviewed the patient's current medications.     Assessment/Plan     Principal Problem:    Hallucinations      Impression/plan  Altered mental status  Etiology unclear may have confusion from recent C. difficile infection.  We will continue oral vancomycin and follow.  White count has improved to within normal limits  Patient has history of adenocarcinoma with metastasis  Currently on chemotherapy Keytruda.  Blood cultures no growth at 24 hours  UA negative  CT scan abdomen pelvis negative  CT chest improved from previous exam no acute process  CT head no acute intercranial process.    Adenocarcinoma  Keytruda  Oncology consult currently stable not needed at this time    Hypertension  Continue diltiazem    History of COPD no exacerbation currently  Continue as needed nebulizer treatments    History of C. difficile  Currently on oral  vancomycin    History of some mental confusion  Continue Remeron for now    CODE STATUS full code  DVT prophylaxis is heparin    We will continue to follow.  Follow patient's response to therapy      Medical Decision Making  Number and Complexity of problems: 1  Differential Diagnosis: sepsis vs dehydration     Conditions and Status:        Condition is unchanged.     [facility] Data  External documents reviewed: previous medical records  My EKG interpretation: none  My plain film interpretation: chest interstitial lung disease  Tests considered but not ordered: none     Decision rules/scores evaluated (example [id number], Wells, etc): n/a     Discussed with: patient and other significant Annette Hui     Treatment Plan  See above     Care Planning  Shared decision making: the patient  Code status and discussions: full code     Disposition  Social Determinants of Health that impact treatment or disposition: none  I expect the patient to be discharged to home in 2 days.       I confirmed that the patient's Advance Care Plan is present, code status is documented, or surrogate decision maker is listed in the patient's medical record.     I discussed the patient's findings and my recommendations with: the patient    Eliazar Zhang DO

## 2023-09-14 LAB
CALCIUM SPEC-SCNC: 8.4 MG/DL (ref 8.6–10.5)
MAGNESIUM SERPL-MCNC: 1.6 MG/DL (ref 1.6–2.4)
PHOSPHATE SERPL-MCNC: 3 MG/DL (ref 2.5–4.5)
POTASSIUM SERPL-SCNC: 4.3 MMOL/L (ref 3.5–5.2)

## 2023-09-14 PROCEDURE — 83735 ASSAY OF MAGNESIUM: CPT | Performed by: HOSPITALIST

## 2023-09-14 PROCEDURE — 96372 THER/PROPH/DIAG INJ SC/IM: CPT

## 2023-09-14 PROCEDURE — 96361 HYDRATE IV INFUSION ADD-ON: CPT

## 2023-09-14 PROCEDURE — 82310 ASSAY OF CALCIUM: CPT | Performed by: HOSPITALIST

## 2023-09-14 PROCEDURE — 84100 ASSAY OF PHOSPHORUS: CPT | Performed by: HOSPITALIST

## 2023-09-14 PROCEDURE — 94760 N-INVAS EAR/PLS OXIMETRY 1: CPT

## 2023-09-14 PROCEDURE — 25010000002 HEPARIN (PORCINE) PER 1000 UNITS

## 2023-09-14 PROCEDURE — 84132 ASSAY OF SERUM POTASSIUM: CPT | Performed by: HOSPITALIST

## 2023-09-14 PROCEDURE — G0378 HOSPITAL OBSERVATION PER HR: HCPCS

## 2023-09-14 PROCEDURE — 94799 UNLISTED PULMONARY SVC/PX: CPT

## 2023-09-14 PROCEDURE — 94640 AIRWAY INHALATION TREATMENT: CPT

## 2023-09-14 PROCEDURE — 97535 SELF CARE MNGMENT TRAINING: CPT

## 2023-09-14 RX ADMIN — VANCOMYCIN HYDROCHLORIDE 125 MG: KIT at 12:09

## 2023-09-14 RX ADMIN — HEPARIN SODIUM 5000 UNITS: 5000 INJECTION INTRAVENOUS; SUBCUTANEOUS at 20:38

## 2023-09-14 RX ADMIN — VANCOMYCIN HYDROCHLORIDE 125 MG: KIT at 23:02

## 2023-09-14 RX ADMIN — MIRTAZAPINE 15 MG: 15 TABLET, FILM COATED ORAL at 20:38

## 2023-09-14 RX ADMIN — SODIUM CHLORIDE 100 ML/HR: 9 INJECTION, SOLUTION INTRAVENOUS at 03:13

## 2023-09-14 RX ADMIN — SODIUM CHLORIDE 100 ML/HR: 9 INJECTION, SOLUTION INTRAVENOUS at 23:02

## 2023-09-14 RX ADMIN — VANCOMYCIN HYDROCHLORIDE 125 MG: KIT at 06:54

## 2023-09-14 RX ADMIN — Medication 10 ML: at 08:08

## 2023-09-14 RX ADMIN — DILTIAZEM HYDROCHLORIDE 240 MG: 240 CAPSULE, COATED, EXTENDED RELEASE ORAL at 08:08

## 2023-09-14 RX ADMIN — VANCOMYCIN HYDROCHLORIDE 125 MG: KIT at 00:58

## 2023-09-14 RX ADMIN — VANCOMYCIN HYDROCHLORIDE 125 MG: KIT at 17:14

## 2023-09-14 RX ADMIN — HEPARIN SODIUM 5000 UNITS: 5000 INJECTION INTRAVENOUS; SUBCUTANEOUS at 08:07

## 2023-09-14 RX ADMIN — FOLIC ACID 1 MG: 1 TABLET ORAL at 08:08

## 2023-09-14 RX ADMIN — SODIUM CHLORIDE 100 ML/HR: 9 INJECTION, SOLUTION INTRAVENOUS at 12:43

## 2023-09-14 NOTE — PLAN OF CARE
Goal Outcome Evaluation:              Outcome Evaluation: Pt reports no pain at this time.  Contact precautioned maintained. Ca 8.4, Mag 1.6, Phos 3.0, K+ 4.3. Possible dc to AM per MD. Will continue to monitor.

## 2023-09-14 NOTE — PROGRESS NOTES
[facility] Medicine Services  INPATIENT PROGRESS NOTE      Length of Stay: 0  Date of Admission: 9/11/2023  Primary Care Physician: Marina Kitchen MD    Subjective   Chief Complaint:  Remains pleasantly confused  HPI:  ED evaluation  73-year-old male with COPD, history of stroke, stage IV lung cancer on Keytruda, recurrent C. difficile infection, presented after developing fever and increased diarrhea in setting of recurrent C. difficile colitis. Pulmonary embolism diagnosed in 2015. Hypertension. Metastatic lung adenocarcinoma with pulmonary metastasis for which patient is currently on treatment with Keytruda. Iron deficiency anemia. History of recurrent C. Difficile Was discharged 4 days ago from [facility] after Clostridium difficile colitis, on vanco PO, no diarrhea, his friend Annette Hui refers staying in bed, not eating, and confused, now oriented.     Daily assessment September 14, 2023  On evaluation today the wife is at bedside questions are answered.  The wife is indicated that she is not able to care for her  at home and would request skilled nursing facility on discharge.  This has been reviewed with case management and they are actively evaluating options.  Patient is now more oriented than before.  Still remains somewhat confused.  Remains poor historian.  We will continue to follow patient anticipate stable for discharge in a.m.      Review of Systems   Unable to perform ROS: Mental status change      All pertinent negatives and positives are as above. All other systems have been reviewed and are negative unless otherwise stated.     Objective    As of today 09/14/23  Temp:  [97.2 °F (36.2 °C)-99.2 °F (37.3 °C)] 98 °F (36.7 °C)  Heart Rate:  [76-86] 77  Resp:  [18] 18  BP: ()/(49-58) 95/49    Physical Exam  Vitals and nursing note reviewed.   HENT:      Head: Normocephalic and atraumatic.      Right Ear: External ear normal.      Left Ear:  External ear normal.      Nose: Nose normal.      Mouth/Throat:      Mouth: Mucous membranes are dry.      Pharynx: Oropharynx is clear.   Eyes:      General: No scleral icterus.     Extraocular Movements: Extraocular movements intact.      Conjunctiva/sclera: Conjunctivae normal.      Pupils: Pupils are equal, round, and reactive to light.   Cardiovascular:      Rate and Rhythm: Normal rate and regular rhythm.      Pulses: Normal pulses.      Heart sounds: Normal heart sounds.   Pulmonary:      Effort: Pulmonary effort is normal.      Breath sounds: Normal breath sounds.   Abdominal:      General: Bowel sounds are normal. There is no distension.      Palpations: Abdomen is soft.      Tenderness: There is no abdominal tenderness.   Musculoskeletal:         General: Normal range of motion.      Cervical back: Normal range of motion and neck supple.   Skin:     General: Skin is warm and dry.      Coloration: Skin is not jaundiced.   Neurological:      General: No focal deficit present.      Mental Status: He is alert. He is disoriented.      Motor: Weakness present.      Comments: Improving mental status         dilTIAZem CD, 240 mg, Oral, Q24H  folic acid, 1 mg, Oral, Daily  heparin (porcine), 5,000 Units, Subcutaneous, Q12H  mirtazapine, 15 mg, Oral, Nightly  predniSONE, 5 mg, Oral, Every Other Day  senna-docusate sodium, 2 tablet, Oral, BID  sodium chloride, 10 mL, Intravenous, Q12H  tiotropium bromide-olodaterol, 2 puff, Inhalation, Daily - RT  vancomycin, 125 mg, Oral, Q6H         Results Review:  I have reviewed the labs, radiology results, and diagnostic studies.    Laboratory Data:   Results from last 7 days   Lab Units 09/14/23  1417 09/14/23  0033 09/13/23  1210 09/12/23  0657 09/11/23  1445 09/08/23  0554   SODIUM mmol/L  --   --   --  136 132* 135*   POTASSIUM mmol/L  --  4.3 3.3* 3.8 4.1 3.5   CHLORIDE mmol/L  --   --   --  99 97* 99   CO2 mmol/L  --   --   --  23.0 15.0* 28.0   BUN mg/dL  --   --   --   4* 4* 6*   CREATININE mg/dL  --   --   --  0.53* 0.53* 0.44*   GLUCOSE mg/dL  --   --   --  107* 63* 68   CALCIUM mg/dL 8.4*  --  8.5* 9.3 8.9 7.6*   BILIRUBIN mg/dL  --   --   --  0.3 0.2  --    ALK PHOS U/L  --   --   --  82 73  --    ALT (SGPT) U/L  --   --   --  22 17  --    AST (SGOT) U/L  --   --   --  24 19  --    ANION GAP mmol/L  --   --   --  14.0 20.0* 8.0       Estimated Creatinine Clearance: 88.2 mL/min (A) (by C-G formula based on SCr of 0.53 mg/dL (L)).  Results from last 7 days   Lab Units 09/14/23  1417 09/13/23  1210 09/11/23  1445   MAGNESIUM mg/dL 1.6 1.7 1.9   PHOSPHORUS mg/dL 3.0 2.3*  --            Results from last 7 days   Lab Units 09/12/23  0657 09/11/23  1445   WBC 10*3/mm3 10.11 12.99*   HEMOGLOBIN g/dL 12.3* 12.3*   HEMATOCRIT % 36.8* 36.9*   PLATELETS 10*3/mm3 394 342       Results from last 7 days   Lab Units 09/12/23  0742   INR  0.98         Culture Data:   Blood Culture   Date Value Ref Range Status   09/11/2023 No growth at 2 days  Preliminary   09/11/2023 No growth at 2 days  Preliminary     No results found for: URINECX  No results found for: RESPCX  No results found for: WOUNDCX  No results found for: STOOLCX  No components found for: BODYFLD    Radiology Data:   Imaging Results (Last 24 Hours)       ** No results found for the last 24 hours. **            I have reviewed the patient's current medications.     Assessment/Plan     Principal Problem:    Hallucinations      Impression/plan  Altered mental status  Etiology unclear may have confusion from recent C. difficile infection.  We will continue oral vancomycin and follow.  White count has improved to within normal limits  Patient has history of adenocarcinoma with metastasis  Currently on chemotherapy Keytruda.  Blood cultures no growth at 24 hours  UA negative  CT scan abdomen pelvis negative  CT chest improved from previous exam no acute process  CT head no acute intercranial process.  Continues to improve.  Stable  otherwise.  We will continue with medication.  Anticipate discharge to skilled nursing facility when case management has made final arrangements    Adenocarcinoma  Keytruda  Oncology consult currently stable not needed at this time    Hypertension  Continue diltiazem    History of COPD no exacerbation currently  Continue as needed nebulizer treatments    History of C. difficile  Currently on oral vancomycin    History of some mental confusion  Continue Remeron for now    CODE STATUS full code  DVT prophylaxis is heparin    We will continue to follow.  Follow patient's response to therapy      Medical Decision Making  Number and Complexity of problems: 1  Differential Diagnosis: sepsis vs dehydration     Conditions and Status:        Condition is unchanged.     [facility] Data  External documents reviewed: previous medical records  My EKG interpretation: none  My plain film interpretation: chest interstitial lung disease  Tests considered but not ordered: none     Decision rules/scores evaluated (example [id number], Wells, etc): n/a     Discussed with: patient and other significant Annette Hui     Treatment Plan  See above     Care Planning  Shared decision making: the patient  Code status and discussions: full code     Disposition  Social Determinants of Health that impact treatment or disposition: none  I expect the patient to be discharged to home in 2 days.       I confirmed that the patient's Advance Care Plan is present, code status is documented, or surrogate decision maker is listed in the patient's medical record.     I discussed the patient's findings and my recommendations with: the patient    Eliazar Zhang DO

## 2023-09-14 NOTE — SIGNIFICANT NOTE
09/14/23 1409   OTHER   Discipline physical therapy assistant   Rehab Time/Intention   Session Not Performed patient/family declined treatment  (pt defers PT tx, states he doesn't feel like working w/ therapy today)

## 2023-09-14 NOTE — THERAPY TREATMENT NOTE
Acute Care - Occupational Therapy Treatment Note  [facility]     Patient Name: Brian Garcia  : 1950  MRN: [id number]  Today's Date: 2023             Admit Date: 2023       ICD-10-CM ICD-9-CM   1. Hallucinations  R44.3 780.1   2. Fatigue, unspecified type  R53.83 780.79   3. Impaired functional mobility, balance, gait, and endurance [Z74.09 (ICD-10-CM)]  Z74.09 V49.89   4. Impaired mobility and ADLs [Z74.09, Z78.9 (ICD-10-CM)]  Z74.09 V49.89    Z78.9      Patient Active Problem List   Diagnosis    Physical deconditioning    Leukocytosis    History of pulmonary embolism    Stage 4 very severe COPD by GOLD classification    Chronic respiratory failure with hypoxia, on home O2 therapy    Hypertension    CVA (cerebral vascular accident)    Traumatic hemorrhage of cerebrum    Iron deficiency anemia secondary to inadequate dietary iron intake    Acute pain of right shoulder    Limited range of motion (ROM) of shoulder    Rotator cuff syndrome of right shoulder    Impingement syndrome of right shoulder    Arthrosis of right acromioclavicular joint    Chronic right shoulder pain    Nontraumatic incomplete tear of right rotator cuff    Primary osteoarthritis of right shoulder    Malignant neoplasm of overlapping sites of right lung    Primary lung adenocarcinoma    Encounter for venous access device care    Iron malabsorption    Hypokalemia    C. difficile colitis    Hallucinations     Past Medical History:   Diagnosis Date    A-fib     Anesthesia     hip surgery (spinal caused him unpleasant sensations never wants it again)    Arthritis     Blood in sputum 2023    bright red blood, teaspoon - tablespoon daily    COPD (chronic obstructive pulmonary disease)     CVA (cerebral vascular accident) 2022    Hearing aid worn     bilateral    History of pulmonary embolism     History of recurrent pneumonia     Hypertension     Lung cancer 2023    Oxygen dependent     since @ age 65     Pulmonary nodules     Risk for falls     uses walker    Rotator cuff syndrome of right shoulder     Tachycardia     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 HTN/Eliquis    Wears glasses     readers    Wears glasses     reading     Past Surgical History:   Procedure Laterality Date    HIP ARTHROPLASTY Right     RIB BIOPSY Left 03/17/2023 11th    VENOUS ACCESS DEVICE (PORT) INSERTION N/A 3/30/2023    Procedure: MEDIPORT PLACEMENT          (C-ARM);  Surgeon: Lavon Cochran MD;  Location: [facility];  Service: General;  Laterality: N/A;         OT ASSESSMENT FLOWSHEET (last 12 hours)       OT Evaluation and Treatment       Row Name 09/14/23 0922                   OT Time and Intention    Subjective Information no complaints  -RB        Document Type therapy note (daily note)  -RB        Mode of Treatment occupational therapy  -RB           General Information    Patient Profile Reviewed yes  -RB        Existing Precautions/Restrictions fall;oxygen therapy device and L/min  2 LPM  -RB           Pain Assessment    Pretreatment Pain Rating 0/10 - no pain  -RB        Posttreatment Pain Rating 0/10 - no pain  -RB           Cognition    Orientation Status (Cognition) oriented x 4  -RB           Strength Comprehensive (MMT)    General Manual Muscle Testing (MMT) Assessment --  -RB           Activities of Daily Living    BADL Assessment/Intervention bathing;lower body dressing;grooming;toileting  -RB           Bathing Assessment/Intervention    [place] Level (Bathing) bathing skills;supervision  -RB        Position (Bathing) edge of bed sitting  -RB           Lower Body Dressing Assessment/Training    [place] Level (Lower Body Dressing) lower body dressing skills;don;pants/bottoms;socks;contact guard assist  -RB        Position (Lower Body Dressing) edge of bed sitting  -RB           Grooming Assessment/Training    [place] Level (Grooming) shave face;supervision  -RB        Assistive Devices  (Grooming) electric razor  -RB           Toileting Assessment/Training    [place] Level (Toileting) supervision  -RB        Assistive Devices (Toileting) commode, bedside with drop arms  -RB        Position (Toileting) supported standing  -RB           Bed Mobility    Bed Mobility supine-sit;sit-supine  -RB        Scooting/Bridging [place] (Bed Mobility) --  -RB        Supine-Sit [place] (Bed Mobility) supervision  -RB        Sit-Supine [place] (Bed Mobility) supervision  -RB        Assistive Device (Bed Mobility) bed rails;head of bed elevated  -RB           Functional Mobility    Functional Mobility- Ind. Level contact guard assist  -RB        Functional Mobility-Distance (Feet) 3  -RB        Functional Mobility- Safety Issues balance decreased during turns  -RB        Functional Mobility- Comment Bed to BSC.  -RB           Transfer Assessment/Treatment    Transfers toilet transfer  -RB           Sit-Stand Transfer    Sit-Stand [place] (Transfers) contact guard  -RB           Toilet Transfer    Type (Toilet Transfer) sit-stand;stand-sit  -RB        [place] Level (Toilet Transfer) contact guard  -RB        Assistive Device (Toilet Transfer) commode, bedside with drop arms  -RB           Safety Issues, Functional Mobility    Impairments Affecting Function (Mobility) balance;endurance/activity tolerance;shortness of breath;strength  -RB           Vital Signs    Pre Systolic BP Rehab 129  -RB        Pre Treatment Diastolic BP 57  -RB        Pretreatment Heart Rate (beats/min) 101  -RB        Intratreatment Heart Rate (beats/min) 110  -RB        Pre SpO2 (%) 91  -RB        O2 Delivery Pre Treatment supplemental O2  3 L  -RB        Intra SpO2 (%) 95  -RB        Post SpO2 (%) 95  -RB        Pre Patient Position Sitting  -RB           Positioning and Restraints    Pre-Treatment Position in bed  -RB        Post Treatment Position bed  -RB        In Bed supine;call light within reach;encouraged  to call for assist;exit alarm on;with family/caregiver  -RB           Therapy Plan Review/Discharge Plan (OT)    Anticipated Discharge Disposition (OT) skilled nursing facility;inpatient rehabilitation facility  -RB                  User Key  (r) = Recorded By, (t) = Taken By, (c) = Cosigned By      Initials Name Effective Dates    RB Abhi Chang, YOVANA 07/11/23 -                      Occupational Therapy Education       Title: PT OT SLP Therapies (In Progress)       Topic: Occupational Therapy (In Progress)       Point: ADL training (In Progress)       Description:   Instruct learner(s) on proper safety adaptation and remediation techniques during self care or transfers.   Instruct in proper use of assistive devices.                  Learning Progress Summary             Patient Acceptance, E,TB, NR by RW at 9/12/2023 1555    Comment: POC, Role of OT, transfer training                         Point: Home exercise program (Not Started)       Description:   Instruct learner(s) on appropriate technique for monitoring, assisting and/or progressing therapeutic exercises/activities.                  Learner Progress:  Not documented in this visit.              Point: Precautions (Done)       Description:   Instruct learner(s) on prescribed precautions during self-care and functional transfers.                  Learning Progress Summary             Patient Acceptance, E,D, NR,VU by RB at 9/14/2023 1343    Comment: Edu pt on use of gait belt and non skid socks when OOB and no OOB without assist.    Acceptance, E,TB, NR by RW at 9/12/2023 1555    Comment: POC, Role of OT, transfer training                         Point: Body mechanics (In Progress)       Description:   Instruct learner(s) on proper positioning and spine alignment during self-care, functional mobility activities and/or exercises.                  Learning Progress Summary             Patient Acceptance, E,TB, NR by RW at 9/12/2023 1555    Comment: POC, Role  of OT, transfer training                                         User Key       Initials Effective Dates Name Provider Type Discipline    RB 07/11/23 -  Abhi Chang, OT Occupational Therapist OT    RW 09/22/22 -  Mildred Joe OT Occupational Therapist OT                      OT Recommendation and Plan           Outcome Measures       Row Name 09/14/23 0922             How much help from another is currently needed...    Putting on and taking off regular lower body clothing? 3  -RB      Bathing (including washing, rinsing, and drying) 3  -RB      Toileting (which includes using toilet bed pan or urinal) 3  -RB      Putting on and taking off regular upper body clothing 3  -RB      Taking care of personal grooming (such as brushing teeth) 3  -RB      Eating meals 4  -RB      AM-PAC 6 Clicks Score (OT) 19  -RB                User Key  (r) = Recorded By, (t) = Taken By, (c) = Cosigned By      Initials Name Provider Type    RB Abhi Chang OT Occupational Therapist                    Time Calculation:    Time Calculation- OT       Row Name 09/14/23 1346             Time Calculation- OT    OT Start Time 0922  -RB      OT Stop Time 1043  -RB      OT Time Calculation (min) 81 min  -RB      OT Received On 09/14/23  -RB                User Key  (r) = Recorded By, (t) = Taken By, (c) = Cosigned By      Initials Name Provider Type    RB Abhi Chang OT Occupational Therapist                  Therapy Charges for Today       Code Description Service Date Service Provider Modifiers Qty    [id number]  OT SELF CARE/MGMT/TRAIN EA 15 MIN 9/14/2023 Abhi Chang OT GO 5                 Abhi Chang OT  9/14/2023

## 2023-09-14 NOTE — PLAN OF CARE
Goal Outcome Evaluation:      OT tx on this date.  Pt was CGA for sit to stand and toilet transfers.  Pt performed total sponge bath with supervision and dons pants/socks with CGA.  Pt was set up supervision for toileting.                 Anticipated Discharge Disposition (OT): skilled nursing facility, inpatient rehabilitation facility

## 2023-09-15 PROBLEM — E43 SEVERE MALNUTRITION: Status: ACTIVE | Noted: 2023-09-15

## 2023-09-15 PROCEDURE — 96361 HYDRATE IV INFUSION ADD-ON: CPT

## 2023-09-15 PROCEDURE — 25010000002 HEPARIN (PORCINE) PER 1000 UNITS

## 2023-09-15 PROCEDURE — 63710000001 PREDNISONE PER 5 MG

## 2023-09-15 PROCEDURE — 96372 THER/PROPH/DIAG INJ SC/IM: CPT

## 2023-09-15 PROCEDURE — G0378 HOSPITAL OBSERVATION PER HR: HCPCS

## 2023-09-15 RX ORDER — TIZANIDINE 2 MG/1
2 TABLET ORAL NIGHTLY PRN
Status: DISCONTINUED | OUTPATIENT
Start: 2023-09-15 | End: 2023-09-21 | Stop reason: HOSPADM

## 2023-09-15 RX ADMIN — SODIUM CHLORIDE 100 ML/HR: 9 INJECTION, SOLUTION INTRAVENOUS at 08:47

## 2023-09-15 RX ADMIN — VANCOMYCIN HYDROCHLORIDE 125 MG: KIT at 14:22

## 2023-09-15 RX ADMIN — MIRTAZAPINE 15 MG: 15 TABLET, FILM COATED ORAL at 20:45

## 2023-09-15 RX ADMIN — VANCOMYCIN HYDROCHLORIDE 125 MG: KIT at 17:55

## 2023-09-15 RX ADMIN — DILTIAZEM HYDROCHLORIDE 240 MG: 240 CAPSULE, COATED, EXTENDED RELEASE ORAL at 08:46

## 2023-09-15 RX ADMIN — TIZANIDINE 2 MG: 2 TABLET ORAL at 21:53

## 2023-09-15 RX ADMIN — SODIUM CHLORIDE 100 ML/HR: 9 INJECTION, SOLUTION INTRAVENOUS at 18:29

## 2023-09-15 RX ADMIN — FOLIC ACID 1 MG: 1 TABLET ORAL at 08:46

## 2023-09-15 RX ADMIN — Medication 10 ML: at 20:45

## 2023-09-15 RX ADMIN — VANCOMYCIN HYDROCHLORIDE 125 MG: KIT at 06:35

## 2023-09-15 RX ADMIN — HEPARIN SODIUM 5000 UNITS: 5000 INJECTION INTRAVENOUS; SUBCUTANEOUS at 08:46

## 2023-09-15 RX ADMIN — PREDNISONE 5 MG: 5 TABLET ORAL at 08:46

## 2023-09-15 RX ADMIN — HEPARIN SODIUM 5000 UNITS: 5000 INJECTION INTRAVENOUS; SUBCUTANEOUS at 20:45

## 2023-09-15 RX ADMIN — Medication 10 ML: at 08:46

## 2023-09-15 NOTE — DISCHARGE PLACEMENT REQUEST
"Debbie Velasco (73 y.o. Male)       Date of Birth   1950    Social Security Number       Address   [address] [place] [address]    Home Phone   [phone]    MRN   [id number]       [facility]   [facility]    Marital Status                               Admission Date   9/11/23    Admission Type   Emergency    Admitting Provider   Fredrick Ni MD    Attending Provider   Fredrick Ni MD    Department, Room/Bed   [address], 329/1       Discharge Date       Discharge Disposition   Skilled Nursing Facility (DC - External)    Discharge Destination                                 Attending Provider: Fredrick Ni MD    Allergies: Amoxicillin, Albuterol, Asmanex (120 Metered Doses) [Mometasone Furoate], Gabapentin, Lortab [Hydrocodone-acetaminophen], Morphine And Related, Prilosec [Omeprazole], Sodium Clavulanate, Statins, Sulfa Antibiotics, Symbicort [Budesonide-formoterol Fumarate]    Isolation: Spore   Infection: C.difficile (09/07/23)   Code Status: CPR    Ht: 165.1 cm (65\")   Wt: 56.2 kg (124 lb)    Admission Cmt: None   Principal Problem: Hallucinations [R44.3]                   Active Insurance as of 9/11/2023       Primary Coverage       Payor Plan Insurance Group Employer/Plan Group    [facility] DEPT [id number]       Payor Plan Address Payor Plan Phone Number Payor Plan Fax Number Effective Dates    [facility] OFFICE OF COMMUNITY CARE [phone]  2/24/2000 - None Entered    [address] [address]       [facility] [id number]         Subscriber Name Subscriber Birth Date Member ID       DEBBIE VELASCO 1950 [id number]                     Emergency Contacts        (Rel.) Home Phone Work Phone Mobile Phone    Annette Harvey (Significant Other) [phone] -- [phone]              Emergency Contact Information       Name Relation Home Work Mobile    Annette Harvey Significant Other [phone]      "        Physician Progress Notes (last 72 hours)        Eliazar Zhang, DO at 09/14/23 1638              [facility] Medicine Services  INPATIENT PROGRESS NOTE      Length of Stay: 0  Date of Admission: 9/11/2023  Primary Care Physician: Marina Kitchen MD    Subjective   Chief Complaint:  Remains pleasantly confused  HPI:  ED evaluation  73-year-old male with COPD, history of stroke, stage IV lung cancer on Keytruda, recurrent C. difficile infection, presented after developing fever and increased diarrhea in setting of recurrent C. difficile colitis. Pulmonary embolism diagnosed in 2015. Hypertension. Metastatic lung adenocarcinoma with pulmonary metastasis for which patient is currently on treatment with Keytruda. Iron deficiency anemia. History of recurrent C. Difficile Was discharged 4 days ago from [facility] after Clostridium difficile colitis, on vanco PO, no diarrhea, his friend Annette Hui refers staying in bed, not eating, and confused, now oriented.     Daily assessment September 14, 2023  On evaluation today the wife is at bedside questions are answered.  The wife is indicated that she is not able to care for her  at home and would request skilled nursing facility on discharge.  This has been reviewed with case management and they are actively evaluating options.  Patient is now more oriented than before.  Still remains somewhat confused.  Remains poor historian.  We will continue to follow patient anticipate stable for discharge in a.m.      Review of Systems   Unable to perform ROS: Mental status change      All pertinent negatives and positives are as above. All other systems have been reviewed and are negative unless otherwise stated.     Objective    As of today 09/14/23  Temp:  [97.2 °F (36.2 °C)-99.2 °F (37.3 °C)] 98 °F (36.7 °C)  Heart Rate:  [76-86] 77  Resp:  [18] 18  BP: ()/(49-58) 95/49    Physical Exam  Vitals and nursing note  reviewed.   HENT:      Head: Normocephalic and atraumatic.      Right Ear: External ear normal.      Left Ear: External ear normal.      Nose: Nose normal.      Mouth/Throat:      Mouth: Mucous membranes are dry.      Pharynx: Oropharynx is clear.   Eyes:      General: No scleral icterus.     Extraocular Movements: Extraocular movements intact.      Conjunctiva/sclera: Conjunctivae normal.      Pupils: Pupils are equal, round, and reactive to light.   Cardiovascular:      Rate and Rhythm: Normal rate and regular rhythm.      Pulses: Normal pulses.      Heart sounds: Normal heart sounds.   Pulmonary:      Effort: Pulmonary effort is normal.      Breath sounds: Normal breath sounds.   Abdominal:      General: Bowel sounds are normal. There is no distension.      Palpations: Abdomen is soft.      Tenderness: There is no abdominal tenderness.   Musculoskeletal:         General: Normal range of motion.      Cervical back: Normal range of motion and neck supple.   Skin:     General: Skin is warm and dry.      Coloration: Skin is not jaundiced.   Neurological:      General: No focal deficit present.      Mental Status: He is alert. He is disoriented.      Motor: Weakness present.      Comments: Improving mental status         dilTIAZem CD, 240 mg, Oral, Q24H  folic acid, 1 mg, Oral, Daily  heparin (porcine), 5,000 Units, Subcutaneous, Q12H  mirtazapine, 15 mg, Oral, Nightly  predniSONE, 5 mg, Oral, Every Other Day  senna-docusate sodium, 2 tablet, Oral, BID  sodium chloride, 10 mL, Intravenous, Q12H  tiotropium bromide-olodaterol, 2 puff, Inhalation, Daily - RT  vancomycin, 125 mg, Oral, Q6H         Results Review:  I have reviewed the labs, radiology results, and diagnostic studies.    Laboratory Data:   Results from last 7 days   Lab Units 09/14/23  1417 09/14/23  0033 09/13/23  1210 09/12/23  0657 09/11/23  1445 09/08/23  0554   SODIUM mmol/L  --   --   --  136 132* 135*   POTASSIUM mmol/L  --  4.3 3.3* 3.8 4.1 3.5    CHLORIDE mmol/L  --   --   --  99 97* 99   CO2 mmol/L  --   --   --  23.0 15.0* 28.0   BUN mg/dL  --   --   --  4* 4* 6*   CREATININE mg/dL  --   --   --  0.53* 0.53* 0.44*   GLUCOSE mg/dL  --   --   --  107* 63* 68   CALCIUM mg/dL 8.4*  --  8.5* 9.3 8.9 7.6*   BILIRUBIN mg/dL  --   --   --  0.3 0.2  --    ALK PHOS U/L  --   --   --  82 73  --    ALT (SGPT) U/L  --   --   --  22 17  --    AST (SGOT) U/L  --   --   --  24 19  --    ANION GAP mmol/L  --   --   --  14.0 20.0* 8.0       Estimated Creatinine Clearance: 88.2 mL/min (A) (by C-G formula based on SCr of 0.53 mg/dL (L)).  Results from last 7 days   Lab Units 09/14/23  1417 09/13/23  1210 09/11/23  1445   MAGNESIUM mg/dL 1.6 1.7 1.9   PHOSPHORUS mg/dL 3.0 2.3*  --            Results from last 7 days   Lab Units 09/12/23  0657 09/11/23  1445   WBC 10*3/mm3 10.11 12.99*   HEMOGLOBIN g/dL 12.3* 12.3*   HEMATOCRIT % 36.8* 36.9*   PLATELETS 10*3/mm3 394 342       Results from last 7 days   Lab Units 09/12/23  0742   INR  0.98         Culture Data:   Blood Culture   Date Value Ref Range Status   09/11/2023 No growth at 2 days  Preliminary   09/11/2023 No growth at 2 days  Preliminary     No results found for: URINECX  No results found for: RESPCX  No results found for: WOUNDCX  No results found for: STOOLCX  No components found for: BODYFLD    Radiology Data:   Imaging Results (Last 24 Hours)       ** No results found for the last 24 hours. **            I have reviewed the patient's current medications.     Assessment/Plan     Principal Problem:    Hallucinations      Impression/plan  Altered mental status  Etiology unclear may have confusion from recent C. difficile infection.  We will continue oral vancomycin and follow.  White count has improved to within normal limits  Patient has history of adenocarcinoma with metastasis  Currently on chemotherapy Keytruda.  Blood cultures no growth at 24 hours  UA negative  CT scan abdomen pelvis negative  CT chest improved  from previous exam no acute process  CT head no acute intercranial process.  Continues to improve.  Stable otherwise.  We will continue with medication.  Anticipate discharge to skilled nursing facility when case management has made final arrangements    Adenocarcinoma  Keytruda  Oncology consult currently stable not needed at this time    Hypertension  Continue diltiazem    History of COPD no exacerbation currently  Continue as needed nebulizer treatments    History of C. difficile  Currently on oral vancomycin    History of some mental confusion  Continue Remeron for now    CODE STATUS full code  DVT prophylaxis is heparin    We will continue to follow.  Follow patient's response to therapy      Medical Decision Making  Number and Complexity of problems: 1  Differential Diagnosis: sepsis vs dehydration     Conditions and Status:        Condition is unchanged.     [facility] Data  External documents reviewed: previous medical records  My EKG interpretation: none  My plain film interpretation: chest interstitial lung disease  Tests considered but not ordered: none     Decision rules/scores evaluated (example [id number], Wells, etc): n/a     Discussed with: patient and other significant Annette Hui     Treatment Plan  See above     Care Planning  Shared decision making: the patient  Code status and discussions: full code     Disposition  Social Determinants of Health that impact treatment or disposition: none  I expect the patient to be discharged to home in 2 days.       I confirmed that the patient's Advance Care Plan is present, code status is documented, or surrogate decision maker is listed in the patient's medical record.     I discussed the patient's findings and my recommendations with: the patient    Eliazar Zhang DO    Electronically signed by Eliazar Zhang DO at 09/14/23 6497       Eliazar Zhang DO at 09/13/23 0420              [facility]  Medicine Services  INPATIENT PROGRESS NOTE      Length of Stay: 0  Date of Admission: 9/11/2023  Primary Care Physician: Marina Kitchen MD    Subjective   Chief Complaint:  Remains pleasantly confused  HPI:  ED evaluation  73-year-old male with COPD, history of stroke, stage IV lung cancer on Keytruda, recurrent C. difficile infection, presented after developing fever and increased diarrhea in setting of recurrent C. difficile colitis. Pulmonary embolism diagnosed in 2015. Hypertension. Metastatic lung adenocarcinoma with pulmonary metastasis for which patient is currently on treatment with Keytruda. Iron deficiency anemia. History of recurrent C. Difficile Was discharged 4 days ago from [facility] after Clostridium difficile colitis, on vanco PO, no diarrhea, his friend Annette Hui refers staying in bed, not eating, and confused, now oriented.     Daily assessment September 13, 2023  Seen and examined.  Stable on evaluation.  Remains pleasantly confused.  Continues to benefit from physical therapy.  Wife is at bedside questions are answered.  The patient's mental confusion could be possibly related to recent infection of C. difficile.  He is currently stable.  No behavioral disturbance noted.  Vital signs are stable patient is afebrile      Review of Systems   Unable to perform ROS: Mental status change      All pertinent negatives and positives are as above. All other systems have been reviewed and are negative unless otherwise stated.     Objective    As of today 09/13/23  Temp:  [97.6 °F (36.4 °C)-98.3 °F (36.8 °C)] 97.8 °F (36.6 °C)  Heart Rate:  [] 90  Resp:  [16-18] 18  BP: (104-143)/(51-65) 117/58    Physical Exam  Vitals and nursing note reviewed.   HENT:      Head: Normocephalic and atraumatic.      Right Ear: External ear normal.      Left Ear: External ear normal.      Nose: Nose normal.      Mouth/Throat:      Mouth: Mucous membranes are dry.      Pharynx: Oropharynx is clear.   Eyes:      General: No  scleral icterus.     Extraocular Movements: Extraocular movements intact.      Conjunctiva/sclera: Conjunctivae normal.      Pupils: Pupils are equal, round, and reactive to light.   Cardiovascular:      Rate and Rhythm: Normal rate and regular rhythm.      Pulses: Normal pulses.      Heart sounds: Normal heart sounds.   Pulmonary:      Effort: Pulmonary effort is normal.      Breath sounds: Normal breath sounds.   Abdominal:      General: Bowel sounds are normal. There is no distension.      Palpations: Abdomen is soft.      Tenderness: There is no abdominal tenderness.   Musculoskeletal:         General: Normal range of motion.      Cervical back: Normal range of motion and neck supple.   Skin:     General: Skin is warm and dry.      Coloration: Skin is not jaundiced.   Neurological:      General: No focal deficit present.      Mental Status: He is alert and oriented to person, place, and time.      Motor: Weakness present.   Psychiatric:         Mood and Affect: Mood normal.         Behavior: Behavior normal.         dilTIAZem CD, 240 mg, Oral, Q24H  folic acid, 1 mg, Oral, Daily  heparin (porcine), 5,000 Units, Subcutaneous, Q12H  mirtazapine, 15 mg, Oral, Nightly  potassium chloride ER, 40 mEq, Oral, Q4H  predniSONE, 5 mg, Oral, Every Other Day  senna-docusate sodium, 2 tablet, Oral, BID  sodium chloride, 10 mL, Intravenous, Q12H  vancomycin, 125 mg, Oral, Q6H         Results Review:  I have reviewed the labs, radiology results, and diagnostic studies.    Laboratory Data:   Results from last 7 days   Lab Units 09/13/23  1210 09/12/23  0657 09/11/23  1445 09/08/23  0554   SODIUM mmol/L  --  136 132* 135*   POTASSIUM mmol/L 3.3* 3.8 4.1 3.5   CHLORIDE mmol/L  --  99 97* 99   CO2 mmol/L  --  23.0 15.0* 28.0   BUN mg/dL  --  4* 4* 6*   CREATININE mg/dL  --  0.53* 0.53* 0.44*   GLUCOSE mg/dL  --  107* 63* 68   CALCIUM mg/dL 8.5* 9.3 8.9 7.6*   BILIRUBIN mg/dL  --  0.3 0.2  --    ALK PHOS U/L  --  82 73  --    ALT  (SGPT) U/L  --  22 17  --    AST (SGOT) U/L  --  24 19  --    ANION GAP mmol/L  --  14.0 20.0* 8.0       Estimated Creatinine Clearance: 88.2 mL/min (A) (by C-G formula based on SCr of 0.53 mg/dL (L)).  Results from last 7 days   Lab Units 09/13/23  1210 09/11/23  1445   MAGNESIUM mg/dL 1.7 1.9   PHOSPHORUS mg/dL 2.3*  --            Results from last 7 days   Lab Units 09/12/23  0657 09/11/23  1445   WBC 10*3/mm3 10.11 12.99*   HEMOGLOBIN g/dL 12.3* 12.3*   HEMATOCRIT % 36.8* 36.9*   PLATELETS 10*3/mm3 394 342       Results from last 7 days   Lab Units 09/12/23  0742   INR  0.98         Culture Data:   Blood Culture   Date Value Ref Range Status   09/11/2023 No growth at 24 hours  Preliminary   09/11/2023 No growth at 24 hours  Preliminary     No results found for: URINECX  No results found for: RESPCX  No results found for: WOUNDCX  No results found for: STOOLCX  No components found for: BODYFLD    Radiology Data:   Imaging Results (Last 24 Hours)       ** No results found for the last 24 hours. **            I have reviewed the patient's current medications.     Assessment/Plan     Principal Problem:    Hallucinations      Impression/plan  Altered mental status  Etiology unclear may have confusion from recent C. difficile infection.  We will continue oral vancomycin and follow.  White count has improved to within normal limits  Patient has history of adenocarcinoma with metastasis  Currently on chemotherapy Keytruda.  Blood cultures no growth at 24 hours  UA negative  CT scan abdomen pelvis negative  CT chest improved from previous exam no acute process  CT head no acute intercranial process.    Adenocarcinoma  Keytruda  Oncology consult currently stable not needed at this time    Hypertension  Continue diltiazem    History of COPD no exacerbation currently  Continue as needed nebulizer treatments    History of C. difficile  Currently on oral vancomycin    History of some mental confusion  Continue Remeron for  now    CODE STATUS full code  DVT prophylaxis is heparin    We will continue to follow.  Follow patient's response to therapy      Medical Decision Making  Number and Complexity of problems: 1  Differential Diagnosis: sepsis vs dehydration     Conditions and Status:        Condition is unchanged.     [facility] Data  External documents reviewed: previous medical records  My EKG interpretation: none  My plain film interpretation: chest interstitial lung disease  Tests considered but not ordered: none     Decision rules/scores evaluated (example [id number], Wells, etc): n/a     Discussed with: patient and other significant Annette Hui     Treatment Plan  See above     Care Planning  Shared decision making: the patient  Code status and discussions: full code     Disposition  Social Determinants of Health that impact treatment or disposition: none  I expect the patient to be discharged to home in 2 days.       I confirmed that the patient's Advance Care Plan is present, code status is documented, or surrogate decision maker is listed in the patient's medical record.     I discussed the patient's findings and my recommendations with: the patient    Eliazar Zhang DO    Electronically signed by Eliazar Zhang DO at 09/13/23 1726          Physical Therapy Notes (last 72 hours)        Britney Espinosa, PT at 09/12/23 1529  Version 1 of 1      Attestation signed by Daniel Costa, PT at 09/12/23 0512    I agree with this PT's documentation.                 Goal Outcome Evaluation:  Plan of Care Reviewed With: patient, spouse           Outcome Evaluation: PT evaluation completed, co-eval with OT. Patient AO X 4 and agreeable to therapy. Patient able to supine>sit and scoot with SPV, sit>supine with CGA, and sit>stand with CGA. Patient was able to take 4 steps X 2 with HHA. Patient is ambulating with shuffling gait pattern and significant other reports that is not usual. Recommend patient ambulate with FWW. Goals  created, continue skilled IP PT. Recommend patient d/c home with assist and HHPT pending progress with goals.      Anticipated Discharge Disposition (PT): home with assist, home with home health    Electronically signed by Daniel Costa, PT at 23 [phone]       Britney Espinosa, PT at 23 1530  Version 2 of 2      Attestation signed by Daniel Costa, PT at 23 4533    I agree with this PT's documentation.                 Patient Name: Brian Garcia  : 1950    MRN: [id number]                              Today's Date: 2023       Admit Date: 2023    Visit Dx:     ICD-10-CM ICD-9-CM   1. Hallucinations  R44.3 780.1   2. Fatigue, unspecified type  R53.83 780.79   3. Impaired functional mobility, balance, gait, and endurance [Z74.09 (ICD-10-CM)]  Z74.09 V49.89     Patient Active Problem List   Diagnosis    Physical deconditioning    Leukocytosis    History of pulmonary embolism    Stage 4 very severe COPD by GOLD classification    Chronic respiratory failure with hypoxia, on home O2 therapy    Hypertension    CVA (cerebral vascular accident)    Traumatic hemorrhage of cerebrum    Iron deficiency anemia secondary to inadequate dietary iron intake    Acute pain of right shoulder    Limited range of motion (ROM) of shoulder    Rotator cuff syndrome of right shoulder    Impingement syndrome of right shoulder    Arthrosis of right acromioclavicular joint    Chronic right shoulder pain    Nontraumatic incomplete tear of right rotator cuff    Primary osteoarthritis of right shoulder    Malignant neoplasm of overlapping sites of right lung    Primary lung adenocarcinoma    Encounter for venous access device care    Iron malabsorption    Hypokalemia    C. difficile colitis    Hallucinations     Past Medical History:   Diagnosis Date    A-fib     Anesthesia     hip surgery (spinal caused him unpleasant sensations never wants it again)    Arthritis     Blood in sputum 2023     "bright red blood, teaspoon - tablespoon daily    COPD (chronic obstructive pulmonary disease)     CVA (cerebral vascular accident) 12/24/2022    Hearing aid worn     bilateral    History of pulmonary embolism     History of recurrent pneumonia     Hypertension     Lung cancer 03/17/2023    Oxygen dependent     since @ age 65    Pulmonary nodules     Risk for falls     uses walker    Rotator cuff syndrome of right shoulder     Tachycardia     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 HTN/Eliquis    Wears glasses     readers    Wears glasses     reading     Past Surgical History:   Procedure Laterality Date    HIP ARTHROPLASTY Right     RIB BIOPSY Left 03/17/2023 11th    VENOUS ACCESS DEVICE (PORT) INSERTION N/A 3/30/2023    Procedure: MEDIPORT PLACEMENT          (C-ARM);  Surgeon: Lavon Cochran MD;  Location: [facility];  Service: General;  Laterality: N/A;      General Information       Row Name 09/12/23 1353          Physical Therapy Time and Intention    Document Type evaluation  -MM     Mode of Treatment physical therapy;occupational therapy  -MM       Row Name 09/12/23 2346          General Information    Patient Profile Reviewed yes  -MM     Prior Level of Function independent:;all household mobility  -MM     Existing Precautions/Restrictions fall;oxygen therapy device and L/min  2 LPM  -MM     Barriers to Rehab medically complex;previous functional deficit  -MM       Row Name 09/12/23 0682          Living Environment    People in Home significant other  -MM       Row Name 09/12/23 [facility]7          Home Main Entrance    Number of Stairs, Main Entrance one;other (see comments)  Ramp to enter  -MM       Row Name 09/12/23 9602          Stairs Within Home, Primary    Stairs, Within Home, Primary Patient \"sometimes\" ambulates with FWW. Ramp to enter with one small step. Tub/showe with shower chair and grab bars. Elevated toilet seat.  -MM     Number of Stairs, Within Home, Primary none  -MM       Row " Name 09/12/23 1355          Cognition    Orientation Status (Cognition) oriented x 4  -MM       Row Name 09/12/23 1355          Safety Issues, Functional Mobility    Safety Issues Affecting Function (Mobility) awareness of need for assistance  -MM     Impairments Affecting Function (Mobility) balance;endurance/activity tolerance;shortness of breath;strength  -MM               User Key  (r) = Recorded By, (t) = Taken By, (c) = Cosigned By      Initials Name Provider Type    Britney Xie, PT Physical Therapist                   Mobility       Row Name 09/12/23 [facility]5          Bed Mobility    Bed Mobility supine-sit;sit-supine;scooting/bridging  -MM     Scooting/Bridging [place] (Bed Mobility) supervision  -MM     Supine-Sit [place] (Bed Mobility) supervision  -MM     Sit-Supine [place] (Bed Mobility) contact guard  -MM     Assistive Device (Bed Mobility) bed rails;head of bed elevated  -MM       Row Name 09/12/23 1355          Sit-Stand Transfer    Sit-Stand [place] (Transfers) contact guard  -MM       Row Name 09/12/23 [facility]5          Gait/Stairs (Locomotion)    [place] Level (Gait) contact guard  -MM     Assistive Device (Gait) walker, front-wheeled  -MM     Distance in Feet (Gait) 4 steps X 2  -MM     Deviations/Abnormal Patterns (Gait) gait speed decreased;festinating/shuffling  -MM               User Key  (r) = Recorded By, (t) = Taken By, (c) = Cosigned By      Initials Name Provider Type    Britney Xie PT Physical Therapist                   Obj/Interventions       Row Name 09/12/23 1355          Range of Motion Comprehensive    General Range of Motion bilateral lower extremity ROM WFL  -MM       Row Name 09/12/23 1355          Strength Comprehensive (MMT)    General Manual Muscle Testing (MMT) Assessment other (see comments)  -MM     Comment, General Manual Muscle Testing (MMT) Assessment BLE 3+/5 grossly.  -MM       Row Name 09/12/23 1355          Sensory  Assessment (Somatosensory)    Sensory Assessment (Somatosensory) LE sensation intact  -MM               User Key  (r) = Recorded By, (t) = Taken By, (c) = Cosigned By      Initials Name Provider Type    Britney Xie, PT Physical Therapist                   Goals/Plan       Row Name 09/12/23 4818          Bed Mobility Goal 1 (PT)    Activity/Assistive Device (Bed Mobility Goal 1, PT) sit to supine/supine to sit  -MM     [place] Level/Cues Needed (Bed Mobility Goal 1, PT) independent  -MM     Time Frame (Bed Mobility Goal 1, PT) by discharge  -MM     Strategies/Barriers (Bed Mobility Goal 1, PT) HOB flat, no bed rails.  -MM     Progress/Outcomes (Bed Mobility Goal 1, PT) goal not met  -MM       Row Name 09/12/23 6955          Transfer Goal 1 (PT)    Activity/Assistive Device (Transfer Goal 1, PT) sit-to-stand/stand-to-sit;bed-to-chair/chair-to-bed;walker, rolling  -MM     [place] Level/Cues Needed (Transfer Goal 1, PT) modified independence  -MM     Time Frame (Transfer Goal 1, PT) by discharge  -MM     Progress/Outcome (Transfer Goal 1, PT) goal not met  -MM       Row Name 09/12/23 0419          Gait Training Goal 1 (PT)    Activity/Assistive Device (Gait Training Goal 1, PT) increase endurance/gait distance;walker, rolling  -MM     [place] Level (Gait Training Goal 1, PT) modified independence  -MM     Distance (Gait Training Goal 1, PT) 150' X 1  -MM     Time Frame (Gait Training Goal 1, PT) by discharge  -MM     Progress/Outcome (Gait Training Goal 1, PT) goal not met  -MM       Row Name 09/12/23 8274          Problem Specific Goal 1 (PT)    Problem Specific Goal 1 (PT) Score 24/28 on Tinetti fall risk assessment to decrease fall risk.  -MM     Time Frame (Problem Specific Goal 1, PT) by discharge  -MM     Progress/Outcome (Problem Specific Goal 1, PT) goal not met  -MM       Row Name 09/12/23 9665          Therapy Assessment/Plan (PT)    Planned Therapy Interventions (PT) balance  training;bed mobility training;gait training;home exercise program;joint mobilization;lumbar stabilization;manual therapy techniques;motor coordination training;stretching;strengthening;stair training;ROM (range of motion);postural re-education;patient/family education;neuromuscular re-education;transfer training  -MM               User Key  (r) = Recorded By, (t) = Taken By, (c) = Cosigned By      Initials Name Provider Type    MM Britney Espinosa, PT Physical Therapist                   Clinical Impression       Row Name 09/12/23 2591          Pain    Pretreatment Pain Rating 0/10 - no pain  -MM     Posttreatment Pain Rating 0/10 - no pain  -MM       Row Name 09/12/23 [facility]          Plan of Care Review    Plan of Care Reviewed With patient;spouse  -MM     Outcome Evaluation PT evaluation completed, co-eval with OT. Patient AO X 4 and agreeable to therapy. Patient able to supine>sit and scoot with SPV, sit>supine with CGA, and sit>stand with CGA. Patient was able to take 4 steps X 2 with HHA. Patient is ambulating with shuffling gait pattern and significant other reports that is not usual. Recommend patient ambulate with FWW. Goals created, continue skilled IP PT. Recommend patient d/c home with assist and HHPT pending progress with goals.  -MM       Row Name 09/12/23 6848          Therapy Assessment/Plan (PT)    Rehab Potential (PT) fair, will monitor progress closely  -MM     Criteria for Skilled Interventions Met (PT) yes;skilled treatment is necessary  -MM     Therapy Frequency (PT) 5 times/wk  -MM       Row Name 09/12/23 4917          Vital Signs    Pre Systolic BP Rehab 130  -MM     Pre Treatment Diastolic BP 71  -MM     Pretreatment Heart Rate (beats/min) 96  -MM     Pre SpO2 (%) 97  -MM     O2 Delivery Pre Treatment nasal cannula  1.5 LPM  -MM     Pre Patient Position Supine  -MM       Row Name 09/12/23 2459          Positioning and Restraints    Pre-Treatment Position in bed  -MM     Post Treatment  Position bed  -MM     In Bed encouraged to call for assist;call light within reach;with family/caregiver  -MM               User Key  (r) = Recorded By, (t) = Taken By, (c) = Cosigned By      Initials Name Provider Type    Britney Xie PT Physical Therapist                   Outcome Measures       Row Name 09/12/23 1359          How much help from another person do you currently need...    Turning from your back to your side while in flat bed without using bedrails? 3  -MM     Moving from lying on back to sitting on the side of a flat bed without bedrails? 3  -MM     Moving to and from a bed to a chair (including a wheelchair)? 3  -MM     Standing up from a chair using your arms (e.g., wheelchair, bedside chair)? 3  -MM     Climbing 3-5 steps with a railing? 2  -MM     To walk in hospital room? 2  -MM     AM-PAC 6 Clicks Score (PT) 16  -MM     Highest level of mobility 5 --> Static standing  -MM       Row Name 09/12/23 5134          Functional Assessment    Outcome Measure Options AM-PAC 6 Clicks Basic Mobility (PT)  -MM               User Key  (r) = Recorded By, (t) = Taken By, (c) = Cosigned By      Initials Name Provider Type    Britney Xie PT Physical Therapist                                 Physical Therapy Education       Title: PT OT SLP Therapies (In Progress)       Topic: Physical Therapy (In Progress)       Point: Mobility training (In Progress)       Learning Progress Summary             Patient Acceptance, E, NR by HA at 9/12/2023 8358    Comment: PT POC and goals, proper hand placement to facilitate transfers, use of FWW to increase safety with ambulation.                         Point: Home exercise program (Not Started)       Learner Progress:  Not documented in this visit.              Point: Body mechanics (Not Started)       Learner Progress:  Not documented in this visit.              Point: Precautions (Not Started)       Learner Progress:  Not documented in this visit.                               User Key       Initials Effective Dates Name Provider Type Discipline     06/26/23 -  Britnye Espinosa, PT Physical Therapist PT                  PT Recommendation and Plan  Planned Therapy Interventions (PT): balance training, bed mobility training, gait training, home exercise program, joint mobilization, lumbar stabilization, manual therapy techniques, motor coordination training, stretching, strengthening, stair training, ROM (range of motion), postural re-education, patient/family education, neuromuscular re-education, transfer training  Plan of Care Reviewed With: patient, spouse  Outcome Evaluation: PT evaluation completed, co-eval with OT. Patient AO X 4 and agreeable to therapy. Patient able to supine>sit and scoot with SPV, sit>supine with CGA, and sit>stand with CGA. Patient was able to take 4 steps X 2 with HHA. Patient is ambulating with shuffling gait pattern and significant other reports that is not usual. Recommend patient ambulate with FWW. Goals created, continue skilled IP PT. Recommend patient d/c home with assist and HHPT pending progress with goals.     Time Calculation:   PT Evaluation Complexity  History, PT Evaluation Complexity: 1-2 personal factors and/or comorbidities  Examination of Body Systems (PT Eval Complexity): total of 3 or more elements  Clinical Presentation (PT Evaluation Complexity): evolving  Clinical Decision Making (PT Evaluation Complexity): moderate complexity  Overall Complexity (PT Evaluation Complexity): moderate complexity     PT Charges       Row Name 09/12/23 1530             Time Calculation    Start Time 1354  -MM      Stop Time 1435  -MM      Time Calculation (min) 41 min  -MM      PT Received On 09/12/23  -MM      PT Goal Re-Cert Due Date 09/25/23  -MM         Untimed Charges    PT Eval/Re-eval Minutes 41  -MM         Total Minutes    Untimed Charges Total Minutes 41  -MM       Total Minutes 41  -MM                User Key  (r)  = Recorded By, (t) = Taken By, (c) = Cosigned By      Initials Name Provider Type    MM Britney Espinosa, PT Physical Therapist                  Therapy Charges for Today       Code Description Service Date Service Provider Modifiers Qty    90461135958 HC PT EVAL MOD COMPLEXITY 3 2023 Britney Espinosa, PT GP 1            PT G-Codes  Outcome Measure Options: AM-PAC 6 Clicks Basic Mobility (PT)  AM-PAC 6 Clicks Score (PT): 16  PT Discharge Summary  Anticipated Discharge Disposition (PT): home with assist, home with home health    Britney Espinosa, PT  2023      Electronically signed by Daniel Costa, PT at 23 1552       Britney Espinosa, PT at 23 1530  Version 1 of 2         Patient Name: Brian Garcia  : 1950    MRN: [id number]                              Today's Date: 2023       Admit Date: 2023    Visit Dx:     ICD-10-CM ICD-9-CM   1. Hallucinations  R44.3 780.1   2. Fatigue, unspecified type  R53.83 780.79   3. Impaired functional mobility, balance, gait, and endurance [Z74.09 (ICD-10-CM)]  Z74.09 V49.89     Patient Active Problem List   Diagnosis    Physical deconditioning    Leukocytosis    History of pulmonary embolism    Stage 4 very severe COPD by GOLD classification    Chronic respiratory failure with hypoxia, on home O2 therapy    Hypertension    CVA (cerebral vascular accident)    Traumatic hemorrhage of cerebrum    Iron deficiency anemia secondary to inadequate dietary iron intake    Acute pain of right shoulder    Limited range of motion (ROM) of shoulder    Rotator cuff syndrome of right shoulder    Impingement syndrome of right shoulder    Arthrosis of right acromioclavicular joint    Chronic right shoulder pain    Nontraumatic incomplete tear of right rotator cuff    Primary osteoarthritis of right shoulder    Malignant neoplasm of overlapping sites of right lung    Primary lung adenocarcinoma    Encounter for venous access device  care    Iron malabsorption    Hypokalemia    C. difficile colitis    Hallucinations     Past Medical History:   Diagnosis Date    A-fib     Anesthesia     hip surgery (spinal caused him unpleasant sensations never wants it again)    Arthritis     Blood in sputum 02/22/2023    bright red blood, teaspoon - tablespoon daily    COPD (chronic obstructive pulmonary disease)     CVA (cerebral vascular accident) 12/24/2022    Hearing aid worn     bilateral    History of pulmonary embolism     History of recurrent pneumonia     Hypertension     Lung cancer 03/17/2023    Oxygen dependent     since @ age 65    Pulmonary nodules     Risk for falls     uses walker    Rotator cuff syndrome of right shoulder     Tachycardia     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 HTN/Eliquis    Wears glasses     readers    Wears glasses     reading     Past Surgical History:   Procedure Laterality Date    HIP ARTHROPLASTY Right     RIB BIOPSY Left 03/17/2023 11th    VENOUS ACCESS DEVICE (PORT) INSERTION N/A 3/30/2023    Procedure: MEDIPORT PLACEMENT          (C-ARM);  Surgeon: Lavon Cochran MD;  Location: [facility];  Service: General;  Laterality: N/A;      General Information       Row Name 09/12/23 1354          Physical Therapy Time and Intention    Document Type evaluation  -MM     Mode of Treatment physical therapy;occupational therapy  -MM       Row Name 09/12/23 6496          General Information    Patient Profile Reviewed yes  -MM     Prior Level of Function independent:;all household mobility  -MM     Existing Precautions/Restrictions fall;oxygen therapy device and L/min  2 LPM  -MM     Barriers to Rehab medically complex;previous functional deficit  -MM       Row Name 09/12/23 7708          Living Environment    People in Home significant other  -MM       Row Name 09/12/23 [facility]          Home Main Entrance    Number of Stairs, Main Entrance one;other (see comments)  Ramp to enter  -MM       Row Name 09/12/23 3240  "         Stairs Within Home, Primary    Stairs, Within Home, Primary Patient \"sometimes\" ambulates with FWW. Ramp to enter with one small step. Tub/showe with shower chair and grab bars. Elevated toilet seat.  -MM     Number of Stairs, Within Home, Primary none  -MM       Row Name 09/12/23 1355          Cognition    Orientation Status (Cognition) oriented x 4  -MM       Row Name 09/12/23 1355          Safety Issues, Functional Mobility    Safety Issues Affecting Function (Mobility) awareness of need for assistance  -MM     Impairments Affecting Function (Mobility) balance;endurance/activity tolerance;shortness of breath;strength  -MM               User Key  (r) = Recorded By, (t) = Taken By, (c) = Cosigned By      Initials Name Provider Type    Britney Xie PT Physical Therapist                   Mobility       Row Name 09/12/23 1355          Bed Mobility    Bed Mobility supine-sit;sit-supine;scooting/bridging  -MM     Scooting/Bridging [place] (Bed Mobility) supervision  -MM     Supine-Sit [place] (Bed Mobility) supervision  -MM     Sit-Supine [place] (Bed Mobility) contact guard  -MM     Assistive Device (Bed Mobility) bed rails;head of bed elevated  -MM       Row Name 09/12/23 1355          Sit-Stand Transfer    Sit-Stand [place] (Transfers) contact guard  -MM       Row Name 09/12/23 1355          Gait/Stairs (Locomotion)    [place] Level (Gait) contact guard  -MM     Assistive Device (Gait) walker, front-wheeled  -MM     Distance in Feet (Gait) 4 steps X 2  -MM     Deviations/Abnormal Patterns (Gait) gait speed decreased;festinating/shuffling  -MM               User Key  (r) = Recorded By, (t) = Taken By, (c) = Cosigned By      Initials Name Provider Type    Britney Xie PT Physical Therapist                   Obj/Interventions       Row Name 09/12/23 1355          Range of Motion Comprehensive    General Range of Motion bilateral lower extremity ROM WFL  -MM  "      Row Name 09/12/23 1352          Strength Comprehensive (MMT)    General Manual Muscle Testing (MMT) Assessment other (see comments)  -MM     Comment, General Manual Muscle Testing (MMT) Assessment BLE 3+/5 grossly.  -MM       Row Name 09/12/23 1355          Sensory Assessment (Somatosensory)    Sensory Assessment (Somatosensory) LE sensation intact  -MM               User Key  (r) = Recorded By, (t) = Taken By, (c) = Cosigned By      Initials Name Provider Type    MM Britney Espinosa, PT Physical Therapist                   Goals/Plan       Row Name 09/12/23 1355          Bed Mobility Goal 1 (PT)    Activity/Assistive Device (Bed Mobility Goal 1, PT) sit to supine/supine to sit  -MM     [place] Level/Cues Needed (Bed Mobility Goal 1, PT) independent  -MM     Time Frame (Bed Mobility Goal 1, PT) by discharge  -MM     Strategies/Barriers (Bed Mobility Goal 1, PT) HOB flat, no bed rails.  -MM     Progress/Outcomes (Bed Mobility Goal 1, PT) goal not met  -MM       Row Name 09/12/23 [facility]5          Transfer Goal 1 (PT)    Activity/Assistive Device (Transfer Goal 1, PT) sit-to-stand/stand-to-sit;bed-to-chair/chair-to-bed;walker, rolling  -MM     [place] Level/Cues Needed (Transfer Goal 1, PT) modified independence  -MM     Time Frame (Transfer Goal 1, PT) by discharge  -MM     Progress/Outcome (Transfer Goal 1, PT) goal not met  -MM       Row Name 09/12/23 1805          Gait Training Goal 1 (PT)    Activity/Assistive Device (Gait Training Goal 1, PT) increase endurance/gait distance;walker, rolling  -MM     [place] Level (Gait Training Goal 1, PT) modified independence  -MM     Distance (Gait Training Goal 1, PT) 150' X 1  -MM     Time Frame (Gait Training Goal 1, PT) by discharge  -MM     Progress/Outcome (Gait Training Goal 1, PT) goal not met  -MM       Row Name 09/12/23 1355          Problem Specific Goal 1 (PT)    Problem Specific Goal 1 (PT) Score 24/28 on Tinetti fall risk assessment to  decrease fall risk.  -MM     Time Frame (Problem Specific Goal 1, PT) by discharge  -MM     Progress/Outcome (Problem Specific Goal 1, PT) goal not met  -MM       Row Name 09/12/23 3471          Therapy Assessment/Plan (PT)    Planned Therapy Interventions (PT) balance training;bed mobility training;gait training;home exercise program;joint mobilization;lumbar stabilization;manual therapy techniques;motor coordination training;stretching;strengthening;stair training;ROM (range of motion);postural re-education;patient/family education;neuromuscular re-education;transfer training  -MM               User Key  (r) = Recorded By, (t) = Taken By, (c) = Cosigned By      Initials Name Provider Type    MM Britney Espinosa, PT Physical Therapist                   Clinical Impression       Row Name 09/12/23 3572          Pain    Pretreatment Pain Rating 0/10 - no pain  -MM     Posttreatment Pain Rating 0/10 - no pain  -MM       Row Name 09/12/23 0698          Plan of Care Review    Plan of Care Reviewed With patient;spouse  -MM     Outcome Evaluation PT evaluation completed, co-eval with OT. Patient AO X 4 and agreeable to therapy. Patient able to supine>sit and scoot with SPV, sit>supine with CGA, and sit>stand with CGA. Patient was able to take 4 steps X 2 with HHA. Patient is ambulating with shuffling gait pattern and significant other reports that is not usual. Recommend patient ambulate with FWW. Goals created, continue skilled IP PT. Recommend patient d/c home with assist and HHPT pending progress with goals.  -MM       Row Name 09/12/23 2029          Therapy Assessment/Plan (PT)    Rehab Potential (PT) fair, will monitor progress closely  -MM     Criteria for Skilled Interventions Met (PT) yes;skilled treatment is necessary  -MM     Therapy Frequency (PT) 5 times/wk  -MM       Row Name 09/12/23 3483          Vital Signs    Pre Systolic BP Rehab 130  -MM     Pre Treatment Diastolic BP 71  -MM     Pretreatment Heart  Rate (beats/min) 96  -MM     Pre SpO2 (%) 97  -MM     O2 Delivery Pre Treatment nasal cannula  1.5 LPM  -MM     Pre Patient Position Supine  -MM       Row Name 09/12/23 1355          Positioning and Restraints    Pre-Treatment Position in bed  -MM     Post Treatment Position bed  -MM     In Bed exit alarm on;encouraged to call for assist;call light within reach;with family/caregiver  -MM               User Key  (r) = Recorded By, (t) = Taken By, (c) = Cosigned By      Initials Name Provider Type    Britney Xie, PT Physical Therapist                   Outcome Measures       Row Name 09/12/23 1355          How much help from another person do you currently need...    Turning from your back to your side while in flat bed without using bedrails? 3  -MM     Moving from lying on back to sitting on the side of a flat bed without bedrails? 3  -MM     Moving to and from a bed to a chair (including a wheelchair)? 3  -MM     Standing up from a chair using your arms (e.g., wheelchair, bedside chair)? 3  -MM     Climbing 3-5 steps with a railing? 2  -MM     To walk in hospital room? 2  -MM     AM-PAC 6 Clicks Score (PT) 16  -MM     Highest level of mobility 5 --> Static standing  -MM       Row Name 09/12/23 1790          Functional Assessment    Outcome Measure Options AM-PAC 6 Clicks Basic Mobility (PT)  -MM               User Key  (r) = Recorded By, (t) = Taken By, (c) = Cosigned By      Initials Name Provider Type    Brintey Xie PT Physical Therapist                                 Physical Therapy Education       Title: PT OT SLP Therapies (In Progress)       Topic: Physical Therapy (In Progress)       Point: Mobility training (In Progress)       Learning Progress Summary             Patient Acceptance, E, NR by HA at 9/12/2023 1528    Comment: PT POC and goals, proper hand placement to facilitate transfers, use of FWW to increase safety with ambulation.                         Point: Home exercise  program (Not Started)       Learner Progress:  Not documented in this visit.              Point: Body mechanics (Not Started)       Learner Progress:  Not documented in this visit.              Point: Precautions (Not Started)       Learner Progress:  Not documented in this visit.                              User Key       Initials Effective Dates Name Provider Type Discipline     06/26/23 -  Britney Espinosa, PT Physical Therapist PT                  PT Recommendation and Plan  Planned Therapy Interventions (PT): balance training, bed mobility training, gait training, home exercise program, joint mobilization, lumbar stabilization, manual therapy techniques, motor coordination training, stretching, strengthening, stair training, ROM (range of motion), postural re-education, patient/family education, neuromuscular re-education, transfer training  Plan of Care Reviewed With: patient, spouse  Outcome Evaluation: PT evaluation completed, co-eval with OT. Patient AO X 4 and agreeable to therapy. Patient able to supine>sit and scoot with SPV, sit>supine with CGA, and sit>stand with CGA. Patient was able to take 4 steps X 2 with HHA. Patient is ambulating with shuffling gait pattern and significant other reports that is not usual. Recommend patient ambulate with FWW. Goals created, continue skilled IP PT. Recommend patient d/c home with assist and HHPT pending progress with goals.     Time Calculation:   PT Evaluation Complexity  History, PT Evaluation Complexity: 1-2 personal factors and/or comorbidities  Examination of Body Systems (PT Eval Complexity): total of 3 or more elements  Clinical Presentation (PT Evaluation Complexity): evolving  Clinical Decision Making (PT Evaluation Complexity): moderate complexity  Overall Complexity (PT Evaluation Complexity): moderate complexity     PT Charges       Row Name 09/12/23 1530             Time Calculation    Start Time 1354  -MM      Stop Time 1435  -MM      Time  Calculation (min) 41 min  -MM      PT Received On 23  -MM      PT Goal Re-Cert Due Date 23  -MM         Untimed Charges    PT Eval/Re-eval Minutes 41  -MM         Total Minutes    Untimed Charges Total Minutes 41  -MM       Total Minutes 41  -MM                User Key  (r) = Recorded By, (t) = Taken By, (c) = Cosigned By      Initials Name Provider Type    MM Britney Espinosa, PT Physical Therapist                  Therapy Charges for Today       Code Description Service Date Service Provider Modifiers Qty    91880181005 HC PT EVAL MOD COMPLEXITY 3 2023 Britney Espinosa, PT GP 1            PT G-Codes  Outcome Measure Options: AM-PAC 6 Clicks Basic Mobility (PT)  AM-PAC 6 Clicks Score (PT): 16  PT Discharge Summary  Anticipated Discharge Disposition (PT): home with assist, home with home health    Britney Espinosa, TYLER  2023      Electronically signed by Britney Espinosa, PT at 23 1531       Andrea Romano PTA at 23  Version 1 of          Goal Outcome Evaluation:  Plan of Care Reviewed With: patient        Progress: improving  Outcome Evaluation: Pt. was spv. for bed mobility and was cga for transfers.  Pt. performed multiple sit to stands for strengthning and stood 10 x1 and then 8x1 this tx.  Pt. also perfromed seated LE therex eob 20 x 1.  Pt. sat up eob for 16' with spv.  No new goals met and pt. improving well and would benefit from continued skilled PT.           Electronically signed by Andrea Romano PTA at 23       Andrea Romano PTA at 23  Version 1 of          Acute Care - Physical Therapy Treatment Note  [facility]     Patient Name: Brian Garcia  : 1950  MRN: [id number]  Today's Date: 2023      Visit Dx:     ICD-10-CM ICD-9-CM   1. Hallucinations  R44.3 780.1   2. Fatigue, unspecified type  R53.83 780.79   3. Impaired functional mobility, balance, gait, and endurance [Z74.09 (ICD-10-CM)]  Z74.09  V49.89   4. Impaired mobility and ADLs [Z74.09, Z78.9 (ICD-10-CM)]  Z74.09 V49.89    Z78.9      Patient Active Problem List   Diagnosis    Physical deconditioning    Leukocytosis    History of pulmonary embolism    Stage 4 very severe COPD by GOLD classification    Chronic respiratory failure with hypoxia, on home O2 therapy    Hypertension    CVA (cerebral vascular accident)    Traumatic hemorrhage of cerebrum    Iron deficiency anemia secondary to inadequate dietary iron intake    Acute pain of right shoulder    Limited range of motion (ROM) of shoulder    Rotator cuff syndrome of right shoulder    Impingement syndrome of right shoulder    Arthrosis of right acromioclavicular joint    Chronic right shoulder pain    Nontraumatic incomplete tear of right rotator cuff    Primary osteoarthritis of right shoulder    Malignant neoplasm of overlapping sites of right lung    Primary lung adenocarcinoma    Encounter for venous access device care    Iron malabsorption    Hypokalemia    C. difficile colitis    Hallucinations     Past Medical History:   Diagnosis Date    A-fib     Anesthesia     hip surgery (spinal caused him unpleasant sensations never wants it again)    Arthritis     Blood in sputum 02/22/2023    bright red blood, teaspoon - tablespoon daily    COPD (chronic obstructive pulmonary disease)     CVA (cerebral vascular accident) 12/24/2022    Hearing aid worn     bilateral    History of pulmonary embolism     History of recurrent pneumonia     Hypertension     Lung cancer 03/17/2023    Oxygen dependent     since @ age 65    Pulmonary nodules     Risk for falls     uses walker    Rotator cuff syndrome of right shoulder     Tachycardia     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 HTN/Eliquis    Wears glasses     readers    Wears glasses     reading     Past Surgical History:   Procedure Laterality Date    HIP ARTHROPLASTY Right     RIB BIOPSY Left 03/17/2023 11th    VENOUS ACCESS DEVICE (PORT)  INSERTION N/A 3/30/2023    Procedure: MEDIPORT PLACEMENT          (C-ARM);  Surgeon: Lavon Cochran MD;  Location: [facility];  Service: General;  Laterality: N/A;     PT Assessment (last 12 hours)       PT Evaluation and Treatment       Row Name 09/13/23 1055          Physical Therapy Time and Intention    Subjective Information no complaints  -CA     Document Type therapy note (daily note)  -CA     Mode of Treatment individual therapy;physical therapy  -CA       Row Name 09/13/23 1055          General Information    Existing Precautions/Restrictions fall;oxygen therapy device and L/min  -CA       Row Name 09/13/23 1055          Pain    Pretreatment Pain Rating 0/10 - no pain  -CA     Posttreatment Pain Rating 0/10 - no pain  -CA       Row Name 09/13/23 1055          Cognition    Orientation Status (Cognition) oriented x 4  -CA       Row Name 09/13/23 1055          Bed Mobility    Bed Mobility supine-sit;sit-supine  -CA     Scooting/Bridging [place] (Bed Mobility) supervision  -CA     Supine-Sit [place] (Bed Mobility) supervision  -CA     Sit-Supine [place] (Bed Mobility) supervision  -CA     Assistive Device (Bed Mobility) bed rails;head of bed elevated  -CA       Row Name 09/13/23 1055          Transfers    Transfers sit-stand transfer;stand-sit transfer  -CA     Comment, (Transfers) Pt worked on multiple sit tostands for strengthning and performed 10x 1 and then 8x1  -CA       Row Name 09/13/23 1055          Sit-Stand Transfer    Sit-Stand [place] (Transfers) contact guard  -CA       Row Name 09/13/23 1055          Stand-Sit Transfer    Stand-Sit [place] (Transfers) contact guard  -CA       Row Name 09/13/23 1055          Motor Skills    Therapeutic Exercise hip;knee;ankle  -CA       Row Name 09/13/23 1055          Hip (Therapeutic Exercise)    Hip (Therapeutic Exercise) AROM (active range of motion)  -CA     Hip AROM (Therapeutic Exercise) bilateral;flexion;extension  -CA       Row  Name 09/13/23 1055          Knee (Therapeutic Exercise)    Knee (Therapeutic Exercise) AROM (active range of motion)  -CA     Knee AROM (Therapeutic Exercise) bilateral;LAQ (long arc quad)  -CA       Row Name 09/13/23 1055          Ankle (Therapeutic Exercise)    Ankle (Therapeutic Exercise) AROM (active range of motion)  -CA     Ankle AROM (Therapeutic Exercise) bilateral;dorsiflexion;plantarflexion  -CA       Row Name 09/13/23 1055          Vital Signs    Pre Systolic BP Rehab 133  -CA     Pre Treatment Diastolic BP 58  -CA     Pretreatment Heart Rate (beats/min) 97  -CA     Pre SpO2 (%) 97  -CA     O2 Delivery Pre Treatment nasal cannula  -CA     Intra SpO2 (%) 93  -CA     O2 Delivery Intra Treatment nasal cannula  -CA     Post SpO2 (%) 96  -CA     O2 Delivery Post Treatment nasal cannula  -CA     Pre Patient Position Supine  -CA     Intra Patient Position Sitting  -CA     Post Patient Position Supine  -CA       Row Name 09/13/23 1055          Positioning and Restraints    Pre-Treatment Position in bed  -CA     Post Treatment Position bed  -CA     In Bed supine;call light within reach;encouraged to call for assist;exit alarm on;with family/caregiver  -CA               User Key  (r) = Recorded By, (t) = Taken By, (c) = Cosigned By      Initials Name Provider Type    Andrea Sarabia, PTA Physical Therapist Assistant                    Physical Therapy Education       Title: PT OT SLP Therapies (In Progress)       Topic: Physical Therapy (In Progress)       Point: Mobility training (In Progress)       Learning Progress Summary             Patient Acceptance, E, NR by MM at 9/12/2023 7078    Comment: PT POC and goals, proper hand placement to facilitate transfers, use of FWW to increase safety with ambulation.                         Point: Home exercise program (Not Started)       Learner Progress:  Not documented in this visit.              Point: Body mechanics (Not Started)       Learner Progress:  Not  documented in this visit.              Point: Precautions (Not Started)       Learner Progress:  Not documented in this visit.                              User Key       Initials Effective Dates Name Provider Type Discipline     06/26/23 -  Britney Espinosa, PT Physical Therapist PT                  PT Recommendation and Plan     Plan of Care Reviewed With: patient  Progress: improving  Outcome Evaluation: Pt. was spv. for bed mobility and was cga for transfers.  Pt. performed multiple sit to stands for strengthning and stood 10 x1 and then 8x1 this tx.  Pt. also perfromed seated LE therex eob 20 x 1.  Pt. sat up eob for 16' with spv.  No new goals met and pt. improving well and would benefit from continued skilled PT.       Time Calculation:    PT Charges       Row Name 09/13/23 8169             Time Calculation    Start Time 1055  -CA      Stop Time 1120  -CA      Time Calculation (min) 25 min  -CA      PT Received On 09/13/23  -CA         Time Calculation- PT    Total Timed Code Minutes- PT 25 minute(s)  -CA                User Key  (r) = Recorded By, (t) = Taken By, (c) = Cosigned By      Initials Name Provider Type    CA Andrea Romano PTA Physical Therapist Assistant                  Therapy Charges for Today       Code Description Service Date Service Provider Modifiers Qty    82489792615 HC PT THERAPEUTIC ACT EA 15 MIN 9/13/2023 Andrea Romano PTA GP 1    40444922114 HC PT THER PROC EA 15 MIN 9/13/2023 Andrea Romano PTA GP 1            PT G-Codes  Outcome Measure Options: AM-PAC 6 Clicks Basic Mobility (PT)  AM-PAC 6 Clicks Score (PT): 16  AM-PAC 6 Clicks Score (OT): 16    Andrea Romano PTA  9/13/2023      Electronically signed by Andrea Romano PTA at 09/13/23 3400       Sima Mota PTA at 09/14/23 1415  Version 1 of 1 09/14/23 1409   OTHER   Discipline physical therapy assistant   Rehab Time/Intention   Session Not Performed patient/family declined treatment  (pt defers PT tx,  states he doesn't feel like working w/ therapy today)         Electronically signed by Sima Mota PTA at 23 1415          Occupational Therapy Notes (last 72 hours)        CharleneAbhi carrasco OT at 23 1347          Acute Care - Occupational Therapy Treatment Note  [facility]     Patient Name: Brian Garcia  : 1950  MRN: [id number]  Today's Date: 2023             Admit Date: 2023       ICD-10-CM ICD-9-CM   1. Hallucinations  R44.3 780.1   2. Fatigue, unspecified type  R53.83 780.79   3. Impaired functional mobility, balance, gait, and endurance [Z74.09 (ICD-10-CM)]  Z74.09 V49.89   4. Impaired mobility and ADLs [Z74.09, Z78.9 (ICD-10-CM)]  Z74.09 V49.89    Z78.9      Patient Active Problem List   Diagnosis    Physical deconditioning    Leukocytosis    History of pulmonary embolism    Stage 4 very severe COPD by GOLD classification    Chronic respiratory failure with hypoxia, on home O2 therapy    Hypertension    CVA (cerebral vascular accident)    Traumatic hemorrhage of cerebrum    Iron deficiency anemia secondary to inadequate dietary iron intake    Acute pain of right shoulder    Limited range of motion (ROM) of shoulder    Rotator cuff syndrome of right shoulder    Impingement syndrome of right shoulder    Arthrosis of right acromioclavicular joint    Chronic right shoulder pain    Nontraumatic incomplete tear of right rotator cuff    Primary osteoarthritis of right shoulder    Malignant neoplasm of overlapping sites of right lung    Primary lung adenocarcinoma    Encounter for venous access device care    Iron malabsorption    Hypokalemia    C. difficile colitis    Hallucinations     Past Medical History:   Diagnosis Date    A-fib     Anesthesia     hip surgery (spinal caused him unpleasant sensations never wants it again)    Arthritis     Blood in sputum 2023    bright red blood, teaspoon - tablespoon daily    COPD (chronic obstructive pulmonary  disease)     CVA (cerebral vascular accident) 12/24/2022    Hearing aid worn     bilateral    History of pulmonary embolism     History of recurrent pneumonia     Hypertension     Lung cancer 03/17/2023    Oxygen dependent     since @ age 65    Pulmonary nodules     Risk for falls     uses walker    Rotator cuff syndrome of right shoulder     Tachycardia     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 HTN/Eliquis    Wears glasses     readers    Wears glasses     reading     Past Surgical History:   Procedure Laterality Date    HIP ARTHROPLASTY Right     RIB BIOPSY Left 03/17/2023 11th    VENOUS ACCESS DEVICE (PORT) INSERTION N/A 3/30/2023    Procedure: MEDIPORT PLACEMENT          (C-ARM);  Surgeon: Lavon Cochran MD;  Location: [facility];  Service: General;  Laterality: N/A;         OT ASSESSMENT FLOWSHEET (last 12 hours)       OT Evaluation and Treatment       Row Name 09/14/23 0922                   OT Time and Intention    Subjective Information no complaints  -RB        Document Type therapy note (daily note)  -RB        Mode of Treatment occupational therapy  -RB           General Information    Patient Profile Reviewed yes  -RB        Existing Precautions/Restrictions fall;oxygen therapy device and L/min  2 LPM  -RB           Pain Assessment    Pretreatment Pain Rating 0/10 - no pain  -RB        Posttreatment Pain Rating 0/10 - no pain  -RB           Cognition    Orientation Status (Cognition) oriented x 4  -RB           Strength Comprehensive (MMT)    General Manual Muscle Testing (MMT) Assessment --  -RB           Activities of Daily Living    BADL Assessment/Intervention bathing;lower body dressing;grooming;toileting  -RB           Bathing Assessment/Intervention    [place] Level (Bathing) bathing skills;supervision  -RB        Position (Bathing) edge of bed sitting  -RB           Lower Body Dressing Assessment/Training    [place] Level (Lower Body Dressing) lower body dressing  skills;don;pants/bottoms;socks;contact guard assist  -RB        Position (Lower Body Dressing) edge of bed sitting  -RB           Grooming Assessment/Training    [place] Level (Grooming) shave face;supervision  -RB        Assistive Devices (Grooming) electric razor  -RB           Toileting Assessment/Training    [place] Level (Toileting) supervision  -RB        Assistive Devices (Toileting) commode, bedside with drop arms  -RB        Position (Toileting) supported standing  -RB           Bed Mobility    Bed Mobility supine-sit;sit-supine  -RB        Scooting/Bridging [place] (Bed Mobility) --  -RB        Supine-Sit [place] (Bed Mobility) supervision  -RB        Sit-Supine [place] (Bed Mobility) supervision  -RB        Assistive Device (Bed Mobility) bed rails;head of bed elevated  -RB           Functional Mobility    Functional Mobility- Ind. Level contact guard assist  -RB        Functional Mobility-Distance (Feet) 3  -RB        Functional Mobility- Safety Issues balance decreased during turns  -RB        Functional Mobility- Comment Bed to BSC.  -RB           Transfer Assessment/Treatment    Transfers toilet transfer  -RB           Sit-Stand Transfer    Sit-Stand [place] (Transfers) contact guard  -RB           Toilet Transfer    Type (Toilet Transfer) sit-stand;stand-sit  -RB        [place] Level (Toilet Transfer) contact guard  -RB        Assistive Device (Toilet Transfer) commode, bedside with drop arms  -RB           Safety Issues, Functional Mobility    Impairments Affecting Function (Mobility) balance;endurance/activity tolerance;shortness of breath;strength  -RB           Vital Signs    Pre Systolic BP Rehab 129  -RB        Pre Treatment Diastolic BP 57  -RB        Pretreatment Heart Rate (beats/min) 101  -RB        Intratreatment Heart Rate (beats/min) 110  -RB        Pre SpO2 (%) 91  -RB        O2 Delivery Pre Treatment supplemental O2  3 L  -RB        Intra SpO2 (%) 95   -RB        Post SpO2 (%) 95  -RB        Pre Patient Position Sitting  -RB           Positioning and Restraints    Pre-Treatment Position in bed  -RB        Post Treatment Position bed  -RB        In Bed supine;call light within reach;encouraged to call for assist;exit alarm on;with family/caregiver  -RB           Therapy Plan Review/Discharge Plan (OT)    Anticipated Discharge Disposition (OT) skilled nursing facility;inpatient rehabilitation facility  -RB                  User Key  (r) = Recorded By, (t) = Taken By, (c) = Cosigned By      Initials Name Effective Dates    RB Abhi Chang OT 07/11/23 -                      Occupational Therapy Education       Title: PT OT SLP Therapies (In Progress)       Topic: Occupational Therapy (In Progress)       Point: ADL training (In Progress)       Description:   Instruct learner(s) on proper safety adaptation and remediation techniques during self care or transfers.   Instruct in proper use of assistive devices.                  Learning Progress Summary             Patient Acceptance, E,TB, NR by RW at 9/12/2023 1555    Comment: POC, Role of OT, transfer training                         Point: Home exercise program (Not Started)       Description:   Instruct learner(s) on appropriate technique for monitoring, assisting and/or progressing therapeutic exercises/activities.                  Learner Progress:  Not documented in this visit.              Point: Precautions (Done)       Description:   Instruct learner(s) on prescribed precautions during self-care and functional transfers.                  Learning Progress Summary             Patient Acceptance, E,D, NR,VU by RB at 9/14/2023 1343    Comment: Edu pt on use of gait belt and non skid socks when OOB and no OOB without assist.    Acceptance, E,TB, NR by RW at 9/12/2023 1555    Comment: POC, Role of OT, transfer training                         Point: Body mechanics (In Progress)       Description:   Instruct  learner(s) on proper positioning and spine alignment during self-care, functional mobility activities and/or exercises.                  Learning Progress Summary             Patient Acceptance, E,TB, NR by RW at 9/12/2023 9215    Comment: POC, Role of OT, transfer training                                         User Key       Initials Effective Dates Name Provider Type Discipline    RB 07/11/23 -  Abhi Chang OT Occupational Therapist OT    RW 09/22/22 -  Mildred Joe OT Occupational Therapist OT                      OT Recommendation and Plan           Outcome Measures       Row Name 09/14/23 0922             How much help from another is currently needed...    Putting on and taking off regular lower body clothing? 3  -RB      Bathing (including washing, rinsing, and drying) 3  -RB      Toileting (which includes using toilet bed pan or urinal) 3  -RB      Putting on and taking off regular upper body clothing 3  -RB      Taking care of personal grooming (such as brushing teeth) 3  -RB      Eating meals 4  -RB      AM-PAC 6 Clicks Score (OT) 19  -RB                User Key  (r) = Recorded By, (t) = Taken By, (c) = Cosigned By      Initials Name Provider Type    RB Abhi Chang OT Occupational Therapist                    Time Calculation:    Time Calculation- OT       Row Name 09/14/23 1346             Time Calculation- OT    OT Start Time 0922  -RB      OT Stop Time 1043  -RB      OT Time Calculation (min) 81 min  -RB      OT Received On 09/14/23  -RB                User Key  (r) = Recorded By, (t) = Taken By, (c) = Cosigned By      Initials Name Provider Type    RB Abhi Chang OT Occupational Therapist                  Therapy Charges for Today       Code Description Service Date Service Provider Modifiers Qty    52268492815  OT SELF CARE/MGMT/TRAIN EA 15 MIN 9/14/2023 Abhi Chang OT GO 5                 Abhi Chang OT  9/14/2023    Electronically signed by Abhi Chang OT at 09/14/23 6571        Abhi Chang, OT at 23 1344          Goal Outcome Evaluation:      OT tx on this date.  Pt was CGA for sit to stand and toilet transfers.  Pt performed total sponge bath with supervision and dons pants/socks with CGA.  Pt was set up supervision for toileting.                 Anticipated Discharge Disposition (OT): skilled nursing facility, inpatient rehabilitation facility    Electronically signed by Abhi Chang, OT at 23 1346       Mildred Joe OT at 23 4537          Patient Name: Brian Garcia  : 1950    MRN: [id number]                              Today's Date: 2023       Admit Date: 2023    Visit Dx:     ICD-10-CM ICD-9-CM   1. Hallucinations  R44.3 780.1   2. Fatigue, unspecified type  R53.83 780.79   3. Impaired functional mobility, balance, gait, and endurance [Z74.09 (ICD-10-CM)]  Z74.09 V49.89   4. Impaired mobility and ADLs [Z74.09, Z78.9 (ICD-10-CM)]  Z74.09 V49.89    Z78.9      Patient Active Problem List   Diagnosis    Physical deconditioning    Leukocytosis    History of pulmonary embolism    Stage 4 very severe COPD by GOLD classification    Chronic respiratory failure with hypoxia, on home O2 therapy    Hypertension    CVA (cerebral vascular accident)    Traumatic hemorrhage of cerebrum    Iron deficiency anemia secondary to inadequate dietary iron intake    Acute pain of right shoulder    Limited range of motion (ROM) of shoulder    Rotator cuff syndrome of right shoulder    Impingement syndrome of right shoulder    Arthrosis of right acromioclavicular joint    Chronic right shoulder pain    Nontraumatic incomplete tear of right rotator cuff    Primary osteoarthritis of right shoulder    Malignant neoplasm of overlapping sites of right lung    Primary lung adenocarcinoma    Encounter for venous access device care    Iron malabsorption    Hypokalemia    C. difficile colitis    Hallucinations     Past Medical History:   Diagnosis Date    A-fib      Anesthesia     hip surgery (spinal caused him unpleasant sensations never wants it again)    Arthritis     Blood in sputum 02/22/2023    bright red blood, teaspoon - tablespoon daily    COPD (chronic obstructive pulmonary disease)     CVA (cerebral vascular accident) 12/24/2022    Hearing aid worn     bilateral    History of pulmonary embolism     History of recurrent pneumonia     Hypertension     Lung cancer 03/17/2023    Oxygen dependent     since @ age 65    Pulmonary nodules     Risk for falls     uses walker    Rotator cuff syndrome of right shoulder     Tachycardia     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 HTN/Eliquis    Wears glasses     readers    Wears glasses     reading     Past Surgical History:   Procedure Laterality Date    HIP ARTHROPLASTY Right     RIB BIOPSY Left 03/17/2023 11th    VENOUS ACCESS DEVICE (PORT) INSERTION N/A 3/30/2023    Procedure: MEDIPORT PLACEMENT          (C-ARM);  Surgeon: Lavon Cochran MD;  Location: [facility];  Service: General;  Laterality: N/A;      General Information       Row Name 09/12/23 1354          OT Time and Intention    Document Type evaluation  -RW     Mode of Treatment co-treatment;physical therapy;occupational therapy  -RW       Row Name 09/12/23 [facility]8          General Information    Patient Profile Reviewed yes  -RW     Prior Level of Function independent:;feeding;grooming;all household mobility;min assist:;dressing;bathing  -RW     Existing Precautions/Restrictions fall  -RW     Barriers to Rehab medically complex;previous functional deficit  -RW       Row Name 09/12/23 1351          Living Environment    People in Home significant other  -RW       Row Name 09/12/23 1354          Home Main Entrance    Number of Stairs, Main Entrance one  -RW       Row Name 09/12/23 1354          Stairs Within Home, Primary    Stairs, Within Home, Primary ramp to enter with a small step into the house; FWW if needed; tub shower, grab bars, shower  chair, tall toilet;  -RW     Number of Stairs, Within Home, Primary none  -RW       Row Name 09/12/23 1354          Cognition    Orientation Status (Cognition) oriented x 4  -RW       Row Name 09/12/23 1354          Safety Issues, Functional Mobility    Safety Issues Affecting Function (Mobility) awareness of need for assistance;insight into deficits/self-awareness;impulsivity;safety precaution awareness;at risk behavior observed;ability to follow commands  -RW     Impairments Affecting Function (Mobility) balance;endurance/activity tolerance;shortness of breath;strength  -RW               User Key  (r) = Recorded By, (t) = Taken By, (c) = Cosigned By      Initials Name Provider Type    Mildred Benz OT Occupational Therapist                     Mobility/ADL's       Row Name 09/12/23 1354          Bed Mobility    Bed Mobility supine-sit  -RW     Scooting/Bridging [place] (Bed Mobility) supervision  -RW     Supine-Sit [place] (Bed Mobility) supervision  -RW     Sit-Supine [place] (Bed Mobility) contact guard  -RW     Assistive Device (Bed Mobility) bed rails;head of bed elevated  -       Row Name 09/12/23 1354          Transfers    Transfers sit-stand transfer  -       Row Name 09/12/23 135          Sit-Stand Transfer    Sit-Stand [place] (Transfers) contact guard  -RW               User Key  (r) = Recorded By, (t) = Taken By, (c) = Cosigned By      Initials Name Provider Type    Mildred Benz OT Occupational Therapist                   Obj/Interventions       Row Name 09/12/23 1354          Sensory Assessment (Somatosensory)    Sensory Assessment (Somatosensory) UE sensation intact  -       Row Name 09/12/23 1354          Range of Motion Comprehensive    General Range of Motion bilateral upper extremity ROM WFL  -       Row Name 09/12/23 1354          Strength Comprehensive (MMT)    General Manual Muscle Testing (MMT) Assessment upper extremity strength deficits identified   -RW     Comment, General Manual Muscle Testing (MMT) Assessment BUE 4-/5 grossly, except B shld flex 3+/5 grossly.  -RW               User Key  (r) = Recorded By, (t) = Taken By, (c) = Cosigned By      Initials Name Provider Type    RW Mildred Joe, OT Occupational Therapist                   Goals/Plan       Row Name 09/12/23 [facility]          Transfer Goal 1 (OT)    Activity/Assistive Device (Transfer Goal 1, OT) transfers, all  -RW     [place] Level/Cues Needed (Transfer Goal 1, OT) modified independence  -RW     Time Frame (Transfer Goal 1, OT) long term goal (LTG);by discharge  -RW     Progress/Outcome (Transfer Goal 1, OT) goal not met  -RW       Row Name 09/12/23 [facility]          Bathing Goal 1 (OT)    Activity/Device (Bathing Goal 1, OT) lower body bathing  -RW     [place] Level/Cues Needed (Bathing Goal 1, OT) modified independence  -RW     Time Frame (Bathing Goal 1, OT) long term goal (LTG);by discharge  -RW     Progress/Outcomes (Bathing Goal 1, OT) goal not met  -RW       Row Name 09/12/23 [facility]          Dressing Goal 1 (OT)    Activity/Device (Dressing Goal 1, OT) lower body dressing  -RW     Love/Cues Needed (Dressing Goal 1, OT) modified independence  -RW     Time Frame (Dressing Goal 1, OT) long term goal (LTG);by discharge  -RW     Progress/Outcome (Dressing Goal 1, OT) goal not met  -RW       Row Name 09/12/23 [facility]          Therapy Assessment/Plan (OT)    Planned Therapy Interventions (OT) activity tolerance training;manual therapy/joint mobilization;patient/caregiver education/training;adaptive equipment training;neuromuscular control/coordination retraining;BADL retraining;ROM/therapeutic exercise;cognitive/visual perception retraining;occupation/activity based interventions;strengthening exercise;edema control/reduction;functional balance retraining;IADL retraining;passive ROM/stretching;transfer/mobility retraining  -RW               User Key  (r) = Recorded By, (t) = Taken By,  (c) = Cosigned By      Initials Name Provider Type    RW Mildred Joe, OT Occupational Therapist                   Clinical Impression       Row Name 09/12/23 1358          Pain Assessment    Pretreatment Pain Rating 0/10 - no pain  -RW     Posttreatment Pain Rating 0/10 - no pain  -RW       Row Name 09/12/23 2269          Plan of Care Review    Plan of Care Reviewed With patient;significant other  -RW     Outcome Evaluation OT eval complete, co-eval with PT. Pt supine upon arrival and agreeable to therapy. Pt perf sup>sit with supervision. Pts BUE ROM was WFL. Pts BUE 4-/5 grossly, except B shld flex 3+/5 grossly. Pt perf sit<>stand with CGA. Pt perf 4 stepsx2 with CGA and HHA. Pt perf sit>sup with CGA. Pt demonstrated decreased ADLs, strength, and transfer safety. Cont inpatient OT. Pt would benefit from conitued rehab at d/c SNF vs home with assist and home health OT.  -       Row Name 09/12/23 1088          Therapy Assessment/Plan (OT)    Patient/Family Therapy Goal Statement (OT) to return home  -RW     Rehab Potential (OT) good, to achieve stated therapy goals  -RW     Criteria for Skilled Therapeutic Interventions Met (OT) yes;skilled treatment is necessary  -RW     Therapy Frequency (OT) other (see comments)  5-7 d/wk  -RW     Predicted Duration of Therapy Intervention (OT) until all goals met or d/c  -RW       Row Name 09/12/23 1352          Vital Signs    Pre Systolic BP Rehab 130  -RW     Pre Treatment Diastolic BP 71  -RW     Pretreatment Heart Rate (beats/min) 96  -RW     Pre SpO2 (%) 97  -RW     O2 Delivery Pre Treatment nasal cannula  1.5 LPM  -RW     Pre Patient Position Supine  -RW     Post Patient Position Supine  -RW       Row Name 09/12/23 9588          Positioning and Restraints    Pre-Treatment Position in bed  -RW     Post Treatment Position bed  -RW     In Bed notified nsg;supine;call light within reach;encouraged to call for assist;with family/caregiver  -RW               User Key  (r)  = Recorded By, (t) = Taken By, (c) = Cosigned By      Initials Name Provider Type    Mildred Benz, YOVANA Occupational Therapist                   Outcome Measures       Row Name 09/12/23 1354          How much help from another is currently needed...    Putting on and taking off regular lower body clothing? 2  -RW     Bathing (including washing, rinsing, and drying) 2  -RW     Toileting (which includes using toilet bed pan or urinal) 2  -RW     Putting on and taking off regular upper body clothing 3  -RW     Taking care of personal grooming (such as brushing teeth) 3  -RW     Eating meals 4  -RW     AM-PAC 6 Clicks Score (OT) 16  -RW       Row Name 09/12/23 1355          How much help from another person do you currently need...    Turning from your back to your side while in flat bed without using bedrails? 3  -MM     Moving from lying on back to sitting on the side of a flat bed without bedrails? 3  -MM     Moving to and from a bed to a chair (including a wheelchair)? 3  -MM     Standing up from a chair using your arms (e.g., wheelchair, bedside chair)? 3  -MM     Climbing 3-5 steps with a railing? 2  -MM     To walk in hospital room? 2  -MM     AM-PAC 6 Clicks Score (PT) 16  -MM     Highest level of mobility 5 --> Static standing  -MM       Row Name 09/12/23 1355 09/12/23 1354       Functional Assessment    Outcome Measure Options AM-PAC 6 Clicks Basic Mobility (PT)  -MM AM-PAC 6 Clicks Daily Activity (OT)  -RW              User Key  (r) = Recorded By, (t) = Taken By, (c) = Cosigned By      Initials Name Provider Type    Mildred Benz, OT Occupational Therapist    MM Britney Espinosa PT Physical Therapist                    Occupational Therapy Education       Title: PT OT SLP Therapies (In Progress)       Topic: Occupational Therapy (In Progress)       Point: ADL training (In Progress)       Description:   Instruct learner(s) on proper safety adaptation and remediation techniques during self care  or transfers.   Instruct in proper use of assistive devices.                  Learning Progress Summary             Patient Acceptance, E,TB, NR by RW at 9/12/2023 1555    Comment: POC, Role of OT, transfer training                         Point: Home exercise program (Not Started)       Description:   Instruct learner(s) on appropriate technique for monitoring, assisting and/or progressing therapeutic exercises/activities.                  Learner Progress:  Not documented in this visit.              Point: Precautions (In Progress)       Description:   Instruct learner(s) on prescribed precautions during self-care and functional transfers.                  Learning Progress Summary             Patient Acceptance, E,TB, NR by RW at 9/12/2023 1555    Comment: POC, Role of OT, transfer training                         Point: Body mechanics (In Progress)       Description:   Instruct learner(s) on proper positioning and spine alignment during self-care, functional mobility activities and/or exercises.                  Learning Progress Summary             Patient Acceptance, E,TB, NR by RW at 9/12/2023 1555    Comment: POC, Role of OT, transfer training                                         User Key       Initials Effective Dates Name Provider Type Discipline     09/22/22 -  Mildred Joe OT Occupational Therapist OT                  OT Recommendation and Plan  Planned Therapy Interventions (OT): activity tolerance training, manual therapy/joint mobilization, patient/caregiver education/training, adaptive equipment training, neuromuscular control/coordination retraining, BADL retraining, ROM/therapeutic exercise, cognitive/visual perception retraining, occupation/activity based interventions, strengthening exercise, edema control/reduction, functional balance retraining, IADL retraining, passive ROM/stretching, transfer/mobility retraining  Therapy Frequency (OT): other (see comments) (5-7 d/wk)  Plan of Care  Review  Plan of Care Reviewed With: patient, significant other  Outcome Evaluation: OT eval complete, co-eval with PT. Pt supine upon arrival and agreeable to therapy. Pt perf sup>sit with supervision. Pts BUE ROM was WFL. Pts BUE 4-/5 grossly, except B shld flex 3+/5 grossly. Pt perf sit<>stand with CGA. Pt perf 4 stepsx2 with CGA and HHA. Pt perf sit>sup with CGA. Pt demonstrated decreased ADLs, strength, and transfer safety. Cont inpatient OT. Pt would benefit from conitued rehab at d/c SNF vs home with assist and home health OT.     Time Calculation:   Evaluation Complexity (OT)  Review Occupational Profile/Medical/Therapy History Complexity: expanded/moderate complexity  Assessment, Occupational Performance/Identification of Deficit Complexity: 3-5 performance deficits  Clinical Decision Making Complexity (OT): detailed assessment/moderate complexity  Overall Complexity of Evaluation (OT): moderate complexity     Time Calculation- OT       Row Name 09/12/23 4237             Time Calculation- OT    OT Start Time 1354  -RW      OT Stop Time 1435  -RW      OT Time Calculation (min) 41 min  -RW      OT Received On 09/12/23  -RW      OT Goal Re-Cert Due Date 09/25/23  -RW         Untimed Charges    OT Eval/Re-eval Minutes 41  -RW         Total Minutes    Untimed Charges Total Minutes 41  -RW       Total Minutes 41  -RW                User Key  (r) = Recorded By, (t) = Taken By, (c) = Cosigned By      Initials Name Provider Type    RW Mildred Joe OT Occupational Therapist                  Therapy Charges for Today       Code Description Service Date Service Provider Modifiers Qty    [id number]  OT EVAL MOD COMPLEXITY 3 9/12/2023 Mildred Joe OT GO 1                 Mildred Joe OT  9/12/2023    Electronically signed by Mildred Joe OT at 09/12/23 9406       Mildred Joe OT at 09/12/23 8201          Goal Outcome Evaluation:  Plan of Care Reviewed With: patient, significant other            Outcome Evaluation: OT eval complete, co-eval with PT. Pt supine upon arrival and agreeable to therapy. Pt perf sup>sit with supervision. Pts BUE ROM was WFL. Pts BUE 4-/5 grossly, except B shld flex 3+/5 grossly. Pt perf sit<>stand with CGA. Pt perf 4 stepsx2 with CGA and HHA. Pt perf sit>sup with CGA. Pt demonstrated decreased ADLs, strength, and transfer safety. Cont inpatient OT. Pt would benefit from conitued rehab at d/c SNF vs home with assist and home health OT.           Electronically signed by Mildred Joe OT at 09/12/23 1946

## 2023-09-15 NOTE — DISCHARGE PLACEMENT REQUEST
"Debbie Velasco (73 y.o. Male)       Date of Birth   1950    Social Security Number       Address   [address] [place] [address]    Home Phone   [phone]    MRN   [id number]       [facility]   [facility]    Marital Status                               Admission Date   9/11/23    Admission Type   Emergency    Admitting Provider   Fredrick Ni MD    Attending Provider   Fredrick Ni MD    Department, Room/Bed   [address], 329/1       Discharge Date       Discharge Disposition   Skilled Nursing Facility (DC - External)    Discharge Destination                                 Attending Provider: Fredrick Ni MD    Allergies: Amoxicillin, Albuterol, Asmanex (120 Metered Doses) [Mometasone Furoate], Gabapentin, Lortab [Hydrocodone-acetaminophen], Morphine And Related, Prilosec [Omeprazole], Sodium Clavulanate, Statins, Sulfa Antibiotics, Symbicort [Budesonide-formoterol Fumarate]    Isolation: Spore   Infection: C.difficile (09/07/23)   Code Status: CPR    Ht: 165.1 cm (65\")   Wt: 56.2 kg (124 lb)    Admission Cmt: None   Principal Problem: Hallucinations [R44.3]                   Active Insurance as of 9/11/2023       Primary Coverage       Payor Plan Insurance Group Employer/Plan Group    [facility] DEPT [id number]       Payor Plan Address Payor Plan Phone Number Payor Plan Fax Number Effective Dates    [facility] OFFICE OF COMMUNITY CARE [phone]  2/24/2000 - None Entered    [address] [address]       [facility] [id number]         Subscriber Name Subscriber Birth Date Member ID       DEBBIE VELASCO 1950 [id number]                     Emergency Contacts        (Rel.) Home Phone Work Phone Mobile Phone    Annette Harvey (Significant Other) [phone] -- [phone]              Emergency Contact Information       Name Relation Home Work Mobile    Annette Harvey Significant Other [phone]      "        History & Physical        Fredrick Ni MD at 09/11/23 1835                [facility] Medicine Admission      Date of Admission: 9/11/2023      Primary Care Physician: Marina Kitchen MD      Chief Complaint: change in mental status    HPI:73-year-old male with COPD, history of stroke, stage IV lung cancer on Keytruda, recurrent C. difficile infection, presented after developing fever and increased diarrhea in setting of recurrent C. difficile colitis. Pulmonary embolism diagnosed in 2015. Hypertension. Metastatic lung adenocarcinoma with pulmonary metastasis for which patient is currently on treatment with Keytruda. Iron deficiency anemia. History of recurrent C. Difficile Was discharged 4 days ago from [facility] after Clostridium difficile colitis, on vanco PO, no diarrhea, his friend Annette Hui refers staying in bed, not eating, and confused, now oriented.  Past Medical History:   Diagnosis Date    A-fib     Anesthesia     hip surgery (spinal caused him unpleasant sensations never wants it again)    Arthritis     Blood in sputum 02/22/2023    bright red blood, teaspoon - tablespoon daily    COPD (chronic obstructive pulmonary disease)     CVA (cerebral vascular accident) 12/24/2022    Hearing aid worn     bilateral    History of pulmonary embolism     History of recurrent pneumonia     Hypertension     Lung cancer 03/17/2023    Oxygen dependent     since @ age 65    Pulmonary nodules     Risk for falls     uses walker    Rotator cuff syndrome of right shoulder     Tachycardia     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 HTN/Eliquis    Wears glasses     readers    Wears glasses     reading      Past Surgical History:   Procedure Laterality Date    HIP ARTHROPLASTY Right     RIB BIOPSY Left 03/17/2023 11th    VENOUS ACCESS DEVICE (PORT) INSERTION N/A 3/30/2023    Procedure: MEDIPORT PLACEMENT          (C-ARM);  Surgeon: Lavon Cochran MD;   Location: [facility] OR;  Service: General;  Laterality: N/A;      Allergies   Allergen Reactions    Amoxicillin Anaphylaxis    Albuterol Irritability     Facial flushing and palpitations.    Asmanex (120 Metered Doses) [Mometasone Furoate] Unknown - Low Severity    Gabapentin Hallucinations    Lortab [Hydrocodone-Acetaminophen] Other (See Comments)     Can't remember    Morphine And Related Hallucinations    Prilosec [Omeprazole] Other (See Comments)     unknown    Sodium Clavulanate Unknown - High Severity    Statins GI Intolerance    Sulfa Antibiotics Other (See Comments)     Can't remember also more and can't remember    Symbicort [Budesonide-Formoterol Fumarate] Unknown (See Comments)     Doesn't remember reaction type      Social History     Socioeconomic History    Marital status:     Number of children: 3    Highest education level: Associate degree: occupational, technical, or vocational program   Tobacco Use    Smoking status: Former     Packs/day: 3.00     Years: 47.00     Pack years: 141.00     Types: Cigarettes     Start date: 1961     Quit date: 5/1/2008     Years since quitting: 15.3    Smokeless tobacco: Never   Vaping Use    Vaping Use: Never used   Substance and Sexual Activity    Alcohol use: Not Currently     Comment: not last 40 years    Drug use: Never    Sexual activity: Not Currently     Partners: Female      Family History   Problem Relation Age of Onset    Cancer Sister     Brain cancer Brother     Cancer Brother     No Known Problems Daughter     No Known Problems Daughter     No Known Problems Son         Concurrent Medical History:  has a past medical history of A-fib, Anesthesia, Arthritis, Blood in sputum (02/22/2023), COPD (chronic obstructive pulmonary disease), CVA (cerebral vascular accident) (12/24/2022), Hearing aid worn, History of pulmonary embolism, History of recurrent pneumonia, Hypertension, Lung cancer (03/17/2023), Oxygen dependent, Pulmonary nodules, Risk for falls,  Rotator cuff syndrome of right shoulder, Tachycardia, Traumatic hemorrhage of cerebrum (12/24/2022), Wears glasses, and Wears glasses.    Past Surgical History:  has a past surgical history that includes Hip Arthroplasty (Right); Rib biopsy (Left, 03/17/2023); and Venous Access Device (Port) (N/A, 3/30/2023).    Family History: family history includes Brain cancer in his brother; Cancer in his brother and sister; No Known Problems in his daughter, daughter, and son.     Social History:  reports that he quit smoking about 15 years ago. His smoking use included cigarettes. He started smoking about 62 years ago. He has a 141.00 pack-year smoking history. He has never used smokeless tobacco. He reports that he does not currently use alcohol. He reports that he does not use drugs.    Allergies:   Allergies   Allergen Reactions    Amoxicillin Anaphylaxis    Albuterol Irritability     Facial flushing and palpitations.    Asmanex (120 Metered Doses) [Mometasone Furoate] Unknown - Low Severity    Gabapentin Hallucinations    Lortab [Hydrocodone-Acetaminophen] Other (See Comments)     Can't remember    Morphine And Related Hallucinations    Prilosec [Omeprazole] Other (See Comments)     unknown    Sodium Clavulanate Unknown - High Severity    Statins GI Intolerance    Sulfa Antibiotics Other (See Comments)     Can't remember also more and can't remember    Symbicort [Budesonide-Formoterol Fumarate] Unknown (See Comments)     Doesn't remember reaction type       Medications:   Prior to Admission medications    Medication Sig Start Date End Date Taking? Authorizing Provider   cholecalciferol (VITAMIN D3) 10 MCG (400 UNIT) tablet Take 1 tablet by mouth Daily. 50 mcg daily   Yes ProviderQamar MD   dilTIAZem CD (CARDIZEM CD) 240 MG 24 hr capsule Take 1 capsule by mouth Daily. 5/18/23  Yes Neville Meeks MD   folic acid (FOLVITE) 1 MG tablet Take 1 tablet by mouth Daily. 2/11/23  Yes Omega Davis MD   furosemide  (LASIX) 20 MG tablet Take 1 tablet by mouth Daily As Needed (Swelling of lower extremity). 4/17/23  Yes Omega Davis MD   ipratropium (ATROVENT HFA) 17 MCG/ACT inhaler Inhale 2 puffs 4 (Four) Times a Day As Needed for Wheezing.   Yes Provider, MD CAROL FooteOR-CON 20 MEQ CR tablet 1 tablet Daily. 5/13/23  Yes ProviderQamar MD   melatonin 5 MG tablet tablet Take 1 tablet by mouth At Night As Needed.   Yes Provider, MD Qamar   mirtazapine (REMERON) 15 MG tablet Take 1 tablet by mouth Every Night.   Yes Provider, MD Qamar   montelukast (SINGULAIR) 10 MG tablet Take 1 tablet by mouth Every Night.   Yes Provider, MD Qamar   ondansetron (ZOFRAN) 8 MG tablet Take 1 tablet by mouth 3 (Three) Times a Day As Needed for Nausea or Vomiting. 3/27/23  Yes Omega Davis MD   predniSONE (DELTASONE) 5 MG tablet Take 1 tablet by mouth Every Other Day.   Yes Provider, MD Qamar   tiotropium bromide-olodaterol (Stiolto Respimat) 2.5-2.5 MCG/ACT aerosol solution inhaler Inhale 2 puffs Daily. 7/19/23  Yes lCotilde Washington DO   TiZANidine (ZANAFLEX) 2 MG capsule Take 1 capsule by mouth At Night As Needed for Muscle Spasms.   Yes ProviderQamar MD   vancomycin (VANCOCIN) 125 MG capsule Take 1 capsule by mouth 4 (Four) Times a Day for 90 days. 9/8/23 12/7/23 Yes Quinn Richardson MD   dilTIAZem XR (DILACOR XR) 180 MG 24 hr capsule Take 1 capsule by mouth Daily. 6/30/22 10/7/22  Neville Meeks MD     I have utilized all available immediate resources to obtain, update, or review the patient's current medications (including all prescriptions, over-the-counter products, herbals, cannabis/cannabidiol products, and vitamin/mineral/dietary (nutritional) supplements).      Review of Systems:  Review of Systems   Constitutional:  Negative for activity change, appetite change, chills, diaphoresis and fatigue.   HENT:  Negative for congestion, ear discharge, hearing loss, rhinorrhea, sinus pain,  sneezing and sore throat.    Eyes:  Negative for photophobia, discharge and visual disturbance.   Respiratory:  Negative for cough, chest tightness, shortness of breath and wheezing.    Cardiovascular:  Negative for chest pain and palpitations.   Gastrointestinal:  Negative for abdominal distention, abdominal pain, blood in stool, diarrhea, nausea and vomiting.   Endocrine: Negative for cold intolerance, heat intolerance, polydipsia, polyphagia and polyuria.   Genitourinary:  Negative for dysuria, flank pain, hematuria and urgency.   Musculoskeletal:  Negative for arthralgias, joint swelling and myalgias.   Skin:  Negative for color change.   Allergic/Immunologic: Negative for food allergies.   Neurological:  Negative for dizziness, seizures, syncope, speech difficulty, weakness and headaches.   Hematological:  Negative for adenopathy. Does not bruise/bleed easily.   Psychiatric/Behavioral:  Negative for confusion, hallucinations, self-injury and suicidal ideas. The patient is not nervous/anxious.     Otherwise complete ROS is negative except as mentioned above.    Physical Exam:   Temp:  [98.1 °F (36.7 °C)] 98.1 °F (36.7 °C)  Heart Rate:  [80-98] 80  Resp:  [20] 20  BP: (112-132)/(57-59) 132/59  Physical Exam  Constitutional:       Appearance: He is ill-appearing.   HENT:      Head: Normocephalic and atraumatic.      Right Ear: Tympanic membrane normal.      Left Ear: Tympanic membrane normal.      Nose: Nose normal.      Mouth/Throat:      Mouth: Mucous membranes are dry.   Eyes:      Pupils: Pupils are equal, round, and reactive to light.   Cardiovascular:      Rate and Rhythm: Normal rate and regular rhythm.      Pulses: Normal pulses.      Heart sounds: Normal heart sounds.   Pulmonary:      Effort: Pulmonary effort is normal.      Breath sounds: Normal breath sounds.   Abdominal:      General: Abdomen is flat. Bowel sounds are normal.      Palpations: Abdomen is soft.   Musculoskeletal:         General: Normal  range of motion.      Cervical back: Normal range of motion and neck supple.      Comments: sarcopenia   Skin:     General: Skin is warm and dry.      Capillary Refill: Capillary refill takes less than 2 seconds.   Neurological:      General: No focal deficit present.      Mental Status: He is alert and oriented to person, place, and time.   Psychiatric:         Mood and Affect: Mood normal.         Behavior: Behavior normal.         Thought Content: Thought content normal.         Judgment: Judgment normal.         Results Reviewed:  I have personally reviewed current lab, radiology, and data and agree with results.  Lab Results (last 24 hours)       Procedure Component Value Units Date/Time    Urinalysis, Microscopic Only - Urine, Clean Catch [[id number]]  (Abnormal) Collected: 09/11/23 1610    Specimen: Urine, Clean Catch Updated: 09/11/23 1805     RBC, UA 0-2 /HPF      WBC, UA 0-2 /HPF      Bacteria, UA None Seen /HPF      Squamous Epithelial Cells, UA 0-2 /HPF      Hyaline Casts, UA 3-6 /LPF      Granular Casts, UA 3-6 /LPF      Methodology Manual Light Microscopy    Urinalysis With Microscopic If Indicated (No Culture) - Urine, Clean Catch [[id number]]  (Abnormal) Collected: 09/11/23 1610    Specimen: Urine, Clean Catch Updated: 09/11/23 1656     Color, UA Yellow     Appearance, UA Clear     pH, UA 5.5     Specific Gravity, UA 1.016     Glucose, UA Negative     Ketones, UA >=160 mg/dL (4+)     Bilirubin, UA Negative     Blood, UA Negative     Protein, UA 30 mg/dL (1+)     Leuk Esterase, UA Negative     Nitrite, UA Negative     Urobilinogen, UA 0.2 E.U./dL    Extra Tubes [[id number]] Collected: 09/11/23 1445    Specimen: Blood, Venous Line Updated: 09/11/23 1545    Narrative:      The following orders were created for panel order Extra Tubes.  Procedure                               Abnormality         Status                     ---------                               -----------         ------                      Lavender Top[[id number]]                                     Final result               Gold Top - SST[[id number]]                                   Final result               Quinones Top[[id number]]                                         In process                 Light Blue Top[[id number]]                                   Final result                 Please view results for these tests on the individual orders.    Lavender Top [[id number]] Collected: 09/11/23 1445    Specimen: Blood Updated: 09/11/23 1545     Extra Tube hold for add-on     Comment: Auto resulted       Gold Top - SST [[id number]] Collected: 09/11/23 1445    Specimen: Blood Updated: 09/11/23 1545     Extra Tube Hold for add-ons.     Comment: Auto resulted.       Light Blue Top [[id number]] Collected: 09/11/23 1445    Specimen: Blood Updated: 09/11/23 1545     Extra Tube Hold for add-ons.     Comment: Auto resulted       Comprehensive Metabolic Panel [[id number]]  (Abnormal) Collected: 09/11/23 1445    Specimen: Blood Updated: 09/11/23 1529     Glucose 63 mg/dL      BUN 4 mg/dL      Creatinine 0.53 mg/dL      Sodium 132 mmol/L      Potassium 4.1 mmol/L      Chloride 97 mmol/L      CO2 15.0 mmol/L      Calcium 8.9 mg/dL      Total Protein 7.6 g/dL      Albumin 3.1 g/dL      ALT (SGPT) 17 U/L      AST (SGOT) 19 U/L      Alkaline Phosphatase 73 U/L      Total Bilirubin 0.2 mg/dL      Globulin 4.5 gm/dL      A/G Ratio 0.7 g/dL      BUN/Creatinine Ratio 7.5     Anion Gap 20.0 mmol/L      eGFR 105.2 mL/min/1.73     Narrative:      GFR Normal >60  Chronic Kidney Disease <60  Kidney Failure <15    The GFR formula is only valid for adults with stable renal function between ages 18 and 70.    Magnesium [[id number]]  (Normal) Collected: 09/11/23 1445    Specimen: Blood Updated: 09/11/23 1529     Magnesium 1.9 mg/dL     CBC & Differential [[id number]]  (Abnormal) Collected: 09/11/23 1445    Specimen: Blood Updated: 09/11/23 1502    Narrative:      The following orders  were created for panel order CBC & Differential.  Procedure                               Abnormality         Status                     ---------                               -----------         ------                     CBC Auto Differential[[id number]]        Abnormal            Final result                 Please view results for these tests on the individual orders.    CBC Auto Differential [[id number]]  (Abnormal) Collected: 09/11/23 1445    Specimen: Blood Updated: 09/11/23 1502     WBC 12.99 10*3/mm3      RBC 3.96 10*6/mm3      Hemoglobin 12.3 g/dL      Hematocrit 36.9 %      MCV 93.2 fL      MCH 31.1 pg      MCHC 33.3 g/dL      RDW 12.6 %      RDW-SD 43.3 fl      MPV 9.0 fL      Platelets 342 10*3/mm3      Neutrophil % 61.0 %      Lymphocyte % 12.9 %      Monocyte % 15.6 %      Eosinophil % 6.7 %      Basophil % 1.3 %      Immature Grans % 2.5 %      Neutrophils, Absolute 7.92 10*3/mm3      Lymphocytes, Absolute 1.68 10*3/mm3      Monocytes, Absolute 2.03 10*3/mm3      Eosinophils, Absolute 0.87 10*3/mm3      Basophils, Absolute 0.17 10*3/mm3      Immature Grans, Absolute 0.32 10*3/mm3      nRBC 0.0 /100 WBC     Quinones Top [[id number]] Collected: 09/11/23 1445    Specimen: Blood Updated: 09/11/23 1454    COVID-19 and FLU A/B PCR - Swab, Nasopharynx [[id number]]  (Normal) Collected: 09/11/23 1350    Specimen: Swab from Nasopharynx Updated: 09/11/23 1420     COVID19 Not Detected     Influenza A PCR Not Detected     Influenza B PCR Not Detected    Narrative:      Fact sheet for providers: https://www.fda.gov/media/696956/download    Fact sheet for patients: https://www.fda.gov/media/103606/download    Test performed by PCR.          Imaging Results (Last 24 Hours)       Procedure Component Value Units Date/Time    CT Chest Without Contrast Diagnostic [[id number]] Resulted: 09/11/23 1829     Updated: 09/11/23 1830    CT Abdomen Without Contrast [[id number]] Resulted: 09/11/23 1829     Updated: 09/11/23 1830    XR  Chest 1 View [[id number]] Collected: 09/11/23 1411     Updated: 09/11/23 1633    Narrative:      INDICATION:  Fatigue.    FINDINGS:  Single view of the chest demonstrates advanced features of interstitial lung  disease.  Port-A-Cath is seen on the right.  No acute findings are suspected.      CT Head Without Contrast [[id number]] Collected: 09/11/23 1410     Updated: 09/11/23 1633    Narrative:      INDICATION:  Altered mental status.    FINDINGS:  Patient is slanted within the gantry. No definite focal abnormal densities are  seen. Age-related changes present.    SUMMARY:  No acute findings.              Assessment:    Active Hospital Problems    Diagnosis     **Hallucinations              Plan:73 years old male patient with known history COPD, history of stroke, stage IV lung cancer on Keytruda, recurrent C. difficile infection, presented after developing fever and increased diarrhea in setting of recurrent C. difficile colitis. Pulmonary embolism diagnosed in 2015. Hypertension. Metastatic lung adenocarcinoma with pulmonary metastasis for which patient is currently on treatment with Keytruda. Iron deficiency anemia. Came in with change in mental status, dry mucosa, high WBC, will rule out infection, IVF, observation status. CT chest, abdomen and pelvis, blood cultures.  1.change in mental status    Medical Decision Making  Number and Complexity of problems: 1  Differential Diagnosis: sepsis vs dehydration    Conditions and Status:        Condition is unchanged.     [facility] Data  External documents reviewed: previous medical records  My EKG interpretation: none  My plain film interpretation: chest interstitial lung disease  Tests considered but not ordered: none     Decision rules/scores evaluated (example [id number], Wells, etc): n/a     Discussed with: patient and other significant Annette Hui     Treatment Plan  See above    Care Planning  Shared decision making: the patient  Code status and discussions: full  code    Disposition  Social Determinants of Health that impact treatment or disposition: none  I expect the patient to be discharged to home in 2 days.      I confirmed that the patient's Advance Care Plan is present, code status is documented, or surrogate decision maker is listed in the patient's medical record.    I discussed the patient's findings and my recommendations with: the patient      This document has been electronically signed by Fredrick Ni MD on September 11, 2023 18:35 CDT                  Electronically signed by Fredrick Ni MD at 09/11/23 1856          Emergency Department Notes        Keshia Solis, RN at 09/12/23 1553          Nursing report ED to floor  Brian Pace McKnight  73 y.o.  male    HPI:   Chief Complaint   Patient presents with    Weakness - Generalized    Fatigue    Diarrhea       Admitting doctor:   Fredrick Ni MD    Consulting provider(s):  Consults       No orders found for last 30 day(s).             Admitting diagnosis:   The primary encounter diagnosis was Hallucinations. Diagnoses of Fatigue, unspecified type and Impaired functional mobility, balance, gait, and endurance [Z74.09 (ICD-10-CM)] were also pertinent to this visit.    Code status:   Current Code Status       Date Active Code Status Order ID Comments User Context       9/11/2023 1843 CPR (Attempt to Resuscitate) [id number]  Fredrick Ni MD ED        Question Answer    Code Status (Patient has no pulse and is not breathing) CPR (Attempt to Resuscitate)    Medical Interventions (Patient has pulse or is breathing) Full Support    Level Of Support Discussed With Patient                    Allergies:   Amoxicillin, Albuterol, Asmanex (120 metered doses) [mometasone furoate], Gabapentin, Lortab [hydrocodone-acetaminophen], Morphine and related, Prilosec [omeprazole], Sodium clavulanate, Statins, Sulfa antibiotics, and Symbicort [budesonide-formoterol fumarate]    Intake and Output    Intake/Output Summary  (Last 24 hours) at 9/12/2023 1553  Last data filed at 9/12/2023 0800  Gross per 24 hour   Intake 1021.67 ml   Output 300 ml   Net 721.67 ml       Weight:       09/11/23  1157   Weight: 56.2 kg (124 lb)       Most recent vitals:   Vitals:    09/12/23 0603 09/12/23 0633 09/12/23 0800 09/12/23 1230   BP: 144/64 151/92 106/60    BP Location:   Left arm    Patient Position:   Sitting    Pulse: 106 98 111 90   Resp:   18    Temp:   97.8 °F (36.6 °C)    TempSrc:   Temporal    SpO2:   99% 97%   Weight:       Height:         Oxygen Therapy: no    Active LDAs/IV Access:   Lines, Drains & Airways       Active LDAs       Name Placement date Placement time Site Days    Peripheral IV 09/11/23 1446 Anterior;Left Forearm 09/11/23  1446  Forearm  1    Single Lumen Implantable Port 03/30/23 Right Chest 03/30/23  1203  Chest  166                    Labs (abnormal labs have a star):   Labs Reviewed   COMPREHENSIVE METABOLIC PANEL - Abnormal; Notable for the following components:       Result Value    Glucose 63 (*)     BUN 4 (*)     Creatinine 0.53 (*)     Sodium 132 (*)     Chloride 97 (*)     CO2 15.0 (*)     Albumin 3.1 (*)     Anion Gap 20.0 (*)     All other components within normal limits    Narrative:     GFR Normal >60  Chronic Kidney Disease <60  Kidney Failure <15    The GFR formula is only valid for adults with stable renal function between ages 18 and 70.   URINALYSIS W/ MICROSCOPIC IF INDICATED (NO CULTURE) - Abnormal; Notable for the following components:    Ketones, UA >=160 mg/dL (4+) (*)     Protein, UA 30 mg/dL (1+) (*)     All other components within normal limits   CBC WITH AUTO DIFFERENTIAL - Abnormal; Notable for the following components:    WBC 12.99 (*)     RBC 3.96 (*)     Hemoglobin 12.3 (*)     Hematocrit 36.9 (*)     Lymphocyte % 12.9 (*)     Monocyte % 15.6 (*)     Eosinophil % 6.7 (*)     Immature Grans % 2.5 (*)     Neutrophils, Absolute 7.92 (*)     Monocytes, Absolute 2.03 (*)     Eosinophils, Absolute  0.87 (*)     Immature Grans, Absolute 0.32 (*)     All other components within normal limits   URINALYSIS, MICROSCOPIC ONLY - Abnormal; Notable for the following components:    RBC, UA 0-2 (*)     All other components within normal limits   COMPREHENSIVE METABOLIC PANEL - Abnormal; Notable for the following components:    Glucose 107 (*)     BUN 4 (*)     Creatinine 0.53 (*)     All other components within normal limits    Narrative:     GFR Normal >60  Chronic Kidney Disease <60  Kidney Failure <15    The GFR formula is only valid for adults with stable renal function between ages 18 and 70.   FERRITIN - Abnormal; Notable for the following components:    Ferritin 1,801.00 (*)     All other components within normal limits    Narrative:     Results may be falsely decreased if patient taking Biotin.     HEMOGLOBIN A1C - Abnormal; Notable for the following components:    Hemoglobin A1C 5.80 (*)     All other components within normal limits    Narrative:     Hemoglobin A1C Ranges:    Increased Risk for Diabetes  5.7% to 6.4%  Diabetes                     >= 6.5%  Diabetic Goal                < 7.0%   IRON PROFILE - Abnormal; Notable for the following components:    Transferrin 144 (*)     TIBC 215 (*)     All other components within normal limits   LIPID PANEL - Abnormal; Notable for the following components:    LDL Cholesterol  109 (*)     All other components within normal limits    Narrative:     Cholesterol Reference Ranges  (U.S. Department of Health and Human Services ATP III Classifications)    Desirable          <200 mg/dL  Borderline High    200-239 mg/dL  High Risk          >240 mg/dL      Triglyceride Reference Ranges  (U.S. Department of Health and Human Services ATP III Classifications)    Normal           <150 mg/dL  Borderline High  150-199 mg/dL  High             200-499 mg/dL  Very High        >500 mg/dL    HDL Reference Ranges  (U.S. Department of Health and Human Services ATP III  Classifications)    Low     <40 mg/dl (major risk factor for CHD)  High    >60 mg/dl ('negative' risk factor for CHD)        LDL Reference Ranges  (U.S. Department of Health and Human Services ATP III Classifications)    Optimal          <100 mg/dL  Near Optimal     100-129 mg/dL  Borderline High  130-159 mg/dL  High             160-189 mg/dL  Very High        >189 mg/dL   MANUAL DIFFERENTIAL - Abnormal; Notable for the following components:    Neutrophil % 82.0 (*)     Lymphocyte % 8.0 (*)     Monocyte % 4.0 (*)     Basophil % 2.0 (*)     Neutrophils Absolute 8.59 (*)     All other components within normal limits   CBC WITH AUTO DIFFERENTIAL - Abnormal; Notable for the following components:    RBC 3.90 (*)     Hemoglobin 12.3 (*)     Hematocrit 36.8 (*)     All other components within normal limits   POCT GLUCOSE FINGERSTICK - Abnormal; Notable for the following components:    Glucose 178 (*)     All other components within normal limits   COVID-19 AND FLU A/B, NP SWAB IN TRANSPORT MEDIA 8-12 HR TAT - Normal    Narrative:     Fact sheet for providers: https://www.fda.gov/media/965465/download    Fact sheet for patients: https://www.fda.gov/media/027546/download    Test performed by PCR.   MAGNESIUM - Normal   TSH+FREE T4 - Normal   LACTIC ACID, PLASMA - Normal   PROTIME-INR - Normal    Narrative:     Therapeutic range for most indications is 2.0-3.0 INR,  or 2.5-3.5 for mechanical heart valves.   LIPASE - Normal   PROCALCITONIN - Normal    Narrative:     As a Marker for Sepsis (Non-Neonates):    1. <0.5 ng/mL represents a low risk of severe sepsis and/or septic shock.  2. >2 ng/mL represents a high risk of severe sepsis and/or septic shock.    As a Marker for Lower Respiratory Tract Infections that require antibiotic therapy:    PCT on Admission    Antibiotic Therapy       6-12 Hrs later    >0.5                Strongly Recommended  >0.25 - <0.5        Recommended   0.1 - 0.25          Discouraged               "Remeasure/reassess PCT  <0.1                Strongly Discouraged     Remeasure/reassess PCT    As 28 day mortality risk marker: \"Change in Procalcitonin Result\" (>80% or <=80%) if Day 0 (or Day 1) and Day 4 values are available. Refer to http://www.[facility]-pct-calculator.com    Change in PCT <=80%  A decrease of PCT levels below or equal to 80% defines a positive change in PCT test result representing a higher risk for 28-day all-cause mortality of patients diagnosed with severe sepsis for septic shock.    Change in PCT >80%  A decrease of PCT levels of more than 80% defines a negative change in PCT result representing a lower risk for 28-day all-cause mortality of patients diagnosed with severe sepsis or septic shock.      BLOOD CULTURE   BLOOD CULTURE   URINALYSIS W/ MICROSCOPIC IF INDICATED (NO CULTURE)   CBC AND DIFFERENTIAL    Narrative:     The following orders were created for panel order CBC & Differential.  Procedure                               Abnormality         Status                     ---------                               -----------         ------                     CBC Auto Differential[[id number]]        Abnormal            Final result                 Please view results for these tests on the individual orders.   EXTRA TUBES    Narrative:     The following orders were created for panel order Extra Tubes.  Procedure                               Abnormality         Status                     ---------                               -----------         ------                     Lavender Top[[id number]]                                     Final result               Gold Top - SST[[id number]]                                   Final result               Quinones Top[[id number]]                                         Final result               Light Blue Top[[id number]]                                   Final result                 Please view results for these tests on the individual orders.   LAVENDER " TOP   GOLD TOP - SST   GRAY TOP   LIGHT BLUE TOP   CBC AND DIFFERENTIAL    Narrative:     The following orders were created for panel order CBC & Differential.  Procedure                               Abnormality         Status                     ---------                               -----------         ------                     Manual Differential[[id number]]          Abnormal            Final result               CBC Auto Differential[[id number]]        Abnormal            Final result                 Please view results for these tests on the individual orders.       Meds given in ED:   Medications   dextrose (D50W) (25 g/50 mL) IV injection 50 mL (50 mL Intravenous Given 9/11/23 1824)   dilTIAZem CD (CARDIZEM CD) 24 hr capsule 240 mg (240 mg Oral Given 9/11/23 2204)   folic acid (FOLVITE) tablet 1 mg (1 mg Oral Given 9/11/23 2204)   ipratropium (ATROVENT) nebulizer solution 0.5 mg (has no administration in time range)   mirtazapine (REMERON) tablet 15 mg (15 mg Oral Given 9/11/23 2204)   predniSONE (DELTASONE) tablet 5 mg (5 mg Oral Given 9/11/23 2204)   vancomycin oral solution reconstituted 125 mg (125 mg Oral Given 9/12/23 1200)   sodium chloride 0.9 % flush 10 mL (10 mL Intravenous Given 9/12/23 0848)   sodium chloride 0.9 % flush 10 mL (10 mL Intravenous Given 9/12/23 0847)   sodium chloride 0.9 % infusion 40 mL (has no administration in time range)   sennosides-docusate (PERICOLACE) 8.6-50 MG per tablet 2 tablet (2 tablets Oral Given 9/12/23 0846)     And   polyethylene glycol (MIRALAX) packet 17 g (has no administration in time range)     And   bisacodyl (DULCOLAX) EC tablet 5 mg (has no administration in time range)     And   bisacodyl (DULCOLAX) suppository 10 mg (has no administration in time range)   heparin (porcine) 5000 UNIT/ML injection 5,000 Units (5,000 Units Subcutaneous Given 9/12/23 0847)   nitroglycerin (NITROSTAT) SL tablet 0.4 mg (has no administration in time range)   sodium chloride  0.9 % infusion (100 mL/hr Intravenous New Bag 9/12/23 0933)   Potassium Replacement - Follow Nurse / BPA Driven Protocol (has no administration in time range)   Magnesium Standard Dose Replacement - Follow Nurse / BPA Driven Protocol (has no administration in time range)   Phosphorus Replacement - Follow Nurse / BPA Driven Protocol (has no administration in time range)   Calcium Replacement - Follow Nurse / BPA Driven Protocol (has no administration in time range)   ondansetron (ZOFRAN) tablet 4 mg (has no administration in time range)     Or   ondansetron (ZOFRAN) injection 4 mg (has no administration in time range)   sodium chloride 0.9 % bolus 1,000 mL (0 mL Intravenous Stopped 9/11/23 1546)     sodium chloride, 100 mL/hr, Last Rate: 100 mL/hr (09/12/23 0933)         NIH Stroke Scale:       Isolation/Infection(s):  Contact Spore   C.difficile     COVID Testing  Collected no  Resulted n/a    Nursing report ED to floor:  Mental status: a&o x4  Ambulatory status: by self  Precautions:  CONTACT     ED nurse phone extentsizh- [phone]    Electronically signed by Keshia Solis, RN at 09/12/23 1553       Florencia Whyte, RN at 09/12/23 154          Nursing report ED to floor  Brian Garcia  73 y.o.  male    HPI:   Chief Complaint   Patient presents with    Weakness - Generalized    Fatigue    Diarrhea       Admitting doctor:   Fredrick Ni MD    Consulting provider(s):  Consults       No orders found for last 30 day(s).             Admitting diagnosis:   The primary encounter diagnosis was Hallucinations. Diagnoses of Fatigue, unspecified type and Impaired functional mobility, balance, gait, and endurance [Z74.09 (ICD-10-CM)] were also pertinent to this visit.    Code status:   Current Code Status       Date Active Code Status Order ID Comments User Context       9/11/2023 4539 CPR (Attempt to Resuscitate) [id number]  Fredrick Ni MD ED        Question Answer    Code Status (Patient has no pulse and is  not breathing) CPR (Attempt to Resuscitate)    Medical Interventions (Patient has pulse or is breathing) Full Support    Level Of Support Discussed With Patient                    Allergies:   Amoxicillin, Albuterol, Asmanex (120 metered doses) [mometasone furoate], Gabapentin, Lortab [hydrocodone-acetaminophen], Morphine and related, Prilosec [omeprazole], Sodium clavulanate, Statins, Sulfa antibiotics, and Symbicort [budesonide-formoterol fumarate]    Intake and Output    Intake/Output Summary (Last 24 hours) at 9/12/2023 1547  Last data filed at 9/12/2023 0800  Gross per 24 hour   Intake 1021.67 ml   Output 300 ml   Net 721.67 ml       Weight:       09/11/23  1157   Weight: 56.2 kg (124 lb)       Most recent vitals:   Vitals:    09/12/23 0603 09/12/23 0633 09/12/23 0800 09/12/23 1230   BP: 144/64 151/92 106/60    BP Location:   Left arm    Patient Position:   Sitting    Pulse: 106 98 111 90   Resp:   18    Temp:   97.8 °F (36.6 °C)    TempSrc:   Temporal    SpO2:   99% 97%   Weight:       Height:         Oxygen Therapy: 3L NC     Active LDAs/IV Access:   Lines, Drains & Airways       Active LDAs       Name Placement date Placement time Site Days    Peripheral IV 09/11/23 1446 Anterior;Left Forearm 09/11/23  1446  Forearm  1    Single Lumen Implantable Port 03/30/23 Right Chest 03/30/23  1203  Chest  166                    Labs (abnormal labs have a star):   Labs Reviewed   COMPREHENSIVE METABOLIC PANEL - Abnormal; Notable for the following components:       Result Value    Glucose 63 (*)     BUN 4 (*)     Creatinine 0.53 (*)     Sodium 132 (*)     Chloride 97 (*)     CO2 15.0 (*)     Albumin 3.1 (*)     Anion Gap 20.0 (*)     All other components within normal limits    Narrative:     GFR Normal >60  Chronic Kidney Disease <60  Kidney Failure <15    The GFR formula is only valid for adults with stable renal function between ages 18 and 70.   URINALYSIS W/ MICROSCOPIC IF INDICATED (NO CULTURE) - Abnormal; Notable  for the following components:    Ketones, UA >=160 mg/dL (4+) (*)     Protein, UA 30 mg/dL (1+) (*)     All other components within normal limits   CBC WITH AUTO DIFFERENTIAL - Abnormal; Notable for the following components:    WBC 12.99 (*)     RBC 3.96 (*)     Hemoglobin 12.3 (*)     Hematocrit 36.9 (*)     Lymphocyte % 12.9 (*)     Monocyte % 15.6 (*)     Eosinophil % 6.7 (*)     Immature Grans % 2.5 (*)     Neutrophils, Absolute 7.92 (*)     Monocytes, Absolute 2.03 (*)     Eosinophils, Absolute 0.87 (*)     Immature Grans, Absolute 0.32 (*)     All other components within normal limits   URINALYSIS, MICROSCOPIC ONLY - Abnormal; Notable for the following components:    RBC, UA 0-2 (*)     All other components within normal limits   COMPREHENSIVE METABOLIC PANEL - Abnormal; Notable for the following components:    Glucose 107 (*)     BUN 4 (*)     Creatinine 0.53 (*)     All other components within normal limits    Narrative:     GFR Normal >60  Chronic Kidney Disease <60  Kidney Failure <15    The GFR formula is only valid for adults with stable renal function between ages 18 and 70.   FERRITIN - Abnormal; Notable for the following components:    Ferritin 1,801.00 (*)     All other components within normal limits    Narrative:     Results may be falsely decreased if patient taking Biotin.     HEMOGLOBIN A1C - Abnormal; Notable for the following components:    Hemoglobin A1C 5.80 (*)     All other components within normal limits    Narrative:     Hemoglobin A1C Ranges:    Increased Risk for Diabetes  5.7% to 6.4%  Diabetes                     >= 6.5%  Diabetic Goal                < 7.0%   IRON PROFILE - Abnormal; Notable for the following components:    Transferrin 144 (*)     TIBC 215 (*)     All other components within normal limits   LIPID PANEL - Abnormal; Notable for the following components:    LDL Cholesterol  109 (*)     All other components within normal limits    Narrative:     Cholesterol Reference  Ranges  (U.S. Department of Health and Human Services ATP III Classifications)    Desirable          <200 mg/dL  Borderline High    200-239 mg/dL  High Risk          >240 mg/dL      Triglyceride Reference Ranges  (U.S. Department of Health and Human Services ATP III Classifications)    Normal           <150 mg/dL  Borderline High  150-199 mg/dL  High             200-499 mg/dL  Very High        >500 mg/dL    HDL Reference Ranges  (U.S. Department of Health and Human Services ATP III Classifications)    Low     <40 mg/dl (major risk factor for CHD)  High    >60 mg/dl ('negative' risk factor for CHD)        LDL Reference Ranges  (U.S. Department of Health and Human Services ATP III Classifications)    Optimal          <100 mg/dL  Near Optimal     100-129 mg/dL  Borderline High  130-159 mg/dL  High             160-189 mg/dL  Very High        >189 mg/dL   MANUAL DIFFERENTIAL - Abnormal; Notable for the following components:    Neutrophil % 82.0 (*)     Lymphocyte % 8.0 (*)     Monocyte % 4.0 (*)     Basophil % 2.0 (*)     Neutrophils Absolute 8.59 (*)     All other components within normal limits   CBC WITH AUTO DIFFERENTIAL - Abnormal; Notable for the following components:    RBC 3.90 (*)     Hemoglobin 12.3 (*)     Hematocrit 36.8 (*)     All other components within normal limits   POCT GLUCOSE FINGERSTICK - Abnormal; Notable for the following components:    Glucose 178 (*)     All other components within normal limits   COVID-19 AND FLU A/B, NP SWAB IN TRANSPORT MEDIA 8-12 HR TAT - Normal    Narrative:     Fact sheet for providers: https://www.fda.gov/media/082328/download    Fact sheet for patients: https://www.fda.gov/media/501512/download    Test performed by PCR.   MAGNESIUM - Normal   TSH+FREE T4 - Normal   LACTIC ACID, PLASMA - Normal   PROTIME-INR - Normal    Narrative:     Therapeutic range for most indications is 2.0-3.0 INR,  or 2.5-3.5 for mechanical heart valves.   LIPASE - Normal   PROCALCITONIN - Normal  "   Narrative:     As a Marker for Sepsis (Non-Neonates):    1. <0.5 ng/mL represents a low risk of severe sepsis and/or septic shock.  2. >2 ng/mL represents a high risk of severe sepsis and/or septic shock.    As a Marker for Lower Respiratory Tract Infections that require antibiotic therapy:    PCT on Admission    Antibiotic Therapy       6-12 Hrs later    >0.5                Strongly Recommended  >0.25 - <0.5        Recommended   0.1 - 0.25          Discouraged              Remeasure/reassess PCT  <0.1                Strongly Discouraged     Remeasure/reassess PCT    As 28 day mortality risk marker: \"Change in Procalcitonin Result\" (>80% or <=80%) if Day 0 (or Day 1) and Day 4 values are available. Refer to http://www.[email or web address]ct-calculator.com    Change in PCT <=80%  A decrease of PCT levels below or equal to 80% defines a positive change in PCT test result representing a higher risk for 28-day all-cause mortality of patients diagnosed with severe sepsis for septic shock.    Change in PCT >80%  A decrease of PCT levels of more than 80% defines a negative change in PCT result representing a lower risk for 28-day all-cause mortality of patients diagnosed with severe sepsis or septic shock.      BLOOD CULTURE   BLOOD CULTURE   URINALYSIS W/ MICROSCOPIC IF INDICATED (NO CULTURE)   CBC AND DIFFERENTIAL    Narrative:     The following orders were created for panel order CBC & Differential.  Procedure                               Abnormality         Status                     ---------                               -----------         ------                     CBC Auto Differential[[id number]]        Abnormal            Final result                 Please view results for these tests on the individual orders.   EXTRA TUBES    Narrative:     The following orders were created for panel order Extra Tubes.  Procedure                               Abnormality         Status                     ---------                      "          -----------         ------                     Lavender Top[[id number]]                                     Final result               Gold Top - SST[[id number]]                                   Final result               Quinones Top[[id number]]                                         Final result               Light Blue Top[[id number]]                                   Final result                 Please view results for these tests on the individual orders.   LAVENDER TOP   GOLD TOP - SST   GRAY TOP   LIGHT BLUE TOP   CBC AND DIFFERENTIAL    Narrative:     The following orders were created for panel order CBC & Differential.  Procedure                               Abnormality         Status                     ---------                               -----------         ------                     Manual Differential[[id number]]          Abnormal            Final result               CBC Auto Differential[[id number]]        Abnormal            Final result                 Please view results for these tests on the individual orders.       Meds given in ED:   Medications   dextrose (D50W) (25 g/50 mL) IV injection 50 mL (50 mL Intravenous Given 9/11/23 1824)   dilTIAZem CD (CARDIZEM CD) 24 hr capsule 240 mg (240 mg Oral Given 9/11/23 2204)   folic acid (FOLVITE) tablet 1 mg (1 mg Oral Given 9/11/23 2204)   ipratropium (ATROVENT) nebulizer solution 0.5 mg (has no administration in time range)   mirtazapine (REMERON) tablet 15 mg (15 mg Oral Given 9/11/23 2204)   predniSONE (DELTASONE) tablet 5 mg (5 mg Oral Given 9/11/23 2204)   vancomycin oral solution reconstituted 125 mg (125 mg Oral Given 9/12/23 1200)   sodium chloride 0.9 % flush 10 mL (10 mL Intravenous Given 9/12/23 0848)   sodium chloride 0.9 % flush 10 mL (10 mL Intravenous Given 9/12/23 0847)   sodium chloride 0.9 % infusion 40 mL (has no administration in time range)   sennosides-docusate (PERICOLACE) 8.6-50 MG per tablet 2 tablet (2 tablets Oral Given  9/12/23 0846)     And   polyethylene glycol (MIRALAX) packet 17 g (has no administration in time range)     And   bisacodyl (DULCOLAX) EC tablet 5 mg (has no administration in time range)     And   bisacodyl (DULCOLAX) suppository 10 mg (has no administration in time range)   heparin (porcine) 5000 UNIT/ML injection 5,000 Units (5,000 Units Subcutaneous Given 9/12/23 0847)   nitroglycerin (NITROSTAT) SL tablet 0.4 mg (has no administration in time range)   sodium chloride 0.9 % infusion (100 mL/hr Intravenous New Bag 9/12/23 0933)   Potassium Replacement - Follow Nurse / BPA Driven Protocol (has no administration in time range)   Magnesium Standard Dose Replacement - Follow Nurse / BPA Driven Protocol (has no administration in time range)   Phosphorus Replacement - Follow Nurse / BPA Driven Protocol (has no administration in time range)   Calcium Replacement - Follow Nurse / BPA Driven Protocol (has no administration in time range)   ondansetron (ZOFRAN) tablet 4 mg (has no administration in time range)     Or   ondansetron (ZOFRAN) injection 4 mg (has no administration in time range)   sodium chloride 0.9 % bolus 1,000 mL (0 mL Intravenous Stopped 9/11/23 1546)     sodium chloride, 100 mL/hr, Last Rate: 100 mL/hr (09/12/23 0933)         NIH Stroke Scale:       Isolation/Infection(s):  Contact Spore   C.difficile     COVID Testing  Collected yes  Resulted yes- negative    Nursing report ED to floor:  Mental status: A/Ox3  Ambulatory status: Ax1  Precautions: contact spore (c. Diff history)    ED nurse phone extentsion- [phone]    Electronically signed by Florencia Whyte, RN at 09/12/23 1540       Earnestine Modi, RN at 09/12/23 0738          Pt ambulated to chair at bedside without difficulty. This RN assisted pt and wife with sitting up per pt's request to get out of bed and stretch. Cardiac electrodes replaced due to skin irritation per pt at this time. Complete bed change at this time. Pt states no further needs.  "Call light in reach and pt and wife instructed to call out for help when ready to get back in bed. Breakfast tray set up for pt.    Electronically signed by Earnestine Modi, RN at 09/12/23 0755       Charmaine Ramos RN at 09/11/23 1841          Nursing report ED to floor  Brian Garcia  73 y.o.  male    HPI:   Chief Complaint   Patient presents with    Weakness - Generalized    Fatigue    Diarrhea       Admitting doctor:   Fredrick Ni MD    Consulting provider(s):  Consults       No orders found from 8/13/2023 to 9/12/2023.             Admitting diagnosis:   The primary encounter diagnosis was Hallucinations. A diagnosis of Fatigue, unspecified type was also pertinent to this visit.    Code status:   Current Code Status       Date Active Code Status Order ID Comments User Context       Prior            Allergies:   Amoxicillin, Albuterol, Asmanex (120 metered doses) [mometasone furoate], Gabapentin, Lortab [hydrocodone-acetaminophen], Morphine and related, Prilosec [omeprazole], Sodium clavulanate, Statins, Sulfa antibiotics, and Symbicort [budesonide-formoterol fumarate]    Intake and Output    Intake/Output Summary (Last 24 hours) at 9/11/2023 1841  Last data filed at 9/11/2023 1546  Gross per 24 hour   Intake 1000 ml   Output --   Net 1000 ml       Weight:       09/11/23  1157   Weight: 56.2 kg (124 lb)       Most recent vitals:   Vitals:    09/11/23 1157 09/11/23 1300 09/11/23 1634   BP: 112/58 115/57 132/59   BP Location: Left arm  Left arm   Patient Position: Lying  Lying   Pulse: 98 88 80   Resp: 20  20   Temp: 98.1 °F (36.7 °C)     TempSrc: Oral     SpO2: 98% 97% 94%   Weight: 56.2 kg (124 lb)     Height: 165.1 cm (65\")       Oxygen Therapy: 2l/min via nc    Active LDAs/IV Access:   Lines, Drains & Airways       Active LDAs       Name Placement date Placement time Site Days    Peripheral IV 09/11/23 1446 Anterior;Left Forearm 09/11/23  1446  Forearm  less than 1    Single Lumen Implantable " Port 03/30/23 Right Chest 03/30/23  1203  Chest  165                    Labs (abnormal labs have a star):   Labs Reviewed   COMPREHENSIVE METABOLIC PANEL - Abnormal; Notable for the following components:       Result Value    Glucose 63 (*)     BUN 4 (*)     Creatinine 0.53 (*)     Sodium 132 (*)     Chloride 97 (*)     CO2 15.0 (*)     Albumin 3.1 (*)     Anion Gap 20.0 (*)     All other components within normal limits    Narrative:     GFR Normal >60  Chronic Kidney Disease <60  Kidney Failure <15    The GFR formula is only valid for adults with stable renal function between ages 18 and 70.   URINALYSIS W/ MICROSCOPIC IF INDICATED (NO CULTURE) - Abnormal; Notable for the following components:    Ketones, UA >=160 mg/dL (4+) (*)     Protein, UA 30 mg/dL (1+) (*)     All other components within normal limits   CBC WITH AUTO DIFFERENTIAL - Abnormal; Notable for the following components:    WBC 12.99 (*)     RBC 3.96 (*)     Hemoglobin 12.3 (*)     Hematocrit 36.9 (*)     Lymphocyte % 12.9 (*)     Monocyte % 15.6 (*)     Eosinophil % 6.7 (*)     Immature Grans % 2.5 (*)     Neutrophils, Absolute 7.92 (*)     Monocytes, Absolute 2.03 (*)     Eosinophils, Absolute 0.87 (*)     Immature Grans, Absolute 0.32 (*)     All other components within normal limits   URINALYSIS, MICROSCOPIC ONLY - Abnormal; Notable for the following components:    RBC, UA 0-2 (*)     All other components within normal limits   COVID-19 AND FLU A/B, NP SWAB IN TRANSPORT MEDIA 8-12 HR TAT - Normal    Narrative:     Fact sheet for providers: https://www.fda.gov/media/656184/download    Fact sheet for patients: https://www.fda.gov/media/809526/download    Test performed by PCR.   MAGNESIUM - Normal   BLOOD CULTURE   BLOOD CULTURE   CBC AND DIFFERENTIAL    Narrative:     The following orders were created for panel order CBC & Differential.  Procedure                               Abnormality         Status                     ---------                                -----------         ------                     CBC Auto Differential[[id number]]        Abnormal            Final result                 Please view results for these tests on the individual orders.   LAVENDER TOP   GOLD TOP - SST   LIGHT BLUE TOP   EXTRA TUBES    Narrative:     The following orders were created for panel order Extra Tubes.  Procedure                               Abnormality         Status                     ---------                               -----------         ------                     Lavender Top[[id number]]                                     Final result               Gold Top - SST[[id number]]                                   Final result               Quinones Top[[id number]]                                         In process                 Light Blue Top[[id number]]                                   Final result                 Please view results for these tests on the individual orders.   GRAY TOP       Meds given in ED:   Medications   dextrose (D50W) (25 g/50 mL) IV injection 50 mL (50 mL Intravenous Given 9/11/23 1824)   sodium chloride 0.9 % bolus 1,000 mL (0 mL Intravenous Stopped 9/11/23 1546)           NIH Stroke Scale:       Isolation/Infection(s):  No active isolations   C.difficile     COVID Testing  Collected yes  Resulted yes    Nursing report ED to floor:  Mental status: A&O x3  Ambulatory status: 1 person assist  Precautions: no    ED nurse phone [facility]- [phone]    Electronically signed by Charmaine Ramos RN at 09/11/23 1842       Devonte Pettit at 09/11/23 1351          Patient unable to urinate at this time. Patient provided with urinal and informed on need of sample.    Electronically signed by Devonte Pettit at 09/11/23 1351       Tom Stubbs MD at 09/11/23 1340          Subjective   History of Present Illness  73-year-old male comes to the ER chief complaint fatigue, no appetite.  Patient was discharged from the hospital few days ago after  being treated for C. difficile.  Wife states patient has been hallucinating and seeing people dancing on the ceiling and walls.  No diarrhea, vomiting for the last few days.  No belly pain.    History provided by:  Patient and spouse  History limited by: Poor historian.   used: No      Review of Systems   Constitutional:  Positive for activity change, appetite change and fatigue. Negative for chills and fever.   HENT:  Negative for drooling.    Eyes:  Negative for redness.   Respiratory:  Negative for cough, chest tightness, shortness of breath and wheezing.    Cardiovascular:  Negative for chest pain.   Gastrointestinal:  Negative for abdominal pain, constipation, diarrhea, nausea and vomiting.   Genitourinary:  Negative for flank pain.   Skin:  Negative for color change.   Neurological:  Negative for seizures.   Psychiatric/Behavioral:  Positive for confusion.      Past Medical History:   Diagnosis Date    A-fib     Anesthesia     hip surgery (spinal caused him unpleasant sensations never wants it again)    Arthritis     Blood in sputum 02/22/2023    bright red blood, teaspoon - tablespoon daily    COPD (chronic obstructive pulmonary disease)     CVA (cerebral vascular accident) 12/24/2022    Hearing aid worn     bilateral    History of pulmonary embolism     History of recurrent pneumonia     Hypertension     Lung cancer 03/17/2023    Oxygen dependent     since @ age 65    Pulmonary nodules     Risk for falls     uses walker    Rotator cuff syndrome of right shoulder     Tachycardia     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 HTN/Eliquis    Wears glasses     readers    Wears glasses     reading       Allergies   Allergen Reactions    Amoxicillin Anaphylaxis    Albuterol Irritability     Facial flushing and palpitations.    Asmanex (120 Metered Doses) [Mometasone Furoate] Unknown - Low Severity    Gabapentin Hallucinations    Lortab [Hydrocodone-Acetaminophen] Other (See  "Comments)     Can't remember    Morphine And Related Hallucinations    Prilosec [Omeprazole] Other (See Comments)     unknown    Sodium Clavulanate Unknown - High Severity    Statins GI Intolerance    Sulfa Antibiotics Other (See Comments)     Can't remember also more and can't remember    Symbicort [Budesonide-Formoterol Fumarate] Unknown (See Comments)     Doesn't remember reaction type       Past Surgical History:   Procedure Laterality Date    HIP ARTHROPLASTY Right     RIB BIOPSY Left 03/17/2023 11th    VENOUS ACCESS DEVICE (PORT) INSERTION N/A 3/30/2023    Procedure: MEDIPORT PLACEMENT          (C-ARM);  Surgeon: Lavon Cochran MD;  Location: [facility];  Service: General;  Laterality: N/A;       Family History   Problem Relation Age of Onset    Cancer Sister     Brain cancer Brother     Cancer Brother     No Known Problems Daughter     No Known Problems Daughter     No Known Problems Son        Social History     Socioeconomic History    Marital status:     Number of children: 3    Highest education level: Associate degree: occupational, technical, or vocational program   Tobacco Use    Smoking status: Former     Packs/day: 3.00     Years: 47.00     Pack years: 141.00     Types: Cigarettes     Start date: 1961     Quit date: 5/1/2008     Years since quitting: 15.3    Smokeless tobacco: Never   Vaping Use    Vaping Use: Never used   Substance and Sexual Activity    Alcohol use: Not Currently     Comment: not last 40 years    Drug use: Never    Sexual activity: Not Currently     Partners: Female           Objective   Vitals:    09/11/23 1157 09/11/23 1300 09/11/23 1634   BP: 112/58 115/57 132/59   BP Location: Left arm  Left arm   Patient Position: Lying  Lying   Pulse: 98 88 80   Resp: 20  20   Temp: 98.1 °F (36.7 °C)     TempSrc: Oral     SpO2: 98% 97% 94%   Weight: 56.2 kg (124 lb)     Height: 165.1 cm (65\")         Physical Exam  Vitals and nursing note reviewed.   Constitutional:       General: " He is not in acute distress.     Appearance: He is well-developed. He is ill-appearing. He is not toxic-appearing or diaphoretic.   Eyes:      General: No scleral icterus.     Conjunctiva/sclera: Conjunctivae normal.   Pulmonary:      Effort: Pulmonary effort is normal. No accessory muscle usage or respiratory distress.   Chest:      Chest wall: No tenderness.   Abdominal:      Palpations: Abdomen is soft.      Tenderness: There is no abdominal tenderness (deep palpation). There is no guarding or rebound.   Skin:     General: Skin is warm and dry.      Capillary Refill: Capillary refill takes less than 2 seconds.   Neurological:      Mental Status: He is alert and oriented to person, place, and time.   Psychiatric:         Attention and Perception: He perceives visual hallucinations.         Behavior: Behavior is cooperative.       Procedures          ED Course      Results for orders placed or performed during the hospital encounter of 09/11/23   COVID-19 and FLU A/B PCR - Swab, Nasopharynx    Specimen: Nasopharynx; Swab   Result Value Ref Range    COVID19 Not Detected Not Detected - Ref. Range    Influenza A PCR Not Detected Not Detected    Influenza B PCR Not Detected Not Detected   Comprehensive Metabolic Panel    Specimen: Blood   Result Value Ref Range    Glucose 63 (L) 65 - 99 mg/dL    BUN 4 (L) 8 - 23 mg/dL    Creatinine 0.53 (L) 0.76 - 1.27 mg/dL    Sodium 132 (L) 136 - 145 mmol/L    Potassium 4.1 3.5 - 5.2 mmol/L    Chloride 97 (L) 98 - 107 mmol/L    CO2 15.0 (L) 22.0 - 29.0 mmol/L    Calcium 8.9 8.6 - 10.5 mg/dL    Total Protein 7.6 6.0 - 8.5 g/dL    Albumin 3.1 (L) 3.5 - 5.2 g/dL    ALT (SGPT) 17 1 - 41 U/L    AST (SGOT) 19 1 - 40 U/L    Alkaline Phosphatase 73 39 - 117 U/L    Total Bilirubin 0.2 0.0 - 1.2 mg/dL    Globulin 4.5 gm/dL    A/G Ratio 0.7 g/dL    BUN/Creatinine Ratio 7.5 7.0 - 25.0    Anion Gap 20.0 (H) 5.0 - 15.0 mmol/L    eGFR 105.2 >60.0 mL/min/1.73   Urinalysis With Microscopic If  Indicated (No Culture) - Urine, Clean Catch    Specimen: Urine, Clean Catch   Result Value Ref Range    Color, UA Yellow Yellow, Straw, Dark Yellow, Ana Laura    Appearance, UA Clear Clear    pH, UA 5.5 5.0 - 9.0    Specific Gravity, UA 1.016 1.003 - 1.030    Glucose, UA Negative Negative    Ketones, UA >=160 mg/dL (4+) (A) Negative    Bilirubin, UA Negative Negative    Blood, UA Negative Negative    Protein, UA 30 mg/dL (1+) (A) Negative    Leuk Esterase, UA Negative Negative    Nitrite, UA Negative Negative    Urobilinogen, UA 0.2 E.U./dL 0.2 - 1.0 E.U./dL   Magnesium    Specimen: Blood   Result Value Ref Range    Magnesium 1.9 1.6 - 2.4 mg/dL   CBC Auto Differential    Specimen: Blood   Result Value Ref Range    WBC 12.99 (H) 3.40 - 10.80 10*3/mm3    RBC 3.96 (L) 4.14 - 5.80 10*6/mm3    Hemoglobin 12.3 (L) 13.0 - 17.7 g/dL    Hematocrit 36.9 (L) 37.5 - 51.0 %    MCV 93.2 79.0 - 97.0 fL    MCH 31.1 26.6 - 33.0 pg    MCHC 33.3 31.5 - 35.7 g/dL    RDW 12.6 12.3 - 15.4 %    RDW-SD 43.3 37.0 - 54.0 fl    MPV 9.0 6.0 - 12.0 fL    Platelets 342 140 - 450 10*3/mm3    Neutrophil % 61.0 42.7 - 76.0 %    Lymphocyte % 12.9 (L) 19.6 - 45.3 %    Monocyte % 15.6 (H) 5.0 - 12.0 %    Eosinophil % 6.7 (H) 0.3 - 6.2 %    Basophil % 1.3 0.0 - 1.5 %    Immature Grans % 2.5 (H) 0.0 - 0.5 %    Neutrophils, Absolute 7.92 (H) 1.70 - 7.00 10*3/mm3    Lymphocytes, Absolute 1.68 0.70 - 3.10 10*3/mm3    Monocytes, Absolute 2.03 (H) 0.10 - 0.90 10*3/mm3    Eosinophils, Absolute 0.87 (H) 0.00 - 0.40 10*3/mm3    Basophils, Absolute 0.17 0.00 - 0.20 10*3/mm3    Immature Grans, Absolute 0.32 (H) 0.00 - 0.05 10*3/mm3    nRBC 0.0 0.0 - 0.2 /100 WBC   Urinalysis, Microscopic Only - Urine, Clean Catch    Specimen: Urine, Clean Catch   Result Value Ref Range    RBC, UA 0-2 (A) None Seen /HPF    WBC, UA 0-2 None Seen, 0-2, 3-5 /HPF    Bacteria, UA None Seen None Seen /HPF    Squamous Epithelial Cells, UA 0-2 None Seen, 0-2 /HPF    Hyaline Casts, UA 3-6  None Seen /LPF    Granular Casts, UA 3-6 None Seen /LPF    Methodology Manual Light Microscopy    Lavender Top   Result Value Ref Range    Extra Tube hold for add-on    Gold Top - SST   Result Value Ref Range    Extra Tube Hold for add-ons.    Light Blue Top   Result Value Ref Range    Extra Tube Hold for add-ons.      CT Head Without Contrast   Final Result      XR Chest 1 View   Final Result      CT Chest Without Contrast Diagnostic    (Results Pending)   CT Abdomen Without Contrast    (Results Pending)             Medical Decision Making  Vital signs are stable, afebrile.  Laboratory work-up significant for a white count of 13, UA is leukocyte nitrite negative.  COVID and flu are negative.  Chest x-ray shows no acute cardiopulmonary processes.  CT of the head negative for acute findings.  Patient satting well on his home oxygen.  Denies having any abdominal pain, diarrhea.  Discussed with the on-call hospitalist who agrees to admit    Amount and/or Complexity of Data Reviewed  Labs: ordered.  Radiology: ordered.    Risk  Prescription drug management.  Decision regarding hospitalization.        Final diagnoses:   Hallucinations   Fatigue, unspecified type       ED Disposition  ED Disposition       ED Disposition   Decision to Admit    Condition   --    Comment   Level of Care: Telemetry [5]   Diagnosis: Hallucinations [780.1.ICD-9-CM]   Admitting Physician: MARY KIM [[id number]]   Attending Physician: MARY KIM [[id number]]                 No follow-up provider specified.       Medication List      No changes were made to your prescriptions during this visit.            Tom Stubbs MD  09/11/23 1823      Electronically signed by Tom Stubbs MD at 09/11/23 1823       Oxygen Therapy (last day)       Date/Time SpO2 Device (Oxygen Therapy) Flow (L/min) Oxygen Concentration (%) ETCO2 (mmHg)    09/15/23 0943 -- nasal cannula 3 -- --    09/15/23 0734 94 nasal cannula 3 -- --    09/15/23 0300 97 nasal  cannula 3 -- --    09/15/23 0100 -- nasal cannula 3 -- --    09/14/23 2300 93 nasal cannula 3 -- --    09/14/23 1938 98 nasal cannula 3 -- --    09/14/23 1458 96 nasal cannula -- -- --    09/14/23 0759 99 nasal cannula 3 -- --    09/14/23 0743 97 nasal cannula -- -- --    09/14/23 0353 99 nasal cannula 3 -- --          Current Facility-Administered Medications   Medication Dose Route Frequency Provider Last Rate Last Admin    sennosides-docusate (PERICOLACE) 8.6-50 MG per tablet 2 tablet  2 tablet Oral BID Fredrick Ni MD   2 tablet at 09/12/23 0846    And    polyethylene glycol (MIRALAX) packet 17 g  17 g Oral Daily PRN Fredrick Ni MD        And    bisacodyl (DULCOLAX) EC tablet 5 mg  5 mg Oral Daily PRN Fredrick Ni MD        And    bisacodyl (DULCOLAX) suppository 10 mg  10 mg Rectal Daily PRN Fredrick Ni MD        Calcium Replacement - Follow Nurse / BPA Driven Protocol   Does not apply PRN Fredrick Ni MD        dextrose (D50W) (25 g/50 mL) IV injection 50 mL  50 mL Intravenous Q1H PRN Fredrick Ni MD   50 mL at 09/11/23 1824    dilTIAZem CD (CARDIZEM CD) 24 hr capsule 240 mg  240 mg Oral Q24H Fredrick Ni MD   240 mg at 09/15/23 0846    folic acid (FOLVITE) tablet 1 mg  1 mg Oral Daily Fredrick Ni MD   1 mg at 09/15/23 0846    heparin (porcine) 5000 UNIT/ML injection 5,000 Units  5,000 Units Subcutaneous Q12H Fredrick Ni MD   5,000 Units at 09/15/23 0846    ipratropium (ATROVENT) nebulizer solution 0.5 mg  0.5 mg Nebulization 4x Daily PRN Fredrick Ni MD        Magnesium Standard Dose Replacement - Follow Nurse / BPA Driven Protocol   Does not apply PRN Fredrick Ni MD        mirtazapine (REMERON) tablet 15 mg  15 mg Oral Nightly Fredrick Ni MD   15 mg at 09/14/23 2038    nitroglycerin (NITROSTAT) SL tablet 0.4 mg  0.4 mg Sublingual Q5 Min PRN Fredrick Ni MD        ondansetron (ZOFRAN) tablet 4 mg  4 mg Oral Q6H PRN Fredrick Ni MD        Or    ondansetron  (ZOFRAN) injection 4 mg  4 mg Intravenous Q6H PRN Fredrick Ni MD        Phosphorus Replacement - Follow Nurse / BPA Driven Protocol   Does not apply PRN Fredrick Ni MD        Potassium Replacement - Follow Nurse / BPA Driven Protocol   Does not apply PRN Fredrick Ni MD        predniSONE (DELTASONE) tablet 5 mg  5 mg Oral Every Other Day Fredrick Ni MD   5 mg at 09/15/23 0846    sodium chloride 0.9 % flush 10 mL  10 mL Intravenous Q12H Fredrick Ni MD   10 mL at 09/15/23 0846    sodium chloride 0.9 % flush 10 mL  10 mL Intravenous PRN Fredrick Ni MD   10 mL at 09/12/23 0847    sodium chloride 0.9 % infusion 40 mL  40 mL Intravenous PRN Fredrick Ni MD        sodium chloride 0.9 % infusion  100 mL/hr Intravenous Continuous Fredrick Ni  mL/hr at 09/15/23 0847 100 mL/hr at 09/15/23 0847    tiotropium bromide-olodaterol (STIOLTO RESPIMAT) 2.5-2.5 MCG/ACT inhaler 2 puff  2 puff Inhalation Daily - RT Eliazar Zhang R, DO   2 puff at 09/15/23 0847    vancomycin oral solution reconstituted 125 mg  125 mg Oral Q6H Fredrick Ni MD   125 mg at 09/15/23 0635     Lab Results (last 48 hours)       Procedure Component Value Units Date/Time    Blood Culture - Blood, Hand, Left [[id number]]  (Normal) Collected: 09/11/23 2017    Specimen: Blood from Hand, Left Updated: 09/14/23 2045     Blood Culture No growth at 3 days    Blood Culture - Blood, Arm, Left [[id number]]  (Normal) Collected: 09/11/23 2017    Specimen: Blood from Arm, Left Updated: 09/14/23 2045     Blood Culture No growth at 3 days    Phosphorus [[id number]]  (Normal) Collected: 09/14/23 1417    Specimen: Blood Updated: 09/14/23 1519     Phosphorus 3.0 mg/dL     Calcium [[id number]]  (Abnormal) Collected: 09/14/23 1417    Specimen: Blood Updated: 09/14/23 1502     Calcium 8.4 mg/dL     Magnesium [[id number]]  (Normal) Collected: 09/14/23 1417    Specimen: Blood Updated: 09/14/23 1502     Magnesium 1.6 mg/dL     Potassium [[id number]]   (Normal) Collected: 09/14/23 0033    Specimen: Blood Updated: 09/14/23 0056     Potassium 4.3 mmol/L     Magnesium [[id number]]  (Normal) Collected: 09/13/23 1210    Specimen: Blood Updated: 09/13/23 1257     Magnesium 1.7 mg/dL     Calcium [[id number]]  (Abnormal) Collected: 09/13/23 1210    Specimen: Blood Updated: 09/13/23 1257     Calcium 8.5 mg/dL     Potassium [[id number]]  (Abnormal) Collected: 09/13/23 1210    Specimen: Blood Updated: 09/13/23 1257     Potassium 3.3 mmol/L     Phosphorus [[id number]]  (Abnormal) Collected: 09/13/23 1210    Specimen: Blood Updated: 09/13/23 1257     Phosphorus 2.3 mg/dL           Imaging Results (Last 48 Hours)       ** No results found for the last 48 hours. **          ECG/EMG Results (last 24 hours)       Procedure Component Value Units Date/Time    SCANNED - TELEMETRY   [[id number]] Resulted: 09/11/23     Updated: 09/14/23 1334    SCANNED - TELEMETRY   [[id number]] Resulted: 09/11/23     Updated: 09/14/23 1343    SCANNED - TELEMETRY   [[id number]] Resulted: 09/11/23     Updated: 09/14/23 1358    SCANNED - TELEMETRY   [[id number]] Resulted: 09/11/23     Updated: 09/14/23 1358            Operative/Procedure Notes (most recent note)    No notes of this type exist for this encounter.          Physician Progress Notes (last 24 hours)        Eliazar Zhang DO at 09/14/23 1638              [facility]  INPATIENT PROGRESS NOTE      Length of Stay: 0  Date of Admission: 9/11/2023  Primary Care Physician: Marina Kitchen MD    Subjective   Chief Complaint:  Remains pleasantly confused  HPI:  ED evaluation  73-year-old male with COPD, history of stroke, stage IV lung cancer on Keytruda, recurrent C. difficile infection, presented after developing fever and increased diarrhea in setting of recurrent C. difficile colitis. Pulmonary embolism diagnosed in 2015. Hypertension. Metastatic lung adenocarcinoma with pulmonary  metastasis for which patient is currently on treatment with Keytruda. Iron deficiency anemia. History of recurrent C. Difficile Was discharged 4 days ago from [facility] after Clostridium difficile colitis, on vanco PO, no diarrhea, his friend Annette Hui refers staying in bed, not eating, and confused, now oriented.     Daily assessment September 14, 2023  On evaluation today the wife is at bedside questions are answered.  The wife is indicated that she is not able to care for her  at home and would request skilled nursing facility on discharge.  This has been reviewed with case management and they are actively evaluating options.  Patient is now more oriented than before.  Still remains somewhat confused.  Remains poor historian.  We will continue to follow patient anticipate stable for discharge in a.m.      Review of Systems   Unable to perform ROS: Mental status change      All pertinent negatives and positives are as above. All other systems have been reviewed and are negative unless otherwise stated.     Objective    As of today 09/14/23  Temp:  [97.2 °F (36.2 °C)-99.2 °F (37.3 °C)] 98 °F (36.7 °C)  Heart Rate:  [76-86] 77  Resp:  [18] 18  BP: ()/(49-58) 95/49    Physical Exam  Vitals and nursing note reviewed.   HENT:      Head: Normocephalic and atraumatic.      Right Ear: External ear normal.      Left Ear: External ear normal.      Nose: Nose normal.      Mouth/Throat:      Mouth: Mucous membranes are dry.      Pharynx: Oropharynx is clear.   Eyes:      General: No scleral icterus.     Extraocular Movements: Extraocular movements intact.      Conjunctiva/sclera: Conjunctivae normal.      Pupils: Pupils are equal, round, and reactive to light.   Cardiovascular:      Rate and Rhythm: Normal rate and regular rhythm.      Pulses: Normal pulses.      Heart sounds: Normal heart sounds.   Pulmonary:      Effort: Pulmonary effort is normal.      Breath sounds: Normal breath sounds.   Abdominal:       General: Bowel sounds are normal. There is no distension.      Palpations: Abdomen is soft.      Tenderness: There is no abdominal tenderness.   Musculoskeletal:         General: Normal range of motion.      Cervical back: Normal range of motion and neck supple.   Skin:     General: Skin is warm and dry.      Coloration: Skin is not jaundiced.   Neurological:      General: No focal deficit present.      Mental Status: He is alert. He is disoriented.      Motor: Weakness present.      Comments: Improving mental status         dilTIAZem CD, 240 mg, Oral, Q24H  folic acid, 1 mg, Oral, Daily  heparin (porcine), 5,000 Units, Subcutaneous, Q12H  mirtazapine, 15 mg, Oral, Nightly  predniSONE, 5 mg, Oral, Every Other Day  senna-docusate sodium, 2 tablet, Oral, BID  sodium chloride, 10 mL, Intravenous, Q12H  tiotropium bromide-olodaterol, 2 puff, Inhalation, Daily - RT  vancomycin, 125 mg, Oral, Q6H         Results Review:  I have reviewed the labs, radiology results, and diagnostic studies.    Laboratory Data:   Results from last 7 days   Lab Units 09/14/23  1417 09/14/23  0033 09/13/23  1210 09/12/23  0657 09/11/23  1445 09/08/23  0554   SODIUM mmol/L  --   --   --  136 132* 135*   POTASSIUM mmol/L  --  4.3 3.3* 3.8 4.1 3.5   CHLORIDE mmol/L  --   --   --  99 97* 99   CO2 mmol/L  --   --   --  23.0 15.0* 28.0   BUN mg/dL  --   --   --  4* 4* 6*   CREATININE mg/dL  --   --   --  0.53* 0.53* 0.44*   GLUCOSE mg/dL  --   --   --  107* 63* 68   CALCIUM mg/dL 8.4*  --  8.5* 9.3 8.9 7.6*   BILIRUBIN mg/dL  --   --   --  0.3 0.2  --    ALK PHOS U/L  --   --   --  82 73  --    ALT (SGPT) U/L  --   --   --  22 17  --    AST (SGOT) U/L  --   --   --  24 19  --    ANION GAP mmol/L  --   --   --  14.0 20.0* 8.0       Estimated Creatinine Clearance: 88.2 mL/min (A) (by C-G formula based on SCr of 0.53 mg/dL (L)).  Results from last 7 days   Lab Units 09/14/23  1417 09/13/23  1210 09/11/23  1445   MAGNESIUM mg/dL 1.6 1.7 1.9   PHOSPHORUS  mg/dL 3.0 2.3*  --            Results from last 7 days   Lab Units 09/12/23  0657 09/11/23  1445   WBC 10*3/mm3 10.11 12.99*   HEMOGLOBIN g/dL 12.3* 12.3*   HEMATOCRIT % 36.8* 36.9*   PLATELETS 10*3/mm3 394 342       Results from last 7 days   Lab Units 09/12/23  0742   INR  0.98         Culture Data:   Blood Culture   Date Value Ref Range Status   09/11/2023 No growth at 2 days  Preliminary   09/11/2023 No growth at 2 days  Preliminary     No results found for: URINECX  No results found for: RESPCX  No results found for: WOUNDCX  No results found for: STOOLCX  No components found for: BODYFLD    Radiology Data:   Imaging Results (Last 24 Hours)       ** No results found for the last 24 hours. **            I have reviewed the patient's current medications.     Assessment/Plan     Principal Problem:    Hallucinations      Impression/plan  Altered mental status  Etiology unclear may have confusion from recent C. difficile infection.  We will continue oral vancomycin and follow.  White count has improved to within normal limits  Patient has history of adenocarcinoma with metastasis  Currently on chemotherapy Keytruda.  Blood cultures no growth at 24 hours  UA negative  CT scan abdomen pelvis negative  CT chest improved from previous exam no acute process  CT head no acute intercranial process.  Continues to improve.  Stable otherwise.  We will continue with medication.  Anticipate discharge to skilled nursing facility when case management has made final arrangements    Adenocarcinoma  Keytruda  Oncology consult currently stable not needed at this time    Hypertension  Continue diltiazem    History of COPD no exacerbation currently  Continue as needed nebulizer treatments    History of C. difficile  Currently on oral vancomycin    History of some mental confusion  Continue Remeron for now    CODE STATUS full code  DVT prophylaxis is heparin    We will continue to follow.  Follow patient's response to  therapy      Medical Decision Making  Number and Complexity of problems: 1  Differential Diagnosis: sepsis vs dehydration     Conditions and Status:        Condition is unchanged.     MDM Data  External documents reviewed: previous medical records  My EKG interpretation: none  My plain film interpretation: chest interstitial lung disease  Tests considered but not ordered: none     Decision rules/scores evaluated (example [id number], Wells, etc): n/a     Discussed with: patient and other significant Annette Hui     Treatment Plan  See above     Care Planning  Shared decision making: the patient  Code status and discussions: full code     Disposition  Social Determinants of Health that impact treatment or disposition: none  I expect the patient to be discharged to home in 2 days.       I confirmed that the patient's Advance Care Plan is present, code status is documented, or surrogate decision maker is listed in the patient's medical record.     I discussed the patient's findings and my recommendations with: the patient    Eliazar Zhang DO    Electronically signed by Eliazar Zhang DO at 09/14/23 1640       Consult Notes (last 24 hours)  Notes from 09/14/23 1056 through 09/15/23 1056   No notes of this type exist for this encounter.       Nutrition Notes (last 24 hours)  Notes from 09/14/23 1056 through 09/15/23 1056   No notes exist for this encounter.          Physical Therapy Notes (last 24 hours)        Sima Mota PTA at 09/14/23 1415  Version 1 of 1            09/14/23 1409   OTHER   Discipline physical therapy assistant   Rehab Time/Intention   Session Not Performed patient/family declined treatment  (pt defers PT tx, states he doesn't feel like working w/ therapy today)         Electronically signed by Sima Mota PTA at 09/14/23 1415          Occupational Therapy Notes (last 24 hours)        CharleneAbhi carrasco OT at 09/14/23 1347          Acute Care - Occupational Therapy  Treatment Note  [facility]     Patient Name: Brian Garcia  : 1950  MRN: [id number]  Today's Date: 2023             Admit Date: 2023       ICD-10-CM ICD-9-CM   1. Hallucinations  R44.3 780.1   2. Fatigue, unspecified type  R53.83 780.79   3. Impaired functional mobility, balance, gait, and endurance [Z74.09 (ICD-10-CM)]  Z74.09 V49.89   4. Impaired mobility and ADLs [Z74.09, Z78.9 (ICD-10-CM)]  Z74.09 V49.89    Z78.9      Patient Active Problem List   Diagnosis    Physical deconditioning    Leukocytosis    History of pulmonary embolism    Stage 4 very severe COPD by GOLD classification    Chronic respiratory failure with hypoxia, on home O2 therapy    Hypertension    CVA (cerebral vascular accident)    Traumatic hemorrhage of cerebrum    Iron deficiency anemia secondary to inadequate dietary iron intake    Acute pain of right shoulder    Limited range of motion (ROM) of shoulder    Rotator cuff syndrome of right shoulder    Impingement syndrome of right shoulder    Arthrosis of right acromioclavicular joint    Chronic right shoulder pain    Nontraumatic incomplete tear of right rotator cuff    Primary osteoarthritis of right shoulder    Malignant neoplasm of overlapping sites of right lung    Primary lung adenocarcinoma    Encounter for venous access device care    Iron malabsorption    Hypokalemia    C. difficile colitis    Hallucinations     Past Medical History:   Diagnosis Date    A-fib     Anesthesia     hip surgery (spinal caused him unpleasant sensations never wants it again)    Arthritis     Blood in sputum 2023    bright red blood, teaspoon - tablespoon daily    COPD (chronic obstructive pulmonary disease)     CVA (cerebral vascular accident) 2022    Hearing aid worn     bilateral    History of pulmonary embolism     History of recurrent pneumonia     Hypertension     Lung cancer 2023    Oxygen dependent     since @ age 65    Pulmonary nodules     Risk for  falls     uses walker    Rotator cuff syndrome of right shoulder     Tachycardia     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 HTN/Eliquis    Wears glasses     readers    Wears glasses     reading     Past Surgical History:   Procedure Laterality Date    HIP ARTHROPLASTY Right     RIB BIOPSY Left 03/17/2023 11th    VENOUS ACCESS DEVICE (PORT) INSERTION N/A 3/30/2023    Procedure: MEDIPORT PLACEMENT          (C-ARM);  Surgeon: Lavon Cochran MD;  Location: [facility];  Service: General;  Laterality: N/A;         OT ASSESSMENT FLOWSHEET (last 12 hours)       OT Evaluation and Treatment       Row Name 09/14/23 0922                   OT Time and Intention    Subjective Information no complaints  -RB        Document Type therapy note (daily note)  -RB        Mode of Treatment occupational therapy  -RB           General Information    Patient Profile Reviewed yes  -RB        Existing Precautions/Restrictions fall;oxygen therapy device and L/min  2 LPM  -RB           Pain Assessment    Pretreatment Pain Rating 0/10 - no pain  -RB        Posttreatment Pain Rating 0/10 - no pain  -RB           Cognition    Orientation Status (Cognition) oriented x 4  -RB           Strength Comprehensive (MMT)    General Manual Muscle Testing (MMT) Assessment --  -RB           Activities of Daily Living    BADL Assessment/Intervention bathing;lower body dressing;grooming;toileting  -RB           Bathing Assessment/Intervention    [place] Level (Bathing) bathing skills;supervision  -RB        Position (Bathing) edge of bed sitting  -RB           Lower Body Dressing Assessment/Training    [place] Level (Lower Body Dressing) lower body dressing skills;don;pants/bottoms;socks;contact guard assist  -RB        Position (Lower Body Dressing) edge of bed sitting  -RB           Grooming Assessment/Training    [place] Level (Grooming) shave face;supervision  -RB        Assistive Devices (Grooming) electric razor  -RB            Toileting Assessment/Training    [place] Level (Toileting) supervision  -RB        Assistive Devices (Toileting) commode, bedside with drop arms  -RB        Position (Toileting) supported standing  -RB           Bed Mobility    Bed Mobility supine-sit;sit-supine  -RB        Scooting/Bridging [place] (Bed Mobility) --  -RB        Supine-Sit [place] (Bed Mobility) supervision  -RB        Sit-Supine [place] (Bed Mobility) supervision  -RB        Assistive Device (Bed Mobility) bed rails;head of bed elevated  -RB           Functional Mobility    Functional Mobility- Ind. Level contact guard assist  -RB        Functional Mobility-Distance (Feet) 3  -RB        Functional Mobility- Safety Issues balance decreased during turns  -RB        Functional Mobility- Comment Bed to BSC.  -RB           Transfer Assessment/Treatment    Transfers toilet transfer  -RB           Sit-Stand Transfer    Sit-Stand [place] (Transfers) contact guard  -RB           Toilet Transfer    Type (Toilet Transfer) sit-stand;stand-sit  -RB        [place] Level (Toilet Transfer) contact guard  -RB        Assistive Device (Toilet Transfer) commode, bedside with drop arms  -RB           Safety Issues, Functional Mobility    Impairments Affecting Function (Mobility) balance;endurance/activity tolerance;shortness of breath;strength  -RB           Vital Signs    Pre Systolic BP Rehab 129  -RB        Pre Treatment Diastolic BP 57  -RB        Pretreatment Heart Rate (beats/min) 101  -RB        Intratreatment Heart Rate (beats/min) 110  -RB        Pre SpO2 (%) 91  -RB        O2 Delivery Pre Treatment supplemental O2  3 L  -RB        Intra SpO2 (%) 95  -RB        Post SpO2 (%) 95  -RB        Pre Patient Position Sitting  -RB           Positioning and Restraints    Pre-Treatment Position in bed  -RB        Post Treatment Position bed  -RB        In Bed supine;call light within reach;encouraged to call for assist;exit alarm  on;with family/caregiver  -RB           Therapy Plan Review/Discharge Plan (OT)    Anticipated Discharge Disposition (OT) skilled nursing facility;inpatient rehabilitation facility  -RB                  User Key  (r) = Recorded By, (t) = Taken By, (c) = Cosigned By      Initials Name Effective Dates    RB Abhi Chang OT 07/11/23 -                      Occupational Therapy Education       Title: PT OT SLP Therapies (In Progress)       Topic: Occupational Therapy (In Progress)       Point: ADL training (In Progress)       Description:   Instruct learner(s) on proper safety adaptation and remediation techniques during self care or transfers.   Instruct in proper use of assistive devices.                  Learning Progress Summary             Patient Acceptance, E,TB, NR by RW at 9/12/2023 1555    Comment: POC, Role of OT, transfer training                         Point: Home exercise program (Not Started)       Description:   Instruct learner(s) on appropriate technique for monitoring, assisting and/or progressing therapeutic exercises/activities.                  Learner Progress:  Not documented in this visit.              Point: Precautions (Done)       Description:   Instruct learner(s) on prescribed precautions during self-care and functional transfers.                  Learning Progress Summary             Patient Acceptance, E,D, NR,VU by RB at 9/14/2023 1343    Comment: Edu pt on use of gait belt and non skid socks when OOB and no OOB without assist.    Acceptance, E,TB, NR by RW at 9/12/2023 1555    Comment: POC, Role of OT, transfer training                         Point: Body mechanics (In Progress)       Description:   Instruct learner(s) on proper positioning and spine alignment during self-care, functional mobility activities and/or exercises.                  Learning Progress Summary             Patient Acceptance, E,TB, NR by RW at 9/12/2023 1555    Comment: POC, Role of OT, transfer training                                          User Key       Initials Effective Dates Name Provider Type Discipline    RB 07/11/23 -  Abhi Chang OT Occupational Therapist OT    RW 09/22/22 -  Mildred Joe OT Occupational Therapist OT                      OT Recommendation and Plan           Outcome Measures       Row Name 09/14/23 0922             How much help from another is currently needed...    Putting on and taking off regular lower body clothing? 3  -RB      Bathing (including washing, rinsing, and drying) 3  -RB      Toileting (which includes using toilet bed pan or urinal) 3  -RB      Putting on and taking off regular upper body clothing 3  -RB      Taking care of personal grooming (such as brushing teeth) 3  -RB      Eating meals 4  -RB      AM-PAC 6 Clicks Score (OT) 19  -RB                User Key  (r) = Recorded By, (t) = Taken By, (c) = Cosigned By      Initials Name Provider Type    RB Abhi Chang OT Occupational Therapist                    Time Calculation:    Time Calculation- OT       Row Name 09/14/23 1346             Time Calculation- OT    OT Start Time 0922  -RB      OT Stop Time 1043  -RB      OT Time Calculation (min) 81 min  -RB      OT Received On 09/14/23  -RB                User Key  (r) = Recorded By, (t) = Taken By, (c) = Cosigned By      Initials Name Provider Type    RB Abhi Chang OT Occupational Therapist                  Therapy Charges for Today       Code Description Service Date Service Provider Modifiers Qty    [id number] HC OT SELF CARE/MGMT/TRAIN EA 15 MIN 9/14/2023 Abhi Chang OT GO 5                 Abhi Chang OT  9/14/2023    Electronically signed by Abhi Chang OT at 09/14/23 1348       Abhi Chang OT at 09/14/23 1344          Goal Outcome Evaluation:      OT tx on this date.  Pt was CGA for sit to stand and toilet transfers.  Pt performed total sponge bath with supervision and dons pants/socks with CGA.  Pt was set up supervision for toileting.                  Anticipated Discharge Disposition (OT): skilled nursing facility, inpatient rehabilitation facility    Electronically signed by Abhi Chang OT at 09/14/23 1346       Speech Language Pathology Notes (last 24 hours)  Notes from 09/14/23 1056 through 09/15/23 1056   No notes exist for this encounter.       Respiratory Therapy Notes (last 24 hours)  Notes from 09/14/23 1056 through 09/15/23 1056   No notes exist for this encounter.

## 2023-09-15 NOTE — SIGNIFICANT NOTE
Pt defers OT stating he does not feel like therapy today.   09/15/23 1418   OTHER   Discipline occupational therapy assistant   Rehab Time/Intention   Session Not Performed patient/family declined treatment

## 2023-09-15 NOTE — PROGRESS NOTES
[facility] Medicine Services  INPATIENT PROGRESS NOTE      Length of Stay: 0  Date of Admission: 9/11/2023  Primary Care Physician: Marina Kitchen MD    Subjective   Chief Complaint:  Remains pleasantly confused  HPI:  ED evaluation  73-year-old male with COPD, history of stroke, stage IV lung cancer on Keytruda, recurrent C. difficile infection, presented after developing fever and increased diarrhea in setting of recurrent C. difficile colitis. Pulmonary embolism diagnosed in 2015. Hypertension. Metastatic lung adenocarcinoma with pulmonary metastasis for which patient is currently on treatment with Keytruda. Iron deficiency anemia. History of recurrent C. Difficile Was discharged 4 days ago from [facility] after Clostridium difficile colitis, on vanco PO, no diarrhea, his friend Annette Hui refers staying in bed, not eating, and confused, now oriented.     Daily assessment September 15, 2023  Seen and examined.  The wife is at bedside questions are answered.  The patient is stable for discharge.  Discharge orders have been placed.  Awaiting final arrangements by case management.  Otherwise the patient is getting stronger.  Awake alert.  More oriented minimally      Review of Systems   Constitutional:  Positive for fatigue. Negative for chills and fever.   HENT: Negative.     Eyes: Negative.    Respiratory: Negative.  Negative for cough and shortness of breath.    Cardiovascular: Negative.  Negative for chest pain and palpitations.   Gastrointestinal: Negative.  Negative for nausea and vomiting.   Endocrine: Negative.    Genitourinary: Negative.    Musculoskeletal: Negative.  Negative for myalgias.   Skin: Negative.  Negative for rash.   Neurological:  Positive for weakness. Negative for dizziness.   Hematological: Negative.    Psychiatric/Behavioral: Negative.        All pertinent negatives and positives are as above. All other systems have been reviewed and are  negative unless otherwise stated.     Objective    As of today 09/15/23  Temp:  [97.9 °F (36.6 °C)-98.3 °F (36.8 °C)] 98 °F (36.7 °C)  Heart Rate:  [74-86] 75  Resp:  [18] 18  BP: ()/(47-62) 107/54    Physical Exam  Vitals and nursing note reviewed.   HENT:      Head: Normocephalic and atraumatic.      Right Ear: External ear normal.      Left Ear: External ear normal.      Nose: Nose normal.      Mouth/Throat:      Mouth: Mucous membranes are dry.      Pharynx: Oropharynx is clear.   Eyes:      General: No scleral icterus.     Extraocular Movements: Extraocular movements intact.      Conjunctiva/sclera: Conjunctivae normal.      Pupils: Pupils are equal, round, and reactive to light.   Cardiovascular:      Rate and Rhythm: Normal rate and regular rhythm.      Pulses: Normal pulses.      Heart sounds: Normal heart sounds.   Pulmonary:      Effort: Pulmonary effort is normal.      Breath sounds: Normal breath sounds.   Abdominal:      General: Bowel sounds are normal. There is no distension.      Palpations: Abdomen is soft.      Tenderness: There is no abdominal tenderness.   Musculoskeletal:         General: Normal range of motion.      Cervical back: Normal range of motion and neck supple.   Skin:     General: Skin is warm and dry.      Coloration: Skin is not jaundiced.   Neurological:      General: No focal deficit present.      Mental Status: He is alert. Mental status is at baseline.      Motor: Weakness present.      Comments: Improving mental status         dilTIAZem CD, 240 mg, Oral, Q24H  folic acid, 1 mg, Oral, Daily  heparin (porcine), 5,000 Units, Subcutaneous, Q12H  mirtazapine, 15 mg, Oral, Nightly  predniSONE, 5 mg, Oral, Every Other Day  senna-docusate sodium, 2 tablet, Oral, BID  sodium chloride, 10 mL, Intravenous, Q12H  tiotropium bromide-olodaterol, 2 puff, Inhalation, Daily - RT  vancomycin, 125 mg, Oral, Q6H         Results Review:  I have reviewed the labs, radiology results, and  diagnostic studies.    Laboratory Data:   Results from last 7 days   Lab Units 09/14/23  1417 09/14/23  0033 09/13/23  1210 09/12/23  0657 09/11/23  1445   SODIUM mmol/L  --   --   --  136 132*   POTASSIUM mmol/L  --  4.3 3.3* 3.8 4.1   CHLORIDE mmol/L  --   --   --  99 97*   CO2 mmol/L  --   --   --  23.0 15.0*   BUN mg/dL  --   --   --  4* 4*   CREATININE mg/dL  --   --   --  0.53* 0.53*   GLUCOSE mg/dL  --   --   --  107* 63*   CALCIUM mg/dL 8.4*  --  8.5* 9.3 8.9   BILIRUBIN mg/dL  --   --   --  0.3 0.2   ALK PHOS U/L  --   --   --  82 73   ALT (SGPT) U/L  --   --   --  22 17   AST (SGOT) U/L  --   --   --  24 19   ANION GAP mmol/L  --   --   --  14.0 20.0*       Estimated Creatinine Clearance: 97.2 mL/min (A) (by C-G formula based on SCr of 0.53 mg/dL (L)).  Results from last 7 days   Lab Units 09/14/23  1417 09/13/23  1210 09/11/23  1445   MAGNESIUM mg/dL 1.6 1.7 1.9   PHOSPHORUS mg/dL 3.0 2.3*  --            Results from last 7 days   Lab Units 09/12/23  0657 09/11/23  1445   WBC 10*3/mm3 10.11 12.99*   HEMOGLOBIN g/dL 12.3* 12.3*   HEMATOCRIT % 36.8* 36.9*   PLATELETS 10*3/mm3 394 342       Results from last 7 days   Lab Units 09/12/23  0742   INR  0.98         Culture Data:   No results found for: BLOODCX    No results found for: URINECX  No results found for: RESPCX  No results found for: WOUNDCX  No results found for: STOOLCX  No components found for: BODYFLD    Radiology Data:   Imaging Results (Last 24 Hours)       ** No results found for the last 24 hours. **            I have reviewed the patient's current medications.     Assessment/Plan     Principal Problem:    Hallucinations  Active Problems:    Severe malnutrition      Impression/plan  Altered mental status  Etiology unclear may have confusion from recent C. difficile infection.  We will continue oral vancomycin and follow.  White count has improved to within normal limits  Patient has history of adenocarcinoma with metastasis  Currently on  chemotherapy Keytruda.  Blood cultures no growth at 24 hours  UA negative  CT scan abdomen pelvis negative  CT chest improved from previous exam no acute process  CT head no acute intercranial process.  More awake and alert.  Getting stronger.  Wife is at bedside questions are answered patient is being evaluated for discharge to skilled nursing facility.  Awaiting final arrangements by case management    Adenocarcinoma  Keytruda  Oncology consult currently stable not needed at this time    Hypertension  Continue diltiazem    History of COPD no exacerbation currently  Continue as needed nebulizer treatments    History of C. difficile  Currently on oral vancomycin    History of some mental confusion  Continue Remeron for now    CODE STATUS full code  DVT prophylaxis is heparin    We will continue to follow.  Follow patient's response to therapy      Medical Decision Making  Number and Complexity of problems: 1  Differential Diagnosis: sepsis vs dehydration     Conditions and Status:        Condition is unchanged.     [facility] Data  External documents reviewed: previous medical records  My EKG interpretation: none  My plain film interpretation: chest interstitial lung disease  Tests considered but not ordered: none     Decision rules/scores evaluated (example [id number], Wells, etc): n/a     Discussed with: patient and other significant Annette Hui     Treatment Plan  See above     Care Planning  Shared decision making: the patient  Code status and discussions: full code     Disposition  Social Determinants of Health that impact treatment or disposition: none  I expect the patient to be discharged to home in 2 days.       I confirmed that the patient's Advance Care Plan is present, code status is documented, or surrogate decision maker is listed in the patient's medical record.     I discussed the patient's findings and my recommendations with: the patient    Eliazar Zhang DO

## 2023-09-16 LAB
BACTERIA SPEC AEROBE CULT: NORMAL
BACTERIA SPEC AEROBE CULT: NORMAL

## 2023-09-16 PROCEDURE — 96372 THER/PROPH/DIAG INJ SC/IM: CPT

## 2023-09-16 PROCEDURE — 94799 UNLISTED PULMONARY SVC/PX: CPT

## 2023-09-16 PROCEDURE — 96361 HYDRATE IV INFUSION ADD-ON: CPT

## 2023-09-16 PROCEDURE — 25010000002 HEPARIN (PORCINE) PER 1000 UNITS

## 2023-09-16 PROCEDURE — G0378 HOSPITAL OBSERVATION PER HR: HCPCS

## 2023-09-16 RX ADMIN — DILTIAZEM HYDROCHLORIDE 240 MG: 240 CAPSULE, COATED, EXTENDED RELEASE ORAL at 09:05

## 2023-09-16 RX ADMIN — SODIUM CHLORIDE 100 ML/HR: 9 INJECTION, SOLUTION INTRAVENOUS at 03:55

## 2023-09-16 RX ADMIN — VANCOMYCIN HYDROCHLORIDE 125 MG: KIT at 00:02

## 2023-09-16 RX ADMIN — VANCOMYCIN HYDROCHLORIDE 125 MG: KIT at 05:45

## 2023-09-16 RX ADMIN — SODIUM CHLORIDE 100 ML/HR: 9 INJECTION, SOLUTION INTRAVENOUS at 23:42

## 2023-09-16 RX ADMIN — Medication 10 ML: at 09:02

## 2023-09-16 RX ADMIN — MIRTAZAPINE 15 MG: 15 TABLET, FILM COATED ORAL at 20:29

## 2023-09-16 RX ADMIN — VANCOMYCIN HYDROCHLORIDE 125 MG: KIT at 17:11

## 2023-09-16 RX ADMIN — VANCOMYCIN HYDROCHLORIDE 125 MG: KIT at 11:08

## 2023-09-16 RX ADMIN — SODIUM CHLORIDE 100 ML/HR: 9 INJECTION, SOLUTION INTRAVENOUS at 13:39

## 2023-09-16 RX ADMIN — HEPARIN SODIUM 5000 UNITS: 5000 INJECTION INTRAVENOUS; SUBCUTANEOUS at 20:29

## 2023-09-16 RX ADMIN — Medication 10 ML: at 20:29

## 2023-09-16 RX ADMIN — TIZANIDINE 2 MG: 2 TABLET ORAL at 20:29

## 2023-09-16 RX ADMIN — HEPARIN SODIUM 5000 UNITS: 5000 INJECTION INTRAVENOUS; SUBCUTANEOUS at 09:01

## 2023-09-16 RX ADMIN — FOLIC ACID 1 MG: 1 TABLET ORAL at 09:01

## 2023-09-16 RX ADMIN — VANCOMYCIN HYDROCHLORIDE 125 MG: KIT at 23:42

## 2023-09-16 NOTE — PROGRESS NOTES
[facility] Medicine Services  INPATIENT PROGRESS NOTE      Length of Stay: 0  Date of Admission: 9/11/2023  Primary Care Physician: Marina Kitchen MD    Subjective   Chief Complaint:  Remains pleasantly confused  HPI:  ED evaluation  73-year-old male with COPD, history of stroke, stage IV lung cancer on Keytruda, recurrent C. difficile infection, presented after developing fever and increased diarrhea in setting of recurrent C. difficile colitis. Pulmonary embolism diagnosed in 2015. Hypertension. Metastatic lung adenocarcinoma with pulmonary metastasis for which patient is currently on treatment with Keytruda. Iron deficiency anemia. History of recurrent C. Difficile Was discharged 4 days ago from [facility] after Clostridium difficile colitis, on vanco PO, no diarrhea, his friend Annette Hui refers staying in bed, not eating, and confused, now oriented.     Daily assessment September 16, 2023  On evaluation this morning the patient is doing significantly better.  His wife is at bedside questions are answered.  Patient is awake and alert and according to the wife the patient is now at his baseline mentation.  [facility].      Review of Systems   Constitutional:  Positive for fatigue. Negative for chills and fever.   HENT: Negative.     Eyes: Negative.    Respiratory: Negative.  Negative for cough and shortness of breath.    Cardiovascular: Negative.  Negative for chest pain and palpitations.   Gastrointestinal: Negative.  Negative for nausea and vomiting.   Endocrine: Negative.    Genitourinary: Negative.    Musculoskeletal: Negative.  Negative for myalgias.   Skin: Negative.  Negative for rash.   Neurological:  Positive for weakness. Negative for dizziness.   Hematological: Negative.    Psychiatric/Behavioral: Negative.        All pertinent negatives and positives are as above. All other systems have been  reviewed and are negative unless otherwise stated.     Objective    As of today 09/16/23  Temp:  [97.7 °F (36.5 °C)-99.2 °F (37.3 °C)] 98 °F (36.7 °C)  Heart Rate:  [75-86] 86  Resp:  [16-18] 16  BP: (107-140)/(54-84) 136/63    Physical Exam  Vitals and nursing note reviewed.   HENT:      Head: Normocephalic and atraumatic.      Right Ear: External ear normal.      Left Ear: External ear normal.      Nose: Nose normal.      Mouth/Throat:      Mouth: Mucous membranes are dry.      Pharynx: Oropharynx is clear.   Eyes:      General: No scleral icterus.     Extraocular Movements: Extraocular movements intact.      Conjunctiva/sclera: Conjunctivae normal.      Pupils: Pupils are equal, round, and reactive to light.   Cardiovascular:      Rate and Rhythm: Normal rate and regular rhythm.      Pulses: Normal pulses.      Heart sounds: Normal heart sounds.   Pulmonary:      Effort: Pulmonary effort is normal.      Breath sounds: Normal breath sounds.   Abdominal:      General: Bowel sounds are normal. There is no distension.      Palpations: Abdomen is soft.      Tenderness: There is no abdominal tenderness.   Musculoskeletal:         General: Normal range of motion.      Cervical back: Normal range of motion and neck supple.   Skin:     General: Skin is warm and dry.      Coloration: Skin is not jaundiced.   Neurological:      General: No focal deficit present.      Mental Status: He is alert. Mental status is at baseline.      Motor: Weakness present.      Comments: Improving mental status         dilTIAZem CD, 240 mg, Oral, Q24H  folic acid, 1 mg, Oral, Daily  heparin (porcine), 5,000 Units, Subcutaneous, Q12H  mirtazapine, 15 mg, Oral, Nightly  predniSONE, 5 mg, Oral, Every Other Day  senna-docusate sodium, 2 tablet, Oral, BID  sodium chloride, 10 mL, Intravenous, Q12H  tiotropium bromide-olodaterol, 2 puff, Inhalation, Daily - RT  vancomycin, 125 mg, Oral, Q6H         Results Review:  I have reviewed the labs,  radiology results, and diagnostic studies.    Laboratory Data:   Results from last 7 days   Lab Units 09/14/23  1417 09/14/23  0033 09/13/23  1210 09/12/23  0657 09/11/23  1445   SODIUM mmol/L  --   --   --  136 132*   POTASSIUM mmol/L  --  4.3 3.3* 3.8 4.1   CHLORIDE mmol/L  --   --   --  99 97*   CO2 mmol/L  --   --   --  23.0 15.0*   BUN mg/dL  --   --   --  4* 4*   CREATININE mg/dL  --   --   --  0.53* 0.53*   GLUCOSE mg/dL  --   --   --  107* 63*   CALCIUM mg/dL 8.4*  --  8.5* 9.3 8.9   BILIRUBIN mg/dL  --   --   --  0.3 0.2   ALK PHOS U/L  --   --   --  82 73   ALT (SGPT) U/L  --   --   --  22 17   AST (SGOT) U/L  --   --   --  24 19   ANION GAP mmol/L  --   --   --  14.0 20.0*       Estimated Creatinine Clearance: 96.3 mL/min (A) (by C-G formula based on SCr of 0.53 mg/dL (L)).  Results from last 7 days   Lab Units 09/14/23  1417 09/13/23  1210 09/11/23  1445   MAGNESIUM mg/dL 1.6 1.7 1.9   PHOSPHORUS mg/dL 3.0 2.3*  --            Results from last 7 days   Lab Units 09/12/23  0657 09/11/23  1445   WBC 10*3/mm3 10.11 12.99*   HEMOGLOBIN g/dL 12.3* 12.3*   HEMATOCRIT % 36.8* 36.9*   PLATELETS 10*3/mm3 394 342       Results from last 7 days   Lab Units 09/12/23  0742   INR  0.98         Culture Data:   No results found for: BLOODCX    No results found for: URINECX  No results found for: RESPCX  No results found for: WOUNDCX  No results found for: STOOLCX  No components found for: BODYFLD    Radiology Data:   Imaging Results (Last 24 Hours)       ** No results found for the last 24 hours. **            I have reviewed the patient's current medications.     Assessment/Plan     Principal Problem:    Hallucinations  Active Problems:    Severe malnutrition      Impression/plan  Altered mental status  Etiology unclear may have confusion from recent C. difficile infection.  We will continue oral vancomycin and follow.  White count has improved to within normal limits  Patient has history of adenocarcinoma with  metastasis  Currently on chemotherapy Keytruda.  Blood cultures no growth at 24 hours  UA negative  CT scan abdomen pelvis negative  CT chest improved from previous exam no acute process  CT head no acute intercranial process.  Stable on exam.  [facility]  We will continue current treatment and monitor.    Adenocarcinoma  Keytruda  Oncology consult currently stable not needed at this time    Hypertension  Continue diltiazem    History of COPD no exacerbation currently  Continue as needed nebulizer treatments    History of C. difficile  Currently on oral vancomycin    History of some mental confusion  Continue Remeron for now    CODE STATUS full code  DVT prophylaxis is heparin    We will continue to follow.  Follow patient's response to therapy      Medical Decision Making  Number and Complexity of problems: 1  Differential Diagnosis: sepsis vs dehydration     Conditions and Status:        Condition is unchanged.     [facility] Data  External documents reviewed: previous medical records  My EKG interpretation: none  My plain film interpretation: chest interstitial lung disease  Tests considered but not ordered: none     Decision rules/scores evaluated (example [id number], Wells, etc): n/a     Discussed with: patient and other significant Annette Hui     Treatment Plan  See above     Care Planning  Shared decision making: the patient  Code status and discussions: full code     Disposition  Social Determinants of Health that impact treatment or disposition: none  I expect the patient to be discharged to home in 2 days.       I confirmed that the patient's Advance Care Plan is present, code status is documented, or surrogate decision maker is listed in the patient's medical record.     I discussed the patient's findings and my recommendations with: the patient    Eliazar Zhang DO

## 2023-09-16 NOTE — PLAN OF CARE
Problem: Adult Inpatient Plan of Care  Goal: Plan of Care Review  Outcome: Ongoing, Progressing  Flowsheets  Taken 9/16/2023 0620 by Hali Johnson RN  Progress: improving  Outcome Evaluation: pt vs stable, waiting placement, no new acute events at this time.  Taken 9/13/2023 1457 by Andrea Romano PTA  Plan of Care Reviewed With: patient  Goal: Patient-Specific Goal (Individualized)  Outcome: Ongoing, Progressing  Goal: Absence of Hospital-Acquired Illness or Injury  Outcome: Ongoing, Progressing  Intervention: Identify and Manage Fall Risk  Recent Flowsheet Documentation  Taken 9/16/2023 0600 by Hali Johnson RN  Safety Promotion/Fall Prevention:   activity supervised   assistive device/personal items within reach   clutter free environment maintained   fall prevention program maintained   safety round/check completed   nonskid shoes/slippers when out of bed  Taken 9/16/2023 0405 by Hali Johnson RN  Safety Promotion/Fall Prevention:   activity supervised   assistive device/personal items within reach   clutter free environment maintained   fall prevention program maintained   safety round/check completed   nonskid shoes/slippers when out of bed  Taken 9/16/2023 0210 by Hali Johnson RN  Safety Promotion/Fall Prevention:   activity supervised   assistive device/personal items within reach   clutter free environment maintained   fall prevention program maintained   safety round/check completed   nonskid shoes/slippers when out of bed  Taken 9/16/2023 0003 by Hali Johnson RN  Safety Promotion/Fall Prevention:   activity supervised   assistive device/personal items within reach   clutter free environment maintained   fall prevention program maintained   safety round/check completed   nonskid shoes/slippers when out of bed  Taken 9/15/2023 2200 by Hali Johnson RN  Safety Promotion/Fall Prevention:   activity supervised   assistive device/personal items within reach   clutter free  environment maintained   fall prevention program maintained   safety round/check completed   nonskid shoes/slippers when out of bed  Taken 9/15/2023 2115 by Hali Johnson RN  Safety Promotion/Fall Prevention:   activity supervised   assistive device/personal items within reach   clutter free environment maintained   fall prevention program maintained   safety round/check completed   nonskid shoes/slippers when out of bed  Taken 9/15/2023 2050 by Hali Johnson RN  Safety Promotion/Fall Prevention:   activity supervised   assistive device/personal items within reach   clutter free environment maintained   fall prevention program maintained   safety round/check completed   nonskid shoes/slippers when out of bed  Taken 9/15/2023 1936 by Hali Johnson RN  Safety Promotion/Fall Prevention:   assistive device/personal items within reach   clutter free environment maintained   activity supervised   fall prevention program maintained   safety round/check completed   muscle strengthening facilitated  Intervention: Prevent Skin Injury  Recent Flowsheet Documentation  Taken 9/16/2023 0600 by Hali Johnson RN  Body Position:   position changed independently   supine  Taken 9/16/2023 0405 by Hali Johnson RN  Body Position:   position changed independently   dangle, side of bed  Taken 9/16/2023 0210 by Hali Johnson RN  Body Position:   left   side-lying   position changed independently  Taken 9/16/2023 0003 by Hali Johnson RN  Body Position: patient/family refused  Taken 9/15/2023 2200 by Hali Johnson RN  Body Position:   supine   supine, legs elevated  Taken 9/15/2023 2050 by Hali Johnson RN  Body Position:   position changed independently   supine, legs elevated  Intervention: Prevent and Manage VTE (Venous Thromboembolism) Risk  Recent Flowsheet Documentation  Taken 9/15/2023 2050 by Hali Johnson RN  Activity Management: activity encouraged  VTE  Prevention/Management: (hep sub q) other (see comments)  Goal: Optimal Comfort and Wellbeing  Outcome: Ongoing, Progressing  Intervention: Provide Person-Centered Care  Recent Flowsheet Documentation  Taken 9/15/2023 2050 by Hali Johnson RN  Trust Relationship/Rapport:   care explained   questions answered   thoughts/feelings acknowledged  Goal: Readiness for Transition of Care  Outcome: Ongoing, Progressing   Goal Outcome Evaluation:           Progress: improving  Outcome Evaluation: pt vs stable, waiting placement, no new acute events at this time.

## 2023-09-17 PROCEDURE — 94664 DEMO&/EVAL PT USE INHALER: CPT

## 2023-09-17 PROCEDURE — 96372 THER/PROPH/DIAG INJ SC/IM: CPT

## 2023-09-17 PROCEDURE — 94799 UNLISTED PULMONARY SVC/PX: CPT

## 2023-09-17 PROCEDURE — 97530 THERAPEUTIC ACTIVITIES: CPT

## 2023-09-17 PROCEDURE — 25010000002 HEPARIN (PORCINE) PER 1000 UNITS

## 2023-09-17 PROCEDURE — G0378 HOSPITAL OBSERVATION PER HR: HCPCS

## 2023-09-17 PROCEDURE — 63710000001 PREDNISONE PER 5 MG

## 2023-09-17 PROCEDURE — 94760 N-INVAS EAR/PLS OXIMETRY 1: CPT

## 2023-09-17 PROCEDURE — 96361 HYDRATE IV INFUSION ADD-ON: CPT

## 2023-09-17 PROCEDURE — 97110 THERAPEUTIC EXERCISES: CPT

## 2023-09-17 RX ADMIN — Medication 10 ML: at 20:22

## 2023-09-17 RX ADMIN — FOLIC ACID 1 MG: 1 TABLET ORAL at 08:40

## 2023-09-17 RX ADMIN — VANCOMYCIN HYDROCHLORIDE 125 MG: KIT at 13:14

## 2023-09-17 RX ADMIN — PREDNISONE 5 MG: 5 TABLET ORAL at 08:40

## 2023-09-17 RX ADMIN — TIZANIDINE 2 MG: 2 TABLET ORAL at 20:22

## 2023-09-17 RX ADMIN — Medication 10 ML: at 08:41

## 2023-09-17 RX ADMIN — VANCOMYCIN HYDROCHLORIDE 125 MG: KIT at 17:51

## 2023-09-17 RX ADMIN — MIRTAZAPINE 15 MG: 15 TABLET, FILM COATED ORAL at 20:22

## 2023-09-17 RX ADMIN — HEPARIN SODIUM 5000 UNITS: 5000 INJECTION INTRAVENOUS; SUBCUTANEOUS at 08:41

## 2023-09-17 RX ADMIN — HEPARIN SODIUM 5000 UNITS: 5000 INJECTION INTRAVENOUS; SUBCUTANEOUS at 20:22

## 2023-09-17 RX ADMIN — VANCOMYCIN HYDROCHLORIDE 125 MG: KIT at 05:36

## 2023-09-17 RX ADMIN — DILTIAZEM HYDROCHLORIDE 240 MG: 240 CAPSULE, COATED, EXTENDED RELEASE ORAL at 08:40

## 2023-09-17 NOTE — PROGRESS NOTES
[facility] Medicine Services  INPATIENT PROGRESS NOTE      Length of Stay: 0  Date of Admission: 9/11/2023  Primary Care Physician: Marina Kitchen MD    Subjective   Chief Complaint:  Remains pleasantly confused  HPI:  ED evaluation  73-year-old male with COPD, history of stroke, stage IV lung cancer on Keytruda, recurrent C. difficile infection, presented after developing fever and increased diarrhea in setting of recurrent C. difficile colitis. Pulmonary embolism diagnosed in 2015. Hypertension. Metastatic lung adenocarcinoma with pulmonary metastasis for which patient is currently on treatment with Keytruda. Iron deficiency anemia. History of recurrent C. Difficile Was discharged 4 days ago from [facility] after Clostridium difficile colitis, on vanco PO, no diarrhea, his friend Annette Hui refers staying in bed, not eating, and confused, now oriented.     Daily assessment September 17, 2023  The patient on evaluation this morning is doing better every day.  More awake and alert.  Appears to be at baseline.  Is eating and drinking.  Diarrhea is improving.  No fevers or chills.  We will continue current medications and await case management to complete arrangements for skilled nursing facility placement      Review of Systems   Constitutional:  Positive for fatigue. Negative for chills and fever.   HENT: Negative.     Eyes: Negative.    Respiratory: Negative.  Negative for cough and shortness of breath.    Cardiovascular: Negative.  Negative for chest pain and palpitations.   Gastrointestinal: Negative.  Negative for nausea and vomiting.   Endocrine: Negative.    Genitourinary: Negative.    Musculoskeletal: Negative.  Negative for myalgias.   Skin: Negative.  Negative for rash.   Neurological:  Positive for weakness. Negative for dizziness.   Hematological: Negative.    Psychiatric/Behavioral: Negative.        All pertinent negatives and positives are as above. All  other systems have been reviewed and are negative unless otherwise stated.     Objective    As of today 09/17/23  Temp:  [96.5 °F (35.8 °C)-98.5 °F (36.9 °C)] 96.5 °F (35.8 °C)  Heart Rate:  [79-82] 81  Resp:  [16-18] 18  BP: (104-123)/(53-58) 123/56    Physical Exam  Vitals and nursing note reviewed.   HENT:      Head: Normocephalic and atraumatic.      Right Ear: External ear normal.      Left Ear: External ear normal.      Nose: Nose normal.      Mouth/Throat:      Mouth: Mucous membranes are dry.      Pharynx: Oropharynx is clear.   Eyes:      General: No scleral icterus.     Extraocular Movements: Extraocular movements intact.      Conjunctiva/sclera: Conjunctivae normal.      Pupils: Pupils are equal, round, and reactive to light.   Cardiovascular:      Rate and Rhythm: Normal rate and regular rhythm.      Pulses: Normal pulses.      Heart sounds: Normal heart sounds.   Pulmonary:      Effort: Pulmonary effort is normal.      Breath sounds: Normal breath sounds.   Abdominal:      General: Bowel sounds are normal. There is no distension.      Palpations: Abdomen is soft.      Tenderness: There is no abdominal tenderness.   Musculoskeletal:         General: Normal range of motion.      Cervical back: Normal range of motion and neck supple.   Skin:     General: Skin is warm and dry.      Coloration: Skin is not jaundiced.   Neurological:      General: No focal deficit present.      Mental Status: He is alert. Mental status is at baseline.      Motor: Weakness present.      Comments: Improving mental status         dilTIAZem CD, 240 mg, Oral, Q24H  folic acid, 1 mg, Oral, Daily  heparin (porcine), 5,000 Units, Subcutaneous, Q12H  mirtazapine, 15 mg, Oral, Nightly  predniSONE, 5 mg, Oral, Every Other Day  senna-docusate sodium, 2 tablet, Oral, BID  sodium chloride, 10 mL, Intravenous, Q12H  tiotropium bromide-olodaterol, 2 puff, Inhalation, Daily - RT  vancomycin, 125 mg, Oral, Q6H         Results Review:  I have  reviewed the labs, radiology results, and diagnostic studies.    Laboratory Data:   Results from last 7 days   Lab Units 09/14/23  1417 09/14/23  0033 09/13/23  1210 09/12/23  0657 09/11/23  1445   SODIUM mmol/L  --   --   --  136 132*   POTASSIUM mmol/L  --  4.3 3.3* 3.8 4.1   CHLORIDE mmol/L  --   --   --  99 97*   CO2 mmol/L  --   --   --  23.0 15.0*   BUN mg/dL  --   --   --  4* 4*   CREATININE mg/dL  --   --   --  0.53* 0.53*   GLUCOSE mg/dL  --   --   --  107* 63*   CALCIUM mg/dL 8.4*  --  8.5* 9.3 8.9   BILIRUBIN mg/dL  --   --   --  0.3 0.2   ALK PHOS U/L  --   --   --  82 73   ALT (SGPT) U/L  --   --   --  22 17   AST (SGOT) U/L  --   --   --  24 19   ANION GAP mmol/L  --   --   --  14.0 20.0*       Estimated Creatinine Clearance: 97.7 mL/min (A) (by C-G formula based on SCr of 0.53 mg/dL (L)).  Results from last 7 days   Lab Units 09/14/23  1417 09/13/23  1210 09/11/23  1445   MAGNESIUM mg/dL 1.6 1.7 1.9   PHOSPHORUS mg/dL 3.0 2.3*  --            Results from last 7 days   Lab Units 09/12/23  0657 09/11/23  1445   WBC 10*3/mm3 10.11 12.99*   HEMOGLOBIN g/dL 12.3* 12.3*   HEMATOCRIT % 36.8* 36.9*   PLATELETS 10*3/mm3 394 342       Results from last 7 days   Lab Units 09/12/23  0742   INR  0.98         Culture Data:   No results found for: BLOODCX    No results found for: URINECX  No results found for: RESPCX  No results found for: WOUNDCX  No results found for: STOOLCX  No components found for: BODYFLD    Radiology Data:   Imaging Results (Last 24 Hours)       ** No results found for the last 24 hours. **            I have reviewed the patient's current medications.     Assessment/Plan     Principal Problem:    Hallucinations  Active Problems:    Severe malnutrition      Impression/plan  Altered mental status  Etiology unclear may have confusion from recent C. difficile infection.  We will continue oral vancomycin and follow.  White count has improved to within normal limits  Patient has history of  adenocarcinoma with metastasis  Currently on chemotherapy Keytruda.  Blood cultures no growth at 24 hours  UA negative  CT scan abdomen pelvis negative  CT chest improved from previous exam no acute process  CT head no acute intercranial process.  Case management currently working on placement to skilled nursing facility.  Patient eating and drinking will discontinue IV fluids.    Adenocarcinoma  Keytruda  Oncology consult currently stable not needed at this time    Hypertension  Continue diltiazem    History of COPD no exacerbation currently  Continue as needed nebulizer treatments    History of C. difficile  Currently on oral vancomycin    History of some mental confusion  Continue Remeron for now    CODE STATUS full code  DVT prophylaxis is heparin    We will continue to follow.  Follow patient's response to therapy      Medical Decision Making  Number and Complexity of problems: 1  Differential Diagnosis: sepsis vs dehydration     Conditions and Status:        Condition is unchanged.     [facility] Data  External documents reviewed: previous medical records  My EKG interpretation: none  My plain film interpretation: chest interstitial lung disease  Tests considered but not ordered: none     Decision rules/scores evaluated (example [id number], Wells, etc): n/a     Discussed with: patient and other significant Annette Hui     Treatment Plan  See above     Care Planning  Shared decision making: the patient  Code status and discussions: full code     Disposition  Social Determinants of Health that impact treatment or disposition: none  I expect the patient to be discharged to home in 2 days.       I confirmed that the patient's Advance Care Plan is present, code status is documented, or surrogate decision maker is listed in the patient's medical record.     I discussed the patient's findings and my recommendations with: the patient    Eliazar Zhang DO

## 2023-09-17 NOTE — THERAPY TREATMENT NOTE
Patient Name: Brian Garcia  : 1950    MRN: [id number]                              Today's Date: 2023       Admit Date: 2023    Visit Dx:     ICD-10-CM ICD-9-CM   1. Hallucinations  R44.3 780.1   2. Fatigue, unspecified type  R53.83 780.79   3. Impaired functional mobility, balance, gait, and endurance [Z74.09 (ICD-10-CM)]  Z74.09 V49.89   4. Impaired mobility and ADLs [Z74.09, Z78.9 (ICD-10-CM)]  Z74.09 V49.89    Z78.9      Patient Active Problem List   Diagnosis    Physical deconditioning    Leukocytosis    History of pulmonary embolism    Stage 4 very severe COPD by GOLD classification    Chronic respiratory failure with hypoxia, on home O2 therapy    Hypertension    CVA (cerebral vascular accident)    Traumatic hemorrhage of cerebrum    Iron deficiency anemia secondary to inadequate dietary iron intake    Acute pain of right shoulder    Limited range of motion (ROM) of shoulder    Rotator cuff syndrome of right shoulder    Impingement syndrome of right shoulder    Arthrosis of right acromioclavicular joint    Chronic right shoulder pain    Nontraumatic incomplete tear of right rotator cuff    Primary osteoarthritis of right shoulder    Malignant neoplasm of overlapping sites of right lung    Primary lung adenocarcinoma    Encounter for venous access device care    Iron malabsorption    Hypokalemia    C. difficile colitis    Hallucinations    Severe malnutrition     Past Medical History:   Diagnosis Date    A-fib     Anesthesia     hip surgery (spinal caused him unpleasant sensations never wants it again)    Arthritis     Blood in sputum 2023    bright red blood, teaspoon - tablespoon daily    COPD (chronic obstructive pulmonary disease)     CVA (cerebral vascular accident) 2022    Hearing aid worn     bilateral    History of pulmonary embolism     History of recurrent pneumonia     Hypertension     Lung cancer 2023    Oxygen dependent     since @ age 65     Pulmonary nodules     Risk for falls     uses walker    Rotator cuff syndrome of right shoulder     Tachycardia     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 HTN/Eliquis    Wears glasses     readers    Wears glasses     reading     Past Surgical History:   Procedure Laterality Date    HIP ARTHROPLASTY Right     RIB BIOPSY Left 03/17/2023 11th    VENOUS ACCESS DEVICE (PORT) INSERTION N/A 3/30/2023    Procedure: MEDIPORT PLACEMENT          (C-ARM);  Surgeon: Lavon Cochran MD;  Location: [facility];  Service: General;  Laterality: N/A;      General Information       Row Name 09/17/23 1420          OT Time and Intention    Document Type therapy note (daily note)  -     Mode of Treatment individual therapy;occupational therapy  -KD       Row Name 09/17/23 1420          General Information    Patient Profile Reviewed yes  -KD     Existing Precautions/Restrictions fall;oxygen therapy device and L/min  -KD       Row Name 09/17/23 1420          Cognition    Orientation Status (Cognition) oriented x 4  -KD       Row Name 09/17/23 1420          Safety Issues, Functional Mobility    Impairments Affecting Function (Mobility) balance;endurance/activity tolerance;shortness of breath;strength  -               User Key  (r) = Recorded By, (t) = Taken By, (c) = Cosigned By      Initials Name Provider Type     Faby Orourke COTA Occupational Therapist Assistant                     Mobility/ADL's    No documentation.                  Obj/Interventions       Row Name 09/17/23 1420          Shoulder (Therapeutic Exercise)    Shoulder (Therapeutic Exercise) AROM (active range of motion)  -     Shoulder AROM (Therapeutic Exercise) bilateral;aBduction;aDduction;external rotation;internal rotation  No flex/ext w/ RUE 2' rotator cuff tear  -       Row Name 09/17/23 1420          Elbow/Forearm (Therapeutic Exercise)    Elbow/Forearm (Therapeutic Exercise) AROM (active range of motion)  -     Elbow/Forearm AROM  (Therapeutic Exercise) bilateral;flexion;extension;supination;pronation  -KD       Row Name 09/17/23 1420          Motor Skills    Therapeutic Exercise shoulder;elbow/forearm;wrist;hand  -KD               User Key  (r) = Recorded By, (t) = Taken By, (c) = Cosigned By      Initials Name Provider Type    Faby Judd COTA Occupational Therapist Assistant                   Goals/Plan       Row Name 09/17/23 1420          Transfer Goal 1 (OT)    Activity/Assistive Device (Transfer Goal 1, OT) transfers, all  -KD     [place] Level/Cues Needed (Transfer Goal 1, OT) modified independence  -KD     Time Frame (Transfer Goal 1, OT) long term goal (LTG);by discharge  -KD     Progress/Outcome (Transfer Goal 1, OT) goal not met  -KD       Row Name 09/17/23 1420          Bathing Goal 1 (OT)    Activity/Device (Bathing Goal 1, OT) lower body bathing  -KD     [place] Level/Cues Needed (Bathing Goal 1, OT) modified independence  -KD     Time Frame (Bathing Goal 1, OT) long term goal (LTG);by discharge  -KD     Progress/Outcomes (Bathing Goal 1, OT) goal not met  -KD       Row Name 09/17/23 1420          Dressing Goal 1 (OT)    Activity/Device (Dressing Goal 1, OT) lower body dressing  -KD     [place]/Cues Needed (Dressing Goal 1, OT) modified independence  -KD     Time Frame (Dressing Goal 1, OT) long term goal (LTG);by discharge  -KD     Progress/Outcome (Dressing Goal 1, OT) goal not met  -KD               User Key  (r) = Recorded By, (t) = Taken By, (c) = Cosigned By      Initials Name Provider Type    Faby Judd COTA Occupational Therapist Assistant                   Clinical Impression       Row Name 09/17/23 1420          Pain Assessment    Pretreatment Pain Rating 7/10  -KD     Pain Location - back  -KD     Pain Intervention(s) Nursing Notified  -KD       Row Name 09/17/23 1420          Plan of Care Review    Plan of Care Reviewed With patient  -KD     Progress no change  -KD     Outcome  Evaluation Pt deferred any OOB/EOB with encouragement from signee and spouse. Pt agreeable to bed ex only @ this time. Pt performed BUE ther ex  w/ 2lb HW and good tolerance. Pt unable to perform shld flex/ext on RUE 2' torn rotator cuff. No goals met this date. Cont OT POC.  -KD       Row Name 09/17/23 1420          Therapy Assessment/Plan (OT)    Therapy Frequency (OT) other (see comments)  5-7 d/wk  -KD       Row Name 09/17/23 1420          Therapy Plan Review/Discharge Plan (OT)    Anticipated Discharge Disposition (OT) skilled nursing facility;inpatient rehabilitation facility  -KD       Row Name 09/17/23 1420          Vital Signs    Pre Systolic BP Rehab 131  -KD     Pre Treatment Diastolic BP 62  -KD     Pretreatment Heart Rate (beats/min) 97  -KD     Pre SpO2 (%) 94  -KD     O2 Delivery Pre Treatment supplemental O2  -KD     Pre Patient Position Supine  -KD     Intra Patient Position Sitting  -KD     Post Patient Position Sitting  -KD       Row Name 09/17/23 1420          Positioning and Restraints    Pre-Treatment Position in bed  -KD     Post Treatment Position bed  -KD     In Bed fowlers;call light within reach;encouraged to call for assist;exit alarm on;with family/caregiver  -KD               User Key  (r) = Recorded By, (t) = Taken By, (c) = Cosigned By      Initials Name Provider Type    KD Faby Orourke COTA Occupational Therapist Assistant                   Outcome Measures       Row Name 09/17/23 1420          How much help from another is currently needed...    Putting on and taking off regular lower body clothing? 3  -KD     Bathing (including washing, rinsing, and drying) 3  -KD     Toileting (which includes using toilet bed pan or urinal) 3  -KD     Putting on and taking off regular upper body clothing 3  -KD     Taking care of personal grooming (such as brushing teeth) 3  -KD     Eating meals 4  -KD     AM-PAC 6 Clicks Score (OT) 19  -KD       Row Name 09/17/23 1117 09/17/23 0842       How  much help from another person do you currently need...    Turning from your back to your side while in flat bed without using bedrails? 4  -CA 4  -JA    Moving from lying on back to sitting on the side of a flat bed without bedrails? 3  -CA 3  -JA    Moving to and from a bed to a chair (including a wheelchair)? 3  -CA 3  -JA    Standing up from a chair using your arms (e.g., wheelchair, bedside chair)? 3  -CA 3  -JA    Climbing 3-5 steps with a railing? 2  -CA 2  -JA    To walk in hospital room? 3  -CA 2  -JA    AM-PAC 6 Clicks Score (PT) 18  -CA 17  -JA    Highest level of mobility 6 --> Walked 10 steps or more  -CA 5 --> Static standing  -JA      Row Name 09/17/23 1117          Functional Assessment    Outcome Measure Options AM-PAC 6 Clicks Basic Mobility (PT)  -CA               User Key  (r) = Recorded By, (t) = Taken By, (c) = Cosigned By      Initials Name Provider Type    CA Andrea Romano, PTA Physical Therapist Assistant    Faby Judd COTA Occupational Therapist Assistant    Adam Vanegas, RN Registered Nurse                    Occupational Therapy Education       Title: PT OT SLP Therapies (In Progress)       Topic: Occupational Therapy (In Progress)       Point: ADL training (In Progress)       Description:   Instruct learner(s) on proper safety adaptation and remediation techniques during self care or transfers.   Instruct in proper use of assistive devices.                  Learning Progress Summary             Patient Acceptance, E,TB, NR by RW at 9/12/2023 7273    Comment: POC, Role of OT, transfer training                         Point: Home exercise program (Not Started)       Description:   Instruct learner(s) on appropriate technique for monitoring, assisting and/or progressing therapeutic exercises/activities.                  Learner Progress:  Not documented in this visit.              Point: Precautions (Done)       Description:   Instruct learner(s) on prescribed precautions  during self-care and functional transfers.                  Learning Progress Summary             Patient Acceptance, E,D, NR,VU by RB at 9/14/2023 1343    Comment: Edu pt on use of gait belt and non skid socks when OOB and no OOB without assist.    Acceptance, E,TB, NR by RW at 9/12/2023 1555    Comment: POC, Role of OT, transfer training                         Point: Body mechanics (In Progress)       Description:   Instruct learner(s) on proper positioning and spine alignment during self-care, functional mobility activities and/or exercises.                  Learning Progress Summary             Patient Acceptance, E,TB, NR by RW at 9/12/2023 1555    Comment: POC, Role of OT, transfer training                                         User Key       Initials Effective Dates Name Provider Type Discipline     07/11/23 -  Abhi Chang, OT Occupational Therapist OT    RW 09/22/22 -  Mildred Joe OT Occupational Therapist OT                  OT Recommendation and Plan  Therapy Frequency (OT): other (see comments) (5-7 d/wk)  Plan of Care Review  Plan of Care Reviewed With: patient  Progress: no change  Outcome Evaluation: Pt deferred any OOB/EOB with encouragement from signee and spouse. Pt agreeable to bed ex only @ this time. Pt performed BUE ther ex  w/ 2lb HW and good tolerance. Pt unable to perform shld flex/ext on RUE 2' torn rotator cuff. No goals met this date. Cont OT POC.     Time Calculation:         Time Calculation- OT       Row Name 09/17/23 1420             Time Calculation- OT    OT Start Time 1420  -KD      OT Stop Time 1435  -KD      OT Time Calculation (min) 15 min  -KD      Total Timed Code Minutes- OT 15 minute(s)  -KD      OT Received On 09/17/23  -KD         Timed Charges    43828 - OT Therapeutic Exercise Minutes 15  -KD         Total Minutes    Timed Charges Total Minutes 15  -KD       Total Minutes 15  -KD                User Key  (r) = Recorded By, (t) = Taken By, (c) = Cosigned By       Initials Name Provider Type    KD Faby Orourke COTA Occupational Therapist Assistant                  Therapy Charges for Today       Code Description Service Date Service Provider Modifiers Qty    92255844639 HC OT THER PROC EA 15 MIN 9/17/2023 Faby Orourke COTA GO 1                 ALEXANDER Valles  9/17/2023

## 2023-09-17 NOTE — THERAPY TREATMENT NOTE
Acute Care - Physical Therapy Treatment Note  [facility]     Patient Name: Brian Garcia  : 1950  MRN: [id number]  Today's Date: 2023      Visit Dx:     ICD-10-CM ICD-9-CM   1. Hallucinations  R44.3 780.1   2. Fatigue, unspecified type  R53.83 780.79   3. Impaired functional mobility, balance, gait, and endurance [Z74.09 (ICD-10-CM)]  Z74.09 V49.89   4. Impaired mobility and ADLs [Z74.09, Z78.9 (ICD-10-CM)]  Z74.09 V49.89    Z78.9      Patient Active Problem List   Diagnosis    Physical deconditioning    Leukocytosis    History of pulmonary embolism    Stage 4 very severe COPD by GOLD classification    Chronic respiratory failure with hypoxia, on home O2 therapy    Hypertension    CVA (cerebral vascular accident)    Traumatic hemorrhage of cerebrum    Iron deficiency anemia secondary to inadequate dietary iron intake    Acute pain of right shoulder    Limited range of motion (ROM) of shoulder    Rotator cuff syndrome of right shoulder    Impingement syndrome of right shoulder    Arthrosis of right acromioclavicular joint    Chronic right shoulder pain    Nontraumatic incomplete tear of right rotator cuff    Primary osteoarthritis of right shoulder    Malignant neoplasm of overlapping sites of right lung    Primary lung adenocarcinoma    Encounter for venous access device care    Iron malabsorption    Hypokalemia    C. difficile colitis    Hallucinations    Severe malnutrition     Past Medical History:   Diagnosis Date    A-fib     Anesthesia     hip surgery (spinal caused him unpleasant sensations never wants it again)    Arthritis     Blood in sputum 2023    bright red blood, teaspoon - tablespoon daily    COPD (chronic obstructive pulmonary disease)     CVA (cerebral vascular accident) 2022    Hearing aid worn     bilateral    History of pulmonary embolism     History of recurrent pneumonia     Hypertension     Lung cancer 2023    Oxygen dependent     since @ age 65     Pulmonary nodules     Risk for falls     uses walker    Rotator cuff syndrome of right shoulder     Tachycardia     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 HTN/Eliquis    Wears glasses     readers    Wears glasses     reading     Past Surgical History:   Procedure Laterality Date    HIP ARTHROPLASTY Right     RIB BIOPSY Left 03/17/2023 11th    VENOUS ACCESS DEVICE (PORT) INSERTION N/A 3/30/2023    Procedure: MEDIPORT PLACEMENT          (C-ARM);  Surgeon: Lavon Cochran MD;  Location: [facility];  Service: General;  Laterality: N/A;     PT Assessment (last 12 hours)       PT Evaluation and Treatment       Row Name 09/17/23 1117          Physical Therapy Time and Intention    Subjective Information no complaints  -CA     Document Type therapy note (daily note)  -CA     Mode of Treatment individual therapy;physical therapy  -CA       Row Name 09/17/23 1117          General Information    Existing Precautions/Restrictions fall;oxygen therapy device and L/min  -CA       Row Name 09/17/23 1117          Pain    Pretreatment Pain Rating 7/10  -CA     Posttreatment Pain Rating 8/10  -CA     Pain Location - back  -CA       Row Name 09/17/23 1117          Cognition    Orientation Status (Cognition) oriented x 4  -CA       Row Name 09/17/23 1117          Bed Mobility    Bed Mobility supine-sit;sit-supine  -CA     Scooting/Bridging [place] (Bed Mobility) supervision  -CA     Supine-Sit [place] (Bed Mobility) supervision  -CA     Sit-Supine [place] (Bed Mobility) supervision  -CA       Row Name 09/17/23 1117          Transfers    Transfers sit-stand transfer;stand-sit transfer;toilet transfer  -CA       Row Name 09/17/23 1117          Sit-Stand Transfer    Sit-Stand [place] (Transfers) supervision  -CA       Row Name 09/17/23 1117          Stand-Sit Transfer    Stand-Sit [place] (Transfers) supervision  -CA       Row Name 09/17/23 1117          Toilet Transfer    Type (Toilet  Transfer) sit-stand;stand-sit  -CA     [place] Level (Toilet Transfer) contact guard;standby assist  -CA       Row Name 09/17/23 1117          Gait/Stairs (Locomotion)    [place] Level (Gait) standby assist;contact guard  -CA     Distance in Feet (Gait) 3' x 2  -CA     Deviations/Abnormal Patterns (Gait) gait speed decreased  -CA       Row Name 09/17/23 1117          Motor Skills    Therapeutic Exercise ankle  -CA       Row Name 09/17/23 1117          Ankle (Therapeutic Exercise)    Ankle (Therapeutic Exercise) AROM (active range of motion)  -CA     Ankle AROM (Therapeutic Exercise) bilateral;dorsiflexion;plantarflexion  -CA       Row Name 09/17/23 1117          Vital Signs    Pre Systolic BP Rehab 134  -CA     Pre Treatment Diastolic BP 62  -CA     Pre SpO2 (%) 95  -CA     O2 Delivery Pre Treatment supplemental O2  -CA     Pre Patient Position Supine  -CA     Intra Patient Position Standing  -CA     Post Patient Position Supine  -CA       Row Name 09/17/23 1117          Positioning and Restraints    Pre-Treatment Position in bed  -CA     Post Treatment Position bed  -CA     In Bed supine;call light within reach;encouraged to call for assist;exit alarm on  -CA               User Key  (r) = Recorded By, (t) = Taken By, (c) = Cosigned By      Initials Name Provider Type    CA Andrea Romano, PTA Physical Therapist Assistant                    Physical Therapy Education       Title: PT OT SLP Therapies (In Progress)       Topic: Physical Therapy (In Progress)       Point: Mobility training (In Progress)       Learning Progress Summary             Patient Acceptance, E, NR by MM at 9/12/2023 7488    Comment: PT POC and goals, proper hand placement to facilitate transfers, use of FWW to increase safety with ambulation.                         Point: Home exercise program (Not Started)       Learner Progress:  Not documented in this visit.              Point: Body mechanics (Not Started)       Learner Progress:   Not documented in this visit.              Point: Precautions (Not Started)       Learner Progress:  Not documented in this visit.                              User Key       Initials Effective Dates Name Provider Type Discipline    MM 06/26/23 -  Britney Espinosa, PT Physical Therapist PT                  PT Recommendation and Plan     Plan of Care Reviewed With: patient  Progress: improving  Outcome Evaluation: Pt. was spv. for bed mobility and sit to stand from the bed.  Pt. amb. 3' x 2 to and from [facility] with sba/cga and HHA.  Pt. was sba/cga for sit to stand t/f from [facility].  Pt. performed AP eob and then deferred further therex as he was fatigued and returned to bed and placed in chair position in bed.  No new goals met this tx.   Outcome Measures       Row Name 09/17/23 1117             How much help from another person do you currently need...    Turning from your back to your side while in flat bed without using bedrails? 4  -CA      Moving from lying on back to sitting on the side of a flat bed without bedrails? 3  -CA      Moving to and from a bed to a chair (including a wheelchair)? 3  -CA      Standing up from a chair using your arms (e.g., wheelchair, bedside chair)? 3  -CA      Climbing 3-5 steps with a railing? 2  -CA      To walk in hospital room? 3  -CA      AM-PAC 6 Clicks Score (PT) 18  -CA         Functional Assessment    Outcome Measure Options AM-PAC 6 Clicks Basic Mobility (PT)  -CA                User Key  (r) = Recorded By, (t) = Taken By, (c) = Cosigned By      Initials Name Provider Type    CA Andrea Romano, PTA Physical Therapist Assistant                     Time Calculation:    PT Charges       Row Name 09/17/23 1540             Time Calculation    Start Time 1117  -CA      Stop Time 1140  -CA      Time Calculation (min) 23 min  -CA      PT Received On 09/17/23  -CA         Time Calculation- PT    Total Timed Code Minutes- PT 23 minute(s)  -CA                User Key  (r) = Recorded By,  (t) = Taken By, (c) = Cosigned By      Initials Name Provider Type    CA Andrea Romano, REGINE Physical Therapist Assistant                  Therapy Charges for Today       Code Description Service Date Service Provider Modifiers Qty    33173167377 HC PT THERAPEUTIC ACT EA 15 MIN 9/17/2023 Andrea Romano PTA GP 2            PT G-Codes  Outcome Measure Options: AM-PAC 6 Clicks Basic Mobility (PT)  AM-PAC 6 Clicks Score (PT): 18  AM-PAC 6 Clicks Score (OT): 19    Andrea Romano PTA  9/17/2023

## 2023-09-17 NOTE — PLAN OF CARE
Goal Outcome Evaluation:  Plan of Care Reviewed With: patient        Progress: improving  Outcome Evaluation: Pt. was spv. for bed mobility and sit to stand from the bed.  Pt. amb. 3' x 2 to and from BS with sba/cga and HHA.  Pt. was sba/cga for sit to stand t/f from BS.  Pt. performed AP eob and then deferred further therex as he was fatigued and returned to bed and placed in chair position in bed.  No new goals met this tx.

## 2023-09-17 NOTE — PLAN OF CARE
Goal Outcome Evaluation:  Plan of Care Reviewed With: patient        Progress: no change  Outcome Evaluation: Pt deferred any OOB/EOB with encouragement from signee and spouse. Pt agreeable to bed ex only @ this time. Pt performed BUE ther ex  w/ 2lb HW and good tolerance. Pt unable to perform shld flex/ext on RUE 2' torn rotator cuff. No goals met this date. Cont OT POC.      Anticipated Discharge Disposition (OT): skilled nursing facility, inpatient rehabilitation facility

## 2023-09-17 NOTE — PLAN OF CARE
Problem: Adult Inpatient Plan of Care  Goal: Plan of Care Review  Outcome: Ongoing, Progressing  Flowsheets  Taken 9/17/2023 0300 by Hali Johnson RN  Progress: improving  Outcome Evaluation: pt vs stable, discharge planning in progess spouse at bedside.  Taken 9/13/2023 1457 by Andrea Romano PTA  Plan of Care Reviewed With: patient  Goal: Patient-Specific Goal (Individualized)  Outcome: Ongoing, Progressing  Goal: Absence of Hospital-Acquired Illness or Injury  Outcome: Ongoing, Progressing  Intervention: Identify and Manage Fall Risk  Recent Flowsheet Documentation  Taken 9/17/2023 0205 by Hali Johnson RN  Safety Promotion/Fall Prevention:   activity supervised   assistive device/personal items within reach   clutter free environment maintained   fall prevention program maintained   safety round/check completed   nonskid shoes/slippers when out of bed  Taken 9/17/2023 0003 by Hali Johnson RN  Safety Promotion/Fall Prevention:   activity supervised   assistive device/personal items within reach   clutter free environment maintained   fall prevention program maintained   safety round/check completed   nonskid shoes/slippers when out of bed  Taken 9/16/2023 2243 by Hali Johnson RN  Safety Promotion/Fall Prevention:   activity supervised   assistive device/personal items within reach   clutter free environment maintained   fall prevention program maintained   safety round/check completed   nonskid shoes/slippers when out of bed  Taken 9/16/2023 2135 by Hali Johnson RN  Safety Promotion/Fall Prevention:   activity supervised   assistive device/personal items within reach   clutter free environment maintained   fall prevention program maintained   safety round/check completed   nonskid shoes/slippers when out of bed  Taken 9/16/2023 2033 by Hali Johnson RN  Safety Promotion/Fall Prevention:   activity supervised   assistive device/personal items within reach   clutter free  environment maintained   fall prevention program maintained   safety round/check completed   nonskid shoes/slippers when out of bed  Taken 9/16/2023 1915 by Hali Johnson RN  Safety Promotion/Fall Prevention:   activity supervised   assistive device/personal items within reach   clutter free environment maintained   fall prevention program maintained   safety round/check completed   nonskid shoes/slippers when out of bed  Intervention: Prevent Skin Injury  Recent Flowsheet Documentation  Taken 9/17/2023 0205 by Hali Johnson RN  Body Position:   position changed independently   right   side-lying  Taken 9/17/2023 0003 by Hali Johnson RN  Body Position:   position changed independently   left   side-lying  Taken 9/16/2023 2243 by Hali Johnson RN  Body Position:   right   side-lying  Taken 9/16/2023 2033 by Hali Johnson RN  Body Position:   position changed independently   right   side-lying  Intervention: Prevent and Manage VTE (Venous Thromboembolism) Risk  Recent Flowsheet Documentation  Taken 9/16/2023 2033 by Hali Johnson RN  Activity Management: activity encouraged  VTE Prevention/Management: (hep sub q) other (see comments)  Goal: Optimal Comfort and Wellbeing  Outcome: Ongoing, Progressing  Intervention: Provide Person-Centered Care  Recent Flowsheet Documentation  Taken 9/16/2023 2033 by Hali Johnson RN  Trust Relationship/Rapport:   care explained   questions answered   thoughts/feelings acknowledged  Goal: Readiness for Transition of Care  Outcome: Ongoing, Progressing   Goal Outcome Evaluation:           Progress: improving  Outcome Evaluation: pt vs stable, discharge planning in progess spouse at bedside.

## 2023-09-18 PROCEDURE — 97535 SELF CARE MNGMENT TRAINING: CPT

## 2023-09-18 PROCEDURE — 97110 THERAPEUTIC EXERCISES: CPT

## 2023-09-18 PROCEDURE — 97530 THERAPEUTIC ACTIVITIES: CPT

## 2023-09-18 PROCEDURE — G0378 HOSPITAL OBSERVATION PER HR: HCPCS

## 2023-09-18 PROCEDURE — 25010000002 HEPARIN (PORCINE) PER 1000 UNITS

## 2023-09-18 PROCEDURE — 94799 UNLISTED PULMONARY SVC/PX: CPT

## 2023-09-18 PROCEDURE — 96372 THER/PROPH/DIAG INJ SC/IM: CPT

## 2023-09-18 PROCEDURE — 94760 N-INVAS EAR/PLS OXIMETRY 1: CPT

## 2023-09-18 RX ORDER — IBUPROFEN 200 MG
1 TABLET ORAL 3 TIMES DAILY PRN
Status: DISCONTINUED | OUTPATIENT
Start: 2023-09-18 | End: 2023-09-21 | Stop reason: HOSPADM

## 2023-09-18 RX ADMIN — FOLIC ACID 1 MG: 1 TABLET ORAL at 08:25

## 2023-09-18 RX ADMIN — HEPARIN SODIUM 5000 UNITS: 5000 INJECTION INTRAVENOUS; SUBCUTANEOUS at 08:26

## 2023-09-18 RX ADMIN — Medication 10 ML: at 20:51

## 2023-09-18 RX ADMIN — DILTIAZEM HYDROCHLORIDE 240 MG: 240 CAPSULE, COATED, EXTENDED RELEASE ORAL at 08:25

## 2023-09-18 RX ADMIN — HEPARIN SODIUM 5000 UNITS: 5000 INJECTION INTRAVENOUS; SUBCUTANEOUS at 20:51

## 2023-09-18 RX ADMIN — VANCOMYCIN HYDROCHLORIDE 125 MG: KIT at 05:39

## 2023-09-18 RX ADMIN — Medication 10 ML: at 08:26

## 2023-09-18 RX ADMIN — VANCOMYCIN HYDROCHLORIDE 125 MG: KIT at 11:50

## 2023-09-18 RX ADMIN — TIZANIDINE 2 MG: 2 TABLET ORAL at 20:51

## 2023-09-18 RX ADMIN — VANCOMYCIN HYDROCHLORIDE 125 MG: KIT at 00:22

## 2023-09-18 RX ADMIN — MIRTAZAPINE 15 MG: 15 TABLET, FILM COATED ORAL at 20:51

## 2023-09-18 RX ADMIN — VANCOMYCIN HYDROCHLORIDE 125 MG: KIT at 17:03

## 2023-09-18 NOTE — THERAPY TREATMENT NOTE
Patient Name: Brian Garcia  : 1950    MRN: [id number]                              Today's Date: 2023       Admit Date: 2023    Visit Dx:     ICD-10-CM ICD-9-CM   1. Hallucinations  R44.3 780.1   2. Fatigue, unspecified type  R53.83 780.79   3. Impaired functional mobility, balance, gait, and endurance [Z74.09 (ICD-10-CM)]  Z74.09 V49.89   4. Impaired mobility and ADLs [Z74.09, Z78.9 (ICD-10-CM)]  Z74.09 V49.89    Z78.9      Patient Active Problem List   Diagnosis    Physical deconditioning    Leukocytosis    History of pulmonary embolism    Stage 4 very severe COPD by GOLD classification    Chronic respiratory failure with hypoxia, on home O2 therapy    Hypertension    CVA (cerebral vascular accident)    Traumatic hemorrhage of cerebrum    Iron deficiency anemia secondary to inadequate dietary iron intake    Acute pain of right shoulder    Limited range of motion (ROM) of shoulder    Rotator cuff syndrome of right shoulder    Impingement syndrome of right shoulder    Arthrosis of right acromioclavicular joint    Chronic right shoulder pain    Nontraumatic incomplete tear of right rotator cuff    Primary osteoarthritis of right shoulder    Malignant neoplasm of overlapping sites of right lung    Primary lung adenocarcinoma    Encounter for venous access device care    Iron malabsorption    Hypokalemia    C. difficile colitis    Hallucinations    Severe malnutrition     Past Medical History:   Diagnosis Date    A-fib     Anesthesia     hip surgery (spinal caused him unpleasant sensations never wants it again)    Arthritis     Blood in sputum 2023    bright red blood, teaspoon - tablespoon daily    COPD (chronic obstructive pulmonary disease)     CVA (cerebral vascular accident) 2022    Hearing aid worn     bilateral    History of pulmonary embolism     History of recurrent pneumonia     Hypertension     Lung cancer 2023    Oxygen dependent     since @ age 65     Pulmonary nodules     Risk for falls     uses walker    Rotator cuff syndrome of right shoulder     Tachycardia     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 HTN/Eliquis    Wears glasses     readers    Wears glasses     reading     Past Surgical History:   Procedure Laterality Date    HIP ARTHROPLASTY Right     RIB BIOPSY Left 03/17/2023 11th    VENOUS ACCESS DEVICE (PORT) INSERTION N/A 3/30/2023    Procedure: MEDIPORT PLACEMENT          (C-ARM);  Surgeon: Lavon Cochran MD;  Location: [facility];  Service: General;  Laterality: N/A;      General Information       Row Name 09/18/23 1330          OT Time and Intention    Document Type therapy note (daily note)  -BB     Mode of Treatment individual therapy;occupational therapy  -BB       Row Name 09/18/23 1330          General Information    Patient Profile Reviewed yes  -BB     Existing Precautions/Restrictions fall;oxygen therapy device and L/min  -BB       Row Name 09/18/23 1330          Cognition    Orientation Status (Cognition) oriented x 4  -BB       Row Name 09/18/23 1330          Safety Issues, Functional Mobility    Impairments Affecting Function (Mobility) balance;endurance/activity tolerance;shortness of breath;strength  -BB               User Key  (r) = Recorded By, (t) = Taken By, (c) = Cosigned By      Initials Name Provider Type    BB Lisa José COTA Occupational Therapist Assistant                     Mobility/ADL's       Row Name 09/18/23 1330          Bed Mobility    Bed Mobility bed mobility (all) activities  -BB     All Activities, [place] (Bed Mobility) independent  -BB     Supine-Sit [place] (Bed Mobility) modified independence  -BB     Assistive Device (Bed Mobility) bed rails;head of bed elevated  -BB       Row Name 09/18/23 1330          Transfers    Transfers sit-stand transfer;stand-sit transfer;toilet transfer  -BB       Row Name 09/18/23 1330          Sit-Stand Transfer    Sit-Stand [place]  (Transfers) independent  -BB     Assistive Device (Sit-Stand Transfers) other (see comments)  no AD  -BB       Row Name 09/18/23 1330          Stand-Sit Transfer    Stand-Sit [place] (Transfers) independent  -BB       Row Name 09/18/23 1330          Toilet Transfer    Type (Toilet Transfer) sit-stand;stand-sit  -[facility] Name 09/18/23 1330          Functional Mobility    Functional Mobility- Ind. Level contact guard assist  -[facility] Name 09/18/23 1330          Activities of Daily Living    BADL Assessment/Intervention grooming  -[facility] Name 09/18/23 1330          Grooming Assessment/Training    [place] Level (Grooming) hair care, combing/brushing;shave face;wash face, hands;modified independence  -BB     Assistive Devices (Grooming) electric razor  -     Position (Grooming) edge of bed sitting  -               User Key  (r) = Recorded By, (t) = Taken By, (c) = Cosigned By      Initials Name Provider Type    Lisa Ricardo COTA Occupational Therapist Assistant                   Obj/Interventions       [facility] Name 09/18/23 1330          Range of Motion Comprehensive    General Range of Motion bilateral lower extremity ROM WFL  -[facility] Name 09/18/23 1330          Strength Comprehensive (MMT)    General Manual Muscle Testing (MMT) Assessment other (see comments)  -[facility] Name 09/18/23 1330          Shoulder (Therapeutic Exercise)    Shoulder AROM (Therapeutic Exercise) bilateral;flexion;extension;sitting;15 repititions  limited R shoulder ROM, pt states his R shoulder ROM is better today  -[facility] Name 09/18/23 1330          Elbow/Forearm (Therapeutic Exercise)    Elbow/Forearm AROM (Therapeutic Exercise) bilateral;flexion;extension;supination;pronation;15 repititions  -[facility] Name 09/18/23 1330          Wrist (Therapeutic Exercise)    Wrist (Therapeutic Exercise) AROM (active range of motion)  -     Wrist AROM (Therapeutic Exercise)  bilateral;flexion;extension;15 repititions  -BB               User Key  (r) = Recorded By, (t) = Taken By, (c) = Cosigned By      Initials Name Provider Type    Lisa Ricardo COTA Occupational Therapist Assistant                   Goals/Plan       Row Name 09/18/23 1330          Transfer Goal 1 (OT)    Activity/Assistive Device (Transfer Goal 1, OT) transfers, all  -BB     [place] Level/Cues Needed (Transfer Goal 1, OT) modified independence  -BB     Time Frame (Transfer Goal 1, OT) long term goal (LTG);by discharge  -BB     Progress/Outcome (Transfer Goal 1, OT) goal not met  -BB       [facility] Name 09/18/23 1330          Bathing Goal 1 (OT)    Activity/Device (Bathing Goal 1, OT) lower body bathing  -BB     [place] Level/Cues Needed (Bathing Goal 1, OT) modified independence  -BB     Time Frame (Bathing Goal 1, OT) long term goal (LTG);by discharge  -BB     Progress/Outcomes (Bathing Goal 1, OT) goal not met  -BB       Row Name 09/18/23 1330          Dressing Goal 1 (OT)    Activity/Device (Dressing Goal 1, OT) lower body dressing  -BB     [place]/Cues Needed (Dressing Goal 1, OT) modified independence  -BB     Time Frame (Dressing Goal 1, OT) long term goal (LTG);by discharge  -BB     Progress/Outcome (Dressing Goal 1, OT) goal not met  -BB               User Key  (r) = Recorded By, (t) = Taken By, (c) = Cosigned By      Initials Name Provider Type    Lisa Ricardo COTA Occupational Therapist Assistant                   Clinical Impression       Row Name 09/18/23 1330          Pain Assessment    Pretreatment Pain Rating 0/10 - no pain  -BB     Posttreatment Pain Rating 0/10 - no pain  -BB       Row Name 09/18/23 1330          Plan of Care Review    Plan of Care Reviewed With patient  -BB     Progress improving  -BB     Outcome Evaluation Pt is Mod I for supine to sit. Pt completed B UE exercises  with good tolerance. Pt is Mod I for grooming tasks. All needs within reach. Pt would  benefit from continued OT services.  -BB       Row Name 09/18/23 1330          Therapy Assessment/Plan (OT)    Rehab Potential (OT) good, to achieve stated therapy goals  -BB     Criteria for Skilled Therapeutic Interventions Met (OT) yes;skilled treatment is necessary  -BB     Therapy Frequency (OT) other (see comments)  5-7 d/wk  -BB       Row Name 09/18/23 1330          Therapy Plan Review/Discharge Plan (OT)    Anticipated Discharge Disposition (OT) skilled nursing facility;inpatient rehabilitation facility  -BB       Row Name 09/18/23 1330          Vital Signs    Pretreatment Heart Rate (beats/min) 83  -BB     Pre SpO2 (%) 96  -BB     O2 Delivery Pre Treatment supplemental O2  -BB     Pre Patient Position Sitting  -BB       Row Name 09/18/23 1330          Positioning and Restraints    Pre-Treatment Position in bed  -BB     Post Treatment Position bed  -BB     In Bed sitting EOB;call light within reach;encouraged to call for assist;exit alarm on  -BB               User Key  (r) = Recorded By, (t) = Taken By, (c) = Cosigned By      Initials Name Provider Type    BB Lisa José COTA Occupational Therapist Assistant                   Outcome Measures       Row Name 09/18/23 1330          How much help from another is currently needed...    Putting on and taking off regular lower body clothing? 3  -BB     Bathing (including washing, rinsing, and drying) 3  -BB     Toileting (which includes using toilet bed pan or urinal) 3  -BB     Putting on and taking off regular upper body clothing 3  -BB     Taking care of personal grooming (such as brushing teeth) 3  -BB     Eating meals 4  -BB     AM-PAC 6 Clicks Score (OT) 19  -BB       Row Name 09/18/23 1457 09/18/23 0855       How much help from another person do you currently need...    Turning from your back to your side while in flat bed without using bedrails? 4  -MH 4  -JH    Moving from lying on back to sitting on the side of a flat bed without bedrails? 4   - 3  -JH    Moving to and from a bed to a chair (including a wheelchair)? 4  - 3  -JH    Standing up from a chair using your arms (e.g., wheelchair, bedside chair)? 4  - 3  -JH    Climbing 3-5 steps with a railing? 3  - 3  -JH    To walk in hospital room? 4  - 3  -JH    AM-PAC 6 Clicks Score (PT) 23  - 19  -JH    Highest level of mobility 7 --> Walked 25 feet or more  - 6 --> Walked 10 steps or more  -              User Key  (r) = Recorded By, (t) = Taken By, (c) = Cosigned By      Initials Name Provider Type    Breann eHredia, PTA Physical Therapist Assistant    Lisa Ricardo COTA Occupational Therapist Assistant    Yanet Goodman, RN Registered Nurse                    Occupational Therapy Education       Title: PT OT SLP Therapies (In Progress)       Topic: Occupational Therapy (In Progress)       Point: ADL training (Done)       Description:   Instruct learner(s) on proper safety adaptation and remediation techniques during self care or transfers.   Instruct in proper use of assistive devices.                  Learning Progress Summary             Patient Acceptance, E,TB, VU by BB at 9/18/2023 1609    Acceptance, E,TB, NR by RW at 9/12/2023 1555    Comment: POC, Role of OT, transfer training                         Point: Home exercise program (Not Started)       Description:   Instruct learner(s) on appropriate technique for monitoring, assisting and/or progressing therapeutic exercises/activities.                  Learner Progress:  Not documented in this visit.              Point: Precautions (Done)       Description:   Instruct learner(s) on prescribed precautions during self-care and functional transfers.                  Learning Progress Summary             Patient Acceptance, E,D, NR,VU by RB at 9/14/2023 1343    Comment: Edu pt on use of gait belt and non skid socks when OOB and no OOB without assist.    Acceptance, E,TB, NR by RW at 9/12/2023 1555    Comment: POC, Role  of OT, transfer training                         Point: Body mechanics (Done)       Description:   Instruct learner(s) on proper positioning and spine alignment during self-care, functional mobility activities and/or exercises.                  Learning Progress Summary             Patient Acceptance, E,TB, VU by  at 9/18/2023 1609    Acceptance, E,TB, NR by RW at 9/12/2023 1555    Comment: POC, Role of OT, transfer training                                         User Key       Initials Effective Dates Name Provider Type Discipline    RB 07/11/23 -  Abhi Chang, OT Occupational Therapist OT    BB 06/16/21 -  Lisa José COTA Occupational Therapist Assistant OT    RW 09/22/22 -  Mildred Joe OT Occupational Therapist OT                  OT Recommendation and Plan  Therapy Frequency (OT): other (see comments) (5-7 d/wk)  Plan of Care Review  Plan of Care Reviewed With: patient  Progress: improving  Outcome Evaluation: Pt is Mod I for supine to sit. Pt completed B UE exercises  with good tolerance. Pt is Mod I for grooming tasks. All needs within reach. Pt would benefit from continued OT services.     Time Calculation:         Time Calculation- OT       Row Name 09/18/23 1609             Time Calculation- OT    OT Start Time 1330  -BB      OT Stop Time 1409  -BB      OT Time Calculation (min) 39 min  -BB      Total Timed Code Minutes- OT 39 minute(s)  -BB      OT Received On 09/18/23  -BB         Timed Charges    42247 - OT Therapeutic Exercise Minutes 24  -BB      87535 - OT Self Care/Mgmt Minutes 15  -BB         Total Minutes    Timed Charges Total Minutes 39  -BB       Total Minutes 39  -BB                User Key  (r) = Recorded By, (t) = Taken By, (c) = Cosigned By      Initials Name Provider Type    Lisa Ricardo COTA Occupational Therapist Assistant                  Therapy Charges for Today       Code Description Service Date Service Provider Modifiers Qty    33349749824  OT THER  PROC EA 15 MIN 9/18/2023 Lisa José COTA GO 2    37693329225 HC OT SELF CARE/MGMT/TRAIN EA 15 MIN 9/18/2023 Lisa José COTA GO 1                 ALEXANDER Dasilva  9/18/2023

## 2023-09-18 NOTE — DISCHARGE PLACEMENT REQUEST
"RadhaBrian Sanjeev (73 y.o. Male)       Date of Birth   1950    Social Security Number       Address   [address] [place] [address]    Home Phone   [phone]    MRN   [id number]       [facility]    Marital Status                               Admission Date   9/11/23    Admission Type   Emergency    Admitting Provider   Fredrick Ni MD    Attending Provider   Fredrick Ni MD    Department, Room/Bed   [address], [facility]/1       Discharge Date       Discharge Disposition       Discharge Destination                                 Attending Provider: Fredrick Ni MD    Allergies: Amoxicillin, Albuterol, Asmanex (120 Metered Doses) [Mometasone Furoate], Gabapentin, Lortab [Hydrocodone-acetaminophen], Morphine And Related, Prilosec [Omeprazole], Sodium Clavulanate, Statins, Sulfa Antibiotics, Symbicort [Budesonide-formoterol Fumarate]    Isolation: Spore   Infection: C.difficile (09/07/23)   Code Status: CPR    Ht: 165.1 cm (65\")   Wt: 53.8 kg (118 lb 9.6 oz)    Admission Cmt: None   Principal Problem: Hallucinations [R44.3]                   Active Insurance as of 9/11/2023       Primary Coverage       Payor Plan Insurance Group Employer/Plan Group    [employer] [place] [id number]       Payor Plan Address Payor Plan Phone Number Payor Plan Fax Number Effective Dates    [facility] OFFICE OF COMMUNITY CARE [phone]  2/24/2000 - None Entered    [address] [address]       [facility] [address]         Subscriber Name Subscriber Birth Date Member ID       BRIAN GARCIA 1950 [id number]                     Emergency Contacts        (Rel.) Home Phone Work Phone Mobile Phone    Annette Harvey (Significant Other) [phone] -- [phone]              Insurance Information                  [employer] 111 Phone: [phone]    Subscriber: Brian Garcia Subscriber#: [id number]    " Group#: [id number] Precert#: --

## 2023-09-18 NOTE — PROGRESS NOTES
[facility] Medicine Services  INPATIENT PROGRESS NOTE    Length of Stay: 0  Date of Admission: 9/11/2023  Primary Care Physician: Marina Kitchen MD    Subjective   Chief Complaint: none  HPI:  73-year-old male with COPD, history of stroke, stage IV lung cancer on Keytruda, recurrent C. difficile infection, presented after developing fever and increased diarrhea in setting of recurrent C. difficile colitis. Pulmonary embolism diagnosed in 2015. Hypertension. Metastatic lung adenocarcinoma with pulmonary metastasis for which patient is currently on treatment with Keytruda. Iron deficiency anemia. History of recurrent C. Difficile Was discharged 4 days ago from [facility] after Clostridium difficile colitis, on vanco PO, no diarrhea, his friend Annette Hui refers staying in bed, not eating, and confused, now oriented.        As of today, 09/18/23  Review of Systems   Constitutional:  Negative for activity change, appetite change, chills, diaphoresis and fatigue.   HENT:  Negative for congestion, ear discharge, hearing loss, rhinorrhea, sinus pain, sneezing and sore throat.    Eyes:  Negative for photophobia, discharge and visual disturbance.   Respiratory:  Negative for cough, chest tightness, shortness of breath and wheezing.    Cardiovascular:  Negative for chest pain and palpitations.   Gastrointestinal:  Negative for abdominal distention, abdominal pain, blood in stool, diarrhea, nausea and vomiting.   Endocrine: Negative for cold intolerance, heat intolerance, polydipsia, polyphagia and polyuria.   Genitourinary:  Negative for dysuria, flank pain, hematuria and urgency.   Musculoskeletal:  Negative for arthralgias, joint swelling and myalgias.   Skin:  Negative for color change.   Allergic/Immunologic: Negative for food allergies.   Neurological:  Negative for dizziness, seizures, syncope, speech difficulty, weakness and headaches.   Hematological:  Negative for  adenopathy. Does not bruise/bleed easily.   Psychiatric/Behavioral:  Negative for confusion, hallucinations, self-injury and suicidal ideas. The patient is not nervous/anxious.       All pertinent negatives and positives are as above. All other systems have been reviewed and are negative unless otherwise stated.    Objective    Temp:  [97.5 °F (36.4 °C)-98.1 °F (36.7 °C)] 98.1 °F (36.7 °C)  Heart Rate:  [76-88] 79  Resp:  [16-18] 18  BP: (101-138)/(52-65) 101/53    AM-PAC 6 Clicks Score (PT): 23 (09/18/23 1457)    As of today, 09/18/23  Physical Exam  Constitutional:       Appearance: Normal appearance.   HENT:      Head: Normocephalic and atraumatic.      Right Ear: Tympanic membrane normal.      Left Ear: Tympanic membrane normal.      Nose: Nose normal.      Mouth/Throat:      Mouth: Mucous membranes are moist.   Eyes:      Pupils: Pupils are equal, round, and reactive to light.   Cardiovascular:      Rate and Rhythm: Normal rate and regular rhythm.      Pulses: Normal pulses.      Heart sounds: Normal heart sounds.   Pulmonary:      Effort: Pulmonary effort is normal.      Breath sounds: Normal breath sounds.   Abdominal:      General: Abdomen is flat. Bowel sounds are normal.      Palpations: Abdomen is soft.   Musculoskeletal:         General: Normal range of motion.      Cervical back: Normal range of motion and neck supple.   Skin:     General: Skin is warm and dry.      Capillary Refill: Capillary refill takes less than 2 seconds.   Neurological:      General: No focal deficit present.      Mental Status: He is alert and oriented to person, place, and time.   Psychiatric:         Mood and Affect: Mood normal.         Behavior: Behavior normal.         Thought Content: Thought content normal.         Judgment: Judgment normal.         Results Review:  I have reviewed the labs, radiology results, and diagnostic studies.    Laboratory Data:   Results from last 7 days   Lab Units 09/14/23  1417 09/14/23  0033  09/13/23  1210 09/12/23  0657   SODIUM mmol/L  --   --   --  136   POTASSIUM mmol/L  --  4.3 3.3* 3.8   CHLORIDE mmol/L  --   --   --  99   CO2 mmol/L  --   --   --  23.0   BUN mg/dL  --   --   --  4*   CREATININE mg/dL  --   --   --  0.53*   GLUCOSE mg/dL  --   --   --  107*   CALCIUM mg/dL 8.4*  --  8.5* 9.3   BILIRUBIN mg/dL  --   --   --  0.3   ALK PHOS U/L  --   --   --  82   ALT (SGPT) U/L  --   --   --  22   AST (SGOT) U/L  --   --   --  24   ANION GAP mmol/L  --   --   --  14.0     Estimated Creatinine Clearance: 93.1 mL/min (A) (by C-G formula based on SCr of 0.53 mg/dL (L)).  Results from last 7 days   Lab Units 09/14/23  1417 09/13/23  1210   MAGNESIUM mg/dL 1.6 1.7   PHOSPHORUS mg/dL 3.0 2.3*         Results from last 7 days   Lab Units 09/12/23  0657   WBC 10*3/mm3 10.11   HEMOGLOBIN g/dL 12.3*   HEMATOCRIT % 36.8*   PLATELETS 10*3/mm3 394     Results from last 7 days   Lab Units 09/12/23  0742   INR  0.98       Culture Data:   No results found for: BLOODCX  No results found for: URINECX  No results found for: RESPCX  No results found for: WOUNDCX  No results found for: STOOLCX  No components found for: BODYFLD    Radiology Data:   Imaging Results (Last 24 Hours)       ** No results found for the last 24 hours. **            I have utilized all available immediate resources to obtain, update, or review the patient's current medications (including all prescriptions, over-the-counter products, herbals, cannabis/cannabidiol products, and vitamin/mineral/dietary (nutritional) supplements).       Assessment/Plan     Active Hospital Problems    Diagnosis    • **Hallucinations    • Severe malnutrition        Assessment and Plan:    Impression/plan  1-Altered mental status. Etiology unclear may have confusion from recent C. difficile infection.  We will continue oral vancomycin and follow. White count has improved to within normal limits.   Blood cultures no growth at 5 days  UA negative  CT scan abdomen pelvis  negative  CT chest improved from previous exam no acute process  CT head no acute intercranial process.  Case management currently working on placement to skilled nursing facility.  Patient eating and drinking    2-Patient has history of adenocarcinoma with metastasis  Currently on chemotherapy Keytruda.  Oncology consult currently stable not needed at this time     3-Hypertension  Continue diltiazem     4-History of COPD no exacerbation currently  Continue as needed nebulizer treatments     5-History of C. difficile  Currently on oral vancomycin     6-History of some mental confusion  Continue Remeron for now, psych evaluation     7-CODE STATUS full code    8-DVT prophylaxis is heparin     We will continue to follow.  Follow patient's response to therapy    Medical Decision Making  Number and Complexity of problems: 1  Differential Diagnosis: change in mental status    Conditions and Status:        Condition is improving.     [facility] Data  External documents reviewed: previous medical records  My EKG interpretation: none  My CT interpretation: chest emphysema  Tests considered but not ordered: none     Decision rules/scores evaluated (example [id number], [place], etc): n/a     Discussed with: the patient, , nursing staff     Treatment Plan  Pending placement    Care Planning  Shared decision making: with the patient  Code status and discussions: full code    Disposition  Social Determinants of Health that impact treatment or disposition: none  I expect the patient to be discharged to skilled nurse facility in 1 days.       I confirmed that the patient's Advance Care Plan is present, code status is documented, or surrogate decision maker is listed in the patient's medical record.      This document has been electronically signed by Fredrick Ni MD on September 18, 2023 17:13 CDT

## 2023-09-18 NOTE — DISCHARGE PLACEMENT REQUEST
"Brian Velasco (73 y.o. Male)       Date of Birth   1950    Social Security Number       Address   [address] [place] [address]    Home Phone   [phone]    MRN   [id number]       Buddhist   [facility]    Marital Status                               Admission Date   9/11/23    Admission Type   Emergency    Admitting Provider   Fredrick Ni MD    Attending Provider   Fredrick Ni MD    Department, Room/Bed   [address], [facility]/1       Discharge Date       Discharge Disposition       Discharge Destination                                 Attending Provider: Fredrick Ni MD    Allergies: Amoxicillin, Albuterol, Asmanex (120 Metered Doses) [Mometasone Furoate], Gabapentin, Lortab [Hydrocodone-acetaminophen], Morphine And Related, Prilosec [Omeprazole], Sodium Clavulanate, Statins, Sulfa Antibiotics, Symbicort [Budesonide-formoterol Fumarate]    Isolation: Spore   Infection: C.difficile (09/07/23)   Code Status: CPR    Ht: 165.1 cm (65\")   Wt: 53.8 kg (118 lb 9.6 oz)    Admission Cmt: None   Principal Problem: Hallucinations [R44.3]                   Active Insurance as of 9/11/2023       Primary Coverage       Payor Plan Insurance Group Employer/Plan Group    [facility]MIN[facility] [place] DEPT [id number]       Payor Plan Address Payor Plan Phone Number Payor Plan Fax Number Effective Dates    [facility] OFFICE OF COMMUNITY CARE [phone]  2/24/2000 - None Entered    [address] [address]       [facility] [address]         Subscriber Name Subscriber Birth Date Member ID       BRIAN VELASCO 1950 [id number]                     Emergency Contacts        (Rel.) Home Phone Work Phone Mobile Phone    CandiceAnnette (Significant Other) [phone] -- [phone]              Intake & Output (last 3 days)         09/15 0701  09/16 0700 09/16 0701  09/17 0700 09/17 0701  09/18 0700 09/18 0701  09/19 0700    P.O. 480 608 480     Total " Intake(mL/kg) 480 (8.6) 600 (10.6) 480 (8.9)     Urine (mL/kg/hr) 2825 (2.1) 1400 (1) 2800 (2.2)     Stool 0 0 0     Total Output 2825 1400 2800     Net -2345 -800 -2320             Urine Unmeasured Occurrence 2 x 3 x 1 x     Stool Unmeasured Occurrence 2 x 1 x 1 x            Physician Progress Notes (last 24 hours)  Notes from 23 1029 through 23 1029   No notes of this type exist for this encounter.          Physical Therapy Notes (last 24 hours)        Andrea Romano PTA at 23 1536  Version 1 of 1         Goal Outcome Evaluation:  Plan of Care Reviewed With: patient        Progress: improving  Outcome Evaluation: Pt. was spv. for bed mobility and sit to stand from the bed.  Pt. amb. 3' x 2 to and from BS with sba/cga and HHA.  Pt. was sba/cga for sit to stand t/f from BS.  Pt. performed AP eob and then deferred further therex as he was fatigued and returned to bed and placed in chair position in bed.  No new goals met this tx.           Electronically signed by Andrea Romano PTA at 23 153       Andrea Romano PTA at 23 1541  Version 1 of 1         Acute Care - Physical Therapy Treatment Note  [facility]     Patient Name: Brian Garcia  : 1950  MRN: [id number]  Today's Date: 2023      Visit Dx:     ICD-10-CM ICD-9-CM   1. Hallucinations  R44.3 780.1   2. Fatigue, unspecified type  R53.83 780.79   3. Impaired functional mobility, balance, gait, and endurance [Z74.09 (ICD-10-CM)]  Z74.09 V49.89   4. Impaired mobility and ADLs [Z74.09, Z78.9 (ICD-10-CM)]  Z74.09 V49.89    Z78.9      Patient Active Problem List   Diagnosis    Physical deconditioning    Leukocytosis    History of pulmonary embolism    Stage 4 very severe COPD by GOLD classification    Chronic respiratory failure with hypoxia, on home O2 therapy    Hypertension    CVA (cerebral vascular accident)    Traumatic hemorrhage of cerebrum    Iron deficiency anemia secondary to inadequate dietary  iron intake    Acute pain of right shoulder    Limited range of motion (ROM) of shoulder    Rotator cuff syndrome of right shoulder    Impingement syndrome of right shoulder    Arthrosis of right acromioclavicular joint    Chronic right shoulder pain    Nontraumatic incomplete tear of right rotator cuff    Primary osteoarthritis of right shoulder    Malignant neoplasm of overlapping sites of right lung    Primary lung adenocarcinoma    Encounter for venous access device care    Iron malabsorption    Hypokalemia    C. difficile colitis    Hallucinations    Severe malnutrition     Past Medical History:   Diagnosis Date    A-fib     Anesthesia     hip surgery (spinal caused him unpleasant sensations never wants it again)    Arthritis     Blood in sputum 02/22/2023    bright red blood, teaspoon - tablespoon daily    COPD (chronic obstructive pulmonary disease)     CVA (cerebral vascular accident) 12/24/2022    Hearing aid worn     bilateral    History of pulmonary embolism     History of recurrent pneumonia     Hypertension     Lung cancer 03/17/2023    Oxygen dependent     since @ age 65    Pulmonary nodules     Risk for falls     uses walker    Rotator cuff syndrome of right shoulder     Tachycardia     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 HTN/Eliquis    Wears glasses     readers    Wears glasses     reading     Past Surgical History:   Procedure Laterality Date    HIP ARTHROPLASTY Right     RIB BIOPSY Left 03/17/2023 11th    VENOUS ACCESS DEVICE (PORT) INSERTION N/A 3/30/2023    Procedure: MEDIPORT PLACEMENT          (C-ARM);  Surgeon: Lavon Cochran MD;  Location: [facility];  Service: General;  Laterality: N/A;     PT Assessment (last 12 hours)       PT Evaluation and Treatment       Row Name 09/17/23 1117          Physical Therapy Time and Intention    Subjective Information no complaints  -CA     Document Type therapy note (daily note)  -CA     Mode of Treatment individual therapy;physical  therapy  -[facility] Name 09/17/23 1117          General Information    Existing Precautions/Restrictions fall;oxygen therapy device and L/min  -[facility] Name 09/17/23 1117          Pain    Pretreatment Pain Rating 7/10  -CA     Posttreatment Pain Rating 8/10  -CA     Pain Location - back  -[facility] Name 09/17/23 1117          Cognition    Orientation Status (Cognition) oriented x 4  -[facility] Name 09/17/23 1117          Bed Mobility    Bed Mobility supine-sit;sit-supine  -CA     Scooting/Bridging [place] (Bed Mobility) supervision  -CA     Supine-Sit [place] (Bed Mobility) supervision  -CA     Sit-Supine [place] (Bed Mobility) supervision  -[facility] Name 09/17/23 1117          Transfers    Transfers sit-stand transfer;stand-sit transfer;toilet transfer  -[facility] Name 09/17/23 1117          Sit-Stand Transfer    Sit-Stand [place] (Transfers) supervision  -[facility] Name 09/17/23 1117          Stand-Sit Transfer    Stand-Sit [place] (Transfers) supervision  -CA       Row Name 09/17/23 1117          Toilet Transfer    Type (Toilet Transfer) sit-stand;stand-sit  -CA     [place] Level (Toilet Transfer) contact guard;standby assist  -[facility] Name 09/17/23 1117          Gait/Stairs (Locomotion)    [place] Level (Gait) standby assist;contact guard  -CA     Distance in Feet (Gait) 3' x 2  -CA     Deviations/Abnormal Patterns (Gait) gait speed decreased  -[facility] Name 09/17/23 1117          Motor Skills    Therapeutic Exercise ankle  -[facility] Name 09/17/23 1117          Ankle (Therapeutic Exercise)    Ankle (Therapeutic Exercise) AROM (active range of motion)  -CA     Ankle AROM (Therapeutic Exercise) bilateral;dorsiflexion;plantarflexion  -[facility] Name 09/17/23 1117          Vital Signs    Pre Systolic BP Rehab 134  -CA     Pre Treatment Diastolic BP 62  -CA     Pre SpO2 (%) 95  -CA     O2 Delivery Pre Treatment supplemental O2  -CA     Pre  Patient Position Supine  -CA     Intra Patient Position Standing  -CA     Post Patient Position Supine  -CA       Row Name 09/17/23 1117          Positioning and Restraints    Pre-Treatment Position in bed  -CA     Post Treatment Position bed  -CA     In Bed supine;call light within reach;encouraged to call for assist;exit alarm on  -CA               User Key  (r) = Recorded By, (t) = Taken By, (c) = Cosigned By      Initials Name Provider Type    CA Andrea Romano, PTA Physical Therapist Assistant                    Physical Therapy Education       Title: PT OT SLP Therapies (In Progress)       Topic: Physical Therapy (In Progress)       Point: Mobility training (In Progress)       Learning Progress Summary             Patient Acceptance, E, NR by MM at 9/12/2023 4338    Comment: PT POC and goals, proper hand placement to facilitate transfers, use of FWW to increase safety with ambulation.                         Point: Home exercise program (Not Started)       Learner Progress:  Not documented in this visit.              Point: Body mechanics (Not Started)       Learner Progress:  Not documented in this visit.              Point: Precautions (Not Started)       Learner Progress:  Not documented in this visit.                              User Key       Initials Effective Dates Name Provider Type Discipline     06/26/23 -  Britney Espinosa, PT Physical Therapist PT                  PT Recommendation and Plan     Plan of Care Reviewed With: patient  Progress: improving  Outcome Evaluation: Pt. was spv. for bed mobility and sit to stand from the bed.  Pt. amb. 3' x 2 to and from [facility] with sba/cga and HHA.  Pt. was sba/cga for sit to stand t/f from [facility].  Pt. performed AP eob and then deferred further therex as he was fatigued and returned to bed and placed in chair position in bed.  No new goals met this tx.   Outcome Measures       Row Name 09/17/23 1117             How much help from another person do you  currently need...    Turning from your back to your side while in flat bed without using bedrails? 4  -CA      Moving from lying on back to sitting on the side of a flat bed without bedrails? 3  -CA      Moving to and from a bed to a chair (including a wheelchair)? 3  -CA      Standing up from a chair using your arms (e.g., wheelchair, bedside chair)? 3  -CA      Climbing 3-5 steps with a railing? 2  -CA      To walk in hospital room? 3  -CA      AM-PAC 6 Clicks Score (PT) 18  -CA         Functional Assessment    Outcome Measure Options AM-PAC 6 Clicks Basic Mobility (PT)  -CA                User Key  (r) = Recorded By, (t) = Taken By, (c) = Cosigned By      Initials Name Provider Type    nAdrea Sarabia PTA Physical Therapist Assistant                     Time Calculation:    PT Charges       Row Name 23 1540             Time Calculation    Start Time 1117  -CA      Stop Time 1140  -CA      Time Calculation (min) 23 min  -CA      PT Received On 23  -CA         Time Calculation- PT    Total Timed Code Minutes- PT 23 minute(s)  -CA                User Key  (r) = Recorded By, (t) = Taken By, (c) = Cosigned By      Initials Name Provider Type    Andrea Sarabia PTA Physical Therapist Assistant                  Therapy Charges for Today       Code Description Service Date Service Provider Modifiers Qty    48335705227  PT THERAPEUTIC ACT EA 15 MIN 2023 Andrea Romano PTA GP 2            PT G-Codes  Outcome Measure Options: AM-PAC 6 Clicks Basic Mobility (PT)  AM-PAC 6 Clicks Score (PT): 18  AM-PAC 6 Clicks Score (OT): 19    Andrea Romano PTA  2023      Electronically signed by Andrea Romano PTA at 23 1541          Occupational Therapy Notes (last 24 hours)        Faby Orourke COTA at 23 1506          Patient Name: Brian Garcia  : 1950    MRN: [id number]                              Today's Date: 2023       Admit Date: 2023    Visit Dx:      ICD-10-CM ICD-9-CM   1. Hallucinations  R44.3 780.1   2. Fatigue, unspecified type  R53.83 780.79   3. Impaired functional mobility, balance, gait, and endurance [Z74.09 (ICD-10-CM)]  Z74.09 V49.89   4. Impaired mobility and ADLs [Z74.09, Z78.9 (ICD-10-CM)]  Z74.09 V49.89    Z78.9      Patient Active Problem List   Diagnosis    Physical deconditioning    Leukocytosis    History of pulmonary embolism    Stage 4 very severe COPD by GOLD classification    Chronic respiratory failure with hypoxia, on home O2 therapy    Hypertension    CVA (cerebral vascular accident)    Traumatic hemorrhage of cerebrum    Iron deficiency anemia secondary to inadequate dietary iron intake    Acute pain of right shoulder    Limited range of motion (ROM) of shoulder    Rotator cuff syndrome of right shoulder    Impingement syndrome of right shoulder    Arthrosis of right acromioclavicular joint    Chronic right shoulder pain    Nontraumatic incomplete tear of right rotator cuff    Primary osteoarthritis of right shoulder    Malignant neoplasm of overlapping sites of right lung    Primary lung adenocarcinoma    Encounter for venous access device care    Iron malabsorption    Hypokalemia    C. difficile colitis    Hallucinations    Severe malnutrition     Past Medical History:   Diagnosis Date    A-fib     Anesthesia     hip surgery (spinal caused him unpleasant sensations never wants it again)    Arthritis     Blood in sputum 02/22/2023    bright red blood, teaspoon - tablespoon daily    COPD (chronic obstructive pulmonary disease)     CVA (cerebral vascular accident) 12/24/2022    Hearing aid worn     bilateral    History of pulmonary embolism     History of recurrent pneumonia     Hypertension     Lung cancer 03/17/2023    Oxygen dependent     since @ age 65    Pulmonary nodules     Risk for falls     uses walker    Rotator cuff syndrome of right shoulder     Tachycardia     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  HTN/Eliquis    Wears glasses     readers    Wears glasses     reading     Past Surgical History:   Procedure Laterality Date    HIP ARTHROPLASTY Right     RIB BIOPSY Left 03/17/2023 11th    VENOUS ACCESS DEVICE (PORT) INSERTION N/A 3/30/2023    Procedure: MEDIPORT PLACEMENT          (C-ARM);  Surgeon: Lavon Cochran MD;  Location: [facility];  Service: General;  Laterality: N/A;      General Information       Row Name 09/17/23 1420          OT Time and Intention    Document Type therapy note (daily note)  -     Mode of Treatment individual therapy;occupational therapy  -       Row Name 09/17/23 1420          General Information    Patient Profile Reviewed yes  -     Existing Precautions/Restrictions fall;oxygen therapy device and L/min  -       Row Name 09/17/23 1420          Cognition    Orientation Status (Cognition) oriented x 4  -       Row Name 09/17/23 1420          Safety Issues, Functional Mobility    Impairments Affecting Function (Mobility) balance;endurance/activity tolerance;shortness of breath;strength  -               User Key  (r) = Recorded By, (t) = Taken By, (c) = Cosigned By      Initials Name Provider Type     Faby Orourke COTA Occupational Therapist Assistant                     Mobility/ADL's    No documentation.                  Obj/Interventions       Row Name 09/17/23 1420          Shoulder (Therapeutic Exercise)    Shoulder (Therapeutic Exercise) AROM (active range of motion)  -     Shoulder AROM (Therapeutic Exercise) bilateral;aBduction;aDduction;external rotation;internal rotation  No flex/ext w/ RUE 2' rotator cuff tear  -       Row Name 09/17/23 1420          Elbow/Forearm (Therapeutic Exercise)    Elbow/Forearm (Therapeutic Exercise) AROM (active range of motion)  -     Elbow/Forearm AROM (Therapeutic Exercise) bilateral;flexion;extension;supination;pronation  -       Row Name 09/17/23 1420          Motor Skills    Therapeutic Exercise  shoulder;elbow/forearm;wrist;hand  -KD               User Key  (r) = Recorded By, (t) = Taken By, (c) = Cosigned By      Initials Name Provider Type    Faby Judd COTA Occupational Therapist Assistant                   Goals/Plan       Row Name 09/17/23 1420          Transfer Goal 1 (OT)    Activity/Assistive Device (Transfer Goal 1, OT) transfers, all  -KD     [place] Level/Cues Needed (Transfer Goal 1, OT) modified independence  -KD     Time Frame (Transfer Goal 1, OT) long term goal (LTG);by discharge  -KD     Progress/Outcome (Transfer Goal 1, OT) goal not met  -KD       Row Name 09/17/23 1420          Bathing Goal 1 (OT)    Activity/Device (Bathing Goal 1, OT) lower body bathing  -KD     [place] Level/Cues Needed (Bathing Goal 1, OT) modified independence  -KD     Time Frame (Bathing Goal 1, OT) long term goal (LTG);by discharge  -KD     Progress/Outcomes (Bathing Goal 1, OT) goal not met  -KD       Row Name 09/17/23 1420          Dressing Goal 1 (OT)    Activity/Device (Dressing Goal 1, OT) lower body dressing  -KD     [place]/Cues Needed (Dressing Goal 1, OT) modified independence  -KD     Time Frame (Dressing Goal 1, OT) long term goal (LTG);by discharge  -KD     Progress/Outcome (Dressing Goal 1, OT) goal not met  -KD               User Key  (r) = Recorded By, (t) = Taken By, (c) = Cosigned By      Initials Name Provider Type    Faby Judd COTA Occupational Therapist Assistant                   Clinical Impression       Row Name 09/17/23 1420          Pain Assessment    Pretreatment Pain Rating 7/10  -KD     Pain Location - back  -KD     Pain Intervention(s) Nursing Notified  -KD       Row Name 09/17/23 1420          Plan of Care Review    Plan of Care Reviewed With patient  -KD     Progress no change  -KD     Outcome Evaluation Pt deferred any OOB/EOB with encouragement from signee and spouse. Pt agreeable to bed ex only @ this time. Pt performed BUE ther ex  w/ 2lb HW and  good tolerance. Pt unable to perform shld flex/ext on RUE 2' torn rotator cuff. No goals met this date. Cont OT POC.  -KD       Row Name 09/17/23 1420          Therapy Assessment/Plan (OT)    Therapy Frequency (OT) other (see comments)  5-7 d/wk  -KD       Row Name 09/17/23 1420          Therapy Plan Review/Discharge Plan (OT)    Anticipated Discharge Disposition (OT) skilled nursing facility;inpatient rehabilitation facility  -KD       Row Name 09/17/23 1420          Vital Signs    Pre Systolic BP Rehab 131  -KD     Pre Treatment Diastolic BP 62  -KD     Pretreatment Heart Rate (beats/min) 97  -KD     Pre SpO2 (%) 94  -KD     O2 Delivery Pre Treatment supplemental O2  -KD     Pre Patient Position Supine  -KD     Intra Patient Position Sitting  -KD     Post Patient Position Sitting  -KD       Row Name 09/17/23 1420          Positioning and Restraints    Pre-Treatment Position in bed  -KD     Post Treatment Position bed  -KD     In Bed fowlers;call light within reach;encouraged to call for assist;exit alarm on;with family/caregiver  -KD               User Key  (r) = Recorded By, (t) = Taken By, (c) = Cosigned By      Initials Name Provider Type    KD Faby Orourke COTA Occupational Therapist Assistant                   Outcome Measures       Row Name 09/17/23 1420          How much help from another is currently needed...    Putting on and taking off regular lower body clothing? 3  -KD     Bathing (including washing, rinsing, and drying) 3  -KD     Toileting (which includes using toilet bed pan or urinal) 3  -KD     Putting on and taking off regular upper body clothing 3  -KD     Taking care of personal grooming (such as brushing teeth) 3  -KD     Eating meals 4  -KD     AM-PAC 6 Clicks Score (OT) 19  -KD       Row Name 09/17/23 1117 09/17/23 0842       How much help from another person do you currently need...    Turning from your back to your side while in flat bed without using bedrails? 4  -CA 4  -JA    Moving  from lying on back to sitting on the side of a flat bed without bedrails? 3  -CA 3  -JA    Moving to and from a bed to a chair (including a wheelchair)? 3  -CA 3  -JA    Standing up from a chair using your arms (e.g., wheelchair, bedside chair)? 3  -CA 3  -JA    Climbing 3-5 steps with a railing? 2  -CA 2  -JA    To walk in hospital room? 3  -CA 2  -JA    AM-PAC 6 Clicks Score (PT) 18  -CA 17  -JA    Highest level of mobility 6 --> Walked 10 steps or more  -CA 5 --> Static standing  -JA      Row Name 09/17/23 1117          Functional Assessment    Outcome Measure Options AM-PAC 6 Clicks Basic Mobility (PT)  -CA               User Key  (r) = Recorded By, (t) = Taken By, (c) = Cosigned By      Initials Name Provider Type    CA Andrea Romano, PTA Physical Therapist Assistant    Faby Judd COTA Occupational Therapist Assistant    Adam Vanegas, RN Registered Nurse                    Occupational Therapy Education       Title: PT OT SLP Therapies (In Progress)       Topic: Occupational Therapy (In Progress)       Point: ADL training (In Progress)       Description:   Instruct learner(s) on proper safety adaptation and remediation techniques during self care or transfers.   Instruct in proper use of assistive devices.                  Learning Progress Summary             Patient Acceptance, E,TB, NR by RW at 9/12/2023 7945    Comment: POC, Role of OT, transfer training                         Point: Home exercise program (Not Started)       Description:   Instruct learner(s) on appropriate technique for monitoring, assisting and/or progressing therapeutic exercises/activities.                  Learner Progress:  Not documented in this visit.              Point: Precautions (Done)       Description:   Instruct learner(s) on prescribed precautions during self-care and functional transfers.                  Learning Progress Summary             Patient Acceptance, E,D, NR,VU by RB at 9/14/2023 3353     Comment: Edu pt on use of gait belt and non skid socks when OOB and no OOB without assist.    Acceptance, E,TB, NR by RW at 9/12/2023 1555    Comment: POC, Role of OT, transfer training                         Point: Body mechanics (In Progress)       Description:   Instruct learner(s) on proper positioning and spine alignment during self-care, functional mobility activities and/or exercises.                  Learning Progress Summary             Patient Acceptance, E,TB, NR by RW at 9/12/2023 1555    Comment: POC, Role of OT, transfer training                                         User Key       Initials Effective Dates Name Provider Type Discipline    RB 07/11/23 -  Abhi Chang, OT Occupational Therapist OT    RW 09/22/22 -  Mildred Joe OT Occupational Therapist OT                  OT Recommendation and Plan  Therapy Frequency (OT): other (see comments) (5-7 d/wk)  Plan of Care Review  Plan of Care Reviewed With: patient  Progress: no change  Outcome Evaluation: Pt deferred any OOB/EOB with encouragement from signee and spouse. Pt agreeable to bed ex only @ this time. Pt performed BUE ther ex  w/ 2lb HW and good tolerance. Pt unable to perform shld flex/ext on RUE 2' torn rotator cuff. No goals met this date. Cont OT POC.     Time Calculation:         Time Calculation- OT       Row Name 09/17/23 1420             Time Calculation- OT    OT Start Time 1420  -KD      OT Stop Time 1435  -KD      OT Time Calculation (min) 15 min  -KD      Total Timed Code Minutes- OT 15 minute(s)  -KD      OT Received On 09/17/23  -KD         Timed Charges    15131 - OT Therapeutic Exercise Minutes 15  -KD         Total Minutes    Timed Charges Total Minutes 15  -KD       Total Minutes 15  -KD                User Key  (r) = Recorded By, (t) = Taken By, (c) = Cosigned By      Initials Name Provider Type    Faby Judd COTA Occupational Therapist Assistant                  Therapy Charges for Today       Code  Description Service Date Service Provider Modifiers Qty    27289877300 HC OT THER PROC EA 15 MIN 9/17/2023 Faby Orourke COTA GO 1                 ALEXANDER Valles  9/17/2023    Electronically signed by Faby Orourke COTA at 09/17/23 1506       Faby Orourke COTA at 09/17/23 1506          Goal Outcome Evaluation:  Plan of Care Reviewed With: patient        Progress: no change  Outcome Evaluation: Pt deferred any OOB/EOB with encouragement from signee and spouse. Pt agreeable to bed ex only @ this time. Pt performed BUE ther ex  w/ 2lb HW and good tolerance. Pt unable to perform shld flex/ext on RUE 2' torn rotator cuff. No goals met this date. Cont OT POC.      Anticipated Discharge Disposition (OT): skilled nursing facility, inpatient rehabilitation facility    Electronically signed by Faby Orourke COTA at 09/17/23 1670

## 2023-09-18 NOTE — PLAN OF CARE
Problem: Adult Inpatient Plan of Care  Goal: Plan of Care Review  Outcome: Ongoing, Progressing  Flowsheets  Taken 9/18/2023 0514 by Hali Johnson RN  Progress: no change  Outcome Evaluation: pt vs stable, no new acute events at this time, discharge planning in progress to snf.  Taken 9/17/2023 1534 by Andrea Romano PTA  Plan of Care Reviewed With: patient  Goal: Patient-Specific Goal (Individualized)  Outcome: Ongoing, Progressing  Goal: Absence of Hospital-Acquired Illness or Injury  Outcome: Ongoing, Progressing  Intervention: Identify and Manage Fall Risk  Recent Flowsheet Documentation  Taken 9/18/2023 0442 by Hali Johnson RN  Safety Promotion/Fall Prevention:   activity supervised   assistive device/personal items within reach   clutter free environment maintained   fall prevention program maintained   safety round/check completed   nonskid shoes/slippers when out of bed  Taken 9/18/2023 0212 by Hali Johnson RN  Safety Promotion/Fall Prevention:   activity supervised   assistive device/personal items within reach   clutter free environment maintained   fall prevention program maintained   safety round/check completed   nonskid shoes/slippers when out of bed  Taken 9/18/2023 0021 by Hali Johnson RN  Safety Promotion/Fall Prevention:   activity supervised   assistive device/personal items within reach   clutter free environment maintained   fall prevention program maintained   safety round/check completed   nonskid shoes/slippers when out of bed  Taken 9/17/2023 2220 by Hali Johnson RN  Safety Promotion/Fall Prevention:   activity supervised   assistive device/personal items within reach   clutter free environment maintained   fall prevention program maintained   safety round/check completed   nonskid shoes/slippers when out of bed  Taken 9/17/2023 2130 by Hali Johnson RN  Safety Promotion/Fall Prevention:   activity supervised   assistive device/personal items within  reach   clutter free environment maintained   fall prevention program maintained   safety round/check completed   nonskid shoes/slippers when out of bed  Taken 9/17/2023 2031 by Hali Johnson RN  Safety Promotion/Fall Prevention:   activity supervised   assistive device/personal items within reach   clutter free environment maintained   fall prevention program maintained   safety round/check completed   nonskid shoes/slippers when out of bed  Taken 9/17/2023 2030 by Hali Johnson RN  Safety Promotion/Fall Prevention:   activity supervised   assistive device/personal items within reach   clutter free environment maintained   fall prevention program maintained   nonskid shoes/slippers when out of bed   safety round/check completed  Taken 9/17/2023 1935 by Hali Johnson RN  Safety Promotion/Fall Prevention:   activity supervised   assistive device/personal items within reach   clutter free environment maintained   fall prevention program maintained   safety round/check completed   nonskid shoes/slippers when out of bed  Intervention: Prevent Skin Injury  Recent Flowsheet Documentation  Taken 9/18/2023 0442 by Hali Johnson RN  Body Position:   position changed independently   left   side-lying  Taken 9/18/2023 0212 by Hali Johnson RN  Body Position:   position changed independently   right   side-lying  Taken 9/18/2023 0021 by Hali Johnson RN  Body Position:   position changed independently   right   side-lying  Taken 9/17/2023 2220 by Hali Johnson RN  Body Position:   position changed independently   left   side-lying  Taken 9/17/2023 2031 by Hali Johnson RN  Body Position:   position changed independently   supine, legs elevated  Intervention: Prevent and Manage VTE (Venous Thromboembolism) Risk  Recent Flowsheet Documentation  Taken 9/17/2023 2030 by Hali Johnson RN  Activity Management: activity encouraged  VTE Prevention/Management: (hep sub q) other (see  comments)  Goal: Optimal Comfort and Wellbeing  Outcome: Ongoing, Progressing  Intervention: Provide Person-Centered Care  Recent Flowsheet Documentation  Taken 9/17/2023 2030 by Hali Johnson RN  Trust Relationship/Rapport:   care explained   questions answered   thoughts/feelings acknowledged  Goal: Readiness for Transition of Care  Outcome: Ongoing, Progressing   Goal Outcome Evaluation:           Progress: no change  Outcome Evaluation: pt vs stable, no new acute events at this time, discharge planning in progress to snf.

## 2023-09-18 NOTE — THERAPY TREATMENT NOTE
Patient Name: Brian Garcia  : 1950    MRN: [id number]                              Today's Date: 2023       Physical Therapy Treatment Note    Admit Date: 2023    Visit Dx:     ICD-10-CM ICD-9-CM   1. Hallucinations  R44.3 780.1   2. Fatigue, unspecified type  R53.83 780.79   3. Impaired functional mobility, balance, gait, and endurance [Z74.09 (ICD-10-CM)]  Z74.09 V49.89   4. Impaired mobility and ADLs [Z74.09, Z78.9 (ICD-10-CM)]  Z74.09 V49.89    Z78.9      Patient Active Problem List   Diagnosis    Physical deconditioning    Leukocytosis    History of pulmonary embolism    Stage 4 very severe COPD by GOLD classification    Chronic respiratory failure with hypoxia, on home O2 therapy    Hypertension    CVA (cerebral vascular accident)    Traumatic hemorrhage of cerebrum    Iron deficiency anemia secondary to inadequate dietary iron intake    Acute pain of right shoulder    Limited range of motion (ROM) of shoulder    Rotator cuff syndrome of right shoulder    Impingement syndrome of right shoulder    Arthrosis of right acromioclavicular joint    Chronic right shoulder pain    Nontraumatic incomplete tear of right rotator cuff    Primary osteoarthritis of right shoulder    Malignant neoplasm of overlapping sites of right lung    Primary lung adenocarcinoma    Encounter for venous access device care    Iron malabsorption    Hypokalemia    C. difficile colitis    Hallucinations    Severe malnutrition     Past Medical History:   Diagnosis Date    A-fib     Anesthesia     hip surgery (spinal caused him unpleasant sensations never wants it again)    Arthritis     Blood in sputum 2023    bright red blood, teaspoon - tablespoon daily    COPD (chronic obstructive pulmonary disease)     CVA (cerebral vascular accident) 2022    Hearing aid worn     bilateral    History of pulmonary embolism     History of recurrent pneumonia     Hypertension     Lung cancer 2023    Oxygen  dependent     since @ age 65    Pulmonary nodules     Risk for falls     uses walker    Rotator cuff syndrome of right shoulder     Tachycardia     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 HTN/Eliquis    Wears glasses     readers    Wears glasses     reading     Past Surgical History:   Procedure Laterality Date    HIP ARTHROPLASTY Right     RIB BIOPSY Left 03/17/2023 11th    VENOUS ACCESS DEVICE (PORT) INSERTION N/A 3/30/2023    Procedure: MEDIPORT PLACEMENT          (C-ARM);  Surgeon: Lavon Cochran MD;  Location: [facility];  Service: General;  Laterality: N/A;      General Information       Row Name 09/18/23 1422          Physical Therapy Time and Intention    Document Type therapy note (daily note)  -     Mode of Treatment individual therapy;physical therapy  -       Row Name 09/18/23 1422          General Information    Patient Profile Reviewed yes  -     Existing Precautions/Restrictions fall;oxygen therapy device and L/min  -       Row Name 09/18/23 1422          Cognition    Orientation Status (Cognition) oriented x 4  -       Row Name 09/18/23 1422          Safety Issues, Functional Mobility    Impairments Affecting Function (Mobility) balance;endurance/activity tolerance;shortness of breath;strength  -               User Key  (r) = Recorded By, (t) = Taken By, (c) = Cosigned By      Initials Name Provider Type     Breann Bennett PTA Physical Therapist Assistant                   Mobility       Row Name 09/18/23 1430          Bed Mobility    Bed Mobility bed mobility (all) activities  -     All Activities, [place] (Bed Mobility) independent  -     Comment, (Bed Mobility) no bedrails and bed flat  -       Row Name 09/18/23 1430          Bed-Chair Transfer    Bed-Chair [place] (Transfers) standby assist  -       Row Name 09/18/23 1430          Sit-Stand Transfer    Sit-Stand [place] (Transfers) independent  -     Assistive Device (Sit-Stand  Transfers) other (see comments)  no AD  -       Row Name 09/18/23 1430          Gait/Stairs (Locomotion)    [place] Level (Gait) standby assist;verbal cues  -     Assistive Device (Gait) other (see comments)  no AD  -     Distance in Feet (Gait) 75 feet  -     Deviations/Abnormal Patterns (Gait) festinating/shuffling;kimber decreased;stride length decreased;gait speed decreased  -               User Key  (r) = Recorded By, (t) = Taken By, (c) = Cosigned By      Initials Name Provider Type     Breann Bennett PTA Physical Therapist Assistant                   Obj/Interventions       Row Name 09/18/23 1452          Motor Skills    Therapeutic Exercise hip;knee;ankle  -       Row Name 09/18/23 1452          Hip (Therapeutic Exercise)    Hip (Therapeutic Exercise) strengthening exercise;isometric exercises  -     Hip Isometrics (Therapeutic Exercise) bilateral;aDduction;10 repetitions;2 sets;sitting;5 second hold  -     Hip Strengthening (Therapeutic Exercise) bilateral;marching while seated;20 repititions;sitting;5 second hold  sit to/from stand x 10  -       Row Name 09/18/23 1452          Knee (Therapeutic Exercise)    Knee (Therapeutic Exercise) strengthening exercise  -     Knee Strengthening (Therapeutic Exercise) bilateral;LAQ (long arc quad);20 repititions;sitting;5 second hold  -       Row Name 09/18/23 1452          Ankle (Therapeutic Exercise)    Ankle AROM (Therapeutic Exercise) bilateral;dorsiflexion;plantarflexion;20 repititions;sitting  -               User Key  (r) = Recorded By, (t) = Taken By, (c) = Cosigned By      Initials Name Provider Type     Breann Bennett PTA Physical Therapist Assistant                   Goals/Plan       Row Name 09/18/23 1457          Bed Mobility Goal 1 (PT)    Activity/Assistive Device (Bed Mobility Goal 1, PT) sit to supine/supine to sit  -     [place] Level/Cues Needed (Bed Mobility Goal 1, PT) independent  -     Time Frame (Bed  Mobility Goal 1, PT) by discharge  -     Strategies/Barriers (Bed Mobility Goal 1, PT) HOB flat, no bed rails.  -     Progress/Outcomes (Bed Mobility Goal 1, PT) goal met   -       Row Name 09/18/23 1457          Transfer Goal 1 (PT)    Activity/Assistive Device (Transfer Goal 1, PT) sit-to-stand/stand-to-sit;bed-to-chair/chair-to-bed;walker, rolling  -     [place] Level/Cues Needed (Transfer Goal 1, PT) modified independence  -MH     Time Frame (Transfer Goal 1, PT) by discharge  -     Progress/Outcome (Transfer Goal 1, PT) goal not met;good progress toward goal  -       Row Name 09/18/23 1457          Gait Training Goal 1 (PT)    Activity/Assistive Device (Gait Training Goal 1, PT) increase endurance/gait distance;walker, rolling  -     [place] Level (Gait Training Goal 1, PT) modified independence  -     Distance (Gait Training Goal 1, PT) 150' X 1  -MH     Time Frame (Gait Training Goal 1, PT) by discharge  -     Progress/Outcome (Gait Training Goal 1, PT) goal not met;good progress toward goal  -       Row Name 09/18/23 1457          Problem Specific Goal 1 (PT)    Problem Specific Goal 1 (PT) Score 24/28 on Tinetti fall risk assessment to decrease fall risk.  -     Time Frame (Problem Specific Goal 1, PT) by discharge  -     Progress/Outcome (Problem Specific Goal 1, PT) goal not met  -               User Key  (r) = Recorded By, (t) = Taken By, (c) = Cosigned By      Initials Name Provider Type     Breann Bennett PTA Physical Therapist Assistant                   Clinical Impression       Row Name 09/18/23 1425          Pain    Pretreatment Pain Rating 0/10 - no pain  -     Posttreatment Pain Rating 0/10 - no pain  -       Row Name 09/18/23 1423          Plan of Care Review    Plan of Care Reviewed With patient;spouse  -     Progress improving  -     Outcome Evaluation pt pleasant and agreeable to treatment. vitals stable at initation of treatment. patient  demonstrates independence with all aspects of bed mobility today. he demonstrates independence with sit to stand transfer. he is SBA for bed to chair with no AD and managing own O2 tubing. he is SBA for gait in room 75 feet iwth no AD and managing own O2 tubing. his O2 sats do fall to 84% with gait today, self recognizes need to sit down. initiates PLB on his own and able to bring O2 sats back up to therapeutic within 1 minute rest break. pt then able to go on to complete LE therex with no difficulty. all needs met post treatment and in reach.  -       Row Name 09/18/23 1423          Therapy Assessment/Plan (PT)    Rehab Potential (PT) fair, will monitor progress closely  -     Criteria for Skilled Interventions Met (PT) yes;skilled treatment is necessary  -     Therapy Frequency (PT) 5 times/wk  -       Row Name 09/18/23 1423          Vital Signs    Pre Systolic BP Rehab 120  -MH     Pre Treatment Diastolic BP 53  -MH     Pretreatment Heart Rate (beats/min) 84  -MH     Intratreatment Heart Rate (beats/min) 94  -MH     Pre SpO2 (%) 96  -MH     O2 Delivery Pre Treatment nasal cannula  3L  -MH     Intra SpO2 (%) 83  recovers to 94% in 1 minute with PLB  -MH     O2 Delivery Intra Treatment nasal cannula  3L  -MH     Post SpO2 (%) 94  -MH     Pre Patient Position Supine  -MH     Intra Patient Position Sitting  -     Post Patient Position Supine  -       Row Name 09/18/23 1423          Positioning and Restraints    Pre-Treatment Position in bed  -MH     Post Treatment Position bed  -MH     In Bed fowlers;call light within reach;encouraged to call for assist;exit alarm on;with family/caregiver  -               User Key  (r) = Recorded By, (t) = Taken By, (c) = Cosigned By      Initials Name Provider Type    Breann Heredia, PTA Physical Therapist Assistant                   Outcome Measures       Row Name 09/18/23 1457 09/18/23 0855       How much help from another person do you currently need...     Turning from your back to your side while in flat bed without using bedrails? 4  - 4  -JH    Moving from lying on back to sitting on the side of a flat bed without bedrails? 4  - 3  -JH    Moving to and from a bed to a chair (including a wheelchair)? 4  - 3  -JH    Standing up from a chair using your arms (e.g., wheelchair, bedside chair)? 4  - 3  -JH    Climbing 3-5 steps with a railing? 3  - 3  -JH    To walk in hospital room? 4  -MH 3  -JH    AM-PAC 6 Clicks Score (PT) 23  - 19  -JH    Highest level of mobility 7 --> Walked 25 feet or more  - 6 --> Walked 10 steps or more  -              User Key  (r) = Recorded By, (t) = Taken By, (c) = Cosigned By      Initials Name Provider Type     Breann Bennett, PTA Physical Therapist Assistant    Yanet Goodman RN Registered Nurse                                 Physical Therapy Education       Title: PT OT SLP Therapies (In Progress)       Topic: Physical Therapy (In Progress)       Point: Mobility training (In Progress)       Learning Progress Summary             Patient Acceptance, E, NR by  at 9/12/2023 1528    Comment: PT POC and goals, proper hand placement to facilitate transfers, use of FWW to increase safety with ambulation.                         Point: Home exercise program (Not Started)       Learner Progress:  Not documented in this visit.              Point: Body mechanics (Not Started)       Learner Progress:  Not documented in this visit.              Point: Precautions (Not Started)       Learner Progress:  Not documented in this visit.                              User Key       Initials Effective Dates Name Provider Type Discipline     06/26/23 -  Britney Espinosa, PT Physical Therapist PT                  PT Recommendation and Plan     Plan of Care Reviewed With: patient, spouse  Progress: improving  Outcome Evaluation: pt pleasant and agreeable to treatment. vitals stable at initation of treatment. patient demonstrates  independence with all aspects of bed mobility today. he demonstrates independence with sit to stand transfer. he is SBA for bed to chair with no AD and managing own O2 tubing. he is SBA for gait in room 75 feet iwth no AD and managing own O2 tubing. his O2 sats do fall to 84% with gait today, self recognizes need to sit down. initiates PLB on his own and able to bring O2 sats back up to therapeutic within 1 minute rest break. pt then able to go on to complete LE therex with no difficulty. all needs met post treatment and in reach.     Time Calculation:         PT Charges       Row Name 09/18/23 1425             Time Calculation    Start Time 1419  -      Stop Time 1451  -      Time Calculation (min) 32 min  -      PT Received On 09/18/23  -         Time Calculation- PT    Total Timed Code Minutes- PT 32 minute(s)  -         Timed Charges    92728 - PT Therapeutic Exercise Minutes 15  -MH      43435 - PT Therapeutic Activity Minutes 17  -MH         Total Minutes    Timed Charges Total Minutes 32  -MH       Total Minutes 32  -MH                User Key  (r) = Recorded By, (t) = Taken By, (c) = Cosigned By      Initials Name Provider Type     Breann Bennett PTA Physical Therapist Assistant                  Therapy Charges for Today       Code Description Service Date Service Provider Modifiers Qty    31790774041 HC PT THER PROC EA 15 MIN 9/18/2023 Breann Bennett PTA GP 1    95440909846 HC PT THERAPEUTIC ACT EA 15 MIN 9/18/2023 Breann Bennett PTA GP 1            PT G-Codes  Outcome Measure Options: AM-PAC 6 Clicks Basic Mobility (PT)  AM-PAC 6 Clicks Score (PT): 23  AM-PAC 6 Clicks Score (OT): 19  PT Discharge Summary  Anticipated Discharge Disposition (PT): home with assist, home with home health    Breann Bennett PTA  9/18/2023

## 2023-09-18 NOTE — PROGRESS NOTES
Nutrition Services    Patient Name:  Brian Garcia  YOB: 1950  MRN: [id number]  Admit Date:  9/11/2023    RD follow up. Continues to be in cdiff isolation. Awaiting discharge to nursing facility. Pt asleep during visit, wife at bedside. Wife states pt's intake has improved. Average intake for past 2 days is 96%. Wife reports pt will only consume boost if sent premixed with water (50/50). Will update diet order to include instructions to mix prior to serving to improve supplement intake. Wt is trending down, #. Pt previously met criteria for severe acute malnutrition (9/13).  Will continue magic cup/boost to optimize nutritional intake. RDN staff to follow hospital course.     Electronically signed by:  Meggan Zambrano  09/18/23 10:03 CDT

## 2023-09-18 NOTE — PLAN OF CARE
Goal Outcome Evaluation:  Plan of Care Reviewed With: patient        Progress: improving  Outcome Evaluation: Pt is Mod I for supine to sit. Pt completed B UE exercises  with good tolerance. Pt is Mod I for grooming tasks. All needs within reach. Pt would benefit from continued OT services.      Anticipated Discharge Disposition (OT): skilled nursing facility, inpatient rehabilitation facility

## 2023-09-18 NOTE — SIGNIFICANT NOTE
09/18/23 1354   OTHER   Discipline physical therapy assistant   Rehab Time/Intention   Session Not Performed patient unavailable for treatment  (pt with other staff. will check back later.)   Therapy Assessment/Plan (PT)   Criteria for Skilled Interventions Met (PT) yes;skilled treatment is necessary

## 2023-09-18 NOTE — PLAN OF CARE
Goal Outcome Evaluation:  Plan of Care Reviewed With: patient, spouse        Progress: improving  Outcome Evaluation: pt pleasant and agreeable to treatment. vitals stable at initation of treatment. patient demonstrates independence with all aspects of bed mobility today. he demonstrates independence with sit to stand transfer. he is SBA for bed to chair with no AD and managing own O2 tubing. he is SBA for gait in room 75 feet iwth no AD and managing own O2 tubing. his O2 sats do fall to 84% with gait today, self recognizes need to sit down. initiates PLB on his own and able to bring O2 sats back up to therapeutic within 1 minute rest break. pt then able to go on to complete LE therex with no difficulty. all needs met post treatment and in reach.      Anticipated Discharge Disposition (PT): home with assist, home with home health

## 2023-09-18 NOTE — PLAN OF CARE
Goal Outcome Evaluation:           Progress: improving  Outcome Evaluation: vitals stable; no complaint of pain; case management working on discharge planning

## 2023-09-19 PROBLEM — Z86.73 HISTORY OF CVA (CEREBROVASCULAR ACCIDENT): Status: ACTIVE | Noted: 2023-09-19

## 2023-09-19 PROBLEM — F03.90 DEMENTIA WITHOUT BEHAVIORAL DISTURBANCE: Status: ACTIVE | Noted: 2023-09-19

## 2023-09-19 LAB
ANION GAP SERPL CALCULATED.3IONS-SCNC: 5 MMOL/L (ref 5–15)
BASOPHILS # BLD AUTO: 0.1 10*3/MM3 (ref 0–0.2)
BASOPHILS NFR BLD AUTO: 1.3 % (ref 0–1.5)
BUN SERPL-MCNC: 12 MG/DL (ref 8–23)
BUN/CREAT SERPL: 25.5 (ref 7–25)
CALCIUM SPEC-SCNC: 9.1 MG/DL (ref 8.6–10.5)
CHLORIDE SERPL-SCNC: 94 MMOL/L (ref 98–107)
CO2 SERPL-SCNC: 37 MMOL/L (ref 22–29)
CREAT SERPL-MCNC: 0.47 MG/DL (ref 0.76–1.27)
DEPRECATED RDW RBC AUTO: 48.6 FL (ref 37–54)
EGFRCR SERPLBLD CKD-EPI 2021: 109 ML/MIN/1.73
EOSINOPHIL # BLD AUTO: 1.03 10*3/MM3 (ref 0–0.4)
EOSINOPHIL NFR BLD AUTO: 13.8 % (ref 0.3–6.2)
ERYTHROCYTE [DISTWIDTH] IN BLOOD BY AUTOMATED COUNT: 13.2 % (ref 12.3–15.4)
GLUCOSE SERPL-MCNC: 82 MG/DL (ref 65–99)
HCT VFR BLD AUTO: 31 % (ref 37.5–51)
HGB BLD-MCNC: 9.8 G/DL (ref 13–17.7)
IMM GRANULOCYTES # BLD AUTO: 0.04 10*3/MM3 (ref 0–0.05)
IMM GRANULOCYTES NFR BLD AUTO: 0.5 % (ref 0–0.5)
LYMPHOCYTES # BLD AUTO: 1.54 10*3/MM3 (ref 0.7–3.1)
LYMPHOCYTES NFR BLD AUTO: 20.7 % (ref 19.6–45.3)
MCH RBC QN AUTO: 31.5 PG (ref 26.6–33)
MCHC RBC AUTO-ENTMCNC: 31.6 G/DL (ref 31.5–35.7)
MCV RBC AUTO: 99.7 FL (ref 79–97)
MONOCYTES # BLD AUTO: 0.83 10*3/MM3 (ref 0.1–0.9)
MONOCYTES NFR BLD AUTO: 11.2 % (ref 5–12)
NEUTROPHILS NFR BLD AUTO: 3.9 10*3/MM3 (ref 1.7–7)
NEUTROPHILS NFR BLD AUTO: 52.5 % (ref 42.7–76)
NRBC BLD AUTO-RTO: 0 /100 WBC (ref 0–0.2)
PLATELET # BLD AUTO: 338 10*3/MM3 (ref 140–450)
PMV BLD AUTO: 10.1 FL (ref 6–12)
POTASSIUM SERPL-SCNC: 4.2 MMOL/L (ref 3.5–5.2)
RBC # BLD AUTO: 3.11 10*6/MM3 (ref 4.14–5.8)
SODIUM SERPL-SCNC: 136 MMOL/L (ref 136–145)
WBC NRBC COR # BLD: 7.44 10*3/MM3 (ref 3.4–10.8)

## 2023-09-19 PROCEDURE — 63710000001 PREDNISONE PER 5 MG

## 2023-09-19 PROCEDURE — 25010000002 HEPARIN (PORCINE) PER 1000 UNITS

## 2023-09-19 PROCEDURE — 97535 SELF CARE MNGMENT TRAINING: CPT

## 2023-09-19 PROCEDURE — G0378 HOSPITAL OBSERVATION PER HR: HCPCS

## 2023-09-19 PROCEDURE — 90792 PSYCH DIAG EVAL W/MED SRVCS: CPT | Performed by: PSYCHIATRY & NEUROLOGY

## 2023-09-19 PROCEDURE — 94799 UNLISTED PULMONARY SVC/PX: CPT

## 2023-09-19 PROCEDURE — 94760 N-INVAS EAR/PLS OXIMETRY 1: CPT

## 2023-09-19 PROCEDURE — 96372 THER/PROPH/DIAG INJ SC/IM: CPT

## 2023-09-19 PROCEDURE — 80048 BASIC METABOLIC PNL TOTAL CA: CPT

## 2023-09-19 PROCEDURE — 97110 THERAPEUTIC EXERCISES: CPT

## 2023-09-19 PROCEDURE — 85025 COMPLETE CBC W/AUTO DIFF WBC: CPT

## 2023-09-19 RX ADMIN — VANCOMYCIN HYDROCHLORIDE 125 MG: KIT at 05:21

## 2023-09-19 RX ADMIN — MICONAZOLE NITRATE 1 APPLICATION: 20 POWDER TOPICAL at 20:50

## 2023-09-19 RX ADMIN — VANCOMYCIN HYDROCHLORIDE 125 MG: KIT at 00:08

## 2023-09-19 RX ADMIN — MIRTAZAPINE 15 MG: 15 TABLET, FILM COATED ORAL at 20:50

## 2023-09-19 RX ADMIN — Medication 10 ML: at 20:50

## 2023-09-19 RX ADMIN — HEPARIN SODIUM 5000 UNITS: 5000 INJECTION INTRAVENOUS; SUBCUTANEOUS at 08:04

## 2023-09-19 RX ADMIN — FOLIC ACID 1 MG: 1 TABLET ORAL at 08:04

## 2023-09-19 RX ADMIN — VANCOMYCIN HYDROCHLORIDE 125 MG: KIT at 23:15

## 2023-09-19 RX ADMIN — PREDNISONE 5 MG: 5 TABLET ORAL at 08:04

## 2023-09-19 RX ADMIN — VANCOMYCIN HYDROCHLORIDE 125 MG: KIT at 11:02

## 2023-09-19 RX ADMIN — Medication 10 ML: at 08:05

## 2023-09-19 RX ADMIN — HEPARIN SODIUM 5000 UNITS: 5000 INJECTION INTRAVENOUS; SUBCUTANEOUS at 20:49

## 2023-09-19 RX ADMIN — VANCOMYCIN HYDROCHLORIDE 125 MG: KIT at 17:09

## 2023-09-19 NOTE — CONSULTS
Psychiatry & Behavioral Health Emergency Department Consult    9/19/2023    Referring Provider: Dr. Ni  Reason for Consultation:  delirium    History of Present Illness:    Patient is a 73 y.o. male with past medical history significant for recurrent C Diff, history of CVA, stage IV lung cancer on Keytruda, PE in 2015, HTN, who presented to the ED due to complaints of visual hallucinations few days after discharge from the hospital after being treated for C. Diff.  He was admitted on 9/11/23 due to these issues and psych was consulted yesterday evening for delirium.  Case management is working on getting him placed in a SNF.    Patient was oriented to person, place and year.  Wife present and notes that he was having VH prior to admission.  She notes that he is not having VH or behaviors now.  She notes plan to go to short term rehab if recommended.    Psychiatric Review Of Systems:  Pertinent items are noted in HPI.    Past Psychiatric History:    Psychiatric Hospitalizations: Patient has had no prior hospitalizations.    Suicide Attempts: Patient has had no prior suicide attempts.    Prior Treatment and Medications Tried: Per chart review he is currently on Remeron at a sleep dose of 15mg qhs,  He has also been on Zyprexa in March prescribed by Hem/Onc,     History of violence or legal issues: The patient has no significant history of legal issues.    Social History:    Substance Abuse:  Tobacco: denied  Alcohol: does not drink  Cannabis: does not use  Methamphetamine: does not use  Opiate: does not use  Cocaine: does not use  Synthetic: does not use  IV drug use: denies     Marriages: once for 56yrs  Current Relationships:   Children: two sons and four grandsons    Living Situation: with wife    Firearms Access: wife denies any guns in the home.    Social History     Socioeconomic History    Marital status:     Number of children: 3    Highest education level:  Associate degree: occupational, technical, or vocational program   Tobacco Use    Smoking status: Former     Packs/day: 3.00     Years: 47.00     Pack years: 141.00     Types: Cigarettes     Start date: 1961     Quit date: 5/1/2008     Years since quitting: 15.3    Smokeless tobacco: Never   Vaping Use    Vaping Use: Never used   Substance and Sexual Activity    Alcohol use: Not Currently     Comment: not last 40 years    Drug use: Never    Sexual activity: Not Currently     Partners: Female         Family History  Family History   Problem Relation Age of Onset    Cancer Sister     Brain cancer Brother     Cancer Brother     No Known Problems Daughter     No Known Problems Daughter     No Known Problems Son        Past Medical History:    Past Medical History:   Diagnosis Date    A-fib     Anesthesia     hip surgery (spinal caused him unpleasant sensations never wants it again)    Arthritis     Blood in sputum 02/22/2023    bright red blood, teaspoon - tablespoon daily    COPD (chronic obstructive pulmonary disease)     CVA (cerebral vascular accident) 12/24/2022    Hearing aid worn     bilateral    History of pulmonary embolism     History of recurrent pneumonia     Hypertension     Lung cancer 03/17/2023    Oxygen dependent     since @ age 65    Pulmonary nodules     Risk for falls     uses walker    Rotator cuff syndrome of right shoulder     Tachycardia     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 HTN/Eliquis    Wears glasses     readers    Wears glasses     reading     Past Surgical History:   Procedure Laterality Date    HIP ARTHROPLASTY Right     RIB BIOPSY Left 03/17/2023 11th    VENOUS ACCESS DEVICE (PORT) INSERTION N/A 3/30/2023    Procedure: MEDIPORT PLACEMENT          (C-ARM);  Surgeon: Lavon Cochran MD;  Location: [facility];  Service: General;  Laterality: N/A;     Allergies:  Amoxicillin, Albuterol, Asmanex (120 metered doses) [mometasone furoate], Gabapentin, Lortab  [hydrocodone-acetaminophen], Morphine and related, Prilosec [omeprazole], Sodium clavulanate, Statins, Sulfa antibiotics, and Symbicort [budesonide-formoterol fumarate]    Prior to Admission Medications:  Medications Prior to Admission   Medication Sig Dispense Refill Last Dose    cholecalciferol (VITAMIN D3) 10 MCG (400 UNIT) tablet Take 1 tablet by mouth Daily. 50 mcg daily   9/11/2023    dilTIAZem CD (CARDIZEM CD) 240 MG 24 hr capsule Take 1 capsule by mouth Daily. 30 capsule 11 9/11/2023    folic acid (FOLVITE) 1 MG tablet Take 1 tablet by mouth Daily. 90 tablet 1 9/11/2023    furosemide (LASIX) 20 MG tablet Take 1 tablet by mouth Daily As Needed (Swelling of lower extremity). 7 tablet 0 9/11/2023    ipratropium (ATROVENT HFA) 17 MCG/ACT inhaler Inhale 2 puffs 4 (Four) Times a Day As Needed for Wheezing.   Past Week    KLOR-CON 20 MEQ CR tablet 1 tablet Daily.   9/10/2023    melatonin 5 MG tablet tablet Take 1 tablet by mouth At Night As Needed.   9/10/2023    mirtazapine (REMERON) 15 MG tablet Take 1 tablet by mouth Every Night.   9/10/2023    montelukast (SINGULAIR) 10 MG tablet Take 1 tablet by mouth Every Night.   9/10/2023    ondansetron (ZOFRAN) 8 MG tablet Take 1 tablet by mouth 3 (Three) Times a Day As Needed for Nausea or Vomiting. 30 tablet 3 Past Week    predniSONE (DELTASONE) 5 MG tablet Take 1 tablet by mouth Every Other Day.   Past Week    tiotropium bromide-olodaterol (Stiolto Respimat) 2.5-2.5 MCG/ACT aerosol solution inhaler Inhale 2 puffs Daily. 3 each 3 Past Week    TiZANidine (ZANAFLEX) 2 MG capsule Take 1 capsule by mouth At Night As Needed for Muscle Spasms.   9/10/2023    vancomycin (VANCOCIN) 125 MG capsule Take 1 capsule by mouth 4 (Four) Times a Day for 90 days. 360 capsule 0 9/11/2023       Medical Review Of Systems:  Reviewed review of systems from  Dr. Ni note from yesterday.    Agree with ROS as noted with any relevant updates:    Constitutional:  Negative for activity change,  "appetite change, chills, diaphoresis and fatigue.   HENT:  Negative for congestion, ear discharge, hearing loss, rhinorrhea, sinus pain, sneezing and sore throat.    Eyes:  Negative for photophobia, discharge and visual disturbance.   Respiratory:  Negative for cough, chest tightness, shortness of breath and wheezing.    Cardiovascular:  Negative for chest pain and palpitations.   Gastrointestinal:  Negative for abdominal distention, abdominal pain, blood in stool, diarrhea, nausea and vomiting.   Endocrine: Negative for cold intolerance, heat intolerance, polydipsia, polyphagia and polyuria.   Genitourinary:  Negative for dysuria, flank pain, hematuria and urgency.   Musculoskeletal:  Negative for arthralgias, joint swelling and myalgias.   Skin:  Negative for color change.   Allergic/Immunologic: Negative for food allergies.   Neurological:  Negative for dizziness, seizures, syncope, speech difficulty, weakness and headaches.   Hematological:  Negative for adenopathy. Does not bruise/bleed easily.   Psychiatric/Behavioral:  Negative for confusion, hallucinations, self-injury and suicidal ideas. The patient is not nervous/anxious.      All other systems reviewed and are negative.    Objective     Vital Signs    Temp:  [97.3 °F (36.3 °C)-97.8 °F (36.6 °C)] 97.8 °F (36.6 °C)  Heart Rate:  [75-99] 85  Resp:  [16-18] 16  BP: (105-115)/(52-58) 111/56      09/17/23  0534 09/18/23  0512 09/19/23  0521   Weight: 56.5 kg (124 lb 8 oz) 53.8 kg (118 lb 9.6 oz) 53.3 kg (117 lb 9.6 oz)         Physical Exam:   General Appearance: alert, pleasant, interactive, and cooperative,  Hygiene:   fair  Gait & Station:  in bed  Musculoskeletal: No tremors or abnormal involuntary movements    Mental Status Exam:   Cooperation:  Cooperative  Eye Contact:  Good  Psychomotor Behavior:  Appropriate  Mood: \"Fine\"  Affect:  normal  Speech:  Normal  Thought Process:   mildly dyscognitive  Associations: Goal Directed and Tangential  Thought " Content:     minimal   Suicidal:  None   Homicidal:  None   Hallucinations:  None   Delusion:  None  Cognitive Functioning:   Consciousness: awake and alert   Orientation:  Person, Place, and year   Attention:  Intact  Concentration: Impaired   Language:  Intact Vocabulary: Average   Short Term Memory: Deficits   Long Term Memory: Deficits   Fund of Knowledge: Below Average  Reliability:   limited byNCD  Insight:   limited byNCD  Judgement:   limited byNCD  Impulse Control:   limited byNCD    Diagnostic Data:    Lab Results: Results source: EMR   Recent Results (from the past 72 hour(s))   Basic Metabolic Panel    Collection Time: 09/19/23  7:18 AM    Specimen: Blood   Result Value Ref Range    Glucose 82 65 - 99 mg/dL    BUN 12 8 - 23 mg/dL    Creatinine 0.47 (L) 0.76 - 1.27 mg/dL    Sodium 136 136 - 145 mmol/L    Potassium 4.2 3.5 - 5.2 mmol/L    Chloride 94 (L) 98 - 107 mmol/L    CO2 37.0 (H) 22.0 - 29.0 mmol/L    Calcium 9.1 8.6 - 10.5 mg/dL    BUN/Creatinine Ratio 25.5 (H) 7.0 - 25.0    Anion Gap 5.0 5.0 - 15.0 mmol/L    eGFR 109.0 >60.0 mL/min/1.73   CBC Auto Differential    Collection Time: 09/19/23  7:18 AM    Specimen: Blood   Result Value Ref Range    WBC 7.44 3.40 - 10.80 10*3/mm3    RBC 3.11 (L) 4.14 - 5.80 10*6/mm3    Hemoglobin 9.8 (L) 13.0 - 17.7 g/dL    Hematocrit 31.0 (L) 37.5 - 51.0 %    MCV 99.7 (H) 79.0 - 97.0 fL    MCH 31.5 26.6 - 33.0 pg    MCHC 31.6 31.5 - 35.7 g/dL    RDW 13.2 12.3 - 15.4 %    RDW-SD 48.6 37.0 - 54.0 fl    MPV 10.1 6.0 - 12.0 fL    Platelets 338 140 - 450 10*3/mm3    Neutrophil % 52.5 42.7 - 76.0 %    Lymphocyte % 20.7 19.6 - 45.3 %    Monocyte % 11.2 5.0 - 12.0 %    Eosinophil % 13.8 (H) 0.3 - 6.2 %    Basophil % 1.3 0.0 - 1.5 %    Immature Grans % 0.5 0.0 - 0.5 %    Neutrophils, Absolute 3.90 1.70 - 7.00 10*3/mm3    Lymphocytes, Absolute 1.54 0.70 - 3.10 10*3/mm3    Monocytes, Absolute 0.83 0.10 - 0.90 10*3/mm3    Eosinophils, Absolute 1.03 (H) 0.00 - 0.40 10*3/mm3     Basophils, Absolute 0.10 0.00 - 0.20 10*3/mm3    Immature Grans, Absolute 0.04 0.00 - 0.05 10*3/mm3    nRBC 0.0 0.0 - 0.2 /100 WBC       No results found for: GLUF     Hemoglobin A1C   Date Value Ref Range Status   09/12/2023 5.80 (H) 4.80 - 5.60 % Final       Lab Results   Component Value Date    CHOL 173 09/12/2023    TRIG 137 09/12/2023    HDL 40 09/12/2023     (H) 09/12/2023    VLDL 24 09/12/2023    LDLHDL 2.64 09/12/2023        TSH   Date Value Ref Range Status   09/11/2023 1.830 0.270 - 4.200 uIU/mL Final       Free T4   Date Value Ref Range Status   09/11/2023 1.20 0.93 - 1.70 ng/dL Final     Comment:     T4 results may be falsely increased if patient taking Biotin.       Vitamin B-12   Date Value Ref Range Status   08/09/2023 >2,000 (H) 211 - 946 pg/mL Final     Folate   Date Value Ref Range Status   08/09/2023 >20.00 4.78 - 24.20 ng/mL Final       No results found for: HCGQUAL    Imaging Results:  CT Abdomen Pelvis Without Contrast    Result Date: 9/11/2023  Narrative: CT ABDOMEN PELVIS WO CONTRAST HISTORY: Abdominal pain, acute, nonlocalized COMPARISON: None Automated exposure control was also utilized to decrease patient radiation dose. TECHNIQUE: Unenhanced axial images through the abdomen and pelvis were completed without oral contrast. Multiplanar reformatted images were provided for review. FINDINGS: LOWER CHEST: The lung bases and base of the heart are unremarkable. LIVER: No focal liver lesion. BILIARY SYSTEM: The gallbladder is unremarkable. No intrahepatic or extrahepatic ductal dilatation. PANCREAS: No focal pancreatic lesion. SPLEEN: Unremarkable. KIDNEYS: Bilateral kidneys are unremarkable. The ureters are decompressed and normal in appearance. ADRENALS: Unremarkable. RETROPERITONEUM: No mass, lymphadenopathy or hemorrhage. GI TRACT: No evidence of obstruction or bowel wall thickening. The appendix is not definitively visualized, but there is no evidence of acute appendicitis. OTHER:  There is no mesenteric mass, lymphadenopathy or fluid collection. The osseous structures and soft tissues demonstrate no worrisome lesions. PELVIS: No mass lesion, fluid collection or significant lymphadenopathy is seen in the pelvis. The urinary bladder is within normal limits.     Impression: 1. No evidence of acute abdominopelvic process.     XR Chest 2 View    Result Date: 9/6/2023  Narrative: History: fever COMPARISON: 8/2/2023 Findings: PA and lateral views of the chest obtained.Heart size is normal. There is severe emphysema. There are no osseous lesions.     Impression: Severe emphysema.    CT Head Without Contrast    Result Date: 9/11/2023  Narrative: INDICATION: Altered mental status. FINDINGS: Patient is slanted within the gantry. No definite focal abnormal densities are seen. Age-related changes present. SUMMARY: No acute findings.    CT Chest Without Contrast Diagnostic    Result Date: 9/11/2023  Narrative: CT CHEST WO CONTRAST DIAGNOSTIC HISTORY: Pneumonia suspected, uncomplicated, no prior imaging (Ped >= 3mo) COMPARISON: 4/21/2023 Automated exposure control was also utilized to decrease patient radiation dose. TECHNIQUE: Axial images through the chest were completed without intravenous contrast. Multiplanar reformatted images were provided for review. FINDINGS: Neck base: The imaged portion of the neck and thyroid gland is unremarkable. Lungs: Severe emphysema with mild parenchymal scarring in the right middle lobe this is significantly improved from prior study. No pleural effusion is present. The trachea and bronchial tree are patent. Heart and mediastinum: The heart is normal in size. The great vessels are normal in caliber and appearance. There is no pericardial effusion. No enlarged axillary or mediastinal lymph nodes are present. Bones and soft tissues: No acute findings are seen in the bones or surrounding soft tissues. Upper abdomen: No acute findings are seen in the visualized portion of the  upper abdomen.     Impression: 1. Severe emphysema with mild parenchymal scarring in the right middle lobe. This is significantly improved from 4/21/2023.  No definite acute infiltrate or consolidation.  No pleural effusion.     CT Abdomen Pelvis With Contrast    Result Date: 9/6/2023  Narrative: Indication: C. Difficile colitis TECHNIQUE: Intravenous contrast was administered and axial images from the level of the diaphragms through the pelvis were performed followed by 2-D multiplanar reformats. Comparison: None FINDINGS: Liver shows small hypodense lesion that may represent cysts, but are too small to accurately characterize.  Other abdominal viscera are unremarkable.  Moderate to marked atherosclerotic calcification is seen in the abdominal aorta and its branches.  Colon diffusely shows wall thickening and enhancement that may be from mild acute colitis, but fecal material is still seen in the colon diffusely.  Visualized lung bases show emphysema.  On bone windows diffuse osteopenia is seen with prominent degenerative changes in the spine and the left hip.     Impression: Probable mild acute diffuse colitis    XR Chest 1 View    Result Date: 9/11/2023  Narrative: INDICATION: Fatigue. FINDINGS: Single view of the chest demonstrates advanced features of interstitial lung disease.  Port-A-Cath is seen on the right.  No acute findings are suspected.      Assessment & Plan       Hallucinations    Severe malnutrition    History of CVA (cerebrovascular accident)    Dementia without behavioral disturbance        Recommendations:    Dementia:   --Likely from vascular causing given the history CVA and small vessel disease on head imaging.   --No dementia related behaviors that require pharmacological intervention.   --Patient does not have decisional capacity due to his dementia.    Hallucinations:   --Resolved.  From delirium due to infection that has resolved with treatment of the infection.    Unclear why he is on  Remeron 15mg qhs.  Wife notes he was not taking it at home.  It may help with insomnia if present.    Ability and Capacity to Respond to Treatment: fair    Thank you for allowing me to participate in the care of this patient.    Cammy Lopez MD  09/19/23  15:15 CDT

## 2023-09-19 NOTE — THERAPY TREATMENT NOTE
Patient Name: Brian Garcia  : 1950    MRN: [id number]                              Today's Date: 2023       Admit Date: 2023    Visit Dx:     ICD-10-CM ICD-9-CM   1. Hallucinations  R44.3 780.1   2. Fatigue, unspecified type  R53.83 780.79   3. Impaired functional mobility, balance, gait, and endurance [Z74.09 (ICD-10-CM)]  Z74.09 V49.89   4. Impaired mobility and ADLs [Z74.09, Z78.9 (ICD-10-CM)]  Z74.09 V49.89    Z78.9      Patient Active Problem List   Diagnosis    Physical deconditioning    Leukocytosis    History of pulmonary embolism    Stage 4 very severe COPD by GOLD classification    Chronic respiratory failure with hypoxia, on home O2 therapy    Hypertension    CVA (cerebral vascular accident)    Traumatic hemorrhage of cerebrum    Iron deficiency anemia secondary to inadequate dietary iron intake    Acute pain of right shoulder    Limited range of motion (ROM) of shoulder    Rotator cuff syndrome of right shoulder    Impingement syndrome of right shoulder    Arthrosis of right acromioclavicular joint    Chronic right shoulder pain    Nontraumatic incomplete tear of right rotator cuff    Primary osteoarthritis of right shoulder    Malignant neoplasm of overlapping sites of right lung    Primary lung adenocarcinoma    Encounter for venous access device care    Iron malabsorption    Hypokalemia    C. difficile colitis    Hallucinations    Severe malnutrition     Past Medical History:   Diagnosis Date    A-fib     Anesthesia     hip surgery (spinal caused him unpleasant sensations never wants it again)    Arthritis     Blood in sputum 2023    bright red blood, teaspoon - tablespoon daily    COPD (chronic obstructive pulmonary disease)     CVA (cerebral vascular accident) 2022    Hearing aid worn     bilateral    History of pulmonary embolism     History of recurrent pneumonia     Hypertension     Lung cancer 2023    Oxygen dependent     since @ age 65     Pulmonary nodules     Risk for falls     uses walker    Rotator cuff syndrome of right shoulder     Tachycardia     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 HTN/Eliquis    Wears glasses     readers    Wears glasses     reading     Past Surgical History:   Procedure Laterality Date    HIP ARTHROPLASTY Right     RIB BIOPSY Left 03/17/2023 11th    VENOUS ACCESS DEVICE (PORT) INSERTION N/A 3/30/2023    Procedure: MEDIPORT PLACEMENT          (C-ARM);  Surgeon: Lavon Cochran MD;  Location: [facility];  Service: General;  Laterality: N/A;      General Information       Row Name 09/19/23 1240          OT Time and Intention    Document Type therapy note (daily note)  -BB     Mode of Treatment individual therapy;occupational therapy  -BB       Row Name 09/19/23 1240          General Information    Patient Profile Reviewed yes  -BB     Existing Precautions/Restrictions fall;oxygen therapy device and L/min  -BB       Row Name 09/19/23 1240          Cognition    Orientation Status (Cognition) oriented x 4  -BB       Row Name 09/19/23 1240          Safety Issues, Functional Mobility    Impairments Affecting Function (Mobility) balance;endurance/activity tolerance;shortness of breath;strength  -BB               User Key  (r) = Recorded By, (t) = Taken By, (c) = Cosigned By      Initials Name Provider Type    BB Lisa José COTA Occupational Therapist Assistant                     Mobility/ADL's       Row Name 09/19/23 1240          Bed Mobility    Bed Mobility bed mobility (all) activities  -BB     All Activities, [place] (Bed Mobility) independent  -BB     Supine-Sit [place] (Bed Mobility) modified independence  -BB     Sit-Supine [place] (Bed Mobility) modified independence  -BB     Assistive Device (Bed Mobility) bed rails;head of bed elevated  -BB       Row Name 09/19/23 1240          Transfers    Transfers sit-stand transfer;stand-sit transfer;toilet transfer  -BB       Row Name  09/19/23 1240          Sit-Stand Transfer    Sit-Stand [place] (Transfers) independent  -BB     Assistive Device (Sit-Stand Transfers) other (see comments)  no AD  -BB       Row Name 09/19/23 1240          Stand-Sit Transfer    Stand-Sit [place] (Transfers) independent  -BB       Row Name 09/19/23 1240          Toilet Transfer    Type (Toilet Transfer) sit-stand;stand-sit  -BB       Row Name 09/19/23 1240          Activities of Daily Living    BADL Assessment/Intervention lower body dressing;upper body dressing  -BB       [facility] Name 09/19/23 1240          Grooming Assessment/Training    [place] Level (Grooming) hair care, combing/brushing;wash face, hands;modified independence;oral care regimen  -     Position (Grooming) edge of bed sitting  -[facility] Name 09/19/23 1240          Lower Body Dressing Assessment/Training    [place] Level (Lower Body Dressing) doff;don;socks  Independent for doffing/donning L sock, Independent for doffing R sock and dependent for donning R sock.  -BB     Position (Lower Body Dressing) edge of bed sitting  -BB       [facility] Name 09/19/23 1240          Upper Body Dressing Assessment/Training    [place] Level (Upper Body Dressing) doff;don;modified independence  [facility] gown  -     Position (Upper Body Dressing) edge of bed sitting  -               User Key  (r) = Recorded By, (t) = Taken By, (c) = Cosigned By      Initials Name Provider Type    BB Lisa José COTA Occupational Therapist Assistant                   Obj/Interventions       Row Name 09/19/23 1240          Range of Motion Comprehensive    General Range of Motion bilateral lower extremity ROM WFL  -BB       Row Name 09/19/23 1240          Strength Comprehensive (MMT)    General Manual Muscle Testing (MMT) Assessment other (see comments)  -BB       Row Name 09/19/23 1240          Shoulder (Therapeutic Exercise)    Shoulder AROM (Therapeutic Exercise) right;flexion;extension;external  rotation;internal rotation;15 repititions;sitting  -BB       Row Name 09/19/23 1240          Elbow/Forearm (Therapeutic Exercise)    Elbow/Forearm AROM (Therapeutic Exercise) bilateral;flexion;extension;supination;pronation;sitting;15 repititions  -BB       Row Name 09/19/23 1240          Wrist (Therapeutic Exercise)    Wrist AROM (Therapeutic Exercise) bilateral;flexion;extension;15 repititions  -BB       Row Name 09/19/23 1240          Hand (Therapeutic Exercise)    Hand (Therapeutic Exercise) AROM (active range of motion)  -BB     Hand AROM/AAROM (Therapeutic Exercise) bilateral;finger flexion;finger extension;15 repititions  -BB               User Key  (r) = Recorded By, (t) = Taken By, (c) = Cosigned By      Initials Name Provider Type    Lisa Ricardo COTA Occupational Therapist Assistant                   Goals/Plan       Row Name 09/19/23 1240          Transfer Goal 1 (OT)    Activity/Assistive Device (Transfer Goal 1, OT) transfers, all  -BB     [place] Level/Cues Needed (Transfer Goal 1, OT) modified independence  -BB     Time Frame (Transfer Goal 1, OT) long term goal (LTG);by discharge  -BB     Progress/Outcome (Transfer Goal 1, OT) goal not met  -BB       Row Name 09/19/23 1240          Bathing Goal 1 (OT)    Activity/Device (Bathing Goal 1, OT) lower body bathing  -BB     [place] Level/Cues Needed (Bathing Goal 1, OT) modified independence  -BB     Time Frame (Bathing Goal 1, OT) long term goal (LTG);by discharge  -BB     Progress/Outcomes (Bathing Goal 1, OT) goal not met  -BB       Row Name 09/19/23 1240          Dressing Goal 1 (OT)    Activity/Device (Dressing Goal 1, OT) lower body dressing  -BB     [place]/Cues Needed (Dressing Goal 1, OT) modified independence  -BB     Time Frame (Dressing Goal 1, OT) long term goal (LTG);by discharge  -BB     Progress/Outcome (Dressing Goal 1, OT) goal not met  -BB               User Key  (r) = Recorded By, (t) = Taken By, (c) =  Cosigned By      Initials Name Provider Type    Lisa Ricardo COTA Occupational Therapist Assistant                   Clinical Impression       Row Name 09/19/23 1240          Pain Assessment    Pretreatment Pain Rating 0/10 - no pain  -BB     Posttreatment Pain Rating 0/10 - no pain  -BB       Row Name 09/19/23 1240          Plan of Care Review    Plan of Care Reviewed With patient  -BB     Progress improving  -BB     Outcome Evaluation Pt agrees to OT tx. Pt is Mod I for supine-sit and Independent for sit-stand. Pt completed B UE exercises wit RBs as needed. Pt is Mod I for doffing/donning L sock and Independent doffing right sock/dependent for donning R sock. Pt would benefit from continued OT POC.  -BB       Row Name 09/19/23 1240          Therapy Assessment/Plan (OT)    Rehab Potential (OT) good, to achieve stated therapy goals  -BB     Criteria for Skilled Therapeutic Interventions Met (OT) yes;skilled treatment is necessary  -BB     Therapy Frequency (OT) other (see comments)  5-7 d/wk  -BB       [facility] Name 09/19/23 1240          Therapy Plan Review/Discharge Plan (OT)    Anticipated Discharge Disposition (OT) skilled nursing facility;inpatient rehabilitation facility  -BB       [facility] Name 09/19/23 1240          Vital Signs    Pretreatment Heart Rate (beats/min) 83  -BB     Pre SpO2 (%) 96  -BB     O2 Delivery Pre Treatment supplemental O2  -BB     Pre Patient Position Sitting  -BB       [facility] Name 09/19/23 1240          Positioning and Restraints    Pre-Treatment Position in bed  -BB     Post Treatment Position bed  -BB     In Bed fowlers;call light within reach;encouraged to call for assist;exit alarm on  -BB               User Key  (r) = Recorded By, (t) = Taken By, (c) = Cosigned By      Initials Name Provider Type    Lisa Ricardo COTA Occupational Therapist Assistant                   Outcome Measures       Row Name 09/19/23 1240          How much help from another is currently needed...     Putting on and taking off regular lower body clothing? 3  -BB     Bathing (including washing, rinsing, and drying) 3  -BB     Toileting (which includes using toilet bed pan or urinal) 3  -BB     Putting on and taking off regular upper body clothing 3  -BB     Taking care of personal grooming (such as brushing teeth) 4  -BB     Eating meals 4  -BB     AM-PAC 6 Clicks Score (OT) 20  -BB       Row Name 09/19/23 0808          How much help from another person do you currently need...    Turning from your back to your side while in flat bed without using bedrails? 4  -DL     Moving from lying on back to sitting on the side of a flat bed without bedrails? 4  -DL     Moving to and from a bed to a chair (including a wheelchair)? 4  -DL     Standing up from a chair using your arms (e.g., wheelchair, bedside chair)? 4  -DL     Climbing 3-5 steps with a railing? 3  -DL     To walk in hospital room? 3  -DL     AM-PAC 6 Clicks Score (PT) 22  -DL     Highest level of mobility 7 --> Walked 25 feet or more  -DL               User Key  (r) = Recorded By, (t) = Taken By, (c) = Cosigned By      Initials Name Provider Type    DL Chasidy Byers, RN Registered Nurse    Lisa Ricardo COTA Occupational Therapist Assistant                    Occupational Therapy Education       Title: PT OT SLP Therapies (In Progress)       Topic: Occupational Therapy (In Progress)       Point: ADL training (Done)       Description:   Instruct learner(s) on proper safety adaptation and remediation techniques during self care or transfers.   Instruct in proper use of assistive devices.                  Learning Progress Summary             Patient Acceptance, E,TB, VU by MARCELL at 9/19/2023 1533    Acceptance, E,TB, VU by BB at 9/18/2023 1609    Acceptance, E,TB, NR by RW at 9/12/2023 1555    Comment: POC, Role of OT, transfer training                         Point: Home exercise program (Not Started)       Description:   Instruct learner(s) on  appropriate technique for monitoring, assisting and/or progressing therapeutic exercises/activities.                  Learner Progress:  Not documented in this visit.              Point: Precautions (Done)       Description:   Instruct learner(s) on prescribed precautions during self-care and functional transfers.                  Learning Progress Summary             Patient Acceptance, E,D, NR,VU by RB at 9/14/2023 1343    Comment: Edu pt on use of gait belt and non skid socks when OOB and no OOB without assist.    Acceptance, E,TB, NR by RW at 9/12/2023 1555    Comment: POC, Role of OT, transfer training                         Point: Body mechanics (Done)       Description:   Instruct learner(s) on proper positioning and spine alignment during self-care, functional mobility activities and/or exercises.                  Learning Progress Summary             Patient Acceptance, E,TB, VU by BB at 9/19/2023 1533    Acceptance, E,TB, VU by BB at 9/18/2023 1609    Acceptance, E,TB, NR by RW at 9/12/2023 1555    Comment: POC, Role of OT, transfer training                                         User Key       Initials Effective Dates Name Provider Type Discipline     07/11/23 -  Abhi Chang, OT Occupational Therapist OT    BB 06/16/21 -  Lisa José COTA Occupational Therapist Assistant OT    RW 09/22/22 -  Mildred Joe OT Occupational Therapist OT                  OT Recommendation and Plan  Therapy Frequency (OT): other (see comments) (5-7 d/wk)  Plan of Care Review  Plan of Care Reviewed With: patient  Progress: improving  Outcome Evaluation: Pt agrees to OT tx. Pt is Mod I for supine-sit and Independent for sit-stand. Pt completed B UE exercises wit RBs as needed. Pt is Mod I for doffing/donning L sock and Independent doffing right sock/dependent for donning R sock. Pt would benefit from continued OT POC.     Time Calculation:         Time Calculation- OT       Row Name 09/19/23 1531              Time Calculation- OT    OT Start Time 1240  -BB      OT Stop Time 1334  -BB      OT Time Calculation (min) 54 min  -BB      Total Timed Code Minutes- OT 54 minute(s)  -BB      OT Received On 09/19/23  -BB         Timed Charges    43533 - OT Therapeutic Exercise Minutes 29  -BB      92090 - OT Self Care/Mgmt Minutes 25  -BB         Total Minutes    Timed Charges Total Minutes 54  -BB       Total Minutes 54  -BB                User Key  (r) = Recorded By, (t) = Taken By, (c) = Cosigned By      Initials Name Provider Type    Lisa Ricardo COTA Occupational Therapist Assistant                  Therapy Charges for Today       Code Description Service Date Service Provider Modifiers Qty    25031950701 HC OT THER PROC EA 15 MIN 9/18/2023 Lisa José COTA GO 2    74135574227 HC OT SELF CARE/MGMT/TRAIN EA 15 MIN 9/18/2023 Lisa José COTA GO 1    56547300563 HC OT THER PROC EA 15 MIN 9/19/2023 Lisa José COTA GO 2    67651140074 HC OT SELF CARE/MGMT/TRAIN EA 15 MIN 9/19/2023 Lisa José COTA GO 2                 ALEXANDER Dasilva  9/19/2023

## 2023-09-19 NOTE — PLAN OF CARE
Goal Outcome Evaluation:  Plan of Care Reviewed With: patient        Progress: improving  Outcome Evaluation: Pt agrees to OT tx. Pt is Mod I for supine-sit and Independent for sit-stand. Pt completed B UE exercises wit RBs as needed. Pt is Mod I for doffing/donning L sock and Independent doffing right sock/dependent for donning R sock. Pt would benefit from continued OT POC.      Anticipated Discharge Disposition (OT): skilled nursing facility, inpatient rehabilitation facility

## 2023-09-19 NOTE — PLAN OF CARE
Problem: Adult Inpatient Plan of Care  Goal: Plan of Care Review  Outcome: Ongoing, Progressing  Flowsheets  Taken 9/19/2023 0411 by Hali Johnson RN  Progress: improving  Outcome Evaluation: pt vs stable, no new events at this time, awaiting placement, discharge planning in progress.  Taken 9/18/2023 1423 by Breann Bennett PTA  Plan of Care Reviewed With:   patient   spouse  Goal: Patient-Specific Goal (Individualized)  Outcome: Ongoing, Progressing  Goal: Absence of Hospital-Acquired Illness or Injury  Outcome: Ongoing, Progressing  Intervention: Identify and Manage Fall Risk  Recent Flowsheet Documentation  Taken 9/19/2023 0403 by Hali Johnson RN  Safety Promotion/Fall Prevention:   activity supervised   assistive device/personal items within reach   clutter free environment maintained   fall prevention program maintained   safety round/check completed   nonskid shoes/slippers when out of bed  Taken 9/19/2023 0206 by Hali Johnson RN  Safety Promotion/Fall Prevention:   activity supervised   assistive device/personal items within reach   clutter free environment maintained   fall prevention program maintained   safety round/check completed   nonskid shoes/slippers when out of bed  Taken 9/19/2023 0008 by Hali Johnson RN  Safety Promotion/Fall Prevention:   activity supervised   assistive device/personal items within reach   clutter free environment maintained   safety round/check completed   nonskid shoes/slippers when out of bed  Taken 9/18/2023 2200 by Hali Johnson RN  Safety Promotion/Fall Prevention:   activity supervised   clutter free environment maintained   assistive device/personal items within reach   fall prevention program maintained   safety round/check completed   nonskid shoes/slippers when out of bed  Taken 9/18/2023 2100 by Hali Johnson RN  Safety Promotion/Fall Prevention:   activity supervised   assistive device/personal items within reach   clutter  free environment maintained   fall prevention program maintained   safety round/check completed   nonskid shoes/slippers when out of bed  Taken 9/18/2023 1915 by Hali Johnson RN  Safety Promotion/Fall Prevention:   activity supervised   assistive device/personal items within reach   clutter free environment maintained   fall prevention program maintained   safety round/check completed   nonskid shoes/slippers when out of bed  Intervention: Prevent Skin Injury  Recent Flowsheet Documentation  Taken 9/19/2023 0403 by Hali Johnson RN  Body Position: position changed independently  Taken 9/19/2023 0206 by Hali Johnson RN  Body Position:   position changed independently   supine  Taken 9/19/2023 0008 by Hali Johnson RN  Body Position: position changed independently  Taken 9/18/2023 2200 by Hali Johnson RN  Body Position:   position changed independently   left   side-lying  Intervention: Prevent and Manage VTE (Venous Thromboembolism) Risk  Recent Flowsheet Documentation  Taken 9/18/2023 2100 by Hali Johnson RN  Activity Management: unable to complete activity (see comments)  VTE Prevention/Management: (hep sub q) other (see comments)  Goal: Optimal Comfort and Wellbeing  Outcome: Ongoing, Progressing  Intervention: Provide Person-Centered Care  Recent Flowsheet Documentation  Taken 9/18/2023 2100 by Hali Johnson RN  Trust Relationship/Rapport:   care explained   questions answered   thoughts/feelings acknowledged  Goal: Readiness for Transition of Care  Outcome: Ongoing, Progressing   Goal Outcome Evaluation:           Progress: improving  Outcome Evaluation: pt vs stable, no new events at this time, awaiting placement, discharge planning in progress.

## 2023-09-19 NOTE — PROGRESS NOTES
[facility] Medicine Services  INPATIENT PROGRESS NOTE    Length of Stay: 0  Date of Admission: 9/11/2023  Primary Care Physician: Marina Kitchen MD    Subjective   Chief Complaint: none today  HPI:  73-year-old male with COPD, history of stroke, stage IV lung cancer on Keytruda, recurrent C. difficile infection, presented after developing fever and increased diarrhea in setting of recurrent C. difficile colitis. Pulmonary embolism diagnosed in 2015. Hypertension. Metastatic lung adenocarcinoma with pulmonary metastasis for which patient is currently on treatment with Keytruda. Iron deficiency anemia. History of recurrent C. Difficile Was discharged from [facility] after Clostridium difficile colitis, on vanco PO, no diarrhea, his friend Annette Hui refers staying in bed, not eating, and confused (readmission), now oriented.      As of today, 09/19/23  Review of Systems   Constitutional:  Negative for activity change, appetite change, chills, diaphoresis and fatigue.   HENT:  Negative for congestion, ear discharge, hearing loss, rhinorrhea, sinus pain, sneezing and sore throat.    Eyes:  Negative for photophobia, discharge and visual disturbance.   Respiratory:  Negative for cough, chest tightness, shortness of breath and wheezing.    Cardiovascular:  Negative for chest pain and palpitations.   Gastrointestinal:  Negative for abdominal distention, abdominal pain, blood in stool, diarrhea, nausea and vomiting.   Endocrine: Negative for cold intolerance, heat intolerance, polydipsia, polyphagia and polyuria.   Genitourinary:  Negative for dysuria, flank pain, hematuria and urgency.   Musculoskeletal:  Negative for arthralgias, joint swelling and myalgias.   Skin:  Negative for color change.   Allergic/Immunologic: Negative for food allergies.   Neurological:  Negative for dizziness, seizures, syncope, speech difficulty, weakness and headaches.   Hematological:  Negative  for adenopathy. Does not bruise/bleed easily.   Psychiatric/Behavioral:  Negative for confusion, hallucinations, self-injury and suicidal ideas. The patient is not nervous/anxious.       All pertinent negatives and positives are as above. All other systems have been reviewed and are negative unless otherwise stated.    Objective    Temp:  [97.3 °F (36.3 °C)-97.8 °F (36.6 °C)] 97.8 °F (36.6 °C)  Heart Rate:  [75-99] 85  Resp:  [16-18] 16  BP: (105-115)/(52-58) 111/56    AM-PAC 6 Clicks Score (PT): 22 (09/19/23 0808)    As of today, 09/19/23  Physical Exam  Constitutional:       Appearance: Normal appearance.   HENT:      Head: Normocephalic and atraumatic.      Right Ear: Tympanic membrane normal.      Left Ear: Tympanic membrane normal.      Nose: Nose normal.      Mouth/Throat:      Mouth: Mucous membranes are moist.   Eyes:      Pupils: Pupils are equal, round, and reactive to light.   Cardiovascular:      Rate and Rhythm: Normal rate and regular rhythm.      Pulses: Normal pulses.      Heart sounds: Normal heart sounds.   Pulmonary:      Effort: Pulmonary effort is normal.      Breath sounds: Normal breath sounds.   Abdominal:      General: Abdomen is flat. Bowel sounds are normal.      Palpations: Abdomen is soft.   Musculoskeletal:         General: Normal range of motion.      Cervical back: Normal range of motion and neck supple.   Skin:     General: Skin is warm and dry.      Capillary Refill: Capillary refill takes less than 2 seconds.   Neurological:      General: No focal deficit present.      Mental Status: He is alert and oriented to person, place, and time.   Psychiatric:         Mood and Affect: Mood normal.         Behavior: Behavior normal.         Thought Content: Thought content normal.         Judgment: Judgment normal.         Results Review:  I have reviewed the labs, radiology results, and diagnostic studies.    Laboratory Data:   Results from last 7 days   Lab Units 09/19/23  8634  09/14/23  1417 09/14/23  0033 09/13/23  1210   SODIUM mmol/L 136  --   --   --    POTASSIUM mmol/L 4.2  --  4.3 3.3*   CHLORIDE mmol/L 94*  --   --   --    CO2 mmol/L 37.0*  --   --   --    BUN mg/dL 12  --   --   --    CREATININE mg/dL 0.47*  --   --   --    GLUCOSE mg/dL 82  --   --   --    CALCIUM mg/dL 9.1 8.4*  --  8.5*   ANION GAP mmol/L 5.0  --   --   --      Estimated Creatinine Clearance: 104 mL/min (A) (by C-G formula based on SCr of 0.47 mg/dL (L)).  Results from last 7 days   Lab Units 09/14/23  1417 09/13/23  1210   MAGNESIUM mg/dL 1.6 1.7   PHOSPHORUS mg/dL 3.0 2.3*         Results from last 7 days   Lab Units 09/19/23  0718   WBC 10*3/mm3 7.44   HEMOGLOBIN g/dL 9.8*   HEMATOCRIT % 31.0*   PLATELETS 10*3/mm3 338           Culture Data:   No results found for: BLOODCX  No results found for: URINECX  No results found for: RESPCX  No results found for: WOUNDCX  No results found for: STOOLCX  No components found for: BODYFLD    Radiology Data:   Imaging Results (Last 24 Hours)       ** No results found for the last 24 hours. **            I have utilized all available immediate resources to obtain, update, or review the patient's current medications (including all prescriptions, over-the-counter products, herbals, cannabis/cannabidiol products, and vitamin/mineral/dietary (nutritional) supplements).       Assessment/Plan     Active Hospital Problems    Diagnosis     **Hallucinations     Severe malnutrition          Assessment and Plan:    Impression/plan  1-Altered mental status. Etiology unclear may have confusion from recent C. difficile infection.  We will continue oral vancomycin and follow. White count has improved to within normal limits.   Blood cultures no growth at 5 days  UA negative  CT scan abdomen pelvis negative  CT chest improved from previous exam no acute process  CT head no acute intercranial process.  Case management currently working on placement to skilled nursing facility.  Patient  eating and drinking. Pre cert sent to NH rehab to home     2-Patient has history of adenocarcinoma with metastasis  Currently on chemotherapy Keytruda.  Oncology consult currently stable not needed at this time     3-Hypertension  Continue diltiazem     4-History of COPD no exacerbation currently  Continue as needed nebulizer treatments     5-History of C. difficile  Currently on oral vancomycin     6-History of some mental confusion  Continue Remeron for now, psych evaluation     7-CODE STATUS full code     8-DVT prophylaxis is heparin       Medical Decision Making  Number and Complexity of problems: 1  Differential Diagnosis: delirium    Conditions and Status:        Condition is improving.     [facility] Data  External documents reviewed: previous medical records  My EKG interpretation: none  My CT interpretation: chest emphysema  Tests considered but not ordered: none     Decision rules/scores evaluated (example [id number], Wells, etc): n/a     Discussed with: Annette (girlfriend) and the patient (hearing loss)     Treatment Plan  Pending placement    Care Planning  Shared decision making: Annette, long term partner  Code status and discussions: full code    Disposition  Social Determinants of Health that impact treatment or disposition: none  I expect the patient to be discharged to rehab in 1 days.       I confirmed that the patient's Advance Care Plan is present, code status is documented, or surrogate decision maker is listed in the patient's medical record.      This document has been electronically signed by Fredrick Ni MD on September 19, 2023 15:42 CDT

## 2023-09-20 PROCEDURE — 97110 THERAPEUTIC EXERCISES: CPT

## 2023-09-20 PROCEDURE — 25010000002 HEPARIN (PORCINE) PER 1000 UNITS

## 2023-09-20 PROCEDURE — G0378 HOSPITAL OBSERVATION PER HR: HCPCS

## 2023-09-20 PROCEDURE — 94799 UNLISTED PULMONARY SVC/PX: CPT

## 2023-09-20 PROCEDURE — 94760 N-INVAS EAR/PLS OXIMETRY 1: CPT

## 2023-09-20 PROCEDURE — 97530 THERAPEUTIC ACTIVITIES: CPT

## 2023-09-20 PROCEDURE — 96372 THER/PROPH/DIAG INJ SC/IM: CPT

## 2023-09-20 PROCEDURE — 97535 SELF CARE MNGMENT TRAINING: CPT

## 2023-09-20 RX ADMIN — VANCOMYCIN HYDROCHLORIDE 125 MG: KIT at 18:15

## 2023-09-20 RX ADMIN — HEPARIN SODIUM 5000 UNITS: 5000 INJECTION INTRAVENOUS; SUBCUTANEOUS at 09:10

## 2023-09-20 RX ADMIN — TIZANIDINE 2 MG: 2 TABLET ORAL at 20:35

## 2023-09-20 RX ADMIN — Medication 10 ML: at 09:10

## 2023-09-20 RX ADMIN — VANCOMYCIN HYDROCHLORIDE 125 MG: KIT at 05:49

## 2023-09-20 RX ADMIN — DILTIAZEM HYDROCHLORIDE 240 MG: 240 CAPSULE, COATED, EXTENDED RELEASE ORAL at 09:10

## 2023-09-20 RX ADMIN — HEPARIN SODIUM 5000 UNITS: 5000 INJECTION INTRAVENOUS; SUBCUTANEOUS at 20:35

## 2023-09-20 RX ADMIN — MICONAZOLE NITRATE 1 APPLICATION: 20 POWDER TOPICAL at 20:35

## 2023-09-20 RX ADMIN — Medication 10 ML: at 20:35

## 2023-09-20 RX ADMIN — FOLIC ACID 1 MG: 1 TABLET ORAL at 09:10

## 2023-09-20 RX ADMIN — VANCOMYCIN HYDROCHLORIDE 125 MG: KIT at 11:01

## 2023-09-20 NOTE — DISCHARGE PLACEMENT REQUEST
"Debbie Velasco (73 y.o. Male)       Date of Birth   1950    Social Security Number       Address   [address] [place], [place] [address]    Home Phone   [phone]    MRN   [id number]       [facility]   [facility]    Marital Status                               Admission Date   9/11/23    Admission Type   Emergency    Admitting Provider   Fredrick Ni MD    Attending Provider   Fredrick Ni MD    Department, Room/Bed   [address], [facility]/1       Discharge Date       Discharge Disposition       Discharge Destination                                 Attending Provider: Fredrick Ni MD    Allergies: Amoxicillin, Albuterol, Asmanex (120 Metered Doses) [Mometasone Furoate], Gabapentin, Lortab [Hydrocodone-acetaminophen], Morphine And Related, Prilosec [Omeprazole], Sodium Clavulanate, Statins, Sulfa Antibiotics, Symbicort [Budesonide-formoterol Fumarate]    Isolation: Spore   Infection: C.difficile (09/07/23)   Code Status: CPR    Ht: 165.1 cm (65\")   Wt: 52.3 kg (115 lb 4.8 oz)    Admission Cmt: None   Principal Problem: Hallucinations [R44.3]                   Active Insurance as of 9/11/2023       Primary Coverage       Payor Plan Insurance Group Employer/Plan Group    [facility] [place] [id number]       Payor Plan Address Payor Plan Phone Number Payor Plan Fax Number Effective Dates    [facility] OFFICE OF COMMUNITY CARE [phone]  2/24/2000 - None Entered    [address] [address]       [facility] [address]         Subscriber Name Subscriber Birth Date Member ID       DEBBIE VELASCO 1950 [id number]                     Emergency Contacts        (Rel.) Home Phone Work Phone Mobile Phone    Annette Harvey (Significant Other) [phone] -- [phone]          [address] [address]  Phone:  [phone]  Fax:  [phone] Date: Sep 20, 2023      Ambulatory " Referral to Home Health (Hospital)     Patient:  Brian Garcia MRN:  [id number]   [address]  [place] [address] :  1950  SSN:    Phone: [phone] Sex:  M      INSURANCE PAYOR PLAN GROUP # SUBSCRIBER ID   Primary:    [facility] [id number]      Referring Provider Information:  MARY KIM Phone: [phone] Fax: [phone]       Referral Information:   # Visits:  999 Referral Type: Home Health [42]   Urgency:  Routine Referral Reason: Specialty Services Required   Start Date: Sep 20, 2023 End Date:  To be determined by Insurer   Diagnosis: Malignant neoplasm of overlapping sites of right lung (C34.81 [ICD-10-CM] 162.8 [ICD-9-CM])      Refer to Dept:   Refer to Provider:   Refer to Provider Phone:   Refer to Facility:       Face to Face Visit Date: 2023  Follow-up provider for Plan of Care? I treated the patient in an acute care facility and will not continue treatment after discharge.  Follow-up provider: JAGDISH REES [[id number]]  Reason/Clinical Findings: failure to thrive  Describe mobility limitations that make leaving home difficult: unstable gait  Nursing/Therapeutic Services Requested: Physical Therapy  Nursing/Therapeutic Services Requested: Occupational Therapy  Nursing/Therapeutic Services Requested: Speech Therapy  PT orders: Therapeutic exercise  PT orders: Gait Training  PT orders: Transfer training  PT orders: Strengthening  Weight Bearing Status: As Tolerated  Occupational orders: Activities of daily living  SLP orders: Dysphagia therapy  SLP orders: Language  SLP orders: Cognition  Frequency: 1 Week 1     This document serves as a request of services and does not constitute Insurance authorization or approval of services.  To determine eligibility, please contact the members Insurance carrier to verify and review coverage.     If you have medical questions regarding this request for services. Please contact [facility]  [address] at [phone] during normal business hours.        Authorizing Provider:Fredrick Ni MD  Authorizing Provider's NPI: [id number]  Order Entered By: Fredrick Ni MD 9/20/2023 10:11 AM     Electronically signed by: Fredrick Ni MD 9/20/2023 10:11 AM       Insurance Information                  [employer] DE[employer] [facility] Phone: [phone]    Subscriber: Radha Brian Sanjeev Subscriber#: [id number]    Group#: [id number] Precert#: --             History & Physical        Fredrick Ni MD at 09/11/23 1835                [facility] Medicine Admission      Date of Admission: 9/11/2023      Primary Care Physician: Marina Kitchen MD      Chief Complaint: change in mental status    HPI:73-year-old male with COPD, history of stroke, stage IV lung cancer on Keytruda, recurrent C. difficile infection, presented after developing fever and increased diarrhea in setting of recurrent C. difficile colitis. Pulmonary embolism diagnosed in 2015. Hypertension. Metastatic lung adenocarcinoma with pulmonary metastasis for which patient is currently on treatment with Keytruda. Iron deficiency anemia. History of recurrent C. Difficile Was discharged 4 days ago from [facility] after Clostridium difficile colitis, on vanco PO, no diarrhea, his friend Annette Hui refers staying in bed, not eating, and confused, now oriented.  Past Medical History:   Diagnosis Date    A-fib     Anesthesia     hip surgery (spinal caused him unpleasant sensations never wants it again)    Arthritis     Blood in sputum 02/22/2023    bright red blood, teaspoon - tablespoon daily    COPD (chronic obstructive pulmonary disease)     CVA (cerebral vascular accident) 12/24/2022    Hearing aid worn     bilateral    History of pulmonary embolism     History of recurrent pneumonia     Hypertension     Lung cancer 03/17/2023    Oxygen dependent     since @ age 65    Pulmonary nodules      Risk for falls     uses walker    Rotator cuff syndrome of right shoulder     Tachycardia     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 HTN/Eliquis    Wears glasses     readers    Wears glasses     reading      Past Surgical History:   Procedure Laterality Date    HIP ARTHROPLASTY Right     RIB BIOPSY Left 03/17/2023 11th    VENOUS ACCESS DEVICE (PORT) INSERTION N/A 3/30/2023    Procedure: MEDIPORT PLACEMENT          (C-ARM);  Surgeon: Lavon Cochran MD;  Location: [facility] OR;  Service: General;  Laterality: N/A;      Allergies   Allergen Reactions    Amoxicillin Anaphylaxis    Albuterol Irritability     Facial flushing and palpitations.    Asmanex (120 Metered Doses) [Mometasone Furoate] Unknown - Low Severity    Gabapentin Hallucinations    Lortab [Hydrocodone-Acetaminophen] Other (See Comments)     Can't remember    Morphine And Related Hallucinations    Prilosec [Omeprazole] Other (See Comments)     unknown    Sodium Clavulanate Unknown - High Severity    Statins GI Intolerance    Sulfa Antibiotics Other (See Comments)     Can't remember also more and can't remember    Symbicort [Budesonide-Formoterol Fumarate] Unknown (See Comments)     Doesn't remember reaction type      Social History     Socioeconomic History    Marital status:     Number of children: 3    Highest education level: Associate degree: occupational, technical, or vocational program   Tobacco Use    Smoking status: Former     Packs/day: 3.00     Years: 47.00     Pack years: 141.00     Types: Cigarettes     Start date: 1961     Quit date: 5/1/2008     Years since quitting: 15.3    Smokeless tobacco: Never   Vaping Use    Vaping Use: Never used   Substance and Sexual Activity    Alcohol use: Not Currently     Comment: not last 40 years    Drug use: Never    Sexual activity: Not Currently     Partners: Female      Family History   Problem Relation Age of Onset    Cancer Sister     Brain cancer Brother     Cancer Brother      No Known Problems Daughter     No Known Problems Daughter     No Known Problems Son         Concurrent Medical History:  has a past medical history of A-fib, Anesthesia, Arthritis, Blood in sputum (02/22/2023), COPD (chronic obstructive pulmonary disease), CVA (cerebral vascular accident) (12/24/2022), Hearing aid worn, History of pulmonary embolism, History of recurrent pneumonia, Hypertension, Lung cancer (03/17/2023), Oxygen dependent, Pulmonary nodules, Risk for falls, Rotator cuff syndrome of right shoulder, Tachycardia, Traumatic hemorrhage of cerebrum (12/24/2022), Wears glasses, and Wears glasses.    Past Surgical History:  has a past surgical history that includes Hip Arthroplasty (Right); Rib biopsy (Left, 03/17/2023); and Venous Access Device (Port) (N/A, 3/30/2023).    Family History: family history includes Brain cancer in his brother; Cancer in his brother and sister; No Known Problems in his daughter, daughter, and son.     Social History:  reports that he quit smoking about 15 years ago. His smoking use included cigarettes. He started smoking about 62 years ago. He has a 141.00 pack-year smoking history. He has never used smokeless tobacco. He reports that he does not currently use alcohol. He reports that he does not use drugs.    Allergies:   Allergies   Allergen Reactions    Amoxicillin Anaphylaxis    Albuterol Irritability     Facial flushing and palpitations.    Asmanex (120 Metered Doses) [Mometasone Furoate] Unknown - Low Severity    Gabapentin Hallucinations    Lortab [Hydrocodone-Acetaminophen] Other (See Comments)     Can't remember    Morphine And Related Hallucinations    Prilosec [Omeprazole] Other (See Comments)     unknown    Sodium Clavulanate Unknown - High Severity    Statins GI Intolerance    Sulfa Antibiotics Other (See Comments)     Can't remember also more and can't remember    Symbicort [Budesonide-Formoterol Fumarate] Unknown (See Comments)     Doesn't remember reaction type        Medications:   Prior to Admission medications    Medication Sig Start Date End Date Taking? Authorizing Provider   cholecalciferol (VITAMIN D3) 10 MCG (400 UNIT) tablet Take 1 tablet by mouth Daily. 50 mcg daily   Yes Qamar Javed MD   dilTIAZem CD (CARDIZEM CD) 240 MG 24 hr capsule Take 1 capsule by mouth Daily. 5/18/23  Yes Neville Meeks MD   folic acid (FOLVITE) 1 MG tablet Take 1 tablet by mouth Daily. 2/11/23  Yes Omega Davis MD   furosemide (LASIX) 20 MG tablet Take 1 tablet by mouth Daily As Needed (Swelling of lower extremity). 4/17/23  Yes Omega Davis MD   ipratropium (ATROVENT HFA) 17 MCG/ACT inhaler Inhale 2 puffs 4 (Four) Times a Day As Needed for Wheezing.   Yes ProviderQamar MD   KLOR-CON 20 MEQ CR tablet 1 tablet Daily. 5/13/23  Yes ProviderQamar MD   melatonin 5 MG tablet tablet Take 1 tablet by mouth At Night As Needed.   Yes ProviderQamar MD   mirtazapine (REMERON) 15 MG tablet Take 1 tablet by mouth Every Night.   Yes Provider, MD Qamar   montelukast (SINGULAIR) 10 MG tablet Take 1 tablet by mouth Every Night.   Yes ProviderQamar MD   ondansetron (ZOFRAN) 8 MG tablet Take 1 tablet by mouth 3 (Three) Times a Day As Needed for Nausea or Vomiting. 3/27/23  Yes Omega Davis MD   predniSONE (DELTASONE) 5 MG tablet Take 1 tablet by mouth Every Other Day.   Yes ProviderQamar MD   tiotropium bromide-olodaterol (Stiolto Respimat) 2.5-2.5 MCG/ACT aerosol solution inhaler Inhale 2 puffs Daily. 7/19/23  Yes Clotilde Washington DO   TiZANidine (ZANAFLEX) 2 MG capsule Take 1 capsule by mouth At Night As Needed for Muscle Spasms.   Yes Qamar Javed MD   vancomycin (VANCOCIN) 125 MG capsule Take 1 capsule by mouth 4 (Four) Times a Day for 90 days. 9/8/23 12/7/23 Yes Quinn Richardson MD   dilTIAZem XR (DILACOR XR) 180 MG 24 hr capsule Take 1 capsule by mouth Daily. 6/30/22 10/7/22  Neville Meeks MD     I have utilized all  available immediate resources to obtain, update, or review the patient's current medications (including all prescriptions, over-the-counter products, herbals, cannabis/cannabidiol products, and vitamin/mineral/dietary (nutritional) supplements).      Review of Systems:  Review of Systems   Constitutional:  Negative for activity change, appetite change, chills, diaphoresis and fatigue.   HENT:  Negative for congestion, ear discharge, hearing loss, rhinorrhea, sinus pain, sneezing and sore throat.    Eyes:  Negative for photophobia, discharge and visual disturbance.   Respiratory:  Negative for cough, chest tightness, shortness of breath and wheezing.    Cardiovascular:  Negative for chest pain and palpitations.   Gastrointestinal:  Negative for abdominal distention, abdominal pain, blood in stool, diarrhea, nausea and vomiting.   Endocrine: Negative for cold intolerance, heat intolerance, polydipsia, polyphagia and polyuria.   Genitourinary:  Negative for dysuria, flank pain, hematuria and urgency.   Musculoskeletal:  Negative for arthralgias, joint swelling and myalgias.   Skin:  Negative for color change.   Allergic/Immunologic: Negative for food allergies.   Neurological:  Negative for dizziness, seizures, syncope, speech difficulty, weakness and headaches.   Hematological:  Negative for adenopathy. Does not bruise/bleed easily.   Psychiatric/Behavioral:  Negative for confusion, hallucinations, self-injury and suicidal ideas. The patient is not nervous/anxious.     Otherwise complete ROS is negative except as mentioned above.    Physical Exam:   Temp:  [98.1 °F (36.7 °C)] 98.1 °F (36.7 °C)  Heart Rate:  [80-98] 80  Resp:  [20] 20  BP: (112-132)/(57-59) 132/59  Physical Exam  Constitutional:       Appearance: He is ill-appearing.   HENT:      Head: Normocephalic and atraumatic.      Right Ear: Tympanic membrane normal.      Left Ear: Tympanic membrane normal.      Nose: Nose normal.      Mouth/Throat:      Mouth:  Mucous membranes are dry.   Eyes:      Pupils: Pupils are equal, round, and reactive to light.   Cardiovascular:      Rate and Rhythm: Normal rate and regular rhythm.      Pulses: Normal pulses.      Heart sounds: Normal heart sounds.   Pulmonary:      Effort: Pulmonary effort is normal.      Breath sounds: Normal breath sounds.   Abdominal:      General: Abdomen is flat. Bowel sounds are normal.      Palpations: Abdomen is soft.   Musculoskeletal:         General: Normal range of motion.      Cervical back: Normal range of motion and neck supple.      Comments: sarcopenia   Skin:     General: Skin is warm and dry.      Capillary Refill: Capillary refill takes less than 2 seconds.   Neurological:      General: No focal deficit present.      Mental Status: He is alert and oriented to person, place, and time.   Psychiatric:         Mood and Affect: Mood normal.         Behavior: Behavior normal.         Thought Content: Thought content normal.         Judgment: Judgment normal.         Results Reviewed:  I have personally reviewed current lab, radiology, and data and agree with results.  Lab Results (last 24 hours)       Procedure Component Value Units Date/Time    Urinalysis, Microscopic Only - Urine, Clean Catch [[id number]]  (Abnormal) Collected: 09/11/23 1610    Specimen: Urine, Clean Catch Updated: 09/11/23 1805     RBC, UA 0-2 /HPF      WBC, UA 0-2 /HPF      Bacteria, UA None Seen /HPF      Squamous Epithelial Cells, UA 0-2 /HPF      Hyaline Casts, UA 3-6 /LPF      Granular Casts, UA 3-6 /LPF      Methodology Manual Light Microscopy    Urinalysis With Microscopic If Indicated (No Culture) - Urine, Clean Catch [[id number]]  (Abnormal) Collected: 09/11/23 1610    Specimen: Urine, Clean Catch Updated: 09/11/23 1656     Color, UA Yellow     Appearance, UA Clear     pH, UA 5.5     Specific Gravity, UA 1.016     Glucose, UA Negative     Ketones, UA >=160 mg/dL (4+)     Bilirubin, UA Negative     Blood, UA Negative      Protein, UA 30 mg/dL (1+)     Leuk Esterase, UA Negative     Nitrite, UA Negative     Urobilinogen, UA 0.2 E.U./dL    Extra Tubes [[id number]] Collected: 09/11/23 1445    Specimen: Blood, Venous Line Updated: 09/11/23 1545    Narrative:      The following orders were created for panel order Extra Tubes.  Procedure                               Abnormality         Status                     ---------                               -----------         ------                     Lavender Top[[id number]]                                     Final result               Gold Top - SST[[id number]]                                   Final result               Quinones Top[[id number]]                                         In process                 Light Blue Top[[id number]]                                   Final result                 Please view results for these tests on the individual orders.    Lavender Top [[id number]] Collected: 09/11/23 1445    Specimen: Blood Updated: 09/11/23 1545     Extra Tube hold for add-on     Comment: Auto resulted       Gold Top - SST [[id number]] Collected: 09/11/23 1445    Specimen: Blood Updated: 09/11/23 1545     Extra Tube Hold for add-ons.     Comment: Auto resulted.       Light Blue Top [[id number]] Collected: 09/11/23 1445    Specimen: Blood Updated: 09/11/23 1545     Extra Tube Hold for add-ons.     Comment: Auto resulted       Comprehensive Metabolic Panel [[id number]]  (Abnormal) Collected: 09/11/23 1445    Specimen: Blood Updated: 09/11/23 1529     Glucose 63 mg/dL      BUN 4 mg/dL      Creatinine 0.53 mg/dL      Sodium 132 mmol/L      Potassium 4.1 mmol/L      Chloride 97 mmol/L      CO2 15.0 mmol/L      Calcium 8.9 mg/dL      Total Protein 7.6 g/dL      Albumin 3.1 g/dL      ALT (SGPT) 17 U/L      AST (SGOT) 19 U/L      Alkaline Phosphatase 73 U/L      Total Bilirubin 0.2 mg/dL      Globulin 4.5 gm/dL      A/G Ratio 0.7 g/dL      BUN/Creatinine Ratio 7.5     Anion Gap 20.0 mmol/L      eGFR  105.2 mL/min/1.73     Narrative:      GFR Normal >60  Chronic Kidney Disease <60  Kidney Failure <15    The GFR formula is only valid for adults with stable renal function between ages 18 and 70.    Magnesium [[id number]]  (Normal) Collected: 09/11/23 1445    Specimen: Blood Updated: 09/11/23 1529     Magnesium 1.9 mg/dL     CBC & Differential [[id number]]  (Abnormal) Collected: 09/11/23 1445    Specimen: Blood Updated: 09/11/23 1502    Narrative:      The following orders were created for panel order CBC & Differential.  Procedure                               Abnormality         Status                     ---------                               -----------         ------                     CBC Auto Differential[[id number]]        Abnormal            Final result                 Please view results for these tests on the individual orders.    CBC Auto Differential [[id number]]  (Abnormal) Collected: 09/11/23 1445    Specimen: Blood Updated: 09/11/23 1502     WBC 12.99 10*3/mm3      RBC 3.96 10*6/mm3      Hemoglobin 12.3 g/dL      Hematocrit 36.9 %      MCV 93.2 fL      MCH 31.1 pg      MCHC 33.3 g/dL      RDW 12.6 %      RDW-SD 43.3 fl      MPV 9.0 fL      Platelets 342 10*3/mm3      Neutrophil % 61.0 %      Lymphocyte % 12.9 %      Monocyte % 15.6 %      Eosinophil % 6.7 %      Basophil % 1.3 %      Immature Grans % 2.5 %      Neutrophils, Absolute 7.92 10*3/mm3      Lymphocytes, Absolute 1.68 10*3/mm3      Monocytes, Absolute 2.03 10*3/mm3      Eosinophils, Absolute 0.87 10*3/mm3      Basophils, Absolute 0.17 10*3/mm3      Immature Grans, Absolute 0.32 10*3/mm3      nRBC 0.0 /100 WBC     Quinones Top [[id number]] Collected: 09/11/23 1445    Specimen: Blood Updated: 09/11/23 1454    COVID-19 and FLU A/B PCR - Swab, Nasopharynx [[id number]]  (Normal) Collected: 09/11/23 1350    Specimen: Swab from Nasopharynx Updated: 09/11/23 1420     COVID19 Not Detected     Influenza A PCR Not Detected     Influenza B PCR Not Detected     Narrative:      Fact sheet for providers: https://www.fda.gov/media/817832/download    Fact sheet for patients: https://www.fda.gov/media/408971/download    Test performed by PCR.          Imaging Results (Last 24 Hours)       Procedure Component Value Units Date/Time    CT Chest Without Contrast Diagnostic [[id number]] Resulted: 09/11/23 1829     Updated: 09/11/23 1830    CT Abdomen Without Contrast [[id number]] Resulted: 09/11/23 1829     Updated: 09/11/23 1830    XR Chest 1 View [[id number]] Collected: 09/11/23 1411     Updated: 09/11/23 1633    Narrative:      INDICATION:  Fatigue.    FINDINGS:  Single view of the chest demonstrates advanced features of interstitial lung  disease.  Port-A-Cath is seen on the right.  No acute findings are suspected.      CT Head Without Contrast [[id number]] Collected: 09/11/23 1410     Updated: 09/11/23 1633    Narrative:      INDICATION:  Altered mental status.    FINDINGS:  Patient is slanted within the gantry. No definite focal abnormal densities are  seen. Age-related changes present.    SUMMARY:  No acute findings.              Assessment:    Active Hospital Problems    Diagnosis     **Hallucinations              Plan:73 years old male patient with known history COPD, history of stroke, stage IV lung cancer on Keytruda, recurrent C. difficile infection, presented after developing fever and increased diarrhea in setting of recurrent C. difficile colitis. Pulmonary embolism diagnosed in 2015. Hypertension. Metastatic lung adenocarcinoma with pulmonary metastasis for which patient is currently on treatment with Keytruda. Iron deficiency anemia. Came in with change in mental status, dry mucosa, high WBC, will rule out infection, IVF, observation status. CT chest, abdomen and pelvis, blood cultures.  1.change in mental status    Medical Decision Making  Number and Complexity of problems: 1  Differential Diagnosis: sepsis vs dehydration    Conditions and Status:        Condition is  unchanged.     [facility] Data  External documents reviewed: previous medical records  My EKG interpretation: none  My plain film interpretation: chest interstitial lung disease  Tests considered but not ordered: none     Decision rules/scores evaluated (example [id number], Wells, etc): n/a     Discussed with: patient and other significant Annette Hui     Treatment Plan  See above    Care Planning  Shared decision making: the patient  Code status and discussions: full code    Disposition  Social Determinants of Health that impact treatment or disposition: none  I expect the patient to be discharged to home in 2 days.      I confirmed that the patient's Advance Care Plan is present, code status is documented, or surrogate decision maker is listed in the patient's medical record.    I discussed the patient's findings and my recommendations with: the patient      This document has been electronically signed by Fredrick Ni MD on September 11, 2023 18:35 CDT                  Electronically signed by Fredrick Ni MD at 09/11/23 0878

## 2023-09-20 NOTE — PLAN OF CARE
Goal Outcome Evaluation:  Plan of Care Reviewed With: patient        Progress: no change  Outcome Evaluation: Pt states he is tired and sleepy, however agrees to UE exercises. Pt states he aready cleaned up today. Pt completed B UE exercises with RBs as needed.  All needs within reach. Pt would benefit from continued OT services.      Anticipated Discharge Disposition (OT): skilled nursing facility, inpatient rehabilitation facility

## 2023-09-20 NOTE — THERAPY TREATMENT NOTE
Patient Name: Brian Garcia  : 1950    MRN: [id number]                              Today's Date: 2023       Admit Date: 2023    Visit Dx:     ICD-10-CM ICD-9-CM   1. Hallucinations  R44.3 780.1   2. Fatigue, unspecified type  R53.83 780.79   3. Impaired functional mobility, balance, gait, and endurance [Z74.09 (ICD-10-CM)]  Z74.09 V49.89   4. Impaired mobility and ADLs [Z74.09, Z78.9 (ICD-10-CM)]  Z74.09 V49.89    Z78.9    5. Malignant neoplasm of overlapping sites of right lung  C34.81 162.8     Patient Active Problem List   Diagnosis    Physical deconditioning    Leukocytosis    History of pulmonary embolism    Stage 4 very severe COPD by GOLD classification    Chronic respiratory failure with hypoxia, on home O2 therapy    Hypertension    CVA (cerebral vascular accident)    Traumatic hemorrhage of cerebrum    Iron deficiency anemia secondary to inadequate dietary iron intake    Acute pain of right shoulder    Limited range of motion (ROM) of shoulder    Rotator cuff syndrome of right shoulder    Impingement syndrome of right shoulder    Arthrosis of right acromioclavicular joint    Chronic right shoulder pain    Nontraumatic incomplete tear of right rotator cuff    Primary osteoarthritis of right shoulder    Malignant neoplasm of overlapping sites of right lung    Primary lung adenocarcinoma    Encounter for venous access device care    Iron malabsorption    Hypokalemia    C. difficile colitis    Hallucinations    Severe malnutrition    History of CVA (cerebrovascular accident)    Dementia without behavioral disturbance     Past Medical History:   Diagnosis Date    A-fib     Anesthesia     hip surgery (spinal caused him unpleasant sensations never wants it again)    Arthritis     Blood in sputum 2023    bright red blood, teaspoon - tablespoon daily    COPD (chronic obstructive pulmonary disease)     CVA (cerebral vascular accident) 2022    Hearing aid worn     bilateral     History of pulmonary embolism     History of recurrent pneumonia     Hypertension     Lung cancer 03/17/2023    Oxygen dependent     since @ age 65    Pulmonary nodules     Risk for falls     uses walker    Rotator cuff syndrome of right shoulder     Tachycardia     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 HTN/Eliquis    Wears glasses     readers    Wears glasses     reading     Past Surgical History:   Procedure Laterality Date    HIP ARTHROPLASTY Right     RIB BIOPSY Left 03/17/2023    11th    VENOUS ACCESS DEVICE (PORT) INSERTION N/A 3/30/2023    Procedure: MEDIPORT PLACEMENT          (C-ARM);  Surgeon: Lavon Cochran MD;  Location: [facility];  Service: General;  Laterality: N/A;      General Information       Row Name 09/20/23 1250          OT Time and Intention    Document Type therapy note (daily note)  -BB     Mode of Treatment individual therapy;occupational therapy  -BB       Row Name 09/20/23 1250          General Information    Patient Profile Reviewed yes  -BB     Existing Precautions/Restrictions fall;oxygen therapy device and L/min  -BB       Row Name 09/20/23 1250          Cognition    Orientation Status (Cognition) oriented x 4  -BB       Row Name 09/20/23 1250          Safety Issues, Functional Mobility    Impairments Affecting Function (Mobility) balance;endurance/activity tolerance;shortness of breath;strength  -BB               User Key  (r) = Recorded By, (t) = Taken By, (c) = Cosigned By      Initials Name Provider Type    BB Lisa José COTA Occupational Therapist Assistant                     Mobility/ADL's       Row Name 09/20/23 1250          Bed Mobility    Bed Mobility bed mobility (all) activities  -BB     All Activities, [place] (Bed Mobility) independent  -BB     Scooting/Bridging [place] (Bed Mobility) supervision  -BB     Supine-Sit [place] (Bed Mobility) modified independence  -BB     Sit-Supine [place] (Bed Mobility) modified  independence  -BB     Assistive Device (Bed Mobility) bed rails;head of bed elevated  -BB       [facility] Name 09/20/23 1250          Transfers    Transfers sit-stand transfer;stand-sit transfer;toilet transfer  -[facility] Name 09/20/23 1250          Sit-Stand Transfer    Assistive Device (Sit-Stand Transfers) other (see comments)  no AD  -BB       [facility] Name 09/20/23 1250          Toilet Transfer    Type (Toilet Transfer) sit-stand;stand-sit  -BB     Assistive Device (Toilet Transfer) commode, bedside with drop arms  -BB       Row Name 09/20/23 1250          Functional Mobility    Functional Mobility- Ind. Level contact guard assist  -[facility] Name 09/20/23 1250          Grooming Assessment/Training    [place] Level (Grooming) hair care, combing/brushing;wash face, hands;modified independence;oral care regimen  -BB     Position (Grooming) edge of bed sitting  -BB               User Key  (r) = Recorded By, (t) = Taken By, (c) = Cosigned By      Initials Name Provider Type    BB Lisa José COTA Occupational Therapist Assistant                   Obj/Interventions       [facility] Name 09/20/23 1250          Range of Motion Comprehensive    General Range of Motion bilateral lower extremity ROM WFL  -[facility] Name 09/20/23 1250          Strength Comprehensive (MMT)    General Manual Muscle Testing (MMT) Assessment other (see comments)  -[facility] Name 09/20/23 1250          Shoulder (Therapeutic Exercise)    Shoulder AROM (Therapeutic Exercise) flexion;extension;bilateral;external rotation;internal rotation;15 repititions;2 sets;sitting  chest press  -[facility] Name 09/20/23 1250          Elbow/Forearm (Therapeutic Exercise)    Elbow/Forearm AROM (Therapeutic Exercise) bilateral;flexion;extension;supination;pronation;sitting;15 repititions;2 sets  -[facility] Name 09/20/23 1250          Wrist (Therapeutic Exercise)    Wrist AROM (Therapeutic Exercise) bilateral;flexion;extension;15 repititions  -                User Key  (r) = Recorded By, (t) = Taken By, (c) = Cosigned By      Initials Name Provider Type    Lisa Ricardo COTA Occupational Therapist Assistant                   Goals/Plan       Row Name 09/20/23 1250          Transfer Goal 1 (OT)    Activity/Assistive Device (Transfer Goal 1, OT) transfers, all  -BB     [place] Level/Cues Needed (Transfer Goal 1, OT) modified independence  -BB     Time Frame (Transfer Goal 1, OT) long term goal (LTG);by discharge  -BB     Progress/Outcome (Transfer Goal 1, OT) goal not met  -BB       Row Name 09/20/23 1250          Bathing Goal 1 (OT)    Activity/Device (Bathing Goal 1, OT) lower body bathing  -BB     [place] Level/Cues Needed (Bathing Goal 1, OT) modified independence  -BB     Time Frame (Bathing Goal 1, OT) long term goal (LTG);by discharge  -BB     Progress/Outcomes (Bathing Goal 1, OT) goal not met  -BB       Row Name 09/20/23 1250          Dressing Goal 1 (OT)    Activity/Device (Dressing Goal 1, OT) lower body dressing  -BB     [place]/Cues Needed (Dressing Goal 1, OT) modified independence  -BB     Time Frame (Dressing Goal 1, OT) long term goal (LTG);by discharge  -BB     Progress/Outcome (Dressing Goal 1, OT) goal not met  -BB               User Key  (r) = Recorded By, (t) = Taken By, (c) = Cosigned By      Initials Name Provider Type    Lisa Ricardo COTA Occupational Therapist Assistant                   Clinical Impression       Row Name 09/20/23 1250          Pain Assessment    Pretreatment Pain Rating 0/10 - no pain  -BB     Posttreatment Pain Rating 0/10 - no pain  -BB       Row Name 09/20/23 1250          Plan of Care Review    Plan of Care Reviewed With patient  -MARCELL     Progress no change  -BB     Outcome Evaluation Pt states he is tired and sleepy, however agrees to UE exercises. Pt states he aready cleaned up today. Pt completed B UE exercises with RBs as needed.  All needs within reach. Pt would benefit  from continued OT services.  -BB       Row Name 09/20/23 1250          Therapy Assessment/Plan (OT)    Rehab Potential (OT) good, to achieve stated therapy goals  -BB     Criteria for Skilled Therapeutic Interventions Met (OT) yes;skilled treatment is necessary  -BB     Therapy Frequency (OT) other (see comments)  5-7 d/wk  -BB       Row Name 09/20/23 1250          Therapy Plan Review/Discharge Plan (OT)    Anticipated Discharge Disposition (OT) skilled nursing facility;inpatient rehabilitation facility  -BB       Row Name 09/20/23 1250          Vital Signs    Pretreatment Heart Rate (beats/min) 86  -BB     Pre SpO2 (%) 96  -BB     O2 Delivery Pre Treatment supplemental O2  -BB     Pre Patient Position Supine  -BB       Row Name 09/20/23 1250          Positioning and Restraints    Pre-Treatment Position in bed  -BB     Post Treatment Position bed  -BB     In Bed fowlers;call light within reach;encouraged to call for assist;exit alarm on  -BB               User Key  (r) = Recorded By, (t) = Taken By, (c) = Cosigned By      Initials Name Provider Type    BB Lisa José COTA Occupational Therapist Assistant                   Outcome Measures       Row Name 09/20/23 1250          How much help from another is currently needed...    Putting on and taking off regular lower body clothing? 3  -BB     Bathing (including washing, rinsing, and drying) 3  -BB     Toileting (which includes using toilet bed pan or urinal) 3  -BB     Putting on and taking off regular upper body clothing 3  -BB     Taking care of personal grooming (such as brushing teeth) 4  -BB     Eating meals 4  -BB     AM-PAC 6 Clicks Score (OT) 20  -BB       Row Name 09/20/23 0900          How much help from another person do you currently need...    Turning from your back to your side while in flat bed without using bedrails? 4  -BM     Moving from lying on back to sitting on the side of a flat bed without bedrails? 4  -BM     Moving to and from a  bed to a chair (including a wheelchair)? 4  -BM     Standing up from a chair using your arms (e.g., wheelchair, bedside chair)? 4  -BM     Climbing 3-5 steps with a railing? 3  -BM     To walk in hospital room? 3  -BM     AM-PAC 6 Clicks Score (PT) 22  -BM     Highest level of mobility 7 --> Walked 25 feet or more  -BM               User Key  (r) = Recorded By, (t) = Taken By, (c) = Cosigned By      Initials Name Provider Type    Lisa Ricardo COTA Occupational Therapist Assistant    BM Alyssa Alfonso, RN Registered Nurse                    Occupational Therapy Education       Title: PT OT SLP Therapies (In Progress)       Topic: Occupational Therapy (In Progress)       Point: ADL training (Done)       Description:   Instruct learner(s) on proper safety adaptation and remediation techniques during self care or transfers.   Instruct in proper use of assistive devices.                  Learning Progress Summary             Patient Acceptance, E,TB, VU by BB at 9/20/2023 1452    Acceptance, E,TB, VU by BB at 9/19/2023 1533    Acceptance, E,TB, VU by BB at 9/18/2023 1609    Acceptance, E,TB, NR by RW at 9/12/2023 1555    Comment: POC, Role of OT, transfer training                         Point: Home exercise program (Not Started)       Description:   Instruct learner(s) on appropriate technique for monitoring, assisting and/or progressing therapeutic exercises/activities.                  Learner Progress:  Not documented in this visit.              Point: Precautions (Done)       Description:   Instruct learner(s) on prescribed precautions during self-care and functional transfers.                  Learning Progress Summary             Patient Acceptance, E,D, NR,VU by RB at 9/14/2023 1343    Comment: Edu pt on use of gait belt and non skid socks when OOB and no OOB without assist.    Acceptance, E,TB, NR by RW at 9/12/2023 1555    Comment: POC, Role of OT, transfer training                         Point:  Body mechanics (Done)       Description:   Instruct learner(s) on proper positioning and spine alignment during self-care, functional mobility activities and/or exercises.                  Learning Progress Summary             Patient Acceptance, E,TB, VU by BB at 9/20/2023 1452    Acceptance, E,TB, VU by BB at 9/19/2023 1533    Acceptance, E,TB, VU by BB at 9/18/2023 1609    Acceptance, E,TB, NR by RW at 9/12/2023 1555    Comment: POC, Role of OT, transfer training                                         User Key       Initials Effective Dates Name Provider Type Discipline    RB 07/11/23 -  Abhi Chang, OT Occupational Therapist OT    BB 06/16/21 -  Lisa José COTA Occupational Therapist Assistant OT    RW 09/22/22 -  Mildred Joe OT Occupational Therapist OT                  OT Recommendation and Plan  Therapy Frequency (OT): other (see comments) (5-7 d/wk)  Plan of Care Review  Plan of Care Reviewed With: patient  Progress: no change  Outcome Evaluation: Pt states he is tired and sleepy, however agrees to UE exercises. Pt states he aready cleaned up today. Pt completed B UE exercises with RBs as needed.  All needs within reach. Pt would benefit from continued OT services.     Time Calculation:         Time Calculation- OT       Row Name 09/20/23 1453             Time Calculation- OT    OT Start Time 1250  -BB      OT Stop Time 1329  -BB      OT Time Calculation (min) 39 min  -BB      Total Timed Code Minutes- OT 39 minute(s)  -BB      OT Received On 09/20/23  -BB         Timed Charges    17339 - OT Therapeutic Exercise Minutes 24  -BB      75885 - OT Self Care/Mgmt Minutes 15  -BB         Total Minutes    Timed Charges Total Minutes 39  -BB       Total Minutes 39  -BB                User Key  (r) = Recorded By, (t) = Taken By, (c) = Cosigned By      Initials Name Provider Type    BB Lisa José COTA Occupational Therapist Assistant                  Therapy Charges for Today       Code  Description Service Date Service Provider Modifiers Qty    93834736029 HC OT THER PROC EA 15 MIN 9/19/2023 Lisa José COTA GO 2    21257883246 HC OT SELF CARE/MGMT/TRAIN EA 15 MIN 9/19/2023 Lisa José COTA GO 2    71232090061 HC OT THER PROC EA 15 MIN 9/20/2023 Lisa José COTA GO 2    56088839143 HC OT SELF CARE/MGMT/TRAIN EA 15 MIN 9/20/2023 Lisa José COTA GO 1                 ALEXANDER Dasilva  9/20/2023

## 2023-09-20 NOTE — PLAN OF CARE
Goal Outcome Evaluation:  Plan of Care Reviewed With: patient, significant other        Progress: improving  Outcome Evaluation: patient is pleasant and agreeable to PT treatment this afternoon. gives good effort throughout. vitals stable. he remains independent with all aspects of bed mobility. he is independent with sit to stand. he is SBA to independent with bed to chair transfer. he is SBA to independent with gait within room. he does have a festination with his gait. needs cues for energy conservation. first trip of 108 feet patient's O2 drops to 89% but recovers to 94% within 1 minute. second trip of 72 feet patient's O2 drops to 87% but recovers to 91% within 15 seconds. patient is repositioned in bed with all needs met post treatment.      Anticipated Discharge Disposition (PT): home with assist, home with home health

## 2023-09-20 NOTE — DISCHARGE PLACEMENT REQUEST
"RadhaBrian Sanjeev (73 y.o. Male)       Date of Birth   1950    Social Security Number       Address   [address] [place] [address]    Home Phone   [phone]    MRN   [id number]       [employer]   [facility]    Marital Status                               Admission Date   9/11/23    Admission Type   Emergency    Admitting Provider   Fredrick Ni MD    Attending Provider   Fredrick Ni MD    Department, Room/Bed   [address], [facility]/1       Discharge Date       Discharge Disposition       Discharge Destination                                 Attending Provider: Fredrick Ni MD    Allergies: Amoxicillin, Albuterol, Asmanex (120 Metered Doses) [Mometasone Furoate], Gabapentin, Lortab [Hydrocodone-acetaminophen], Morphine And Related, Prilosec [Omeprazole], Sodium Clavulanate, Statins, Sulfa Antibiotics, Symbicort [Budesonide-formoterol Fumarate]    Isolation: Spore   Infection: C.difficile (09/07/23)   Code Status: CPR    Ht: 165.1 cm (65\")   Wt: 52.3 kg (115 lb 4.8 oz)    Admission Cmt: None   Principal Problem: Hallucinations [R44.3]                   Active Insurance as of 9/11/2023       Primary Coverage       Payor Plan Insurance Group Employer/Plan Group    [employer] [place] [id number]       Payor Plan Address Payor Plan Phone Number Payor Plan Fax Number Effective Dates    [facility] OFFICE OF COMMUNITY CARE [phone]  2/24/2000 - None Entered    [address] [address]       [facility] [address]         Subscriber Name Subscriber Birth Date Member ID       BRIAN GARCIA 1950 [id number]                     Emergency Contacts        (Rel.) Home Phone Work Phone Mobile Phone    Annette Harvey (Significant Other) [phone] -- [phone]              Insurance Information                  [employer] [facility] Phone: [phone]    Subscriber: Brian Garcia Subscriber#: [id number]    " Group#: [id number] Precert#: --

## 2023-09-20 NOTE — PROGRESS NOTES
Adult Nutrition  Assessment/PES    Patient Name:  Brian Garcia  YOB: 1950  MRN: [id number]  Admit Date:  9/11/2023    Assessment Date:  9/20/2023    Comments:  This patient is on a heart healthy, soft to chew, chopped meats diet. His average meal intake over the last 7 days has been 75-85% of meals. Ht: 65 inches wt: 115.3, and his weight on 8/9/23 was 126. He is receiving magic cup two times daily, and he also receives boost plus three times daily mixed with half water as nutrition supplements. I visited this patient and encouraged him to increase his intake to maintain his weight. Relevant medications: folic acid, remeron Current labs: 9/19/23 Cl 94, C02 37, Hgb 9.8, Hct 31.0 Will follow up per protocol and prn.                       Problem/Interventions:   Problem 1       Row Name 09/20/23 1545          Nutrition Diagnoses Problem 1    Problem 1 Underweight     Etiology (related to) Medical Diagnosis     Oncology Lung cancer     Appetite Good     Signs/Symptoms (evidenced by) Unintended Weight Change     Unintended Weight Change Loss     Number of Pounds Lost 10.7     Weight loss time period 1 month                          Intervention Goal       Row Name 09/20/23 1547          Intervention Goal    General Maintain nutrition     PO Increase intake                    Nutrition Intervention       Row Name 09/20/23 1547          Nutrition Intervention    RD/Tech Action Encourage intake                    Nutrition Prescription       Row Name 09/20/23 1548          Nutrition Prescription PO    PO Prescription Other (comment)  Continue current diet, encouraged increased meal intake     Common Modifiers Cardiac                    Education/Evaluation       Row Name 09/20/23 1548          Monitor/Evaluation    Monitor PO intake;Supplement intake;Weight;Pertinent labs                     Electronically signed by:  Ralf Lynch RD  09/20/23 15:50 CDT

## 2023-09-20 NOTE — THERAPY TREATMENT NOTE
Patient Name: Brian Garcia  : 1950    MRN: [id number]                              Today's Date: 2023       Physical Therapy Treatment Note    Admit Date: 2023    Visit Dx:     ICD-10-CM ICD-9-CM   1. Hallucinations  R44.3 780.1   2. Fatigue, unspecified type  R53.83 780.79   3. Impaired functional mobility, balance, gait, and endurance [Z74.09 (ICD-10-CM)]  Z74.09 V49.89   4. Impaired mobility and ADLs [Z74.09, Z78.9 (ICD-10-CM)]  Z74.09 V49.89    Z78.9    5. Malignant neoplasm of overlapping sites of right lung  C34.81 162.8     Patient Active Problem List   Diagnosis    Physical deconditioning    Leukocytosis    History of pulmonary embolism    Stage 4 very severe COPD by GOLD classification    Chronic respiratory failure with hypoxia, on home O2 therapy    Hypertension    CVA (cerebral vascular accident)    Traumatic hemorrhage of cerebrum    Iron deficiency anemia secondary to inadequate dietary iron intake    Acute pain of right shoulder    Limited range of motion (ROM) of shoulder    Rotator cuff syndrome of right shoulder    Impingement syndrome of right shoulder    Arthrosis of right acromioclavicular joint    Chronic right shoulder pain    Nontraumatic incomplete tear of right rotator cuff    Primary osteoarthritis of right shoulder    Malignant neoplasm of overlapping sites of right lung    Primary lung adenocarcinoma    Encounter for venous access device care    Iron malabsorption    Hypokalemia    C. difficile colitis    Hallucinations    Severe malnutrition    History of CVA (cerebrovascular accident)    Dementia without behavioral disturbance     Past Medical History:   Diagnosis Date    A-fib     Anesthesia     hip surgery (spinal caused him unpleasant sensations never wants it again)    Arthritis     Blood in sputum 2023    bright red blood, teaspoon - tablespoon daily    COPD (chronic obstructive pulmonary disease)     CVA (cerebral vascular accident) 2022     Hearing aid worn     bilateral    History of pulmonary embolism     History of recurrent pneumonia     Hypertension     Lung cancer 03/17/2023    Oxygen dependent     since @ age 65    Pulmonary nodules     Risk for falls     uses walker    Rotator cuff syndrome of right shoulder     Tachycardia     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 HTN/Eliquis    Wears glasses     readers    Wears glasses     reading     Past Surgical History:   Procedure Laterality Date    HIP ARTHROPLASTY Right     RIB BIOPSY Left 03/17/2023    11th    VENOUS ACCESS DEVICE (PORT) INSERTION N/A 3/30/2023    Procedure: MEDIPORT PLACEMENT          (C-ARM);  Surgeon: Lavon Cochran MD;  Location: [facility];  Service: General;  Laterality: N/A;      General Information       Row Name 09/20/23 1501          Physical Therapy Time and Intention    Document Type therapy note (daily note)  -     Mode of Treatment individual therapy;physical therapy  -       Row Name 09/20/23 1501          General Information    Patient Profile Reviewed yes  -     Existing Precautions/Restrictions fall;oxygen therapy device and L/min  -       Row Name 09/20/23 1501          Cognition    Orientation Status (Cognition) oriented x 4  -       Row Name 09/20/23 1501          Safety Issues, Functional Mobility    Impairments Affecting Function (Mobility) balance;endurance/activity tolerance;shortness of breath;strength  -               User Key  (r) = Recorded By, (t) = Taken By, (c) = Cosigned By      Initials Name Provider Type     Breann Bennett PTA Physical Therapist Assistant                   Mobility       Row Name 09/20/23 1508          Bed Mobility    Bed Mobility bed mobility (all) activities  -     All Activities, [place] (Bed Mobility) independent  -     Comment, (Bed Mobility) bed flat and no bedrails  -       Row Name 09/20/23 1503          Bed-Chair Transfer    Bed-Chair [place] (Transfers)  independent;standby assist  -       Row Name 09/20/23 1505          Sit-Stand Transfer    Sit-Stand [place] (Transfers) independent  -       Row Name 09/20/23 1505          Gait/Stairs (Locomotion)    [place] Level (Gait) independent;standby assist  -     Distance in Feet (Gait) 108 feet with O2 dropping to 89% but recovers to 94% within 1 minute; second trip 76 feet with O2 dropping to 87% but recovers to 91% within 15 seconds  -     Deviations/Abnormal Patterns (Gait) festinating/shuffling;kimber decreased;stride length decreased;gait speed decreased  -               User Key  (r) = Recorded By, (t) = Taken By, (c) = Cosigned By      Initials Name Provider Type     Breann Bennett PTA Physical Therapist Assistant                   Obj/Interventions       Row Name 09/20/23 1541          Motor Skills    Therapeutic Exercise hip;knee;ankle  -       Row Name 09/20/23 1541          Hip (Therapeutic Exercise)    Hip Isometrics (Therapeutic Exercise) bilateral;aDduction;10 repetitions;2 sets;sitting;5 second hold  -     Hip Strengthening (Therapeutic Exercise) bilateral;marching while seated;sitting;20 repititions;5 second hold  -       Row Name 09/20/23 1541          Knee (Therapeutic Exercise)    Knee (Therapeutic Exercise) isometric exercises  -     Knee Isometrics (Therapeutic Exercise) bilateral;hamstring sets;10 repetitions;2 sets;5 second hold;sitting  -     Knee Strengthening (Therapeutic Exercise) bilateral;LAQ (long arc quad);20 repititions;5 second hold;sitting  -       Row Name 09/20/23 1541          Ankle (Therapeutic Exercise)    Ankle (Therapeutic Exercise) strengthening exercise  -     Ankle Strengthening (Therapeutic Exercise) bilateral;20 repititions;dorsiflexion;plantarflexion;sitting;5 second hold  -               User Key  (r) = Recorded By, (t) = Taken By, (c) = Cosigned By      Initials Name Provider Type     Breann Bennett PTA Physical Therapist Assistant                    Goals/Plan       Row Name 09/20/23 1547          Bed Mobility Goal 1 (PT)    Activity/Assistive Device (Bed Mobility Goal 1, PT) sit to supine/supine to sit  -     [place] Level/Cues Needed (Bed Mobility Goal 1, PT) independent  -MH     Time Frame (Bed Mobility Goal 1, PT) by discharge  -     Strategies/Barriers (Bed Mobility Goal 1, PT) HOB flat, no bed rails.  -     Progress/Outcomes (Bed Mobility Goal 1, PT) goal met   -[facility] Name 09/20/23 1547          Transfer Goal 1 (PT)    Activity/Assistive Device (Transfer Goal 1, PT) sit-to-stand/stand-to-sit;bed-to-chair/chair-to-bed;walker, rolling  -     [place] Level/Cues Needed (Transfer Goal 1, PT) modified independence  -MH     Time Frame (Transfer Goal 1, PT) by discharge  -     Progress/Outcome (Transfer Goal 1, PT) goal met   -MH       Row Name 09/20/23 1547          Gait Training Goal 1 (PT)    Activity/Assistive Device (Gait Training Goal 1, PT) increase endurance/gait distance;walker, rolling  -     [place] Level (Gait Training Goal 1, PT) modified independence  -     Distance (Gait Training Goal 1, PT) 150' X 1  -MH     Time Frame (Gait Training Goal 1, PT) by discharge  -     Progress/Outcome (Gait Training Goal 1, PT) goal not met;good progress toward goal  -MH       Row Name 09/20/23 1547          Problem Specific Goal 1 (PT)    Problem Specific Goal 1 (PT) Score 24/28 on Tinetti fall risk assessment to decrease fall risk.  -     Time Frame (Problem Specific Goal 1, PT) by discharge  -     Progress/Outcome (Problem Specific Goal 1, PT) goal not met  -               User Key  (r) = Recorded By, (t) = Taken By, (c) = Cosigned By      Initials Name Provider Type     Breann Bennett PTA Physical Therapist Assistant                   Clinical Impression       Row Name 09/20/23 1509          Pain    Pretreatment Pain Rating 0/10 - no pain  -     Posttreatment Pain Rating 0/10 - no pain  -        Row Name 09/20/23 150          Plan of Care Review    Plan of Care Reviewed With patient;significant other  -     Progress improving  -     Outcome Evaluation patient is pleasant and agreeable to PT treatment this afternoon. gives good effort throughout. vitals stable. he remains independent with all aspects of bed mobility. he is independent with sit to stand. he is SBA to independent with bed to chair transfer. he is SBA to independent with gait within room. he does have a festination with his gait. needs cues for energy conservation. first trip of 108 feet patient's O2 drops to 89% but recovers to 94% within 1 minute. second trip of 72 feet patient's O2 drops to 87% but recovers to 91% within 15 seconds. patient is repositioned in bed with all needs met post treatment.  -       Row Name 09/20/23 1504          Therapy Assessment/Plan (PT)    Rehab Potential (PT) fair, will monitor progress closely  -     Criteria for Skilled Interventions Met (PT) yes;skilled treatment is necessary  -     Therapy Frequency (PT) 5 times/wk  -       Row Name 09/20/23 1502          Vital Signs    Intra Systolic BP Rehab 116  -MH     Intra Treatment Diastolic BP 59  -MH     Pretreatment Heart Rate (beats/min) 82  -MH     Intratreatment Heart Rate (beats/min) 100  -MH     Pre SpO2 (%) 94  -MH     O2 Delivery Pre Treatment nasal cannula  3L  -MH     Intra SpO2 (%) 87  -MH     O2 Delivery Intra Treatment nasal cannula  3L  -MH     Post SpO2 (%) 94  -MH     O2 Delivery Post Treatment nasal cannula  3L  -MH     Pre Patient Position Supine  -MH     Intra Patient Position Sitting  -MH     Post Patient Position Sitting  -       Row Name 09/20/23 1506          Positioning and Restraints    Pre-Treatment Position in bed  -MH     Post Treatment Position bed  -MH     In Bed fowlers;call light within reach;encouraged to call for assist;exit alarm on;with family/caregiver;with other staff  -               User Key  (r) = Recorded  By, (t) = Taken By, (c) = Cosigned By      Initials Name Provider Type    Breann Heredia PTA Physical Therapist Assistant                   Outcome Measures       Row Name 09/20/23 1547 09/20/23 0900       How much help from another person do you currently need...    Turning from your back to your side while in flat bed without using bedrails? 4  - 4  -BM    Moving from lying on back to sitting on the side of a flat bed without bedrails? 4  - 4  -BM    Moving to and from a bed to a chair (including a wheelchair)? 4  - 4  -BM    Standing up from a chair using your arms (e.g., wheelchair, bedside chair)? 4  - 4  -BM    Climbing 3-5 steps with a railing? 4  - 3  -BM    To walk in hospital room? 4  - 3  -BM    AM-PAC 6 Clicks Score (PT) 24  - 22  -BM    Highest level of mobility 8 --> Walked 250 feet or more  - 7 --> Walked 25 feet or more  -BM              User Key  (r) = Recorded By, (t) = Taken By, (c) = Cosigned By      Initials Name Provider Type    Breann Heredia PTA Physical Therapist Assistant    Alyssa Mendez, RN Registered Nurse                                 Physical Therapy Education       Title: PT OT SLP Therapies (In Progress)       Topic: Physical Therapy (In Progress)       Point: Mobility training (In Progress)       Learning Progress Summary             Patient Acceptance, E, NR by HA at 9/12/2023 1528    Comment: PT POC and goals, proper hand placement to facilitate transfers, use of FWW to increase safety with ambulation.                         Point: Home exercise program (Not Started)       Learner Progress:  Not documented in this visit.              Point: Body mechanics (Not Started)       Learner Progress:  Not documented in this visit.              Point: Precautions (Not Started)       Learner Progress:  Not documented in this visit.                              User Key       Initials Effective Dates Name Provider Type Ricky BONNER 06/26/23 -   Britney Espinosa, PT Physical Therapist PT                  PT Recommendation and Plan     Plan of Care Reviewed With: patient, significant other  Progress: improving  Outcome Evaluation: patient is pleasant and agreeable to PT treatment this afternoon. gives good effort throughout. vitals stable. he remains independent with all aspects of bed mobility. he is independent with sit to stand. he is SBA to independent with bed to chair transfer. he is SBA to independent with gait within room. he does have a festination with his gait. needs cues for energy conservation. first trip of 108 feet patient's O2 drops to 89% but recovers to 94% within 1 minute. second trip of 72 feet patient's O2 drops to 87% but recovers to 91% within 15 seconds. patient is repositioned in bed with all needs met post treatment.     Time Calculation:         PT Charges       Row Name 09/20/23 1539             Time Calculation    Start Time 1455  -      Stop Time 1535  -      Time Calculation (min) 40 min  -      PT Received On 09/20/23  -         Time Calculation- PT    Total Timed Code Minutes- PT 40 minute(s)  -         Timed Charges    24890 - PT Therapeutic Exercise Minutes 25  -MH      83815 - PT Therapeutic Activity Minutes 15  -MH         Total Minutes    Timed Charges Total Minutes 40  -MH       Total Minutes 40  -MH                User Key  (r) = Recorded By, (t) = Taken By, (c) = Cosigned By      Initials Name Provider Type     Breann Bennett PTA Physical Therapist Assistant                  Therapy Charges for Today       Code Description Service Date Service Provider Modifiers Qty    00536939925 HC PT THER PROC EA 15 MIN 9/20/2023 Breann Bennett PTA GP 2    65333069079 HC PT THERAPEUTIC ACT EA 15 MIN 9/20/2023 Breann Bennett PTA GP 1            PT G-Codes  Outcome Measure Options: AM-PAC 6 Clicks Basic Mobility (PT)  AM-PAC 6 Clicks Score (PT): 24  AM-PAC 6 Clicks Score (OT): 20  PT Discharge Summary  Anticipated  Discharge Disposition (PT): home with assist, home with home health    Breann Bennett, PTA  9/20/2023

## 2023-09-20 NOTE — SIGNIFICANT NOTE
09/20/23 1313   OTHER   Discipline physical therapy assistant   Rehab Time/Intention   Session Not Performed patient unavailable for treatment;other (see comments)  (pt is with OT, will come back at a later time)   Therapy Assessment/Plan (PT)   Criteria for Skilled Interventions Met (PT) yes;skilled treatment is necessary

## 2023-09-20 NOTE — PROGRESS NOTES
[facility] Medicine Services  INPATIENT PROGRESS NOTE    Length of Stay: 0  Date of Admission: 9/11/2023  Primary Care Physician: Marina Kitchen MD    Subjective   Chief Complaint: none today  HPI:  73-year-old male with COPD, history of stroke, stage IV lung cancer on Keytruda, recurrent C. difficile infection, presented after developing fever and increased diarrhea in setting of recurrent C. difficile colitis. Pulmonary embolism diagnosed in 2015. Hypertension. Metastatic lung adenocarcinoma with pulmonary metastasis for which patient is currently on treatment with Keytruda. Iron deficiency anemia. History of recurrent C. Difficile Was discharged from [facility] after Clostridium difficile colitis, on vanco PO, no diarrhea, his friend Annette Hui refers staying in bed, not eating, and confused (readmission), now oriented.         As of today, 09/20/23  Review of Systems   Constitutional:  Negative for activity change, appetite change, chills, diaphoresis and fatigue.   HENT:  Negative for congestion, ear discharge, hearing loss, rhinorrhea, sinus pain, sneezing and sore throat.    Eyes:  Negative for photophobia, discharge and visual disturbance.   Respiratory:  Negative for cough, chest tightness, shortness of breath and wheezing.    Cardiovascular:  Negative for chest pain and palpitations.   Gastrointestinal:  Negative for abdominal distention, abdominal pain, blood in stool, diarrhea, nausea and vomiting.   Endocrine: Negative for cold intolerance, heat intolerance, polydipsia, polyphagia and polyuria.   Genitourinary:  Negative for dysuria, flank pain, hematuria and urgency.   Musculoskeletal:  Negative for arthralgias, joint swelling and myalgias.   Skin:  Negative for color change.   Allergic/Immunologic: Negative for food allergies.   Neurological:  Negative for dizziness, seizures, syncope, speech difficulty, weakness and headaches.   Hematological:   Negative for adenopathy. Does not bruise/bleed easily.   Psychiatric/Behavioral:  Negative for confusion, hallucinations, self-injury and suicidal ideas. The patient is not nervous/anxious.       All pertinent negatives and positives are as above. All other systems have been reviewed and are negative unless otherwise stated.    Objective    Temp:  [97.5 °F (36.4 °C)-98.2 °F (36.8 °C)] 98.1 °F (36.7 °C)  Heart Rate:  [] 99  Resp:  [16-18] 18  BP: (117-148)/(59-69) 117/63    AM-PAC 6 Clicks Score (PT): 22 (09/19/23 0808)    As of today, 09/20/23  Physical Exam  Constitutional:       Appearance: Normal appearance.   HENT:      Head: Normocephalic and atraumatic.      Right Ear: Tympanic membrane normal.      Left Ear: Tympanic membrane normal.      Nose: Nose normal.      Mouth/Throat:      Mouth: Mucous membranes are moist.   Eyes:      Pupils: Pupils are equal, round, and reactive to light.   Cardiovascular:      Rate and Rhythm: Normal rate and regular rhythm.      Pulses: Normal pulses.      Heart sounds: Normal heart sounds.   Pulmonary:      Effort: Pulmonary effort is normal.      Breath sounds: Normal breath sounds.   Abdominal:      General: Abdomen is flat. Bowel sounds are normal.      Palpations: Abdomen is soft.   Musculoskeletal:         General: Normal range of motion.      Cervical back: Normal range of motion and neck supple.   Skin:     General: Skin is warm and dry.      Capillary Refill: Capillary refill takes less than 2 seconds.   Neurological:      General: No focal deficit present.      Mental Status: He is alert and oriented to person, place, and time.   Psychiatric:         Mood and Affect: Mood normal.         Behavior: Behavior normal.         Thought Content: Thought content normal.         Judgment: Judgment normal.         Results Review:  I have reviewed the labs, radiology results, and diagnostic studies.    Laboratory Data:   Results from last 7 days   Lab Units 09/19/23  8146  09/14/23  1417 09/14/23  0033   SODIUM mmol/L 136  --   --    POTASSIUM mmol/L 4.2  --  4.3   CHLORIDE mmol/L 94*  --   --    CO2 mmol/L 37.0*  --   --    BUN mg/dL 12  --   --    CREATININE mg/dL 0.47*  --   --    GLUCOSE mg/dL 82  --   --    CALCIUM mg/dL 9.1 8.4*  --    ANION GAP mmol/L 5.0  --   --      Estimated Creatinine Clearance: 102 mL/min (A) (by C-G formula based on SCr of 0.47 mg/dL (L)).  Results from last 7 days   Lab Units 09/14/23  1417   MAGNESIUM mg/dL 1.6   PHOSPHORUS mg/dL 3.0         Results from last 7 days   Lab Units 09/19/23  0718   WBC 10*3/mm3 7.44   HEMOGLOBIN g/dL 9.8*   HEMATOCRIT % 31.0*   PLATELETS 10*3/mm3 338           Culture Data:   No results found for: BLOODCX  No results found for: URINECX  No results found for: RESPCX  No results found for: WOUNDCX  No results found for: STOOLCX  No components found for: BODYFLD    Radiology Data:   Imaging Results (Last 24 Hours)       ** No results found for the last 24 hours. **            I have utilized all available immediate resources to obtain, update, or review the patient's current medications (including all prescriptions, over-the-counter products, herbals, cannabis/cannabidiol products, and vitamin/mineral/dietary (nutritional) supplements).       Assessment/Plan     Active Hospital Problems    Diagnosis     **Hallucinations     History of CVA (cerebrovascular accident)     Dementia without behavioral disturbance     Severe malnutrition          Assessment and Plan:    Impression/plan  1-Altered mental status. Etiology unclear may have confusion from recent C. difficile infection.  We will continue oral vancomycin and follow. White count has improved to within normal limits.   Blood cultures no growth at 5 days  UA negative  CT scan abdomen pelvis negative  CT chest improved from previous exam no acute process  CT head no acute intercranial process.  Case management currently working on placement to skilled nursing facility.   Patient eating and drinking. Pre cert sent to NH rehab to home but was decline because taking chemotherapy, his long term fiance wants to discuss with Dr Davis to stop chemotherapy, spoke over the phone to Dr Davis, it is a family decision, they can stop chemo as their wishes and will follow in office, so he can go to nursing home.     2-Patient has history of adenocarcinoma with metastasis  Currently on chemotherapy Keytruda.  Oncology consult currently stable not needed at this time     3-Hypertension  Continue diltiazem     4-History of COPD no exacerbation currently  Continue as needed nebulizer treatments     5-History of C. difficile  Currently on oral vancomycin     6-History of some mental confusion  Continue Remeron for now, psych evaluation     7-CODE STATUS full code     8-DVT prophylaxis is heparin    Medical Decision Making  Number and Complexity of problems: 1  Differential Diagnosis: none    Conditions and Status:        Condition is improving.     [facility] Data  External documents reviewed: previous medical records  My EKG interpretation: none  My CT interpretation: chest emphysema  Tests considered but not ordered: none     Decision rules/scores evaluated (example [id number], [place], etc): n/a     Discussed with: Annette long term jody     Treatment Plan  Pending placement  Stop keytruda as patient and girlfriend wishes so he can go to nursing home, get stronger and then follow with Dr Davis in his office (he is not receiving in hospital for at least a week. Re start placement process    Care Planning  Shared decision making: no  Code status and discussions: full code    Disposition  Social Determinants of Health that impact treatment or disposition: nursing home placement  I expect the patient to be discharged to skilled nurse facility in 1 days.       I confirmed that the patient's Advance Care Plan is present, code status is documented, or surrogate decision maker is listed in the patient's medical  record.      This document has been electronically signed by Fredrick Ni MD on September 20, 2023 12:37 CDT

## 2023-09-20 NOTE — PLAN OF CARE
Goal Outcome Evaluation:  Plan of Care Reviewed With: patient, spouse    Comments:  This patient is on a heart healthy, soft to chew, chopped meats diet. His average meal intake over the last 7 days has been 75-85% of meals. Ht: 65 inches wt: 115.3, and his weight on 8/9/23 was 126. He is receiving magic cup two times daily, and he also receives boost plus three times daily mixed with half water as nutrition supplements. I visited this patient and encouraged him to increase his intake to maintain his weight. Relevant medications: folic acid, remeron Current labs: 9/19/23 Cl 94, C02 37, Hgb 9.8, Hct 31.0 Will follow up per protocol and prn.

## 2023-09-21 ENCOUNTER — READMISSION MANAGEMENT (OUTPATIENT)
Dept: CALL CENTER | Facility: HOSPITAL | Age: 73
End: 2023-09-21
Payer: MEDICARE

## 2023-09-21 VITALS
HEART RATE: 102 BPM | SYSTOLIC BLOOD PRESSURE: 118 MMHG | WEIGHT: 115.3 LBS | BODY MASS INDEX: 19.21 KG/M2 | DIASTOLIC BLOOD PRESSURE: 59 MMHG | TEMPERATURE: 97.6 F | HEIGHT: 65 IN | OXYGEN SATURATION: 93 % | RESPIRATION RATE: 18 BRPM

## 2023-09-21 PROCEDURE — 25010000002 HEPARIN LOCK FLUSH PER 10 UNITS

## 2023-09-21 PROCEDURE — 94799 UNLISTED PULMONARY SVC/PX: CPT

## 2023-09-21 PROCEDURE — G0378 HOSPITAL OBSERVATION PER HR: HCPCS

## 2023-09-21 PROCEDURE — 25010000002 HEPARIN (PORCINE) PER 1000 UNITS

## 2023-09-21 PROCEDURE — 63710000001 PREDNISONE PER 5 MG

## 2023-09-21 PROCEDURE — 96372 THER/PROPH/DIAG INJ SC/IM: CPT

## 2023-09-21 PROCEDURE — 94760 N-INVAS EAR/PLS OXIMETRY 1: CPT

## 2023-09-21 RX ORDER — HEPARIN SODIUM (PORCINE) LOCK FLUSH IV SOLN 100 UNIT/ML 100 UNIT/ML
5 SOLUTION INTRAVENOUS AS NEEDED
Status: DISCONTINUED | OUTPATIENT
Start: 2023-09-21 | End: 2023-09-21 | Stop reason: HOSPADM

## 2023-09-21 RX ADMIN — HEPARIN SODIUM 5000 UNITS: 5000 INJECTION INTRAVENOUS; SUBCUTANEOUS at 09:18

## 2023-09-21 RX ADMIN — Medication 10 ML: at 09:19

## 2023-09-21 RX ADMIN — PREDNISONE 5 MG: 5 TABLET ORAL at 09:18

## 2023-09-21 RX ADMIN — VANCOMYCIN HYDROCHLORIDE 125 MG: KIT at 09:18

## 2023-09-21 RX ADMIN — VANCOMYCIN HYDROCHLORIDE 125 MG: KIT at 11:11

## 2023-09-21 RX ADMIN — MICONAZOLE NITRATE 1 APPLICATION: 20 POWDER TOPICAL at 09:19

## 2023-09-21 RX ADMIN — FOLIC ACID 1 MG: 1 TABLET ORAL at 09:18

## 2023-09-21 RX ADMIN — HEPARIN 500 UNITS: 100 SYRINGE at 13:44

## 2023-09-21 RX ADMIN — DILTIAZEM HYDROCHLORIDE 240 MG: 240 CAPSULE, COATED, EXTENDED RELEASE ORAL at 09:18

## 2023-09-21 RX ADMIN — VANCOMYCIN HYDROCHLORIDE 125 MG: KIT at 00:51

## 2023-09-21 NOTE — DISCHARGE SUMMARY
[facility] Medicine Services  DISCHARGE SUMMARY       Date of Admission: 9/11/2023  Date of Discharge:  9/21/2023  Primary Care Physician: Marina Kitchen MD    Presenting Problem/History of Present Illness:  Hallucinations [R44.3]  Impaired mobility and ADLs [Z74.09, Z78.9]  Impaired functional mobility, balance, gait, and endurance [Z74.09]  Fatigue, unspecified type [R53.83]       Final Discharge Diagnoses:  Active Hospital Problems    Diagnosis     **Hallucinations     History of CVA (cerebrovascular accident)     Dementia without behavioral disturbance     Severe malnutrition        Consults:   Consults       Date and Time Order Name Status Description    9/18/2023  5:27 PM Inpatient Psychiatrist Consult Completed             Procedures Performed:                 Pertinent Test Results:   Lab Results (most recent)       Procedure Component Value Units Date/Time    Basic Metabolic Panel [[id number]]  (Abnormal) Collected: 09/19/23 0718    Specimen: Blood Updated: 09/19/23 1447     Glucose 82 mg/dL      BUN 12 mg/dL      Creatinine 0.47 mg/dL      Sodium 136 mmol/L      Potassium 4.2 mmol/L      Chloride 94 mmol/L      CO2 37.0 mmol/L      Calcium 9.1 mg/dL      BUN/Creatinine Ratio 25.5     Anion Gap 5.0 mmol/L      eGFR 109.0 mL/min/1.73     Narrative:      GFR Normal >60  Chronic Kidney Disease <60  Kidney Failure <15    The GFR formula is only valid for adults with stable renal function between ages 18 and 70.    CBC & Differential [[id number]]  (Abnormal) Collected: 09/19/23 0718    Specimen: Blood Updated: 09/19/23 1439    Narrative:      The following orders were created for panel order CBC & Differential.  Procedure                               Abnormality         Status                     ---------                               -----------         ------                     CBC Auto Differential[[id number]]        Abnormal            Final result                  Please view results for these tests on the individual orders.    CBC Auto Differential [[id number]]  (Abnormal) Collected: 09/19/23 0718    Specimen: Blood Updated: 09/19/23 1439     WBC 7.44 10*3/mm3      RBC 3.11 10*6/mm3      Hemoglobin 9.8 g/dL      Hematocrit 31.0 %      MCV 99.7 fL      MCH 31.5 pg      MCHC 31.6 g/dL      RDW 13.2 %      RDW-SD 48.6 fl      MPV 10.1 fL      Platelets 338 10*3/mm3      Neutrophil % 52.5 %      Lymphocyte % 20.7 %      Monocyte % 11.2 %      Eosinophil % 13.8 %      Basophil % 1.3 %      Immature Grans % 0.5 %      Neutrophils, Absolute 3.90 10*3/mm3      Lymphocytes, Absolute 1.54 10*3/mm3      Monocytes, Absolute 0.83 10*3/mm3      Eosinophils, Absolute 1.03 10*3/mm3      Basophils, Absolute 0.10 10*3/mm3      Immature Grans, Absolute 0.04 10*3/mm3      nRBC 0.0 /100 WBC     Blood Culture - Blood, Hand, Left [[id number]]  (Normal) Collected: 09/11/23 2017    Specimen: Blood from Hand, Left Updated: 09/16/23 2046     Blood Culture No growth at 5 days    Blood Culture - Blood, Arm, Left [[id number]]  (Normal) Collected: 09/11/23 2017    Specimen: Blood from Arm, Left Updated: 09/16/23 2046     Blood Culture No growth at 5 days    Phosphorus [[id number]]  (Normal) Collected: 09/14/23 1417    Specimen: Blood Updated: 09/14/23 1519     Phosphorus 3.0 mg/dL     Calcium [[id number]]  (Abnormal) Collected: 09/14/23 1417    Specimen: Blood Updated: 09/14/23 1502     Calcium 8.4 mg/dL     Magnesium [[id number]]  (Normal) Collected: 09/14/23 1417    Specimen: Blood Updated: 09/14/23 1502     Magnesium 1.6 mg/dL     Potassium [[id number]]  (Normal) Collected: 09/14/23 0033    Specimen: Blood Updated: 09/14/23 0056     Potassium 4.3 mmol/L     Magnesium [[id number]]  (Normal) Collected: 09/13/23 1210    Specimen: Blood Updated: 09/13/23 1257     Magnesium 1.7 mg/dL     Calcium [[id number]]  (Abnormal) Collected: 09/13/23 1210    Specimen: Blood Updated: 09/13/23 1257     Calcium 8.5 mg/dL      Potassium [[id number]]  (Abnormal) Collected: 09/13/23 1210    Specimen: Blood Updated: 09/13/23 1257     Potassium 3.3 mmol/L     Phosphorus [[id number]]  (Abnormal) Collected: 09/13/23 1210    Specimen: Blood Updated: 09/13/23 1257     Phosphorus 2.3 mg/dL     CBC & Differential [[id number]]  (Abnormal) Collected: 09/12/23 0657    Specimen: Blood Updated: 09/12/23 1224    Narrative:      The following orders were created for panel order CBC & Differential.  Procedure                               Abnormality         Status                     ---------                               -----------         ------                     Manual Differential[[id number]]          Abnormal            Final result               CBC Auto Differential[[id number]]        Abnormal            Final result                 Please view results for these tests on the individual orders.    Manual Differential [[id number]]  (Abnormal) Collected: 09/12/23 0657    Specimen: Blood Updated: 09/12/23 1224     Neutrophil % 82.0 %      Lymphocyte % 8.0 %      Monocyte % 4.0 %      Eosinophil % 1.0 %      Basophil % 2.0 %      Bands %  3.0 %      Neutrophils Absolute 8.59 10*3/mm3      Lymphocytes Absolute 0.81 10*3/mm3      Monocytes Absolute 0.40 10*3/mm3      Eosinophils Absolute 0.10 10*3/mm3      Basophils Absolute 0.20 10*3/mm3      RBC Morphology Normal     WBC Morphology Normal     Platelet Estimate Adequate    Protime-INR [[id number]]  (Normal) Collected: 09/12/23 0742    Specimen: Blood Updated: 09/12/23 0843     Protime 13.0 Seconds      INR 0.98    Narrative:      Therapeutic range for most indications is 2.0-3.0 INR,  or 2.5-3.5 for mechanical heart valves.    Hemoglobin A1c [[id number]]  (Abnormal) Collected: 09/12/23 0657    Specimen: Blood Updated: 09/12/23 0753     Hemoglobin A1C 5.80 %     Narrative:      Hemoglobin A1C Ranges:    Increased Risk for Diabetes  5.7% to 6.4%  Diabetes                     >= 6.5%  Diabetic Goal                 < 7.0%    Comprehensive Metabolic Panel [[id number]]  (Abnormal) Collected: 09/12/23 0657    Specimen: Blood Updated: 09/12/23 0748     Glucose 107 mg/dL      BUN 4 mg/dL      Creatinine 0.53 mg/dL      Sodium 136 mmol/L      Potassium 3.8 mmol/L      Chloride 99 mmol/L      CO2 23.0 mmol/L      Calcium 9.3 mg/dL      Total Protein 8.0 g/dL      Albumin 3.5 g/dL      ALT (SGPT) 22 U/L      AST (SGOT) 24 U/L      Alkaline Phosphatase 82 U/L      Total Bilirubin 0.3 mg/dL      Globulin 4.5 gm/dL      A/G Ratio 0.8 g/dL      BUN/Creatinine Ratio 7.5     Anion Gap 14.0 mmol/L      eGFR 105.2 mL/min/1.73     Narrative:      GFR Normal >60  Chronic Kidney Disease <60  Kidney Failure <15    The GFR formula is only valid for adults with stable renal function between ages 18 and 70.    Iron Profile [[id number]]  (Abnormal) Collected: 09/12/23 0657    Specimen: Blood Updated: 09/12/23 0748     Iron 92 mcg/dL      Iron Saturation (TSAT) 43 %      Transferrin 144 mg/dL      TIBC 215 mcg/dL     Lipase [[id number]]  (Normal) Collected: 09/12/23 0657    Specimen: Blood Updated: 09/12/23 0748     Lipase 31 U/L     Lipid Panel [[id number]]  (Abnormal) Collected: 09/12/23 0657    Specimen: Blood Updated: 09/12/23 0748     Total Cholesterol 173 mg/dL      Triglycerides 137 mg/dL      HDL Cholesterol 40 mg/dL      LDL Cholesterol  109 mg/dL      VLDL Cholesterol 24 mg/dL      LDL/HDL Ratio 2.64    Narrative:      Cholesterol Reference Ranges  (U.S. Department of Health and Human Services ATP III Classifications)    Desirable          <200 mg/dL  Borderline High    200-239 mg/dL  High Risk          >240 mg/dL      Triglyceride Reference Ranges  (U.S. Department of Health and Human Services ATP III Classifications)    Normal           <150 mg/dL  Borderline High  150-199 mg/dL  High             200-499 mg/dL  Very High        >500 mg/dL    HDL Reference Ranges  (U.S. Department of Health and Human Services ATP III  "Classifications)    Low     <40 mg/dl (major risk factor for CHD)  High    >60 mg/dl ('negative' risk factor for CHD)        LDL Reference Ranges  (U.S. Department of Health and Human Services ATP III Classifications)    Optimal          <100 mg/dL  Near Optimal     100-129 mg/dL  Borderline High  130-159 mg/dL  High             160-189 mg/dL  Very High        >189 mg/dL    Procalcitonin [[id number]]  (Normal) Collected: 09/12/23 0657    Specimen: Blood Updated: 09/12/23 0746     Procalcitonin 0.09 ng/mL     Narrative:      As a Marker for Sepsis (Non-Neonates):    1. <0.5 ng/mL represents a low risk of severe sepsis and/or septic shock.  2. >2 ng/mL represents a high risk of severe sepsis and/or septic shock.    As a Marker for Lower Respiratory Tract Infections that require antibiotic therapy:    PCT on Admission    Antibiotic Therapy       6-12 Hrs later    >0.5                Strongly Recommended  >0.25 - <0.5        Recommended   0.1 - 0.25          Discouraged              Remeasure/reassess PCT  <0.1                Strongly Discouraged     Remeasure/reassess PCT    As 28 day mortality risk marker: \"Change in Procalcitonin Result\" (>80% or <=80%) if Day 0 (or Day 1) and Day 4 values are available. Refer to http://www.[employer]-pct-calculator.com    Change in PCT <=80%  A decrease of PCT levels below or equal to 80% defines a positive change in PCT test result representing a higher risk for 28-day all-cause mortality of patients diagnosed with severe sepsis for septic shock.    Change in PCT >80%  A decrease of PCT levels of more than 80% defines a negative change in PCT result representing a lower risk for 28-day all-cause mortality of patients diagnosed with severe sepsis or septic shock.       Ferritin [[id number]]  (Abnormal) Collected: 09/12/23 0657    Specimen: Blood Updated: 09/12/23 0745     Ferritin 1,801.00 ng/mL     Narrative:      Results may be falsely decreased if patient taking Biotin.      Lactic " Acid, Plasma [[id number]]  (Normal) Collected: 09/12/23 0657    Specimen: Blood Updated: 09/12/23 0735     Lactate 1.8 mmol/L     CBC Auto Differential [[id number]]  (Abnormal) Collected: 09/12/23 0657    Specimen: Blood Updated: 09/12/23 0727     WBC 10.11 10*3/mm3      RBC 3.90 10*6/mm3      Hemoglobin 12.3 g/dL      Hematocrit 36.8 %      MCV 94.4 fL      MCH 31.5 pg      MCHC 33.4 g/dL      RDW 12.8 %      RDW-SD 44.5 fl      MPV 9.2 fL      Platelets 394 10*3/mm3     TSH+Free T4 [[id number]]  (Normal) Collected: 09/11/23 1445    Specimen: Blood Updated: 09/11/23 2123     TSH 1.830 uIU/mL      Free T4 1.20 ng/dL      Comment: T4 results may be falsely increased if patient taking Biotin.       POC Glucose Once [[id number]]  (Abnormal) Collected: 09/11/23 2002    Specimen: Blood Updated: 09/11/23 2014     Glucose 178 mg/dL      Comment: RN NotifiedOperator: [id number] [facility] ID: [id number]       Extra Tubes [[id number]] Collected: 09/11/23 1445    Specimen: Blood, Venous Line Updated: 09/11/23 1846    Narrative:      The following orders were created for panel order Extra Tubes.  Procedure                               Abnormality         Status                     ---------                               -----------         ------                     Lavender Top[[id number]]                                     Final result               Gold Top - SST[[id number]]                                   Final result               Quinones Top[[id number]]                                         Final result               Light Blue Top[[id number]]                                   Final result                 Please view results for these tests on the individual orders.    Quinones Top [[id number]] Collected: 09/11/23 1445    Specimen: Blood Updated: 09/11/23 1846     Extra Tube Hold for add-ons.     Comment: Auto resulted.       Urinalysis, Microscopic Only - Urine, Clean Catch [[id number]]  (Abnormal) Collected: 09/11/23  1610    Specimen: Urine, Clean Catch Updated: 09/11/23 1805     RBC, UA 0-2 /HPF      WBC, UA 0-2 /HPF      Bacteria, UA None Seen /HPF      Squamous Epithelial Cells, UA 0-2 /HPF      Hyaline Casts, UA 3-6 /LPF      Granular Casts, UA 3-6 /LPF      Methodology Manual Light Microscopy    Urinalysis With Microscopic If Indicated (No Culture) - Urine, Clean Catch [[id number]]  (Abnormal) Collected: 09/11/23 1610    Specimen: Urine, Clean Catch Updated: 09/11/23 1656     Color, UA Yellow     Appearance, UA Clear     pH, UA 5.5     Specific Gravity, UA 1.016     Glucose, UA Negative     Ketones, UA >=160 mg/dL (4+)     Bilirubin, UA Negative     Blood, UA Negative     Protein, UA 30 mg/dL (1+)     Leuk Esterase, UA Negative     Nitrite, UA Negative     Urobilinogen, UA 0.2 E.U./dL    Lavender Top [[id number]] Collected: 09/11/23 1445    Specimen: Blood Updated: 09/11/23 1545     Extra Tube hold for add-on     Comment: Auto resulted       Gold Top - SST [[id number]] Collected: 09/11/23 1445    Specimen: Blood Updated: 09/11/23 1545     Extra Tube Hold for add-ons.     Comment: Auto resulted.       Light Blue Top [[id number]] Collected: 09/11/23 1445    Specimen: Blood Updated: 09/11/23 1545     Extra Tube Hold for add-ons.     Comment: Auto resulted       Comprehensive Metabolic Panel [[id number]]  (Abnormal) Collected: 09/11/23 1445    Specimen: Blood Updated: 09/11/23 1529     Glucose 63 mg/dL      BUN 4 mg/dL      Creatinine 0.53 mg/dL      Sodium 132 mmol/L      Potassium 4.1 mmol/L      Chloride 97 mmol/L      CO2 15.0 mmol/L      Calcium 8.9 mg/dL      Total Protein 7.6 g/dL      Albumin 3.1 g/dL      ALT (SGPT) 17 U/L      AST (SGOT) 19 U/L      Alkaline Phosphatase 73 U/L      Total Bilirubin 0.2 mg/dL      Globulin 4.5 gm/dL      A/G Ratio 0.7 g/dL      BUN/Creatinine Ratio 7.5     Anion Gap 20.0 mmol/L      eGFR 105.2 mL/min/1.73     Narrative:      GFR Normal >60  Chronic Kidney Disease <60  Kidney Failure  <15    The GFR formula is only valid for adults with stable renal function between ages 18 and 70.    COVID-19 and FLU A/B PCR - Swab, Nasopharynx [[id number]]  (Normal) Collected: 09/11/23 1350    Specimen: Swab from Nasopharynx Updated: 09/11/23 1420     COVID19 Not Detected     Influenza A PCR Not Detected     Influenza B PCR Not Detected    Narrative:      Fact sheet for providers: https://www.fda.gov/media/701921/download    Fact sheet for patients: https://www.fda.gov/media/103523/download    Test performed by PCR.          Imaging Results (Most Recent)       Procedure Component Value Units Date/Time    CT Abdomen Pelvis Without Contrast [[id number]] Collected: 09/11/23 1905     Updated: 09/11/23 1910    Narrative:      CT ABDOMEN PELVIS WO CONTRAST    HISTORY: Abdominal pain, acute, nonlocalized    COMPARISON: None    Automated exposure control was also utilized to decrease patient radiation dose.    TECHNIQUE: Unenhanced axial images through the abdomen and pelvis were completed  without oral contrast. Multiplanar reformatted images were provided for review.    FINDINGS:  LOWER CHEST: The lung bases and base of the heart are unremarkable.    LIVER: No focal liver lesion.    BILIARY SYSTEM: The gallbladder is unremarkable. No intrahepatic or extrahepatic  ductal dilatation.    PANCREAS: No focal pancreatic lesion.    SPLEEN: Unremarkable.    KIDNEYS: Bilateral kidneys are unremarkable. The ureters are decompressed and  normal in appearance.    ADRENALS: Unremarkable.    RETROPERITONEUM: No mass, lymphadenopathy or hemorrhage.    GI TRACT: No evidence of obstruction or bowel wall thickening. The appendix is  not definitively visualized, but there is no evidence of acute appendicitis.    OTHER: There is no mesenteric mass, lymphadenopathy or fluid collection. The  osseous structures and soft tissues demonstrate no worrisome lesions.    PELVIS: No mass lesion, fluid collection or significant lymphadenopathy is  seen  in the pelvis. The urinary bladder is within normal limits.        Impression:      1. No evidence of acute abdominopelvic process.      CT Chest Without Contrast Diagnostic [[id number]] Collected: 09/11/23 1854     Updated: 09/11/23 1859    Narrative:      CT CHEST WO CONTRAST DIAGNOSTIC    HISTORY: Pneumonia suspected, uncomplicated, no prior imaging (Ped >= 3mo)    COMPARISON: 4/21/2023    Automated exposure control was also utilized to decrease patient radiation dose.    TECHNIQUE: Axial images through the chest were completed without intravenous  contrast. Multiplanar reformatted images were provided for review.    FINDINGS:  Neck base: The imaged portion of the neck and thyroid gland is unremarkable.    Lungs: Severe emphysema with mild parenchymal scarring in the right middle lobe  this is significantly improved from prior study. No pleural effusion is present.  The trachea and bronchial tree are patent.    Heart and mediastinum: The heart is normal in size. The great vessels are normal  in caliber and appearance. There is no pericardial effusion. No enlarged  axillary or mediastinal lymph nodes are present.    Bones and soft tissues: No acute findings are seen in the bones or surrounding  soft tissues.    Upper abdomen: No acute findings are seen in the visualized portion of the upper  abdomen.          Impression:      1. Severe emphysema with mild parenchymal scarring in the right middle lobe.  This is significantly improved from 4/21/2023.  No definite acute infiltrate or  consolidation.  No pleural effusion.        XR Chest 1 View [[id number]] Collected: 09/11/23 1411     Updated: 09/11/23 1633    Narrative:      INDICATION:  Fatigue.    FINDINGS:  Single view of the chest demonstrates advanced features of interstitial lung  disease.  Port-A-Cath is seen on the right.  No acute findings are suspected.      CT Head Without Contrast [[id number]] Collected: 09/11/23 1410     Updated: 09/11/23 1633     Narrative:      INDICATION:  Altered mental status.    FINDINGS:  Patient is slanted within the gantry. No definite focal abnormal densities are  seen. Age-related changes present.    SUMMARY:  No acute findings.            Chief Complaint on Day of Discharge: none    Hospital Course:  The patient is a 73 y.o. male who [facility] to [facility] with change in mental status.  73-year-old male with COPD, history of stroke, stage IV lung cancer on Keytruda, recurrent C. difficile infection, presented after developing fever and increased diarrhea in setting of recurrent C. difficile colitis. Pulmonary embolism diagnosed in 2015. Hypertension. Metastatic lung adenocarcinoma with pulmonary metastasis for which patient is currently on treatment with Keytruda. Iron deficiency anemia. History of recurrent C. Difficile Was discharged from [facility] ([place] after Clostridium difficile colitis, on vanco PO, no diarrhea, his friend Annette Hui refers staying in bed, not eating, and confused (readmission), now oriented.     Assessment and Plan:    Impression/plan  1-Altered mental status. Etiology unclear may have confusion from recent C. difficile infection.  We will continue oral vancomycin and follow. White count has improved to within normal limits.   Blood cultures no growth at 5 days  UA negative  CT scan abdomen pelvis negative  CT chest improved from previous exam no acute process  CT head no acute intercranial process.  It was denied skilled nurse facility. Will go to home health     2-Patient has history of adenocarcinoma with metastasis  Currently on chemotherapy Keytruda.  Oncology consult currently stable not needed at this time     3-Hypertension  Continue diltiazem     4-History of COPD no exacerbation currently  Continue as needed nebulizer treatments     5-History of C. difficile  Currently on oral vancomycin     6-History of some mental confusion  Continue Remeron for now, psych evaluation     7-CODE STATUS  "full code     8-DVT prophylaxis is heparin  Condition on Discharge:  good    Physical Exam on Discharge:  /66 (BP Location: Right arm, Patient Position: Sitting)   Pulse 102   Temp 98.8 °F (37.1 °C) (Temporal)   Resp 18   Ht 165.1 cm (65\")   Wt 52.3 kg (115 lb 4.8 oz)   SpO2 97%   BMI 19.19 kg/m²   Physical Exam  Constitutional:       Appearance: Normal appearance.   HENT:      Head: Normocephalic and atraumatic.      Right Ear: Tympanic membrane normal.      Left Ear: Tympanic membrane normal.      Nose: Nose normal.      Mouth/Throat:      Mouth: Mucous membranes are moist.   Eyes:      Pupils: Pupils are equal, round, and reactive to light.   Cardiovascular:      Rate and Rhythm: Normal rate and regular rhythm.      Pulses: Normal pulses.      Heart sounds: Normal heart sounds.   Pulmonary:      Effort: Pulmonary effort is normal.      Breath sounds: Normal breath sounds.   Abdominal:      General: Abdomen is flat. Bowel sounds are normal.      Palpations: Abdomen is soft.   Musculoskeletal:         General: Normal range of motion.      Cervical back: Normal range of motion and neck supple.   Skin:     General: Skin is warm and dry.      Capillary Refill: Capillary refill takes less than 2 seconds.   Neurological:      General: No focal deficit present.      Mental Status: He is alert and oriented to person, place, and time.   Psychiatric:         Mood and Affect: Mood normal.         Behavior: Behavior normal.         Thought Content: Thought content normal.         Judgment: Judgment normal.         Discharge Disposition:  Home-Health Care [facility]    Discharge Medications:     Discharge Medications        Continue These Medications        Instructions Start Date   cholecalciferol 10 MCG (400 UNIT) tablet  Commonly known as: VITAMIN D3   400 Units, Oral, Daily, 50 mcg daily      dilTIAZem  MG 24 hr capsule  Commonly known as: CARDIZEM CD   240 mg, Oral, Every 24 Hours Scheduled      folic acid 1 " MG tablet  Commonly known as: FOLVITE   1 mg, Oral, Daily      furosemide 20 MG tablet  Commonly known as: LASIX   20 mg, Oral, Daily PRN      ipratropium 17 MCG/ACT inhaler  Commonly known as: ATROVENT HFA   2 puffs, Inhalation, 4 Times Daily PRN      KLOR-CON 20 MEQ CR tablet  Generic drug: potassium chloride   1 tablet Daily.      melatonin 5 MG tablet tablet   5 mg, Oral, Nightly PRN      mirtazapine 15 MG tablet  Commonly known as: REMERON   15 mg, Oral, Nightly      montelukast 10 MG tablet  Commonly known as: SINGULAIR   10 mg, Oral, Nightly      ondansetron 8 MG tablet  Commonly known as: ZOFRAN   8 mg, Oral, 3 Times Daily PRN      predniSONE 5 MG tablet  Commonly known as: DELTASONE   5 mg, Oral, Every Other Day      Stiolto Respimat 2.5-2.5 MCG/ACT aerosol solution inhaler  Generic drug: tiotropium bromide-olodaterol   2 puffs, Inhalation, Daily      TiZANidine 2 MG capsule  Commonly known as: ZANAFLEX   2 mg, Oral, Nightly PRN      vancomycin 125 MG capsule  Commonly known as: VANCOCIN   125 mg, Oral, 4 Times Daily               Discharge Diet:   Diet Instructions       Diet: Cardiac Diets; Healthy Heart (2-3 Na+); Soft to Chew (NDD 3); Chopped Meat; Thin (IDDSI 0)      Discharge Diet: Cardiac Diets    Cardiac Diet: Healthy Heart (2-3 Na+)    Texture: Soft to Chew (NDD 3)    Soft to Chew: Chopped Meat    Fluid Consistency: Thin (IDDSI 0)    Diet: Regular/House Diet; Regular Texture (IDDSI 7); Thin (IDDSI 0)      Discharge Diet: Regular/House Diet    Texture: Regular Texture (IDDSI 7)    Fluid Consistency: Thin (IDDSI 0)            Activity at Discharge:   Activity Instructions       Activity as Tolerated      Activity as Tolerated              Discharge Care Plan/Instructions: home health    Follow-up Appointments:   Future Appointments   Date Time Provider Department Center   9/27/2023  8:30 AM [facility] OP INFU CHAIR 06 [facility] OPI [facility]   9/27/2023  9:00 AM Adriana Adams APRN [facility] ONC [facility]   9/27/2023  10:00 AM  [facility] OP INFU BED 3  [facility] OPI [facility]   11/3/2023 11:30 AM Neville Meeks MD [facility] CD [facility] None   12/18/2023  1:00 PM Clotilde Washington DO [facility] PULM [facility]       Test Results Pending at Discharge:     Patient not prescribed/taking: ;Beta-blocker for medical/patient reason(s) including not indicated.      This document has been electronically signed by Fredrick Ni MD on September 21, 2023 10:48 CDT      Time: 10:50

## 2023-09-21 NOTE — PROGRESS NOTES
Discharge Planning Assessment  [facility]     Patient Name: Brian Garcia  MRN: [id number]  Today's Date: 9/21/2023    Admit Date: 9/11/2023    Plan: Hypoglycemia - MILADY Romano RNCM   Discharge Needs Assessment       Row Name 09/21/23 1103       Living Environment    People in Home significant other    Current Living Arrangements home    Potentially Unsafe Housing Conditions none    Primary Care Provided by self    Provides Primary Care For no one, unable/limited ability to care for self    Family Caregiver if Needed none    Able to Return to Prior Arrangements yes    Living Arrangement Comments lives with SO who is hardly able to care for self       Resource/Environmental Concerns    Resource/Environmental Concerns none    Transportation Concerns none       Food Insecurity    Within the past 12 months, you worried that your food would run out before you got the money to buy more. Never true    Within the past 12 months, the food you bought just didn't last and you didn't have money to get more. Never true       Transition Planning    Patient/Family Anticipates Transition to home with help/services    Patient/Family Anticipated Services at Transition home health care    Transportation Anticipated family or friend will provide       Discharge Needs Assessment    Concerns to be Addressed denies needs/concerns at this time    Anticipated Changes Related to Illness inability to care for someone else    Equipment Needed After Discharge none    Outpatient/Agency/Support Group Needs homecare agency    Discharge Facility/Level of Care Needs home with home health    [facility] of [facility]    Discharge Coordination/Progress pt lives with SO,  they depend upon each other.   no new equipment  needed.  has portable tank in room for ride home,  no issues with rx or transportation.   [facility]  insurance upheld denial for rehab to home,  [facility]  den[facility].                   Discharge Plan       Row Name 09/21/23 1043       Plan    Final Discharge Disposition Code 06 - home with home health care      Row Name 09/21/23 0955       Plan    Plan Comments per pa, denial was upheld,  notified SO  garret rn      Row Name 09/21/23 0928       Plan    Plan Comments SO in room this am.  she had stated that she would not be able to care for him at home,  he would need to be stronger.   she has issues getting around the room and moves with a slow stooped posture.   SO would not be able to assist pt out of bed or chair.   suspect that pt was taking care of her at home.  garret rn      Row Name 09/21/23 0917       Plan    Plan Comments rec email this am that insurance had denied rehab to home.   p2p information given to dr atng.  will need ot be done by [address] [place] [address].    notified SO  of denial and p2p  also gave her sitters list.  garret hirsch                  Continued Care and Services - Admitted Since 9/11/2023       Destination       Service Provider Request Status Selected Services Address Phone Fax Patient Preferred    [facility] Pending - Request Sent N/A [address] [facility] [address] [phone] --    [facility] Pending - Request Sent N/A [address] [address] [phone] --    [facility] Pending - Request Sent N/A [address] [facility] [address] [phone] --    [facility] Declined  declined pt; they did not feel that he could do the therapy N/A [address] [facility] [address] [phone] --    [facility] Declined  not able to take per tao N/A [address] [address] [phone] --    [facility] Declined  no reason given N/A [address] [facility] [address] [phone] --              Home Medical Care        Service Provider Request Status Selected Services Address Phone Fax Patient Preferred    [facility]  Selected [facility] [address] [place] [address] [phone] --                  Selected Continued Care - Prior Encounters Includes continued care and service providers with selected services from prior encounters from 6/13/2023 to 9/21/2023      Discharged on 9/8/2023 Admission date: 9/6/2023 - Discharge disposition: Home or Self Care      Home Medical Care       Service Provider Selected Services Address Phone Fax Patient Preferred    [facility] [address] [place] [address] [phone] --                          Expected Discharge Date and Time       Expected Discharge Date Expected Discharge Time    Sep 21, 2023            Demographic Summary       Row Name 09/21/23 1102       General Information    Admission Type observation    Arrived From home    Required Notices Provided Observation Status Notice    Referral Source high risk screening    Reason for Consult discharge planning    Preferred Language English                   Functional Status    No documentation.                  Psychosocial    No documentation.                  Abuse/Neglect    No documentation.                  Legal    No documentation.                  Substance Abuse    No documentation.                  Patient Forms    No documentation.                     Diane Mcpherson, RN

## 2023-09-22 NOTE — OUTREACH NOTE
Prep Survey      Flowsheet Row Responses   [facility] facility patient discharged from? [place]   Is LACE score < 7 ? No   Eligibility Not Eligible   What are the reasons patient is not eligible? Other   Does the patient have one of the following disease processes/diagnoses(primary or secondary)? Other   Prep survey completed? Yes            Susanne CHURCH - Registered Nurse

## 2023-09-25 ENCOUNTER — TELEPHONE (OUTPATIENT)
Dept: RADIATION ONCOLOGY | Facility: HOSPITAL | Age: 73
End: 2023-09-25

## 2023-09-25 NOTE — TELEPHONE ENCOUNTER
----- Message from Omega Davis MD sent at 9/25/2023 11:48 AM CDT -----  Yes. We can bring him back.Thanks  ----- Message -----  From: Gilma Griggs RN  Sent: 9/25/2023   9:16 AM CDT  To: Omega Davis MD    PT has been in the hospital from 9/11-9/21. Plan had originally been to go to rehab for strengthening after but was unable to get insurance approval. PT is home and asking if ok to come for infusion on 9/27.

## 2023-09-27 ENCOUNTER — INFUSION (OUTPATIENT)
Dept: ONCOLOGY | Facility: HOSPITAL | Age: 73
End: 2023-09-27
Payer: OTHER GOVERNMENT

## 2023-09-27 ENCOUNTER — OFFICE VISIT (OUTPATIENT)
Dept: ONCOLOGY | Facility: CLINIC | Age: 73
End: 2023-09-27
Payer: OTHER GOVERNMENT

## 2023-09-27 VITALS
TEMPERATURE: 96.7 F | HEART RATE: 86 BPM | WEIGHT: 119 LBS | DIASTOLIC BLOOD PRESSURE: 51 MMHG | OXYGEN SATURATION: 96 % | BODY MASS INDEX: 19.8 KG/M2 | SYSTOLIC BLOOD PRESSURE: 119 MMHG | RESPIRATION RATE: 18 BRPM

## 2023-09-27 DIAGNOSIS — C34.91 PRIMARY ADENOCARCINOMA OF RIGHT LUNG: Primary | ICD-10-CM

## 2023-09-27 DIAGNOSIS — C34.81 MALIGNANT NEOPLASM OF OVERLAPPING SITES OF RIGHT LUNG: Primary | Chronic | ICD-10-CM

## 2023-09-27 DIAGNOSIS — C34.81 MALIGNANT NEOPLASM OF OVERLAPPING SITES OF RIGHT LUNG: ICD-10-CM

## 2023-09-27 LAB
ALBUMIN SERPL-MCNC: 3.7 G/DL (ref 3.5–5.2)
ALBUMIN/GLOB SERPL: 1.2 G/DL
ALP SERPL-CCNC: 67 U/L (ref 39–117)
ALT SERPL W P-5'-P-CCNC: 18 U/L (ref 1–41)
ANION GAP SERPL CALCULATED.3IONS-SCNC: 6 MMOL/L (ref 5–15)
AST SERPL-CCNC: 19 U/L (ref 1–40)
BASOPHILS # BLD AUTO: 0.13 10*3/MM3 (ref 0–0.2)
BASOPHILS NFR BLD AUTO: 1.5 % (ref 0–1.5)
BILIRUB SERPL-MCNC: 0.2 MG/DL (ref 0–1.2)
BUN SERPL-MCNC: 9 MG/DL (ref 8–23)
BUN/CREAT SERPL: 18 (ref 7–25)
CALCIUM SPEC-SCNC: 9.3 MG/DL (ref 8.6–10.5)
CHLORIDE SERPL-SCNC: 97 MMOL/L (ref 98–107)
CO2 SERPL-SCNC: 32 MMOL/L (ref 22–29)
CREAT SERPL-MCNC: 0.5 MG/DL (ref 0.76–1.27)
DEPRECATED RDW RBC AUTO: 46.7 FL (ref 37–54)
EGFRCR SERPLBLD CKD-EPI 2021: 107 ML/MIN/1.73
EOSINOPHIL # BLD AUTO: 0.66 10*3/MM3 (ref 0–0.4)
EOSINOPHIL NFR BLD AUTO: 7.9 % (ref 0.3–6.2)
ERYTHROCYTE [DISTWIDTH] IN BLOOD BY AUTOMATED COUNT: 13 % (ref 12.3–15.4)
GLOBULIN UR ELPH-MCNC: 3.2 GM/DL
GLUCOSE SERPL-MCNC: 108 MG/DL (ref 65–99)
HCT VFR BLD AUTO: 32 % (ref 37.5–51)
HGB BLD-MCNC: 10.6 G/DL (ref 13–17.7)
HOLD SPECIMEN: NORMAL
IMM GRANULOCYTES # BLD AUTO: 0.04 10*3/MM3 (ref 0–0.05)
IMM GRANULOCYTES NFR BLD AUTO: 0.5 % (ref 0–0.5)
LYMPHOCYTES # BLD AUTO: 1.8 10*3/MM3 (ref 0.7–3.1)
LYMPHOCYTES NFR BLD AUTO: 21.4 % (ref 19.6–45.3)
MCH RBC QN AUTO: 32.2 PG (ref 26.6–33)
MCHC RBC AUTO-ENTMCNC: 33.1 G/DL (ref 31.5–35.7)
MCV RBC AUTO: 97.3 FL (ref 79–97)
MONOCYTES # BLD AUTO: 1.03 10*3/MM3 (ref 0.1–0.9)
MONOCYTES NFR BLD AUTO: 12.3 % (ref 5–12)
NEUTROPHILS NFR BLD AUTO: 4.74 10*3/MM3 (ref 1.7–7)
NEUTROPHILS NFR BLD AUTO: 56.4 % (ref 42.7–76)
NRBC BLD AUTO-RTO: 0 /100 WBC (ref 0–0.2)
PLATELET # BLD AUTO: 299 10*3/MM3 (ref 140–450)
PMV BLD AUTO: 9.6 FL (ref 6–12)
POTASSIUM SERPL-SCNC: 3.6 MMOL/L (ref 3.5–5.2)
PROT SERPL-MCNC: 6.9 G/DL (ref 6–8.5)
RBC # BLD AUTO: 3.29 10*6/MM3 (ref 4.14–5.8)
SODIUM SERPL-SCNC: 135 MMOL/L (ref 136–145)
WBC NRBC COR # BLD: 8.4 10*3/MM3 (ref 3.4–10.8)

## 2023-09-27 PROCEDURE — 85025 COMPLETE CBC W/AUTO DIFF WBC: CPT

## 2023-09-27 PROCEDURE — 99214 OFFICE O/P EST MOD 30 MIN: CPT | Performed by: NURSE PRACTITIONER

## 2023-09-27 PROCEDURE — 3074F SYST BP LT 130 MM HG: CPT | Performed by: NURSE PRACTITIONER

## 2023-09-27 PROCEDURE — G0463 HOSPITAL OUTPT CLINIC VISIT: HCPCS | Performed by: NURSE PRACTITIONER

## 2023-09-27 PROCEDURE — 36591 DRAW BLOOD OFF VENOUS DEVICE: CPT | Performed by: INTERNAL MEDICINE

## 2023-09-27 PROCEDURE — 3078F DIAST BP <80 MM HG: CPT | Performed by: NURSE PRACTITIONER

## 2023-09-27 PROCEDURE — 80053 COMPREHEN METABOLIC PANEL: CPT

## 2023-09-27 PROCEDURE — 36415 COLL VENOUS BLD VENIPUNCTURE: CPT

## 2023-09-27 PROCEDURE — 1126F AMNT PAIN NOTED NONE PRSNT: CPT | Performed by: NURSE PRACTITIONER

## 2023-09-28 NOTE — PROGRESS NOTES
DATE OF VISIT: 9/27/2023      REASON FOR VISIT:  Metastatic lung adenocarcinoma with bone metastasis, history of pulmonary embolism, leukocytosis, iron deficiency anemia, iron malabsorption, hypokalemia        HISTORY OF PRESENT ILLNESS:   73-year-old male with medical problems significant for hypertension, history of pulmonary embolism diagnosed in 2015 for which patient was on Coumadin for 1 year, chronic obstructive pulmonary disease, history of nicotine addiction that he quit in 2008, metastatic lung adenocarcinoma with pulmonary metastasis for which patient is currently on treatment with Keytruda. His last treatment was on 8/30/2023    He has been admitted to hospital since last treatment. He was admitted for hallucinations. Per his wife pt is still being treated for C diff infection as well.  He did have CT scan while hospitalized.  He stats he is feeling better and mores stronger since being discharged. Home health and PT/OT are coming to his house few times per week.  Complains of chronic shortness of breath.  Denies any hemoptysis.  Denies any new lymph node enlargement.  Complains of chronic neuropathy affecting lower extremity.  Complains of fatigue.        Past Medical History, Past Surgical History, Social History, Family History have been reviewed and are without significant changes except as mentioned.    Review of Systems   A comprehensive 14 point review of systems was performed and was negative except as mentioned.    Medications:  The current medication list was reviewed in the EMR    ALLERGIES:    Allergies   Allergen Reactions    Amoxicillin Anaphylaxis    Albuterol Irritability     Facial flushing and palpitations.    Asmanex (120 Metered Doses) [Mometasone Furoate] Unknown - Low Severity    Gabapentin Hallucinations    Lortab [Hydrocodone-Acetaminophen] Other (See Comments)     Can't remember    Morphine And Related Hallucinations    Prilosec [Omeprazole] Other (See Comments)     unknown     Sodium Clavulanate Unknown - High Severity    Statins GI Intolerance    Sulfa Antibiotics Other (See Comments)     Can't remember also more and can't remember    Symbicort [Budesonide-Formoterol Fumarate] Unknown (See Comments)     Doesn't remember reaction type       Objective      Vitals:    09/27/23 0906   BP: 119/51   Pulse: 86   Resp: 18   Temp: 96.7 °F (35.9 °C)   SpO2: 96%   Weight: 54 kg (119 lb)   PainSc: 0-No pain         8/9/2023     8:54 AM   Current Status   ECOG score 3       Physical Exam  General: alert and oriented no acute distress  Ext;  no edema    RECENT LABS:  Glucose   Date Value Ref Range Status   09/27/2023 108 (H) 65 - 99 mg/dL Final     Glucose, Arterial   Date Value Ref Range Status   02/06/2018 106 mmol/L Final     Sodium   Date Value Ref Range Status   09/27/2023 135 (L) 136 - 145 mmol/L Final     Potassium   Date Value Ref Range Status   09/27/2023 3.6 3.5 - 5.2 mmol/L Final     CO2   Date Value Ref Range Status   09/27/2023 32.0 (H) 22.0 - 29.0 mmol/L Final     Chloride   Date Value Ref Range Status   09/27/2023 97 (L) 98 - 107 mmol/L Final     Anion Gap   Date Value Ref Range Status   09/27/2023 6.0 5.0 - 15.0 mmol/L Final     Creatinine   Date Value Ref Range Status   09/27/2023 0.50 (L) 0.76 - 1.27 mg/dL Final     BUN   Date Value Ref Range Status   09/27/2023 9 8 - 23 mg/dL Final     BUN/Creatinine Ratio   Date Value Ref Range Status   09/27/2023 18.0 7.0 - 25.0 Final     Calcium   Date Value Ref Range Status   09/27/2023 9.3 8.6 - 10.5 mg/dL Final     eGFR Non  Amer   Date Value Ref Range Status   11/08/2021 81 >60 mL/min/1.73 Final     Alkaline Phosphatase   Date Value Ref Range Status   09/27/2023 67 39 - 117 U/L Final     Total Protein   Date Value Ref Range Status   09/27/2023 6.9 6.0 - 8.5 g/dL Final     ALT (SGPT)   Date Value Ref Range Status   09/27/2023 18 1 - 41 U/L Final     AST (SGOT)   Date Value Ref Range Status   09/27/2023 19 1 - 40 U/L Final     Total  Bilirubin   Date Value Ref Range Status   09/27/2023 0.2 0.0 - 1.2 mg/dL Final     Albumin   Date Value Ref Range Status   09/27/2023 3.7 3.5 - 5.2 g/dL Final     Globulin   Date Value Ref Range Status   09/27/2023 3.2 gm/dL Final     Lab Results   Component Value Date    WBC 8.40 09/27/2023    HGB 10.6 (L) 09/27/2023    HCT 32.0 (L) 09/27/2023    MCV 97.3 (H) 09/27/2023     09/27/2023     Lab Results   Component Value Date    NEUTROABS 4.74 09/27/2023    IRON 92 09/12/2023    IRON 84 08/09/2023    IRON 85 05/30/2023    TIBC 215 (L) 09/12/2023    TIBC 249 (L) 08/09/2023    TIBC 316 05/30/2023    LABIRON 43 09/12/2023    LABIRON 34 08/09/2023    LABIRON 27 05/30/2023    FERRITIN 1,801.00 (H) 09/12/2023    FERRITIN 771.30 (H) 08/09/2023    FERRITIN 799.30 (H) 05/30/2023    CAOGKHTW36 >2,000 (H) 08/09/2023    OMZKXPHD15 1,247 (H) 05/30/2023    SXTRMVDZ86 1,010 (H) 04/10/2023    FOLATE >20.00 08/09/2023    FOLATE >20.00 05/30/2023    FOLATE >20.00 04/10/2023     No results found for: , LABCA2, AFPTM, HCGQUANT, , CHROMGRNA, 8KNIM80TFC, CEA, REFLABREPO            RADIOLOGY DATA : CT chest without contrast 9/11/2023; reviewed with Dr. Davis and discussed with patient today;    IMPRESSION:  1. Severe emphysema with mild parenchymal scarring in the right middle lobe.  This is significantly improved from 4/21/2023.  No definite acute infiltrate or  consolidation.  No pleural effusion.      Assessment & Plan     1.  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  - Last dose was on 8/30/2023 and was cycle 7.  -he has poor tolerance even with Keytruda; he is still weak and being treated for C diff infection.  -CT  scan form 9/11/2023 shows improvement from 4/21/2023; discussed with the option of continuing with Keytruda with the knowledge that he could end back in hospital or we can give him a break of about 6 weeks and then repeat his CT scan in 6 weeks and appt with Dr. Davis; pt and wife both agree with no treatment and would like to have repeat CT scans in 6 weeks with MD appt.  -orders placed today.     2.  Iron deficiency anemia:  - Due to iron malabsorption patient has received intravenous Injectafer in the past.  - Hemoglobin today 11.9.  - Remains on folic acid p.o. daily  - We will repeat anemia work-up in October 2023     3.  History of pulmonary embolism  - S/p Coumadin for 1 year in 2015     4.  History of recurrent C. difficile  - Patient is currently being treated for this.      5.  Health maintenance: Patient does not smoke     6.  Advance care planning:  - Patient remains full code.  Has living will on chart.                  PHQ-9 Total Score: 0 pt is not suicidal or homicidal     Brian Pace Garcia reports a pain score of 0.  Given his pain assessment as noted, treatment options were discussed and the following options were decided upon as a follow-up plan to address the patient's pain:  no pain today .         Adriana Adams, APRN  9/28/2023  10:48 CDT        Part of this note may be an electronic transcription/translation of spoken language to printed text using the Dragon Dictation System.          CC:

## (undated) DEVICE — SOL IRR NACL 0.9PCT BT 1000ML

## (undated) DEVICE — STERILE POLYISOPRENE POWDER-FREE SURGICAL GLOVES WITH EMOLLIENT COATING: Brand: PROTEXIS

## (undated) DEVICE — ADHS SKIN PREMIERPRO EXOFIN TOPICAL HI/VISC .5ML

## (undated) DEVICE — 3M™ IOBAN™ 2 ANTIMICROBIAL INCISE DRAPE 6651EZ: Brand: IOBAN™ 2

## (undated) DEVICE — PK MAJ PROC LF 60

## (undated) DEVICE — PATIENT RETURN ELECTRODE, SINGLE-USE, CONTACT QUALITY MONITORING, ADULT, WITH 9FT CORD, FOR PATIENTS WEIGING OVER 33LBS. (15KG): Brand: MEGADYNE

## (undated) DEVICE — TRAP FLD MINIVAC MEGADYNE 100ML

## (undated) DEVICE — CVR SURG EQUIP BND RECTG 36X28

## (undated) DEVICE — GLV SURG SENSICARE PI MIC PF SZ7.5 LF STRL

## (undated) DEVICE — SUT MONOCRYL 4/0 PS2 27IN Y426H ETY426H

## (undated) DEVICE — TBG PENCL TELESCP MEGADYNE SMOKE EVAC 10FT

## (undated) DEVICE — SUT VIC 3/0 RB1 27IN J215H

## (undated) DEVICE — GLV SURG SIGNATURE ESSENTIAL PF LTX SZ6

## (undated) DEVICE — SUT PROLN 3/0 RB1 D/A 36IN 8558H

## (undated) DEVICE — STERILE POLYISOPRENE POWDER-FREE SURGICAL GLOVES: Brand: PROTEXIS

## (undated) DEVICE — ST CVR PROB PULLUP ULTRASND 5X48IN

## (undated) DEVICE — GLV SURG SIGNATURE ESSENTIAL PF LTX SZ7.5

## (undated) DEVICE — INTENDED FOR TISSUE SEPARATION, AND OTHER PROCEDURES THAT REQUIRE A SHARP SURGICAL BLADE TO PUNCTURE OR CUT.: Brand: BARD-PARKER ® CARBON RIB-BACK BLADES

## (undated) DEVICE — SUT PROLN 2/0 8685H